# Patient Record
Sex: FEMALE | Race: BLACK OR AFRICAN AMERICAN | Employment: OTHER | URBAN - METROPOLITAN AREA
[De-identification: names, ages, dates, MRNs, and addresses within clinical notes are randomized per-mention and may not be internally consistent; named-entity substitution may affect disease eponyms.]

---

## 2017-01-04 ENCOUNTER — OFFICE VISIT (OUTPATIENT)
Dept: ORTHOPEDIC SURGERY | Age: 77
End: 2017-01-04

## 2017-01-04 VITALS
OXYGEN SATURATION: 94 % | WEIGHT: 175 LBS | SYSTOLIC BLOOD PRESSURE: 115 MMHG | RESPIRATION RATE: 18 BRPM | HEIGHT: 61 IN | BODY MASS INDEX: 33.04 KG/M2 | DIASTOLIC BLOOD PRESSURE: 71 MMHG | HEART RATE: 74 BPM | TEMPERATURE: 98.1 F

## 2017-01-04 DIAGNOSIS — M79.18 CERVICAL MYOFASCIAL PAIN SYNDROME: ICD-10-CM

## 2017-01-04 DIAGNOSIS — M48.02 CERVICAL SPINAL STENOSIS: Primary | ICD-10-CM

## 2017-01-04 DIAGNOSIS — M50.30 DDD (DEGENERATIVE DISC DISEASE), CERVICAL: ICD-10-CM

## 2017-01-04 DIAGNOSIS — M79.2 NEURITIS: ICD-10-CM

## 2017-01-04 DIAGNOSIS — M54.12 CERVICAL RADICULOPATHY: ICD-10-CM

## 2017-01-04 NOTE — MR AVS SNAPSHOT
Visit Information Date & Time Provider Department Dept. Phone Encounter #  
 1/4/2017  7:50 AM Armando Rosario MD [facility] [phone] [id number] Follow-up Instructions Return in about 6 months (around 7/4/2017) for Medication follow up. Routing History Your Appointments 1/30/2017  9:00 AM  
Office Visit with Shaneka Jimenez MD  
[facility] (--) Appt Note: medicare wellness [address] [id number] [phone]  
  
   
 [address] [id number] Upcoming Health Maintenance Date Due DTaP/Tdap/Td series (1 - Tdap) 2[birth year]1/1961 BREAST CANCER SCRN MAMMOGRAM 4/22/2017 MEDICARE YEARLY EXAM 1/26/2017 GLAUCOMA SCREENING Q2Y 1/11/2018 COLONOSCOPY 1/13/2020 Allergies as of 1/4/2017  Review Complete On: 1/4/2017 By: Armando Rosario MD  
  
 Severity Noted Reaction Type Reactions Naprosyn [Naproxen]  01/21/2014    Other (comments) Blood in urine Current Immunizations  Reviewed on 9/22/2016 Name Date Influenza High Dose Vaccine PF 9/22/2016  9:45 AM  
 Influenza Vaccine 10/20/2015, 9/26/2014, 10/8/2013 Influenza Vaccine Split 10/6/2011 Pneumococcal Conjugate (PCV-13) 5/11/2015 Pneumococcal Polysaccharide (PPSV-23) 6/14/2016 Not reviewed this visit You Were Diagnosed With   
  
 Codes Comments Cervical spinal stenosis    -  Primary ICD-10-CM: M48.02 
ICD-9-CM: 723.0 Cervical myofascial pain syndrome     ICD-10-CM: M79.1 ICD-9-CM: 729.1 Neuritis     ICD-10-CM: M79.2 ICD-9-CM: 729.2 Cervical radiculopathy     ICD-10-CM: M54.12 
ICD-9-CM: 723.4 DDD (degenerative disc disease), cervical     ICD-10-CM: M50.30 ICD-9-CM: 722.4 Vitals BP Pulse Temp Resp Height(growth percentile) Weight(growth percentile) 115/71 74 98.1 °F (36.7 °C) 18 5' 1\" (1.549 m) 175 lb (79.4 kg) SpO2 BMI OB Status Smoking Status 94% 33.07 kg/m2 Hysterectomy Former Smoker BMI and BSA Data Body Mass Index Body Surface Area 33.07 kg/m 2 1.85 m 2 Preferred Pharmacy Pharmacy Name Phone [address] [facility] [phone] Your Updated Medication List  
  
   
This list is accurate as of: 1/4/17  8:28 AM.  Always use your most recent med list.  
  
  
  
  
 acetaminophen-codeine 300-30 mg per tablet Commonly known as:  TYLENOL-CODEINE #3 Take 1 Tab by mouth two (2) times daily as needed for Pain. Max Daily Amount: 2 Tabs. albuterol 90 mcg/actuation inhaler Commonly known as:  PROAIR HFA Take 2 Puffs by inhalation every four (4) hours as needed. BIOTIN PO Take 5,000 mcg by mouth daily. CENTRUM SILVER PO Take 1 Tab by mouth daily. CITRACAL + D PO Take 3 Tabs by mouth daily. CO Q-10 100 mg capsule Generic drug:  co-enzyme Q-10 Take 100 mg by mouth daily. EPINEPHrine 0.3 mg/0.3 mL injection Commonly known as:  EPIPEN  
0.3 mL by IntraMUSCular route once as needed for 1 dose. esomeprazole 40 mg capsule Commonly known as:  NexIUM Take 1 Cap by mouth daily. * fluticasone furoate 100 mcg/actuation Dsdv inhaler Commonly known as:  ARNUITY ELLIPTA Take 1 Puff by inhalation daily. * fluticasone furoate 100 mcg/actuation Dsdv inhaler Commonly known as:  ARNUITY ELLIPTA Take 1 Puff by inhalation daily. gabapentin 600 mg Tb24 Commonly known as:  GRALISE  
TAKE 2 TABLETS (1,200 MG) DAILY  
  
 lidocaine 5 % Commonly known as:  Bard Mancuso Apply patch to the affected area for 12 hours a day and remove for 12 hours a day. * methylPREDNISolone 4 mg tablet Commonly known as:  Denton Beagle Per dose pack instructions * methylPREDNISolone 4 mg tablet Commonly known as:  Denton Beagle Per dose pack instructions  
  
 mometasone 50 mcg/actuation nasal spray Commonly known as:  NASONEX  
2 Sprays by Nasal route daily. omega-3 fatty acids-vitamin e 1,000 mg Cap Take 1 Cap by mouth two (2) times a day. OTHER Flurbiprofen 10%/ Baclofen 2%/ Cyclobenzaprine 2%/ Gabapentin 6%/ Lidocaine 5% Apply 1-2 Grams to affected area 3-4 times per day. 5/18/15-Fax from [facility]-reordered - 3 refills RESTASIS 0.05 % ophthalmic emulsion Generic drug:  cycloSPORINE Administer 1 Drop to both eyes two (2) times a day. * traMADol 50 mg tablet Commonly known as:  ULTRAM  
Take 1 Tab by mouth every eight (8) hours as needed for Pain. * traMADol 50 mg tablet Commonly known as:  ULTRAM  
Take 1 Tab by mouth every six (6) hours as needed for Pain. Max Daily Amount: 200 mg.  
  
 triamterene-hydroCHLOROthiazide 37.5-25 mg per tablet Commonly known as:  Soundra [place] TAKE ONE-HALF (1/2) TABLET DAILY  
  
 VITAMIN D3 1,000 unit Cap Generic drug:  cholecalciferol Take 1,000 Units by mouth daily. ZyrTEC 10 mg tablet Generic drug:  cetirizine Take 10 mg by mouth daily as needed for Allergies. * Notice: This list has 6 medication(s) that are the same as other medications prescribed for you. Read the directions carefully, and ask your doctor or other care provider to review them with you. Prescriptions Sent to Pharmacy Refills  
 gabapentin (GRALISE) 600 mg Tb24 1 Sig: TAKE 2 TABLETS (1,200 MG) DAILY Class: Normal  
 Pharmacy: [address], [address] [place] [address] #: [phone] Follow-up Instructions Return in about 6 months (around 7/4/2017) for Medication follow up. To-Do List   
 03/07/2017 7:00 AM  
  Appointment with [facility] RM 1 at [facility] ([phone]) GENERAL INSTRUCTIONS  This study does not require you to drink contrast prior to your study.   RELATED STUDY INFORMATION  Bring any films, CDs, and reports related with you on the day of your exam.  This only includes studies done outside of [address] [place] [address], [facility], Casandra Figueroa, and Valentino Agent. QUESTIONS  Notify the CT Department if you have any questions concerning your study. Casandra Figueroa - [phone] Moneta Darlin Siegel Valentino Agent - [phone] Patient Instructions Neck Arthritis: Exercises Your Care Instructions Here are some examples of typical rehabilitation exercises for your condition. Start each exercise slowly. Ease off the exercise if you start to have pain. Your doctor or physical therapist will tell you when you can start these exercises and which ones will work best for you. How to do the exercises Neck stretches to the side 1. This stretch works best if you keep your shoulder down as you lean away from it. To help you remember to do this, start by relaxing your shoulders and lightly holding on to your thighs or your chair. 2. Tilt your head toward your shoulder and hold for 15 to 30 seconds. Let the weight of your head stretch your muscles. 3. Repeat 2 to 4 times toward each shoulder. Chin tuck 1. Lie on the floor with a rolled-up towel under your neck. Your head should be touching the floor. 2. Slowly bring your chin toward your chest. 
3. Hold for a count of 6, and then relax for up to 10 seconds. 4. Repeat 8 to 12 times. Active cervical rotation 1. Sit in a firm chair, or stand up straight. 2. Keeping your chin level, turn your head to the right, and hold for 15 to 30 seconds. 3. Turn your head to the left and hold for 15 to 30 seconds. 4. Repeat 2 to 4 times to each side. Shoulder blade squeeze 1. While standing, squeeze your shoulder blades together. 2. Do not raise your shoulders up as you are squeezing. 3. Hold for 6 seconds. 4. Repeat 8 to 12 times. Shoulder rolls 1. Sit comfortably with your feet shoulder-width apart.  You can also do this exercise standing up. 2. Roll your shoulders up, then back, and then down in a smooth, circular motion. 3. Repeat 2 to 4 times. Follow-up care is a key part of your treatment and safety. Be sure to make and go to all appointments, and call your doctor if you are having problems. It's also a good idea to know your test results and keep a list of the medicines you take. Where can you learn more? Go to http://keke-josé luis.info/. Enter H599 in the search box to learn more about \"Neck Arthritis: Exercises. \" Current as of: May 23, 2016 Content Version: 11.1 © 8685-4397 CÃ[employer]ras Libres. Care instructions adapted under license by [employer] (which disclaims liability or warranty for this information). If you have questions about a medical condition or this instruction, always ask your healthcare professional. [place] [address] any warranty or liability for your use of this information. Low Back Arthritis: Exercises Your Care Instructions Here are some examples of typical rehabilitation exercises for your condition. Start each exercise slowly. Ease off the exercise if you start to have pain. Your doctor or physical therapist will tell you when you can start these exercises and which ones will work best for you. When you are not being active, find a comfortable position for rest. Some people are comfortable on the floor or a medium-firm bed with a small pillow under their head and another under their knees. Some people prefer to lie on their side with a pillow between their knees. Don't stay in one position for too long. Take short walks (10 to 20 minutes) every 2 to 3 hours. Avoid slopes, hills, and stairs until you feel better. Walk only distances you can manage without pain, especially leg pain. How to do the exercises Pelvic tilt 4. Lie on your back with your knees bent. 5. \"Brace\" your stomachtighten your muscles by pulling in and imagining your belly button moving toward your spine. 6. Press your lower back into the floor. You should feel your hips and pelvis rock back. 7. Hold for 6 seconds while breathing smoothly. 8. Relax and allow your pelvis and hips to rock forward. 9. Repeat 8 to 12 times. Back stretches 5. Get down on your hands and knees on the floor. 6. Relax your head and allow it to droop. Round your back up toward the ceiling until you feel a nice stretch in your upper, middle, and lower back. Hold this stretch for as long as it feels comfortable, or about 15 to 30 seconds. 7. Return to the starting position with a flat back while you are on your hands and knees. 8. Let your back sway by pressing your stomach toward the floor. Lift your buttocks toward the ceiling. 9. Hold this position for 15 to 30 seconds. 10. Repeat 2 to 4 times. Follow-up care is a key part of your treatment and safety. Be sure to make and go to all appointments, and call your doctor if you are having problems. It's also a good idea to know your test results and keep a list of the medicines you take. Where can you learn more? Go to http://keke-josé luis.info/. Enter Y502 in the search box to learn more about \"Low Back Arthritis: Exercises. \" Current as of: May 23, 2016 Content Version: 11.1 © 6438-4817 [employer]. Care instructions adapted under license by [employer] (which disclaims liability or warranty for this information). If you have questions about a medical condition or this instruction, always ask your healthcare professional. [address] any warranty or liability for your use of this information. Introducing [facility]! Dear Evelyne Wood: Thank you for requesting a [employer] ([employer]) account. Our records indicate that you already have an active [employer] ([employer]) account.   You can access your account anytime at [email or web address] Did you know that you can access your hospital and ER discharge instructions at any time in Sharetribe? You can also review all of your test results from your hospital stay or ER visit. Additional Information If you have questions, please visit the Frequently Asked Questions section of the Sharetribe website at [email or web address] Remember, Sharetribe is NOT to be used for urgent needs. For medical emergencies, dial 911. Now available from your iPhone and Android! Please provide this summary of care documentation to your next provider. Your primary care clinician is listed as SPENSER MIRANDA. If you have any questions after today's visit, please call [phone].

## 2017-01-04 NOTE — PROGRESS NOTES
[facility]  [address] [place], [address]  Phone: [phone]  Fax: [phone]          HISTORY OF PRESENT ILLNESS:  Ajay Padilla is a 78 y.o. female with history of cervical and lumbar pain. She c/o pain in the neck that intermittently radiates down both arms. Pt tried physical therapy with benefit. She denies ever trying dry needling. Pt has been prescribed Gralise 600 mg, taking 2 tabs daily, and requests a refill at this time. She denies any balance or weakness issues. Pt desires to continue with current care. Of note, Pt's  passed away in September. Pain Scale: 0 - No pain/10    PCP: Yoselin Valentine MD       Past Medical History   Diagnosis Date    AR (allergic rhinitis)     Arthropathy, unspecified, site unspecified     Asthma     Cataract     Cervical pain     Cylindrical bronchiectasis (HCC)     Fracture      rt ankle as an adult    History of pneumonia     Hypertension     Ill-defined condition      Spasms to left side    Left arm pain     Lumbar spinal stenosis     Myofascial pain     Osteopenia     Right buttock pain     Right low back pain     Sciatica of right side         Social History     Social History    Marital status:      Spouse name: N/A    Number of children: N/A    Years of education: N/A     Occupational History    Not on file.      Social History Main Topics    Smoking status: Former Smoker     Years: 5.00     Types: Cigarettes     Quit date: 1/1/1970    Smokeless tobacco: Never Used    Alcohol use 0.6 oz/week     1 Glasses of wine per week      Comment: rare    Drug use: Yes     Special: Prescription, OTC    Sexual activity: Not on file     Other Topics Concern    Not on file     Social History Narrative       Current Outpatient Prescriptions   Medication Sig Dispense Refill    gabapentin (GRALISE) 600 mg Tb24 TAKE 2 TABLETS (1,200 MG) DAILY 180 Tab 1    traMADol (ULTRAM) 50 mg tablet Take 1 Tab by mouth every six (6) hours as needed for Pain. Max Daily Amount: 200 mg. 20 Tab 0    lidocaine (LIDODERM) 5 % Apply patch to the affected area for 12 hours a day and remove for 12 hours a day. 1 Each 0    fluticasone furoate (ARNUITY ELLIPTA) 100 mcg/actuation dsdv inhaler Take 1 Puff by inhalation daily. 1 Inhaler 0    fluticasone furoate (ARNUITY ELLIPTA) 100 mcg/actuation dsdv inhaler Take 1 Puff by inhalation daily. 1 Inhaler 5    omega-3 fatty acids-vitamin e 1,000 mg cap Take 1 Cap by mouth two (2) times a day.  albuterol (PROAIR HFA) 90 mcg/actuation inhaler Take 2 Puffs by inhalation every four (4) hours as needed. 3 Inhaler 3    Cholecalciferol, Vitamin D3, (VITAMIN D3) 1,000 unit cap Take 1,000 Units by mouth daily.  traMADol (ULTRAM) 50 mg tablet Take 1 Tab by mouth every eight (8) hours as needed for Pain. 30 Tab 0    OTHER Flurbiprofen 10%/ Baclofen 2%/ Cyclobenzaprine 2%/ Gabapentin 6%/ Lidocaine 5%  Apply 1-2 Grams to affected area 3-4 times per day. 5/18/15-Fax from [facility]-reordered - 3 refills      EPINEPHrine (EPIPEN) 0.3 mg/0.3 mL (1:1,000) injection 0.3 mL by IntraMUSCular route once as needed for 1 dose. 1 Each 1    cycloSPORINE (RESTASIS) 0.05 % ophthalmic emulsion Administer 1 Drop to both eyes two (2) times a day.  mometasone (NASONEX) 50 mcg/actuation nasal spray 2 Sprays by Nasal route daily. 3 Container 3    MULTIVITAMIN W-MINERALS/LUTEIN (CENTRUM SILVER PO) Take 1 Tab by mouth daily.  cetirizine (ZYRTEC) 10 mg tablet Take 10 mg by mouth daily as needed for Allergies.  co-enzyme Q-10 (CO Q-10) 100 mg capsule Take 100 mg by mouth daily.  CALCIUM CITRATE/VITAMIN D3 (CITRACAL + D PO) Take 3 Tabs by mouth daily.  BIOTIN PO Take 5,000 mcg by mouth daily.       triamterene-hydroCHLOROthiazide (MAXZIDE) 37.5-25 mg per tablet TAKE ONE-HALF (1/2) TABLET DAILY 45 Tab 1    acetaminophen-codeine (TYLENOL-CODEINE #3) 300-30 mg per tablet Take 1 Tab by mouth two (2) times daily as needed for Pain. Max Daily Amount: 2 Tabs. 60 Tab 0    methylPREDNISolone (MEDROL DOSEPACK) 4 mg tablet Per dose pack instructions 1 Dose Pack 1    methylPREDNISolone (MEDROL DOSEPACK) 4 mg tablet Per dose pack instructions 1 Dose Pack 1    esomeprazole (NEXIUM) 40 mg capsule Take 1 Cap by mouth daily. 90 Cap 1       Allergies   Allergen Reactions    Naprosyn [Naproxen] Other (comments)     Blood in urine           REVIEW OF SYSTEMS    Constitutional: Negative for fever, chills, or weight change. Respiratory: Negative for cough or shortness of breath. Cardiovascular: Negative for chest pain or palpitations. Gastrointestinal: Negative for acid reflux, change in bowel habits, or constipation. Genitourinary: Negative for dysuria and flank pain. Musculoskeletal: Positive for cervical and lumbar pain. Skin: Negative for rash. Neurological: Negative for headaches, dizziness, or numbness. Endo/Heme/Allergies: Negative for increased bruising. Psychiatric/Behavioral: Negative for difficulty with sleep. PHYSICAL EXAMINATION  Visit Vitals    /71    Pulse 74    Temp 98.1 °F (36.7 °C)    Resp 18    Ht 5' 1\" (1.549 m)    Wt 175 lb (79.4 kg)    SpO2 94%    BMI 33.07 kg/m2       Constitutional: Awake, alert, and in no acute distress  Neurological: 1+ symmetrical DTRs in the upper extremities. 1+ symmetrical DTRs in the lower extremities. Sensation to light touch is intact. Negative Yessi's sign bilaterally. Skin: warm, dry, and intact. Musculoskeletal: Decrease left cervical flexion. Good ROM with cervical extension and forward flexion. Tight across the upper trapezius with multiple trigger points. No pain with extension, axial loading, or forward flexion. No pain with internal or external rotation of her hips. Negative straight leg raise bilaterally.       Biceps  Triceps Deltoids Wrist Ext Wrist Flex Hand Intrin   Right +4/5 +4/5 +4/5 +4/5 +4/5 +4/5   Left +4/5 +4/5 +4/5 +4/5 +4/5 +4/5      Hip Flex  Quads Hamstrings Ankle DF EHL Ankle PF   Right +4/5 +4/5 +4/5 +4/5 +4/5 +4/5   Left +4/5 +4/5 +4/5 +4/5 +4/5 +4/5     IMAGING:    Cervical Spine MRI from 11/08/2016 was personally reviewed with the Pt and demonstrated:    Results from [place] encounter on 11/08/16   MRI CERV SPINE WO CONT   Narrative MRI OF CERVICAL SPINE WITHOUT CONTRAST    CPT CODE: 01502    HISTORY: Chronic neck pain extending into the left shoulder with progressive  paresthesias left hand. COMPARISON: MRI cervical spine 4/10/2014. FINDINGS:     There is normal anatomic alignment of the cervical spine. Vertebral body heights  are maintained. Craniocervical junction and posterior elements are intact. Prevertebral soft tissues are normal. Incidentally imaged intracranial soft  tissues demonstrate no acute abnormalities. Within limitations of motion  artifact, cervical spinal cord maintains normal caliber, signal intensity and  morphology throughout. Cervical soft tissues demonstrate no acute abnormalities. C2/C3: Central canal and neural foramina are patent. C3/C4: Central canal and neural foramina are patent. C4/C5: Central canal and neural foramina are patent. C5/C6: Broad-based disc protrusion. Contact of the ventral cord. CSF dorsal to  the cord is maintained. Probable moderate right greater than left foraminal  stenoses. C6/C7: Mild bulging of the disc. Probable mild bilateral foraminal narrowing. C7/T1: Central canal and neural foramina are patent. T1/T2 through T4/T5: Central canal and foramina are adequate on the sagittal  images. Impression IMPRESSION:    Allowing for differences in technique, no substantial interval change since  2014.     Degenerative changes of the cervical spine are most pronounced at C5/C6 where  there is a disc protrusion which contacts the ventral cord and moderate  bilateral foraminal stenosis. Lumbar Spine MRI from 06/30/2016 was personally reviewed with the Pt and demonstrated:  [facility]   MRI LUMB SPINE WO CONT    Narrative Procedure: MRI of the lumbar spine without contrast.    CPT code: 03952    Comparisons: April 10, 2014. Indications: new left radicular symptoms; increased muscle spasm and pain for 3  months     Technique: T1 weighted, T2 FSE with fat saturation, FSE inversion recovery  sagittal images are supplemented by T2 weighted with fat saturation and T1  weighted axial images. Findings:        Sagittal images reveal overall normal vertebral body morphology. No fractures  noted. No suspicious lesions. Conus medullaris ends at the L1 vertebral body level. Correlation of axial and sagittal images reveals the following:    At L1-L2: No significant disc pathology. No significant facet arthropathy. No  central canal or foraminal stenosis. At L2-L3: Mild disc osteophyte complex at the posterior lateral corners. Mild  facet arthropathy. No central canal stenosis. Mild to moderate bilateral  foraminal stenosis. At L3-L4: Mild disc osteophyte complex at the posterior lateral corners. Mild to  moderate facet arthropathy. Posterior epidural lipomatosis with some mass  effect. No central canal stenosis. Mild left and mild to moderate right  foraminal stenosis. At L4-L5: Mild disc osteophyte complex at the posterior lateral corners. Mild to  moderate facet arthropathy. No central canal stenosis. Mild to moderate  foraminal stenosis. At L5-S1: Trace retrolisthesis L5 on S1. Overlying broad-based disc osteophyte  complex. Moderate facet arthropathy. No central canal stenosis. Moderate  foraminal stenosis. Visualized portions of the sacroiliac joints are unremarkable.  Incidentally  imaged retroperitoneal structures are unremarkable as well.               Impression Impression:    Degenerative changes as above. Interval resolution of disc extrusion at L5-S1  seen on prior study from 2014. ASSESSMENT   Law Donaldson was seen today for neck pain. Diagnoses and all orders for this visit:    Cervical spinal stenosis    Cervical myofascial pain syndrome    Neuritis  -     Discontinue: gabapentin (GRALISE) 600 mg Tb24; TAKE 2 TABLETS (1,200 MG) DAILY  -     gabapentin (GRALISE) 600 mg Tb24; TAKE 2 TABLETS (1,200 MG) DAILY    Cervical radiculopathy  -     Discontinue: gabapentin (GRALISE) 600 mg Tb24; TAKE 2 TABLETS (1,200 MG) DAILY  -     gabapentin (GRALISE) 600 mg Tb24; TAKE 2 TABLETS (1,200 MG) DAILY    DDD (degenerative disc disease), cervical  -     Discontinue: gabapentin (GRALISE) 600 mg Tb24; TAKE 2 TABLETS (1,200 MG) DAILY  -     gabapentin (GRALISE) 600 mg Tb24; TAKE 2 TABLETS (1,200 MG) DAILY         IMPRESSION AND PLAN:  Bartolome Hassan is a 78 y.o. female with history of cervical and lumbar pain. She c/o pain in the neck that intermittently radiates down both arms. Pt tried physical therapy with benefit but denies ever trying dry needling. She continues to take Gralise 600 mg 2 tabs daily. 1) Pt was given information on cervical and lumbar arthritis exercises. 2) She received a refill of Gralise 600 mg 2 tabs daily. 3) Pt was given information on dry needling; she will call if her pain worsens and she would like to try this  4) She was given information on how to use a TheraCane. 5) Ms. Marcela Crisostomo has a reminder for a \"due or due soon\" health maintenance. I have asked that she contact her primary care provider, Katia Diaz MD,  for follow-up on this health maintenance. 6)  reviewed. 7) Pt will follow-up in 6 months.       Written by Dennis Kraft, as dictated by Arthur Hubbard MD.

## 2017-01-24 ENCOUNTER — HOSPITAL ENCOUNTER (OUTPATIENT)
Dept: LAB | Age: 77
Discharge: HOME | End: 2017-01-24
Payer: MEDICARE

## 2017-01-24 DIAGNOSIS — E55.9 VITAMIN D DEFICIENCY: ICD-10-CM

## 2017-01-24 DIAGNOSIS — R68.89 OTHER GENERAL SYMPTOMS AND SIGNS: ICD-10-CM

## 2017-01-24 DIAGNOSIS — Z51.81 MEDICATION MONITORING ENCOUNTER: ICD-10-CM

## 2017-01-24 DIAGNOSIS — I10 ESSENTIAL HYPERTENSION: ICD-10-CM

## 2017-01-24 LAB
25(OH)D3 SERPL-MCNC: 35 NG/ML (ref 30–100)
ALBUMIN SERPL BCP-MCNC: 3.6 G/DL (ref 3.4–5)
ALBUMIN/GLOB SERPL: 1 {RATIO} (ref 0.8–1.7)
ALP SERPL-CCNC: 81 U/L (ref 45–117)
ALT SERPL-CCNC: 29 U/L (ref 13–56)
ANION GAP BLD CALC-SCNC: 7 MMOL/L (ref 3–18)
AST SERPL W P-5'-P-CCNC: 16 U/L (ref 15–37)
BASOPHILS # BLD AUTO: 0 K/UL (ref 0–0.1)
BASOPHILS # BLD: 0 % (ref 0–2)
BILIRUB SERPL-MCNC: 0.4 MG/DL (ref 0.2–1)
BUN SERPL-MCNC: 10 MG/DL (ref 7–18)
BUN/CREAT SERPL: 13 (ref 12–20)
CALCIUM SERPL-MCNC: 8.5 MG/DL (ref 8.5–10.1)
CHLORIDE SERPL-SCNC: 106 MMOL/L (ref 100–108)
CHOLEST SERPL-MCNC: 177 MG/DL
CO2 SERPL-SCNC: 28 MMOL/L (ref 21–32)
CREAT SERPL-MCNC: 0.75 MG/DL (ref 0.6–1.3)
DIFFERENTIAL METHOD BLD: NORMAL
EOSINOPHIL # BLD: 0 K/UL (ref 0–0.4)
EOSINOPHIL NFR BLD: 0 % (ref 0–5)
ERYTHROCYTE [DISTWIDTH] IN BLOOD BY AUTOMATED COUNT: 14.2 % (ref 11.6–14.5)
GLOBULIN SER CALC-MCNC: 3.5 G/DL (ref 2–4)
GLUCOSE SERPL-MCNC: 94 MG/DL (ref 74–99)
HCT VFR BLD AUTO: 41.6 % (ref 35–45)
HDLC SERPL-MCNC: 61 MG/DL (ref 40–60)
HDLC SERPL: 2.9 {RATIO} (ref 0–5)
HGB BLD-MCNC: 14.1 G/DL (ref 12–16)
LDLC SERPL CALC-MCNC: 94.6 MG/DL (ref 0–100)
LIPID PROFILE,FLP: ABNORMAL
LYMPHOCYTES # BLD AUTO: 35 % (ref 21–52)
LYMPHOCYTES # BLD: 1.7 K/UL (ref 0.9–3.6)
MAGNESIUM SERPL-MCNC: 2.2 MG/DL (ref 1.8–2.4)
MCH RBC QN AUTO: 30 PG (ref 24–34)
MCHC RBC AUTO-ENTMCNC: 33.9 G/DL (ref 31–37)
MCV RBC AUTO: 88.5 FL (ref 74–97)
MONOCYTES # BLD: 0.3 K/UL (ref 0.05–1.2)
MONOCYTES NFR BLD AUTO: 6 % (ref 3–10)
NEUTS SEG # BLD: 2.9 K/UL (ref 1.8–8)
NEUTS SEG NFR BLD AUTO: 59 % (ref 40–73)
PLATELET # BLD AUTO: 323 K/UL (ref 135–420)
PMV BLD AUTO: 10.5 FL (ref 9.2–11.8)
POTASSIUM SERPL-SCNC: 3.6 MMOL/L (ref 3.5–5.5)
PROT SERPL-MCNC: 7.1 G/DL (ref 6.4–8.2)
RBC # BLD AUTO: 4.7 M/UL (ref 4.2–5.3)
SODIUM SERPL-SCNC: 141 MMOL/L (ref 136–145)
TRIGL SERPL-MCNC: 107 MG/DL (ref ?–150)
TSH SERPL DL<=0.05 MIU/L-ACNC: 0.77 UIU/ML (ref 0.36–3.74)
VIT B12 SERPL-MCNC: 494 PG/ML (ref 211–911)
VLDLC SERPL CALC-MCNC: 21.4 MG/DL
WBC # BLD AUTO: 4.9 K/UL (ref 4.6–13.2)

## 2017-01-24 PROCEDURE — 84443 ASSAY THYROID STIM HORMONE: CPT | Performed by: FAMILY MEDICINE

## 2017-01-24 PROCEDURE — 36415 COLL VENOUS BLD VENIPUNCTURE: CPT | Performed by: FAMILY MEDICINE

## 2017-01-24 PROCEDURE — 80053 COMPREHEN METABOLIC PANEL: CPT | Performed by: FAMILY MEDICINE

## 2017-01-24 PROCEDURE — 85025 COMPLETE CBC W/AUTO DIFF WBC: CPT | Performed by: FAMILY MEDICINE

## 2017-01-24 PROCEDURE — 82607 VITAMIN B-12: CPT | Performed by: FAMILY MEDICINE

## 2017-01-24 PROCEDURE — 83735 ASSAY OF MAGNESIUM: CPT | Performed by: FAMILY MEDICINE

## 2017-01-24 PROCEDURE — 80061 LIPID PANEL: CPT | Performed by: FAMILY MEDICINE

## 2017-01-24 PROCEDURE — 82306 VITAMIN D 25 HYDROXY: CPT | Performed by: FAMILY MEDICINE

## 2017-01-30 ENCOUNTER — OFFICE VISIT (OUTPATIENT)
Dept: FAMILY MEDICINE CLINIC | Age: 77
End: 2017-01-30

## 2017-01-30 VITALS
HEIGHT: 61 IN | HEART RATE: 63 BPM | TEMPERATURE: 97.2 F | SYSTOLIC BLOOD PRESSURE: 119 MMHG | DIASTOLIC BLOOD PRESSURE: 75 MMHG | WEIGHT: 171.25 LBS | RESPIRATION RATE: 16 BRPM | BODY MASS INDEX: 32.33 KG/M2

## 2017-01-30 DIAGNOSIS — Z13.31 SCREENING FOR DEPRESSION: ICD-10-CM

## 2017-01-30 DIAGNOSIS — Z12.11 SCREEN FOR COLON CANCER: ICD-10-CM

## 2017-01-30 DIAGNOSIS — Z13.39 SCREENING FOR ALCOHOLISM: ICD-10-CM

## 2017-01-30 DIAGNOSIS — Z71.89 ACP (ADVANCE CARE PLANNING): ICD-10-CM

## 2017-01-30 DIAGNOSIS — R91.8 MULTIPLE LUNG NODULES: ICD-10-CM

## 2017-01-30 DIAGNOSIS — Z71.89 ADVANCED DIRECTIVES, COUNSELING/DISCUSSION: ICD-10-CM

## 2017-01-30 DIAGNOSIS — Z00.00 ROUTINE GENERAL MEDICAL EXAMINATION AT A HEALTH CARE FACILITY: Primary | ICD-10-CM

## 2017-01-30 DIAGNOSIS — I10 ESSENTIAL HYPERTENSION: ICD-10-CM

## 2017-01-30 RX ORDER — LANOLIN ALCOHOL/MO/W.PET/CERES
3 CREAM (GRAM) TOPICAL
COMMUNITY

## 2017-01-30 NOTE — PROGRESS NOTES
Chief Complaint   Patient presents with   [facility] 41 Visit     Medicare       Health Maintenance reviewed     1. Have you been to the ER, urgent care clinic since your last visit? Hospitalized since your last visit? No    2. Have you seen or consulted any other health care providers outside of the [address] [place] [address] since your last visit? Include any pap smears or colon screening. No    This is a Subsequent Medicare Annual Wellness Visit providing Personalized Prevention Plan Services (PPPS) (Performed 12 months after initial AWV and PPPS )    I have reviewed the patient's medical history in detail and updated the computerized patient record. History     Past Medical History   Diagnosis Date    AR (allergic rhinitis)     Arthropathy, unspecified, site unspecified     Asthma     Cataract     Cervical pain     Cylindrical bronchiectasis (HCC)     Fracture      rt ankle as an adult    History of pneumonia     Hypertension     Ill-defined condition      Spasms to left side    Left arm pain     Lumbar spinal stenosis     Myofascial pain     Osteopenia     Right buttock pain     Right low back pain     Sciatica of right side       Past Surgical History   Procedure Laterality Date    Hx tosha and bso      Hx orthopaedic       left 4th finger trigger release    Hx heent       sinus surgery    Hx cataract removal  8/15/2011     Current Outpatient Prescriptions   Medication Sig Dispense Refill    melatonin 3 mg tablet Take  by mouth.  gabapentin (GRALISE) 600 mg Tb24 TAKE 2 TABLETS (1,200 MG) DAILY 180 Tab 1    triamterene-hydroCHLOROthiazide (MAXZIDE) 37.5-25 mg per tablet TAKE ONE-HALF (1/2) TABLET DAILY 45 Tab 1    acetaminophen-codeine (TYLENOL-CODEINE #3) 300-30 mg per tablet Take 1 Tab by mouth two (2) times daily as needed for Pain. Max Daily Amount: 2 Tabs.  60 Tab 0    lidocaine (LIDODERM) 5 % Apply patch to the affected area for 12 hours a day and remove for 12 hours a day. 1 Each 0    fluticasone furoate (ARNUITY ELLIPTA) 100 mcg/actuation dsdv inhaler Take 1 Puff by inhalation daily. 1 Inhaler 0    omega-3 fatty acids-vitamin e 1,000 mg cap Take 1 Cap by mouth two (2) times a day.  albuterol (PROAIR HFA) 90 mcg/actuation inhaler Take 2 Puffs by inhalation every four (4) hours as needed. 3 Inhaler 3    Cholecalciferol, Vitamin D3, (VITAMIN D3) 1,000 unit cap Take 1,000 Units by mouth daily.  traMADol (ULTRAM) 50 mg tablet Take 1 Tab by mouth every eight (8) hours as needed for Pain. 30 Tab 0    OTHER Flurbiprofen 10%/ Baclofen 2%/ Cyclobenzaprine 2%/ Gabapentin 6%/ Lidocaine 5%  Apply 1-2 Grams to affected area 3-4 times per day. 5/18/15-[facility] - [facility] refills      EPINEPHrine (EPIPEN) 0.3 mg/0.3 mL (1:1,000) injection 0.3 mL by IntraMUSCular route once as needed for 1 dose. 1 Each 1    cycloSPORINE (RESTASIS) 0.05 % ophthalmic emulsion Administer 1 Drop to both eyes two (2) times a day.  mometasone (NASONEX) 50 mcg/actuation nasal spray 2 Sprays by Nasal route daily. 3 Container 3    MULTIVITAMIN W-MINERALS/LUTEIN (CENTRUM SILVER PO) Take 1 Tab by mouth daily.  cetirizine (ZYRTEC) 10 mg tablet Take 10 mg by mouth daily as needed for Allergies.  co-enzyme Q-10 (CO Q-10) 100 mg capsule Take 100 mg by mouth daily.  CALCIUM CITRATE/VITAMIN D3 (CITRACAL + D PO) Take 3 Tabs by mouth daily.  BIOTIN PO Take 5,000 mcg by mouth daily.  esomeprazole (NEXIUM) 40 mg capsule Take 1 Cap by mouth daily.  90 Cap 1     Allergies   Allergen Reactions    Naprosyn [Naproxen] Other (comments)     Blood in urine       Family History   Problem Relation Age of Onset    Asthma Mother     COPD Mother    [facility] MS Mother     Heart Disease Mother     Hypertension Mother    [facility] Stroke Father     Heart Disease Father     Hypertension Father     Allergic Rhinitis Sister     Asthma Brother     Hypertension Brother     Diabetes Brother     Migraines Paternal Grandmother      Social History   Substance Use Topics    Smoking status: Former Smoker     Years: 5.00     Types: Cigarettes     Quit date: 1/1/1970    Smokeless tobacco: Never Used    Alcohol use 0.6 oz/week     1 Glasses of wine per week      Comment: rare     Patient Active Problem List   Diagnosis Code    Multiple lung nodules R91.8    Allergic rhinitis J30.9    HTN (hypertension) I10    Asthma J45.909    Osteopenia M85.80    Vitamin D deficiency E55.9    Dense breasts R92.2    Right knee DJD, 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    Thoracic or lumbosacral neuritis or radiculitis, unspecified [id number]    Cervical radiculopathy M54.12    Colon polyps K63.5    Right low back pain M54.5    Right buttock pain M79.1    Annular disc tear M51.9    Facet hypertrophy of lumbar region M47.896    Bulging lumbar disc M51.26    Lumbar spinal stenosis M48.06    ACP (advance care planning) Z71.89    Neuritis of lower extremity G57.90    Trochanteric bursitis of both hips M70.61, M70.62    HNP (herniated nucleus pulposus), lumbar M51.26    Lumbar facet arthropathy (HCC) M12.88    Trochanteric bursitis, left hip M70.62    Left arm weakness M62.81       Depression Risk Factor Screening:     PHQ 2 / 9, over the last two weeks 6/14/2016   Little interest or pleasure in doing things Not at all   Feeling down, depressed or hopeless Not at all   Total Score PHQ 2 0     Alcohol Risk Factor Screening: On any occasion during the past 3 months, have you had more than 3 drinks containing alcohol? Yes    Do you average more than 7 drinks per week? No      Functional Ability and Level of Safety:   No exam data present    Hearing Loss   none    Activities of Daily Living   Self-care. Requires assistance with: no ADLs    Fall Risk     Fall Risk Assessment, last 12 mths 1/4/2017   Able to walk? Yes   Fall in past 12 months?  No     Abuse Screen   Patient is not abused    Review of Systems   Pertinent items are noted in HPI. Physical Examination     Evaluation of Cognitive Function:  Mood/affect:  happy  Appearance: age appropriate  Family member/caregiver input: no    Nursing note and vitals reviewed. Constitutional: She is oriented to person, place, and time. She appears well-developed and well-nourished. No distress. HENT:   Mouth/Throat: Oropharynx is clear and moist.   Neck: No JVD present. No thyromegaly present. Cardiovascular: Normal rate, regular rhythm and normal heart sounds. Pulmonary/Chest: Effort normal and breath sounds normal. No respiratory distress. She has no wheezes. She has no rales. Musculoskeletal: She exhibits no edema. Lymphadenopathy:     She has no cervical adenopathy. Neurological: She is alert and oriented to person, place, and time. Coordination normal.   Psychiatric: She has a normal mood and affect. Her behavior is normal.     Patient Care Team:  Dedra Leal MD as PCP - Lamine Arauz MD (Pulmonary Disease)    Advice/Referrals/Counseling   Education and counseling provided:  Are appropriate based on today's review and evaluation  End-of-Life planning (with patient's consent)  Pneumococcal Vaccine  Influenza Vaccine  Screening Pap and pelvic (covered once every 2 years)  Colorectal cancer screening tests    Assessment/Plan       ICD-10-CM ICD-9-CM    1. Routine general medical examination at a health care facility Z00.00 V70.0    2. Screening for alcoholism Z13.89 V79.1    3. Advanced directives, counseling/discussion Z71.89 V65.49    4. Screening for depression Z13.89 V79.0 DEPRESSION SCREEN ANNUAL   5. Screen for colon cancer Z12.11 V76.51 OCCULT BLOOD, IMMUNOASSAY (FIT)   6.  ACP (advance care planning) Z71.89 V65.49 ADVANCE CARE PLANNING FIRST 30 MINS

## 2017-01-30 NOTE — PATIENT INSTRUCTIONS
Please contact our office if you have any questions about your visit today. Medicare Part B Preventive Services Limitations Recommendation Scheduled   Bone Mass Measurement  (age 72 & older, biennial) Requires diagnosis related to osteoporosis or estrogen deficiency. Biennial benefit unless patient has history of long-term glucocorticoid tx or baseline is needed because initial test was by other method     Cardiovascular Screening Blood Tests (every 5 years)  Total cholesterol, HDL, Triglycerides Order as a panel if possible     Colorectal Cancer Screening  -Fecal occult blood test (annual)  -Flexible sigmoidoscopy (5y)  -Screening colonoscopy (10y)  -Barium Enema      Counseling to Prevent Tobacco Use (up to 8 sessions per year)  - Counseling greater than 3 and up to 10 minutes  - Counseling greater than 10 minutes Patients must be asymptomatic of tobacco-related conditions to receive as preventive service     Diabetes Screening Tests (at least every 3 years, Medicare covers annually or at 6-month intervals for prediabetic patients)    Fasting blood sugar (FBS) or glucose tolerance test (GTT) Patient must be diagnosed with one of the following:  -Hypertension, Dyslipidemia, obesity, previous impaired FBS or GTT  Or any two of the following: overweight, FH of diabetes, age ? 72, history of gestational diabetes, birth of baby weighing more than 9 pounds     Diabetes Self-Management Training (DSMT) (no USPSTF recommendation) Requires referral by treating physician for patient with diabetes or renal disease. 10 hours of initial DSMT session of no less than 30 minutes each in a continuous 12-month period. 2 hours of follow-up DSMT in subsequent years.      Glaucoma Screening (no USPSTF recommendation) Diabetes mellitus, family history, , age 48 or over,  American, age 72 or over     Human Immunodeficiency Virus (HIV) Screening (annually for increased risk patients)  HIV-1 and HIV-2 by EIA, LC, rapid antibody test, or oral mucosa transudate Patient must be at increased risk for HIV infection per USPSTF guidelines or pregnant. Tests covered annually for patients at increased risk. Pregnant patients may receive up to 3 test during pregnancy. Medical Nutrition Therapy (MNT) (for diabetes or renal disease not recommended schedule) Requires referral by treating physician for patient with diabetes or renal disease. Can be provided in same year as diabetes self-management training (DSMT), and CMS recommends medical nutrition therapy take place after DSMT. Up to 3 hours for initial year and 2 hours in subsequent years. Shingles Vaccination A shingles vaccine is also recommended once in a lifetime after age 61     Seasonal Influenza Vaccination (annually)      Pneumococcal Vaccination (once after 72)      Hepatitis B Vaccinations (if medium/high risk) Medium/high risk factors:  End-stage renal disease,  Hemophiliacs who received Factor VIII or IX concentrates, Clients of institutions for the mentally retarded, Persons who live in the same house as a HepB virus carrier, Homosexual men, Illicit injectable drug abusers. Screening Mammography (biennial age 54-69) Annually (age 36 or over)     Screening Pap Tests and Pelvic Examination (up to age 79 and after 79 if unknown history or abnormal study last 10 years) Every 25 months except high risk     Ultrasound Screening for Abdominal Aortic Aneurysm (AAA) (once) Patient must be referred through Critical access hospital and not have had a screening for abdominal aortic aneurysm before under Medicare.   Limited to patients who meet one of the following criteria:  - Men who are 73-68 years old and have smoked more than 100 cigarettes in their lifetime.  -Anyone with a FH of AAA  -Anyone recommended for screening by USPSTF

## 2017-01-30 NOTE — ACP (ADVANCE CARE PLANNING)
Advance Care Planning    Advance Care Planning (ACP) Provider Note - Comprehensive     Date of ACP Conversation: 01/30/17  Persons included in Conversation:  patient  Length of ACP Conversation in minutes:  16 minutes    Authorized Decision Maker (if patient is incapable of making informed decisions): This person is:  none          General ACP for ALL Patients with Decision Making Capacity:   Importance of advance care planning, including choosing a healthcare agent to communicate patient's healthcare decisions if patient lost the ability to make decisions, such as after a sudden illness or accident  Understanding of the healthcare agent role was assessed and information provided  Exploration of values, goals, and preferences if recovery is not expected, even with continued medical treatment in the event of: Imminent death  Severe, permanent brain injury  Opportunity offered to explore how cultural, Jehovah's witness, spiritual, or personal beliefs would affect decisions for future care     Review of Existing Advance Directive:  none    For Serious or Chronic Illness:  No known illness    Interventions Provided:  Recommended completion of Advance Directive form after review of ACP materials and conversation with prospective healthcare agent   Recommended communicating the plan and making copies for the healthcare agent, personal physician, and others as appropriate (e.g., health system)  Recommended review of completed ACP document annually or upon change in health status Discussed in detail [address] with patient.  Patient seems to have decided on no heroic measures if terminally ill, but wants care if deemed appropriate, at the present time and handouts given for pt complete disposition

## 2017-01-30 NOTE — MR AVS SNAPSHOT
Visit Information Date & Time Provider Department Dept. Phone Encounter #  
 1/30/2017  9:00 AM Evelio WelchBev [facility] [phone] Follow-up Instructions Return in about 3 months (around 4/30/2017). Upcoming Health Maintenance Date Due DTaP/Tdap/Td series (1 - Tdap) 2[birth year]1/1961 MEDICARE YEARLY EXAM 1/26/2017 BREAST CANCER SCRN MAMMOGRAM 4/22/2017 GLAUCOMA SCREENING Q2Y 1/11/2018 COLONOSCOPY 1/13/2020 Allergies as of 1/30/2017  Review Complete On: 1/30/2017 By: Evelio Welch MD  
  
 Severity Noted Reaction Type Reactions Naprosyn [Naproxen]  01/21/2014    Other (comments) Blood in urine Current Immunizations  Reviewed on 9/22/2016 Name Date Influenza High Dose Vaccine PF 9/22/2016  9:45 AM  
 Influenza Vaccine 10/20/2015, 9/26/2014, 10/8/2013 Influenza Vaccine Split 10/6/2011 Pneumococcal Conjugate (PCV-13) 5/11/2015 Pneumococcal Polysaccharide (PPSV-23) 6/14/2016 Not reviewed this visit You Were Diagnosed With   
  
 Codes Comments Routine general medical examination at a health care facility    -  Primary ICD-10-CM: Z00.00 ICD-9-CM: V70.0 Screening for alcoholism     ICD-10-CM: Z13.89 ICD-9-CM: V79.1 Advanced directives, counseling/discussion     ICD-10-CM: Z71.89 ICD-9-CM: V65.49 Screening for depression     ICD-10-CM: Z13.89 ICD-9-CM: V79.0 Screen for colon cancer     ICD-10-CM: Z12.11 ICD-9-CM: V76.51   
 ACP (advance care planning)     ICD-10-CM: Z71.89 ICD-9-CM: V65.49 Essential hypertension     ICD-10-CM: I10 
ICD-9-CM: 401.9 Multiple lung nodules     ICD-10-CM: R91.8 ICD-9-CM: 793.19 Vitals BP Pulse Temp Resp Height(growth percentile) Weight(growth percentile) 119/75 (BP 1 Location: Right arm, BP Patient Position: Sitting) 63 97.2 °F (36.2 °C) (Oral) 16 5' 1\" (1.549 m) 171 lb 4 oz (77.7 kg) BMI OB Status Smoking Status 32.36 kg/m2 Hysterectomy Former Smoker BMI and BSA Data Body Mass Index Body Surface Area  
 32.36 kg/m 2 1.83 m 2 Preferred Pharmacy Pharmacy Name Phone [address] [facility] [phone] Your Updated Medication List  
  
   
This list is accurate as of: 1/30/17 10:39 AM.  Always use your most recent med list.  
  
  
  
  
 acetaminophen-codeine 300-30 mg per tablet Commonly known as:  TYLENOL-CODEINE #3 Take 1 Tab by mouth two (2) times daily as needed for Pain. Max Daily Amount: 2 Tabs. albuterol 90 mcg/actuation inhaler Commonly known as:  PROAIR HFA Take 2 Puffs by inhalation every four (4) hours as needed. BIOTIN PO Take 5,000 mcg by mouth daily. CENTRUM SILVER PO Take 1 Tab by mouth daily. CITRACAL + D PO Take 3 Tabs by mouth daily. CO Q-10 100 mg capsule Generic drug:  co-enzyme Q-10 Take 100 mg by mouth daily. EPINEPHrine 0.3 mg/0.3 mL injection Commonly known as:  EPIPEN  
0.3 mL by IntraMUSCular route once as needed for 1 dose. esomeprazole 40 mg capsule Commonly known as:  NexIUM Take 1 Cap by mouth daily. fluticasone furoate 100 mcg/actuation Dsdv inhaler Commonly known as:  ARNUITY ELLIPTA Take 1 Puff by inhalation daily. gabapentin 600 mg Tb24 Commonly known as:  GRALISE  
TAKE 2 TABLETS (1,200 MG) DAILY  
  
 lidocaine 5 % Commonly known as:  [place]amp [place] Apply patch to the affected area for 12 hours a day and remove for 12 hours a day. melatonin 3 mg tablet Take  by mouth.  
  
 mometasone 50 mcg/actuation nasal spray Commonly known as:  NASONEX  
2 Sprays by Nasal route daily. omega-3 fatty acids-vitamin e 1,000 mg Cap Take 1 Cap by mouth two (2) times a day. OTHER Flurbiprofen 10%/ Baclofen 2%/ Cyclobenzaprine 2%/ Gabapentin 6%/ Lidocaine 5% Apply 1-2 Grams to affected area 3-4 times per day. 5/18/15-Fax from [facility]-reordered - 3 refills RESTASIS 0.05 % ophthalmic emulsion Generic drug:  cycloSPORINE Administer 1 Drop to both eyes two (2) times a day. traMADol 50 mg tablet Commonly known as:  ULTRAM  
Take 1 Tab by mouth every eight (8) hours as needed for Pain.  
  
 triamterene-hydroCHLOROthiazide 37.5-25 mg per tablet Commonly known as:  Tony Castillo TAKE ONE-HALF (1/2) TABLET DAILY  
  
 VITAMIN D3 1,000 unit Cap Generic drug:  cholecalciferol Take 1,000 Units by mouth daily. ZyrTEC 10 mg tablet Generic drug:  cetirizine Take 10 mg by mouth daily as needed for Allergies. We Performed the Following ADVANCE CARE PLANNING FIRST 30 MINS [75065 CPT(R)] [facility] [[id number] [facility]] Follow-up Instructions Return in about 3 months (around 4/30/2017). To-Do List   
 01/30/2017 Lab:  OCCULT BLOOD, IMMUNOASSAY (FIT)   
  
 03/07/2017 7:00 AM  
  Appointment with [facility] CT RM 1 at [facility] ([phone]) GENERAL INSTRUCTIONS  This study does not require you to drink contrast prior to your study. RELATED STUDY INFORMATION  Bring any films, CDs, and reports related with you on the day of your exam.  This only includes studies done outside of [address] [place] [address], [place] [address], [place], and [place]. QUESTIONS  Notify the CT Department if you have any questions concerning your study. [place] - [phone] [facility] [phone] Patient Instructions Please contact our office if you have any questions about your visit today. Medicare Part B Preventive Services Limitations Recommendation Scheduled Bone Mass Measurement 
(age 72 & older, biennial) Requires diagnosis related to osteoporosis or estrogen deficiency. Biennial benefit unless patient has history of long-term glucocorticoid tx or baseline is needed because initial test was by other method Cardiovascular Screening Blood Tests (every 5 years) Total cholesterol, HDL, Triglycerides Order as a panel if possible Colorectal Cancer Screening 
-Fecal occult blood test (annual) -Flexible sigmoidoscopy (5y) 
-Screening colonoscopy (10y) -Barium Enema Counseling to Prevent Tobacco Use (up to 8 sessions per year) - Counseling greater than 3 and up to 10 minutes - Counseling greater than 10 minutes Patients must be asymptomatic of tobacco-related conditions to receive as preventive service Diabetes Screening Tests (at least every 3 years, Medicare covers annually or at 6-month intervals for prediabetic patients) Fasting blood sugar (FBS) or glucose tolerance test (GTT) Patient must be diagnosed with one of the following: 
-Hypertension, Dyslipidemia, obesity, previous impaired FBS or GTT 
Or any two of the following: overweight, FH of diabetes, age ? 72, history of gestational diabetes, birth of baby weighing more than 9 pounds Diabetes Self-Management Training (DSMT) (no USPSTF recommendation) Requires referral by treating physician for patient with diabetes or renal disease. 10 hours of initial DSMT session of no less than 30 minutes each in a continuous 12-month period. 2 hours of follow-up DSMT in subsequent years. Glaucoma Screening (no USPSTF recommendation) Diabetes mellitus, family history, , age 48 or over,  American, age 72 or over Human Immunodeficiency Virus (HIV) Screening (annually for increased risk patients) HIV-1 and HIV-2 by EIA, LC, rapid antibody test, or oral mucosa transudate Patient must be at increased risk for HIV infection per USPSTF guidelines or pregnant. Tests covered annually for patients at increased risk. Pregnant patients may receive up to 3 test during pregnancy.     
Medical Nutrition Therapy (MNT) (for diabetes or renal disease not recommended schedule) Requires referral by treating physician for patient with diabetes or renal disease. Can be provided in same year as diabetes self-management training (DSMT), and CMS recommends medical nutrition therapy take place after DSMT. Up to 3 hours for initial year and 2 hours in subsequent years. Shingles Vaccination A shingles vaccine is also recommended once in a lifetime after age 61 Seasonal Influenza Vaccination (annually) Pneumococcal Vaccination (once after 65) Hepatitis B Vaccinations (if medium/high risk) Medium/high risk factors:  End-stage renal disease, Hemophiliacs who received Factor VIII or IX concentrates, Clients of institutions for the mentally retarded, Persons who live in the same house as a HepB virus carrier, Homosexual men, Illicit injectable drug abusers. Screening Mammography (biennial age 54-69) Annually (age 36 or over) Screening Pap Tests and Pelvic Examination (up to age 79 and after 79 if unknown history or abnormal study last 10 years) Every 24 months except high risk Ultrasound Screening for Abdominal Aortic Aneurysm (AAA) (once) Patient must be referred through IPPE and not have had a screening for abdominal aortic aneurysm before under Medicare. Limited to patients who meet one of the following criteria: 
- Men who are 73-68 years old and have smoked more than 100 cigarettes in their lifetime. 
-Anyone with a FH of AAA 
-Anyone recommended for screening by USPSTF Introducing [facility]! Dear Uri Gale: Thank you for requesting a Rumble account. Our records indicate that you already have an active Rumble account. You can access your account anytime at [email or web address] Did you know that you can access your hospital and ER discharge instructions at any time in Rumble? You can also review all of your test results from your hospital stay or ER visit. Additional Information If you have questions, please visit the Frequently Asked Questions section of the Prezi website at [email or web address] Remember, Prezi is NOT to be used for urgent needs. For medical emergencies, dial 911. Now available from your iPhone and Android! Please provide this summary of care documentation to your next provider. Your primary care clinician is listed as SPENSER MIRANDA. If you have any questions after today's visit, please call [phone].

## 2017-01-30 NOTE — PROGRESS NOTES
HISTORY OF PRESENT ILLNESS  Robert Robles is a 78 y.o. female.  htn chronic problem, stable  elev chol chronic problem, stable  pulm nodules chronic problem, stable asymptomatic at present [facility] follow up with Dr. Matthew SANTO  Past Medical History   Diagnosis Date    AR (allergic rhinitis)     Arthropathy, unspecified, site unspecified     Asthma     Cataract     Cervical pain     Cylindrical bronchiectasis ([address]     Fracture      rt ankle as an adult    History of pneumonia     Hypertension     Ill-defined condition      Spasms to left side    Left arm pain     Lumbar spinal stenosis     Myofascial pain     Osteopenia     Right buttock pain     Right low back pain     Sciatica of right side      Current Outpatient Prescriptions   Medication Sig Dispense Refill    melatonin 3 mg tablet Take  by mouth.  gabapentin (GRALISE) 600 mg Tb24 TAKE 2 TABLETS (1,200 MG) DAILY 180 Tab 1    triamterene-hydroCHLOROthiazide (MAXZIDE) 37.5-25 mg per tablet TAKE ONE-HALF (1/2) TABLET DAILY 45 Tab 1    acetaminophen-codeine (TYLENOL-CODEINE #3) 300-30 mg per tablet Take 1 Tab by mouth two (2) times daily as needed for Pain. Max Daily Amount: 2 Tabs. 60 Tab 0    lidocaine (LIDODERM) 5 % Apply patch to the affected area for 12 hours a day and remove for 12 hours a day. 1 Each 0    fluticasone furoate (ARNUITY ELLIPTA) 100 mcg/actuation dsdv inhaler Take 1 Puff by inhalation daily. 1 Inhaler 0    omega-3 fatty acids-vitamin e 1,000 mg cap Take 1 Cap by mouth two (2) times a day.  albuterol (PROAIR HFA) 90 mcg/actuation inhaler Take 2 Puffs by inhalation every four (4) hours as needed. 3 Inhaler 3    Cholecalciferol, Vitamin D3, (VITAMIN D3) 1,000 unit cap Take 1,000 Units by mouth daily.  traMADol (ULTRAM) 50 mg tablet Take 1 Tab by mouth every eight (8) hours as needed for Pain.  30 Tab 0    OTHER Flurbiprofen 10%/ Baclofen 2%/ Cyclobenzaprine 2%/ Gabapentin 6%/ Lidocaine 5%  Apply 1-2 Grams to affected area 3-4 times per day. 5/18/15-Fax from [facility]-reordered - 3 refills      EPINEPHrine (EPIPEN) 0.3 mg/0.3 mL (1:1,000) injection 0.3 mL by IntraMUSCular route once as needed for 1 dose. 1 Each 1    cycloSPORINE (RESTASIS) 0.05 % ophthalmic emulsion Administer 1 Drop to both eyes two (2) times a day.  mometasone (NASONEX) 50 mcg/actuation nasal spray 2 Sprays by Nasal route daily. 3 Container 3    MULTIVITAMIN W-MINERALS/LUTEIN (CENTRUM SILVER PO) Take 1 Tab by mouth daily.  cetirizine (ZYRTEC) 10 mg tablet Take 10 mg by mouth daily as needed for Allergies.  co-enzyme Q-10 (CO Q-10) 100 mg capsule Take 100 mg by mouth daily.  CALCIUM CITRATE/VITAMIN D3 (CITRACAL + D PO) Take 3 Tabs by mouth daily.  BIOTIN PO Take 5,000 mcg by mouth daily.  esomeprazole (NEXIUM) 40 mg capsule Take 1 Cap by mouth daily. 90 Cap 1     Allergies   Allergen Reactions    Naprosyn [Naproxen] Other (comments)     Blood in urine         ROS Respiratory: Negative for shortness of breath. Cardiovascular: Negative for chest pain. Genitourinary: Negative for frequency. Neurological: Negative for dizziness and headaches. Visit Vitals    /75 (BP 1 Location: Right arm, BP Patient Position: Sitting)    Pulse 63    Temp 97.2 °F (36.2 °C) (Oral)    Resp 16    Ht 5' 1\" (1.549 m)    Wt 171 lb 4 oz (77.7 kg)    BMI 32.36 kg/m2       Physical Exam Nursing note and vitals reviewed. Constitutional: She is oriented to person, place, and time. She appears well-developed and well-nourished. No distress. HENT:   Mouth/Throat: Oropharynx is clear and moist.   Neck: No JVD present. No thyromegaly present. Cardiovascular: Normal rate, regular rhythm and normal heart sounds. Pulmonary/Chest: Effort normal and breath sounds normal. No respiratory distress. She has no wheezes. She has no rales. Musculoskeletal: She exhibits no edema.    Lymphadenopathy:     She has no cervical adenopathy. Neurological: She is alert and oriented to person, place, and time. Coordination normal.   Psychiatric: She has a normal mood and affect. Her behavior is normal.    Results for orders placed or performed during the hospital encounter of 27/10/69   METABOLIC PANEL, COMPREHENSIVE   Result Value Ref Range    Sodium 141 136 - 145 mmol/L    Potassium 3.6 3.5 - 5.5 mmol/L    Chloride 106 100 - 108 mmol/L    CO2 28 21 - 32 mmol/L    Anion gap 7 3.0 - 18 mmol/L    Glucose 94 74 - 99 mg/dL    BUN 10 7.0 - 18 MG/DL    Creatinine 0.75 0.6 - 1.3 MG/DL    BUN/Creatinine ratio 13 12 - 20      GFR est AA >60 >60 ml/min/1.73m2    GFR est non-AA >60 >60 ml/min/1.73m2    Calcium 8.5 8.5 - 10.1 MG/DL    Bilirubin, total 0.4 0.2 - 1.0 MG/DL    ALT 29 13 - 56 U/L    AST 16 15 - 37 U/L    Alk. phosphatase 81 45 - 117 U/L    Protein, total 7.1 6.4 - 8.2 g/dL    Albumin 3.6 3.4 - 5.0 g/dL    Globulin 3.5 2.0 - 4.0 g/dL    A-G Ratio 1.0 0.8 - 1.7     LIPID PANEL   Result Value Ref Range    LIPID PROFILE          Cholesterol, total 177 <200 MG/DL    Triglyceride 107 <150 MG/DL    HDL Cholesterol 61 (H) 40 - 60 MG/DL    LDL, calculated 94.6 0 - 100 MG/DL    VLDL, calculated 21.4 MG/DL    CHOL/HDL Ratio 2.9 0 - 5.0     CBC WITH AUTOMATED DIFF   Result Value Ref Range    WBC 4.9 4.6 - 13.2 K/uL    RBC 4.70 4.20 - 5.30 M/uL    HGB 14.1 12.0 - 16.0 g/dL    HCT 41.6 35.0 - 45.0 %    MCV 88.5 74.0 - 97.0 FL    MCH 30.0 24.0 - 34.0 PG    MCHC 33.9 31.0 - 37.0 g/dL    RDW 14.2 11.6 - 14.5 %    PLATELET 524 443 - 366 K/uL    MPV 10.5 9.2 - 11.8 FL    NEUTROPHILS 59 40 - 73 %    LYMPHOCYTES 35 21 - 52 %    MONOCYTES 6 3 - 10 %    EOSINOPHILS 0 0 - 5 %    BASOPHILS 0 0 - 2 %    ABS. NEUTROPHILS 2.9 1.8 - 8.0 K/UL    ABS. LYMPHOCYTES 1.7 0.9 - 3.6 K/UL    ABS. MONOCYTES 0.3 0.05 - 1.2 K/UL    ABS. EOSINOPHILS 0.0 0.0 - 0.4 K/UL    ABS.  BASOPHILS 0.0 0.0 - 0.1 K/UL    DF AUTOMATED     VITAMIN B12   Result Value Ref Range    Vitamin B12 494 211 - 911 pg/mL   TSH 3RD GENERATION   Result Value Ref Range    TSH 0.77 0.36 - 3.74 uIU/mL   MAGNESIUM   Result Value Ref Range    Magnesium 2.2 1.8 - 2.4 mg/dL   VITAMIN D, 25 HYDROXY   Result Value Ref Range    Vitamin D 25-Hydroxy 35.0 30 - 100 ng/mL       ASSESSMENT and PLAN    ICD-10-CM ICD-9-CM                                              7. Essential hypertension stable continue current medications  I10 401.9    8. Multiple lung nodules follow up with pulmonary R91.8 793.19    Follow-up Disposition:  Return in about 3 months (around 4/30/2017).

## 2017-02-07 ENCOUNTER — HOSPITAL ENCOUNTER (OUTPATIENT)
Dept: LAB | Age: 77
Discharge: HOME | End: 2017-02-07
Payer: MEDICARE

## 2017-02-07 DIAGNOSIS — Z12.11 SCREEN FOR COLON CANCER: ICD-10-CM

## 2017-02-07 PROCEDURE — 82274 ASSAY TEST FOR BLOOD FECAL: CPT | Performed by: FAMILY MEDICINE

## 2017-02-10 LAB — HEMOCCULT STL QL IA: NEGATIVE

## 2017-02-24 DIAGNOSIS — R91.8 PULMONARY NODULES: Primary | ICD-10-CM

## 2017-02-28 DIAGNOSIS — R91.8 MULTIPLE LUNG NODULES: Primary | ICD-10-CM

## 2017-03-07 ENCOUNTER — HOSPITAL ENCOUNTER (OUTPATIENT)
Dept: CT IMAGING | Age: 77
Discharge: HOME | End: 2017-03-07
Attending: INTERNAL MEDICINE
Payer: MEDICARE

## 2017-03-07 DIAGNOSIS — R91.8 PULMONARY NODULES: ICD-10-CM

## 2017-03-07 PROCEDURE — 71250 CT THORAX DX C-: CPT

## 2017-03-22 ENCOUNTER — OFFICE VISIT (OUTPATIENT)
Dept: PULMONOLOGY | Age: 77
End: 2017-03-22

## 2017-03-22 VITALS
BODY MASS INDEX: 32.28 KG/M2 | SYSTOLIC BLOOD PRESSURE: 120 MMHG | HEART RATE: 65 BPM | TEMPERATURE: 97.8 F | RESPIRATION RATE: 16 BRPM | WEIGHT: 171 LBS | HEIGHT: 61 IN | OXYGEN SATURATION: 99 % | DIASTOLIC BLOOD PRESSURE: 80 MMHG

## 2017-03-22 DIAGNOSIS — M47.816 LUMBAR FACET ARTHROPATHY: ICD-10-CM

## 2017-03-22 DIAGNOSIS — J30.1 SEASONAL ALLERGIC RHINITIS DUE TO POLLEN: ICD-10-CM

## 2017-03-22 DIAGNOSIS — J45.30 MILD PERSISTENT ASTHMA WITHOUT COMPLICATION: Primary | ICD-10-CM

## 2017-03-22 DIAGNOSIS — R91.8 PULMONARY NODULES: ICD-10-CM

## 2017-03-22 RX ORDER — ALBUTEROL SULFATE 90 UG/1
2 AEROSOL, METERED RESPIRATORY (INHALATION)
Qty: 3 INHALER | Refills: 3 | Status: SHIPPED | COMMUNITY
Start: 2017-03-22 | End: 2018-05-06 | Stop reason: SDUPTHER

## 2017-03-22 NOTE — PROGRESS NOTES
Chief Complaint   Patient presents with    Asthma    Lung Nodule     Patient here for follow up for asthma and pulmonary nodule. Patient had CT 3/7/17.

## 2017-03-22 NOTE — PROGRESS NOTES
HISTORY OF PRESENT ILLNESS  Roshni Rob is a 78 y.o. female. HPI Comments: Follow up for asthma and pulmonary nodules. Pt denies new respiratory symptoms. Has baseline SOB and wheezing with severe exertion, treated with rescue inhaler. Last used rescue inhaler months ago. Pt also stopped using Arnuity last year because \"I felt better\". Pt with some personal challenges due to the recent death of her  who was on hospice during the last visit. No new respiratory symptoms. Asthma   The history is provided by the patient. This is a chronic problem. The current episode started more than 1 week ago. The problem occurs rarely (at leat twice weekly). The problem has been rapidly improving. Pertinent negatives include no headaches. Shortness of breath: after severe exertion. The symptoms are aggravated by exertion. Relieved by: Albuterol rescue inhaler. Treatments tried: beta agonist. The treatment provided significant relief. Review of Systems   Constitutional: Negative for chills, diaphoresis, fever, malaise/fatigue and weight loss. HENT: Positive for congestion. Negative for ear discharge, ear pain, hearing loss, nosebleeds, sore throat and tinnitus. Eyes: Negative for blurred vision, double vision, photophobia, pain, discharge and redness. Respiratory: Negative for hemoptysis, sputum production and stridor. Cough: occasional. Shortness of breath: after severe exertion. Wheezing: rare. Cardiovascular: Negative for palpitations, orthopnea, claudication, leg swelling and PND. Gastrointestinal: Negative for blood in stool, constipation, diarrhea, heartburn, melena, nausea and vomiting. Genitourinary: Negative for dysuria, flank pain, frequency, hematuria and urgency. Musculoskeletal: Negative for back pain, falls, joint pain, myalgias and neck pain. Skin: Negative for itching and rash.    Neurological: Negative for dizziness, tingling, tremors, sensory change, speech change, focal weakness, seizures, loss of consciousness, weakness and headaches. Endo/Heme/Allergies: Positive for environmental allergies. Negative for polydipsia. Does not bruise/bleed easily. Psychiatric/Behavioral: Positive for depression. Negative for hallucinations, memory loss, substance abuse and suicidal ideas. The patient is not nervous/anxious and does not have insomnia. Past Medical History:   Diagnosis Date    AR (allergic rhinitis)     Arthropathy, unspecified, site unspecified     Asthma     Cataract     Cervical pain     Cylindrical bronchiectasis (HCC)     Fracture     rt ankle as an adult    History of pneumonia     Hypertension     Ill-defined condition     Spasms to left side    Left arm pain     Lumbar spinal stenosis     Myofascial pain     Osteopenia     Right buttock pain     Right low back pain     Sciatica of right side      Current Outpatient Prescriptions on File Prior to Visit   Medication Sig Dispense Refill    melatonin 3 mg tablet Take  by mouth.  gabapentin (GRALISE) 600 mg Tb24 TAKE 2 TABLETS (1,200 MG) DAILY 180 Tab 1    triamterene-hydroCHLOROthiazide (MAXZIDE) 37.5-25 mg per tablet TAKE ONE-HALF (1/2) TABLET DAILY 45 Tab 1    lidocaine (LIDODERM) 5 % Apply patch to the affected area for 12 hours a day and remove for 12 hours a day. 1 Each 0    omega-3 fatty acids-vitamin e 1,000 mg cap Take 1 Cap by mouth two (2) times a day.  Cholecalciferol, Vitamin D3, (VITAMIN D3) 1,000 unit cap Take 1,000 Units by mouth daily.  traMADol (ULTRAM) 50 mg tablet Take 1 Tab by mouth every eight (8) hours as needed for Pain. 30 Tab 0    OTHER Flurbiprofen 10%/ Baclofen 2%/ Cyclobenzaprine 2%/ Gabapentin 6%/ Lidocaine 5%  Apply 1-2 Grams to affected area 3-4 times per day. 5/18/15-Fax from [facility]-reordered - 3 refills      EPINEPHrine (EPIPEN) 0.3 mg/0.3 mL (1:1,000) injection 0.3 mL by IntraMUSCular route once as needed for 1 dose.  1 Each 1    cycloSPORINE (RESTASIS) 0.05 % ophthalmic emulsion Administer 1 Drop to both eyes two (2) times a day.  mometasone (NASONEX) 50 mcg/actuation nasal spray 2 Sprays by Nasal route daily. 3 Container 3    MULTIVITAMIN W-MINERALS/LUTEIN (CENTRUM SILVER PO) Take 1 Tab by mouth daily.  cetirizine (ZYRTEC) 10 mg tablet Take 10 mg by mouth daily as needed for Allergies.  co-enzyme Q-10 (CO Q-10) 100 mg capsule Take 100 mg by mouth daily.  CALCIUM CITRATE/VITAMIN D3 (CITRACAL + D PO) Take 3 Tabs by mouth daily.  BIOTIN PO Take 5,000 mcg by mouth daily.  acetaminophen-codeine (TYLENOL-CODEINE #3) 300-30 mg per tablet Take 1 Tab by mouth two (2) times daily as needed for Pain. Max Daily Amount: 2 Tabs. 60 Tab 0    esomeprazole (NEXIUM) 40 mg capsule Take 1 Cap by mouth daily. 90 Cap 1     No current facility-administered medications on file prior to visit. Allergies   Allergen Reactions    Naprosyn [Naproxen] Other (comments)     Blood in urine       Social History     Social History    Marital status:      Spouse name: N/A    Number of children: N/A    Years of education: N/A     Occupational History    Not on file. Social History Main Topics    Smoking status: Former Smoker     Years: 5.00     Types: Cigarettes     Quit date: 1/1/1970    Smokeless tobacco: Never Used    Alcohol use 0.6 oz/week     1 Glasses of wine per week      Comment: rare    Drug use: Yes     Special: Prescription, OTC    Sexual activity: Not on file     Other Topics Concern    Not on file     Social History Narrative     Blood pressure 120/80, pulse 65, temperature 97.8 °F (36.6 °C), temperature source Oral, resp. rate 16, height 5' 1\" (1.549 m), weight 77.6 kg (171 lb), SpO2 99 %. Physical Exam   Constitutional: She is oriented to person, place, and time. She appears well-developed and well-nourished. No distress. HENT:   Head: Atraumatic.    Nose: Nose normal.   Mouth/Throat: No oropharyngeal exudate. Eyes: Conjunctivae are normal. Pupils are equal, round, and reactive to light. Right eye exhibits no discharge. Left eye exhibits no discharge. No scleral icterus. Neck: No JVD present. No tracheal deviation present. No thyromegaly present. Cardiovascular: Normal rate, regular rhythm and intact distal pulses. Exam reveals no gallop. No murmur heard. Pulmonary/Chest: Effort normal and breath sounds normal. No stridor. No respiratory distress. She has no wheezes. She has no rales. She exhibits no tenderness. Abdominal: Soft. She exhibits no mass. There is no tenderness. There is no rebound. Musculoskeletal: She exhibits no edema or tenderness. Lymphadenopathy:     She has no cervical adenopathy. Neurological: She is alert and oriented to person, place, and time. Skin: Skin is warm and dry. No rash noted. She is not diaphoretic. No erythema. Psychiatric: She has a normal mood and affect. Her behavior is normal. Judgment and thought content normal.     CT Results (most recent):    Results from Hospital Encounter encounter on 03/07/17   CT CHEST WO CONT   Narrative CT CHEST WITHOUT IV CONTRAST    COMPARISON: CT chest 8/8/2016, 8/25/2015 and 3/1/2013. INDICATIONS: Pulmonary nodules. TECHNIQUE: Volumetric data acquisition was performed through the chest with a  multislice scanner. Reconstructions were created in the axial, coronal, and  sagittal planes. All CT scans at this facility are performed using dose optimization technique as  appropriate to the performed exam, to include automated exposure control,  adjustment of the mA and/or kV according to patient's size (Including  appropriate matching for site-specific examinations), or use of iterative  reconstruction technique. FINDINGS:     Thyroid/Base Of Neck: The thyroid is unremarkable as visualized. Lungs:   Trachea and central bronchi are patent. No mass lesions are seen. .  Stable 4 mm 2 pleural-based pulmonary nodules lateral right middle lobe (series  4 image 31 and 32). Resolution of additional 4 mm pulmonary nodule in the right middle lobe. Perhaps 2 mm pulmonary nodule in the right minor fissure, stable (series 4 image  25)  Less conspicuous posterior right upper lobe 3 mm pulmonary nodule (series 4  image 15)  No new suspicious pulmonary nodule. Mild dependent atelectasis. Pleural Spaces: There is no pneumothorax or pleural fluid evident. No pleural plaques are seen    Lymph Nodes:   Axillae: Normal in size and number. Mediastinum / Melania: Limited without IV contrast but no gross adenopathy. Mediastinum, Great Vessels And Heart:  No mediastinal mass. Upper normal heart size. No pericardial effusion. Mild  aortic valve calcifications. No aortic aneurysm. Abdomen Structures Included:  No diagnostic abnormality. Osseous Structures:   Mild thoracic spondylosis. Impression IMPRESSION:     Resolution of 4 mm pulmonary nodule in the right middle lobe. Otherwise stable  few pulmonary nodules up to 4 cm in size, stable since 2013. ASSESSMENT and PLAN  Encounter Diagnoses   Name Primary?  Mild persistent asthma without complication Yes    Pulmonary nodules     Seasonal allergic rhinitis due to pollen     Lumbar facet arthropathy (HCC)        Continue rescue Albuterol , discontinue Arnuity as pt doing well without this for several months. No further indication for CT follow up. Reviewed CT results with pt. Continue rest of medications. RTC 6 months.

## 2017-03-22 NOTE — MR AVS SNAPSHOT
Visit Information Date & Time Provider Department Dept. Phone Encounter #  
 3/22/2017  4:00 PM Haja Antonio MD [facility] Radha Bulger [id number] Follow-up Instructions Return in about 6 months (around 9/22/2017). Your Appointments 4/28/2017  7:30 AM  
Follow Up with Tushar Whitley MD  
[facility] (--) Appt Note: 3 month follow up [address] [place] [id number] [phone]  
  
   
 [address] [place] [id number] Upcoming Health Maintenance Date Due DTaP/Tdap/Td series (1 - Tdap) 2/1/1961 BREAST CANCER SCRN MAMMOGRAM 4/22/2017 GLAUCOMA SCREENING Q2Y 1/11/2018 MEDICARE YEARLY EXAM 1/31/2018 COLONOSCOPY 1/13/2020 Allergies as of 3/22/2017  Review Complete On: 3/22/2017 By: Haja Antonio MD  
  
 Severity Noted Reaction Type Reactions Naprosyn [Naproxen]  01/21/2014    Other (comments) Blood in urine Current Immunizations  Reviewed on 9/22/2016 Name Date Influenza High Dose Vaccine PF 9/22/2016  9:45 AM  
 Influenza Vaccine 10/20/2015, 9/26/2014, 10/8/2013 Influenza Vaccine Split 10/6/2011 Pneumococcal Conjugate (PCV-13) 5/11/2015 Pneumococcal Polysaccharide (PPSV-23) 6/14/2016 Not reviewed this visit You Were Diagnosed With   
  
 Codes Comments Mild persistent asthma without complication    -  Primary ICD-10-CM: J45.30 ICD-9-CM: 493.90 Pulmonary nodules     ICD-10-CM: R91.8 ICD-9-CM: 793.19 Seasonal allergic rhinitis due to pollen     ICD-10-CM: J30.1 ICD-9-CM: 477.0 Lumbar facet arthropathy (HCC)     ICD-10-CM: M12.88 ICD-9-CM: 721.3 Vitals BP Pulse Temp Resp Height(growth percentile) Weight(growth percentile) 120/80 (BP 1 Location: Left arm, BP Patient Position: Sitting) 65 97.8 °F (36.6 °C) (Oral) 16 5' 1\" (1.549 m) 171 lb (77.6 kg) SpO2 BMI OB Status Smoking Status 99% 32.31 kg/m2 Hysterectomy Former Smoker BMI and BSA Data Body Mass Index Body Surface Area  
 32.31 kg/m 2 1.83 m 2 Preferred Pharmacy Pharmacy Name [address] [phone] Your Updated Medication List  
  
   
This list is accurate as of: 3/22/17  4:51 PM.  Always use your most recent med list.  
  
  
  
  
 acetaminophen-codeine 300-30 mg per tablet Commonly known as:  TYLENOL-CODEINE #3 Take 1 Tab by mouth two (2) times daily as needed for Pain. Max Daily Amount: 2 Tabs. albuterol 90 mcg/actuation inhaler Commonly known as:  PROAIR HFA Take 2 Puffs by inhalation every four (4) hours as needed. BIOTIN PO Take 5,000 mcg by mouth daily. CENTRUM SILVER PO Take 1 Tab by mouth daily. CITRACAL + D PO Take 3 Tabs by mouth daily. CO Q-10 100 mg capsule Generic drug:  co-enzyme Q-10 Take 100 mg by mouth daily. EPINEPHrine 0.3 mg/0.3 mL injection Commonly known as:  EPIPEN  
0.3 mL by IntraMUSCular route once as needed for 1 dose. esomeprazole 40 mg capsule Commonly known as:  NexIUM Take 1 Cap by mouth daily. fluticasone furoate 100 mcg/actuation Dsdv inhaler Commonly known as:  ARNUITY ELLIPTA Take 1 Puff by inhalation daily. gabapentin 600 mg Tb24 Commonly known as:  GRALISE  
TAKE 2 TABLETS (1,200 MG) DAILY  
  
 lidocaine 5 % Commonly known as:  [place] Prophet Apply patch to the affected area for 12 hours a day and remove for 12 hours a day. melatonin 3 mg tablet Take  by mouth.  
  
 mometasone 50 mcg/actuation nasal spray Commonly known as:  NASONEX  
2 Sprays by Nasal route daily. omega-3 fatty acids-vitamin e 1,000 mg Cap Take 1 Cap by mouth two (2) times a day. OTHER Flurbiprofen 10%/ Baclofen 2%/ Cyclobenzaprine 2%/ Gabapentin 6%/ Lidocaine 5% Apply 1-2 Grams to affected area 3-4 times per day. 5/18/15-Fax from [facility]-reordered - 3 refills RESTASIS 0.05 % ophthalmic emulsion Generic drug:  cycloSPORINE Administer 1 Drop to both eyes two (2) times a day. traMADol 50 mg tablet Commonly known as:  ULTRAM  
Take 1 Tab by mouth every eight (8) hours as needed for Pain.  
  
 triamterene-hydroCHLOROthiazide 37.5-25 mg per tablet Commonly known as:  [place] Ruse TAKE ONE-HALF (1/2) TABLET DAILY  
  
 VITAMIN D3 1,000 unit Cap Generic drug:  cholecalciferol Take 1,000 Units by mouth daily. ZyrTEC 10 mg tablet Generic drug:  cetirizine Take 10 mg by mouth daily as needed for Allergies. Follow-up Instructions Return in about 6 months (around 9/22/2017). Introducing [facility] SERVICES! Dear Jasper Narayanan: Thank you for requesting a [employer] account. Our records indicate that you already have an active [employer] account. You can access your account anytime at [email or web address] Did you know that you can access your hospital and ER discharge instructions at any time in [employer]? You can also review all of your test results from your hospital stay or ER visit. Additional Information If you have questions, please visit the Frequently Asked Questions section of the [employer] website at [email or web address] Remember, [employer] is NOT to be used for urgent needs. For medical emergencies, dial 911. Now available from your iPhone and Android! Please provide this summary of care documentation to your next provider. Your primary care clinician is listed as SPENSER MIRANDA. If you have any questions after today's visit, please call [phone].

## 2017-04-28 ENCOUNTER — OFFICE VISIT (OUTPATIENT)
Dept: FAMILY MEDICINE CLINIC | Age: 77
End: 2017-04-28

## 2017-04-28 VITALS
WEIGHT: 174.25 LBS | BODY MASS INDEX: 32.9 KG/M2 | HEART RATE: 65 BPM | TEMPERATURE: 97.7 F | SYSTOLIC BLOOD PRESSURE: 120 MMHG | DIASTOLIC BLOOD PRESSURE: 76 MMHG | RESPIRATION RATE: 20 BRPM | HEIGHT: 61 IN

## 2017-04-28 DIAGNOSIS — M51.26 HNP (HERNIATED NUCLEUS PULPOSUS), LUMBAR: ICD-10-CM

## 2017-04-28 DIAGNOSIS — Z12.31 ENCOUNTER FOR SCREENING MAMMOGRAM FOR BREAST CANCER: ICD-10-CM

## 2017-04-28 DIAGNOSIS — J45.20 MILD INTERMITTENT ASTHMA WITHOUT COMPLICATION: ICD-10-CM

## 2017-04-28 DIAGNOSIS — R91.8 MULTIPLE LUNG NODULES: ICD-10-CM

## 2017-04-28 DIAGNOSIS — T78.2XXS ANAPHYLACTIC REACTION, SEQUELA: ICD-10-CM

## 2017-04-28 DIAGNOSIS — I10 ESSENTIAL HYPERTENSION: Primary | ICD-10-CM

## 2017-04-28 RX ORDER — EPINEPHRINE 0.3 MG/.3ML
0.3 INJECTION SUBCUTANEOUS
Qty: 1 SYRINGE | Refills: 1 | Status: SHIPPED | OUTPATIENT
Start: 2017-04-28

## 2017-04-28 NOTE — PROGRESS NOTES
HISTORY OF PRESENT ILLNESS  Des Arnett is a 78 y.o. female. Chief Complaint   Patient presents with    Allergic Rhinitis chronic problem, stable     Hypertension chronic problem, stable Reports compliance with meds Asymptomatic, no headache or dizziness.  Asthma chronic problem, stable prn albuterol    Osteopenia    Vitamin D Deficiency chronic problem, stable    follow up pulmonary nodules. chronic problem, stable asymptomatic had mri last month  follow up hnp DDD, chronic problem, stable off and on flares, prn meds effective, no pain today  history of anaphylaxis, angioedema with multiple exposure, environmental, prn epipen, has not needed, rx has  needs refill  HPI  Past Medical History:   Diagnosis Date    AR (allergic rhinitis)     Arthropathy, unspecified, site unspecified     Asthma     Cataract     Cervical pain     Cylindrical bronchiectasis ([address]     Fracture     rt ankle as an adult    History of pneumonia     Hypertension     Ill-defined condition     Spasms to left side    Left arm pain     Lumbar spinal stenosis     Myofascial pain     Osteopenia     Right buttock pain     Right low back pain     Sciatica of right side      Current Outpatient Prescriptions   Medication Sig Dispense Refill    albuterol (PROAIR HFA) 90 mcg/actuation inhaler Take 2 Puffs by inhalation every four (4) hours as needed. 3 Inhaler 3    melatonin 3 mg tablet Take  by mouth.  gabapentin (GRALISE) 600 mg Tb24 TAKE 2 TABLETS (1,200 MG) DAILY 180 Tab 1    triamterene-hydroCHLOROthiazide (MAXZIDE) 37.5-25 mg per tablet TAKE ONE-HALF (1/2) TABLET DAILY 45 Tab 1    acetaminophen-codeine (TYLENOL-CODEINE #3) 300-30 mg per tablet Take 1 Tab by mouth two (2) times daily as needed for Pain. Max Daily Amount: 2 Tabs. 60 Tab 0    lidocaine (LIDODERM) 5 % Apply patch to the affected area for 12 hours a day and remove for 12 hours a day.  1 Each 0    omega-3 fatty acids-vitamin e 1,000 mg cap Take 1 Cap by mouth two (2) times a day.  Cholecalciferol, Vitamin D3, (VITAMIN D3) 1,000 unit cap Take 1,000 Units by mouth daily.  traMADol (ULTRAM) 50 mg tablet Take 1 Tab by mouth every eight (8) hours as needed for Pain. 30 Tab 0    OTHER Flurbiprofen 10%/ Baclofen 2%/ Cyclobenzaprine 2%/ Gabapentin 6%/ Lidocaine 5%  Apply 1-2 Grams to affected area 3-4 times per day. 5/18/15-Fax from [facility]-reordered - 3 refills      EPINEPHrine (EPIPEN) 0.3 mg/0.3 mL (1:1,000) injection 0.3 mL by IntraMUSCular route once as needed for 1 dose. 1 Each 1    cycloSPORINE (RESTASIS) 0.05 % ophthalmic emulsion Administer 1 Drop to both eyes two (2) times a day.  esomeprazole (NEXIUM) 40 mg capsule Take 1 Cap by mouth daily. 90 Cap 1    mometasone (NASONEX) 50 mcg/actuation nasal spray 2 Sprays by Nasal route daily. 3 Container 3    MULTIVITAMIN W-MINERALS/LUTEIN (CENTRUM SILVER PO) Take 1 Tab by mouth daily.  cetirizine (ZYRTEC) 10 mg tablet Take 10 mg by mouth daily as needed for Allergies.  co-enzyme Q-10 (CO Q-10) 100 mg capsule Take 100 mg by mouth daily.  CALCIUM CITRATE/VITAMIN D3 (CITRACAL + D PO) Take 3 Tabs by mouth daily.  BIOTIN PO Take 5,000 mcg by mouth daily. Allergies   Allergen Reactions    Naprosyn [Naproxen] Other (comments)     Blood in urine         ROS Respiratory: Negative for shortness of breath. Cardiovascular: Negative for chest pain. Genitourinary: Negative for frequency. Neurological: Negative for dizziness and headaches. Blood pressure 120/76, pulse 65, temperature 97.7 °F (36.5 °C), temperature source Oral, resp. rate 20, height 5' 1\" (1.549 m), weight 174 lb 4 oz (79 kg). Physical Exam Nursing note and vitals reviewed. Constitutional: She is oriented to person, place, and time. She appears well-developed and well-nourished. No distress. HENT:   Mouth/Throat: Oropharynx is clear and moist.   Neck: No JVD present.  No thyromegaly present. Cardiovascular: Normal rate, regular rhythm and normal heart sounds. Pulmonary/Chest: Effort normal and breath sounds normal. No respiratory distress. She has no wheezes. She has no rales. Musculoskeletal: She exhibits no edema. Lymphadenopathy:     She has no cervical adenopathy. Neurological: She is alert and oriented to person, place, and time. Coordination normal.   Psychiatric: She has a normal mood and affect. Her behavior is normal.    CT CHEST WITHOUT IV CONTRAST     COMPARISON: CT chest 8/8/2016, 8/25/2015 and 3/1/2013.     INDICATIONS: Pulmonary nodules.     TECHNIQUE: Volumetric data acquisition was performed through the chest with a  multislice scanner. Reconstructions were created in the axial, coronal, and  sagittal planes. All CT scans at this facility are performed using dose optimization technique as  appropriate to the performed exam, to include automated exposure control,  adjustment of the mA and/or kV according to patient's size (Including  appropriate matching for site-specific examinations), or use of iterative  reconstruction technique.     FINDINGS:      Thyroid/Base Of Neck: The thyroid is unremarkable as visualized.     Lungs:   Trachea and central bronchi are patent. No mass lesions are seen. .  Stable 4 mm 2 pleural-based pulmonary nodules lateral right middle lobe (series  4 image 31 and 32). Resolution of additional 4 mm pulmonary nodule in the right middle lobe. Perhaps 2 mm pulmonary nodule in the right minor fissure, stable (series 4 image  25)  Less conspicuous posterior right upper lobe 3 mm pulmonary nodule (series 4  image 15)  No new suspicious pulmonary nodule. Mild dependent atelectasis.     Pleural Spaces: There is no pneumothorax or pleural fluid evident. No pleural plaques are seen     Lymph Nodes:   Axillae: Normal in size and number.   Mediastinum / Melania: Limited without IV contrast but no gross adenopathy.     Mediastinum, Great Vessels And Heart:  No mediastinal mass. Upper normal heart size. No pericardial effusion. Mild  aortic valve calcifications. No aortic aneurysm.     Abdomen Structures Included:  No diagnostic abnormality.     Osseous Structures:   Mild thoracic spondylosis.     IMPRESSION  IMPRESSION:      Resolution of 4 mm pulmonary nodule in the right middle lobe. Otherwise stable  few pulmonary nodules up to 4 cm in size, stable since 2013. ASSESSMENT and PLAN    ICD-10-CM ICD-9-CM    1. Essential hypertension stable continue current medications  I10 401.9    2. HNP (herniated nucleus pulposus), lumbar Stable, continue current care. M51.26 722.10    3. Multiple lung nodules . stable asymptomatic ct improved 1 year follow up  R91.8 793.19    4. Mild intermittent asthma without complication Stable, continue current care. J45.20 493.90    5. Anaphylactic reaction, sequela Stable, continue current care. Refilled for prn use emergency T78. 2XXS 909.9 EPINEPHrine (EPIPEN) 0.3 mg/0.3 mL injection   6.  Encounter for screening mammogram for breast cancer Z12.31 V76.12 VANI MAMMO BI SCREENING INCL CAD

## 2017-04-28 NOTE — PROGRESS NOTES
Chief Complaint   Patient presents with    Allergic Rhinitis    Hypertension    Asthma    Osteopenia    Vitamin D Deficiency       Health Maintenance reviewed    1. Have you been to the ER, urgent care clinic since your last visit? Hospitalized since your last visit? No    2. Have you seen or consulted any other health care providers outside of the [facility] since your last visit? Include any pap smears or colon screening.  No

## 2017-04-28 NOTE — MR AVS SNAPSHOT
Visit Information Date & Time Provider Department Dept. Phone Encounter #  
 4/28/2017  7:30 AM Tony Dawn MD [address] [phone] [id number] Follow-up Instructions Return in about 15 weeks (around 8/11/2017). Upcoming Health Maintenance Date Due DTaP/Tdap/Td series (1 - Tdap) 2/1/1963 BREAST CANCER SCRN MAMMOGRAM 4/22/2017 GLAUCOMA SCREENING Q2Y 1/11/2018 MEDICARE YEARLY EXAM 1/31/2018 COLONOSCOPY 1/13/2020 Allergies as of 4/28/2017  Review Complete On: 4/28/2017 By: Tony Dawn MD  
  
 Severity Noted Reaction Type Reactions Naprosyn [Naproxen]  01/21/2014    Other (comments) Blood in urine Current Immunizations  Reviewed on 9/22/2016 Name Date Influenza High Dose Vaccine PF 9/22/2016  9:45 AM  
 Influenza Vaccine 10/20/2015, 9/26/2014, 10/8/2013 Influenza Vaccine Split 10/6/2011 Pneumococcal Conjugate (PCV-13) 5/11/2015 Pneumococcal Polysaccharide (PPSV-23) 6/14/2016 Not reviewed this visit You Were Diagnosed With   
  
 Codes Comments Essential hypertension    -  Primary ICD-10-CM: I10 
ICD-9-CM: 401.9 HNP (herniated nucleus pulposus), lumbar     ICD-10-CM: M51.26 
ICD-9-CM: 722.10 Multiple lung nodules     ICD-10-CM: R91.8 ICD-9-CM: 793.19 Mild intermittent asthma without complication     XOO-74-MK: J45.20 ICD-9-CM: 493.90 Anaphylactic reaction, sequela     ICD-10-CM: T78. 2XXS ICD-9-CM: 605. 9 Vitals BP Pulse Temp Resp Height(growth percentile) Weight(growth percentile) 120/76 (BP 1 Location: Right arm, BP Patient Position: Sitting) 65 97.7 °F (36.5 °C) (Oral) 20 5' 1\" (1.549 m) 174 lb 4 oz (79 kg) BMI OB Status Smoking Status 32.92 kg/m2 Hysterectomy Former Smoker BMI and BSA Data Body Mass Index Body Surface Area  
 32.92 kg/m 2 1.84 m 2 [phone] Your Updated Medication List  
  
   
This list is accurate as of: 4/28/17  8:15 AM.  Always use your most recent med list.  
  
  
  
  
 acetaminophen-codeine 300-30 mg per tablet Commonly known as:  TYLENOL-CODEINE #3 Take 1 Tab by mouth two (2) times daily as needed for Pain. Max Daily Amount: 2 Tabs. albuterol 90 mcg/actuation inhaler Commonly known as:  PROAIR HFA Take 2 Puffs by inhalation every four (4) hours as needed. BIOTIN PO Take 5,000 mcg by mouth daily. CENTRUM SILVER PO Take 1 Tab by mouth daily. CITRACAL + D PO Take 3 Tabs by mouth daily. CO Q-10 100 mg capsule Generic drug:  co-enzyme Q-10 Take 100 mg by mouth daily. EPINEPHrine 0.3 mg/0.3 mL injection Commonly known as:  EPIPEN  
0.3 mL by IntraMUSCular route once as needed for up to 1 dose. esomeprazole 40 mg capsule Commonly known as:  NexIUM Take 1 Cap by mouth daily. gabapentin 600 mg Tb24 Commonly known as:  GRALISE  
TAKE 2 TABLETS (1,200 MG) DAILY  
  
 lidocaine 5 % Commonly known as:  Lysle [place] Apply patch to the affected area for 12 hours a day and remove for 12 hours a day. melatonin 3 mg tablet Take  by mouth.  
  
 mometasone 50 mcg/actuation nasal spray Commonly known as:  NASONEX  
2 Sprays by Nasal route daily. omega-3 fatty acids-vitamin e 1,000 mg Cap Take 1 Cap by mouth two (2) times a day. OTHER Flurbiprofen 10%/ Baclofen 2%/ Cyclobenzaprine 2%/ Gabapentin 6%/ Lidocaine 5% Apply 1-2 Grams to affected area 3-4 times per day. 5/18/15-Fax from [facility]-reordered - 3 refills RESTASIS 0.05 % ophthalmic emulsion Generic drug:  cycloSPORINE Administer 1 Drop to both eyes two (2) times a day. traMADol 50 mg tablet Commonly known as:  ULTRAM  
Take 1 Tab by mouth every eight (8) hours as needed for Pain. triamterene-hydroCHLOROthiazide 37.5-25 mg per tablet Commonly known as:  Tiny Mouse TAKE ONE-HALF (1/2) TABLET DAILY  
  
 VITAMIN D3 1,000 unit Cap Generic drug:  cholecalciferol Take 1,000 Units by mouth daily. ZyrTEC 10 mg tablet Generic drug:  cetirizine Take 10 mg by mouth daily as needed for Allergies. Prescriptions Sent to Pharmacy Refills EPINEPHrine (EPIPEN) 0.3 mg/0.3 mL injection 1 Si.3 mL by IntraMUSCular route once as needed for up to 1 dose. Class: Normal  
 Pharmacy: [address], [address] [place] [address] #: [phone] Route: IntraMUSCular Follow-up Instructions Return in about 15 weeks (around 2017). Patient Instructions Please contact our office if you have any questions about your visit today. Introducing [facility]! Dear Maribeth Reyna: Thank you for requesting a [employer] account. Our records indicate that you already have an active [employer] account. You can access your account anytime at [email or web address]! Did you know that you can access your hospital and ER discharge instructions at any time in [employer]? You can also review all of your test results from your hospital stay or ER visit. Additional Information If you have questions, please visit the Frequently Asked Questions section of the [employer] website at [email or web address] Remember, [employer] is NOT to be used for urgent needs. For medical emergencies, dial 911. Now available from your iPhone and Android! Please provide this summary of care documentation to your next provider. Your primary care clinician is listed as SPENSER MIRANDA. If you have any questions after today's visit, please call [phone].

## 2017-05-08 ENCOUNTER — HOSPITAL ENCOUNTER (OUTPATIENT)
Dept: MAMMOGRAPHY | Age: 77
Discharge: HOME | End: 2017-05-08
Attending: FAMILY MEDICINE
Payer: MEDICARE

## 2017-05-08 DIAGNOSIS — Z12.31 VISIT FOR SCREENING MAMMOGRAM: ICD-10-CM

## 2017-05-08 PROCEDURE — 77063 BREAST TOMOSYNTHESIS BI: CPT

## 2017-08-08 ENCOUNTER — OFFICE VISIT (OUTPATIENT)
Dept: FAMILY MEDICINE CLINIC | Age: 77
End: 2017-08-08

## 2017-08-08 VITALS
RESPIRATION RATE: 16 BRPM | HEART RATE: 56 BPM | DIASTOLIC BLOOD PRESSURE: 71 MMHG | SYSTOLIC BLOOD PRESSURE: 129 MMHG | WEIGHT: 168 LBS | HEIGHT: 61 IN | TEMPERATURE: 97 F | BODY MASS INDEX: 31.72 KG/M2

## 2017-08-08 DIAGNOSIS — I10 ESSENTIAL HYPERTENSION: ICD-10-CM

## 2017-08-08 DIAGNOSIS — N95.2 ATROPHIC VAGINITIS: Primary | ICD-10-CM

## 2017-08-08 NOTE — PROGRESS NOTES
HISTORY OF PRESENT ILLNESS  Tawny Mullins is a 78 y.o. female. Chief Complaint   Patient presents with    Hypertension chronic problem, stable Reports compliance with meds Asymptomatic, no headache or dizziness.  Asthma    Other     HNP   complains of vaginal dryness causing sexual dysfunction, new problem   S/p tosha in 1980s  HPI  Past Medical History:   Diagnosis Date    AR (allergic rhinitis)     Arthropathy, unspecified, site unspecified     Asthma     Cataract     Cervical pain     Cylindrical bronchiectasis (HCC)     Fracture     rt ankle as an adult    History of pneumonia     Hypertension     Ill-defined condition     Spasms to left side    Left arm pain     Lumbar spinal stenosis     Myofascial pain     Osteopenia     Right buttock pain     Right low back pain     Sciatica of right side      Current Outpatient Prescriptions   Medication Sig Dispense Refill    GRALISE 600 mg Tb24 TAKE 2 TABLETS DAILY 180 Tab 0    EPINEPHrine (EPIPEN) 0.3 mg/0.3 mL injection 0.3 mL by IntraMUSCular route once as needed for up to 1 dose. 1 Syringe 1    albuterol (PROAIR HFA) 90 mcg/actuation inhaler Take 2 Puffs by inhalation every four (4) hours as needed. 3 Inhaler 3    melatonin 3 mg tablet Take  by mouth.  triamterene-hydroCHLOROthiazide (MAXZIDE) 37.5-25 mg per tablet TAKE ONE-HALF (1/2) TABLET DAILY 45 Tab 1    omega-3 fatty acids-vitamin e 1,000 mg cap Take 1 Cap by mouth two (2) times a day.  Cholecalciferol, Vitamin D3, (VITAMIN D3) 1,000 unit cap Take 1,000 Units by mouth daily.  cycloSPORINE (RESTASIS) 0.05 % ophthalmic emulsion Administer 1 Drop to both eyes two (2) times a day.  mometasone (NASONEX) 50 mcg/actuation nasal spray 2 Sprays by Nasal route daily. 3 Container 3    MULTIVITAMIN W-MINERALS/LUTEIN (CENTRUM SILVER PO) Take 1 Tab by mouth daily.  cetirizine (ZYRTEC) 10 mg tablet Take 10 mg by mouth daily as needed for Allergies.       co-enzyme Q-10 (CO Q-10) 100 mg capsule Take 100 mg by mouth daily.  CALCIUM CITRATE/VITAMIN D3 (CITRACAL + D PO) Take 3 Tabs by mouth daily.  BIOTIN PO Take 5,000 mcg by mouth daily. Allergies   Allergen Reactions    Naprosyn [Naproxen] Other (comments)     Blood in urine         ROS  Visit Vitals    /71 (BP 1 Location: Right arm, BP Patient Position: Sitting)    Pulse (!) 56    Temp 97 °F (36.1 °C) (Oral)    Resp 16    Ht 5' 1\" (1.549 m)    Wt 168 lb (76.2 kg)    BMI 31.74 kg/m2       Physical Exam  Nursing note and vitals reviewed. Constitutional: She is oriented to person, place, and time. She appears well-developed and well-nourished. No distress. HENT:   Mouth/Throat: Oropharynx is clear and moist.   Neck: No JVD present. No thyromegaly present. Cardiovascular: Normal rate, regular rhythm and normal heart sounds. Pulmonary/Chest: Effort normal and breath sounds normal. No respiratory distress. She has no wheezes. She has no rales. Musculoskeletal: She exhibits no edema. Lymphadenopathy:     She has no cervical adenopathy. Neurological: She is alert and oriented to person, place, and time. Coordination normal.   Psychiatric: She has a normal mood and affect. Her behavior is normal.    ASSESSMENT and PLAN    ICD-10-CM ICD-9-CM    1. Atrophic vaginitis N95.2 627.3 conjugated estrogens (PREMARIN) 0.625 mg/gram vaginal cream   2. Essential hypertension I10 401.9    Visit based of time 35 minutes total,  with more than 50% of the face-to-face visit time spent in counseling on Depression, it's treatment, prognosis, management advise, plan and follow-up recommendations.    atrophic vaginitis, premarin, otc

## 2017-08-08 NOTE — MR AVS SNAPSHOT
Visit Information Date & Time Provider Department Dept. Phone Encounter #  
 8/8/2017  7:45 AM Itzel Dykes MD [address] [phone] Follow-up Instructions Return in about 3 months (around 11/8/2017). Upcoming Health Maintenance Date Due DTaP/Tdap/Td series (1 - Tdap) 2/1/1961 INFLUENZA AGE 9 TO ADULT 8/1/2017 GLAUCOMA SCREENING Q2Y 1/11/2018 MEDICARE YEARLY EXAM 1/31/2018 BREAST CANCER SCRN MAMMOGRAM 5/8/2018 COLONOSCOPY 1/13/2020 Allergies as of 8/8/2017  Review Complete On: 8/8/2017 By: Itzel Dykes MD  
  
 Severity Noted Reaction Type Reactions Naprosyn [Naproxen]  01/21/2014    Other (comments) Blood in urine Current Immunizations  Reviewed on 9/22/2016 Name Date Influenza High Dose Vaccine PF 9/22/2016  9:45 AM  
 Influenza Vaccine 10/20/2015, 9/26/2014, 10/8/2013 Influenza Vaccine Split 10/6/2011 Pneumococcal Conjugate (PCV-13) 5/11/2015 Pneumococcal Polysaccharide (PPSV-23) 6/14/2016 Not reviewed this visit You Were Diagnosed With   
  
 Codes Comments Atrophic vaginitis    -  Primary ICD-10-CM: N95.2 ICD-9-CM: 627.3 Essential hypertension     ICD-10-CM: I10 
ICD-9-CM: 401.9 Vitals BP Pulse Temp Resp Height(growth percentile) Weight(growth percentile) 129/71 (BP 1 Location: Right arm, BP Patient Position: Sitting) (!) 56 97 °F (36.1 °C) (Oral) 16 5' 1\" (1.549 m) 168 lb (76.2 kg) BMI OB Status Smoking Status 31.74 kg/m2 Hysterectomy Former Smoker BMI and BSA Data Body Mass Index Body Surface Area 31.74 kg/m 2 1.81 m 2 Preferred Pharmacy Pharmacy Name Phone [facility] [address] [facility], [facility] [phone] Your Updated Medication List  
  
   
This list is accurate as of: 8/8/17  9:03 AM.  Always use your most recent med list.  
  
  
  
  
 albuterol 90 mcg/actuation inhaler Commonly known as:  PROAIR HFA Take 2 Puffs by inhalation every four (4) hours as needed. BIOTIN PO Take 5,000 mcg by mouth daily. CENTRUM SILVER PO Take 1 Tab by mouth daily. CITRACAL + D PO Take 3 Tabs by mouth daily. CO Q-10 100 mg capsule Generic drug:  co-enzyme Q-10 Take 100 mg by mouth daily. conjugated estrogens 0.625 mg/gram vaginal cream  
Commonly known as:  PREMARIN Insert 1 g into vagina daily. 3 weeks on and 1 week off, for maintenance 0.5 g 2x/week EPINEPHrine 0.3 mg/0.3 mL injection Commonly known as:  EPIPEN  
0.3 mL by IntraMUSCular route once as needed for up to 1 dose. GRALISE 600 mg Tb24 Generic drug:  gabapentin TAKE 2 TABLETS DAILY  
  
 melatonin 3 mg tablet Take  by mouth.  
  
 mometasone 50 mcg/actuation nasal spray Commonly known as:  NASONEX  
2 Sprays by Nasal route daily. omega-3 fatty acids-vitamin e 1,000 mg Cap Take 1 Cap by mouth two (2) times a day. RESTASIS 0.05 % ophthalmic emulsion Generic drug:  cycloSPORINE Administer 1 Drop to both eyes two (2) times a day. triamterene-hydroCHLOROthiazide 37.5-25 mg per tablet Commonly known as:  Pink Tiffany TAKE ONE-HALF (1/2) TABLET DAILY  
  
 VITAMIN D3 1,000 unit Cap Generic drug:  cholecalciferol Take 1,000 Units by mouth daily. ZyrTEC 10 mg tablet Generic drug:  cetirizine Take 10 mg by mouth daily as needed for Allergies. Prescriptions Sent to Pharmacy Refills  
 conjugated estrogens (PREMARIN) 0.625 mg/gram vaginal cream 3 Sig: Insert 1 g into vagina daily. 3 weeks on and 1 week off, for maintenance 0.5 g 2x/week Class: Normal  
 Pharmacy: [id number] [address] [place] [address], [address] [place] [address] #: [phone] Route: Vaginal  
  
Follow-up Instructions Return in about 3 months (around 11/8/2017). Patient Instructions Please contact our office if you have any questions about your visit today. Atrophic Vaginitis: Care Instructions Your Care Instructions Atrophic vaginitis is an irritation of the vagina. It's caused by thinning tissues and less moisture in the vaginal walls. It often happens during menopause when hormone levels change. Surgery to remove the ovaries also can cause it. Your doctor may do tests to rule out other causes. And you may get tests to measure your hormone levels. The problem is most often treated with the hormone estrogen. It comes in a cream, tablets, or a soft plastic ring that is placed in the vagina. Follow-up care is a key part of your treatment and safety. Be sure to make and go to all appointments, and call your doctor if you are having problems. It's also a good idea to know your test results and keep a list of the medicines you take. How can you care for yourself at home? · Use a water-based lubricant for your vagina if sex is dry or painful. Examples are Astroglide, Wet Lubricant Gel, and K-Y Jelly. · Talk with your doctor about using low-dose vaginal estrogen. It treats dryness and thinning tissue. · Do not douche. · Having sex improves blood flow to the vagina. This helps keep your tissue healthy. · Wipe from front to back after you use the toilet. This stops the spread of bacteria to your vagina. When should you call for help? Watch closely for changes in your health, and be sure to contact your doctor if: 
· You have unexpected vaginal bleeding. · You do not get better as expected. Where can you learn more? Go to http://keke-josé luis.info/. Enter R330 in the search box to learn more about \"Atrophic Vaginitis: Care Instructions. \" Current as of: October 13, 2016 Content Version: 11.3 © 8724-7643 [employer]  Care instructions adapted under license by [employer] (which disclaims liability or warranty for this information). If you have questions about a medical condition or this instruction, always ask your healthcare professional. [address] any warranty or liability for your use of this information. Introducing [facility]! Dear Arlet Espinoza: Thank you for requesting a [email or web address] account. Our records indicate that you already have an active [email or web address] account. You can access your account anytime at [email or web address] Did you know that you can access your hospital and ER discharge instructions at any time in [email or web address]? You can also review all of your test results from your hospital stay or ER visit. Additional Information If you have questions, please visit the Frequently Asked Questions section of the [email or web address] website at [email or web address] Remember, [email or web address] is NOT to be used for urgent needs. For medical emergencies, dial 911. Now available from your iPhone and Android! Please provide this summary of care documentation to your next provider. Your primary care clinician is listed as SPENSER MIRANDA. If you have any questions after today's visit, please call [phone].

## 2017-08-08 NOTE — PATIENT INSTRUCTIONS
Please contact our office if you have any questions about your visit today. Atrophic Vaginitis: Care Instructions  Your Care Instructions    Atrophic vaginitis is an irritation of the vagina. It's caused by thinning tissues and less moisture in the vaginal walls. It often happens during menopause when hormone levels change. Surgery to remove the ovaries also can cause it. Your doctor may do tests to rule out other causes. And you may get tests to measure your hormone levels. The problem is most often treated with the hormone estrogen. It comes in a cream, tablets, or a soft plastic ring that is placed in the vagina. Follow-up care is a key part of your treatment and safety. Be sure to make and go to all appointments, and call your doctor if you are having problems. It's also a good idea to know your test results and keep a list of the medicines you take. How can you care for yourself at home? · Use a water-based lubricant for your vagina if sex is dry or painful. Examples are Astroglide, Wet Lubricant Gel, and K-Y Jelly. · Talk with your doctor about using low-dose vaginal estrogen. It treats dryness and thinning tissue. · Do not douche. · Having sex improves blood flow to the vagina. This helps keep your tissue healthy. · Wipe from front to back after you use the toilet. This stops the spread of bacteria to your vagina. When should you call for help? Watch closely for changes in your health, and be sure to contact your doctor if:  · You have unexpected vaginal bleeding. · You do not get better as expected. Where can you learn more? Go to http://keke-josé luis.info/. Enter R330 in the search box to learn more about \"Atrophic Vaginitis: Care Instructions. \"  Current as of: October 13, 2016  Content Version: 11.3  © 1772-7959 [employer]. Care instructions adapted under license by [employer] (which disclaims liability or warranty for this information).  If you have questions about a medical condition or this instruction, always ask your healthcare professional. [place] [address] any warranty or liability for your use of this information.

## 2017-08-08 NOTE — PROGRESS NOTES
Chief Complaint   Patient presents with    Hypertension    Asthma    Other     HNP       Health Maintenance Due   Topic Date Due    DTaP/Tdap/Td series (1 - Tdap) 02/01/1963    INFLUENZA AGE 9 TO ADULT  08/01/2017       Health Maintenance reviewed     1. Have you been to the ER, urgent care clinic since your last visit? Hospitalized since your last visit? No    2. Have you seen or consulted any other health care providers outside of the [address] [place] [address] since your last visit? Include any pap smears or colon screening.  No

## 2017-08-16 RX ORDER — TRIAMTERENE/HYDROCHLOROTHIAZID 37.5-25 MG
TABLET ORAL
Qty: 45 TAB | Refills: 0 | Status: SHIPPED | OUTPATIENT
Start: 2017-08-16 | End: 2017-11-14 | Stop reason: SDUPTHER

## 2017-09-01 DIAGNOSIS — M79.2 NEURITIS: ICD-10-CM

## 2017-09-01 DIAGNOSIS — M54.12 CERVICAL RADICULOPATHY: ICD-10-CM

## 2017-09-01 DIAGNOSIS — M50.30 DDD (DEGENERATIVE DISC DISEASE), CERVICAL: ICD-10-CM

## 2017-09-01 RX ORDER — GABAPENTIN 600 MG/1
TABLET, FILM COATED ORAL
Qty: 180 TAB | Refills: 0 | Status: SHIPPED | OUTPATIENT
Start: 2017-09-01 | End: 2017-11-20 | Stop reason: SDUPTHER

## 2017-09-06 DIAGNOSIS — N95.2 ATROPHIC VAGINITIS: ICD-10-CM

## 2017-09-12 ENCOUNTER — TELEPHONE (OUTPATIENT)
Dept: FAMILY MEDICINE CLINIC | Age: 77
End: 2017-09-12

## 2017-09-18 NOTE — TELEPHONE ENCOUNTER
Patient notified. [facility] personnel were unavailable to transfer patient. Patient notified that a message will be sent out to her them to call her at her mobile phone to schedule f/u.

## 2017-09-20 ENCOUNTER — OFFICE VISIT (OUTPATIENT)
Dept: ORTHOPEDIC SURGERY | Age: 77
End: 2017-09-20

## 2017-09-20 VITALS
OXYGEN SATURATION: 98 % | TEMPERATURE: 98.3 F | DIASTOLIC BLOOD PRESSURE: 69 MMHG | BODY MASS INDEX: 31.79 KG/M2 | SYSTOLIC BLOOD PRESSURE: 118 MMHG | RESPIRATION RATE: 18 BRPM | HEIGHT: 61 IN | HEART RATE: 63 BPM | WEIGHT: 168.4 LBS

## 2017-09-20 DIAGNOSIS — M54.41 CHRONIC RIGHT-SIDED LOW BACK PAIN WITH RIGHT-SIDED SCIATICA: ICD-10-CM

## 2017-09-20 DIAGNOSIS — M48.02 CERVICAL STENOSIS OF SPINE: ICD-10-CM

## 2017-09-20 DIAGNOSIS — M50.30 DDD (DEGENERATIVE DISC DISEASE), CERVICAL: ICD-10-CM

## 2017-09-20 DIAGNOSIS — M48.061 LUMBAR SPINAL STENOSIS: Primary | ICD-10-CM

## 2017-09-20 DIAGNOSIS — G89.29 CHRONIC RIGHT-SIDED LOW BACK PAIN WITH RIGHT-SIDED SCIATICA: ICD-10-CM

## 2017-09-20 NOTE — PROGRESS NOTES
[address]  [address], [address]  [place], [address] [place] [address] Street  Phone: [phone]  Fax: [phone]  PROGRESS NOTE  Patient: Zeke Smith                MRN: [id number]       SSN: [id number]  YOB: 1940        AGE: 78 y.o. SEX: female  Body mass index is 31.82 kg/(m^2). PCP: Simi Seaman MD  09/20/17    Chief Complaint   Patient presents with    Back Pain     follow up    Neck Pain       HISTORY OF PRESENT ILLNESS:  Zeke Smith is a 78 y.o.  female with history of neck pain and arm pain and LBP and RLE sciatica for 5+ years. Her sciatica started about 5 years ago after pushing a heavy cart at work, she is a recently retired nurse. She comes in today with LBP and very intermittent RLE sciatica. She has no neck or arm pain. Her pain is controlled with her Gralise, she recently had skipped a few days of it from going on vacation and forgetting it, she experienced an increase in her pain. This has resolved. Prior history of neck and back problems: recurrent self limited episodes of low back pain in the past, previous osteoarthritis of lumbar spine and previous herniated disc, cervical stenosis, cervical radiculopathy and myofascial pain. Pain is aching and throbbing and no radiation of pain today. Symptoms are worst: mid-day. Pain is worse with walking, lifting, twisting and affects recreational activities. Pain is better with massage, relaxation and lying down. States she has been using Gralise ER 1200mg daily with complete, relief. Denies bladder/bowel dysfunction, saddle paresthesia, weakness, gait disturbance, or other neurological deficits. Denies chills, fever,night sweats, unexplained weight loss/weight gain, chest pain, sob or anxiety. Reports no new medical issues or hospitalizations since the last visit.   Pt at this time desires to  continue with current care/proceed with medication evaluation/proceed with LBP and intermittent RLE sciatica. ASSESSMENT   Diagnoses and all orders for this visit:    1. Lumbar spinal stenosis    2. Cervical stenosis of spine    3. Chronic right-sided low back pain with right-sided sciatica    4. DDD (degenerative disc disease), cervical         IMPRESSION AND PLAN:  This is a pt with chronic LBP and intermittent RLE sciatica who has has significantly improved since last OV. Her neck and arm pain has completely resolved. She has improved greatly, she will only need to follow-up yearly. She will call for refills of her Gralise, we can give her a year's supply. 1) Pt was given information on walking program   2) Continue HEP  3) Continue Gralise  4) Ms. Kayce Hoffmann has a reminder for a \"due or due soon\" health maintenance. I have asked that she contact her primary care provider, Jumana Grossman MD, for follow-up on this health maintenance. 5) We have informed patient to notify us for immediate appointment if he has any worsening neurogical symptoms or if an emergency situation presents, then call 911  6)  has been reviewed and is appropriate  7) Pt will follow-up in 1 year. Subjective    Work Retired Nurse    Smoking Status Non-smoker    Pain Scale: 0 - No pain/10    Pain Assessment  1/4/2017   Location of Pain Neck   Location Modifiers -   Severity of Pain 0   Quality of Pain -   Quality of Pain Comment -   Duration of Pain -   Frequency of Pain Intermittent   Aggravating Factors -   Aggravating Factors Comment -   Limiting Behavior -   Relieving Factors -   Result of Injury No         REVIEW OF SYSTEMS  Constitutional: Negative for fever, chills, or weight change. Respiratory: Negative for cough or shortness of breath. Cardiovascular: Negative for chest pain or palpitations. Gastrointestinal: Negative for incontinence, acid reflux, change in bowel habits, or constipation. Genitourinary: Negative for incontinence, dysuria and flank pain.    Musculoskeletal: Positive for LBP and intermittent sciatica, RLE pain. See HPI. Skin: Negative for rash. Neurological:RLE L5/S1 Radicular pain See HPI. Endo/Heme/Allergies: Negative. Psychiatric/Behavioral: Negative. PHYSICAL EXAMINATION  Visit Vitals    /69    Pulse 63    Temp 98.3 °F (36.8 °C) (Oral)    Resp 18    Ht 5' 1\" (1.549 m)    Wt 168 lb 6.4 oz (76.4 kg)    SpO2 98%    BMI 31.82 kg/m2         Accompanied by self. Constitutional:  Well developed, well nourished, in no acute distress. Psychiatric: Affect and mood are appropriate. Integumentary: No rashes or abrasions noted on exposed areas. Cardiovascular/Peripheral Vascular: +2 radial & pedal pulses. No peripheral edema is noted. Lymphatic:  No evidence of lymphedema. No cervical lymphadenopathy. SPINE/MUSCULOSKELETAL EXAM    Cervical spine:  Neck is midline. Normal muscle tone. No focal atrophy is noted. Neck ROM decreased with extension. Shoulder ROM intact. Tenderness to palpation none. Negative Spurling's sign. Negative Tinel's sign. Negative Perry's sign. Sensation grossly intact to light touch. MOTOR:     Hip Flex Quads Hamstrings Ankle DF EHL Ankle PF   Right 5/5 5/5 5/5 5/5 5/5 5/5   Left 5/5 5/5 5/5 5/5 5/5 5/5     Ambulation without assistive device.  FWB.    normal gait and station        PAST MEDICAL HISTORY   Past Medical History:   Diagnosis Date    AR (allergic rhinitis)     Arthropathy, unspecified, site unspecified     Asthma     Cataract     Cervical pain     Cylindrical bronchiectasis (HCC)     Fracture     rt ankle as an adult    History of pneumonia     Hypertension     Ill-defined condition     Spasms to left side    Left arm pain     Lumbar spinal stenosis     Myofascial pain     Osteopenia     Right buttock pain     Right low back pain     Sciatica of right side        Past Surgical History:   Procedure Laterality Date    HX CATARACT REMOVAL  8/15/2011    HX HEENT      sinus surgery    HX ORTHOPAEDIC      left 4th finger trigger release    HX SHIELA AND BSO     . MEDICATIONS      Current Outpatient Prescriptions   Medication Sig Dispense Refill    conjugated estrogens (PREMARIN) 0.625 mg/gram vaginal cream Insert 1 g into vagina daily. 3 weeks on and 1 week off, for maintenance 0.5 g 2x/week 90 g 3    GRALISE 600 mg Tb24 TAKE 2 TABLETS DAILY 180 Tab 0    triamterene-hydroCHLOROthiazide (MAXZIDE) 37.5-25 mg per tablet TAKE ONE-HALF (1/2) TABLET DAILY 45 Tab 0    EPINEPHrine (EPIPEN) 0.3 mg/0.3 mL injection 0.3 mL by IntraMUSCular route once as needed for up to 1 dose. 1 Syringe 1    albuterol (PROAIR HFA) 90 mcg/actuation inhaler Take 2 Puffs by inhalation every four (4) hours as needed. 3 Inhaler 3    melatonin 3 mg tablet Take  by mouth.  omega-3 fatty acids-vitamin e 1,000 mg cap Take 1 Cap by mouth two (2) times a day.  Cholecalciferol, Vitamin D3, (VITAMIN D3) 1,000 unit cap Take 1,000 Units by mouth daily.  cycloSPORINE (RESTASIS) 0.05 % ophthalmic emulsion Administer 1 Drop to both eyes two (2) times a day.  mometasone (NASONEX) 50 mcg/actuation nasal spray 2 Sprays by Nasal route daily. 3 Container 3    MULTIVITAMIN W-MINERALS/LUTEIN (CENTRUM SILVER PO) Take 1 Tab by mouth daily.  cetirizine (ZYRTEC) 10 mg tablet Take 10 mg by mouth daily as needed for Allergies.  co-enzyme Q-10 (CO Q-10) 100 mg capsule Take 100 mg by mouth daily.  CALCIUM CITRATE/VITAMIN D3 (CITRACAL + D PO) Take 3 Tabs by mouth daily.  BIOTIN PO Take 5,000 mcg by mouth daily. ALLERGIES    Allergies   Allergen Reactions    Naprosyn [Naproxen] Other (comments)     Blood in urine            SOCIAL HISTORY    Social History     Social History    Marital status:      Spouse name: N/A    Number of children: N/A    Years of education: N/A     Occupational History    Not on file.      Social History Main Topics    Smoking status: Former Smoker     Years: 5.00 Types: Cigarettes     Quit date: 1/1/1970    Smokeless tobacco: Never Used    Alcohol use 0.6 oz/week     1 Glasses of wine per week      Comment: rare    Drug use: Yes     Special: Prescription, OTC    Sexual activity: Not on file     Other Topics Concern    Not on file     Social History Narrative     Social History Narrative      Problem Relation Age of Onset    Asthma Mother     COPD Mother    Natacha Moe MS Mother     Heart Disease Mother     Hypertension Mother     Stroke Father     Heart Disease Father     Hypertension Father     Allergic Rhinitis Sister     Asthma Brother     Hypertension Brother     Diabetes Brother     Migraines Paternal Grandmother          Sussy Poole NP

## 2017-09-20 NOTE — PATIENT INSTRUCTIONS
Walking for Exercise: Care Instructions  Your Care Instructions    Walking is one of the easiest ways to get the exercise you need for good health. A brisk, 30-minute walk each day can help you feel better and have more energy. It can help you lower your risk of disease. Walking can help you keep your bones strong and your heart healthy. Check with your doctor before you start a walking plan if you have heart problems, other health issues, or you have not been active in a long time. Follow your doctor's instructions for safe levels of exercise. Follow-up care is a key part of your treatment and safety. Be sure to make and go to all appointments, and call your doctor if you are having problems. It's also a good idea to know your test results and keep a list of the medicines you take. How can you care for yourself at home? Getting started  · Start slowly and set a short-term goal. For example, walk for 5 or 10 minutes every day. · Bit by bit, increase the amount you walk every day. Try for at least 30 minutes on most days of the week. You also may want to swim, bike, or do other activities. · If finding enough time is a problem, it is fine to be active in blocks of 10 minutes or more throughout your day and week. · To get the heart-healthy benefits of walking, you need to walk briskly enough to increase your heart rate and breathing, but not so fast that you cannot talk comfortably. · Wear comfortable shoes that fit well and provide good support for your feet and ankles. Staying with your plan  · After you've made walking a habit, set a longer-term goal. You may want to set a goal of walking briskly for longer or walking farther. Experts say to do 2½ hours of moderate activity a week. A faster heartbeat is what defines moderate-level activity. · To stay motivated, walk with friends, coworkers, or pets. · Use a phone chance or pedometer to track your steps each day. Set a goal to increase your steps.  Once you get there, set a higher goal. Aim for 10,000 steps a day. · If the weather keeps you from walking outside, go for walks at the mall with a friend. Local schools and churches may have indoor gyms where you can walk. Fitting a walk into your workday  · Park several blocks away from work, or get off the bus a few stops early. · Use the stairs instead of the elevator, at least for a few floors. · Suggest holding meetings with colleagues during a walk inside or outside the building. · Use the restroom that is the farthest from your desk or workstation. · Use your morning and afternoon breaks to take quick 15-minute walks. Staying safe  · Know your surroundings. Walk in a well-lighted, safe place. If it is dark, walk with a partner. Wear light-colored clothing. If you can, buy a vest or jacket that reflects light. · Carry a cell phone for emergencies. · Drink plenty of water. Take a water bottle with you when you walk. This is very important if it is hot out. · Be careful not to slip on wet or icy ground. You can buy \"grippers\" for your shoes to help keep you from slipping. · Pay attention to your walking surface. Use sidewalks and paths. · If you have breathing problems like asthma or COPD, ask your doctor when it is safe for you to walk outdoors. Cold, dry air, smog, pollen, or other things in the air could cause breathing problems. Where can you learn more? Go to http://keke-josé luis.info/. Enter R159 in the search box to learn more about \"Walking for Exercise: Care Instructions. \"  Current as of: March 13, 2017  Content Version: 11.3  © 4623-1771 [employer]. Care instructions adapted under license by [employer] (which disclaims liability or warranty for this information).  If you have questions about a medical condition or this instruction, always ask your healthcare professional. [address]  any warranty or liability for your use of this information.

## 2017-09-25 ENCOUNTER — OFFICE VISIT (OUTPATIENT)
Dept: FAMILY MEDICINE CLINIC | Age: 77
End: 2017-09-25

## 2017-09-25 VITALS
HEIGHT: 61 IN | BODY MASS INDEX: 31.91 KG/M2 | HEART RATE: 60 BPM | RESPIRATION RATE: 20 BRPM | TEMPERATURE: 97.6 F | DIASTOLIC BLOOD PRESSURE: 77 MMHG | WEIGHT: 169 LBS | SYSTOLIC BLOOD PRESSURE: 123 MMHG

## 2017-09-25 DIAGNOSIS — H18.422: ICD-10-CM

## 2017-09-25 DIAGNOSIS — Z23 ENCOUNTER FOR IMMUNIZATION: ICD-10-CM

## 2017-09-25 DIAGNOSIS — Z01.818 PRE-OP EXAM: Primary | ICD-10-CM

## 2017-09-25 NOTE — PATIENT INSTRUCTIONS
Please contact our office if you have any questions about your visit today. Photorefractive Keratectomy ([address]): Before Your Surgery  What is photorefractive keratectomy? Photorefractive keratectomy ([address]) is surgery to improve how well you can see. It reshapes the outer part of your eyeball, called the cornea. This surgery can fix vision problems in one or both eyes. These problems include nearsightedness, farsightedness, and astigmatism. To do the surgery, the doctor first cleans your eye and puts drops in it. The drops numb your eye. Then he or she uses special tools to keep your eye open. The cells on the surface of your eye are removed or pulled to one side. With a laser, the doctor then removes tissue and reshapes your cornea. Then the doctor puts a contact lens on your eye to protect it. The doctor will remove the lens 2 to 4 days after surgery. [address] surgery takes about 20 to 30 minutes. Before surgery, you may get medicine to help you relax. During surgery, you may feel a little pressure in your eye. For 3 or 4 days after surgery, your eyes may burn or itch. You may feel like there is something in your eye. Your eye may also water more than usual.  You may see better as soon as the surgery is over. Or things may look blurry for a few days. You will probably be able to go back to work or your normal routine in about 3 to 7 days. For some people, it takes 3 to 6 months to see as clearly as possible. But most people no longer need glasses or contact lenses. Follow-up care is a key part of your treatment and safety. Be sure to make and go to all appointments, and call your doctor if you are having problems. It's also a good idea to know your test results and keep a list of the medicines you take. What happens before surgery? Surgery can be stressful. This information will help you understand what you can expect. And it will help you safely prepare for surgery.   Preparing for surgery  · Understand exactly what surgery is planned, along with the risks, benefits, and other options. · Tell your doctors ALL the medicines, vitamins, supplements, and herbal remedies you take. Some of these can increase the risk of bleeding or interact with anesthesia. · If you take blood thinners, such as warfarin (Coumadin), clopidogrel (Plavix), or aspirin, be sure to talk to your doctor. He or she will tell you if you should stop taking these medicines before your surgery. Make sure that you understand exactly what your doctor wants you to do. · Your doctor will tell you which medicines to take or stop before your surgery. You may need to stop taking certain medicines a week or more before surgery. So talk to your doctor as soon as you can. · If you have an advance directive, let your doctor know. It may include a living will and a durable power of  for health care. Bring a copy to the hospital. If you don't have one, you may want to prepare one. It lets your doctor and loved ones know your health care wishes. Doctors advise that everyone prepare these papers before any type of surgery or procedure. What happens on the day of surgery? · Follow the instructions exactly about when to stop eating and drinking. If you don't, your surgery may be canceled. If your doctor told you to take your medicines on the day of surgery, take them with only a sip of water. · Take a bath or shower before you come in for your surgery. Do not apply lotions, perfumes, deodorants, or nail polish. · Take off all jewelry and piercings. And take out contact lenses, if you wear them. At the hospital or surgery center  · Bring a picture ID. · The surgery will take about 20 to 30 minutes. · After the surgery, you will have a contact lens on your eye. You will be able to see through the lens, but it will be blurry. Your doctor may tell you to keep your eye closed as much as possible for up to 24 hours. · You will get drops in your eye. These will help it heal and prevent infection. Going home  · Be sure you have someone to drive you home. Anesthesia and pain medicine make it unsafe for you to drive. · You will be given more specific instructions about recovering from your surgery. They will cover things like diet, wound care, follow-up care, driving, and getting back to your normal routine. When should you call your doctor? · You have questions or concerns. · You don't understand how to prepare for your surgery. · You become ill before the surgery (such as fever, flu, or a cold). · You need to reschedule or have changed your mind about having the surgery. Where can you learn more? Go to http://keke-josé luis.info/. Enter H211 in the search box to learn more about \"Photorefractive Keratectomy ([address]): Before Your Surgery. \"  Current as of: March 3, 2017  Content Version: 11.3  © 2622-5393 [employer]. Care instructions adapted under license by [employer] (which disclaims liability or warranty for this information). If you have questions about a medical condition or this instruction, always ask your healthcare professional. [place] [address] any warranty or liability for your use of this information. Influenza (Flu): Care Instructions  Your Care Instructions  Influenza (flu) is an infection in the lungs and breathing passages. It is caused by the influenza virus. There are different strains, or types, of the flu virus from year to year. Unlike the common cold, the flu comes on suddenly and the symptoms, such as a cough, congestion, fever, chills, fatigue, aches, and pains, are more severe. These symptoms may last up to 10 days. Although the flu can make you feel very sick, it usually doesn't cause serious health problems. Home treatment is usually all you need for flu symptoms.  But your doctor may prescribe antiviral medicine to prevent other health problems, such as pneumonia, from developing. Older people and those who have a long-term health condition, such as lung disease, are most at risk for having pneumonia or other health problems. Follow-up care is a key part of your treatment and safety. Be sure to make and go to all appointments, and call your doctor if you are having problems. Its also a good idea to know your test results and keep a list of the medicines you take. How can you care for yourself at home? · Get plenty of rest.  · Drink plenty of fluids, enough so that your urine is light yellow or clear like water. If you have kidney, heart, or liver disease and have to limit fluids, talk with your doctor before you increase the amount of fluids you drink. · Take an over-the-counter pain medicine if needed, such as acetaminophen (Tylenol), ibuprofen (Advil, Motrin), or naproxen (Aleve), to relieve fever, headache, and muscle aches. Read and follow all instructions on the label. No one younger than 20 should take aspirin. It has been linked to Reye syndrome, a serious illness. · Do not smoke. Smoking can make the flu worse. If you need help quitting, talk to your doctor about stop-smoking programs and medicines. These can increase your chances of quitting for good. · Breathe moist air from a hot shower or from a sink filled with hot water to help clear a stuffy nose. · Before you use cough and cold medicines, check the label. These medicines may not be safe for young children or for people with certain health problems. · If the skin around your nose and lips becomes sore, put some petroleum jelly on the area. · To ease coughing:  ¨ Drink fluids to soothe a scratchy throat. ¨ Suck on cough drops or plain hard candy. ¨ Take an over-the-counter cough medicine that contains dextromethorphan to help you get some sleep. Read and follow all instructions on the label. ¨ Raise your head at night with an extra pillow. This may help you rest if coughing keeps you awake.   · Take any prescribed medicine exactly as directed. Call your doctor if you think you are having a problem with your medicine. To avoid spreading the flu  · Wash your hands regularly, and keep your hands away from your face. · Stay home from school, work, and other public places until you are feeling better and your fever has been gone for at least 24 hours. The fever needs to have gone away on its own without the help of medicine. · Ask people living with you to talk to their doctors about preventing the flu. They may get antiviral medicine to keep from getting the flu from you. · To prevent the flu in the future, get a flu vaccine every fall. Encourage people living with you to get the vaccine. · Cover your mouth when you cough or sneeze. When should you call for help? Call 911 anytime you think you may need emergency care. For example, call if:  · You have severe trouble breathing. Call your doctor now or seek immediate medical care if:  · You have new or worse trouble breathing. · You seem to be getting much sicker. · You feel very sleepy or confused. · You have a new or higher fever. · You get a new rash. Watch closely for changes in your health, and be sure to contact your doctor if:  · You begin to get better and then get worse. · You are not getting better after 1 week. Where can you learn more? Go to http://keke-josé luis.info/. Enter N670 in the search box to learn more about \"Influenza (Flu): Care Instructions. \"  Current as of: March 25, 2017  Content Version: 11.3  © 8576-7493 [employer]. Care instructions adapted under license by [employer] (which disclaims liability or warranty for this information). If you have questions about a medical condition or this instruction, always ask your healthcare professional. [place] [address] any warranty or liability for your use of this information.   Influenza Virus Vaccine (By injection) Influenza Virus Vaccine (in-floo-EN-za VYE-placido VAX-een)  Helps prevent infection with influenza (flu) virus. Brand Name(s): Afluria [id number] Formula, Abi Stakes 2723-6967 Formula, FluLaval Quadrivalent 3175-0096 Formula, Fluad 5640-5393 Formula, Fluarix Quadrivalent 9661-8888 Formula, Flublok 5651-2251 Formula, Flucelvax Quadrivalent 8803-7788 Formula, Fluvirin 1373-5045 Formula, Fluzone High-Dose 3354-2980 Formula, Fluzone Intradermal Quadrivalent 3854-8490 Formula, Fluzone Quadrivalent 2872-6969 Formula, Medical Provider Single Use EZ Flu Shot 7180-5949, Medical Provider Single Use EZ Flu Shot 8610-9293   There may be other brand names for this medicine. When This Medicine Should Not Be Used: This vaccine is not right for everyone. You should not receive it if you had an allergic reaction to flu vaccine. If you are allergic to eggs, tell the caregiver who is going to give you the injection. Some brands of this vaccine contain egg proteins and could cause an allergic reaction. How to Use This Medicine:   Injectable  · The vaccine is given as a shot into a muscle or into your skin, usually in the shoulder area. The shot could be given in the thigh for babies and young children. · A nurse or other health provider will give you this medicine. · Read and follow the patient instructions that come with this medicine. Talk to your doctor or pharmacist if you have any questions. · A child who is younger than 5years old and who has not had a flu shot before may need 2 shots. The second shot should be given about 1 month after the first.  · Missed dose: Most people need only 1 dose of the vaccine. If your child needs a second dose, it is important for the vaccine to be given on schedule. If you must cancel an appointment, make a new one right away.   Drugs and Foods to Avoid:   Ask your doctor or pharmacist before using any other medicine, including over-the-counter medicines, vitamins, and herbal products. · Tell your doctor if you are using a medicine or treatment that weakens your immune system, such as a steroid, radiation, or cancer treatment. This vaccine may not work as well if you are also using these medicines. However, your doctor may still want you to get the vaccine because it can give you some protection. Warnings While Using This Medicine:   · Tell your doctor if you are pregnant or breastfeeding or if you have a weak immune system. · Tell your doctor if you ever had an unusual reaction to a flu shot, such as Guillain-Barré syndrome, or if you are allergic to latex. · The flu vaccine may not protect everyone who receives it. This vaccine will not treat flu symptoms if you have already been infected with the virus. Possible Side Effects While Using This Medicine:   Call your doctor right away if you notice any of these side effects:  · Allergic reaction: Itching or hives, swelling in your face or hands, swelling or tingling in your mouth or throat, chest tightness, trouble breathing  · Fainting, dizziness, or lightheadedness  · Fever over 103 degrees F  · Seizures  · Severe muscle weakness  If you notice these less serious side effects, talk with your doctor:   · Headache, muscle pain, tiredness  · Irritability or crying (in a child)  · Redness, pain, swelling, soreness, or a lump where the shot was given  If you notice other side effects that you think are caused by this medicine, tell your doctor. Call your doctor for medical advice about side effects. You may report side effects to FDA at 7-631-FDA-6500  © 2017 [address] Information is for End User's use only and may not be sold, redistributed or otherwise used for commercial purposes. The above information is an  only. It is not intended as medical advice for individual conditions or treatments.  Talk to your doctor, nurse or pharmacist before following any medical regimen to see if it is safe and effective for you.

## 2017-09-25 NOTE — PROGRESS NOTES
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 Have you been to the ER, urgent care clinic since your last visit? Hospitalized since your last visit? No    2. Have you seen or consulted any other health care providers outside of the [address] [place] [address] since your last visit? Include any pap smears or colon screening.  No

## 2017-09-25 NOTE — PROGRESS NOTES
Leta Harada is a 78 y.o. female who presents for routine immunizations. She denies any symptoms , reactions or allergies that would exclude them from being immunized today. Risks and adverse reactions were discussed and the VIS was given to them. All questions were addressed. She was observed for 15 min post injection. There were no reactions observed.     Juan Miguel Adkins LPN

## 2017-09-25 NOTE — MR AVS SNAPSHOT
Visit Information Date & Time Provider Department Dept. Phone Encounter #  
 9/25/2017 10:00 AM Edwina Clark NP [facility] Wilman Herrera 79 [id number] Your Appointments 11/8/2017  7:45 AM  
Follow Up with Mirlande Watters MD  
[address] (--) Appt Note: 3 month fu  
 [address] [phone] [address] [address] [phone]  
  
   
 [address] [phone] [address] [address] Upcoming Health Maintenance Date Due DTaP/Tdap/Td series (1 - Tdap) 2/1/1961 INFLUENZA AGE 9 TO ADULT 8/1/2017 GLAUCOMA SCREENING Q2Y 1/11/2018 MEDICARE YEARLY EXAM 1/31/2018 BREAST CANCER SCRN MAMMOGRAM 5/8/2018 COLONOSCOPY 1/13/2020 Allergies as of 9/25/2017  Review Complete On: 9/20/2017 By: Osmel Deshpande NP Severity Noted Reaction Type Reactions Naprosyn [Naproxen]  01/21/2014    Other (comments) Blood in urine Current Immunizations  Reviewed on 9/22/2016 Name Date Influenza High Dose Vaccine PF 9/22/2016  9:45 AM  
 Influenza Vaccine 10/20/2015, 9/26/2014, 10/8/2013 Influenza Vaccine Split 10/6/2011 Pneumococcal Conjugate (PCV-13) 5/11/2015 Pneumococcal Polysaccharide (PPSV-23) 6/14/2016 Not reviewed this visit You Were Diagnosed With   
  
 Codes Comments Pre-op exam    -  Primary ICD-10-CM: C12.933 ICD-9-CM: V72.84 Keratopathy, band, left     ICD-10-CM: D36.624 ICD-9-CM: 371.43 Encounter for immunization     ICD-10-CM: H35 ICD-9-CM: V03.89 Vitals BP Pulse Temp Resp Height(growth percentile) Weight(growth percentile) 123/77 (BP 1 Location: Right arm, BP Patient Position: Sitting) 60 97.6 °F (36.4 °C) (Oral) 20 5' 1\" (1.549 m) 169 lb (76.7 kg) BMI OB Status Smoking Status 31.93 kg/m2 Hysterectomy Former Smoker BMI and BSA Data Body Mass Index Body Surface Area  
 31.93 kg/m 2 1.82 m 2 [phone] Your Updated Medication List  
  
   
This list is accurate as of: 9/25/17 10:35 AM.  Always use your most recent med list.  
  
  
  
  
 albuterol 90 mcg/actuation inhaler Commonly known as:  PROAIR HFA Take 2 Puffs by inhalation every four (4) hours as needed. BIOTIN PO Take 5,000 mcg by mouth daily. CENTRUM SILVER PO Take 1 Tab by mouth daily. CITRACAL + D PO Take 3 Tabs by mouth daily. CO Q-10 100 mg capsule Generic drug:  co-enzyme Q-10 Take 100 mg by mouth daily. conjugated estrogens 0.625 mg/gram vaginal cream  
Commonly known as:  PREMARIN Insert 1 g into vagina daily. 3 weeks on and 1 week off, for maintenance 0.5 g 2x/week EPINEPHrine 0.3 mg/0.3 mL injection Commonly known as:  EPIPEN  
0.3 mL by IntraMUSCular route once as needed for up to 1 dose. GRALISE 600 mg Tb24 Generic drug:  gabapentin TAKE 2 TABLETS DAILY  
  
 melatonin 3 mg tablet Take  by mouth.  
  
 mometasone 50 mcg/actuation nasal spray Commonly known as:  NASONEX  
2 Sprays by Nasal route daily. omega-3 fatty acids-vitamin e 1,000 mg Cap Take 1 Cap by mouth two (2) times a day. RESTASIS 0.05 % ophthalmic emulsion Generic drug:  cycloSPORINE Administer 1 Drop to both eyes two (2) times a day. triamterene-hydroCHLOROthiazide 37.5-25 mg per tablet Commonly known as:  David Feeling TAKE ONE-HALF (1/2) TABLET DAILY  
  
 VITAMIN D3 1,000 unit Cap Generic drug:  cholecalciferol Take 1,000 Units by mouth daily. ZyrTEC 10 mg tablet Generic drug:  cetirizine Take 10 mg by mouth daily as needed for Allergies. Patient Instructions Please contact our office if you have any questions about your visit today. Photorefractive Keratectomy ([address]): Before Your Surgery What is photorefractive keratectomy? Photorefractive keratectomy ([address]) is surgery to improve how well you can see. It reshapes the outer part of your eyeball, called the cornea. This surgery can fix vision problems in one or both eyes. These problems include nearsightedness, farsightedness, and astigmatism. To do the surgery, the doctor first cleans your eye and puts drops in it. The drops numb your eye. Then he or she uses special tools to keep your eye open. The cells on the surface of your eye are removed or pulled to one side. With a laser, the doctor then removes tissue and reshapes your cornea. Then the doctor puts a contact lens on your eye to protect it. The doctor will remove the lens 2 to 4 days after surgery. [address] surgery takes about 20 to 30 minutes. Before surgery, you may get medicine to help you relax. During surgery, you may feel a little pressure in your eye. For 3 or 4 days after surgery, your eyes may burn or itch. You may feel like there is something in your eye. Your eye may also water more than usual. 
You may see better as soon as the surgery is over. Or things may look blurry for a few days. You will probably be able to go back to work or your normal routine in about 3 to 7 days. For some people, it takes 3 to 6 months to see as clearly as possible. But most people no longer need glasses or contact lenses. Follow-up care is a key part of your treatment and safety. Be sure to make and go to all appointments, and call your doctor if you are having problems. It's also a good idea to know your test results and keep a list of the medicines you take. What happens before surgery? Surgery can be stressful. This information will help you understand what you can expect. And it will help you safely prepare for surgery. Preparing for surgery · Understand exactly what surgery is planned, along with the risks, benefits, and other options.  
· Tell your doctors ALL the medicines, vitamins, supplements, and herbal remedies you take. Some of these can increase the risk of bleeding or interact with anesthesia. · If you take blood thinners, such as warfarin (Coumadin), clopidogrel (Plavix), or aspirin, be sure to talk to your doctor. He or she will tell you if you should stop taking these medicines before your surgery. Make sure that you understand exactly what your doctor wants you to do. · Your doctor will tell you which medicines to take or stop before your surgery. You may need to stop taking certain medicines a week or more before surgery. So talk to your doctor as soon as you can. · If you have an advance directive, let your doctor know. It may include a living will and a durable power of  for health care. Bring a copy to the hospital. If you don't have one, you may want to prepare one. It lets your doctor and loved ones know your health care wishes. Doctors advise that everyone prepare these papers before any type of surgery or procedure. What happens on the day of surgery? · Follow the instructions exactly about when to stop eating and drinking. If you don't, your surgery may be canceled. If your doctor told you to take your medicines on the day of surgery, take them with only a sip of water. · Take a bath or shower before you come in for your surgery. Do not apply lotions, perfumes, deodorants, or nail polish. · Take off all jewelry and piercings. And take out contact lenses, if you wear them. At the hospital or surgery center · Bring a picture ID. · The surgery will take about 20 to 30 minutes. · After the surgery, you will have a contact lens on your eye. You will be able to see through the lens, but it will be blurry. Your doctor may tell you to keep your eye closed as much as possible for up to 24 hours. · You will get drops in your eye. These will help it heal and prevent infection. Going home · Be sure you have someone to drive you home.  Anesthesia and pain medicine make it unsafe for you to drive. · You will be given more specific instructions about recovering from your surgery. They will cover things like diet, wound care, follow-up care, driving, and getting back to your normal routine. When should you call your doctor? · You have questions or concerns. · You don't understand how to prepare for your surgery. · You become ill before the surgery (such as fever, flu, or a cold). · You need to reschedule or have changed your mind about having the surgery. Where can you learn more? Go to http://keke-josé luis.info/. Enter B179 in the search box to learn more about \"Photorefractive Keratectomy ([address]): Before Your Surgery. \" Current as of: March 3, 2017 Content Version: 11.3 © 9120-2797 [email or web address]. Care instructions adapted under license by [employer] (which disclaims liability or warranty for this information). If you have questions about a medical condition or this instruction, always ask your healthcare professional. [place] [address] any warranty or liability for your use of this information. Influenza (Flu): Care Instructions Your Care Instructions Influenza (flu) is an infection in the lungs and breathing passages. It is caused by the influenza virus. There are different strains, or types, of the flu virus from year to year. Unlike the common cold, the flu comes on suddenly and the symptoms, such as a cough, congestion, fever, chills, fatigue, aches, and pains, are more severe. These symptoms may last up to 10 days. Although the flu can make you feel very sick, it usually doesn't cause serious health problems. Home treatment is usually all you need for flu symptoms. But your doctor may prescribe antiviral medicine to prevent other health problems, such as pneumonia, from developing.  Older people and those who have a long-term health condition, such as lung disease, are most at risk for having pneumonia or other health problems. Follow-up care is a key part of your treatment and safety. Be sure to make and go to all appointments, and call your doctor if you are having problems. Its also a good idea to know your test results and keep a list of the medicines you take. How can you care for yourself at home? · Get plenty of rest. 
· Drink plenty of fluids, enough so that your urine is light yellow or clear like water. If you have kidney, heart, or liver disease and have to limit fluids, talk with your doctor before you increase the amount of fluids you drink. · Take an over-the-counter pain medicine if needed, such as acetaminophen (Tylenol), ibuprofen (Advil, Motrin), or naproxen (Aleve), to relieve fever, headache, and muscle aches. Read and follow all instructions on the label. No one younger than 20 should take aspirin. It has been linked to Reye syndrome, a serious illness. · Do not smoke. Smoking can make the flu worse. If you need help quitting, talk to your doctor about stop-smoking programs and medicines. These can increase your chances of quitting for good. · Breathe moist air from a hot shower or from a sink filled with hot water to help clear a stuffy nose. · Before you use cough and cold medicines, check the label. These medicines may not be safe for young children or for people with certain health problems. · If the skin around your nose and lips becomes sore, put some petroleum jelly on the area. · To ease coughing: ¨ Drink fluids to soothe a scratchy throat. ¨ Suck on cough drops or plain hard candy. ¨ Take an over-the-counter cough medicine that contains dextromethorphan to help you get some sleep. Read and follow all instructions on the label. ¨ Raise your head at night with an extra pillow. This may help you rest if coughing keeps you awake. · Take any prescribed medicine exactly as directed. Call your doctor if you think you are having a problem with your medicine. To avoid spreading the flu · Wash your hands regularly, and keep your hands away from your face. · Stay home from school, work, and other public places until you are feeling better and your fever has been gone for at least 24 hours. The fever needs to have gone away on its own without the help of medicine. · Ask people living with you to talk to their doctors about preventing the flu. They may get antiviral medicine to keep from getting the flu from you. · To prevent the flu in the future, get a flu vaccine every fall. Encourage people living with you to get the vaccine. · Cover your mouth when you cough or sneeze. When should you call for help? Call 911 anytime you think you may need emergency care. For example, call if: 
· You have severe trouble breathing. Call your doctor now or seek immediate medical care if: 
· You have new or worse trouble breathing. · You seem to be getting much sicker. · You feel very sleepy or confused. · You have a new or higher fever. · You get a new rash. Watch closely for changes in your health, and be sure to contact your doctor if: 
· You begin to get better and then get worse. · You are not getting better after 1 week. Where can you learn more? Go to http://keke-josé luis.info/. Enter M247 in the search box to learn more about \"Influenza (Flu): Care Instructions. \" Current as of: March 25, 2017 Content Version: 11.3 © 4786-8464 [employer]. Care instructions adapted under license by [employer] (which disclaims liability or warranty for this information). If you have questions about a medical condition or this instruction, always ask your healthcare professional. [place] [address] any warranty or liability for your use of this information. Influenza Virus Vaccine (By injection) Influenza Virus Vaccine (in-floo-EN-za VYE-placido VAX-een) Helps prevent infection with influenza (flu) virus. Brand Name(s): Afluria [id number] Formula, Diana Sinclair 0405-2893 Formula, FluLaval Quadrivalent 8872-7259 Formula, Fluad 5556-5694 Formula, Fluarix Quadrivalent 9170-7888 Formula, Flublok 4496-1896 Formula, Flucelvax Quadrivalent 6507-2394 Formula, Fluvirin 8663-0282 Formula, Fluzone High-Dose 2156-8590 Formula, Fluzone Intradermal Quadrivalent 3307-5605 Formula, Fluzone Quadrivalent 5189-6455 Formula, Medical Provider Single Use EZ Flu Shot 2574-0800, Medical Provider Single Use EZ Flu Shot 7163-0496 There may be other brand names for this medicine. When This Medicine Should Not Be Used: This vaccine is not right for everyone. You should not receive it if you had an allergic reaction to flu vaccine. If you are allergic to eggs, tell the caregiver who is going to give you the injection. Some brands of this vaccine contain egg proteins and could cause an allergic reaction. How to Use This Medicine:  
Injectable · The vaccine is given as a shot into a muscle or into your skin, usually in the shoulder area. The shot could be given in the thigh for babies and young children. · A nurse or other health provider will give you this medicine. · Read and follow the patient instructions that come with this medicine. Talk to your doctor or pharmacist if you have any questions. · A child who is younger than 5years old and who has not had a flu shot before may need 2 shots. The second shot should be given about 1 month after the first. 
· Missed dose: Most people need only 1 dose of the vaccine. If your child needs a second dose, it is important for the vaccine to be given on schedule. If you must cancel an appointment, make a new one right away. Drugs and Foods to Avoid: Ask your doctor or pharmacist before using any other medicine, including over-the-counter medicines, vitamins, and herbal products.  
· Tell your doctor if you are using a medicine or treatment that weakens your immune system, such as a steroid, radiation, or cancer treatment. This vaccine may not work as well if you are also using these medicines. However, your doctor may still want you to get the vaccine because it can give you some protection. Warnings While Using This Medicine: · Tell your doctor if you are pregnant or breastfeeding or if you have a weak immune system. · Tell your doctor if you ever had an unusual reaction to a flu shot, such as Guillain-Barré syndrome, or if you are allergic to latex. · The flu vaccine may not protect everyone who receives it. This vaccine will not treat flu symptoms if you have already been infected with the virus. Possible Side Effects While Using This Medicine:  
Call your doctor right away if you notice any of these side effects: · Allergic reaction: Itching or hives, swelling in your face or hands, swelling or tingling in your mouth or throat, chest tightness, trouble breathing · Fainting, dizziness, or lightheadedness · Fever over 103 degrees F 
· Seizures · Severe muscle weakness If you notice these less serious side effects, talk with your doctor:  
· Headache, muscle pain, tiredness · Irritability or crying (in a child) · Redness, pain, swelling, soreness, or a lump where the shot was given If you notice other side effects that you think are caused by this medicine, tell your doctor. Call your doctor for medical advice about side effects. You may report side effects to FDA at 7-603-FDA-7195 © 2017 [facility] Information is for End User's use only and may not be sold, redistributed or otherwise used for commercial purposes. The above information is an  only. It is not intended as medical advice for individual conditions or treatments. Talk to your doctor, nurse or pharmacist before following any medical regimen to see if it is safe and effective for you. Introducing [facility]! Dear Rafaela Puga: Thank you for requesting a Departing account. Our records indicate that you already have an active Departing account. You can access your account anytime at [email or web address] Did you know that you can access your hospital and ER discharge instructions at any time in Departing? You can also review all of your test results from your hospital stay or ER visit. Additional Information If you have questions, please visit the Frequently Asked Questions section of the Departing website at [email or web address] Remember, Departing is NOT to be used for urgent needs. For medical emergencies, dial 911. Now available from your iPhone and Android! Please provide this summary of care documentation to your next provider. Your primary care clinician is listed as SPENSER MIRANDA. If you have any questions after today's visit, please call [phone].

## 2017-09-25 NOTE — PROGRESS NOTES
HANSA Singh is a 78 y.o. 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 Denies chest pain, chest palpations, shortness of breath or leg pain. Reports blurred vision and stabbing left eye pain occasionally. Reports left  eye pain only last for a second. Reports Restasis helps with eye pain and dryness. Denies any history or reaction to anesthesia in the past.   Denies chest pain or shortness of breath. Reports a history of wheezing due to asthma but denies any recent flares. Denies dizziness, tingling, or weakness on one or any side of body. Admits to seasonal and environmental allergies but denies any currently. Reports chronic issue of insomnia. Reports she is on medication which is effective when she takes it. Past Medical History  Past Medical History:   Diagnosis Date    AR (allergic rhinitis)     Arthropathy, unspecified, site unspecified     Asthma     Cataract     Cervical pain     Cylindrical bronchiectasis (HCC)     Fracture     rt ankle as an adult    History of pneumonia     Hypertension     Ill-defined condition     Spasms to left side    Left arm pain     Lumbar spinal stenosis     Myofascial pain     Osteopenia     Right buttock pain     Right low back pain     Sciatica of right side        Surgical History  Past Surgical History:   Procedure Laterality Date    HX CATARACT REMOVAL  8/15/2011    HX HEENT      sinus surgery    HX ORTHOPAEDIC      left 4th finger trigger release    HX SHIELA AND BSO          Medications  Current Outpatient Prescriptions   Medication Sig Dispense Refill    conjugated estrogens (PREMARIN) 0.625 mg/gram vaginal cream Insert 1 g into vagina daily.  3 weeks on and 1 week off, for maintenance 0.5 g 2x/week 90 g 3    GRALISE 600 mg Tb24 TAKE 2 TABLETS DAILY 180 Tab 0    triamterene-hydroCHLOROthiazide (MAXZIDE) 37.5-25 mg per tablet TAKE ONE-HALF (1/2) TABLET DAILY 45 Tab 0    EPINEPHrine (EPIPEN) 0.3 mg/0.3 mL injection 0.3 mL by IntraMUSCular route once as needed for up to 1 dose. 1 Syringe 1    albuterol (PROAIR HFA) 90 mcg/actuation inhaler Take 2 Puffs by inhalation every four (4) hours as needed. 3 Inhaler 3    melatonin 3 mg tablet Take  by mouth.  omega-3 fatty acids-vitamin e 1,000 mg cap Take 1 Cap by mouth two (2) times a day.  Cholecalciferol, Vitamin D3, (VITAMIN D3) 1,000 unit cap Take 1,000 Units by mouth daily.  cycloSPORINE (RESTASIS) 0.05 % ophthalmic emulsion Administer 1 Drop to both eyes two (2) times a day.  mometasone (NASONEX) 50 mcg/actuation nasal spray 2 Sprays by Nasal route daily. 3 Container 3    MULTIVITAMIN W-MINERALS/LUTEIN (CENTRUM SILVER PO) Take 1 Tab by mouth daily.  cetirizine (ZYRTEC) 10 mg tablet Take 10 mg by mouth daily as needed for Allergies.  co-enzyme Q-10 (CO Q-10) 100 mg capsule Take 100 mg by mouth daily.  CALCIUM CITRATE/VITAMIN D3 (CITRACAL + D PO) Take 3 Tabs by mouth daily.  BIOTIN PO Take 5,000 mcg by mouth daily. Allergies  Allergies   Allergen Reactions    Naprosyn [Naproxen] Other (comments)     Blood in urine         Family History  Family History   Problem Relation Age of Onset    Asthma Mother    [employer] COPD Mother    [employer] MS Mother     Heart Disease Mother     Hypertension Mother     Stroke Father     Heart Disease Father     Hypertension Father     Allergic Rhinitis Sister     Asthma Brother     Hypertension Brother     Diabetes Brother     Migraines Paternal Grandmother        Social History  Social History     Social History    Marital status:      Spouse name: N/A    Number of children: N/A    Years of education: N/A     Occupational History    Not on file.      Social History Main Topics    Smoking status: Former Smoker     Years: 5.00     Types: Cigarettes     Quit date: 1/1/1970    Smokeless tobacco: Never Used    Alcohol use 0.6 oz/week     1 Glasses of wine per week      Comment: rare    Drug use: Yes     Special: Prescription, OTC    Sexual activity: Not on file     Other Topics Concern    Not on file     Social History Narrative       Problem List  Patient Active Problem List   Diagnosis Code    Multiple lung nodules R91.8    Allergic rhinitis J30.9    HTN (hypertension) I10    Asthma J45.909    Osteopenia M85.80    Vitamin D deficiency E55.9    Dense breasts R92.2    Right knee DJD, 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    Thoracic or lumbosacral neuritis or radiculitis, unspecified [id number]    Cervical radiculopathy M54.12    Colon polyps K63.5    Chronic right-sided low back pain with sciatica M54.40, G89.29    Right buttock pain M79.1    Annular disc tear M51.9    Facet hypertrophy of lumbar region M47.896    Bulging lumbar disc M51.26    Lumbar spinal stenosis M48.06    ACP (advance care planning) Z71.89    Neuritis of lower extremity G57.90    Trochanteric bursitis of both hips M70.61, M70.62    HNP (herniated nucleus pulposus), lumbar M51.26    Lumbar facet arthropathy M12.88    Trochanteric bursitis, left hip M70.62    Left arm weakness R29.898    Cervical stenosis of spine M48.02       Review of Systems  Review of Systems   Constitutional: Negative for chills, fever, malaise/fatigue and weight loss. HENT: Negative for ear pain, sinus pain and sore throat. Eyes: Positive for blurred vision, pain and redness (allergies). Negative for double vision and discharge. Respiratory: Positive for wheezing (history of asthma with no recent flares). Negative for cough and shortness of breath. Cardiovascular: Negative for chest pain, palpitations and leg swelling. Gastrointestinal: Negative for abdominal pain, constipation, diarrhea, nausea and vomiting. Genitourinary: Negative for dysuria, frequency, hematuria and urgency.    Musculoskeletal: Positive for back pain (history -denies currently). Negative for falls. Skin: Negative for itching and rash. Neurological: Negative for dizziness, tingling, speech change, focal weakness, weakness and headaches. Endo/Heme/Allergies: Positive for environmental allergies. Psychiatric/Behavioral: Negative for depression, substance abuse and suicidal ideas. The patient has insomnia (chronic on medication). The patient is not nervous/anxious. Vital Signs  Vitals:    09/25/17 1005   BP: 123/77   Pulse: 60   Resp: 20   Temp: 97.6 °F (36.4 °C)   TempSrc: Oral   Weight: 169 lb (76.7 kg)   Height: 5' 1\" (1.549 m)   PainSc:   0 - No pain       Physical Exam  Physical Exam   Constitutional: She is oriented to person, place, and time. HENT:   Right Ear: External ear normal.   Left Ear: External ear normal.   Nose: Nose normal.   Mouth/Throat: Oropharynx is clear and moist.   Neck: Normal range of motion. Cardiovascular: Normal rate, regular rhythm and normal heart sounds. Exam reveals no friction rub. No murmur heard. Pulmonary/Chest: Effort normal and breath sounds normal. No respiratory distress. She has no wheezes. Abdominal: Soft. Bowel sounds are normal. She exhibits no distension. There is no tenderness. Lymphadenopathy:     She has no cervical adenopathy. Neurological: She is alert and oriented to person, place, and time. No cranial nerve deficit. Skin: Skin is warm and dry. Psychiatric: She has a normal mood and affect. Her behavior is normal.   Vitals reviewed. Diagnostics  Orders Placed This Encounter    Influenza virus vaccine ([address]) 72 years and older (16437)       Results  Results for orders placed or performed during the hospital encounter of 02/07/17   OCCULT BLOOD, IMMUNOASSAY (FIT)   Result Value Ref Range    Occult blood fecal, by IA NEGATIVE  NEGATIVE               Assessment and Plan  Diagnoses and all orders for this visit:    1. Pre-op exam    2. Keratopathy, band, left    3.  Encounter for immunization  -     Influenza virus vaccine ([address]) 72 years and older ([id number])    Patient has a history of hypertension. Blood pressure is controlled on medication. Patient also has asthma which appears to be stable on today's visit. Patient has spinal stenosis, osteopenia, and multiple musculoskeletal issues (See current diagnoses). Please note that patient does have an allergy to naprosyn. Please also note that patient has had a history of anaphylactic reaction -source unknown- and patient has an epi-pen. Patient states surgery has been explained to her and she accepts risk. Patient cleared for keratopathy surgery for left eye with topical anesthesia with MAC. After care summary printed and reviewed with patient. Plan reviewed with patient. Questions answered. Patient verbalized understanding of plan and is in agreement with plan. Patient to follow up in one week or earlier if symptoms worsen.      Maame Estrada, FNP-C

## 2017-10-01 PROBLEM — H18.422 BAND KERATOPATHY OF LEFT EYE: Status: ACTIVE | Noted: 2017-10-01

## 2017-10-02 PROBLEM — H18.422 BAND KERATOPATHY OF LEFT EYE: Status: RESOLVED | Noted: 2017-10-01 | Resolved: 2017-10-02

## 2017-11-08 ENCOUNTER — OFFICE VISIT (OUTPATIENT)
Dept: FAMILY MEDICINE CLINIC | Age: 77
End: 2017-11-08

## 2017-11-08 VITALS
DIASTOLIC BLOOD PRESSURE: 77 MMHG | WEIGHT: 169.5 LBS | SYSTOLIC BLOOD PRESSURE: 121 MMHG | BODY MASS INDEX: 32 KG/M2 | RESPIRATION RATE: 16 BRPM | HEART RATE: 71 BPM | TEMPERATURE: 97.3 F | HEIGHT: 61 IN

## 2017-11-08 DIAGNOSIS — I10 ESSENTIAL HYPERTENSION: ICD-10-CM

## 2017-11-08 DIAGNOSIS — M85.80 OSTEOPENIA, UNSPECIFIED LOCATION: ICD-10-CM

## 2017-11-08 DIAGNOSIS — R92.2 DENSE BREASTS: ICD-10-CM

## 2017-11-08 DIAGNOSIS — E55.9 VITAMIN D DEFICIENCY: ICD-10-CM

## 2017-11-08 DIAGNOSIS — Z51.81 MEDICATION MONITORING ENCOUNTER: ICD-10-CM

## 2017-11-08 DIAGNOSIS — H18.423 BAND KERATOPATHY OF BOTH EYES: ICD-10-CM

## 2017-11-08 DIAGNOSIS — Z90.710 H/O: HYSTERECTOMY: ICD-10-CM

## 2017-11-08 DIAGNOSIS — N95.2 ATROPHIC VAGINITIS: Primary | ICD-10-CM

## 2017-11-08 NOTE — PATIENT INSTRUCTIONS
Please contact our office if you have any questions about your visit today. Atrophic Vaginitis: Care Instructions  Your Care Instructions    Atrophic vaginitis is an irritation of the vagina. It's caused by thinning tissues and less moisture in the vaginal walls. It often happens during menopause when hormone levels change. Surgery to remove the ovaries also can cause it. Your doctor may do tests to rule out other causes. And you may get tests to measure your hormone levels. The problem is most often treated with the hormone estrogen. It comes in a cream, tablets, or a soft plastic ring that is placed in the vagina. Follow-up care is a key part of your treatment and safety. Be sure to make and go to all appointments, and call your doctor if you are having problems. It's also a good idea to know your test results and keep a list of the medicines you take. How can you care for yourself at home? · Use a water-based lubricant for your vagina if sex is dry or painful. Examples are Astroglide, Wet Lubricant Gel, and K-Y Jelly. · Talk with your doctor about using low-dose vaginal estrogen. It treats dryness and thinning tissue. · Do not douche. · Having sex improves blood flow to the vagina. This helps keep your tissue healthy. When should you call for help? Watch closely for changes in your health, and be sure to contact your doctor if:  ? · You have unexpected vaginal bleeding. ? · You do not get better as expected. Where can you learn more? Go to http://keke-josé luis.info/. Enter R330 in the search box to learn more about \"Atrophic Vaginitis: Care Instructions. \"  Current as of: October 13, 2016  Content Version: 11.4  © 0048-9466 [employer]. Care instructions adapted under license by [employer] (which disclaims liability or warranty for this information).  If you have questions about a medical condition or this instruction, always ask your healthcare professional. [address] any warranty or liability for your use of this information. Hormone Therapy (HT): Care Instructions  Your Care Instructions    Hormone therapy (HT) is medicine to treat symptoms of menopause, such as hot flashes, vaginal dryness, and sleep problems. It replaces the hormones that drop at menopause. Most women get relief from these symptoms within weeks of starting HT. HT contains two female hormones, estrogen and progestin. HT may come in the form of a pill, patch, gel, spray, or vaginal ring. A vaginal cream or a vaginal ring that has a much lower dose of estrogen may be used to relieve vaginal dryness only. HT has some risks. Most doctors recommend that women only take HT for as short a time as possible. This is to reduce the chances of heart disease, breast cancer, blood clots, and stroke that may be connected to HT. Be sure to have regular checkups with your doctor when taking HT. Talk with your doctor about whether HT is right for you. If you decide that the benefits of HT outweigh the risks, ask your doctor to prescribe the lowest effective dose for as short a time as possible. Follow-up care is a key part of your treatment and safety. Be sure to make and go to all appointments, and call your doctor if you are having problems. It's also a good idea to know your test results and keep a list of the medicines you take. Why might you take HT?  · HT reduces symptoms of menopause. These include hot flashes, mood swings, and sleep problems. · The estrogen in HT helps to prevent thinning bones. And it may lower the chance of colon cancer. · HT helps keep the lining of the vagina moist and thick. This can reduce irritation. · HT helps protect against dental problems, such as tooth loss and gum disease. What are the risks of taking HT? · Some women who take HT may have vaginal bleeding, bloating, nausea, sore breasts, mood swings, and headaches.  Talk to your doctor about changing the type of HT you take or lowering the dose. This may help to end these side effects. · Taking HT may slightly increase your risk for heart disease, breast cancer, ovarian cancer, blood clots, and stroke. · You should not take HT if you:  ¨ Could be pregnant. ¨ Have a personal history of breast cancer, endometrial cancer, pulmonary embolism, deep vein thrombosis, heart attack, or stroke. ¨ Have vaginal bleeding from an unknown cause. ¨ Have active liver disease. What can you do to reduce the symptoms of menopause? · Eat healthy foods and get regular exercise. This also will help to maintain strong bones and a healthy heart. · Do not smoke. If you smoke, you can reduce hot flashes and long-term health risks by stopping. If you need help quitting, talk to your doctor about stop-smoking programs and medicines. These can increase your chances of quitting for good. · Practice daily breathing exercises (meditation) to reduce hot flashes and mood swings. · Limit the amount of alcohol you drink. This can reduce symptoms of menopause and long-term health risks. · Keep your home and office cool. · Use a vaginal lubricant, such as Astroglide, Wet Gel Lubricant, or K-Y Jelly. · Do pelvic floor (Kegel) exercises, which tighten and strengthen pelvic muscles. To do Kegel exercises:  ¨ Squeeze the same muscles you would use to stop your urine. Your belly and thighs should not move. ¨ Hold the squeeze for 3 seconds, then relax for 3 seconds. ¨ Start with 3 seconds. Then add 1 second each week until you are able to squeeze for 10 seconds. ¨ Repeat the exercise 10 to 15 times a session. Do three or more sessions a day. Where can you learn more? Go to http://keke-josé luis.info/. Enter [id number] [id number] [id number] in the search box to learn more about \"Hormone Therapy (HT): Care Instructions. \"  Current as of: October 13, 2016  Content Version: 11.4  © 6237-6071 [employer].  Care instructions adapted under license by [employer] (which disclaims liability or warranty for this information). If you have questions about a medical condition or this instruction, always ask your healthcare professional. [address] any warranty or liability for your use of this information.

## 2017-11-08 NOTE — MR AVS SNAPSHOT
Visit Information Date & Time Provider Department Dept. Phone Encounter #  
 11/8/2017  7:45 AM Konstantin Jaffe MD [address] [id number] Follow-up Instructions Return in about 1 month (around 12/8/2017) for and january AWV. Upcoming Health Maintenance Date Due DTaP/Tdap/Td series (1 - Tdap) 2/1/1963 GLAUCOMA SCREENING Q2Y 1/11/2018 MEDICARE YEARLY EXAM 1/31/2018 BREAST CANCER SCRN MAMMOGRAM 5/8/2018 COLONOSCOPY 1/13/2020 Allergies as of 11/8/2017  Review Complete On: 11/8/2017 By: Konstantin Jaffe MD  
  
 Severity Noted Reaction Type Reactions Naprosyn [Naproxen]  01/21/2014    Other (comments) Blood in urine Current Immunizations  Reviewed on 9/22/2016 Name Date Influenza High Dose Vaccine PF 9/25/2017, 9/22/2016  9:45 AM  
 Influenza Vaccine 10/20/2015, 9/26/2014, 10/8/2013 Influenza Vaccine Split 10/6/2011 Pneumococcal Conjugate (PCV-13) 5/11/2015 Pneumococcal Polysaccharide (PPSV-23) 6/14/2016 Not reviewed this visit You Were Diagnosed With   
  
 Codes Comments Atrophic vaginitis    -  Primary ICD-10-CM: N95.2 ICD-9-CM: 627.3 Dense breasts     ICD-10-CM: R92.2 ICD-9-CM: 793.82   
 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 
ICD-9-CM: V58.83 Essential hypertension     ICD-10-CM: I10 
ICD-9-CM: 401.9 Vitals BP Pulse Temp Resp Height(growth percentile) Weight(growth percentile) 121/77 (BP 1 Location: Right arm, BP Patient Position: Sitting) 71 97.3 °F (36.3 °C) (Oral) 16 5' 1\" (1.549 m) 169 lb 8 oz (76.9 kg) BMI OB Status Smoking Status 32.03 kg/m2 Hysterectomy Former Smoker Vitals History BMI and BSA Data Body Mass Index Body Surface Area 32.03 kg/m 2 1.82 m 2 Preferred Pharmacy Pharmacy Name Phone [facility] [address], [address] [place] [place] [phone] Your Updated Medication List  
  
   
This list is accurate as of: 11/8/17  9:08 AM.  Always use your most recent med list.  
  
  
  
  
 albuterol 90 mcg/actuation inhaler Commonly known as:  PROAIR HFA Take 2 Puffs by inhalation every four (4) hours as needed. BIOTIN PO Take 5,000 mcg by mouth daily. CENTRUM SILVER PO Take 1 Tab by mouth daily. CITRACAL + D PO Take 3 Tabs by mouth daily. CO Q-10 100 mg capsule Generic drug:  co-enzyme Q-10 Take 100 mg by mouth daily. conjugated estrogens 0.3 mg tablet Commonly known as:  PREMARIN Take 1 Tab by mouth daily. EPINEPHrine 0.3 mg/0.3 mL injection Commonly known as:  EPIPEN  
0.3 mL by IntraMUSCular route once as needed for up to 1 dose. GRALISE 600 mg Tb24 Generic drug:  gabapentin TAKE 2 TABLETS DAILY  
  
 melatonin 3 mg tablet Take  by mouth.  
  
 mometasone 50 mcg/actuation nasal spray Commonly known as:  NASONEX  
2 Sprays by Nasal route daily. omega-3 fatty acids-vitamin e 1,000 mg Cap Take 1 Cap by mouth two (2) times a day. RESTASIS 0.05 % ophthalmic emulsion Generic drug:  cycloSPORINE Administer 1 Drop to both eyes two (2) times a day. triamterene-hydroCHLOROthiazide 37.5-25 mg per tablet Commonly known as:  Mat Slice TAKE ONE-HALF (1/2) TABLET DAILY  
  
 VITAMIN D3 1,000 unit Cap Generic drug:  cholecalciferol Take 1,000 Units by mouth daily. ZyrTEC 10 mg tablet Generic drug:  cetirizine Take 10 mg by mouth daily as needed for Allergies. Prescriptions Sent to Pharmacy  Refills  
 conjugated estrogens (PREMARIN) 0.3 mg tablet 3  
 Sig: Take 1 Tab by mouth daily. Class: Normal  
 Pharmacy: [id number] [address], [address]  #: [phone] Route: Oral  
  
Follow-up Instructions Return in about 1 month (around 12/8/2017) for and january AWV. To-Do List   
 12/08/2017 Lab:  CBC WITH AUTOMATED DIFF   
  
 12/08/2017 Lab:  LIPID PANEL   
  
 12/08/2017 Lab:  MAGNESIUM   
  
 12/08/2017 Lab:  METABOLIC PANEL, COMPREHENSIVE   
  
 12/08/2017 Lab:  TSH 3RD GENERATION   
  
 12/08/2017 Lab:  VITAMIN D, 25 HYDROXY Patient Instructions Please contact our office if you have any questions about your visit today. Atrophic Vaginitis: Care Instructions Your Care Instructions Atrophic vaginitis is an irritation of the vagina. It's caused by thinning tissues and less moisture in the vaginal walls. It often happens during menopause when hormone levels change. Surgery to remove the ovaries also can cause it. Your doctor may do tests to rule out other causes. And you may get tests to measure your hormone levels. The problem is most often treated with the hormone estrogen. It comes in a cream, tablets, or a soft plastic ring that is placed in the vagina. Follow-up care is a key part of your treatment and safety. Be sure to make and go to all appointments, and call your doctor if you are having problems. It's also a good idea to know your test results and keep a list of the medicines you take. How can you care for yourself at home? · Use a water-based lubricant for your vagina if sex is dry or painful. Examples are Astroglide, Wet Lubricant Gel, and K-Y Jelly. · Talk with your doctor about using low-dose vaginal estrogen. It treats dryness and thinning tissue. · Do not douche. · Having sex improves blood flow to the vagina. This helps keep your tissue healthy. When should you call for help? Watch closely for changes in your health, and be sure to contact your doctor if: 
? · You have unexpected vaginal bleeding. ? · You do not get better as expected. Where can you learn more? Go to http://keke-josé luis.info/. Enter R330 in the search box to learn more about \"Atrophic Vaginitis: Care Instructions. \" Current as of: October 13, 2016 Content Version: 11.4 © 1319-9010 Instagram. Care instructions adapted under license by [employer] (which disclaims liability or warranty for this information). If you have questions about a medical condition or this instruction, always ask your healthcare professional. [address] any warranty or liability for your use of this information. Hormone Therapy (HT): Care Instructions Your Care Instructions Hormone therapy (HT) is medicine to treat symptoms of menopause, such as hot flashes, vaginal dryness, and sleep problems. It replaces the hormones that drop at menopause. Most women get relief from these symptoms within weeks of starting HT. HT contains two female hormones, estrogen and progestin. HT may come in the form of a pill, patch, gel, spray, or vaginal ring. A vaginal cream or a vaginal ring that has a much lower dose of estrogen may be used to relieve vaginal dryness only. HT has some risks. Most doctors recommend that women only take HT for as short a time as possible. This is to reduce the chances of heart disease, breast cancer, blood clots, and stroke that may be connected to HT. Be sure to have regular checkups with your doctor when taking HT. Talk with your doctor about whether HT is right for you. If you decide that the benefits of HT outweigh the risks, ask your doctor to prescribe the lowest effective dose for as short a time as possible. Follow-up care is a key part of your treatment and safety.  Be sure to make and go to all appointments, and call your doctor if you are having problems. It's also a good idea to know your test results and keep a list of the medicines you take. Why might you take HT? 
· HT reduces symptoms of menopause. These include hot flashes, mood swings, and sleep problems. · The estrogen in HT helps to prevent thinning bones. And it may lower the chance of colon cancer. · HT helps keep the lining of the vagina moist and thick. This can reduce irritation. · HT helps protect against dental problems, such as tooth loss and gum disease. What are the risks of taking HT? · Some women who take HT may have vaginal bleeding, bloating, nausea, sore breasts, mood swings, and headaches. Talk to your doctor about changing the type of HT you take or lowering the dose. This may help to end these side effects. · Taking HT may slightly increase your risk for heart disease, breast cancer, ovarian cancer, blood clots, and stroke. · You should not take HT if you: ¨ Could be pregnant. ¨ Have a personal history of breast cancer, endometrial cancer, pulmonary embolism, deep vein thrombosis, heart attack, or stroke. ¨ Have vaginal bleeding from an unknown cause. ¨ Have active liver disease. What can you do to reduce the symptoms of menopause? · Eat healthy foods and get regular exercise. This also will help to maintain strong bones and a healthy heart. · Do not smoke. If you smoke, you can reduce hot flashes and long-term health risks by stopping. If you need help quitting, talk to your doctor about stop-smoking programs and medicines. These can increase your chances of quitting for good. · Practice daily breathing exercises (meditation) to reduce hot flashes and mood swings. · Limit the amount of alcohol you drink. This can reduce symptoms of menopause and long-term health risks. · Keep your home and office cool. · Use a vaginal lubricant, such as Astroglide, Wet Gel Lubricant, or K-Y Jelly. · Do pelvic floor (Kegel) exercises, which tighten and strengthen pelvic muscles. To do Kegel exercises: 
¨ Squeeze the same muscles you would use to stop your urine. Your belly and thighs should not move. ¨ Hold the squeeze for 3 seconds, then relax for 3 seconds. ¨ Start with 3 seconds. Then add 1 second each week until you are able to squeeze for 10 seconds. ¨ Repeat the exercise 10 to 15 times a session. Do three or more sessions a day. Where can you learn more? Go to http://keke-josé luis.info/. Enter [id number] in the search box to learn more about \"Hormone Therapy (HT): Care Instructions. \" Current as of: October 13, 2016 Content Version: 11.4 © 7813-9836 Widbook. Care instructions adapted under license by [employer] (which disclaims liability or warranty for this information). If you have questions about a medical condition or this instruction, always ask your healthcare professional. [place] [address] any warranty or liability for your use of this information. Introducing [facility]! Dear James: Thank you for requesting a SnowGate account. Our records indicate that you already have an active SnowGate account. You can access your account anytime at [email or web address] cc. Did you know that you can access your hospital and ER discharge instructions at any time in SnowGate? You can also review all of your test results from your hospital stay or ER visit. Additional Information If you have questions, please visit the Frequently Asked Questions section of the SnowGate website at [email or web address]. Remember, SnowGate is NOT to be used for urgent needs. For medical emergencies, dial 911. Now available from your iPhone and Android! Please provide this summary of care documentation to your next provider. Your primary care clinician is listed as SPENSER MIRANDA. If you have any questions after today's visit, please call [phone].

## 2017-11-08 NOTE — PROGRESS NOTES
HISTORY OF PRESENT ILLNESS  Olivia Peters is a 78 y.o. female. 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 3 weeks on and 1 week off, for maintenance 0.5 g 2x/week 90 g 3    GRALISE 600 mg Tb24 TAKE 2 TABLETS DAILY 180 Tab 0    triamterene-hydroCHLOROthiazide (MAXZIDE) 37.5-25 mg per tablet TAKE ONE-HALF (1/2) TABLET DAILY 45 Tab 0    EPINEPHrine (EPIPEN) 0.3 mg/0.3 mL injection 0.3 mL by IntraMUSCular route once as needed for up to 1 dose. 1 Syringe 1    albuterol (PROAIR HFA) 90 mcg/actuation inhaler Take 2 Puffs by inhalation every four (4) hours as needed. 3 Inhaler 3    melatonin 3 mg tablet Take  by mouth.  omega-3 fatty acids-vitamin e 1,000 mg cap Take 1 Cap by mouth two (2) times a day.  Cholecalciferol, Vitamin D3, (VITAMIN D3) 1,000 unit cap Take 1,000 Units by mouth daily.  cycloSPORINE (RESTASIS) 0.05 % ophthalmic emulsion Administer 1 Drop to both eyes two (2) times a day.  mometasone (NASONEX) 50 mcg/actuation nasal spray 2 Sprays by Nasal route daily.  3 Container 3    MULTIVITAMIN W-MINERALS/LUTEIN (CENTRUM SILVER PO) Take 1 Tab by mouth daily.  cetirizine (ZYRTEC) 10 mg tablet Take 10 mg by mouth daily as needed for Allergies.  co-enzyme Q-10 (CO Q-10) 100 mg capsule Take 100 mg by mouth daily.  CALCIUM CITRATE/VITAMIN D3 (CITRACAL + D PO) Take 3 Tabs by mouth daily.  BIOTIN PO Take 5,000 mcg by mouth daily. Allergies   Allergen Reactions    Naprosyn [Naproxen] Other (comments)     Blood in urine         Review of Systems   Constitutional: Positive for diaphoresis. Neurological: Negative for dizziness and headaches. Visit Vitals    /77 (BP 1 Location: Right arm, BP Patient Position: Sitting)    Pulse 71    Temp 97.3 °F (36.3 °C) (Oral)    Resp 16    Ht 5' 1\" (1.549 m)    Wt 169 lb 8 oz (76.9 kg)    BMI 32.03 kg/m2       Physical Exam Nursing note and vitals reviewed. Constitutional: She is oriented to person, place, and time. She appears well-developed and well-nourished. No distress. HENT:   Mouth/Throat: Oropharynx is clear and moist.   Neck: No JVD present. No thyromegaly present. Cardiovascular: Normal rate, regular rhythm and normal heart sounds. Pulmonary/Chest: Effort normal and breath sounds normal. No respiratory distress. She has no wheezes. She has no rales. Musculoskeletal: She exhibits no edema. Lymphadenopathy:     She has no cervical adenopathy. Neurological: She is alert and oriented to person, place, and time. Coordination normal.   Psychiatric: She has a normal mood and affect. Her behavior is normal.    BILATERAL SCREENING DIGITAL MAMMOGRAM WITH CAD AND WITH DIGITAL BREAST  TOMOSYNTHESIS IMAGING/COMBINATION 2-D 3-D EXAM     History:  asymptomatic     Location: [facility]: mammogram 16, 15, 14     Findings: 2-D digital mammograms were performed with CC and MLO views obtained  of both breasts.  Digital tomosynthesis 3-D images were also obtained of both  breasts in the CC and MLO projections. CAD analysis was performed with the  computer-aided detection system. Any areas marked correlated with the mammogram.        There are no masses, regions of distortion, nor suspicious microcalcifications. The digital breast tomosynthesis images demonstrate no suspicious findings.     IMPRESSION  Impression:     No suspicious finding for malignancy. Recommend clinical followup and annual mammography as per the Society of Breast  Imaging and the [address] [place] [address] of Radiology guidelines.      BI RADS : BI-RADS one-negative mammogram     Breast Composition: There are scattered areas of fibroglandular density.           ASSESSMENT and PLAN    ICD-10-CM ICD-9-CM    1.  Atrophic vaginitis N95.2 627.3 conjugated estrogens (PREMARIN) 0.3 mg tablet      METABOLIC PANEL, COMPREHENSIVE      CBC WITH AUTOMATED DIFF      TSH 3RD GENERATION      MAGNESIUM   2. Dense breasts P14.9 591.69 METABOLIC PANEL, COMPREHENSIVE      CBC WITH AUTOMATED DIFF      TSH 3RD GENERATION      MAGNESIUM   3. H/O: hysterectomy, fibroids, 1980s Z90.710 V88.01 MAGNESIUM   4. 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 Osteopenia, unspecified location E68.20 001.08 METABOLIC PANEL, COMPREHENSIVE      CBC WITH AUTOMATED DIFF      TSH 3RD GENERATION      MAGNESIUM   6. Vitamin D deficiency Z39.9 918.1 METABOLIC PANEL, COMPREHENSIVE      CBC WITH AUTOMATED DIFF      TSH 3RD GENERATION      VITAMIN D, 25 HYDROXY      MAGNESIUM   7. Medication monitoring encounter Z51.81 V58.83 LIPID PANEL      METABOLIC PANEL, COMPREHENSIVE      CBC WITH AUTOMATED DIFF      TSH 3RD GENERATION      VITAMIN D, 25 HYDROXY      MAGNESIUM   8. Essential hypertension I10 401.9 LIPID PANEL      METABOLIC PANEL, COMPREHENSIVE      CBC WITH AUTOMATED DIFF      TSH 3RD GENERATION      MAGNESIUM

## 2017-11-08 NOTE — PROGRESS NOTES
Chief Complaint   Patient presents with    Hypertension    Other     HNP    Asthma       Health Maintenance Due   Topic Date Due    DTaP/Tdap/Td series (1 - Tdap) 02/01/1961       Health Maintenance reviewed    1. Have you been to the ER, urgent care clinic since your last visit? Hospitalized since your last visit? No    2. Have you seen or consulted any other health care providers outside of the [facility] since your last visit? Include any pap smears or colon screening.  No

## 2017-11-14 RX ORDER — TRIAMTERENE/HYDROCHLOROTHIAZID 37.5-25 MG
TABLET ORAL
Qty: 45 TAB | Refills: 0 | Status: SHIPPED | OUTPATIENT
Start: 2017-11-14 | End: 2018-05-06 | Stop reason: SDUPTHER

## 2017-11-30 DIAGNOSIS — N95.2 ATROPHIC VAGINITIS: ICD-10-CM

## 2018-01-22 ENCOUNTER — OFFICE VISIT (OUTPATIENT)
Dept: FAMILY MEDICINE CLINIC | Age: 78
End: 2018-01-22

## 2018-01-22 ENCOUNTER — HOSPITAL ENCOUNTER (OUTPATIENT)
Dept: LAB | Age: 78
Discharge: HOME | End: 2018-01-22
Payer: MEDICARE

## 2018-01-22 VITALS
HEIGHT: 61 IN | BODY MASS INDEX: 31.34 KG/M2 | DIASTOLIC BLOOD PRESSURE: 71 MMHG | OXYGEN SATURATION: 97 % | TEMPERATURE: 97.2 F | WEIGHT: 166 LBS | HEART RATE: 76 BPM | SYSTOLIC BLOOD PRESSURE: 124 MMHG | RESPIRATION RATE: 16 BRPM

## 2018-01-22 DIAGNOSIS — R31.9 URINARY TRACT INFECTION WITH HEMATURIA, SITE UNSPECIFIED: ICD-10-CM

## 2018-01-22 DIAGNOSIS — N39.0 URINARY TRACT INFECTION WITH HEMATURIA, SITE UNSPECIFIED: Primary | ICD-10-CM

## 2018-01-22 DIAGNOSIS — R30.0 DYSURIA: ICD-10-CM

## 2018-01-22 DIAGNOSIS — R31.9 URINARY TRACT INFECTION WITH HEMATURIA, SITE UNSPECIFIED: Primary | ICD-10-CM

## 2018-01-22 DIAGNOSIS — N39.0 URINARY TRACT INFECTION WITH HEMATURIA, SITE UNSPECIFIED: ICD-10-CM

## 2018-01-22 LAB
BILIRUB UR QL STRIP: NEGATIVE
GLUCOSE UR-MCNC: NEGATIVE MG/DL
KETONES P FAST UR STRIP-MCNC: NEGATIVE MG/DL
PH UR STRIP: 7 [PH] (ref 4.6–8)
PROT UR QL STRIP: NORMAL
SP GR UR STRIP: 1.01 (ref 1–1.03)
UA UROBILINOGEN AMB POC: NORMAL (ref 0.2–1)
URINALYSIS CLARITY POC: CLEAR
URINALYSIS COLOR POC: YELLOW
URINE BLOOD POC: NORMAL
URINE LEUKOCYTES POC: NORMAL
URINE NITRITES POC: NEGATIVE

## 2018-01-22 PROCEDURE — 87086 URINE CULTURE/COLONY COUNT: CPT | Performed by: FAMILY MEDICINE

## 2018-01-22 PROCEDURE — 87186 SC STD MICRODIL/AGAR DIL: CPT | Performed by: FAMILY MEDICINE

## 2018-01-22 PROCEDURE — 87077 CULTURE AEROBIC IDENTIFY: CPT | Performed by: FAMILY MEDICINE

## 2018-01-22 RX ORDER — PHENAZOPYRIDINE HYDROCHLORIDE 200 MG/1
200 TABLET, FILM COATED ORAL
Qty: 9 TAB | Refills: 0 | Status: SHIPPED | OUTPATIENT
Start: 2018-01-22 | End: 2018-01-25

## 2018-01-22 RX ORDER — SULFAMETHOXAZOLE AND TRIMETHOPRIM 800; 160 MG/1; MG/1
1 TABLET ORAL 2 TIMES DAILY
Qty: 6 TAB | Refills: 0 | Status: SHIPPED | OUTPATIENT
Start: 2018-01-22 | End: 2018-01-25

## 2018-01-22 NOTE — PROGRESS NOTES
HISTORY OF PRESENT ILLNESS  Chastity Tovar is a 78 y.o. female. Chief Complaint   Patient presents with    Blood in Urine     x 2 days     Dysuria    Urinary Frequency       HPI   Chastity Tovar is a 78 y.o. female who complains of urinary frequency, urgency, hematuria, and dysuria x 2 days, without flank pain, fever, chills, or abnormal vaginal discharge. Review of Systems   Constitutional: Negative. Negative for chills and fever. HENT: Negative. Respiratory: Negative. Cardiovascular: Negative. Gastrointestinal: Negative. Negative for nausea and vomiting. Genitourinary: Positive for dysuria, frequency, hematuria and urgency. Negative for flank pain. All other systems reviewed and are negative. Physical Exam   Constitutional: She is oriented to person, place, and time. She appears well-developed and well-nourished. No distress. HENT:   Head: Normocephalic and atraumatic. Right Ear: External ear normal.   Left Ear: External ear normal.   Nose: Nose normal.   Eyes: Conjunctivae and EOM are normal.   Neck: Normal range of motion. Neck supple. No JVD present. No thyromegaly present. Cardiovascular: Normal rate, regular rhythm and normal heart sounds. Exam reveals no gallop and no friction rub. No murmur heard. Pulmonary/Chest: Effort normal and breath sounds normal. She has no wheezes. She has no rhonchi. She has no rales. Abdominal: Soft. Bowel sounds are normal. She exhibits no distension. There is no tenderness. There is no CVA tenderness. Musculoskeletal: Normal range of motion. Lymphadenopathy:     She has no cervical adenopathy. Neurological: She is alert and oriented to person, place, and time. Coordination normal.   Skin: Skin is warm and dry. Psychiatric: She has a normal mood and affect. Her behavior is normal. Judgment and thought content normal.   Nursing note and vitals reviewed.       ASSESSMENT and PLAN  Diagnoses and all orders for this visit: 1. Urinary tract infection with hematuria, site unspecified  -     AMB POC URINALYSIS DIP STICK AUTO W/O MICRO  -     CULTURE, URINE; Future  -     trimethoprim-sulfamethoxazole (BACTRIM DS, SEPTRA DS) 160-800 mg per tablet; Take 1 Tab by mouth two (2) times a day for 3 days. -     phenazopyridine (PYRIDIUM) 200 mg tablet; Take 1 Tab by mouth three (3) times daily as needed for Pain for up to 3 days. 2. Dysuria  -     AMB POC URINALYSIS DIP STICK AUTO W/O MICRO  -     CULTURE, URINE; Future      Follow-up Disposition:  Return in about 2 weeks (around 2/5/2018) for f/u ua.

## 2018-01-22 NOTE — PROGRESS NOTES
Anu Sloan 78 y.o. female   Chief Complaint   Patient presents with    Blood in Urine     x 2 days     Dysuria    Urinary Frequency         1. Have you been to the ER, urgent care clinic since your last visit? Hospitalized since your last visit? No    2. Have you seen or consulted any other health care providers outside of the [address] [place] [address] since your last visit? Include any pap smears or colon screening.  No

## 2018-01-24 ENCOUNTER — OFFICE VISIT (OUTPATIENT)
Dept: FAMILY MEDICINE CLINIC | Age: 78
End: 2018-01-24

## 2018-01-24 ENCOUNTER — HOSPITAL ENCOUNTER (OUTPATIENT)
Dept: LAB | Age: 78
Discharge: HOME | End: 2018-01-24
Payer: MEDICARE

## 2018-01-24 DIAGNOSIS — R92.2 DENSE BREASTS: ICD-10-CM

## 2018-01-24 DIAGNOSIS — Z01.818 PRE-OP EXAM: Primary | ICD-10-CM

## 2018-01-24 DIAGNOSIS — H18.423 BAND KERATOPATHY OF BOTH EYES: ICD-10-CM

## 2018-01-24 DIAGNOSIS — M85.80 OSTEOPENIA, UNSPECIFIED LOCATION: ICD-10-CM

## 2018-01-24 DIAGNOSIS — N95.2 ATROPHIC VAGINITIS: ICD-10-CM

## 2018-01-24 DIAGNOSIS — Z51.81 MEDICATION MONITORING ENCOUNTER: ICD-10-CM

## 2018-01-24 DIAGNOSIS — H18.421: ICD-10-CM

## 2018-01-24 DIAGNOSIS — I10 ESSENTIAL HYPERTENSION: ICD-10-CM

## 2018-01-24 DIAGNOSIS — E55.9 VITAMIN D DEFICIENCY: ICD-10-CM

## 2018-01-24 DIAGNOSIS — Z90.710 H/O: HYSTERECTOMY: ICD-10-CM

## 2018-01-24 LAB
ALBUMIN SERPL-MCNC: 3.8 G/DL (ref 3.4–5)
ALBUMIN/GLOB SERPL: 1 {RATIO} (ref 0.8–1.7)
ALP SERPL-CCNC: 73 U/L (ref 45–117)
ALT SERPL-CCNC: 24 U/L (ref 13–56)
ANION GAP SERPL CALC-SCNC: 7 MMOL/L (ref 3–18)
AST SERPL-CCNC: 20 U/L (ref 15–37)
BACTERIA SPEC CULT: ABNORMAL
BASOPHILS # BLD: 0 K/UL (ref 0–0.06)
BASOPHILS NFR BLD: 0 % (ref 0–2)
BILIRUB SERPL-MCNC: 0.5 MG/DL (ref 0.2–1)
BUN SERPL-MCNC: 10 MG/DL (ref 7–18)
BUN/CREAT SERPL: 10 (ref 12–20)
CALCIUM SERPL-MCNC: 9 MG/DL (ref 8.5–10.1)
CHLORIDE SERPL-SCNC: 105 MMOL/L (ref 100–108)
CHOLEST SERPL-MCNC: 161 MG/DL
CO2 SERPL-SCNC: 31 MMOL/L (ref 21–32)
CREAT SERPL-MCNC: 0.98 MG/DL (ref 0.6–1.3)
DIFFERENTIAL METHOD BLD: ABNORMAL
EOSINOPHIL # BLD: 0 K/UL (ref 0–0.4)
EOSINOPHIL NFR BLD: 0 % (ref 0–5)
ERYTHROCYTE [DISTWIDTH] IN BLOOD BY AUTOMATED COUNT: 14.6 % (ref 11.6–14.5)
GLOBULIN SER CALC-MCNC: 3.7 G/DL (ref 2–4)
GLUCOSE SERPL-MCNC: 92 MG/DL (ref 74–99)
HCT VFR BLD AUTO: 42.6 % (ref 35–45)
HDLC SERPL-MCNC: 65 MG/DL (ref 40–60)
HDLC SERPL: 2.5 {RATIO} (ref 0–5)
HGB BLD-MCNC: 13.8 G/DL (ref 12–16)
LDLC SERPL CALC-MCNC: 75.6 MG/DL (ref 0–100)
LIPID PROFILE,FLP: ABNORMAL
LYMPHOCYTES # BLD: 1.6 K/UL (ref 0.9–3.6)
LYMPHOCYTES NFR BLD: 35 % (ref 21–52)
MAGNESIUM SERPL-MCNC: 2.4 MG/DL (ref 1.6–2.6)
MCH RBC QN AUTO: 29.4 PG (ref 24–34)
MCHC RBC AUTO-ENTMCNC: 32.4 G/DL (ref 31–37)
MCV RBC AUTO: 90.8 FL (ref 74–97)
MONOCYTES # BLD: 0.3 K/UL (ref 0.05–1.2)
MONOCYTES NFR BLD: 7 % (ref 3–10)
NEUTS SEG # BLD: 2.7 K/UL (ref 1.8–8)
NEUTS SEG NFR BLD: 58 % (ref 40–73)
PLATELET # BLD AUTO: 316 K/UL (ref 135–420)
PMV BLD AUTO: 10.5 FL (ref 9.2–11.8)
POTASSIUM SERPL-SCNC: 4.1 MMOL/L (ref 3.5–5.5)
PROT SERPL-MCNC: 7.5 G/DL (ref 6.4–8.2)
RBC # BLD AUTO: 4.69 M/UL (ref 4.2–5.3)
SERVICE CMNT-IMP: ABNORMAL
SODIUM SERPL-SCNC: 143 MMOL/L (ref 136–145)
TRIGL SERPL-MCNC: 102 MG/DL (ref ?–150)
TSH SERPL DL<=0.05 MIU/L-ACNC: 0.85 UIU/ML (ref 0.36–3.74)
VLDLC SERPL CALC-MCNC: 20.4 MG/DL
WBC # BLD AUTO: 4.6 K/UL (ref 4.6–13.2)

## 2018-01-24 PROCEDURE — 84443 ASSAY THYROID STIM HORMONE: CPT | Performed by: FAMILY MEDICINE

## 2018-01-24 PROCEDURE — 80061 LIPID PANEL: CPT | Performed by: FAMILY MEDICINE

## 2018-01-24 PROCEDURE — 83735 ASSAY OF MAGNESIUM: CPT | Performed by: FAMILY MEDICINE

## 2018-01-24 PROCEDURE — 82306 VITAMIN D 25 HYDROXY: CPT | Performed by: FAMILY MEDICINE

## 2018-01-24 PROCEDURE — 36415 COLL VENOUS BLD VENIPUNCTURE: CPT | Performed by: FAMILY MEDICINE

## 2018-01-24 PROCEDURE — 80053 COMPREHEN METABOLIC PANEL: CPT | Performed by: FAMILY MEDICINE

## 2018-01-24 PROCEDURE — 85025 COMPLETE CBC W/AUTO DIFF WBC: CPT | Performed by: FAMILY MEDICINE

## 2018-01-24 NOTE — PROGRESS NOTES
HPI  Jigna Kline is a 78 y.o. female  Chief Complaint   Patient presents with    Pre-op Exam     corneal surgery, right eye     Reports corneal right eye surgery on 2/7/18. Reports blurry vision in right eye. Denies right or left eye pain. Reports left eye surgery was completed in the fall and was successful. Denies chest pain or shortness of breath. Denies dizziness, tingling, LOC. Admits to runny nose from allergies. Denies cough, congestion, or sore throat. Reports being diagnosed with a UTI two days ago and taking Bactrim currently with last dose tomorrow 1/25/18. Denies fevers, chills, nausea, vomiting, urinary urgency, frequency, blood in urine, or pain on urination. Reports all of her symptoms have currently resolved since starting her antibiotic. No reaction from any type of anesthesia in the past.     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    Cataract     Cervical pain     Cylindrical bronchiectasis (HCC)     Fracture     rt ankle as an adult    History of pneumonia     Hypertension     Ill-defined condition     Spasms to left side    Left arm pain     Lumbar spinal stenosis     Myofascial pain     Osteopenia     Right buttock pain     Right low back pain     Sciatica of right side        Surgical History  Past Surgical History:   Procedure Laterality Date    HX CATARACT REMOVAL  8/15/2011    HX HEENT      sinus surgery    HX ORTHOPAEDIC      left 4th finger trigger release    HX SHIELA AND BSO          Medications  Current Outpatient Prescriptions   Medication Sig Dispense Refill    trimethoprim-sulfamethoxazole (BACTRIM DS, SEPTRA DS) 160-800 mg per tablet Take 1 Tab by mouth two (2) times a day for 3 days.  6 Tab 0    phenazopyridine (PYRIDIUM) 200 mg tablet Take 1 Tab by mouth three (3) times daily as needed for Pain for up to 3 days. 9 Tab 0    conjugated estrogens (PREMARIN) 0.3 mg tablet Take 1 Tab by mouth daily. 90 Tab 1    GRALISE 600 mg Tb24 TAKE 2 TABLETS DAILY 180 Tab 0    triamterene-hydroCHLOROthiazide (MAXZIDE) 37.5-25 mg per tablet TAKE ONE-HALF (1/2) TABLET DAILY 45 Tab 0    EPINEPHrine (EPIPEN) 0.3 mg/0.3 mL injection 0.3 mL by IntraMUSCular route once as needed for up to 1 dose. 1 Syringe 1    albuterol (PROAIR HFA) 90 mcg/actuation inhaler Take 2 Puffs by inhalation every four (4) hours as needed. 3 Inhaler 3    melatonin 3 mg tablet Take  by mouth.  omega-3 fatty acids-vitamin e 1,000 mg cap Take 1 Cap by mouth two (2) times a day.  Cholecalciferol, Vitamin D3, (VITAMIN D3) 1,000 unit cap Take 1,000 Units by mouth daily.  cycloSPORINE (RESTASIS) 0.05 % ophthalmic emulsion Administer 1 Drop to both eyes two (2) times a day.  mometasone (NASONEX) 50 mcg/actuation nasal spray 2 Sprays by Nasal route daily. 3 Container 3    MULTIVITAMIN W-MINERALS/LUTEIN (CENTRUM SILVER PO) Take 1 Tab by mouth daily.  cetirizine (ZYRTEC) 10 mg tablet Take 10 mg by mouth daily as needed for Allergies.  co-enzyme Q-10 (CO Q-10) 100 mg capsule Take 100 mg by mouth daily.  CALCIUM CITRATE/VITAMIN D3 (CITRACAL + D PO) Take 3 Tabs by mouth daily.  BIOTIN PO Take 5,000 mcg by mouth daily.          Allergies  Allergies   Allergen Reactions    Naprosyn [Naproxen] Other (comments)     Blood in urine         Family History  Family History   Problem Relation Age of Onset    Asthma Mother    [facility] COPD Mother    [facility] MS Mother     Heart Disease Mother     Hypertension Mother     Stroke Father     Heart Disease Father     Hypertension Father     Allergic Rhinitis Sister     Asthma Brother     Hypertension Brother     Diabetes Brother     Migraines Paternal Grandmother        Social History  Social History     Social History    Marital status:      Spouse name: N/A    Number of children: N/A    Years of education: N/A     Occupational History    Not on file. Social History Main Topics    Smoking status: Former Smoker     Years: 5.00     Types: Cigarettes     Quit date: 1/1/1970    Smokeless tobacco: Never Used    Alcohol use 0.6 oz/week     1 Glasses of wine per week      Comment: rare    Drug use: No    Sexual activity: Not on file     Other Topics Concern    Not on file     Social History Narrative       Problem List  Patient Active Problem List   Diagnosis Code    Multiple lung nodules R91.8    Allergic rhinitis J30.9    HTN (hypertension) I10    Asthma J45.909    Osteopenia M85.80    Vitamin D deficiency E55.9    Dense breasts R92.2    Right knee DJD, 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    Thoracic or lumbosacral neuritis or radiculitis, unspecified [id number]    Cervical radiculopathy M54.12    Colon polyps K63.5    Chronic right-sided low back pain with sciatica M54.40, G89.29    Right buttock pain M79.1    Annular disc tear M51.9    Facet hypertrophy of lumbar region M47.896    Bulging lumbar disc M51.26    Lumbar spinal stenosis M48.061    ACP (advance care planning) Z71.89    Neuritis of lower extremity G57.90    Trochanteric bursitis of both hips M70.61, M70.62    HNP (herniated nucleus pulposus), lumbar M51.26    Lumbar facet arthropathy M12.88    Trochanteric bursitis, left hip M70.62    Left arm weakness R29.898    Cervical stenosis of spine M48.02       Review of Systems  Review of Systems   Constitutional: Negative for chills and fever. HENT: Negative for congestion, ear pain, sinus pain and sore throat. Eyes: Positive for blurred vision. Negative for double vision, photophobia, pain, discharge and redness. Respiratory: Negative for cough, shortness of breath and wheezing. Cardiovascular: Negative for chest pain and leg swelling. Gastrointestinal: Negative for abdominal pain, blood in stool, nausea and vomiting.    Genitourinary: Negative for dysuria, frequency, hematuria and urgency. Musculoskeletal: Negative for back pain, falls, joint pain, myalgias and neck pain. Neurological: Negative for dizziness, speech change and loss of consciousness. Psychiatric/Behavioral: Negative for depression, substance abuse and suicidal ideas. Vital Signs  Vitals:    01/24/18 0901   BP: 114/70   Pulse: (!) 59   Resp: 16   Temp: 97.5 °F (36.4 °C)   TempSrc: Oral   Weight: 166 lb 4 oz (75.4 kg)   Height: 5' 1\" (1.549 m)   PainSc:   0 - No pain       Physical Exam  Physical Exam   Constitutional: She is oriented to person, place, and time. HENT:   Right Ear: External ear normal.   Left Ear: External ear normal.   Mouth/Throat: Oropharynx is clear and moist.   Eyes: Pupils are equal, round, and reactive to light. Neck: Normal range of motion. Cardiovascular: Normal rate, regular rhythm, normal heart sounds and intact distal pulses. Exam reveals no friction rub. No murmur heard. Pulmonary/Chest: Effort normal and breath sounds normal. No respiratory distress. Abdominal: Soft. Bowel sounds are normal. She exhibits no distension. There is no tenderness. There is no rebound and no guarding. Musculoskeletal: Normal range of motion. She exhibits no edema or deformity. Lymphadenopathy:     She has no cervical adenopathy. Neurological: She is alert and oriented to person, place, and time. No cranial nerve deficit. Skin: Skin is warm and dry. Psychiatric: She has a normal mood and affect. Her behavior is normal.   Vitals reviewed. Diagnostics  No orders of the defined types were placed in this encounter.       Results  Results for orders placed or performed during the hospital encounter of 01/22/18   CULTURE, URINE   Result Value Ref Range    Special Requests: NO SPECIAL REQUESTS      Culture result: [id number] COLONIES/mL ESCHERICHIA COLI (A)         Susceptibility    Escherichia coli - LUKAS     Ampicillin ($) <=2 Susceptible ug/mL     Ampicillin/sulbactam ($) <=2 Susceptible ug/mL     Cefazolin ($) <=4 Susceptible ug/mL     Cefepime ($$) <=1 Susceptible ug/mL     Ceftazidime ($) <=1 Susceptible ug/mL     Ceftriaxone ($) <=1 Susceptible ug/mL     Ciprofloxacin ($) <=0.25 Susceptible ug/mL     Gentamicin ($) <=1 Susceptible ug/mL     Imipenem <=0.25 Susceptible ug/mL     Levofloxacin ($) <=0.12 Susceptible ug/mL     Nitrofurantoin <=16 Susceptible ug/mL     Piperacillin/Tazobac ($) <=4 Susceptible ug/mL     Tobramycin ($) <=1 Susceptible ug/mL     Trimeth-Sulfamethoxa <=20 Susceptible ug/mL     Assessment and Plan  Diagnoses and all orders for this visit:    1. Pre-op exam    2. Keratopathy, band, right      Hypertension and asthma controlled with current medication regimen. Patient has an allergy to naprosyn. She is currently being treated for a UTI and will complete her antibiotic therapy on 1/25/18. She is currently asymptomatic with no fevers, chills, hematuria, dysuria, urgency, or frequency. She denies back pain on today's visit but please note she does have a history of spinal stenosis, osteopenia, and other musculoskeletal issues. Her chart also indicates an anaphylactic reaction with source unknown. Patient does have an epi-pen. Patient states she is familiar with th surgery as she had the left eye completed a few months ago with no problems. She accepts risk and she is ready to have the surgery. Based on today's findings, the patient is cleared for keratopathy surgery of the right eye with topical/mac anesthesia. After care summary printed and reviewed with patient. Plan reviewed with patient. Questions answered. Patient verbalized understanding of plan and is in agreement with plan. Patient to follow up as need with her PCP or earlier if symptoms worsen. She has been instructed to return if she becomes symptomatic with urinary symptoms prior to her surgery.  She is also instructed to notify surgeon if symptomatic on the day of surgery including but not limited to any fevers, chills, urinary symptoms, bleeding issues, chest pain, or shortness of breath. She verbalizes understanding and she is in agreement with this plan.     JALYN Liang

## 2018-01-24 NOTE — MR AVS SNAPSHOT
[facility] 
 
 
 [address] [address] [id number] [address] [address] [phone] Patient: Xavier Whitley MRN:  [id number] Visit Information Date & Time Provider Department Dept. Phone Encounter #  
 1/24/2018  9:00 AM Mahendra Moore NP [facility] Herrera [address] [id number] Your Appointments 2/9/2018 10:45 AM  
Follow Up with Devin Oreilly MD  
[address] (--) Appt Note: dariela Stricklandumattie [address] [facility] [id number] [phone] [address] [facility] [address]  
  
    
 2/23/2018  9:00 AM  
Office Visit with Kathy Frias MD  
[address] (--) Appt Note: [facility]   
 [address] [address] [address] [address] [address] [phone]  
  
   
 [place] [address] [address] [address] [address] Upcoming Health Maintenance Date Due DTaP/Tdap/Td series (1 - Tdap) 2/1/1963 BREAST CANCER SCRN MAMMOGRAM 5/8/2018 MEDICARE YEARLY EXAM 1/31/2018 GLAUCOMA SCREENING Q2Y 9/7/2019 COLONOSCOPY 1/13/2020 Allergies as of 1/24/2018  Review Complete On: 1/22/2018 By: Devin Oreilly MD  
  
 Severity Noted Reaction Type Reactions Naprosyn [Naproxen]  01/21/2014    Other (comments) Blood in urine Current Immunizations  Reviewed on 9/22/2016 Name Date Influenza High Dose Vaccine PF 9/25/2017, 9/22/2016  9:45 AM  
 Influenza Vaccine 10/20/2015, 9/26/2014, 10/8/2013 Influenza Vaccine Split 10/6/2011 Pneumococcal Conjugate (PCV-13) 5/11/2015 Pneumococcal Polysaccharide (PPSV-23) 6/14/2016 Not reviewed this visit You Were Diagnosed With   
  
 Codes Comments Pre-op exam    -  Primary ICD-10-CM: G85.366 ICD-9-CM: V72.84 Vitals BP Pulse Temp Resp Height(growth percentile) Weight(growth percentile)  114/70 (BP 1 Location: Right arm, BP Patient Position: Sitting) (!) 59 97.5 °F (36.4 °C) (Oral) 16 5' 1\" (1.549 m) 166 lb 4 oz (75.4 kg) BMI OB Status Smoking Status 31.41 kg/m2 Hysterectomy Former Smoker BMI and BSA Data Body Mass Index Body Surface Area  
 31.41 kg/m 2 1.8 m 2 Preferred Pharmacy Pharmacy Name Phone [facility] [address] [place] [phone] Your Updated Medication List  
  
   
This list is accurate as of: 1/24/18  9:41 AM.  Always use your most recent med list.  
  
  
  
  
 albuterol 90 mcg/actuation inhaler Commonly known as:  PROAIR HFA Take 2 Puffs by inhalation every four (4) hours as needed. BIOTIN PO Take 5,000 mcg by mouth daily. CENTRUM SILVER PO Take 1 Tab by mouth daily. CITRACAL + D PO Take 3 Tabs by mouth daily. CO Q-10 100 mg capsule Generic drug:  co-enzyme Q-10 Take 100 mg by mouth daily. conjugated estrogens 0.3 mg tablet Commonly known as:  PREMARIN Take 1 Tab by mouth daily. EPINEPHrine 0.3 mg/0.3 mL injection Commonly known as:  EPIPEN  
0.3 mL by IntraMUSCular route once as needed for up to 1 dose. GRALISE 600 mg Tb24 Generic drug:  gabapentin TAKE 2 TABLETS DAILY  
  
 melatonin 3 mg tablet Take  by mouth.  
  
 mometasone 50 mcg/actuation nasal spray Commonly known as:  NASONEX  
2 Sprays by Nasal route daily. omega-3 fatty acids-vitamin e 1,000 mg Cap Take 1 Cap by mouth two (2) times a day. phenazopyridine 200 mg tablet Commonly known as:  PYRIDIUM Take 1 Tab by mouth three (3) times daily as needed for Pain for up to 3 days. RESTASIS 0.05 % ophthalmic emulsion Generic drug:  cycloSPORINE Administer 1 Drop to both eyes two (2) times a day. triamterene-hydroCHLOROthiazide 37.5-25 mg per tablet Commonly known as:  Deitra Aschoff TAKE ONE-HALF (1/2) TABLET DAILY  
  
 trimethoprim-sulfamethoxazole 160-800 mg per tablet Commonly known as:  BACTRIM DS, SEPTRA DS Take 1 Tab by mouth two (2) times a day for 3 days. VITAMIN D3 1,000 unit Cap Generic drug:  cholecalciferol Take 1,000 Units by mouth daily. ZyrTEC 10 mg tablet Generic drug:  cetirizine Take 10 mg by mouth daily as needed for Allergies. Patient Instructions Please contact our office if you have any questions about your visit today. Introducing [facility]! Dear Aurelio Roman: Thank you for requesting a Riiid account. Our records indicate that you already have an active Riiid account. You can access your account anytime at [email or web address] Did you know that you can access your hospital and ER discharge instructions at any time in Riiid? You can also review all of your test results from your hospital stay or ER visit. Additional Information If you have questions, please visit the Frequently Asked Questions section of the Riiid website at [email or web address] Remember, Riiid is NOT to be used for urgent needs. For medical emergencies, dial 911. Now available from your iPhone and Android! Please provide this summary of care documentation to your next provider. Your primary care clinician is listed as SPENSER MIRANDA. If you have any questions after today's visit, please call [phone].

## 2018-01-24 NOTE — PROGRESS NOTES
Chief Complaint   Patient presents with    Pre-op Exam     corneal surgery, right eye       1. Have you been to the ER, urgent care clinic since your last visit? Hospitalized since your last visit? No    2. Have you seen or consulted any other health care providers outside of the [address] [place] [address] since your last visit? Include any pap smears or colon screening.  No

## 2018-01-25 VITALS
HEART RATE: 62 BPM | DIASTOLIC BLOOD PRESSURE: 70 MMHG | HEIGHT: 61 IN | SYSTOLIC BLOOD PRESSURE: 114 MMHG | WEIGHT: 166.25 LBS | RESPIRATION RATE: 16 BRPM | TEMPERATURE: 97.5 F | BODY MASS INDEX: 31.39 KG/M2

## 2018-01-25 LAB — 25(OH)D3 SERPL-MCNC: 44 NG/ML (ref 30–100)

## 2018-02-03 PROBLEM — H18.421 BAND KERATOPATHY OF RIGHT EYE: Status: ACTIVE | Noted: 2018-02-03

## 2018-02-05 PROBLEM — H18.421 BAND KERATOPATHY OF RIGHT EYE: Status: RESOLVED | Noted: 2018-02-03 | Resolved: 2018-02-05

## 2018-02-09 ENCOUNTER — OFFICE VISIT (OUTPATIENT)
Dept: FAMILY MEDICINE CLINIC | Age: 78
End: 2018-02-09

## 2018-02-09 VITALS
DIASTOLIC BLOOD PRESSURE: 68 MMHG | HEART RATE: 61 BPM | WEIGHT: 168.25 LBS | SYSTOLIC BLOOD PRESSURE: 114 MMHG | RESPIRATION RATE: 20 BRPM | BODY MASS INDEX: 31.77 KG/M2 | TEMPERATURE: 97 F | HEIGHT: 61 IN

## 2018-02-09 DIAGNOSIS — N30.01 ACUTE CYSTITIS WITH HEMATURIA: Primary | ICD-10-CM

## 2018-02-09 LAB
BILIRUB UR QL STRIP: NEGATIVE
GLUCOSE UR-MCNC: NEGATIVE MG/DL
KETONES P FAST UR STRIP-MCNC: NEGATIVE MG/DL
PH UR STRIP: 6 [PH] (ref 4.6–8)
PROT UR QL STRIP: NEGATIVE
SP GR UR STRIP: 1.02 (ref 1–1.03)
UA UROBILINOGEN AMB POC: NORMAL (ref 0.2–1)
URINALYSIS CLARITY POC: CLEAR
URINALYSIS COLOR POC: YELLOW
URINE BLOOD POC: NEGATIVE
URINE LEUKOCYTES POC: NEGATIVE
URINE NITRITES POC: NEGATIVE

## 2018-02-09 RX ORDER — BROMFENAC SODIUM 0.7 MG/ML
SOLUTION/ DROPS OPHTHALMIC
COMMUNITY
Start: 2018-01-30 | End: 2018-04-04 | Stop reason: ALTCHOICE

## 2018-02-09 RX ORDER — MOXIFLOXACIN 5 MG/ML
SOLUTION/ DROPS OPHTHALMIC
COMMUNITY
Start: 2018-01-30 | End: 2018-04-04 | Stop reason: ALTCHOICE

## 2018-02-09 RX ORDER — PREDNISOLONE ACETATE 10 MG/ML
SUSPENSION/ DROPS OPHTHALMIC
COMMUNITY
Start: 2018-01-30 | End: 2018-04-04 | Stop reason: ALTCHOICE

## 2018-02-09 NOTE — PROGRESS NOTES
Chief Complaint   Patient presents with    Follow-up     UTI       Health Maintenance Due   Topic Date Due    DTaP/Tdap/Td series (1 - Tdap) 02/01/1963    MEDICARE YEARLY EXAM  01/31/2018    BREAST CANCER SCRN MAMMOGRAM  05/08/2018       Health Maintenance reviewed     1. Have you been to the ER, urgent care clinic since your last visit? Hospitalized since your last visit? No    2. Have you seen or consulted any other health care providers outside of the [address] [place] [address] since your last visit? Include any pap smears or colon screening.  Yes When: 1/18 Where: eye dr Reason for visit: surgery

## 2018-02-09 NOTE — PROGRESS NOTES
Chief Complaint   Patient presents with    Follow-up     UTI     HISTORY OF PRESENT ILLNESS  Stefani Gill is a 70 y.o. female. HPI  Pt here for f/u of uti. She has completed the medication and all sx have resolved. No new concerns. ROS  Review of Systems   Constitutional: Negative. HENT: Negative. Respiratory: Negative. Cardiovascular: Negative. All other systems reviewed and are negative. Physical Exam  Physical Exam   Nursing note and vitals reviewed. Constitutional: She is oriented to person, place, and time. She appears well-developed and well-nourished. HENT:   Head: Normocephalic and atraumatic. Right Ear: External ear normal.   Left Ear: External ear normal.   Nose: Nose normal.   Eyes: Conjunctivae and EOM are normal.   Neck: Normal range of motion. Neck supple. No JVD present. Carotid bruit is not present. No thyromegaly present. Cardiovascular: Normal rate, regular rhythm, normal heart sounds and intact distal pulses. Exam reveals no gallop and no friction rub. No murmur heard. Pulmonary/Chest: Effort normal and breath sounds normal. She has no wheezes. She has no rhonchi. She has no rales. Abdominal: Soft. Bowel sounds are normal.   Musculoskeletal: Normal range of motion. Neurological: She is alert and oriented to person, place, and time. Coordination normal.   Skin: Skin is warm and dry. Psychiatric: She has a normal mood and affect.  Her behavior is normal. Judgment and thought content normal.     Recent Results (from the past 12 hour(s))   AMB POC URINALYSIS DIP STICK AUTO W/O MICRO    Collection Time: 02/09/18 11:46 AM   Result Value Ref Range    Color (UA POC) Yellow     Clarity (UA POC) Clear     Glucose (UA POC) Negative Negative    Bilirubin (UA POC) Negative Negative    Ketones (UA POC) Negative Negative    Specific gravity (UA POC) 1.020 1.001 - 1.035    Blood (UA POC) Negative Negative    pH (UA POC) 6.0 4.6 - 8.0    Protein (UA POC) Negative Negative    Urobilinogen (UA POC) 0.2 mg/dL 0.2 - 1    Nitrites (UA POC) Negative Negative    Leukocyte esterase (UA POC) Negative Negative       ASSESSMENT and PLAN  Diagnoses and all orders for this visit:    1. Acute cystitis with hematuria  -     AMB POC URINALYSIS DIP STICK OR TABLET REAGENT AUTO W/O MICRO  Resolved.      Follow-up Disposition: prn

## 2018-04-04 ENCOUNTER — OFFICE VISIT (OUTPATIENT)
Dept: FAMILY MEDICINE CLINIC | Age: 78
End: 2018-04-04

## 2018-04-04 VITALS
SYSTOLIC BLOOD PRESSURE: 127 MMHG | HEIGHT: 61 IN | TEMPERATURE: 98 F | DIASTOLIC BLOOD PRESSURE: 77 MMHG | RESPIRATION RATE: 16 BRPM | WEIGHT: 168.5 LBS | BODY MASS INDEX: 31.81 KG/M2 | HEART RATE: 62 BPM

## 2018-04-04 DIAGNOSIS — Z00.00 MEDICARE ANNUAL WELLNESS VISIT, SUBSEQUENT: Primary | ICD-10-CM

## 2018-04-04 DIAGNOSIS — Z12.39 BREAST CANCER SCREENING: ICD-10-CM

## 2018-04-04 NOTE — PATIENT INSTRUCTIONS
Please contact our office if you have any questions about your visit today. Medicare Wellness Visit, Female    The best way to live healthy is to have a healthy lifestyle by eating a well-balanced diet, exercising regularly, limiting alcohol and stopping smoking. Regular physical exams and screening tests are another way to keep healthy. Preventive exams provided by your health care provider can find health problems before they become diseases or illnesses. Preventive services including immunizations, screening tests, monitoring and exams can help you take care of your own health. All people over age 72 should have a pneumovax  and and a prevnar shot to prevent pneumonia. These are once in a lifetime unless you and your provider decide differently. All people over 65 should have a yearly flu shot and a tetanus vaccine every 10 years. A bone mass density to screen for osteoporosis or thinning of the bones should be done every 2 years after 65. Screening for diabetes mellitus with a blood sugar test should be done every year. Glaucoma is a disease of the eye due to increased ocular pressure that can lead to blindness and it should be done every year by an eye professional.    Cardiovascular screening tests that check for elevated lipids (fatty part of blood) which can lead to heart disease and strokes should be done every 5 years. Colorectal screening that evaluates for blood or polyps in your colon should be done yearly as a stool test or every five years as a flexible sigmoidoscope or every 10 years as a colonoscopy up to age 76. Breast cancer screening with a mammogram is recommended biennially  for women age 54-69. Screening for cervical cancer with a pap smear and pelvic exam is recommended for women after age 72 years every 2 years up to age 79 or when the provider and patient decide to stop. If there is a history of cervical abnormalities or other increased risk for cancer then the test is recommended yearly. Hepatitis C screening is also recommended for anyone born between [birth year] through [address]. A shingles vaccine is also recommended once in a lifetime after age 61. Your Medicare Wellness Exam is recommended annually. Here is a list of your current Health Maintenance items with a due date:  Health Maintenance Due   Topic Date Due    DTaP/Tdap/Td  (1 - Tdap) 02/01/1963    Annual Well Visit  03/14/2018    Breast Cancer Screening  05/08/2018          Well Visit, Over 72: Care Instructions  Your Care Instructions    Physical exams can help you stay healthy. Your doctor has checked your overall health and may have suggested ways to take good care of yourself. He or she also may have recommended tests. At home, you can help prevent illness with healthy eating, regular exercise, and other steps. Follow-up care is a key part of your treatment and safety. Be sure to make and go to all appointments, and call your doctor if you are having problems. It's also a good idea to know your test results and keep a list of the medicines you take. How can you care for yourself at home? · Reach and stay at a healthy weight. This will lower your risk for many problems, such as obesity, diabetes, heart disease, and high blood pressure. · Get at least 30 minutes of exercise on most days of the week. Walking is a good choice. You also may want to do other activities, such as running, swimming, cycling, or playing tennis or team sports. · Do not smoke. Smoking can make health problems worse. If you need help quitting, talk to your doctor about stop-smoking programs and medicines. These can increase your chances of quitting for good. · Protect your skin from too much sun. When you're outdoors from 10 a.m. to 4 p.m., stay in the shade or cover up with clothing and a hat with a wide brim. Wear sunglasses that block UV rays.  Even when it's cloudy, put broad-spectrum sunscreen (SPF 30 or higher) on any exposed skin. · See a dentist one or two times a year for checkups and to have your teeth cleaned. · Wear a seat belt in the car. · Limit alcohol to 2 drinks a day for men and 1 drink a day for women. Too much alcohol can cause health problems. Follow your doctor's advice about when to have certain tests. These tests can spot problems early. For men and women  · Cholesterol. Your doctor will tell you how often to have this done based on your overall health and other things that can increase your risk for heart attack and stroke. · Blood pressure. Have your blood pressure checked during a routine doctor visit. Your doctor will tell you how often to check your blood pressure based on your age, your blood pressure results, and other factors. · Diabetes. Ask your doctor whether you should have tests for diabetes. · Vision. Experts recommend that you have yearly exams for glaucoma and other age-related eye problems. · Hearing. Tell your doctor if you notice any change in your hearing. You can have tests to find out how well you hear. · Colon cancer tests. Keep having colon cancer tests as your doctor recommends. You can have one of several types of tests. · Heart attack and stroke risk. At least every 4 to 6 years, you should have your risk for heart attack and stroke assessed. Your doctor uses factors such as your age, blood pressure, cholesterol, and whether you smoke or have diabetes to show what your risk for a heart attack or stroke is over the next 10 years. · Osteoporosis. Talk to your doctor about whether you should have a bone density test to find out whether you have thinning bones. Also ask your doctor about whether you should take calcium and vitamin D supplements. For women  · Pap test and pelvic exam. You may no longer need a Pap test. Talk with your doctor about whether to stop or continue to have Pap tests.   · Breast exam and mammogram. Ask how often you should have a mammogram, which is an X-ray of your breasts. A mammogram can spot breast cancer before it can be felt and when it is easiest to treat. · Thyroid disease. Talk to your doctor about whether to have your thyroid checked as part of a regular physical exam. Women have an increased chance of a thyroid problem. For men  · Prostate exam. Talk to your doctor about whether you should have a blood test (called a PSA test) for prostate cancer. Experts disagree on whether men should have this test. Some experts recommend that you discuss the benefits and risks of the test with your doctor. · Abdominal aortic aneurysm. Ask your doctor whether you should have a test to check for an aneurysm. You may need a test if you ever smoked or if your parent, brother, sister, or child has had an aneurysm. When should you call for help? Watch closely for changes in your health, and be sure to contact your doctor if you have any problems or symptoms that concern you. Where can you learn more? Go to http://keke-josé luis.info/. Enter M410 in the search box to learn more about \"Well Visit, Over 65: Care Instructions. \"  Current as of: May 12, 2017  Content Version: 11.4  © 1425-1619 [employer], [employer]. Care instructions adapted under license by [employer] (which disclaims liability or warranty for this information). If you have questions about a medical condition or this instruction, always ask your healthcare professional. [address] any warranty or liability for your use of this information.

## 2018-04-04 NOTE — PROGRESS NOTES
Chief Complaint   Patient presents with   [facility] Visit     Medicare       Health Maintenance Due   Topic Date Due    DTaP/Tdap/Td series (1 - Tdap) 02/01/1963    MEDICARE YEARLY EXAM  03/14/2018    BREAST CANCER SCRN MAMMOGRAM  05/08/2018       Health Maintenance reviewed     1. Have you been to the ER, urgent care clinic since your last visit? Hospitalized since your last visit? No    2. Have you seen or consulted any other health care providers outside of the [address] [place] [address] since your last visit? Include any pap smears or colon screening. No      This is the Subsequent Medicare Annual Wellness Exam, performed 12 months or more after the Initial AWV or the last Subsequent AWV    I have reviewed the patient's medical history in detail and updated the computerized patient record. 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    Band keratopathy of right eye 2/3/2018    Cataract     Cervical pain     Cylindrical bronchiectasis (HCC)     Fracture     rt ankle as an adult    History of pneumonia     Hypertension     Ill-defined condition     Spasms to left side    Left arm pain     Lumbar spinal stenosis     Myofascial pain     Osteopenia     Right buttock pain     Right low back pain     Sciatica of right side       Past Surgical History:   Procedure Laterality Date    HX CATARACT REMOVAL  8/15/2011    HX HEENT      sinus surgery    HX KERATOPLASTY Left 10/02/2017    HX ORTHOPAEDIC      left 4th finger trigger release    HX SHIELA AND BSO       Current Outpatient Prescriptions   Medication Sig Dispense Refill    conjugated estrogens (PREMARIN) 0.3 mg tablet Take 1 Tab by mouth daily.  90 Tab 1    GRALISE 600 mg Tb24 TAKE 2 TABLETS DAILY 180 Tab 0    triamterene-hydroCHLOROthiazide (MAXZIDE) 37.5-25 mg per tablet TAKE ONE-HALF (1/2) TABLET DAILY 45 Tab 0    EPINEPHrine (EPIPEN) 0.3 mg/0.3 mL injection 0.3 mL by IntraMUSCular route once as needed for up to 1 dose. 1 Syringe 1    albuterol (PROAIR HFA) 90 mcg/actuation inhaler Take 2 Puffs by inhalation every four (4) hours as needed. 3 Inhaler 3    melatonin 3 mg tablet Take  by mouth.  omega-3 fatty acids-vitamin e 1,000 mg cap Take 1 Cap by mouth two (2) times a day.  Cholecalciferol, Vitamin D3, (VITAMIN D3) 1,000 unit cap Take 3,000 Units by mouth daily.  cycloSPORINE (RESTASIS) 0.05 % ophthalmic emulsion Administer 1 Drop to both eyes two (2) times a day.  mometasone (NASONEX) 50 mcg/actuation nasal spray 2 Sprays by Nasal route daily. 3 Container 3    MULTIVITAMIN W-MINERALS/LUTEIN (CENTRUM SILVER PO) Take 1 Tab by mouth daily.  cetirizine (ZYRTEC) 10 mg tablet Take 10 mg by mouth daily as needed for Allergies.  co-enzyme Q-10 (CO Q-10) 100 mg capsule Take 100 mg by mouth daily.  CALCIUM CITRATE/VITAMIN D3 (CITRACAL + D PO) Take 3 Tabs by mouth daily.  BIOTIN PO Take 5,000 mcg by mouth daily.        Allergies   Allergen Reactions    Naprosyn [Naproxen] Other (comments)     Blood in urine       Family History   Problem Relation Age of Onset    Asthma Mother     COPD Mother    Windy Mullins MS Mother     Heart Disease Mother     Hypertension Mother     Stroke Father     Heart Disease Father     Hypertension Father     Allergic Rhinitis Sister     Asthma Brother     Hypertension Brother     Diabetes Brother     Migraines Paternal Grandmother      Social History   Substance Use Topics    Smoking status: Former Smoker     Years: 5.00     Types: Cigarettes     Quit date: 1/1/1970    Smokeless tobacco: Never Used    Alcohol use 0.6 oz/week     1 Glasses of wine per week      Comment: rare     Patient Active Problem List   Diagnosis Code    Multiple lung nodules R91.8    Allergic rhinitis J30.9    HTN (hypertension) I10    Asthma J45.909    Osteopenia M85.80    Vitamin D deficiency E55.9    Dense breasts R92.2    Right knee DJD, 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    Thoracic or lumbosacral neuritis or radiculitis, unspecified [id number]    Cervical radiculopathy M54.12    Colon polyps K63.5    Chronic right-sided low back pain with sciatica M54.40, G89.29    Right buttock pain M79.1    Annular disc tear M51.9    Facet hypertrophy of lumbar region M47.896    Bulging lumbar disc M51.26    Lumbar spinal stenosis M48.061    ACP (advance care planning) Z71.89    Neuritis of lower extremity G57.90    Trochanteric bursitis of both hips M70.61, M70.62    HNP (herniated nucleus pulposus), lumbar M51.26    Lumbar facet arthropathy (HCC) M46.96    Trochanteric bursitis, left hip M70.62    Left arm weakness R29.898    Cervical stenosis of spine M48.02       Depression Risk Factor Screening:     PHQ over the last two weeks 8/8/2017   Little interest or pleasure in doing things Not at all   Feeling down, depressed or hopeless Not at all   Total Score PHQ 2 0     Alcohol Risk Factor Screening: On any occasion in the past three months you have had more than 3 drinks containing alcohol. Functional Ability and Level of Safety:   Hearing Loss  Hearing is good. Activities of Daily Living  The home contains: handrails and rugs  Patient does total self care    Fall Risk  Fall Risk Assessment, last 12 mths 2/9/2018   Able to walk? Yes   Fall in past 12 months?  No       Abuse Screen  Patient is not abused    Cognitive Screening   Evaluation of Cognitive Function:  Has your family/caregiver stated any concerns about your memory: no      Patient Care Team   Patient Care Team:  Teo Lennon MD as PCP - Peyman Trammell MD (Pulmonary Disease)    Assessment/Plan   Education and counseling provided:          Health Maintenance Due   Topic Date Due    DTaP/Tdap/Td series (1 - Tdap) 02/01/1963    MEDICARE YEARLY EXAM  03/14/2018    BREAST CANCER SCRN MAMMOGRAM 05/08/2018

## 2018-04-04 NOTE — MR AVS SNAPSHOT
[address] [place] [id number] [phone] Patient: Leora Everett MRN:  [id number] Visit Information Date & Time Provider Department Dept. Phone Encounter #  
 4/4/2018  8:00 AM Leanne Little NP [address] [id number] Upcoming Health Maintenance Date Due DTaP/Tdap/Td series (1 - Tdap) 2/1/1963 MEDICARE YEARLY EXAM 3/14/2018 BREAST CANCER SCRN MAMMOGRAM 5/8/2018 GLAUCOMA SCREENING Q2Y 9/7/2019 COLONOSCOPY 1/13/2020 Allergies as of 4/4/2018  Review Complete On: 4/4/2018 By: Leanne Little NP Severity Noted Reaction Type Reactions Naprosyn [Naproxen]  01/21/2014    Other (comments) Blood in urine Current Immunizations  Reviewed on 9/22/2016 Name Date Influenza High Dose Vaccine PF 9/25/2017, 9/22/2016  9:45 AM  
 Influenza Vaccine 10/20/2015, 9/26/2014, 10/8/2013 Influenza Vaccine Split 10/6/2011 Pneumococcal Conjugate (PCV-13) 5/11/2015 Pneumococcal Polysaccharide (PPSV-23) 6/14/2016 Not reviewed this visit You Were Diagnosed With   
  
 Codes Comments Medicare annual wellness visit, subsequent    -  Primary ICD-10-CM: Z00.00 ICD-9-CM: V70.0 Breast cancer screening     ICD-10-CM: Z12.31 
ICD-9-CM: V76.10 Vitals BP Pulse Temp Resp Height(growth percentile) Weight(growth percentile) 127/77 (BP 1 Location: Right arm, BP Patient Position: Sitting) 62 98 °F (36.7 °C) (Oral) 16 5' 1\" (1.549 m) 168 lb 8 oz (76.4 kg) BMI OB Status Smoking Status 31.84 kg/m2 Hysterectomy Former Smoker BMI and BSA Data Body Mass Index Body Surface Area  
 31.84 kg/m 2 1.81 m 2 Preferred Pharmacy Pharmacy Name Phone [facility] [address] [facility], [address] [place] [address] [phone] Your Updated Medication List  
  
   
 This list is accurate as of 4/4/18  8:37 AM.  Always use your most recent med list.  
  
  
  
  
 albuterol 90 mcg/actuation inhaler Commonly known as:  PROAIR HFA Take 2 Puffs by inhalation every four (4) hours as needed. BIOTIN PO Take 5,000 mcg by mouth daily. CENTRUM SILVER PO Take 1 Tab by mouth daily. CITRACAL + D PO Take 3 Tabs by mouth daily. CO Q-10 100 mg capsule Generic drug:  co-enzyme Q-10 Take 100 mg by mouth daily. conjugated estrogens 0.3 mg tablet Commonly known as:  PREMARIN Take 1 Tab by mouth daily. EPINEPHrine 0.3 mg/0.3 mL injection Commonly known as:  EPIPEN  
0.3 mL by IntraMUSCular route once as needed for up to 1 dose. GRALISE 600 mg Tb24 Generic drug:  gabapentin TAKE 2 TABLETS DAILY  
  
 melatonin 3 mg tablet Take  by mouth.  
  
 mometasone 50 mcg/actuation nasal spray Commonly known as:  NASONEX  
2 Sprays by Nasal route daily. omega-3 fatty acids-vitamin e 1,000 mg Cap Take 1 Cap by mouth two (2) times a day. RESTASIS 0.05 % ophthalmic emulsion Generic drug:  cycloSPORINE Administer 1 Drop to both eyes two (2) times a day. triamterene-hydroCHLOROthiazide 37.5-25 mg per tablet Commonly known as:  Jarett Kevin TAKE ONE-HALF (1/2) TABLET DAILY  
  
 VITAMIN D3 1,000 unit Cap Generic drug:  cholecalciferol Take 3,000 Units by mouth daily. ZyrTEC 10 mg tablet Generic drug:  cetirizine Take 10 mg by mouth daily as needed for Allergies. To-Do List   
 10/03/2018 Imaging:  VANI MAMMO BI SCREENING INCL CAD Patient Instructions Please contact our office if you have any questions about your visit today. Medicare Wellness Visit, Female The best way to live healthy is to have a healthy lifestyle by eating a well-balanced diet, exercising regularly, limiting alcohol and stopping smoking.  
 
Regular physical exams and screening tests are another way to keep healthy. Preventive exams provided by your health care provider can find health problems before they become diseases or illnesses. Preventive services including immunizations, screening tests, monitoring and exams can help you take care of your own health. All people over age 72 should have a pneumovax  and and a prevnar shot to prevent pneumonia. These are once in a lifetime unless you and your provider decide differently. All people over 65 should have a yearly flu shot and a tetanus vaccine every 10 years. A bone mass density to screen for osteoporosis or thinning of the bones should be done every 2 years after 65. Screening for diabetes mellitus with a blood sugar test should be done every year. Glaucoma is a disease of the eye due to increased ocular pressure that can lead to blindness and it should be done every year by an eye professional. 
 
Cardiovascular screening tests that check for elevated lipids (fatty part of blood) which can lead to heart disease and strokes should be done every 5 years. Colorectal screening that evaluates for blood or polyps in your colon should be done yearly as a stool test or every five years as a flexible sigmoidoscope or every 10 years as a colonoscopy up to age 76. Breast cancer screening with a mammogram is recommended biennially  for women age 54-69. Screening for cervical cancer with a pap smear and pelvic exam is recommended for women after age 72 years every 2 years up to age 79 or when the provider and patient decide to stop. If there is a history of cervical abnormalities or other increased risk for cancer then the test is recommended yearly. Hepatitis C screening is also recommended for anyone born between [birth year] through [address]. A shingles vaccine is also recommended once in a lifetime after age 61. Your Medicare Wellness Exam is recommended annually. Here is a list of your current Health Maintenance items with a due date: Health Maintenance Due Topic Date Due  
 DTaP/Tdap/Td  (1 - Tdap) 02/01/1961 [place] Annual Well Visit  03/14/2018  Breast Cancer Screening  05/08/2018 Well Visit, Over 72: Care Instructions Your Care Instructions Physical exams can help you stay healthy. Your doctor has checked your overall health and may have suggested ways to take good care of yourself. He or she also may have recommended tests. At home, you can help prevent illness with healthy eating, regular exercise, and other steps. Follow-up care is a key part of your treatment and safety. Be sure to make and go to all appointments, and call your doctor if you are having problems. It's also a good idea to know your test results and keep a list of the medicines you take. How can you care for yourself at home? · Reach and stay at a healthy weight. This will lower your risk for many problems, such as obesity, diabetes, heart disease, and high blood pressure. · Get at least 30 minutes of exercise on most days of the week. Walking is a good choice. You also may want to do other activities, such as running, swimming, cycling, or playing tennis or team sports. · Do not smoke. Smoking can make health problems worse. If you need help quitting, talk to your doctor about stop-smoking programs and medicines. These can increase your chances of quitting for good. · Protect your skin from too much sun. When you're outdoors from 10 a.m. to 4 p.m., stay in the shade or cover up with clothing and a hat with a wide brim. Wear sunglasses that block UV rays. Even when it's cloudy, put broad-spectrum sunscreen (SPF 30 or higher) on any exposed skin. · See a dentist one or two times a year for checkups and to have your teeth cleaned. · Wear a seat belt in the car. · Limit alcohol to 2 drinks a day for men and 1 drink a day for women. Too much alcohol can cause health problems. Follow your doctor's advice about when to have certain tests.  These tests can spot problems early. For men and women · Cholesterol. Your doctor will tell you how often to have this done based on your overall health and other things that can increase your risk for heart attack and stroke. · Blood pressure. Have your blood pressure checked during a routine doctor visit. Your doctor will tell you how often to check your blood pressure based on your age, your blood pressure results, and other factors. · Diabetes. Ask your doctor whether you should have tests for diabetes. · Vision. Experts recommend that you have yearly exams for glaucoma and other age-related eye problems. · Hearing. Tell your doctor if you notice any change in your hearing. You can have tests to find out how well you hear. · Colon cancer tests. Keep having colon cancer tests as your doctor recommends. You can have one of several types of tests. · Heart attack and stroke risk. At least every 4 to 6 years, you should have your risk for heart attack and stroke assessed. Your doctor uses factors such as your age, blood pressure, cholesterol, and whether you smoke or have diabetes to show what your risk for a heart attack or stroke is over the next 10 years. · Osteoporosis. Talk to your doctor about whether you should have a bone density test to find out whether you have thinning bones. Also ask your doctor about whether you should take calcium and vitamin D supplements. For women · Pap test and pelvic exam. You may no longer need a Pap test. Talk with your doctor about whether to stop or continue to have Pap tests. · Breast exam and mammogram. Ask how often you should have a mammogram, which is an X-ray of your breasts. A mammogram can spot breast cancer before it can be felt and when it is easiest to treat. · Thyroid disease. Talk to your doctor about whether to have your thyroid checked as part of a regular physical exam. Women have an increased chance of a thyroid problem. For men · Prostate exam. Talk to your doctor about whether you should have a blood test (called a PSA test) for prostate cancer. Experts disagree on whether men should have this test. Some experts recommend that you discuss the benefits and risks of the test with your doctor. · Abdominal aortic aneurysm. Ask your doctor whether you should have a test to check for an aneurysm. You may need a test if you ever smoked or if your parent, brother, sister, or child has had an aneurysm. When should you call for help? Watch closely for changes in your health, and be sure to contact your doctor if you have any problems or symptoms that concern you. Where can you learn more? Go to http://keke-josé luis.info/. Enter L011 in the search box to learn more about \"Well Visit, Over 65: Care Instructions. \" Current as of: May 12, 2017 Content Version: 11.4 © 3804-4061 [employer]. Care instructions adapted under license by [employer] (which disclaims liability or warranty for this information). If you have questions about a medical condition or this instruction, always ask your healthcare professional. [address] any warranty or liability for your use of this information. Introducing [facility]! Dear Austin Rahman: Thank you for requesting a [employer] account. Our records indicate that you already have an active [employer] account. You can access your account anytime at [email or web address] Did you know that you can access your hospital and ER discharge instructions at any time in [employer]? You can also review all of your test results from your hospital stay or ER visit. Additional Information If you have questions, please visit the Frequently Asked Questions section of the [employer] website at [email or web address] Remember, [employer] is NOT to be used for urgent needs. For medical emergencies, dial 911. Now available from your iPhone and Android! Please provide this summary of care documentation to your next provider. Your primary care clinician is listed as SPENSER MIRANDA. If you have any questions after today's visit, please call [phone].

## 2018-04-04 NOTE — PROGRESS NOTES
Chief Complaint   Patient presents with   [address] Visit     Medicare       Health Maintenance Due   Topic Date Due    DTaP/Tdap/Td series (1 - Tdap) 02/01/1961    MEDICARE YEARLY EXAM  03/14/2018    BREAST CANCER SCRN MAMMOGRAM  05/08/2018       Health Maintenance reviewed     1. Have you been to the ER, urgent care clinic since your last visit? Hospitalized since your last visit? No    2. Have you seen or consulted any other health care providers outside of the [address] [place] [address] since your last visit? Include any pap smears or colon screening. No      This is the Subsequent Medicare Annual Wellness Exam, performed 12 months or more after the Initial AWV or the last Subsequent AWV    I have reviewed the patient's medical history in detail and updated the computerized patient record. 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    Band keratopathy of right eye 2/3/2018    Cataract     Cervical pain     Cylindrical bronchiectasis (HCC)     Fracture     rt ankle as an adult    History of pneumonia     Hypertension     Ill-defined condition     Spasms to left side    Left arm pain     Lumbar spinal stenosis     Myofascial pain     Osteopenia     Right buttock pain     Right low back pain     Sciatica of right side       Past Surgical History:   Procedure Laterality Date    HX CATARACT REMOVAL  8/15/2011    HX HEENT      sinus surgery    HX KERATOPLASTY Left 10/02/2017    HX ORTHOPAEDIC      left 4th finger trigger release    HX SHIELA AND BSO       Current Outpatient Prescriptions   Medication Sig Dispense Refill    conjugated estrogens (PREMARIN) 0.3 mg tablet Take 1 Tab by mouth daily.  90 Tab 1    GRALISE 600 mg Tb24 TAKE 2 TABLETS DAILY 180 Tab 0    triamterene-hydroCHLOROthiazide (MAXZIDE) 37.5-25 mg per tablet TAKE ONE-HALF (1/2) TABLET DAILY 45 Tab 0    EPINEPHrine (EPIPEN) 0.3 mg/0.3 mL injection 0.3 mL by IntraMUSCular route once as needed for up to 1 dose. 1 Syringe 1    albuterol (PROAIR HFA) 90 mcg/actuation inhaler Take 2 Puffs by inhalation every four (4) hours as needed. 3 Inhaler 3    melatonin 3 mg tablet Take  by mouth.  omega-3 fatty acids-vitamin e 1,000 mg cap Take 1 Cap by mouth two (2) times a day.  Cholecalciferol, Vitamin D3, (VITAMIN D3) 1,000 unit cap Take 3,000 Units by mouth daily.  cycloSPORINE (RESTASIS) 0.05 % ophthalmic emulsion Administer 1 Drop to both eyes two (2) times a day.  mometasone (NASONEX) 50 mcg/actuation nasal spray 2 Sprays by Nasal route daily. 3 Container 3    MULTIVITAMIN W-MINERALS/LUTEIN (CENTRUM SILVER PO) Take 1 Tab by mouth daily.  cetirizine (ZYRTEC) 10 mg tablet Take 10 mg by mouth daily as needed for Allergies.  co-enzyme Q-10 (CO Q-10) 100 mg capsule Take 100 mg by mouth daily.  CALCIUM CITRATE/VITAMIN D3 (CITRACAL + D PO) Take 3 Tabs by mouth daily.  BIOTIN PO Take 5,000 mcg by mouth daily.        Allergies   Allergen Reactions    Naprosyn [Naproxen] Other (comments)     Blood in urine       Family History   Problem Relation Age of Onset    Asthma Mother     COPD Mother    Catha Sprout MS Mother     Heart Disease Mother     Hypertension Mother     Stroke Father     Heart Disease Father     Hypertension Father     Allergic Rhinitis Sister     Asthma Brother     Hypertension Brother     Diabetes Brother     Migraines Paternal Grandmother      Social History   Substance Use Topics    Smoking status: Former Smoker     Years: 5.00     Types: Cigarettes     Quit date: 1/1/1970    Smokeless tobacco: Never Used    Alcohol use 0.6 oz/week     1 Glasses of wine per week      Comment: rare     Patient Active Problem List   Diagnosis Code    Multiple lung nodules R91.8    Allergic rhinitis J30.9    HTN (hypertension) I10    Asthma J45.909    Osteopenia M85.80    Vitamin D deficiency E55.9    Dense breasts R92.2    Right knee DJD, 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    Thoracic or lumbosacral neuritis or radiculitis, unspecified [id number]    Cervical radiculopathy M54.12    Colon polyps K63.5    Chronic right-sided low back pain with sciatica M54.40, G89.29    Right buttock pain M79.1    Annular disc tear M51.9    Facet hypertrophy of lumbar region M47.896    Bulging lumbar disc M51.26    Lumbar spinal stenosis M48.061    ACP (advance care planning) Z71.89    Neuritis of lower extremity G57.90    Trochanteric bursitis of both hips M70.61, M70.62    HNP (herniated nucleus pulposus), lumbar M51.26    Lumbar facet arthropathy (HCC) M46.96    Trochanteric bursitis, left hip M70.62    Left arm weakness R29.898    Cervical stenosis of spine M48.02       Depression Risk Factor Screening:     PHQ over the last two weeks 8/8/2017   Little interest or pleasure in doing things Not at all   Feeling down, depressed or hopeless Not at all   Total Score PHQ 2 0     Alcohol Risk Factor Screening: On any occasion in the past three months you have had more than 3 drinks containing alcohol. Functional Ability and Level of Safety:   Hearing Loss  Hearing is good. Activities of Daily Living  The home contains: handrails and rugs  Patient does total self care    Fall Risk  Fall Risk Assessment, last 12 mths 2/9/2018   Able to walk? Yes   Fall in past 12 months?  No       Abuse Screen  Patient is not abused    Cognitive Screening   Evaluation of Cognitive Function:  Has your family/caregiver stated any concerns about your memory: no  Normal    Patient Care Team   Patient Care Team:  Kristine Hernandez MD as PCP - Apolinar Bill MD (Pulmonary Disease)    Assessment/Plan   Education and counseling provided:  Are appropriate based on today's review and evaluation  End-of-Life planning (with patient's consent)  Pneumococcal Vaccine  Influenza Vaccine  Hepatitis B Vaccine  Screening Mammography  Screening Pap and pelvic (covered once every 2 years)  Colorectal cancer screening tests  Cardiovascular screening blood test  Bone mass measurement (DEXA)  Screening for glaucoma  Diabetes screening test  medication     Diagnoses and all orders for this visit:    1. Medicare annual wellness visit, subsequent    2. Breast cancer screening  -     VANI MAMMO BI SCREENING INCL CAD;  Future        Health Maintenance Due   Topic Date Due    DTaP/Tdap/Td series (1 - Tdap) 02/01/1961    MEDICARE YEARLY EXAM  03/14/2018    BREAST CANCER SCRN MAMMOGRAM  05/08/2018

## 2018-04-13 NOTE — TELEPHONE ENCOUNTER
Per pt she thought her pcp gave her flonase, but they said no so she looked and thought it was Dr. Emigdio Culver. Can Dr. Emigdio Culver [facility] in [facility]? Pt made apt for next available 6/4/18.

## 2018-04-13 NOTE — TELEPHONE ENCOUNTER
Spoke with pt. Our records do not show we came this med either. Last allergy med I see was from PCP back in 2012. Pt last seen over 1 yr ago.  Appt scheduled

## 2018-05-07 RX ORDER — ALBUTEROL SULFATE 90 UG/1
AEROSOL, METERED RESPIRATORY (INHALATION)
Qty: 3 INHALER | Refills: 3 | Status: SHIPPED | OUTPATIENT
Start: 2018-05-07

## 2018-05-09 RX ORDER — TRIAMTERENE/HYDROCHLOROTHIAZID 37.5-25 MG
TABLET ORAL
Qty: 45 TAB | Refills: 0 | Status: SHIPPED | COMMUNITY
Start: 2018-05-09 | End: 2018-08-07 | Stop reason: SDUPTHER

## 2018-05-12 DIAGNOSIS — N95.2 ATROPHIC VAGINITIS: ICD-10-CM

## 2018-05-16 RX ORDER — ESTROGENS, CONJUGATED 0.3 MG/1
TABLET, FILM COATED ORAL
Qty: 90 TAB | Refills: 0 | Status: SHIPPED | COMMUNITY
Start: 2018-05-16 | End: 2018-08-14 | Stop reason: SDUPTHER

## 2018-05-18 ENCOUNTER — HOSPITAL ENCOUNTER (OUTPATIENT)
Dept: MAMMOGRAPHY | Age: 78
Discharge: HOME | End: 2018-05-18
Attending: NURSE PRACTITIONER
Payer: MEDICARE

## 2018-05-18 DIAGNOSIS — Z12.39 BREAST CANCER SCREENING: ICD-10-CM

## 2018-05-18 PROCEDURE — 77063 BREAST TOMOSYNTHESIS BI: CPT

## 2018-05-18 NOTE — PROGRESS NOTES
Sent in My Chart - Mammogram is normal and of no clinical concern.  [facility]

## 2018-06-04 ENCOUNTER — OFFICE VISIT (OUTPATIENT)
Dept: PULMONOLOGY | Age: 78
End: 2018-06-04

## 2018-06-04 VITALS
TEMPERATURE: 97.8 F | OXYGEN SATURATION: 98 % | HEART RATE: 65 BPM | BODY MASS INDEX: 32.1 KG/M2 | HEIGHT: 61 IN | RESPIRATION RATE: 18 BRPM | DIASTOLIC BLOOD PRESSURE: 60 MMHG | WEIGHT: 170 LBS | SYSTOLIC BLOOD PRESSURE: 102 MMHG

## 2018-06-04 DIAGNOSIS — J30.2 SEASONAL ALLERGIC RHINITIS, UNSPECIFIED TRIGGER: ICD-10-CM

## 2018-06-04 DIAGNOSIS — R91.8 PULMONARY NODULES: ICD-10-CM

## 2018-06-04 DIAGNOSIS — J45.30 MILD PERSISTENT ASTHMA WITHOUT COMPLICATION: Primary | ICD-10-CM

## 2018-06-04 DIAGNOSIS — M47.816 LUMBAR FACET ARTHROPATHY: ICD-10-CM

## 2018-06-04 RX ORDER — FLUTICASONE PROPIONATE 50 MCG
2 SPRAY, SUSPENSION (ML) NASAL DAILY
Qty: 3 BOTTLE | Refills: 2 | Status: SHIPPED | COMMUNITY
Start: 2018-06-04 | End: 2019-02-11 | Stop reason: SDUPTHER

## 2018-06-04 NOTE — PROGRESS NOTES
HISTORY OF PRESENT ILLNESS  Rayo Rausch is a 70 y.o. female. HPI Comments: Follow up for asthma and pulmonary nodules. Pt denies new respiratory symptoms. Baseline SOB and wheezing with severe exertion has resolved, and pt has not used rescue inhaler in over 6 months. Pt also stopped using Arnuity over a year ago because \"I felt better\". No new respiratory symptoms. Asthma   The history is provided by the patient. This is a chronic problem. The current episode started more than 1 week ago. The problem occurs rarely. The problem has been resolved. Pertinent negatives include no chest pain, no abdominal pain and no headaches. Shortness of breath: only after severe exertion  Nothing aggravates the symptoms. Relieved by: Albuterol rescue inhaler. Treatments tried: beta agonist. The treatment provided significant relief. Review of Systems   Constitutional: Negative for chills, diaphoresis, fever, malaise/fatigue and weight loss. HENT: Positive for congestion. Negative for ear discharge, ear pain, hearing loss, nosebleeds, sinus pain, sore throat and tinnitus. Eyes: Negative for blurred vision, double vision, photophobia, pain, discharge and redness. Respiratory: Negative for hemoptysis, sputum production and stridor. Cough: occasional. Shortness of breath: only after severe exertion  Wheezing: rare. Cardiovascular: Negative for chest pain, palpitations, orthopnea, claudication, leg swelling and PND. Gastrointestinal: Negative for abdominal pain, blood in stool, constipation, diarrhea, heartburn, melena, nausea and vomiting. Genitourinary: Negative for dysuria, flank pain, frequency, hematuria and urgency. Musculoskeletal: Positive for back pain. Negative for falls, joint pain, myalgias and neck pain. Skin: Negative for itching and rash.    Neurological: Negative for dizziness, tingling, tremors, sensory change, speech change, focal weakness, seizures, loss of consciousness, weakness and headaches. Endo/Heme/Allergies: Positive for environmental allergies. Negative for polydipsia. Does not bruise/bleed easily. Psychiatric/Behavioral: Negative for depression, hallucinations, memory loss, substance abuse and suicidal ideas. The patient is not nervous/anxious and does not have insomnia. 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    Band keratopathy of right eye 2/3/2018    Cataract     Cervical pain     Cylindrical bronchiectasis (HCC)     Fracture     rt ankle as an adult    History of pneumonia     Hypertension     Ill-defined condition     Spasms to left side    Left arm pain     Lumbar spinal stenosis     Myofascial pain     Osteopenia     Right buttock pain     Right low back pain     Sciatica of right side      Current Outpatient Prescriptions on File Prior to Visit   Medication Sig Dispense Refill    PREMARIN 0.3 mg tablet TAKE 1 TABLET DAILY 90 Tab 0    triamterene-hydroCHLOROthiazide (MAXZIDE) 37.5-25 mg per tablet TAKE ONE-HALF (1/2) TABLET DAILY 45 Tab 0    GRALISE 600 mg Tb24 TAKE 2 TABLETS DAILY 180 Tab 0    PROAIR HFA 90 mcg/actuation inhaler USE 2 INHALATIONS EVERY 4 HOURS AS NEEDED 3 Inhaler 3    EPINEPHrine (EPIPEN) 0.3 mg/0.3 mL injection 0.3 mL by IntraMUSCular route once as needed for up to 1 dose. 1 Syringe 1    melatonin 3 mg tablet Take  by mouth.  omega-3 fatty acids-vitamin e 1,000 mg cap Take 1 Cap by mouth two (2) times a day.  Cholecalciferol, Vitamin D3, (VITAMIN D3) 1,000 unit cap Take 3,000 Units by mouth daily.  cycloSPORINE (RESTASIS) 0.05 % ophthalmic emulsion Administer 1 Drop to both eyes two (2) times a day.  MULTIVITAMIN W-MINERALS/LUTEIN (CENTRUM SILVER PO) Take 1 Tab by mouth daily.  co-enzyme Q-10 (CO Q-10) 100 mg capsule Take 100 mg by mouth daily.         BIOTIN PO Take 5,000 mcg by mouth daily.  cetirizine (ZYRTEC) 10 mg tablet Take 10 mg by mouth daily as needed for Allergies.  CALCIUM CITRATE/VITAMIN D3 (CITRACAL + D PO) Take 3 Tabs by mouth daily. No current facility-administered medications on file prior to visit. Allergies   Allergen Reactions    Naprosyn [Naproxen] Other (comments)     Blood in urine       Social History     Social History    Marital status:      Spouse name: N/A    Number of children: N/A    Years of education: N/A     Occupational History    Not on file. Social History Main Topics    Smoking status: Former Smoker     Years: 5.00     Types: Cigarettes     Quit date: 1/1/1970    Smokeless tobacco: Never Used    Alcohol use 0.6 oz/week     1 Glasses of wine per week      Comment: rare    Drug use: No    Sexual activity: Not on file     Other Topics Concern    Not on file     Social History Narrative     Blood pressure 102/60, pulse 65, temperature 97.8 °F (36.6 °C), temperature source Oral, resp. rate 18, height 5' 1\" (1.549 m), weight 77.1 kg (170 lb), SpO2 98 %. Physical Exam   Constitutional: She is oriented to person, place, and time. She appears well-developed and well-nourished. No distress. HENT:   Head: Atraumatic. Nose: Nose normal.   Mouth/Throat: No oropharyngeal exudate. Eyes: Conjunctivae are normal. Pupils are equal, round, and reactive to light. Right eye exhibits no discharge. Left eye exhibits no discharge. No scleral icterus. Neck: No JVD present. No tracheal deviation present. No thyromegaly present. Cardiovascular: Normal rate, regular rhythm and intact distal pulses. Exam reveals no gallop. No murmur heard. Pulmonary/Chest: Effort normal and breath sounds normal. No stridor. No respiratory distress. She has no wheezes. She has no rales. She exhibits no tenderness. Abdominal: Soft. She exhibits no mass. There is no tenderness. There is no rebound.    Musculoskeletal: She exhibits no edema or tenderness. Lymphadenopathy:     She has no cervical adenopathy. Neurological: She is alert and oriented to person, place, and time. Skin: Skin is warm and dry. No rash noted. She is not diaphoretic. No erythema. Psychiatric: She has a normal mood and affect. Her behavior is normal. Judgment and thought content normal.     CT Results (most recent):    Results from Hospital Encounter encounter on 03/07/17   CT CHEST WO CONT   Narrative CT CHEST WITHOUT IV CONTRAST    COMPARISON: CT chest 8/8/2016, 8/25/2015 and 3/1/2013. INDICATIONS: Pulmonary nodules. TECHNIQUE: Volumetric data acquisition was performed through the chest with a  multislice scanner. Reconstructions were created in the axial, coronal, and  sagittal planes. All CT scans at this facility are performed using dose optimization technique as  appropriate to the performed exam, to include automated exposure control,  adjustment of the mA and/or kV according to patient's size (Including  appropriate matching for site-specific examinations), or use of iterative  reconstruction technique. FINDINGS:     Thyroid/Base Of Neck: The thyroid is unremarkable as visualized. Lungs:   Trachea and central bronchi are patent. No mass lesions are seen. .  Stable 4 mm 2 pleural-based pulmonary nodules lateral right middle lobe (series  4 image 31 and 32). Resolution of additional 4 mm pulmonary nodule in the right middle lobe. Perhaps 2 mm pulmonary nodule in the right minor fissure, stable (series 4 image  25)  Less conspicuous posterior right upper lobe 3 mm pulmonary nodule (series 4  image 15)  No new suspicious pulmonary nodule. Mild dependent atelectasis. Pleural Spaces: There is no pneumothorax or pleural fluid evident. No pleural plaques are seen    Lymph Nodes:   Axillae: Normal in size and number. Mediastinum / Melania: Limited without IV contrast but no gross adenopathy.     Mediastinum, Great Vessels And Heart:  No mediastinal mass. Upper normal heart size. No pericardial effusion. Mild  aortic valve calcifications. No aortic aneurysm. Abdomen Structures Included:  No diagnostic abnormality. Osseous Structures:   Mild thoracic spondylosis. Impression IMPRESSION:     Resolution of 4 mm pulmonary nodule in the right middle lobe. Otherwise stable  few pulmonary nodules up to 4 cm in size, stable since 2013. ASSESSMENT and PLAN  Encounter Diagnoses   Name Primary?  Mild persistent asthma without complication Yes    Pulmonary nodules     Comment: stable since 2013    Seasonal allergic rhinitis, unspecified trigger     Lumbar facet arthropathy (HCC)        Continue rescue Albuterol , continue to hold Arnuity as pt doing well without this for over a year. Resume nasal steroids, Rx sent for Flonase and pt instructed on proper use. Educated on allergy trigger avoidance. No further indication for CT follow up. Continue rest of medications. RTC 12 months.

## 2018-06-04 NOTE — PROGRESS NOTES
Chief Complaint   Patient presents with    Asthma    Lung Nodule     1. Have you been to the ER, urgent care clinic since your last visit? Hospitalized since your last visit? No    2. Have you seen or consulted any other health care providers outside of the [address] [place] [address] since your last visit? Include any pap smears or colon screening.  No

## 2018-07-17 ENCOUNTER — TELEPHONE (OUTPATIENT)
Dept: FAMILY MEDICINE CLINIC | Age: 78
End: 2018-07-17

## 2018-07-20 ENCOUNTER — TELEPHONE (OUTPATIENT)
Dept: ORTHOPEDIC SURGERY | Age: 78
End: 2018-07-20

## 2018-07-20 DIAGNOSIS — M50.30 DDD (DEGENERATIVE DISC DISEASE), CERVICAL: ICD-10-CM

## 2018-07-20 DIAGNOSIS — M79.2 NEURITIS: ICD-10-CM

## 2018-07-20 DIAGNOSIS — M54.12 CERVICAL RADICULOPATHY: ICD-10-CM

## 2018-07-20 NOTE — TELEPHONE ENCOUNTER
Note      Called pt to inform her we will send her Gralise to her preferred pharmacy   Pharmacy: [id number]     And was informed that she does not have to f/u until 9/2018

## 2018-07-20 NOTE — TELEPHONE ENCOUNTER
Called pt to inform her we will send her Gralise to her preferred pharmacy   Pharmacy: [facility]    And was informed that she does not have to f/u until 9/2018

## 2018-07-20 NOTE — TELEPHONE ENCOUNTER
Pt is calling to speak with Dr. Terrence Marcial or her nurse. She wants to know if she needs to make a follow up appt to get her refill. Pt may be reached at [phone].

## 2018-08-07 RX ORDER — TRIAMTERENE/HYDROCHLOROTHIAZID 37.5-25 MG
TABLET ORAL
Qty: 30 TAB | Refills: 0 | Status: SHIPPED | OUTPATIENT
Start: 2018-08-07 | End: 2019-02-25 | Stop reason: SDUPTHER

## 2018-08-14 DIAGNOSIS — N95.2 ATROPHIC VAGINITIS: ICD-10-CM

## 2018-08-15 RX ORDER — ESTROGENS, CONJUGATED 0.3 MG/1
TABLET, FILM COATED ORAL
Qty: 90 TAB | Refills: 0 | Status: SHIPPED | OUTPATIENT
Start: 2018-08-15 | End: 2018-11-12 | Stop reason: SDUPTHER

## 2018-11-12 DIAGNOSIS — N95.2 ATROPHIC VAGINITIS: ICD-10-CM

## 2018-11-12 RX ORDER — ESTROGENS, CONJUGATED 0.3 MG/1
TABLET, FILM COATED ORAL
Qty: 90 TAB | Refills: 0 | Status: SHIPPED | OUTPATIENT
Start: 2018-11-12 | End: 2019-02-10 | Stop reason: SDUPTHER

## 2018-12-26 PROBLEM — M77.32 HEEL SPUR, LEFT: Status: ACTIVE | Noted: 2018-12-26

## 2018-12-26 PROBLEM — R87.612 LGSIL OF CERVIX OF UNDETERMINED SIGNIFICANCE: Status: ACTIVE | Noted: 2018-12-26

## 2019-01-01 NOTE — PATIENT INSTRUCTIONS

## 2019-02-10 DIAGNOSIS — N95.2 ATROPHIC VAGINITIS: ICD-10-CM

## 2019-02-10 RX ORDER — ESTROGENS, CONJUGATED 0.3 MG/1
TABLET, FILM COATED ORAL
Qty: 90 TAB | Refills: 0 | Status: SHIPPED | OUTPATIENT
Start: 2019-02-10 | End: 2019-07-10 | Stop reason: ALTCHOICE

## 2019-02-11 RX ORDER — FLUTICASONE PROPIONATE 50 MCG
SPRAY, SUSPENSION (ML) NASAL
Qty: 48 G | Refills: 2 | Status: ON HOLD | OUTPATIENT
Start: 2019-02-11 | End: 2019-11-11 | Stop reason: SDUPTHER

## 2019-02-12 ENCOUNTER — OFFICE VISIT (OUTPATIENT)
Dept: INTERNAL MEDICINE CLINIC | Age: 79
End: 2019-02-12

## 2019-02-12 ENCOUNTER — HOSPITAL ENCOUNTER (OUTPATIENT)
Dept: LAB | Age: 79
Discharge: HOME | End: 2019-02-12
Payer: MEDICARE

## 2019-02-12 VITALS
WEIGHT: 192.3 LBS | RESPIRATION RATE: 16 BRPM | HEART RATE: 65 BPM | OXYGEN SATURATION: 98 % | BODY MASS INDEX: 36.31 KG/M2 | TEMPERATURE: 98 F | SYSTOLIC BLOOD PRESSURE: 121 MMHG | DIASTOLIC BLOOD PRESSURE: 66 MMHG | HEIGHT: 61 IN

## 2019-02-12 DIAGNOSIS — G89.29 CHRONIC RIGHT-SIDED LOW BACK PAIN WITH RIGHT-SIDED SCIATICA: ICD-10-CM

## 2019-02-12 DIAGNOSIS — M51.26 HNP (HERNIATED NUCLEUS PULPOSUS), LUMBAR: ICD-10-CM

## 2019-02-12 DIAGNOSIS — Z00.00 WELLNESS EXAMINATION: Primary | ICD-10-CM

## 2019-02-12 DIAGNOSIS — D12.6 TUBULAR ADENOMA OF COLON: ICD-10-CM

## 2019-02-12 DIAGNOSIS — R87.612 LGSIL OF CERVIX OF UNDETERMINED SIGNIFICANCE: ICD-10-CM

## 2019-02-12 DIAGNOSIS — E55.9 VITAMIN D DEFICIENCY: ICD-10-CM

## 2019-02-12 DIAGNOSIS — M54.41 CHRONIC RIGHT-SIDED LOW BACK PAIN WITH RIGHT-SIDED SCIATICA: ICD-10-CM

## 2019-02-12 DIAGNOSIS — I10 ESSENTIAL HYPERTENSION: ICD-10-CM

## 2019-02-12 DIAGNOSIS — M85.80 OSTEOPENIA, UNSPECIFIED LOCATION: ICD-10-CM

## 2019-02-12 DIAGNOSIS — J30.2 SEASONAL ALLERGIC RHINITIS, UNSPECIFIED TRIGGER: ICD-10-CM

## 2019-02-12 DIAGNOSIS — J45.909 UNCOMPLICATED ASTHMA, UNSPECIFIED ASTHMA SEVERITY, UNSPECIFIED WHETHER PERSISTENT: ICD-10-CM

## 2019-02-12 DIAGNOSIS — M89.9 DISORDER OF BONE: ICD-10-CM

## 2019-02-12 DIAGNOSIS — M77.8 TENDONITIS OF ELBOW, LEFT: ICD-10-CM

## 2019-02-12 DIAGNOSIS — Z00.00 WELLNESS EXAMINATION: ICD-10-CM

## 2019-02-12 DIAGNOSIS — M54.12 CERVICAL RADICULOPATHY: ICD-10-CM

## 2019-02-12 PROBLEM — E66.01 SEVERE OBESITY (HCC): Status: ACTIVE | Noted: 2019-02-12

## 2019-02-12 LAB
APPEARANCE UR: CLEAR
BILIRUB UR QL: NEGATIVE
COLOR UR: YELLOW
GLUCOSE UR STRIP.AUTO-MCNC: NEGATIVE MG/DL
HGB UR QL STRIP: NEGATIVE
KETONES UR QL STRIP.AUTO: NEGATIVE MG/DL
LEUKOCYTE ESTERASE UR QL STRIP.AUTO: NEGATIVE
NITRITE UR QL STRIP.AUTO: NEGATIVE
PH UR STRIP: 6.5 [PH] (ref 5–8)
PROT UR STRIP-MCNC: NEGATIVE MG/DL
SP GR UR REFRACTOMETRY: 1.01 (ref 1–1.03)
UROBILINOGEN UR QL STRIP.AUTO: 0.2 EU/DL (ref 0.2–1)

## 2019-02-12 PROCEDURE — 81003 URINALYSIS AUTO W/O SCOPE: CPT

## 2019-02-12 RX ORDER — DICLOFENAC SODIUM 10 MG/G
2 GEL TOPICAL
Qty: 100 G | Refills: 0 | Status: SHIPPED | OUTPATIENT
Start: 2019-02-12 | End: 2019-11-01

## 2019-02-12 RX ORDER — MINERAL OIL
180 ENEMA (ML) RECTAL DAILY
COMMUNITY

## 2019-02-12 NOTE — PATIENT INSTRUCTIONS

## 2019-02-12 NOTE — PROGRESS NOTES
HPI     Leta Harada is a 70 y.o. female with relevant past medical history of HTN, asthma, allergic rhinitis, chronic back pain, cervical spinal stenosis, lumbar herniated disc, stable lung nodules (following with pulm, Dr. Rakesh Merchant), history of abnormal paps, here to establish care. Patient reports left elbow pain and occasional locking sensation with movement, onset a few weeks ago, pain onset with movement, seems to start at neck/shoulder area, radiated to elbow laterally and all the way to her wrist. Tender tendon to touch. Denies any recent trauma. Denies any recent cardiopulmonary symptoms. Former smoker, quit many years ago. H/o abnormal pap, last done 2017 approx. On premarin for dry vaginal mucosa and dyspareunia, symptoms still present on and off. Allergic rhinitis and asthma, per patient well controlled. Denies any active cough, SOB, wheezing, CP, dizziness, HA, malaise. ROS  As above included in HPI. Otherwise 11 point review of systems negative including constitutional, skin, HENT, eyes, respiratory, cardiovascular, gastrointestinal, genitourinary, musculoskeletal, endocrine, hematologic, allergy, and neurologic.     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    Band keratopathy of right eye 2/3/2018    Cataract     Cervical pain     Cylindrical bronchiectasis (HCC)     Fracture     rt ankle as an adult    History of pneumonia     Hypertension     Ill-defined condition     Spasms to left side    Left arm pain     Lumbar spinal stenosis     Myofascial pain     Osteopenia     Right buttock pain     Right low back pain     Sciatica of right side      Past Surgical History:   Procedure Laterality Date    HX CATARACT REMOVAL  8/15/2011    HX HEENT      sinus surgery    HX KERATOPLASTY Left 10/02/2017    HX ORTHOPAEDIC      left 4th finger trigger release    HX SHIELA AND BSO          Family History  Family History   Problem Relation Age of Onset   Samantha Muñoz Asthma Mother    Samantha Muñoz COPD Mother    Samantha Muñoz MS Mother     Heart Disease Mother     Hypertension Mother     Stroke Father     Heart Disease Father     Hypertension Father     Allergic Rhinitis Sister     Asthma Brother     Hypertension Brother     Diabetes Brother     Migraines Paternal Grandmother        Social History  She  reports that she quit smoking about 49 years ago. Her smoking use included cigarettes. She quit after 5.00 years of use. she has never used smokeless tobacco.   Social History     Substance and Sexual Activity   Alcohol Use Yes    Alcohol/week: 0.6 oz    Types: 1 Glasses of wine per week    Comment: rare       Immunization History  Immunization History   Administered Date(s) Administered    Influenza High Dose Vaccine PF 09/22/2016, 09/25/2017    Influenza Vaccine 10/08/2013, 09/26/2014, 10/20/2015    Influenza Vaccine Split 10/06/2011    Pneumococcal Conjugate (PCV-13) 05/11/2015    Pneumococcal Polysaccharide (PPSV-23) 06/14/2016       Allergies  Allergies   Allergen Reactions    Naprosyn [Naproxen] Other (comments)     Blood in urine         Medications  Current Outpatient Medications   Medication Sig    fexofenadine (ALLEGRA) 180 mg tablet Take  by mouth.  diclofenac (VOLTAREN) 1 % gel Apply 2 g to affected area three (3) times daily as needed.  fluticasone (FLONASE) 50 mcg/actuation nasal spray USE 2 SPRAYS IN EACH NOSTRIL DAILY    PREMARIN 0.3 mg tablet TAKE 1 TABLET DAILY    triamterene-hydroCHLOROthiazide (MAXZIDE) 37.5-25 mg per tablet TAKE ONE-HALF (1/2) TABLET DAILY    gabapentin (GRALISE) 600 mg Tb24 Take 1 tab PO BID (Patient taking differently: Take 1,000 mg by mouth daily.  Take 1 tab PO BID)    PROAIR HFA 90 mcg/actuation inhaler USE 2 INHALATIONS EVERY 4 HOURS AS NEEDED    EPINEPHrine (EPIPEN) 0.3 mg/0.3 mL injection 0.3 mL by IntraMUSCular route once as needed for up to 1 dose.  melatonin 3 mg tablet Take  by mouth.  omega-3 fatty acids-vitamin e 1,000 mg cap Take 1 Cap by mouth daily.  Cholecalciferol, Vitamin D3, (VITAMIN D3) 1,000 unit cap Take 3,000 Units by mouth daily.  cycloSPORINE (RESTASIS) 0.05 % ophthalmic emulsion Administer 1 Drop to both eyes two (2) times a day.  MULTIVITAMIN W-MINERALS/LUTEIN (CENTRUM SILVER PO) Take 1 Tab by mouth daily.  co-enzyme Q-10 (CO Q-10) 100 mg capsule Take 100 mg by mouth daily.  BIOTIN PO Take 5,000 mcg by mouth daily. No current facility-administered medications for this visit. Visit Vitals  /66 (BP 1 Location: Left arm, BP Patient Position: Sitting)   Pulse 65   Temp 98 °F (36.7 °C) (Oral)   Resp 16   Ht 5' 1\" (1.549 m)   Wt 192 lb 4.8 oz (87.2 kg)   SpO2 98%   BMI 36.33 kg/m²     Body mass index is 36.33 kg/m². Physical Exam   Constitutional: She is oriented to person, place, and time and well-developed, well-nourished, and in no distress. HENT:   Head: Normocephalic and atraumatic. Right Ear: External ear normal.   Left Ear: External ear normal.   Nose: Nose normal.   Mouth/Throat: Oropharynx is clear and moist.   Eyes: EOM are normal. Pupils are equal, round, and reactive to light. Neck: Normal range of motion. Neck supple. Cardiovascular: Normal rate, regular rhythm, normal heart sounds and intact distal pulses. Pulmonary/Chest: Effort normal and breath sounds normal. No respiratory distress. Abdominal: Soft. Bowel sounds are normal.   Musculoskeletal: Normal range of motion. She exhibits no edema. Neurological: She is alert and oriented to person, place, and time. Skin: Skin is warm. No rash noted. No erythema. No pallor. Psychiatric: Mood and affect normal.   Nursing note and vitals reviewed. REVIEW OF DATA    Labs  No visits with results within 1 Month(s) from this visit.    Latest known visit with results is: Office Visit on 02/09/2018   Component Date Value Ref Range Status    Color (UA POC) 02/09/2018 Yellow   Final    Clarity (UA POC) 02/09/2018 Clear   Final    Glucose (UA POC) 02/09/2018 Negative  Negative Final    Bilirubin (UA POC) 02/09/2018 Negative  Negative Final    Ketones (UA POC) 02/09/2018 Negative  Negative Final    Specific gravity (UA POC) 02/09/2018 1.020  1.001 - 1.035 Final    Blood (UA POC) 02/09/2018 Negative  Negative Final    pH (UA POC) 02/09/2018 6.0  4.6 - 8.0 Final    Protein (UA POC) 02/09/2018 Negative  Negative Final    Urobilinogen (UA POC) 02/09/2018 0.2 mg/dL  0.2 - 1 Final    Nitrites (UA POC) 02/09/2018 Negative  Negative Final    Leukocyte esterase (UA POC) 02/09/2018 Negative  Negative Final         CT Results (most recent):  Results from Hospital Encounter encounter on 03/07/17   CT CHEST WO CONT    Narrative CT CHEST WITHOUT IV CONTRAST    COMPARISON: CT chest 8/8/2016, 8/25/2015 and 3/1/2013. INDICATIONS: Pulmonary nodules. TECHNIQUE: Volumetric data acquisition was performed through the chest with a  multislice scanner. Reconstructions were created in the axial, coronal, and  sagittal planes. All CT scans at this facility are performed using dose optimization technique as  appropriate to the performed exam, to include automated exposure control,  adjustment of the mA and/or kV according to patient's size (Including  appropriate matching for site-specific examinations), or use of iterative  reconstruction technique. FINDINGS:     Thyroid/Base Of Neck: The thyroid is unremarkable as visualized. Lungs:   Trachea and central bronchi are patent. No mass lesions are seen. .  Stable 4 mm 2 pleural-based pulmonary nodules lateral right middle lobe (series  4 image 31 and 32). Resolution of additional 4 mm pulmonary nodule in the right middle lobe.   Perhaps 2 mm pulmonary nodule in the right minor fissure, stable (series 4 image  25)  Less conspicuous posterior right upper lobe 3 mm pulmonary nodule (series 4  image 15)  No new suspicious pulmonary nodule. Mild dependent atelectasis. Pleural Spaces: There is no pneumothorax or pleural fluid evident. No pleural plaques are seen    Lymph Nodes:   Axillae: Normal in size and number. Mediastinum / Melania: Limited without IV contrast but no gross adenopathy. Mediastinum, Great Vessels And Heart:  No mediastinal mass. Upper normal heart size. No pericardial effusion. Mild  aortic valve calcifications. No aortic aneurysm. Abdomen Structures Included:  No diagnostic abnormality. Osseous Structures:   Mild thoracic spondylosis. Impression IMPRESSION:     Resolution of 4 mm pulmonary nodule in the right middle lobe. Otherwise stable  few pulmonary nodules up to 4 cm in size, stable since 2013. XR Results (most recent):  Results from Hospital Encounter encounter on 10/15/16   XR SPINE CERV 4 OR 5 V    Narrative Cervical spine - 5 view(s)    History: Neck pain and tingling. Comparison: Cervical spine radiographs 3/19/2014 and MR cervical spine  4/10/2014. Findings: The lateral view demonstrates from C2 to C7. Trace anterolisthesis C6 on C7  stable from 2014. The vertebra are otherwise normal in height and alignment. Decreased disc height C5-C6. The disc spaces are otherwise preserved. The neural  foramina are patent. The prevertebral soft tissues are normal.  There is no  evidence of fracture or dislocation. Prominent osteophyte anterior inferior C5. Impression Impression:    No acute fracture or malalignment.     Thank you for this referral.        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 Wellness examination  Physical exam done, screening labs as ordered  - LIPID PANEL; Future  - CBC WITH AUTOMATED DIFF; Future  - METABOLIC PANEL, COMPREHENSIVE; Future  - URINALYSIS W/ RFLX MICROSCOPIC; Future    2. Osteopenia, unspecified location  - DEXA BONE DENSITY STUDY AXIAL; Future       3. Tendonitis of elbow, left  Unable to take oral NSAIDs. Okay to use topical with close monitoring. Patient declined muscle relaxant.  - diclofenac (VOLTAREN) 1 % gel; Apply 2 g to affected area three (3) times daily as needed. Dispense: 100 g; Refill: 0    4. Essential hypertension  Controlled, c/w Triamterene/HCTZ    6. Uncomplicated asthma, unspecified asthma severity, unspecified whether persistent  Stable. No recent use of rescue inhaler    7. Seasonal allergic rhinitis, unspecified trigger  Controlled with allegra and fluticasone nasal spray PRN    8. Tubular adenoma of colon  Last colonoscopy 2015    8. Chronic right-sided low back pain with right-sided sciatica  10. HNP (herniated nucleus pulposus), lumbar  11. Cervical radiculopathy  Following with spine ortho, Dr. Ca Hayes. Taking gabapentin with adequate pain relief reported    12. LGSIL of cervix of undetermined significance  - REFERRAL TO OBSTETRICS AND GYNECOLOGY for follow up and pap.      13. Vitamin D deficiency  - VITAMIN D, 1, 25 DIHYDROXY; Future        Follow-up Disposition:  Return in about 6 months (around 8/12/2019). Eulalia Denton MD

## 2019-02-12 NOTE — PROGRESS NOTES
1. Have you been to the ER, urgent care clinic or hospitalized since your last visit? NO.     2. Have you seen or consulted any other health care providers outside of the [address] [place] [address] since your last visit (Include any pap smears or colon screening)? YES   Dr Ruby Quiroga, Dr Ame George    Do you have an Advanced Directive? NO    Would you like information on Advanced Directives?  YES A-T Advancement Flap Text: The defect edges were debeveled with a #15 scalpel blade.  Given the location of the defect, shape of the defect and the proximity to free margins an A-T advancement flap was deemed most appropriate.  Using a sterile surgical marker, an appropriate advancement flap was drawn incorporating the defect and placing the expected incisions within the relaxed skin tension lines where possible.    The area thus outlined was incised deep to adipose tissue with a #15 scalpel blade.  The skin margins were undermined to an appropriate distance in all directions utilizing iris scissors.

## 2019-02-19 ENCOUNTER — APPOINTMENT (OUTPATIENT)
Dept: INTERNAL MEDICINE CLINIC | Age: 79
End: 2019-02-19

## 2019-02-19 ENCOUNTER — HOSPITAL ENCOUNTER (OUTPATIENT)
Dept: LAB | Age: 79
Discharge: HOME | End: 2019-02-19
Payer: MEDICARE

## 2019-02-19 DIAGNOSIS — Z00.00 WELLNESS EXAMINATION: ICD-10-CM

## 2019-02-19 DIAGNOSIS — E55.9 VITAMIN D DEFICIENCY: ICD-10-CM

## 2019-02-19 LAB
ALBUMIN SERPL-MCNC: 3.9 G/DL (ref 3.4–5)
ALBUMIN/GLOB SERPL: 1.1 {RATIO} (ref 0.8–1.7)
ALP SERPL-CCNC: 73 U/L (ref 45–117)
ALT SERPL-CCNC: 30 U/L (ref 13–56)
ANION GAP SERPL CALC-SCNC: 7 MMOL/L (ref 3–18)
AST SERPL-CCNC: 21 U/L (ref 15–37)
BASOPHILS # BLD: 0 K/UL (ref 0–0.1)
BASOPHILS NFR BLD: 0 % (ref 0–2)
BILIRUB SERPL-MCNC: 0.5 MG/DL (ref 0.2–1)
BUN SERPL-MCNC: 12 MG/DL (ref 7–18)
BUN/CREAT SERPL: 15 (ref 12–20)
CALCIUM SERPL-MCNC: 9.1 MG/DL (ref 8.5–10.1)
CHLORIDE SERPL-SCNC: 105 MMOL/L (ref 100–108)
CHOLEST SERPL-MCNC: 171 MG/DL
CO2 SERPL-SCNC: 30 MMOL/L (ref 21–32)
CREAT SERPL-MCNC: 0.78 MG/DL (ref 0.6–1.3)
DIFFERENTIAL METHOD BLD: ABNORMAL
EOSINOPHIL # BLD: 0 K/UL (ref 0–0.4)
EOSINOPHIL NFR BLD: 0 % (ref 0–5)
ERYTHROCYTE [DISTWIDTH] IN BLOOD BY AUTOMATED COUNT: 15.3 % (ref 11.6–14.5)
GLOBULIN SER CALC-MCNC: 3.7 G/DL (ref 2–4)
GLUCOSE SERPL-MCNC: 96 MG/DL (ref 74–99)
HCT VFR BLD AUTO: 45.5 % (ref 35–45)
HDLC SERPL-MCNC: 70 MG/DL (ref 40–60)
HDLC SERPL: 2.4 {RATIO} (ref 0–5)
HGB BLD-MCNC: 14.6 G/DL (ref 12–16)
LDLC SERPL CALC-MCNC: 80.6 MG/DL (ref 0–100)
LIPID PROFILE,FLP: ABNORMAL
LYMPHOCYTES # BLD: 1.8 K/UL (ref 0.9–3.6)
LYMPHOCYTES NFR BLD: 38 % (ref 21–52)
MCH RBC QN AUTO: 28.9 PG (ref 24–34)
MCHC RBC AUTO-ENTMCNC: 32.1 G/DL (ref 31–37)
MCV RBC AUTO: 90.1 FL (ref 74–97)
MONOCYTES # BLD: 0.3 K/UL (ref 0.05–1.2)
MONOCYTES NFR BLD: 6 % (ref 3–10)
NEUTS SEG # BLD: 2.7 K/UL (ref 1.8–8)
NEUTS SEG NFR BLD: 56 % (ref 40–73)
PLATELET # BLD AUTO: 352 K/UL (ref 135–420)
PMV BLD AUTO: 10.6 FL (ref 9.2–11.8)
POTASSIUM SERPL-SCNC: 4.1 MMOL/L (ref 3.5–5.5)
PROT SERPL-MCNC: 7.6 G/DL (ref 6.4–8.2)
RBC # BLD AUTO: 5.05 M/UL (ref 4.2–5.3)
SODIUM SERPL-SCNC: 142 MMOL/L (ref 136–145)
TRIGL SERPL-MCNC: 102 MG/DL (ref ?–150)
VLDLC SERPL CALC-MCNC: 20.4 MG/DL
WBC # BLD AUTO: 4.8 K/UL (ref 4.6–13.2)

## 2019-02-19 PROCEDURE — 80061 LIPID PANEL: CPT

## 2019-02-19 PROCEDURE — 80053 COMPREHEN METABOLIC PANEL: CPT

## 2019-02-19 PROCEDURE — 36415 COLL VENOUS BLD VENIPUNCTURE: CPT

## 2019-02-19 PROCEDURE — 82652 VIT D 1 25-DIHYDROXY: CPT

## 2019-02-19 PROCEDURE — 85025 COMPLETE CBC W/AUTO DIFF WBC: CPT

## 2019-02-21 LAB — 1,25(OH)2D3 SERPL-MCNC: 42.2 PG/ML (ref 19.9–79.3)

## 2019-02-25 DIAGNOSIS — M54.12 CERVICAL RADICULOPATHY: ICD-10-CM

## 2019-02-25 DIAGNOSIS — M79.2 NEURITIS: ICD-10-CM

## 2019-02-25 DIAGNOSIS — M50.30 DDD (DEGENERATIVE DISC DISEASE), CERVICAL: ICD-10-CM

## 2019-02-25 RX ORDER — TRIAMTERENE/HYDROCHLOROTHIAZID 37.5-25 MG
TABLET ORAL
Qty: 90 TAB | Refills: 2 | Status: SHIPPED | OUTPATIENT
Start: 2019-02-25 | End: 2020-08-03 | Stop reason: SDUPTHER

## 2019-03-07 ENCOUNTER — HOSPITAL ENCOUNTER (OUTPATIENT)
Dept: BONE DENSITY | Age: 79
Discharge: HOME | End: 2019-03-07
Attending: INTERNAL MEDICINE
Payer: MEDICARE

## 2019-03-07 DIAGNOSIS — M85.80 OSTEOPENIA, UNSPECIFIED LOCATION: ICD-10-CM

## 2019-03-07 DIAGNOSIS — M89.9 DISORDER OF BONE: ICD-10-CM

## 2019-03-07 PROCEDURE — 77080 DXA BONE DENSITY AXIAL: CPT

## 2019-04-24 ENCOUNTER — HOSPITAL ENCOUNTER (OUTPATIENT)
Dept: LAB | Age: 79
Discharge: HOME | End: 2019-04-24
Payer: MEDICARE

## 2019-04-24 ENCOUNTER — OFFICE VISIT (OUTPATIENT)
Dept: OBGYN CLINIC | Age: 79
End: 2019-04-24

## 2019-04-24 VITALS
BODY MASS INDEX: 31.83 KG/M2 | HEIGHT: 61 IN | RESPIRATION RATE: 18 BRPM | WEIGHT: 168.6 LBS | TEMPERATURE: 96.5 F | OXYGEN SATURATION: 98 % | DIASTOLIC BLOOD PRESSURE: 76 MMHG | HEART RATE: 61 BPM | SYSTOLIC BLOOD PRESSURE: 135 MMHG

## 2019-04-24 DIAGNOSIS — Z12.72 SCREENING FOR VAGINAL CANCER: ICD-10-CM

## 2019-04-24 DIAGNOSIS — Z12.31 BREAST CANCER SCREENING BY MAMMOGRAM: ICD-10-CM

## 2019-04-24 DIAGNOSIS — Z01.419 ENCOUNTER FOR WELL WOMAN EXAM WITH ROUTINE GYNECOLOGICAL EXAM: Primary | ICD-10-CM

## 2019-04-24 PROCEDURE — 87625 HPV TYPES 16 & 18 ONLY: CPT

## 2019-04-24 PROCEDURE — 88175 CYTOPATH C/V AUTO FLUID REDO: CPT

## 2019-04-24 PROCEDURE — 87624 HPV HI-RISK TYP POOLED RSLT: CPT

## 2019-04-24 NOTE — PROGRESS NOTES
Robert Robles presents today for   Chief Complaint   Patient presents with    Well Woman       Is someone accompanying this pt? No  Patient does not have any concerns at this time. Patient is not on birth control at this time . Is the patient using any DME equipment during OV? No    Depression Screening:  3 most recent PHQ Screens 2/12/2019   Little interest or pleasure in doing things Not at all   Feeling down, depressed, irritable, or hopeless Not at all   Total Score PHQ 2 0       Learning Assessment:  Learning Assessment 3/19/2014   PRIMARY LEARNER Patient   HIGHEST LEVEL OF EDUCATION - PRIMARY LEARNER  4 YEARS OF COLLEGE   BARRIERS PRIMARY LEARNER NONE   CO-LEARNER CAREGIVER No   PRIMARY LANGUAGE ENGLISH   LEARNER PREFERENCE PRIMARY LISTENING   ANSWERED BY patient   RELATIONSHIP SELF       Abuse Screening:  Abuse Screening Questionnaire 4/2/2014   Do you ever feel afraid of your partner? N   Are you in a relationship with someone who physically or mentally threatens you? N   Is it safe for you to go home? Y       Fall Risk  Fall Risk Assessment, last 12 mths 2/12/2019   Able to walk? Yes   Fall in past 12 months?  No       This Visit Test  Results for orders placed or performed during the hospital encounter of 02/19/19   LIPID PANEL   Result Value Ref Range    LIPID PROFILE          Cholesterol, total 171 <200 MG/DL    Triglyceride 102 <150 MG/DL    HDL Cholesterol 70 (H) 40 - 60 MG/DL    LDL, calculated 80.6 0 - 100 MG/DL    VLDL, calculated 20.4 MG/DL    CHOL/HDL Ratio 2.4 0 - 5.0     CBC WITH AUTOMATED DIFF   Result Value Ref Range    WBC 4.8 4.6 - 13.2 K/uL    RBC 5.05 4.20 - 5.30 M/uL    HGB 14.6 12.0 - 16.0 g/dL    HCT 45.5 (H) 35.0 - 45.0 %    MCV 90.1 74.0 - 97.0 FL    MCH 28.9 24.0 - 34.0 PG    MCHC 32.1 31.0 - 37.0 g/dL    RDW 15.3 (H) 11.6 - 14.5 %    PLATELET 965 673 - 712 K/uL    MPV 10.6 9.2 - 11.8 FL    NEUTROPHILS 56 40 - 73 %    LYMPHOCYTES 38 21 - 52 %    MONOCYTES 6 3 - 10 % EOSINOPHILS 0 0 - 5 %    BASOPHILS 0 0 - 2 %    ABS. NEUTROPHILS 2.7 1.8 - 8.0 K/UL    ABS. LYMPHOCYTES 1.8 0.9 - 3.6 K/UL    ABS. MONOCYTES 0.3 0.05 - 1.2 K/UL    ABS. EOSINOPHILS 0.0 0.0 - 0.4 K/UL    ABS. BASOPHILS 0.0 0.0 - 0.1 K/UL    DF AUTOMATED     METABOLIC PANEL, COMPREHENSIVE   Result Value Ref Range    Sodium 142 136 - 145 mmol/L    Potassium 4.1 3.5 - 5.5 mmol/L    Chloride 105 100 - 108 mmol/L    CO2 30 21 - 32 mmol/L    Anion gap 7 3.0 - 18 mmol/L    Glucose 96 74 - 99 mg/dL    BUN 12 7.0 - 18 MG/DL    Creatinine 0.78 0.6 - 1.3 MG/DL    BUN/Creatinine ratio 15 12 - 20      GFR est AA >60 >60 ml/min/1.73m2    GFR est non-AA >60 >60 ml/min/1.73m2    Calcium 9.1 8.5 - 10.1 MG/DL    Bilirubin, total 0.5 0.2 - 1.0 MG/DL    ALT (SGPT) 30 13 - 56 U/L    AST (SGOT) 21 15 - 37 U/L    Alk. phosphatase 73 45 - 117 U/L    Protein, total 7.6 6.4 - 8.2 g/dL    Albumin 3.9 3.4 - 5.0 g/dL    Globulin 3.7 2.0 - 4.0 g/dL    A-G Ratio 1.1 0.8 - 1.7     VITAMIN D, 1, 25 DIHYDROXY   Result Value Ref Range    Calcitriol (Vit D 1, 25 di-OH) 42.2 19.9 - 79.3 pg/mL       Health Maintenance reviewed and discussed and ordered per Provider. Health Maintenance Due   Topic Date Due    BREAST CANCER SCRN MAMMOGRAM  05/18/2019   . Coordination of Care:  1. Have you been to the ER, urgent care clinic since your last visit? Hospitalized since your last visit? No    2. Have you seen or consulted any other health care providers outside of the [address] [place] [address] since your last visit? Include any pap smears or colon screening. No    Pt in today for well woman,Doing well, has not had a pap since 2013 and it was normal. Pt voiced no complaints at this time.

## 2019-04-24 NOTE — PATIENT INSTRUCTIONS
Pelvic Exam: Care Instructions  Your Care Instructions    When your doctor examines all of your pelvic organs, it's called a pelvic exam. Two good reasons to have this kind of exam are to check for sexually transmitted infections (STIs) and to get a Pap test. A Pap test is also called a Pap smear. It checks for early changes that can lead to cancer of the cervix. Sometimes a pelvic exam is part of a regular checkup. In this case, you can do some things to make your test results as accurate as possible. · Try to schedule the exam when you don't have your period. · Don't use douches, tampons, or vaginal medicines, sprays, or powders for 24 hours before your exam.  · Don't have sex for 24 hours before your exam.  Other times, women have this kind of exam at any time of the month. This is because they have pelvic pain, bleeding, or discharge. Or they may have another pelvic problem. Before your exam, it's important to share some information with your doctor. For example, if you are a survivor of rape or sexual abuse, you can talk about any concerns you may have. Your doctor will also want to know if you are pregnant or use birth control. And he or she will want to hear about any problems, surgeries, or procedures you have had in your pelvic area. You will also need to tell your doctor when your last period was. Follow-up care is a key part of your treatment and safety. Be sure to make and go to all appointments, and call your doctor if you are having problems. It's also a good idea to know your test results and keep a list of the medicines you take. How is a pelvic exam done? · During a pelvic exam, you will:  ? Take off your clothes below the waist. You will get a paper or cloth cover to put over the lower half of your body. ? Lie on your back on an exam table. Your feet will be raised above you. Stirrups will support your feet. · The doctor will:  ? Ask you to relax your knees.  Your knees need to lean out, toward the walls. ? Check the opening of your vagina for sores or swelling. ? Gently put a tool called a speculum into your vagina. It opens the vagina a little bit. You will feel some pressure. But if you are relaxed, it will not hurt. It lets your doctor see inside the vagina. ? Use a small brush, spatula, or swab to get a sample of cells, if you are having a Pap test or culture. The doctor then removes the speculum. ? Put on gloves and put one or two fingers of one hand into your vagina. The other hand goes on your lower belly. This lets your doctor feel your pelvic organs. You will probably feel some pressure. Try to stay relaxed. ? Put one gloved finger into your rectum and one into your vagina, if needed. This can also help check your pelvic organs. This exam takes about 10 minutes. At the end, you will get a washcloth or tissue to clean your vaginal area. It's normal to have some discharge after this exam. You can then get dressed. Some test results may be ready right away. But results from a culture or a Pap test may take several days or a few weeks. Why should you have a pelvic exam?  · You want to have recommended screening tests. This includes a Pap test.  · You think you have a vaginal infection. Signs include itching, burning, or unusual discharge. · You might have been exposed to a sexually transmitted infection (STI), such as chlamydia or herpes. · You have vaginal bleeding that is not part of your normal menstrual period. · You have pain in your belly or pelvis. · You have been sexually assaulted. A pelvic exam lets your doctor collect evidence and check for STIs. · You are pregnant. · You are having trouble getting pregnant. What are the risks of a pelvic exam?  There are no risks from a pelvic exam.  When should you call for help? Watch closely for changes in your health, and be sure to contact your doctor if you have any problems. Where can you learn more?   Go to http://keke-josé luis.info/. Enter L351 in the search box to learn more about \"Pelvic Exam: Care Instructions. \"  Current as of: May 14, 2018  Content Version: 11.9  © 8170-2903 [employer]. Care instructions adapted under license by [employer] (which disclaims liability or warranty for this information). If you have questions about a medical condition or this instruction, always ask your healthcare professional. [place] [address] any warranty or liability for your use of this information. Well Visit, Over 72: Care Instructions  Your Care Instructions    Physical exams can help you stay healthy. Your doctor has checked your overall health and may have suggested ways to take good care of yourself. He or she also may have recommended tests. At home, you can help prevent illness with healthy eating, regular exercise, and other steps. Follow-up care is a key part of your treatment and safety. Be sure to make and go to all appointments, and call your doctor if you are having problems. It's also a good idea to know your test results and keep a list of the medicines you take. How can you care for yourself at home? · Reach and stay at a healthy weight. This will lower your risk for many problems, such as obesity, diabetes, heart disease, and high blood pressure. · Get at least 30 minutes of exercise on most days of the week. Walking is a good choice. You also may want to do other activities, such as running, swimming, cycling, or playing tennis or team sports. · Do not smoke. Smoking can make health problems worse. If you need help quitting, talk to your doctor about stop-smoking programs and medicines. These can increase your chances of quitting for good. · Protect your skin from too much sun. When you're outdoors from 10 a.m. to 4 p.m., stay in the shade or cover up with clothing and a hat with a wide brim. Wear sunglasses that block UV rays.  Even when it's cloudy, put broad-spectrum sunscreen (SPF 30 or higher) on any exposed skin. · See a dentist one or two times a year for checkups and to have your teeth cleaned. · Wear a seat belt in the car. · Limit alcohol to 2 drinks a day for men and 1 drink a day for women. Too much alcohol can cause health problems. Follow your doctor's advice about when to have certain tests. These tests can spot problems early. For men and women  · Cholesterol. Your doctor will tell you how often to have this done based on your overall health and other things that can increase your risk for heart attack and stroke. · Blood pressure. Have your blood pressure checked during a routine doctor visit. Your doctor will tell you how often to check your blood pressure based on your age, your blood pressure results, and other factors. · Diabetes. Ask your doctor whether you should have tests for diabetes. · Vision. Experts recommend that you have yearly exams for glaucoma and other age-related eye problems. · Hearing. Tell your doctor if you notice any change in your hearing. You can have tests to find out how well you hear. · Colon cancer tests. Keep having colon cancer tests as your doctor recommends. You can have one of several types of tests. · Heart attack and stroke risk. At least every 4 to 6 years, you should have your risk for heart attack and stroke assessed. Your doctor uses factors such as your age, blood pressure, cholesterol, and whether you smoke or have diabetes to show what your risk for a heart attack or stroke is over the next 10 years. · Osteoporosis. Talk to your doctor about whether you should have a bone density test to find out whether you have thinning bones. Also ask your doctor about whether you should take calcium and vitamin D supplements. For women  · Pap test and pelvic exam. You may no longer need a Pap test. Talk with your doctor about whether to stop or continue to have Pap tests.   · Breast exam and mammogram. Ask how often you should have a mammogram, which is an X-ray of your breasts. A mammogram can spot breast cancer before it can be felt and when it is easiest to treat. · Thyroid disease. Talk to your doctor about whether to have your thyroid checked as part of a regular physical exam. Women have an increased chance of a thyroid problem. For men  · Prostate exam. Talk to your doctor about whether you should have a blood test (called a PSA test) for prostate cancer. Experts disagree on whether men should have this test. Some experts recommend that you discuss the benefits and risks of the test with your doctor. · Abdominal aortic aneurysm. Ask your doctor whether you should have a test to check for an aneurysm. You may need a test if you ever smoked or if your parent, brother, sister, or child has had an aneurysm. When should you call for help? Watch closely for changes in your health, and be sure to contact your doctor if you have any problems or symptoms that concern you. Where can you learn more? Go to http://keke-josé luis.info/. Enter P292 in the search box to learn more about \"Well Visit, Over 65: Care Instructions. \"  Current as of: March 28, 2018  Content Version: 11.9  © 6231-8453 [employer]. Care instructions adapted under license by [employer] (which disclaims liability or warranty for this information). If you have questions about a medical condition or this instruction, always ask your healthcare professional. [place] [address] any warranty or liability for your use of this information. Atrophic Vaginitis: Care Instructions  Your Care Instructions    Atrophic vaginitis is an irritation of the vagina. It's caused by thinning tissues and less moisture in the vaginal walls. It often happens during menopause when hormone levels change. Surgery to remove the ovaries also can cause it.  Your doctor may do tests to rule out other causes. And you may get tests to measure your hormone levels. The problem is most often treated with the hormone estrogen. It comes in a cream, tablets, or a soft plastic ring that is placed in the vagina. Follow-up care is a key part of your treatment and safety. Be sure to make and go to all appointments, and call your doctor if you are having problems. It's also a good idea to know your test results and keep a list of the medicines you take. How can you care for yourself at home? · Use a water-based lubricant for your vagina if sex is dry or painful. Examples are Astroglide, Wet Lubricant Gel, and K-Y Jelly. · Talk with your doctor about using low-dose vaginal estrogen. It treats dryness and thinning tissue. · Do not douche. · Having sex improves blood flow to the vagina. This helps keep your tissue healthy. When should you call for help? Watch closely for changes in your health, and be sure to contact your doctor if:    · You have unexpected vaginal bleeding.     · You do not get better as expected. Where can you learn more? Go to http://keke-josé luis.info/. Enter R330 in the search box to learn more about \"Atrophic Vaginitis: Care Instructions. \"  Current as of: May 14, 2018  Content Version: 11.9  © 0671-8598 [employer]. Care instructions adapted under license by [employer] (which disclaims liability or warranty for this information). If you have questions about a medical condition or this instruction, always ask your healthcare professional. [place] [address] any warranty or liability for your use of this information.

## 2019-04-24 NOTE — PROGRESS NOTES
Name: Ilene Holland MRN: [id number] No obstetric history on file. YOB: 1940  Age: 70 y.o. Sex: female        Chief Complaint   Patient presents with    Well Woman       HPI 70 P2 s/p SHIELA, BSO in the  for symptomatic fibroid uterus presents for her well woman exam. She has no complaints at this time.    Denies depression  Does not eat a well balanced diet  Does not exercise  Denies domestic abuse  Last tdap unsure  Mammogram 2018--Birads2  Colonoscopy 2015 s/p polypectomy, needs repeat in 5yrs  Shingles vaccine 2017  Pneumonia vaccine 2016  Flu vaccine 2018  DEXA 3/2019 by PCP    OB History        3    Para   2    Term   2            AB   1    Living   2       SAB   0    TAB        Ectopic   1    Molar        Multiple        Live Births   2          Obstetric Comments   [email or web address]  H/o STI: none  Last Pap:               Social History     Substance and Sexual Activity   Sexual Activity Yes    Partners: Male    Birth control/protection: None       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    Band keratopathy of right eye 2/3/2018    Cataract     Cylindrical bronchiectasis ([facility] [address]     Fracture     rt ankle as an adult    History of pneumonia     Hypertension     Ill-defined condition     Spasms to left side    Left arm pain     Lumbar spinal stenosis     Myofascial pain     Osteopenia     Sciatica of right side        Past Surgical History:   Procedure Laterality Date    HX CATARACT REMOVAL  8/15/2011    HX HEENT      sinus surgery    HX KERATOPLASTY Left 10/02/2017    HX ORTHOPAEDIC      left 4th finger trigger release    HX SHIELA AND BSO      uterine fibroids       Allergies   Allergen Reactions    Naprosyn [Naproxen] Other (comments)     Blood in urine         Current Outpatient Medications on File Prior to Visit   Medication Sig Dispense Refill    gabapentin (GRALISE) 600 mg Tb24 TAKE 1 TABLET TWICE A  Tab 0    triamterene-hydroCHLOROthiazide (MAXZIDE) 37.5-25 mg per tablet TAKE ONE-HALF (1/2) TABLET DAILY 90 Tab 2    fexofenadine (ALLEGRA) 180 mg tablet Take  by mouth.  diclofenac (VOLTAREN) 1 % gel Apply 2 g to affected area three (3) times daily as needed. 100 g 0    fluticasone (FLONASE) 50 mcg/actuation nasal spray USE 2 SPRAYS IN EACH NOSTRIL DAILY 48 g 2    PREMARIN 0.3 mg tablet TAKE 1 TABLET DAILY 90 Tab 0    PROAIR HFA 90 mcg/actuation inhaler USE 2 INHALATIONS EVERY 4 HOURS AS NEEDED 3 Inhaler 3    EPINEPHrine (EPIPEN) 0.3 mg/0.3 mL injection 0.3 mL by IntraMUSCular route once as needed for up to 1 dose. 1 Syringe 1    melatonin 3 mg tablet Take  by mouth.  omega-3 fatty acids-vitamin e 1,000 mg cap Take 1 Cap by mouth daily.  Cholecalciferol, Vitamin D3, (VITAMIN D3) 1,000 unit cap Take 3,000 Units by mouth daily.  cycloSPORINE (RESTASIS) 0.05 % ophthalmic emulsion Administer 1 Drop to both eyes two (2) times a day.  MULTIVITAMIN W-MINERALS/LUTEIN (CENTRUM SILVER PO) Take 1 Tab by mouth daily.  co-enzyme Q-10 (CO Q-10) 100 mg capsule Take 100 mg by mouth daily.  BIOTIN PO Take 5,000 mcg by mouth daily. No current facility-administered medications on file prior to visit.         Social History     Socioeconomic History    Marital status:      Spouse name: Not on file    Number of children: Not on file    Years of education: Not on file    Highest education level: Not on file   Occupational History    Not on file   Social Needs    Financial resource strain: Not on file    Food insecurity:     Worry: Not on file     Inability: Not on file    Transportation needs:     Medical: Not on file     Non-medical: Not on file   Tobacco Use    Smoking status: Former Smoker     Years: 5.00     Types: Cigarettes     Last attempt to quit: 1/1/1970     Years since quittin.3    Smokeless tobacco: Never Used   Substance and Sexual Activity    Alcohol use: Yes     Alcohol/week: 0.6 oz     Types: 1 Glasses of wine per week     Frequency: Monthly or less     Comment: rare    Drug use: No    Sexual activity: Yes     Partners: Male     Birth control/protection: None   Lifestyle    Physical activity:     Days per week: Not on file     Minutes per session: Not on file    Stress: Not on file   Relationships    Social connections:     Talks on phone: Not on file     Gets together: Not on file     Attends Gnosticism service: Not on file     Active member of club or organization: Not on file     Attends meetings of clubs or organizations: Not on file     Relationship status: Not on file    Intimate partner violence:     Fear of current or ex partner: Not on file     Emotionally abused: Not on file     Physically abused: Not on file     Forced sexual activity: Not on file   Other Topics Concern    Not on file   Social History Narrative    Not on file       Family History   Problem Relation Age of Onset    Asthma Mother     COPD Mother     MS Mother     Heart Disease Mother     Hypertension Mother     Stroke Father     Heart Disease Father     Hypertension Father     Allergic Rhinitis Sister     Asthma Brother     Hypertension Brother     Diabetes Brother     Migraines Paternal Grandmother        ROS    Visit Vitals  /76 (BP 1 Location: Right arm, BP Patient Position: Sitting)   Pulse 61   Temp 96.5 °F (35.8 °C) (Oral)   Resp 18   Ht 5' 1\" (1.549 m)   Wt 168 lb 9.6 oz (76.5 kg)   SpO2 98%   BMI 31.86 kg/m²       GENERAL:  Well developed, well nourished, in no distress  NEURO/PSYCHE: Grossly intact, normal mood and affect  HEENT: Normal cephalic, atraumatic, good dentition, neck supple.  No thyromegaly  BREASTS: breasts appear normal, no suspicious masses, no skin or nipple changes  CV: regular rate and rhythm  LUNGS: clear to auscultation bilaterally, no wheezes, rhonchi or rales, good air entry with normal effort  ABDOMEN: + BS, soft without tenderness, no guarding, rebound or masses  EXTREMITIES: no edema or erythema noted  SKIN:  Warm, dry, no lesions  LYMPHATICS: No supraclavicular, axillary or inguinal nodes noted    PELVIC EXAM:  LABIA MAJORA: no masses, tenderness or lesions  LABIA MINORA: no masses, tenderness or lesions  CLITORIS: no masses, tenderness or lesions  URETHRA: normal appearing, no masses or tenderness  BLADDER: no fullness or tenderness  VAGINA: pink appearing vagina with physiologic discharge, no lesions   PERINEUM: no masses, tenderness or lesions  CERVIX: No CMT or lesions  UTERUS: small, mobile, nontender  ADNEXA: nontender and no masses      1. Encounter for well woman exam with routine gynecological exam  Reviewed diet, weight loss, exercise, and domestic abuse. Encouraged condom use every time. Mammogram, colonoscopy, DEXA. Reviewed she needs her pneumonia, shingles and tetanus vaccines. All of her questions were discussed. 2. Screening for vaginal cancer  Last vaginal pap in 2013 with LSIL will repeat today. - PAP IG, APTIMA HPV AND RFX 16/18,45 ([id number]); Future    3. Breast cancer screening by mammogram  - [facility] MAMMO BI SCREENING INCL CAD;  Future          F/U 1yr or as needed

## 2019-05-21 ENCOUNTER — HOSPITAL ENCOUNTER (OUTPATIENT)
Dept: MAMMOGRAPHY | Age: 79
Discharge: HOME | End: 2019-05-21
Attending: INTERNAL MEDICINE
Payer: MEDICARE

## 2019-05-21 DIAGNOSIS — Z12.31 VISIT FOR SCREENING MAMMOGRAM: ICD-10-CM

## 2019-05-21 PROCEDURE — 77063 BREAST TOMOSYNTHESIS BI: CPT

## 2019-06-04 DIAGNOSIS — M79.2 NEURITIS: ICD-10-CM

## 2019-06-04 DIAGNOSIS — M50.30 DDD (DEGENERATIVE DISC DISEASE), CERVICAL: ICD-10-CM

## 2019-06-04 DIAGNOSIS — M54.12 CERVICAL RADICULOPATHY: ICD-10-CM

## 2019-06-04 NOTE — TELEPHONE ENCOUNTER
Patient is going out of town tomorrow and is requesting a 30 day supply - Please sign if appropriate. Last Visit: 9/20/17 with DORINA Green  Next Appointment: return in 1 year  Previous Refill Encounter(s): 2/25/19 #180    Requested Prescriptions     Pending Prescriptions Disp Refills    gabapentin (GRALISE) 600 mg Tb24 60 Tab 0     Sig: Take 600 mg by mouth two (2) times a day.

## 2019-06-11 ENCOUNTER — TELEPHONE (OUTPATIENT)
Dept: OBGYN CLINIC | Age: 79
End: 2019-06-11

## 2019-06-13 ENCOUNTER — TELEPHONE (OUTPATIENT)
Dept: OBGYN CLINIC | Age: 79
End: 2019-06-13

## 2019-06-13 NOTE — TELEPHONE ENCOUNTER
Received a call from the pt using two identifiers,name and . Pt requesting Pap results, stated that she received a call from the provider about the results but never spoke with her. Pt given results and message forwarded to the provider for review because pt wanted to know what she needed to do from here.

## 2019-06-17 NOTE — TELEPHONE ENCOUNTER
Call made to the pt using two identifiers,name and . Pt made aware that the provider wants her to get a repeat pap and she can go to  [facility] and the number given to her office to schedule an appointment due to our office closing on 19. Pt verbalized understanding.

## 2019-06-19 NOTE — TELEPHONE ENCOUNTER
Called and spoke with patient about her vaginal pap smear. VAIN1 with HPV+. She will need repeat pap smear in 1 yr. She has an appointment with her new Obgyn, Dr. Eleazar Carolina in July. Will defer further management. Answered all of her questions.

## 2019-06-24 DIAGNOSIS — M50.30 DDD (DEGENERATIVE DISC DISEASE), CERVICAL: ICD-10-CM

## 2019-06-24 DIAGNOSIS — M79.2 NEURITIS: ICD-10-CM

## 2019-06-24 DIAGNOSIS — M54.12 CERVICAL RADICULOPATHY: ICD-10-CM

## 2019-06-24 NOTE — TELEPHONE ENCOUNTER
Last Visit: 9/20/17 with DORINA Green  Next Appointment: return in 1 year  Previous Refill Encounter(s): 6/4/19 #60    Requested Prescriptions     Pending Prescriptions Disp Refills    gabapentin (GRALISE) 600 mg Tb24 180 Tab 0     Sig: Take 600 mg by mouth two (2) times a day.

## 2019-07-05 ENCOUNTER — OFFICE VISIT (OUTPATIENT)
Dept: PULMONOLOGY | Age: 79
End: 2019-07-05

## 2019-07-05 VITALS
OXYGEN SATURATION: 99 % | BODY MASS INDEX: 31.83 KG/M2 | HEIGHT: 61 IN | SYSTOLIC BLOOD PRESSURE: 122 MMHG | DIASTOLIC BLOOD PRESSURE: 76 MMHG | HEART RATE: 68 BPM | WEIGHT: 168.6 LBS

## 2019-07-05 DIAGNOSIS — R91.8 LUNG NODULES: ICD-10-CM

## 2019-07-05 DIAGNOSIS — Z87.891 FORMER SMOKER: ICD-10-CM

## 2019-07-05 DIAGNOSIS — J45.20 MILD INTERMITTENT ASTHMA WITHOUT COMPLICATION: Primary | ICD-10-CM

## 2019-07-05 DIAGNOSIS — J30.9 ALLERGIC RHINITIS, UNSPECIFIED SEASONALITY, UNSPECIFIED TRIGGER: ICD-10-CM

## 2019-07-05 NOTE — PROGRESS NOTES
Chief Complaint   Patient presents with    Follow-up     patient is here for her 1 year follow up. she states she feels very well today, she denies wheezing/shortness of breath/cough. 1. Have you been to the ER, urgent care clinic since your last visit? Hospitalized since your last visit? No    2. Have you seen or consulted any other health care providers outside of the [address] [place] [address] since your last visit? Include any pap smears or colon screening.  No

## 2019-07-05 NOTE — PROGRESS NOTES
HISTORY OF PRESENT ILLNESS  Mary Rob is a 70 y.o. female. Follow up for asthma and pulmonary nodules. Pt denies new respiratory symptoms. Baseline SOB and wheezing with severe exertion has resolved, and pt has not used rescue inhaler for months. Pt rarely uses rescue inhaler. She was previously on Arnuity which was discontinued on a prior visit. Pt reports increased nasal congestion and rhinorrhea last month, treated with antihstamines. No other new respiratory symptoms. Pt has had serial CT's for lung nodules in the past, stable for over 5 years. Asthma   The history is provided by the patient. This is a chronic problem. The current episode started more than 1 week ago. The problem occurs rarely. The problem has been resolved. Pertinent negatives include no chest pain, no abdominal pain and no headaches. Shortness of breath: with severe exertion   Nothing aggravates the symptoms. Relieved by: Albuterol rescue inhaler. Treatments tried: beta agonist. The treatment provided significant relief. Review of Systems   Constitutional: Negative for chills, diaphoresis, fever, malaise/fatigue and weight loss. HENT: Positive for congestion (resolved). Negative for ear discharge, ear pain, hearing loss, nosebleeds, sinus pain, sore throat and tinnitus. Eyes: Negative for blurred vision, double vision, photophobia, pain, discharge and redness. Respiratory: Negative for cough, hemoptysis, sputum production and stridor. Shortness of breath: with severe exertion   Wheezing: rare. Cardiovascular: Negative for chest pain, palpitations, orthopnea, claudication, leg swelling and PND. Gastrointestinal: Negative for abdominal pain, blood in stool, constipation, diarrhea, heartburn, melena, nausea and vomiting. Genitourinary: Negative for dysuria, flank pain, frequency, hematuria and urgency. Musculoskeletal: Positive for back pain. Negative for falls, joint pain, myalgias and neck pain.    Skin: Negative for itching and rash. Neurological: Negative for dizziness, tingling, tremors, sensory change, speech change, focal weakness, seizures, loss of consciousness, weakness and headaches. Endo/Heme/Allergies: Positive for environmental allergies. Negative for polydipsia. Does not bruise/bleed easily. Psychiatric/Behavioral: Negative for depression, hallucinations, memory loss, substance abuse and suicidal ideas. The patient is not nervous/anxious and does not have insomnia. 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 60 Tab 0    triamterene-hydroCHLOROthiazide (MAXZIDE) 37.5-25 mg per tablet TAKE ONE-HALF (1/2) TABLET DAILY 90 Tab 2    fexofenadine (ALLEGRA) 180 mg tablet Take  by mouth.  fluticasone (FLONASE) 50 mcg/actuation nasal spray USE 2 SPRAYS IN EACH NOSTRIL DAILY 48 g 2    PROAIR HFA 90 mcg/actuation inhaler USE 2 INHALATIONS EVERY 4 HOURS AS NEEDED 3 Inhaler 3    melatonin 3 mg tablet Take  by mouth.  Cholecalciferol, Vitamin D3, (VITAMIN D3) 1,000 unit cap Take 3,000 Units by mouth daily.  cycloSPORINE (RESTASIS) 0.05 % ophthalmic emulsion Administer 1 Drop to both eyes two (2) times a day.  MULTIVITAMIN W-MINERALS/LUTEIN (CENTRUM SILVER PO) Take 1 Tab by mouth daily.  co-enzyme Q-10 (CO Q-10) 100 mg capsule Take 100 mg by mouth daily.  BIOTIN PO Take 5,000 mcg by mouth daily.  diclofenac (VOLTAREN) 1 % gel Apply 2 g to affected area three (3) times daily as needed. 100 g 0    PREMARIN 0.3 mg tablet TAKE 1 TABLET DAILY 90 Tab 0    EPINEPHrine (EPIPEN) 0.3 mg/0.3 mL injection 0.3 mL by IntraMUSCular route once as needed for up to 1 dose. 1 Syringe 1    omega-3 fatty acids-vitamin e 1,000 mg cap Take 1 Cap by mouth daily. No current facility-administered medications on file prior to visit. Allergies   Allergen Reactions    Naprosyn [Naproxen] Other (comments)     Blood in urine       Social History     Socioeconomic History    Marital status:      Spouse name: Not on file    Number of children: Not on file    Years of education: Not on file    Highest education level: Not on file   Occupational History    Not on file   Social Needs    Financial resource strain: Not on file    Food insecurity:     Worry: Not on file     Inability: Not on file    Transportation needs:     Medical: Not on file     Non-medical: Not on file   Tobacco Use    Smoking status: Former Smoker     Years: 5.00     Types: Cigarettes     Last attempt to quit: 1970     Years since quittin.5    Smokeless tobacco: Never Used   Substance and Sexual Activity    Alcohol use:  Yes     Alcohol/week: 0.6 oz     Types: 1 Glasses of wine per week     Frequency: Monthly or less     Comment: rare    Drug use: No    Sexual activity: Yes     Partners: Male     Birth control/protection: None   Lifestyle    Physical activity:     Days per week: Not on file     Minutes per session: Not on file    Stress: Not on file   Relationships    Social connections:     Talks on phone: Not on file     Gets together: Not on file     Attends Anabaptist service: Not on file     Active member of club or organization: Not on file     Attends meetings of clubs or organizations: Not on file     Relationship status: Not on file    Intimate partner violence: Fear of current or ex partner: Not on file     Emotionally abused: Not on file     Physically abused: Not on file     Forced sexual activity: Not on file   Other Topics Concern    Not on file   Social History Narrative    Not on file     Blood pressure 122/76, pulse 68, height 5' 1\" (1.549 m), weight 76.5 kg (168 lb 9.6 oz), SpO2 99 %. Physical Exam   Constitutional: She is oriented to person, place, and time. She appears well-developed and well-nourished. No distress. HENT:   Head: Atraumatic. Nose: Nose normal.   Mouth/Throat: No oropharyngeal exudate. Eyes: Pupils are equal, round, and reactive to light. Conjunctivae are normal. Right eye exhibits no discharge. Left eye exhibits no discharge. No scleral icterus. Neck: No JVD present. No tracheal deviation present. No thyromegaly present. Cardiovascular: Normal rate, regular rhythm and intact distal pulses. Exam reveals no gallop. No murmur heard. Pulmonary/Chest: Effort normal and breath sounds normal. No stridor. No respiratory distress. She has no wheezes. She has no rales. She exhibits no tenderness. Abdominal: Soft. She exhibits no mass. There is no tenderness. There is no rebound. Musculoskeletal: She exhibits no edema, tenderness or deformity. Lymphadenopathy:     She has no cervical adenopathy. Neurological: She is alert and oriented to person, place, and time. Coordination normal.   Skin: Skin is warm and dry. No rash noted. She is not diaphoretic. No erythema. No pallor. Psychiatric: She has a normal mood and affect. Her behavior is normal. Judgment and thought content normal.     CT Results (most recent):  Results from Hospital Encounter encounter on 03/07/17   CT CHEST WO CONT    Narrative CT CHEST WITHOUT IV CONTRAST    COMPARISON: CT chest 8/8/2016, 8/25/2015 and 3/1/2013. INDICATIONS: Pulmonary nodules. TECHNIQUE: Volumetric data acquisition was performed through the chest with a  multislice scanner.  Reconstructions were created in the axial, coronal, and  sagittal planes. All CT scans at this facility are performed using dose optimization technique as  appropriate to the performed exam, to include automated exposure control,  adjustment of the mA and/or kV according to patient's size (Including  appropriate matching for site-specific examinations), or use of iterative  reconstruction technique. FINDINGS:     Thyroid/Base Of Neck: The thyroid is unremarkable as visualized. Lungs:   Trachea and central bronchi are patent. No mass lesions are seen. .  Stable 4 mm 2 pleural-based pulmonary nodules lateral right middle lobe (series  4 image 31 and 32). Resolution of additional 4 mm pulmonary nodule in the right middle lobe. Perhaps 2 mm pulmonary nodule in the right minor fissure, stable (series 4 image  25)  Less conspicuous posterior right upper lobe 3 mm pulmonary nodule (series 4  image 15)  No new suspicious pulmonary nodule. Mild dependent atelectasis. Pleural Spaces: There is no pneumothorax or pleural fluid evident. No pleural plaques are seen    Lymph Nodes:   Axillae: Normal in size and number. Mediastinum / Melania: Limited without IV contrast but no gross adenopathy. Mediastinum, Great Vessels And Heart:  No mediastinal mass. Upper normal heart size. No pericardial effusion. Mild  aortic valve calcifications. No aortic aneurysm. Abdomen Structures Included:  No diagnostic abnormality. Osseous Structures:   Mild thoracic spondylosis. Impression IMPRESSION:     Resolution of 4 mm pulmonary nodule in the right middle lobe. Otherwise stable  few pulmonary nodules up to 4 cm in size, stable since 2013. ASSESSMENT and PLAN  Encounter Diagnoses   Name Primary?     Mild intermittent asthma without complication Yes    Allergic rhinitis, unspecified seasonality, unspecified trigger     Former smoker     Lung nodules        Continue rescue Albuterol , will not add ICS as pt with rare symptoms. Resume nasal steroids, Rx sent for Flonase and pt instructed on proper use. Educated on allergy trigger avoidance. No further indication for CT follow up. Continue rest of medications. RTC 12 months.   Ingrid Cruz on next visit

## 2019-07-08 ENCOUNTER — OFFICE VISIT (OUTPATIENT)
Dept: OBGYN CLINIC | Age: 79
End: 2019-07-08

## 2019-07-08 VITALS
OXYGEN SATURATION: 100 % | DIASTOLIC BLOOD PRESSURE: 79 MMHG | BODY MASS INDEX: 32.28 KG/M2 | HEART RATE: 69 BPM | SYSTOLIC BLOOD PRESSURE: 117 MMHG | WEIGHT: 171 LBS | HEIGHT: 61 IN | RESPIRATION RATE: 20 BRPM | TEMPERATURE: 98 F

## 2019-07-08 DIAGNOSIS — B97.7 HPV IN FEMALE: ICD-10-CM

## 2019-07-08 DIAGNOSIS — R87.612 LGSIL ON PAP SMEAR OF CERVIX: Primary | ICD-10-CM

## 2019-07-08 NOTE — PROGRESS NOTES
1. Have you been to the ER, urgent care clinic since your last visit? Hospitalized since your last visit? No    2. Have you seen or consulted any other health care providers outside of the [address] [place] [address] since your last visit? Include any pap smears or colon screening.  No

## 2019-07-08 NOTE — PROGRESS NOTES
Subjective: Johnny Partida is a 70 y.o. female seen for evaluation of abnormal Pap smear. She was seen in April for an annual exam. She reports having had a hysterectomy in the past for uterine fibroids and denies a history of severe cervical dysplasia or cervical procedures. She had a pap smear performed of the vaginal cuff that resulted as LSIL with +HR HPV. Marital status:   Sexual history: single partner, contraception - status post hysterectomy.   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    Thoracic or lumbosacral neuritis or radiculitis, unspecified [id number]    Cervical radiculopathy M54.12    Tubular adenoma of colon D12.6    Chronic right-sided low back pain with sciatica M54.40, G89.29    Annular disc tear M51.9    Lumbar spinal stenosis M48.061    Trochanteric bursitis of both hips M70.61, M70.62    HNP (herniated nucleus pulposus), lumbar M51.26    Lumbar facet arthropathy M47.816    Cervical stenosis of spine M48.02    LGSIL of cervix of undetermined significance R87.612    Heel spur, left M77.32    Severe obesity ([address] E66.01     Patient Active Problem List    Diagnosis Date Noted    Severe obesity ([address] 02/12/2019    LGSIL of cervix of undetermined significance 12/26/2018    Heel spur, left 12/26/2018    Cervical stenosis of spine 09/20/2017    HNP (herniated nucleus pulposus), lumbar 07/28/2016    Lumbar facet arthropathy 07/28/2016    Trochanteric bursitis of both hips 06/27/2016    Annular disc tear 10/26/2015    Lumbar spinal stenosis 10/26/2015    Chronic right-sided low back pain with sciatica     Tubular adenoma of colon 01/13/2015    Thoracic or lumbosacral neuritis or radiculitis, unspecified 04/24/2014    Cervical radiculopathy 04/24/2014    Right knee DJD, XR 1/2014 01/21/2014    Osteopenia 01/15/2013    Vitamin D deficiency 01/15/2013    Asthma 10/06/2011    Multiple lung nodules 11/03/2010    Allergic rhinitis 11/03/2010    HTN (hypertension) 11/03/2010     Current Outpatient Medications   Medication Sig Dispense Refill    gabapentin (GRALISE) 600 mg Tb24 Take 600 mg by mouth two (2) times a day. 60 Tab 0    triamterene-hydroCHLOROthiazide (MAXZIDE) 37.5-25 mg per tablet TAKE ONE-HALF (1/2) TABLET DAILY 90 Tab 2    fexofenadine (ALLEGRA) 180 mg tablet Take  by mouth.  diclofenac (VOLTAREN) 1 % gel Apply 2 g to affected area three (3) times daily as needed. 100 g 0    fluticasone (FLONASE) 50 mcg/actuation nasal spray USE 2 SPRAYS IN EACH NOSTRIL DAILY 48 g 2    PROAIR HFA 90 mcg/actuation inhaler USE 2 INHALATIONS EVERY 4 HOURS AS NEEDED 3 Inhaler 3    EPINEPHrine (EPIPEN) 0.3 mg/0.3 mL injection 0.3 mL by IntraMUSCular route once as needed for up to 1 dose. 1 Syringe 1    omega-3 fatty acids-vitamin e 1,000 mg cap Take 1 Cap by mouth daily.  Cholecalciferol, Vitamin D3, (VITAMIN D3) 1,000 unit cap Take 3,000 Units by mouth daily.  cycloSPORINE (RESTASIS) 0.05 % ophthalmic emulsion Administer 1 Drop to both eyes two (2) times a day.  MULTIVITAMIN W-MINERALS/LUTEIN (CENTRUM SILVER PO) Take 1 Tab by mouth daily.  co-enzyme Q-10 (CO Q-10) 100 mg capsule Take 100 mg by mouth daily.  BIOTIN PO Take 5,000 mcg by mouth daily.  PREMARIN 0.3 mg tablet TAKE 1 TABLET DAILY 90 Tab 0    melatonin 3 mg tablet Take  by mouth.        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    Band keratopathy of right eye 2/3/2018    Cataract  Cylindrical [facility]chiectasis ([facility]     Fracture     rt ankle as an adult    History of pneumonia     Hypertension     Ill-defined condition     Spasms to left side    Left arm pain     Lumbar spinal stenosis     Myofascial pain     Osteopenia     Sciatica of right side      Past Surgical History:   Procedure Laterality Date    HX CATARACT REMOVAL  8/15/2011    HX HEENT      sinus surgery    HX KERATOPLASTY Left 10/02/2017    HX ORTHOPAEDIC      left 4th finger trigger release    HX SHIELA AND BSO  1970s    uterine fibroids     Family History   Problem Relation Age of Onset    Asthma Mother     COPD Mother    Oleary MS Mother     Heart Disease Mother     Hypertension Mother     Stroke Father     Heart Disease Father     Hypertension Father     Allergic Rhinitis Sister     Asthma Brother     Hypertension Brother     Diabetes Brother     Migraines Paternal Grandmother      Social History     Tobacco Use    Smoking status: Former Smoker     Years: 5.00     Types: Cigarettes     Last attempt to quit: 1970     Years since quittin.5    Smokeless tobacco: Never Used   Substance Use Topics    Alcohol use: Yes     Alcohol/week: 0.6 oz     Types: 1 Glasses of wine per week     Frequency: Monthly or less     Comment: rare      Review of Systems    A comprehensive review of systems was negative except for that written in the HPI. Objective:     Visit Vitals  /79   Pulse 69   Temp 98 °F (36.7 °C) (Oral)   Resp 20   Ht 5' 1\" (1.549 m)   Wt 171 lb (77.6 kg)   SpO2 100%   BMI 32.31 kg/m²      Physical Exam:   General appearance - alert, well appearing, and in no distress     Assessment/Plan:   Colposcopy will be scheduled as soon as possible, plan is pending results of pathology report   The pathophysiology of HPV was discussed with Ms. Lazo. She was advised that it is one of the most commong STDs in the world. Greater than 80% of people are exposed to it at some point in our lifetime. She was also advised that there are great than 100 strains of the virus. While some are low risk strains, others are considered more high risk in regards to its ability to cause cervical cancer. Additionally, we discussed modifiable risk factors including barrier protection during intercourse, smoking cessation, and the HPV vaccine. Lastly, I advised her that with the abnormal pap smear she had (LSIL/+HR HPV), we will need to perform a colposcopy with possible biopsy. ICD-10-CM ICD-9-CM    1. LGSIL on Pap smear of cervix R87.612 795.03    2. 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  LGSIL on Pap smear of cervix Yes    HPV in female      No orders of the defined types were placed in this encounter. Follow-up and Dispositions    · Return in about 1 month (around 8/5/2019) for PROCEDURE: Colposcopy and possible biopsy. The entire visit with Ms. Lazo took 30 minutes. Over 50% of this visit was spent counseling the patient regarding her concerns. All questions were answered to her satisfaction. She will follow-up in 3 weeks.

## 2019-07-10 ENCOUNTER — OFFICE VISIT (OUTPATIENT)
Dept: ORTHOPEDIC SURGERY | Age: 79
End: 2019-07-10

## 2019-07-10 VITALS
HEART RATE: 67 BPM | SYSTOLIC BLOOD PRESSURE: 110 MMHG | WEIGHT: 171.6 LBS | BODY MASS INDEX: 32.4 KG/M2 | TEMPERATURE: 97.9 F | OXYGEN SATURATION: 96 % | RESPIRATION RATE: 19 BRPM | DIASTOLIC BLOOD PRESSURE: 75 MMHG | HEIGHT: 61 IN

## 2019-07-10 DIAGNOSIS — M54.12 CERVICAL RADICULOPATHY: ICD-10-CM

## 2019-07-10 DIAGNOSIS — M79.2 NEURITIS: ICD-10-CM

## 2019-07-10 DIAGNOSIS — M50.30 DDD (DEGENERATIVE DISC DISEASE), CERVICAL: ICD-10-CM

## 2019-07-10 NOTE — PROGRESS NOTES
[address]  [address], [address]  [place]  Phone: [phone]  Fax: [phone]    Belkis Mcdermott  : 1940  PCP: Keke Alonzo MD    PROGRESS NOTE    HISTORY OF PRESENT ILLNESS:  Chief Complaint   Patient presents with    Back Pain     Med Refill     Rut Marroquin is a 70 y.o.  female with history of  history of neck pain and arm pain and LBP and RLE sciatica for 5+ years. Her sciatica started about 5 years ago after pushing a heavy cart at work, she is a recently retired nurse. At her last visit, she was to continue gralise. Today, she states she is here for her neck and back pain. She states the Gralise handles her pain well. She has had to cut back and she has noticed her right sciatica pain has returned. Denies bladder/bowel dysfunction, saddle paresthesia, weakness, gait disturbance, or other neurological deficit. Pt at this time desires to  continue with current care/proceed with medication evaluation. ASSESSMENT  70 y.o. female with lumbar and neck pain. Diagnoses and all orders for this visit:    1. DDD (degenerative disc disease), cervical  -     gabapentin (GRALISE) 600 mg Tb24; Take 1,200 mg by mouth daily. Max Daily Amount: 1,200 mg. Indications: Neuropathic Pain    2. Neuritis  -     gabapentin (GRALISE) 600 mg Tb24; Take 1,200 mg by mouth daily. Max Daily Amount: 1,200 mg. Indications: Neuropathic Pain    3. Cervical radiculopathy  -     gabapentin (GRALISE) 600 mg Tb24; Take 1,200 mg by mouth daily. Max Daily Amount: 1,200 mg. Indications: Neuropathic Pain         IMPRESSION/PLAN    1) Pt was given information on lumbar pain. 2) continue Gralise. 10 day supply for local pharmacy, 90 day supply for express scripts. 3) continue HEP  4) Ms. Dennys Aguilar has a reminder for a \"due or due soon\" health maintenance.  I have asked that she contact her primary care provider, Stpehanie Meraz MD, for follow-up on this health maintenance. 5) We have informed patient to notify us for immediate appointment if he has any worsening neurogical symptoms or if an emergency situation presents, then call 911  6) Pt will follow-up in 1 year for yearly follow up. Risks and benefits of ongoing therapy have been reviewed with the patient.  is appropriate. 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 Max Daily Amount: 1,200 mg. Indications: Neuropathic Pain 180 Tab 3    triamterene-hydroCHLOROthiazide (MAXZIDE) 37.5-25 mg per tablet TAKE ONE-HALF (1/2) TABLET DAILY 90 Tab 2    fexofenadine (ALLEGRA) 180 mg tablet Take  by mouth.  diclofenac (VOLTAREN) 1 % gel Apply 2 g to affected area three (3) times daily as needed. 100 g 0    fluticasone (FLONASE) 50 mcg/actuation nasal spray USE 2 SPRAYS IN EACH NOSTRIL DAILY 48 g 2    PROAIR HFA 90 mcg/actuation inhaler USE 2 INHALATIONS EVERY 4 HOURS AS NEEDED 3 Inhaler 3    EPINEPHrine (EPIPEN) 0.3 mg/0.3 mL injection 0.3 mL by IntraMUSCular route once as needed for up to 1 dose. 1 Syringe 1    melatonin 3 mg tablet Take  by mouth.  Cholecalciferol, Vitamin D3, (VITAMIN D3) 1,000 unit cap Take 3,000 Units by mouth daily.  cycloSPORINE (RESTASIS) 0.05 % ophthalmic emulsion Administer 1 Drop to both eyes two (2) times a day.       MULTIVITAMIN W-MINERALS/LUTEIN (CENTRUM SILVER PO) Take 1 Tab by mouth daily.  co-enzyme Q-10 (CO Q-10) 100 mg capsule Take 100 mg by mouth daily.  BIOTIN PO Take 5,000 mcg by mouth daily. ALLERGIES  Allergies   Allergen Reactions    Naprosyn [Naproxen] Other (comments)     Blood in urine         SOCIAL HISTORY    Social History     Socioeconomic History    Marital status:      Spouse name: Not on file    Number of children: Not on file    Years of education: Not on file    Highest education level: Not on file   Occupational History    Not on file   Social Needs    Financial resource strain: Not on file    Food insecurity:     Worry: Not on file     Inability: Not on file    Transportation needs:     Medical: Not on file     Non-medical: Not on file   Tobacco Use    Smoking status: Former Smoker     Years: 5.00     Types: Cigarettes     Last attempt to quit: 1970     Years since quittin.5    Smokeless tobacco: Never Used   Substance and Sexual Activity    Alcohol use:  Yes     Alcohol/week: 0.6 oz     Types: 1 Glasses of wine per week     Frequency: Monthly or less     Comment: rare    Drug use: No    Sexual activity: Yes     Partners: Male     Birth control/protection: None   Lifestyle    Physical activity:     Days per week: Not on file     Minutes per session: Not on file    Stress: Not on file   Relationships    Social connections:     Talks on phone: Not on file     Gets together: Not on file     Attends Protestant service: Not on file     Active member of club or organization: Not on file     Attends meetings of clubs or organizations: Not on file     Relationship status: Not on file    Intimate partner violence:     Fear of current or ex partner: Not on file     Emotionally abused: Not on file     Physically abused: Not on file     Forced sexual activity: Not on file   Other Topics Concern    Not on file   Social History Narrative    Not on file SUBJECTIVE    Work retired nurse     Pain Scale: 3/10    Pain Assessment  7/10/2019   Location of Pain Back   Location Modifiers -   Severity of Pain 3   Quality of Pain Aching   Quality of Pain Comment -   Duration of Pain -   Frequency of Pain Constant   Aggravating Factors Standing;Walking;Bending   Aggravating Factors Comment -   Limiting Behavior -   Relieving Factors NSAID   Result of Injury -       Accompanied by self. REVIEW OF SYSTEMS  ROS    Constitutional: Negative for fever, chills, or weight change. Respiratory: Negative for cough or shortness of breath. Cardiovascular: Negative for chest pain or palpitations. Gastrointestinal: Negative for acid reflux, change in bowel habits, or constipation. Genitourinary: Negative for incontinence, dysuria and flank pain. Musculoskeletal: Positive for lumbar pain. Skin: Negative for rash. Neurological: Negative for headaches, dizziness, or numbness. Endo/Heme/Allergies: Negative . Psychiatric/Behavioral: Negative. PHYSICAL EXAMINATION  Visit Vitals  /75   Pulse 67   Temp 97.9 °F (36.6 °C) (Oral)   Resp 19   Ht 5' 1\" (1.549 m)   Wt 171 lb 9.6 oz (77.8 kg)   SpO2 96%   BMI 32.42 kg/m²       Constitutional: Well developed,  well nourished,  awake, alert, and in no acute distress. Neurological:  Sensation to light touch is intact. Psychiatric: Affect and mood are appropriate. Integumentary: No rashes or abrasions noted on exposed areas,  warm, dry and intact. Cardiovascular/Peripheral Vascular:  No peripheral edema is noted. Lymphatic:  No evidence of lymphedema. No cervical lymphadenopathy. SPINE/MUSCULOSKELETAL EXAM    Cervical spine:  Neck is midline. Normal muscle tone. No focal atrophy is noted. Neck ROM decreased with extension. Shoulder ROM intact. Tenderness to palpation none. Negative Spurling's sign. Negative Tinel's sign.  Negative Perry's sign.      Sensation grossly intact to light touch.      BUE +4/5        MOTOR:      Hip Flex Quads Hamstrings Ankle DF EHL Ankle PF   Right 5/5 5/5 5/5 5/5 5/5 5/5   Left 5/5 5/5 5/5 5/5 5/5 5/5      Ambulation without assistive device. FWB.      normal gait and station           Jacklyn Allison NP

## 2019-07-10 NOTE — PATIENT INSTRUCTIONS
Back Pain: Care Instructions  Your Care Instructions    Back pain has many possible causes. It is often related to problems with muscles and ligaments of the back. It may also be related to problems with the nerves, discs, or bones of the back. Moving, lifting, standing, sitting, or sleeping in an awkward way can strain the back. Sometimes you don't notice the injury until later. Arthritis is another common cause of back pain. Although it may hurt a lot, back pain usually improves on its own within several weeks. Most people recover in 12 weeks or less. Using good home treatment and being careful not to stress your back can help you feel better sooner. Follow-up care is a key part of your treatment and safety. Be sure to make and go to all appointments, and call your doctor if you are having problems. It's also a good idea to know your test results and keep a list of the medicines you take. How can you care for yourself at home? · Sit or lie in positions that are most comfortable and reduce your pain. Try one of these positions when you lie down:  ? Lie on your back with your knees bent and supported by large pillows. ? Lie on the floor with your legs on the seat of a sofa or chair. ? Lie on your side with your knees and hips bent and a pillow between your legs. ? Lie on your stomach if it does not make pain worse. · Do not sit up in bed, and avoid soft couches and twisted positions. Bed rest can help relieve pain at first, but it delays healing. Avoid bed rest after the first day of back pain. · Change positions every 30 minutes. If you must sit for long periods of time, take breaks from sitting. Get up and walk around, or lie in a comfortable position. · Try using a heating pad on a low or medium setting for 15 to 20 minutes every 2 or 3 hours. Try a warm shower in place of one session with the heating pad. · You can also try an ice pack for 10 to 15 minutes every 2 to 3 hours.  Put a thin cloth between the ice pack and your skin. · Take pain medicines exactly as directed. ? If the doctor gave you a prescription medicine for pain, take it as prescribed. ? If you are not taking a prescription pain medicine, ask your doctor if you can take an over-the-counter medicine. · Take short walks several times a day. You can start with 5 to 10 minutes, 3 or 4 times a day, and work up to longer walks. Walk on level surfaces and avoid hills and stairs until your back is better. · Return to work and other activities as soon as you can. Continued rest without activity is usually not good for your back. · To prevent future back pain, do exercises to stretch and strengthen your back and stomach. Learn how to use good posture, safe lifting techniques, and proper body mechanics. When should you call for help? Call your doctor now or seek immediate medical care if:    · You have new or worsening numbness in your legs.     · You have new or worsening weakness in your legs. (This could make it hard to stand up.)     · You lose control of your bladder or bowels.    Watch closely for changes in your health, and be sure to contact your doctor if:    · You have a fever, lose weight, or don't feel well.     · You do not get better as expected. Where can you learn more? Go to http://keke-josé luis.info/. Enter Y392 in the search box to learn more about \"Back Pain: Care Instructions. \"  Current as of: September 20, 2018  Content Version: 11.9  © 5946-4638 [employer]. Care instructions adapted under license by [employer] (which disclaims liability or warranty for this information). If you have questions about a medical condition or this instruction, always ask your healthcare professional. [place] [address] any warranty or liability for your use of this information.          Neck Pain: Care Instructions  Your Care Instructions    You can have neck pain anywhere from the bottom of your head to the top of your shoulders. It can spread to the upper back or arms. Injuries, painting a ceiling, sleeping with your neck twisted, staying in one position for too long, and many other activities can cause neck pain. Most neck pain gets better with home care. Your doctor may recommend medicine to relieve pain or relax your muscles. He or she may suggest exercise and physical therapy to increase flexibility and relieve stress. You may need to wear a special (cervical) collar to support your neck for a day or two. Follow-up care is a key part of your treatment and safety. Be sure to make and go to all appointments, and call your doctor if you are having problems. It's also a good idea to know your test results and keep a list of the medicines you take. How can you care for yourself at home? · Try using a heating pad on a low or medium setting for 15 to 20 minutes every 2 or 3 hours. Try a warm shower in place of one session with the heating pad. · You can also try an ice pack for 10 to 15 minutes every 2 to 3 hours. Put a thin cloth between the ice and your skin. · Take pain medicines exactly as directed. ? If the doctor gave you a prescription medicine for pain, take it as prescribed. ? If you are not taking a prescription pain medicine, ask your doctor if you can take an over-the-counter medicine. · If your doctor recommends a cervical collar, wear it exactly as directed. When should you call for help? Call your doctor now or seek immediate medical care if:    · You have new or worsening numbness in your arms, buttocks or legs.     · You have new or worsening weakness in your arms or legs.  (This could make it hard to stand up.)     · You lose control of your bladder or bowels.    Watch closely for changes in your health, and be sure to contact your doctor if:    · Your neck pain is getting worse.     · You are not getting better after 1 week.     · You do not get better as expected. Where can you learn more? Go to http://keke-josé luis.info/. Enter [id number] in the search box to learn more about \"Neck Pain: Care Instructions. \"  Current as of: September 20, 2018  Content Version: 11.9  © 2328-7746 [employer]. Care instructions adapted under license by [employer] (which disclaims liability or warranty for this information). If you have questions about a medical condition or this instruction, always ask your healthcare professional. [address] any warranty or liability for your use of this information.

## 2019-08-05 ENCOUNTER — OFFICE VISIT (OUTPATIENT)
Dept: OBGYN CLINIC | Age: 79
End: 2019-08-05

## 2019-08-05 VITALS
HEART RATE: 66 BPM | HEIGHT: 61 IN | WEIGHT: 171 LBS | OXYGEN SATURATION: 100 % | RESPIRATION RATE: 20 BRPM | DIASTOLIC BLOOD PRESSURE: 71 MMHG | SYSTOLIC BLOOD PRESSURE: 118 MMHG | TEMPERATURE: 98 F | BODY MASS INDEX: 32.28 KG/M2

## 2019-08-05 DIAGNOSIS — R87.612 LGSIL ON PAP SMEAR OF CERVIX: Primary | ICD-10-CM

## 2019-08-05 DIAGNOSIS — B97.7 HIGH RISK HPV INFECTION: ICD-10-CM

## 2019-08-05 NOTE — PROGRESS NOTES
1. Have you been to the ER, urgent care clinic since your last visit? Hospitalized since your last visit? **No    2. Have you seen or consulted any other health care providers outside of the [address] [place] [address] since your last visit? Include any pap smears or colon screening.  No

## 2019-08-05 NOTE — PROGRESS NOTES
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 Speculum placed in vagina and excellent visualization of vaginal cuff swabbed x 3 with acetic acid solution. Findings:  Vaginal inspection: normal without visible lesions. Specimens: N/A    Complications: none. Plan:  Repeat pap with next annual exam. No further intervention needed at this time. Lord Collins

## 2019-08-21 NOTE — PROGRESS NOTES
Joy Poole presents today for   Chief Complaint   Patient presents with    Hypertension    Foot Swelling     patient reports having bilateral feet swelling x 1 week. Patient states that this happens in the evening. Patient denies any pain                Depression Screening:  3 most recent PHQ Screens 7/10/2019   Little interest or pleasure in doing things Not at all   Feeling down, depressed, irritable, or hopeless Not at all   Total Score PHQ 2 0       Learning Assessment:  Learning Assessment 3/19/2014   PRIMARY LEARNER Patient   HIGHEST LEVEL OF EDUCATION - PRIMARY LEARNER  4 YEARS OF COLLEGE   BARRIERS PRIMARY LEARNER NONE   CO-LEARNER CAREGIVER No   PRIMARY LANGUAGE ENGLISH   LEARNER PREFERENCE PRIMARY LISTENING   ANSWERED BY patient   RELATIONSHIP SELF       Abuse Screening:  Abuse Screening Questionnaire 8/22/2019   Do you ever feel afraid of your partner? N   Are you in a relationship with someone who physically or mentally threatens you? N   Is it safe for you to go home? Y       Fall Risk  Fall Risk Assessment, last 12 mths 7/10/2019   Able to walk? Yes   Fall in past 12 months? No           Coordination of Care:  1. Have you been to the ER, urgent care clinic since your last visit? Hospitalized since your last visit? no    2. Have you seen or consulted any other health care providers outside of the [address] [place] [address] since your last visit? Include any pap smears or colon screening. no      Advance Directive:  1. Do you have an advance directive in place? Patient Reply:no    2. If not, would you like material regarding how to put one in place?  Patient Reply: no

## 2019-08-22 ENCOUNTER — OFFICE VISIT (OUTPATIENT)
Dept: INTERNAL MEDICINE CLINIC | Age: 79
End: 2019-08-22

## 2019-08-22 VITALS
TEMPERATURE: 97 F | HEIGHT: 61 IN | BODY MASS INDEX: 32.47 KG/M2 | SYSTOLIC BLOOD PRESSURE: 119 MMHG | DIASTOLIC BLOOD PRESSURE: 78 MMHG | WEIGHT: 172 LBS | HEART RATE: 66 BPM | RESPIRATION RATE: 16 BRPM | OXYGEN SATURATION: 96 %

## 2019-08-22 DIAGNOSIS — Z71.89 ADVANCE CARE PLANNING: ICD-10-CM

## 2019-08-22 DIAGNOSIS — I10 ESSENTIAL HYPERTENSION: Primary | ICD-10-CM

## 2019-08-22 DIAGNOSIS — R73.9 HYPERGLYCEMIA: ICD-10-CM

## 2019-08-22 DIAGNOSIS — E66.01 SEVERE OBESITY (HCC): ICD-10-CM

## 2019-08-22 DIAGNOSIS — Z12.11 SCREENING FOR COLON CANCER: ICD-10-CM

## 2019-08-22 DIAGNOSIS — Z00.00 MEDICARE ANNUAL WELLNESS VISIT, SUBSEQUENT: ICD-10-CM

## 2019-08-22 DIAGNOSIS — R87.612 LGSIL OF CERVIX OF UNDETERMINED SIGNIFICANCE: ICD-10-CM

## 2019-08-22 NOTE — PROGRESS NOTES
This is the Subsequent Medicare Annual Wellness Exam, performed 12 months or more after the Initial AWV or the last Subsequent AWV    I have reviewed the patient's medical history in detail and updated the computerized patient record. History   The patient is a 61-year-old female with history of hypertension, LGSIL 4/19, osteopenia, tubular adenomas colon polyps, obesity, vitamin D deficiency, stable pulmonary nodules, asthma (followed by Robert Maya), cervical disc disease, and lumbar disc disease (followed by Orthopedics). Health Maintenance History  Immunizations reviewed: Tdap over-due   Pneumovax: up to date   Flu: over-due  Zoster: up to date but we do not have records. Immunization History   Administered Date(s) Administered    Influenza High Dose Vaccine PF 09/22/2016, 09/25/2017    Influenza Vaccine 10/08/2013, 09/26/2014, 10/20/2015    Influenza Vaccine Split 10/06/2011    Pneumococcal Conjugate (PCV-13) 05/11/2015    Pneumococcal Polysaccharide (PPSV-23) 06/14/2016       Colonoscopy: Overdue per our records after January 2019. No hematochezia/melena    Eye exam: Up to date    Mammo: Up to date. No breast pain/masses.     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    Band keratopathy of right eye 2/3/2018    Cataract     Cylindrical bronchiectasis (HCC)     Fracture     rt ankle as an adult    History of pneumonia     Hypertension     Ill-defined condition     Spasms to left side    Left arm pain     Lumbar spinal stenosis     Myofascial pain     Osteopenia     Sciatica of right side       Past Surgical History:   Procedure Laterality Date    HX CATARACT REMOVAL  8/15/2011    HX HEENT      sinus surgery    HX KERATOPLASTY Left 10/02/2017    HX ORTHOPAEDIC      left 4th finger trigger release    HX SHIELA AND BSO  1970s    uterine fibroids     Current Outpatient Medications   Medication Sig Dispense Refill    gabapentin (GRALISE) 600 mg Tb24 Take 1,200 mg by mouth daily. Max Daily Amount: 1,200 mg. Indications: Neuropathic Pain 20 Tab 0    triamterene-hydroCHLOROthiazide (MAXZIDE) 37.5-25 mg per tablet TAKE ONE-HALF (1/2) TABLET DAILY 90 Tab 2    fexofenadine (ALLEGRA) 180 mg tablet Take  by mouth.  fluticasone (FLONASE) 50 mcg/actuation nasal spray USE 2 SPRAYS IN EACH NOSTRIL DAILY 48 g 2    PROAIR HFA 90 mcg/actuation inhaler USE 2 INHALATIONS EVERY 4 HOURS AS NEEDED 3 Inhaler 3    EPINEPHrine (EPIPEN) 0.3 mg/0.3 mL injection 0.3 mL by IntraMUSCular route once as needed for up to 1 dose. 1 Syringe 1    melatonin 3 mg tablet Take  by mouth.  Cholecalciferol, Vitamin D3, (VITAMIN D3) 1,000 unit cap Take 3,000 Units by mouth daily.  cycloSPORINE (RESTASIS) 0.05 % ophthalmic emulsion Administer 1 Drop to both eyes two (2) times a day.  MULTIVITAMIN W-MINERALS/LUTEIN (CENTRUM SILVER PO) Take 1 Tab by mouth daily.  co-enzyme Q-10 (CO Q-10) 100 mg capsule Take 100 mg by mouth daily.  BIOTIN PO Take 5,000 mcg by mouth daily.  diclofenac (VOLTAREN) 1 % gel Apply 2 g to affected area three (3) times daily as needed.  100 g 0     Allergies   Allergen Reactions    Naprosyn [Naproxen] Other (comments)     Blood in urine       Family History   Problem Relation Age of Onset    Asthma Mother     COPD Mother    Jewell Specter MS Mother     Heart Disease Mother     Hypertension Mother     Stroke Father     Heart Disease Father     Hypertension Father     Allergic Rhinitis Sister     Asthma Brother     Hypertension Brother     Diabetes Brother     Migraines Paternal Grandmother      Social History     Tobacco Use    Smoking status: Former Smoker     Years: 5.00     Types: Cigarettes     Last attempt to quit: 1970     Years since quittin.6    Smokeless tobacco: Never Used   Substance Use Topics    Alcohol use: Yes     Alcohol/week: 1.0 standard drinks     Types: 1 Glasses of wine per week     Frequency: Monthly or less     Comment: rare     Patient Active Problem List   Diagnosis Code    Multiple lung nodules R91.8    Allergic rhinitis J30.9    HTN (hypertension) I10    Asthma J45.909    Osteopenia M85.80    Vitamin D deficiency E55.9    Right knee DJD, 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    Tubular adenoma of colon D12.6    Chronic right-sided low back pain with sciatica M54.40, G89.29    Lumbar spinal stenosis M48.061    HNP (herniated nucleus pulposus), lumbar M51.26    Lumbar facet arthropathy M47.816    Cervical stenosis of spine M48.02    LGSIL of cervix of undetermined significance R87.612    Heel spur, left M77.32    Severe obesity (HCC) E66.01       Depression Risk Factor Screening:     3 most recent PHQ Screens 8/22/2019   Little interest or pleasure in doing things Not at all   Feeling down, depressed, irritable, or hopeless Not at all   Total Score PHQ 2 0     Alcohol Risk Factor Screening: You do not drink alcohol or very rarely. Functional Ability and Level of Safety:   Hearing Loss  Hearing is good. Activities of Daily Living  The home contains: no safety equipment. Patient does total self care   She does not need any assistance with ADLs/IADLs. Fall Risk  Fall Risk Assessment, last 12 mths 8/22/2019   Able to walk? Yes   Fall in past 12 months? No       Abuse Screen  Patient is not abused     ROS:  Gen: No fever/chlls  HEENT: No sore throat, eye pain, ear pain, or congestion.  No HA  CV: No CP  Resp: No cough/SOB  GI: No abdominal pain  : No hematuria/dysuria  Derm: No rash  Neuro: No new paresthesias/weakness  Musc: No new myalgias/jt pain  Psych: No depression sx      Cognitive Screening   Evaluation of Cognitive Function:  Has your family/caregiver stated any concerns about your memory: no  Normal    Visit Vitals  /78 Pulse 66   Temp 97 °F (36.1 °C) (Oral)   Resp 16   Ht 5' 1\" (1.549 m)   Wt 172 lb (78 kg)   SpO2 96%   BMI 32.50 kg/m²     General:  Alert, cooperative, no distress   Head:  Normocephalic, without obvious abnormality, atraumatic. Eyes:  Conjunctivae clear   Ears:  Clear external auditory canals   Nose: Nares normal. Septum midline. Mucosa normal. No drainage or sinus tenderness. Throat: Lips, mucosa, and tongue normal. Unremarkable oropharynx   Neck: Supple, symmetrical, trachea midline, no adenopathy. Lungs:   Clear to auscultation bilaterally. Heart:  Regular rate and rhythm, S1, S2 normal, no murmur, click, rub or gallop. Abdomen:   Soft, non-tender. Bowel sounds normal. No masses. Extremities: Extremities normal no edema. Pulses: +2 radial pulses b/l   Skin:  No rashes or lesions. Lymph nodes: Cervical LN normal.   Neurologic:  Psych: Stable gait  Euthymic     3/3 short-term recall. Unremarkable clock drawing exercise. Patient Care Team   Patient Care Team:  Ankit Boo MD as PCP - General (Family Practice)  Hillary Choudhary MD (Pulmonary Disease)    Assessment/Plan   Education and counseling provided:  Are appropriate based on today's review and evaluation  End-of-Life planning (with patient's consent)  Pneumococcal Vaccine  Influenza Vaccine  Screening Mammography  Screening Pap and pelvic (covered once every 2 years)  Colorectal cancer screening tests  Cardiovascular screening blood test  Bone mass measurement (DEXA)  Screening for glaucoma  Diabetes screening test    Diagnoses and all orders for this visit:    1. Essential hypertension  -     HEMOGLOBIN A1C W/O EAG; Future  -     LIPID PANEL; Future  -     METABOLIC PANEL, COMPREHENSIVE; Future  -     MICROALBUMIN, UR, RAND W/ MICROALB/CREAT RATIO; Future    2. Hyperglycemia  -     HEMOGLOBIN A1C W/O EAG; Future  -     METABOLIC PANEL, COMPREHENSIVE; Future    3.  Screening for colon cancer  -     REFERRAL TO GASTROENTEROLOGY        Health Maintenance Due   Topic Date Due    Shingrix Vaccine Age 49> (1 of 2) 02/01/1992    MEDICARE YEARLY EXAM  04/05/2019    COLONOSCOPY  01/13/2020       Health Maintenance:  - Placing a referral for colon cancer screening due in January 2020. Advance Care Planning (ACP) Provider Conversation Snapshot    Date of ACP Conversation: 08/22/19  Persons included in Conversation:  patient  Length of ACP Conversation in minutes:  <16 minutes (Non-Billable)    Authorized Decision Maker (if patient is incapable of making informed decisions): This person is:   Healthcare Agent/Medical Power of  under Advance Directive - see below    For Patients with Decision Making Capacity:   Values/Goals: Exploration of values, goals, and preferences if recovery is not expected, even with continued medical treatment in the event of:  Imminent death  Severe, permanent brain injury    Conversation Outcomes / Follow-Up Plan:   Recommended completion of Advance Directive form after review of ACP materials and conversation with prospective healthcare agent . The patient was given an advance directive to complete. She wants her , Mr. Trish Bauer, to be her POA. She is interested in her sister as being her successor POA.

## 2019-08-22 NOTE — PROGRESS NOTES
Gemma Hickey presents today for   Chief Complaint   Patient presents with    Hypertension    Foot Swelling     patient reports having bilateral feet swelling x 1 week. Patient states that this happens in the evening. Patient denies any pain    Annual Wellness Visit              Depression Screening:  3 most recent PHQ Screens 7/10/2019   Little interest or pleasure in doing things Not at all   Feeling down, depressed, irritable, or hopeless Not at all   Total Score PHQ 2 0       Learning Assessment:  Learning Assessment 3/19/2014   PRIMARY LEARNER Patient   HIGHEST LEVEL OF EDUCATION - PRIMARY LEARNER  4 YEARS OF COLLEGE   BARRIERS PRIMARY LEARNER NONE   CO-LEARNER CAREGIVER No   PRIMARY LANGUAGE ENGLISH   LEARNER PREFERENCE PRIMARY LISTENING   ANSWERED BY patient   RELATIONSHIP SELF       Abuse Screening:  Abuse Screening Questionnaire 8/22/2019   Do you ever feel afraid of your partner? N   Are you in a relationship with someone who physically or mentally threatens you? N   Is it safe for you to go home? Y       Fall Risk  Fall Risk Assessment, last 12 mths 7/10/2019   Able to walk? Yes   Fall in past 12 months? No           Coordination of Care:  1. Have you been to the ER, urgent care clinic since your last visit? Hospitalized since your last visit? no    2. Have you seen or consulted any other health care providers outside of the [address] [place] [address] since your last visit? Include any pap smears or colon screening. yes      Advance Directive:  1. Do you have an advance directive in place? Patient Reply:no    2. If not, would you like material regarding how to put one in place?  Patient Reply: no

## 2019-08-22 NOTE — PATIENT INSTRUCTIONS
High Blood Pressure: Care Instructions  Overview    It's normal for blood pressure to go up and down throughout the day. But if it stays up, you have high blood pressure. Another name for high blood pressure is hypertension. Despite what a lot of people think, high blood pressure usually doesn't cause headaches or make you feel dizzy or lightheaded. It usually has no symptoms. But it does increase your risk of stroke, heart attack, and other problems. You and your doctor will talk about your risks of these problems based on your blood pressure. Your doctor will give you a goal for your blood pressure. Your goal will be based on your health and your age. Lifestyle changes, such as eating healthy and being active, are always important to help lower blood pressure. You might also take medicine to reach your blood pressure goal.  Follow-up care is a key part of your treatment and safety. Be sure to make and go to all appointments, and call your doctor if you are having problems. It's also a good idea to know your test results and keep a list of the medicines you take. How can you care for yourself at home? Medical treatment  · If you stop taking your medicine, your blood pressure will go back up. You may take one or more types of medicine to lower your blood pressure. Be safe with medicines. Take your medicine exactly as prescribed. Call your doctor if you think you are having a problem with your medicine. · Talk to your doctor before you start taking aspirin every day. Aspirin can help certain people lower their risk of a heart attack or stroke. But taking aspirin isn't right for everyone, because it can cause serious bleeding. · See your doctor regularly. You may need to see the doctor more often at first or until your blood pressure comes down. · If you are taking blood pressure medicine, talk to your doctor before you take decongestants or anti-inflammatory medicine, such as ibuprofen.  Some of these medicines can raise blood pressure. · Learn how to check your blood pressure at home. Lifestyle changes  · Stay at a healthy weight. This is especially important if you put on weight around the waist. Losing even 10 pounds can help you lower your blood pressure. · If your doctor recommends it, get more exercise. Walking is a good choice. Bit by bit, increase the amount you walk every day. Try for at least 30 minutes on most days of the week. You also may want to swim, bike, or do other activities. · Avoid or limit alcohol. Talk to your doctor about whether you can drink any alcohol. · Try to limit how much sodium you eat to less than 2,300 milligrams (mg) a day. Your doctor may ask you to try to eat less than 1,500 mg a day. · Eat plenty of fruits (such as bananas and oranges), vegetables, legumes, whole grains, and low-fat dairy products. · Lower the amount of saturated fat in your diet. Saturated fat is found in animal products such as milk, cheese, and meat. Limiting these foods may help you lose weight and also lower your risk for heart disease. · Do not smoke. Smoking increases your risk for heart attack and stroke. If you need help quitting, talk to your doctor about stop-smoking programs and medicines. These can increase your chances of quitting for good. When should you call for help? Call 911 anytime you think you may need emergency care. This may mean having symptoms that suggest that your blood pressure is causing a serious heart or blood vessel problem. Your blood pressure may be over 180/120.   For example, call 911 if:    · You have symptoms of a heart attack. These may include:  ? Chest pain or pressure, or a strange feeling in the chest.  ? Sweating. ? Shortness of breath. ? Nausea or vomiting. ? Pain, pressure, or a strange feeling in the back, neck, jaw, or upper belly or in one or both shoulders or arms. ? Lightheadedness or sudden weakness.   ? A fast or irregular heartbeat.     · You have symptoms of a stroke. These may include:  ? Sudden numbness, tingling, weakness, or loss of movement in your face, arm, or leg, especially on only one side of your body. ? Sudden vision changes. ? Sudden trouble speaking. ? Sudden confusion or trouble understanding simple statements. ? Sudden problems with walking or balance. ? A sudden, severe headache that is different from past headaches.     · You have severe back or belly pain.    Do not wait until your blood pressure comes down on its own. Get help right away.   Call your doctor now or seek immediate care if:    · Your blood pressure is much higher than normal (such as 180/120 or higher), but you don't have symptoms.     · You think high blood pressure is causing symptoms, such as:  ? Severe headache.  ? Blurry vision.    Watch closely for changes in your health, and be sure to contact your doctor if:    · Your blood pressure measures higher than your doctor recommends at least 2 times. That means the top number is higher or the bottom number is higher, or both.     · You think you may be having side effects from your blood pressure medicine. Where can you learn more? Go to http://keke-josé luis.info/. Enter H632 in the search box to learn more about \"High Blood Pressure: Care Instructions. \"  Current as of: July 22, 2018  Content Version: 12.1  © 1447-0445 [employer]. Care instructions adapted under license by [employer] (which disclaims liability or warranty for this information). If you have questions about a medical condition or this instruction, always ask your healthcare professional. [place] [address] any warranty or liability for your use of this information. Medicare Part B Preventive Services Limitations Recommendation Scheduled   Bone Mass Measurement  (age 72 & older, biennial) Requires diagnosis related to osteoporosis or estrogen deficiency.  Biennial benefit unless patient has history of long-term glucocorticoid tx or baseline is needed because initial test was by other method This should be done at age 72 and again if on osteoporosis medication at 2-3 year intervals. Up to date   Cardiovascular Screening Blood Tests (every 5 years)  Total cholesterol, HDL, Triglycerides Order as a panel if possible We should check your cholesterol panel at least once every 5 years. Up to date   Colorectal Cancer Screening  -Fecal occult blood test (annual)  -Flexible sigmoidoscopy (5y)  -Screening colonoscopy (10y)  -Barium Enema  Due per your Gastroenterologist's recommendations. Overdue   Counseling to Prevent Tobacco Use (up to 8 sessions per year)  - Counseling greater than 3 and up to 10 minutes  - Counseling greater than 10 minutes Patients must be asymptomatic of tobacco-related conditions to receive as preventive service Continue with smoking cessation    Diabetes Screening Tests (at least every 3 years, Medicare covers annually or at 6-month intervals for prediabetic patients)    Fasting blood sugar (FBS) or glucose tolerance test (GTT) Patient must be diagnosed with one of the following:  -Hypertension, Dyslipidemia, obesity, previous impaired FBS or GTT  Or any two of the following: overweight, FH of diabetes, age ? 72, history of gestational diabetes, birth of baby weighing more than 9 pounds Should be done yearly Up to date   Diabetes Self-Management Training (DSMT) (no USPSTF recommendation) Requires referral by treating physician for patient with diabetes or renal disease. 10 hours of initial DSMT session of no less than 30 minutes each in a continuous 12-month period. 2 hours of follow-up DSMT in subsequent years. Not applicable Not applicable   Glaucoma Screening (no USPSTF recommendation) Diabetes mellitus, family history, , age 48 or over,  American, age 72 or over Continue with annual eye exams.  Up to date   Human Immunodeficiency Virus (HIV) Screening (annually for increased risk patients)  HIV-1 and HIV-2 by EIA, LC, rapid antibody test, or oral mucosa transudate Patient must be at increased risk for HIV infection per USPSTF guidelines or pregnant. Tests covered annually for patients at increased risk. Pregnant patients may receive up to 3 test during pregnancy. Not applicable Not applicable   Medical Nutrition Therapy (MNT) (for diabetes or renal disease not recommended schedule) Requires referral by treating physician for patient with diabetes or renal disease. Can be provided in same year as diabetes self-management training (DSMT), and CMS recommends medical nutrition therapy take place after DSMT. Up to 3 hours for initial year and 2 hours in subsequent years. Not applicable Not applicable   Shingles Vaccination A shingles vaccine is also recommended once in a lifetime after age 61 Vaccination recommended for shingles vaccination. Overdue   Seasonal Influenza Vaccination (annually)  Continue with yearly \"flu\" shot annually Overdue   Pneumococcal Vaccination (once after 65)  Please receive this vaccination at age 72. Up to date   Hepatitis B Vaccinations (if medium/high risk) Medium/high risk factors:  End-stage renal disease,  Hemophiliacs who received Factor VIII or IX concentrates, Clients of institutions for the mentally retarded, Persons who live in the same house as a HepB virus carrier, Homosexual men, Illicit injectable drug abusers. Not applicable Not applicable   Screening Mammography (biennial age 54-69) Annually (age 36 or over) You need a mammogram yearly to screen for breast cancer. Up to date   Screening Pap Tests and Pelvic Examination (up to age 79 and after 79 if unknown history or abnormal study last 10 years) Every 24 months except high risk You need no Pap smear at this time.  Discussed tday   Ultrasound Screening for Abdominal Aortic Aneurysm (AAA) (once) Patient must be referred through [facility] and not have had a screening for abdominal aortic aneurysm before under Medicare. Limited to patients who meet one of the following criteria:  - Men who are 73-68 years old and have smoked more than 100 cigarettes in their lifetime.  -Anyone with a FH of AAA  -Anyone recommended for screening by USPSTF Not applicable Not applicable         Medicare Wellness Visit, Female     The best way to live healthy is to have a lifestyle where you eat a well-balanced diet, exercise regularly, limit alcohol use, and quit all forms of tobacco/nicotine, if applicable. Regular preventive services are another way to keep healthy. Preventive services (vaccines, screening tests, monitoring & exams) can help personalize your care plan, which helps you manage your own care. Screening tests can find health problems at the earliest stages, when they are easiest to treat. [facility] follows the current, evidence-based guidelines published by the [facility] ([facility]) when recommending preventive services for our patients. Because we follow these guidelines, sometimes recommendations change over time as research supports it. (For example, mammograms used to be recommended annually. Even though Medicare will still pay for an annual mammogram, the newer guidelines recommend a mammogram every two years for women of average risk.)  Of course, you and your doctor may decide to screen more often for some diseases, based on your risk and your health status. Preventive services for you include:  - Medicare offers their members a free annual wellness visit, which is time for you and your primary care provider to discuss and plan for your preventive service needs. Take advantage of this benefit every year!  -All adults over the age of 72 should receive the recommended pneumonia vaccines.  Current USPSTF guidelines recommend a series of two vaccines for the best pneumonia protection.   -All adults should have a flu vaccine yearly and a tetanus vaccine every 10 years. All adults age 61 and older should receive a shingles vaccine once in their lifetime.    -A bone mass density test is recommended when a woman turns 65 to screen for osteoporosis. This test is only recommended one time, as a screening. Some providers will use this same test as a disease monitoring tool if you already have osteoporosis. -All adults age 38-68 who are overweight should have a diabetes screening test once every three years.   -Other screening tests and preventive services for persons with diabetes include: an eye exam to screen for diabetic retinopathy, a kidney function test, a foot exam, and stricter control over your cholesterol.   -Cardiovascular screening for adults with routine risk involves an electrocardiogram (ECG) at intervals determined by your doctor.   -Colorectal cancer screenings should be done for adults age 54-65 with no increased risk factors for colorectal cancer. There are a number of acceptable methods of screening for this type of cancer. Each test has its own benefits and drawbacks. Discuss with your doctor what is most appropriate for you during your annual wellness visit. The different tests include: colonoscopy (considered the best screening method), a fecal occult blood test, a fecal DNA test, and sigmoidoscopy. -Breast cancer screenings are recommended every other year for women of normal risk, age 54-69.  -Cervical cancer screenings for women over age 72 are only recommended with certain risk factors.   -All adults born between [facility] should be screened once for Hepatitis C.      Here is a list of your current Health Maintenance items (your personalized list of preventive services) with a due date:  Health Maintenance Due   Topic Date Due    Shingles Vaccine (1 of 2) 02/01/1992    Annual Well Visit  04/05/2019    Colonoscopy  01/13/2020         Medicare Wellness Visit, Female     The best way to live healthy is to have a lifestyle where you eat a well-balanced diet, exercise regularly, limit alcohol use, and quit all forms of tobacco/nicotine, if applicable. Regular preventive services are another way to keep healthy. Preventive services (vaccines, screening tests, monitoring & exams) can help personalize your care plan, which helps you manage your own care. Screening tests can find health problems at the earliest stages, when they are easiest to treat. [facility] follows the current, evidence-based guidelines published by the [facility] ([facility]) when recommending preventive services for our patients. Because we follow these guidelines, sometimes recommendations change over time as research supports it. (For example, mammograms used to be recommended annually. Even though Medicare will still pay for an annual mammogram, the newer guidelines recommend a mammogram every two years for women of average risk.)  Of course, you and your doctor may decide to screen more often for some diseases, based on your risk and your health status. Preventive services for you include:  - Medicare offers their members a free annual wellness visit, which is time for you and your primary care provider to discuss and plan for your preventive service needs. Take advantage of this benefit every year!  -All adults over the age of 72 should receive the recommended pneumonia vaccines. Current USPSTF guidelines recommend a series of two vaccines for the best pneumonia protection.   -All adults should have a flu vaccine yearly and a tetanus vaccine every 10 years. All adults age 61 and older should receive a shingles vaccine once in their lifetime.    -A bone mass density test is recommended when a woman turns 65 to screen for osteoporosis. This test is only recommended one time, as a screening. Some providers will use this same test as a disease monitoring tool if you already have osteoporosis.   -All adults age 38-68 who are overweight should have a diabetes screening test once every three years.   -Other screening tests and preventive services for persons with diabetes include: an eye exam to screen for diabetic retinopathy, a kidney function test, a foot exam, and stricter control over your cholesterol.   -Cardiovascular screening for adults with routine risk involves an electrocardiogram (ECG) at intervals determined by your doctor.   -Colorectal cancer screenings should be done for adults age 54-65 with no increased risk factors for colorectal cancer. There are a number of acceptable methods of screening for this type of cancer. Each test has its own benefits and drawbacks. Discuss with your doctor what is most appropriate for you during your annual wellness visit. The different tests include: colonoscopy (considered the best screening method), a fecal occult blood test, a fecal DNA test, and sigmoidoscopy. -Breast cancer screenings are recommended every other year for women of normal risk, age 54-69.  -Cervical cancer screenings for women over age 72 are only recommended with certain risk factors.   -All adults born between [facility] should be screened once for Hepatitis C.      Here is a list of your current Health Maintenance items (your personalized list of preventive services) with a due date:  Health Maintenance Due   Topic Date Due    Shingles Vaccine (1 of 2) 02/01/1992    Annual Well Visit  04/05/2019    Colonoscopy  01/13/2020

## 2019-08-22 NOTE — ACP (ADVANCE CARE PLANNING)
Advance Care Planning (ACP) Provider Conversation Snapshot    Date of ACP Conversation: 08/22/19  Persons included in Conversation:  patient  Length of ACP Conversation in minutes:  <16 minutes (Non-Billable)    Authorized Decision Maker (if patient is incapable of making informed decisions): This person is:   Healthcare Agent/Medical Power of  under Advance Directive - see below    For Patients with Decision Making Capacity:   Values/Goals: Exploration of values, goals, and preferences if recovery is not expected, even with continued medical treatment in the event of:  Imminent death  Severe, permanent brain injury    Conversation Outcomes / Follow-Up Plan:   Recommended completion of Advance Directive form after review of ACP materials and conversation with prospective healthcare agent . The patient was given an advance directive to complete. She wants her , Mr. Jessica Green, to be her POA. She is interested in her sister as being her successor POA.     Dr. Issa Washington  [facility] [address], [address] [place] [address] Str.  Phone: [phone]  Fax: [phone]

## 2019-08-22 NOTE — PROGRESS NOTES
INTERNISTS OF [facility]:  8/22/2019, MRN: [id number]      Errol Keith is a 70 y.o. female and presents to clinic for Hypertension; Foot Swelling (patient reports having bilateral feet swelling x 1 week. Patient states that this happens in the evening. Patient denies any pain); and Annual Wellness Visit    Subjective: The patient is a 70-year-old female with history of hypertension, LGSIL 4/19, osteopenia, tubular adenomas colon polyps, obesity, vitamin D deficiency, stable pulmonary nodules, asthma (followed by [facility]), cervical disc disease, and lumbar disc disease (followed by Orthopedics). 1. Hypertension: Present for over 3 months. Taking 1/2 tab triamterene-HCTZ daily. No adverse side effects of taking this medicine. Bp is 119/78 today. She is not regularly exercising or dieting. Her weight today is 172lbs. Her blood glucose was mildly elevated in the past.  She reports some swelling in her legs at night. She does not drink a lot of water. Wt Readings from Last 3 Encounters:   08/22/19 172 lb (78 kg)   08/05/19 171 lb (77.6 kg)   07/10/19 171 lb 9.6 oz (77.8 kg)     2. LGSIL: Present for yrs. Followed by GYN. She was told to f/u with them in a year. She just had a PAP earlier this yr. No vaginal bleeding.         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 Max Daily Amount: 1,200 mg. Indications: Neuropathic Pain 20 Tab 0    triamterene-hydroCHLOROthiazide (MAXZIDE) 37.5-25 mg per tablet TAKE ONE-HALF (1/2) TABLET DAILY 90 Tab 2    fexofenadine (ALLEGRA) 180 mg tablet Take  by mouth.  fluticasone (FLONASE) 50 mcg/actuation nasal spray USE 2 SPRAYS IN EACH NOSTRIL DAILY 48 g 2    PROAIR HFA 90 mcg/actuation inhaler USE 2 INHALATIONS EVERY 4 HOURS AS NEEDED 3 Inhaler 3    EPINEPHrine (EPIPEN) 0.3 mg/0.3 mL injection 0.3 mL by IntraMUSCular route once as needed for up to 1 dose. 1 Syringe 1    melatonin 3 mg tablet Take  by mouth.  Cholecalciferol, Vitamin D3, (VITAMIN D3) 1,000 unit cap Take 3,000 Units by mouth daily.  cycloSPORINE (RESTASIS) 0.05 % ophthalmic emulsion Administer 1 Drop to both eyes two (2) times a day.  MULTIVITAMIN W-MINERALS/LUTEIN (CENTRUM SILVER PO) Take 1 Tab by mouth daily.  co-enzyme Q-10 (CO Q-10) 100 mg capsule Take 100 mg by mouth daily.  BIOTIN PO Take 5,000 mcg by mouth daily.  diclofenac (VOLTAREN) 1 % gel Apply 2 g to affected area three (3) times daily as needed.  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 (HCC)     Fracture     rt ankle as an adult    History of pneumonia     Hypertension     Ill-defined condition     Spasms to left side    Left arm pain     Lumbar spinal stenosis     Myofascial pain     Osteopenia     Sciatica of right side        Past Surgical History:   Procedure Laterality Date    HX CATARACT REMOVAL  8/15/2011    HX HEENT      sinus surgery    HX KERATOPLASTY Left 10/02/2017    HX ORTHOPAEDIC      left 4th finger trigger release    HX SHIELA AND BSO  1970s    uterine fibroids       Family History   Problem Relation Age of Onset    Asthma Mother     COPD Mother    [place]le[place] MS Mother     Heart Disease Mother     Hypertension Mother     Stroke Father     Heart Disease Father     Hypertension Father     Allergic Rhinitis Sister     Asthma Brother     Hypertension Brother     Diabetes Brother     Migraines Paternal Grandmother        Social History     Tobacco Use    Smoking status: Former Smoker     Years: 5.00     Types: Cigarettes     Last attempt to quit: 1970     Years since quittin.6    Smokeless tobacco: Never Used   Substance Use Topics    Alcohol use: Yes     Alcohol/week: 1.0 standard drinks     Types: 1 Glasses of wine per week     Frequency: Monthly or less     Comment: rare       ROS   Review of Systems   Constitutional: Negative for chills, fever and malaise/fatigue. HENT: Negative for ear pain and sore throat. Eyes: Negative for blurred vision and pain. Respiratory: Negative for cough and shortness of breath. Cardiovascular: Negative for chest pain. Gastrointestinal: Negative for abdominal pain, blood in stool and melena. Genitourinary: Negative for dysuria and hematuria. Musculoskeletal: Positive for back pain and joint pain. Negative for myalgias. Skin: Negative for rash. Neurological: Positive for tingling. Negative for focal weakness and headaches. Endo/Heme/Allergies: Does not bruise/bleed easily. Psychiatric/Behavioral: Negative for substance abuse.        Objective     Vitals:    19 0748   BP: 119/78   Pulse: 66   Resp: 16   Temp: 97 °F (36.1 °C)   TempSrc: Oral   SpO2: 96%   Weight: 172 lb (78 kg)   Height: 5' 1\" (1.549 m)   PainSc:   0 - No pain       Physical Exam   Constitutional: She is oriented to person, place, and time and well-developed, well-nourished, and in no distress. HENT:   Head: Normocephalic and atraumatic. Right Ear: External ear normal.   Left Ear: External ear normal.   Nose: Nose normal.   Mouth/Throat: Oropharynx is clear and moist. No oropharyngeal exudate. Eyes: Pupils are equal, round, and reactive to light. Conjunctivae and EOM are normal. Right eye exhibits no discharge. Left eye exhibits no discharge. No scleral icterus. Neck: Neck supple. Cardiovascular: Normal rate, regular rhythm, normal heart sounds and intact distal pulses. Exam reveals no gallop and no friction rub. No murmur heard. Pulmonary/Chest: Effort normal and breath sounds normal. No respiratory distress. She has no wheezes. She has no rales. Abdominal: Soft. Bowel sounds are normal. She exhibits no distension. There is no tenderness. There is no rebound and no guarding. Musculoskeletal: She exhibits no edema or tenderness (BUE). Lymphadenopathy:     She has no cervical adenopathy. Neurological: She is alert and oriented to person, place, and time. She exhibits normal muscle tone. Gait normal.   Skin: Skin is warm and dry. No erythema. Psychiatric: Affect normal.   Nursing note and vitals reviewed.       LABS   Data Review:   Lab Results   Component Value Date/Time    WBC 4.8 02/19/2019 10:17 AM    HGB 14.6 02/19/2019 10:17 AM    HCT 45.5 (H) 02/19/2019 10:17 AM    PLATELET 805 58/87/5013 10:17 AM    MCV 90.1 02/19/2019 10:17 AM       Lab Results   Component Value Date/Time    Sodium 142 02/19/2019 10:17 AM    Potassium 4.1 02/19/2019 10:17 AM    Chloride 105 02/19/2019 10:17 AM    CO2 30 02/19/2019 10:17 AM    Anion gap 7 02/19/2019 10:17 AM    Glucose 96 02/19/2019 10:17 AM    BUN 12 02/19/2019 10:17 AM    Creatinine 0.78 02/19/2019 10:17 AM    BUN/Creatinine ratio 15 02/19/2019 10:17 AM    GFR est AA >60 02/19/2019 10:17 AM    GFR est non-AA >60 02/19/2019 10:17 AM    Calcium 9.1 02/19/2019 10:17 AM       Lab Results   Component Value Date/Time    Cholesterol, total 171 02/19/2019 10:17 AM    HDL Cholesterol 70 (H) 02/19/2019 10:17 AM    LDL, calculated 80.6 02/19/2019 10:17 AM    VLDL, calculated 20.4 02/19/2019 10:17 AM    Triglyceride 102 02/19/2019 10:17 AM    CHOL/HDL Ratio 2.4 02/19/2019 10:17 AM       No results found for: HBA1C, HGBE8, VCJ4QHWJ, LIY9TYVK, XYP3DPYZ    Assessment/Plan:   1. Hypertension: There is no edema on today's exam.  Her BP is too low to add any additional medication. I advised her to elevate her legs as needed for positional edema.  -Continue with medication as prescribed. - I encouraged her to reduce her portions and to exercise regularly. I will recheck her weight at her follow-up appointment.  -Checking a lipid panel, CMP, urine protein screen, and an A1c just before her follow-up appointment. 2. LGSIL Hx:   - C/w GYN f/u      Health Maintenance Due   Topic Date Due    Shingrix Vaccine Age 49> (1 of 2) 02/01/1992    MEDICARE YEARLY EXAM  04/05/2019    COLONOSCOPY  01/13/2020     Lab review: labs are reviewed in the EHR and ordered as mentioned above    I have discussed the diagnosis with the patient and the intended plan as seen in the above orders. The patient has received an after-visit summary and questions were answered concerning future plans. I have discussed medication side effects and warnings with the patient as well. I have reviewed the plan of care with the patient, accepted their input and they are in agreement with the treatment goals. All questions were answered. The patient understands the plan of care. Handouts provided today with above information. Pt instructed if symptoms worsen to call the office or report to the ED for continued care. Greater than 50% of the visit time was spent in counseling and/or coordination of care. Voice recognition was used to generate this report, which may have resulted in some phonetic based errors in grammar and contents.  Even though attempts were made to correct all the mistakes, some may have been missed, and remained in the body of the document. Follow-up and Dispositions    · Return in about 6 months (around 2/22/2020) for BP check, lab results. Rodolfo Amaya MD

## 2019-10-04 ENCOUNTER — OFFICE VISIT (OUTPATIENT)
Dept: INTERNAL MEDICINE CLINIC | Age: 79
End: 2019-10-04

## 2019-10-04 VITALS
TEMPERATURE: 97.3 F | OXYGEN SATURATION: 93 % | BODY MASS INDEX: 32.59 KG/M2 | HEART RATE: 73 BPM | HEIGHT: 61 IN | RESPIRATION RATE: 14 BRPM | WEIGHT: 172.6 LBS | DIASTOLIC BLOOD PRESSURE: 77 MMHG | SYSTOLIC BLOOD PRESSURE: 127 MMHG

## 2019-10-04 DIAGNOSIS — J20.9 ACUTE BRONCHITIS, UNSPECIFIED ORGANISM: Primary | ICD-10-CM

## 2019-10-04 RX ORDER — AZITHROMYCIN 250 MG/1
TABLET, FILM COATED ORAL
Qty: 6 TAB | Refills: 0 | Status: SHIPPED | OUTPATIENT
Start: 2019-10-04 | End: 2019-11-01

## 2019-10-04 NOTE — PATIENT INSTRUCTIONS
Health Maintenance Due   Topic Date Due    Shingrix Vaccine Age 49> (1 of 2) 02/01/1992    COLONOSCOPY  01/13/2020          Bronchitis: Care Instructions  Your Care Instructions    Bronchitis is inflammation of the bronchial tubes, which carry air to the lungs. The tubes swell and produce mucus, or phlegm. The mucus and inflamed bronchial tubes make you cough. You may have trouble breathing. Most cases of bronchitis are caused by viruses like those that cause colds. Antibiotics usually do not help and they may be harmful. Bronchitis usually develops rapidly and lasts about 2 to 3 weeks in otherwise healthy people. Follow-up care is a key part of your treatment and safety. Be sure to make and go to all appointments, and call your doctor if you are having problems. It's also a good idea to know your test results and keep a list of the medicines you take. How can you care for yourself at home? · Take all medicines exactly as prescribed. Call your doctor if you think you are having a problem with your medicine. · Get some extra rest.  · Take an over-the-counter pain medicine, such as acetaminophen (Tylenol), ibuprofen (Advil, Motrin), or naproxen (Aleve) to reduce fever and relieve body aches. Read and follow all instructions on the label. · Do not take two or more pain medicines at the same time unless the doctor told you to. Many pain medicines have acetaminophen, which is Tylenol. Too much acetaminophen (Tylenol) can be harmful. · Take an over-the-counter cough medicine that contains dextromethorphan to help quiet a dry, hacking cough so that you can sleep. Avoid cough medicines that have more than one active ingredient. Read and follow all instructions on the label. · Breathe moist air from a humidifier, hot shower, or sink filled with hot water. The heat and moisture will thin mucus so you can cough it out. · Do not smoke. Smoking can make bronchitis worse.  If you need help quitting, talk to your doctor about stop-smoking programs and medicines. These can increase your chances of quitting for good. When should you call for help? Call 911 anytime you think you may need emergency care. For example, call if:    · You have severe trouble breathing.    Call your doctor now or seek immediate medical care if:    · You have new or worse trouble breathing.     · You cough up dark brown or bloody mucus (sputum).     · You have a new or higher fever.     · You have a new rash.    Watch closely for changes in your health, and be sure to contact your doctor if:    · You cough more deeply or more often, especially if you notice more mucus or a change in the color of your mucus.     · You are not getting better as expected. Where can you learn more? Go to http://keke-josé luis.info/. Enter H333 in the search box to learn more about \"Bronchitis: Care Instructions. \"  Current as of: June 9, 2019  Content Version: 12.2  © 8485-4130 [employer]. Care instructions adapted under license by [employer] (which disclaims liability or warranty for this information). If you have questions about a medical condition or this instruction, always ask your healthcare professional. [address] any warranty or liability for your use of this information.

## 2019-10-04 NOTE — PROGRESS NOTES
INTERNISTS OF [facility]:  10/4/2019, MRN: [id number]      Darell Moore is a 70 y.o. female and presents to clinic for Cold Symptoms (x 7 days); Cough (coughing up yellow phlegm ); and Wheezing (with some shortness of breath)    Subjective: The patient is a 82-year-old female with history of hypertension, LGSIL 4/19, osteopenia, tubular adenomas colon polyps, obesity, vitamin D deficiency, stable pulmonary nodules, asthma (followed by [facility]), cervical disc disease, and lumbar disc disease (followed by Orthopedics). Respiratory Tract Infection: Present: 7 days. Sx include: productive cough (yellow), fatigue, wheezing, and SOB. Sick contacts: none. Alleviating factors: allegra does not seem to be helping. Associated sx: rhinorrhea. No sore throat or fever/chills. She reports worsening of sx. She has pressure like sinus pain. 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 Max Daily Amount: 1,200 mg. Indications: Neuropathic Pain 20 Tab 0    triamterene-hydroCHLOROthiazide (MAXZIDE) 37.5-25 mg per tablet TAKE ONE-HALF (1/2) TABLET DAILY 90 Tab 2    fexofenadine (ALLEGRA) 180 mg tablet Take 180 mg by mouth daily.       fluticasone (FLONASE) 50 mcg/actuation nasal spray USE 2 SPRAYS IN EACH NOSTRIL DAILY 48 g 2    EPINEPHrine (EPIPEN) 0.3 mg/0.3 mL injection 0.3 mL by IntraMUSCular route once as needed for up to 1 dose. 1 Syringe 1    melatonin 3 mg tablet Take 3 mg by mouth daily as needed (sleep).  Cholecalciferol, Vitamin D3, (VITAMIN D3) 1,000 unit cap Take 3,000 Units by mouth daily.  cycloSPORINE (RESTASIS) 0.05 % ophthalmic emulsion Administer 1 Drop to both eyes two (2) times a day.  MULTIVITAMIN W-MINERALS/LUTEIN (CENTRUM SILVER PO) Take 1 Tab by mouth daily.  co-enzyme Q-10 (CO Q-10) 100 mg capsule Take 100 mg by mouth daily.  BIOTIN PO Take 5,000 mcg by mouth daily.  diclofenac (VOLTAREN) 1 % gel Apply 2 g to affected area three (3) times daily as needed.  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 (HCC)     Fracture     rt ankle as an adult    History of pneumonia     Hypertension     Ill-defined condition     Spasms to left side    Left arm pain     Lumbar spinal stenosis     Myofascial pain     Osteopenia     Sciatica of right side        Past Surgical History:   Procedure Laterality Date    HX CATARACT REMOVAL  8/15/2011    HX HEENT      sinus surgery    HX KERATOPLASTY Left 10/02/2017    HX ORTHOPAEDIC      left 4th finger trigger release    HX SHIELA AND BSO  1970s    uterine fibroids       Family History   Problem Relation Age of Onset    Asthma Mother     COPD Mother     MS Mother     Heart Disease Mother     Hypertension Mother     Stroke Father     Heart Disease Father  Hypertension Father     Allergic Rhinitis Sister     Asthma Brother     Hypertension Brother     Diabetes Brother     Migraines Paternal Grandmother        Social History     Tobacco Use    Smoking status: Former Smoker     Years: 5.00     Types: Cigarettes     Last attempt to quit: 1970     Years since quittin.7    Smokeless tobacco: Never Used   Substance Use Topics    Alcohol use: Yes     Alcohol/week: 1.0 standard drinks     Types: 1 Glasses of wine per week     Frequency: Monthly or less     Comment: rare       ROS   Review of Systems   Constitutional: Positive for malaise/fatigue. Negative for chills, fever and weight loss. HENT: Positive for congestion. Negative for ear pain and sore throat. Eyes: Negative for blurred vision and pain. Respiratory: Positive for cough, sputum production, shortness of breath and wheezing. Cardiovascular: Negative for chest pain. Gastrointestinal: Negative for abdominal pain, blood in stool and melena. Genitourinary: Negative for dysuria and hematuria. Musculoskeletal: Negative for joint pain and myalgias. Skin: Negative for rash. Neurological: Positive for headaches (sinus pressure). Negative for tingling and focal weakness. Endo/Heme/Allergies: Positive for environmental allergies. Does not bruise/bleed easily. Psychiatric/Behavioral: Negative for substance abuse. Objective     Vitals:    10/04/19 0847   BP: 127/77   Pulse: 73   Resp: 14   Temp: 97.3 °F (36.3 °C)   TempSrc: Oral   SpO2: 93%   Weight: 172 lb 9.6 oz (78.3 kg)   Height: 5' 1\" (1.549 m)   PainSc:   0 - No pain       Physical Exam   Constitutional: She is oriented to person, place, and time and well-developed, well-nourished, and in no distress. HENT:   Head: Normocephalic and atraumatic. Right Ear: External ear normal.   Left Ear: External ear normal.   Mouth/Throat: Oropharynx is clear and moist. No oropharyngeal exudate.    Clear TMs. +Nasal mucosa is erythematous. Maxillary sinus areas are tender to palpation   Eyes: Pupils are equal, round, and reactive to light. Conjunctivae and EOM are normal. Right eye exhibits no discharge. Left eye exhibits no discharge. No scleral icterus. Neck: Neck supple. Cardiovascular: Normal rate, regular rhythm, normal heart sounds and intact distal pulses. Exam reveals no gallop and no friction rub. No murmur heard. Pulmonary/Chest: Effort normal. No respiratory distress. She has no wheezes. Coarse breath sounds b/l   Abdominal: Soft. Bowel sounds are normal. She exhibits no distension. There is no tenderness. There is no rebound and no guarding. Musculoskeletal: She exhibits no edema or tenderness (Bue). Lymphadenopathy:     She has no cervical adenopathy. Neurological: She is alert and oriented to person, place, and time. She exhibits normal muscle tone. Gait normal.   Skin: Skin is warm and dry. No erythema. Psychiatric: Affect normal.   Nursing note and vitals reviewed.       LABS   Data Review:   Lab Results   Component Value Date/Time    WBC 4.8 02/19/2019 10:17 AM    HGB 14.6 02/19/2019 10:17 AM    HCT 45.5 (H) 02/19/2019 10:17 AM    PLATELET 697 20/80/2698 10:17 AM    MCV 90.1 02/19/2019 10:17 AM       Lab Results   Component Value Date/Time    Sodium 142 02/19/2019 10:17 AM    Potassium 4.1 02/19/2019 10:17 AM    Chloride 105 02/19/2019 10:17 AM    CO2 30 02/19/2019 10:17 AM    Anion gap 7 02/19/2019 10:17 AM    Glucose 96 02/19/2019 10:17 AM    BUN 12 02/19/2019 10:17 AM    Creatinine 0.78 02/19/2019 10:17 AM    BUN/Creatinine ratio 15 02/19/2019 10:17 AM    GFR est AA >60 02/19/2019 10:17 AM    GFR est non-AA >60 02/19/2019 10:17 AM    Calcium 9.1 02/19/2019 10:17 AM       Lab Results   Component Value Date/Time    Cholesterol, total 171 02/19/2019 10:17 AM    HDL Cholesterol 70 (H) 02/19/2019 10:17 AM    LDL, calculated 80.6 02/19/2019 10:17 AM    VLDL, calculated 20.4 02/19/2019 10:17 AM Triglyceride 102 02/19/2019 10:17 AM    CHOL/HDL Ratio 2.4 02/19/2019 10:17 AM       No results found for: HBA1C, HGBE8, YJF6LWKW, IVZ6NNRA, FZG8FQOG    Assessment/Plan:   Acute Bronchitis:   - Azithromycin ordered. - RTC if sx worsen  - Rest. Hydration with water. ORDERS:  - azithromycin (ZITHROMAX) 250 mg tablet; Take 500mg (2 tabs) on Day 1 and then 250mg (1 tab) daily on Days 2-5. Dispense: 6 Tab; Refill: 0      Health Maintenance Due   Topic Date Due    Shingrix Vaccine Age 49> (1 of 2) 02/01/1992    COLONOSCOPY  01/13/2020     Lab review: labs are reviewed in the EHR    I have discussed the diagnosis with the patient and the intended plan as seen in the above orders. The patient has received an after-visit summary and questions were answered concerning future plans. I have discussed medication side effects and warnings with the patient as well. I have reviewed the plan of care with the patient, accepted their input and they are in agreement with the treatment goals. All questions were answered. The patient understands the plan of care. Handouts provided today with above information. Pt instructed if symptoms worsen to call the office or report to the ED for continued care. Greater than 50% of the visit time was spent in counseling and/or coordination of care. Voice recognition was used to generate this report, which may have resulted in some phonetic based errors in grammar and contents. Even though attempts were made to correct all the mistakes, some may have been missed, and remained in the body of the document.           Dianne Holbrook MD

## 2019-10-04 NOTE — PROGRESS NOTES
Chief Complaint   Patient presents with    Cold Symptoms     x 7 days    Cough     coughing up yellow phlegm     Wheezing     with some shortness of breath       1. Have you been to the ER, urgent care clinic since your last visit? Hospitalized since your last visit? No    2. Have you seen or consulted any other health care providers outside of the [address] [place] [address] since your last visit? Include any pap smears or colon screening. No    Patient was given a copy of the Advanced Directive and understands to bring it in once completed.

## 2019-11-08 ENCOUNTER — ANESTHESIA EVENT (OUTPATIENT)
Dept: ENDOSCOPY | Age: 79
End: 2019-11-08
Payer: MEDICARE

## 2019-11-11 ENCOUNTER — ANESTHESIA (OUTPATIENT)
Dept: ENDOSCOPY | Age: 79
End: 2019-11-11
Payer: MEDICARE

## 2019-11-11 ENCOUNTER — HOSPITAL ENCOUNTER (OUTPATIENT)
Age: 79
Setting detail: OUTPATIENT SURGERY
Discharge: HOME | End: 2019-11-11
Attending: INTERNAL MEDICINE | Admitting: INTERNAL MEDICINE
Payer: MEDICARE

## 2019-11-11 VITALS
RESPIRATION RATE: 20 BRPM | BODY MASS INDEX: 32.28 KG/M2 | SYSTOLIC BLOOD PRESSURE: 134 MMHG | WEIGHT: 171 LBS | TEMPERATURE: 96.8 F | HEART RATE: 61 BPM | OXYGEN SATURATION: 98 % | DIASTOLIC BLOOD PRESSURE: 71 MMHG | HEIGHT: 61 IN

## 2019-11-11 LAB — COLONOSCOPY, EXTERNAL: NORMAL

## 2019-11-11 PROCEDURE — 88305 TISSUE EXAM BY PATHOLOGIST: CPT

## 2019-11-11 PROCEDURE — 77030018846 HC SOL IRR STRL H20 ICUM -A: Performed by: INTERNAL MEDICINE

## 2019-11-11 PROCEDURE — 74011250636 HC RX REV CODE- 250/636: Performed by: NURSE ANESTHETIST, CERTIFIED REGISTERED

## 2019-11-11 PROCEDURE — 77030021593 HC FCPS BIOP ENDOSC BSC -A: Performed by: INTERNAL MEDICINE

## 2019-11-11 PROCEDURE — 76040000019: Performed by: INTERNAL MEDICINE

## 2019-11-11 PROCEDURE — 76060000031 HC ANESTHESIA FIRST 0.5 HR: Performed by: INTERNAL MEDICINE

## 2019-11-11 PROCEDURE — 77030008565 HC TBNG SUC IRR ERBE -B: Performed by: INTERNAL MEDICINE

## 2019-11-11 PROCEDURE — 77030029384 HC SNR POLYP CAPTVR II BSC -B: Performed by: INTERNAL MEDICINE

## 2019-11-11 PROCEDURE — 77030013992 HC SNR POLYP ENDOSC BSC -B: Performed by: INTERNAL MEDICINE

## 2019-11-11 PROCEDURE — 74011000250 HC RX REV CODE- 250: Performed by: NURSE ANESTHETIST, CERTIFIED REGISTERED

## 2019-11-11 RX ORDER — PROPOFOL 10 MG/ML
INJECTION, EMULSION INTRAVENOUS AS NEEDED
Status: DISCONTINUED | OUTPATIENT
Start: 2019-11-11 | End: 2019-11-11 | Stop reason: HOSPADM

## 2019-11-11 RX ORDER — FLUTICASONE PROPIONATE 50 MCG
SPRAY, SUSPENSION (ML) NASAL
Qty: 48 G | Refills: 4 | Status: SHIPPED | OUTPATIENT
Start: 2019-11-11 | End: 2020-12-03

## 2019-11-11 RX ORDER — SODIUM CHLORIDE 0.9 % (FLUSH) 0.9 %
5-40 SYRINGE (ML) INJECTION AS NEEDED
Status: DISCONTINUED | OUTPATIENT
Start: 2019-11-11 | End: 2019-11-11 | Stop reason: HOSPADM

## 2019-11-11 RX ORDER — SODIUM CHLORIDE 0.9 % (FLUSH) 0.9 %
5-40 SYRINGE (ML) INJECTION EVERY 8 HOURS
Status: DISCONTINUED | OUTPATIENT
Start: 2019-11-11 | End: 2019-11-11 | Stop reason: HOSPADM

## 2019-11-11 RX ORDER — LIDOCAINE HYDROCHLORIDE 20 MG/ML
INJECTION, SOLUTION EPIDURAL; INFILTRATION; INTRACAUDAL; PERINEURAL AS NEEDED
Status: DISCONTINUED | OUTPATIENT
Start: 2019-11-11 | End: 2019-11-11 | Stop reason: HOSPADM

## 2019-11-11 RX ORDER — SODIUM CHLORIDE, SODIUM LACTATE, POTASSIUM CHLORIDE, CALCIUM CHLORIDE 600; 310; 30; 20 MG/100ML; MG/100ML; MG/100ML; MG/100ML
75 INJECTION, SOLUTION INTRAVENOUS CONTINUOUS
Status: DISCONTINUED | OUTPATIENT
Start: 2019-11-11 | End: 2019-11-11 | Stop reason: HOSPADM

## 2019-11-11 RX ORDER — SODIUM CHLORIDE, SODIUM LACTATE, POTASSIUM CHLORIDE, CALCIUM CHLORIDE 600; 310; 30; 20 MG/100ML; MG/100ML; MG/100ML; MG/100ML
50 INJECTION, SOLUTION INTRAVENOUS CONTINUOUS
Status: DISCONTINUED | OUTPATIENT
Start: 2019-11-11 | End: 2019-11-11 | Stop reason: HOSPADM

## 2019-11-11 RX ADMIN — SODIUM CHLORIDE, SODIUM LACTATE, POTASSIUM CHLORIDE, AND CALCIUM CHLORIDE 75 ML/HR: 600; 310; 30; 20 INJECTION, SOLUTION INTRAVENOUS at 09:21

## 2019-11-11 RX ADMIN — PROPOFOL 50 MG: 10 INJECTION, EMULSION INTRAVENOUS at 10:25

## 2019-11-11 RX ADMIN — PROPOFOL 50 MG: 10 INJECTION, EMULSION INTRAVENOUS at 10:32

## 2019-11-11 RX ADMIN — FAMOTIDINE 20 MG: 10 INJECTION INTRAVENOUS at 09:21

## 2019-11-11 RX ADMIN — PROPOFOL 50 MG: 10 INJECTION, EMULSION INTRAVENOUS at 10:22

## 2019-11-11 RX ADMIN — LIDOCAINE HYDROCHLORIDE 60 MG: 20 INJECTION, SOLUTION EPIDURAL; INFILTRATION; INTRACAUDAL; PERINEURAL at 10:22

## 2019-11-11 NOTE — H&P
[facility]    [email or web address]      Impression:   1.hx polyps      Plan:     1. Dearing mac all risks benfits and alt discussed       Chief Complaint:   surviellance     HPI:  Steven Whitney is a 70 y.o. 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    Band keratopathy of right eye 2/3/2018    Cataract     Cylindrical bronchiectasis ([address]     Fracture     rt ankle as an adult    History of pneumonia     Hypertension     Ill-defined condition     Spasms to left side    Left arm pain     Lumbar spinal stenosis     Myofascial pain     Osteopenia     Sciatica of right side        PSH:   Past Surgical History:   Procedure Laterality Date    HX CATARACT REMOVAL  8/15/2011    HX HEENT      sinus surgery    HX KERATOPLASTY Left 10/02/2017    HX ORTHOPAEDIC      left 4th finger trigger release    HX SHIELA AND BSO  1970s    uterine fibroids       Social HX:   Social History     Socioeconomic History    Marital status:      Spouse name: Not on file    Number of children: Not on file    Years of education: Not on file    Highest education level: Not on file   Occupational History    Not on file   Social Needs    Financial resource strain: Not on file    Food insecurity:     Worry: Not on file     Inability: Not on file    Transportation needs:     Medical: Not on file     Non-medical: Not on file   Tobacco Use    Smoking status: Former Smoker     Years: 5.00     Types: Cigarettes     Last attempt to quit: 1970     Years since quittin.8    Smokeless tobacco: Never Used   Substance and Sexual Activity    Alcohol use:  Yes     Alcohol/week: 1.0 standard drinks     Types: 1 Glasses of wine per week     Frequency: Monthly or less     Comment: occasional wine    Drug use: No    Sexual activity: Yes     Partners: Male     Birth control/protection: None   Lifestyle    Physical activity:     Days per week: Not on file     Minutes per session: Not on file    Stress: Not on file   Relationships    Social connections:     Talks on phone: Not on file     Gets together: Not on file     Attends Rastafarian service: Not on file     Active member of club or organization: Not on file     Attends meetings of clubs or organizations: Not on file     Relationship status: Not on file    Intimate partner violence:     Fear of current or ex partner: Not on file     Emotionally abused: Not on file     Physically abused: Not on file     Forced sexual activity: Not on file   Other Topics Concern    Not on file   Social History Narrative    Not on file       FHX:   Family History   Problem Relation Age of Onset    Asthma Mother     COPD Mother     MS Mother     Heart Disease Mother     Hypertension Mother     Stroke Father     Heart Disease Father     Hypertension Father     Allergic Rhinitis Sister     Asthma Brother     Hypertension Brother     Diabetes Brother     Migraines Paternal Grandmother        Allergy:   Allergies   Allergen Reactions    Naprosyn [Naproxen] Other (comments)     Blood in urine         Home Medications:     No medications prior to admission. Review of Systems:     Constitutional: No fevers, chills, weight loss, fatigue. Skin: No rashes, pruritis, jaundice, ulcerations, erythema. HENT: No headaches, nosebleeds, sinus pressure, rhinorrhea, sore throat. Eyes: No visual changes, blurred vision, eye pain, photophobia, jaundice. Cardiovascular: No chest pain, heart palpitations. Respiratory: No cough, SOB, wheezing, chest discomfort, orthopnea. Gastrointestinal: neg   Genitourinary: No dysuria, bleeding, discharge, pyuria. Musculoskeletal: No weakness, arthralgias, wasting. Endo: No sweats.    Heme: No bruising, easy bleeding. Allergies: As noted. Neurological: Cranial nerves intact. Alert and oriented. Gait not assessed. Psychiatric:  No anxiety, depression, hallucinations. Visit Vitals  Ht 5' 0.5\" (1.537 m)   Wt 78 kg (172 lb)   BMI 33.04 kg/m²       Physical Assessment:     constitutional: appearance: well developed, well nourished, normal habitus, no deformities, in no acute distress. skin: inspection: no rashes, ulcers, icterus or other lesions; no clubbing or telangiectasias. palpation: no induration or subcutaneos nodules. eyes: inspection: normal conjunctivae and lids; no jaundice pupils: symmetrical, normoreactive to light, normal accommodation and size. ENMT: mouth: normal oral mucosa,lips and gums; good dentition. oropharynx: normal tongue, hard and soft palate; posterior pharynx without erythema, exudate or lesions. neck: no masses organomegaly or tenderness. respiratory: effort: normal chest excursion; no intercostal retraction or accessory muscle use. cardiovascular: abdominal aorta: normal size and position; no bruits. palpation: PMI of normal size and position; normal rhythm; no thrill or murmurs. abdominal: abdomen: normal consistency; no tenderness or masses. hernias: no hernias appreciated. liver: normal size and consistency. spleen: not palpable. rectal: hemoccult/guaiac: not performed. musculoskeletal: no deformities or muscle wasting   lymphatic: axilae: not palpable. groin: not palpable. neck: within normal limits. other: not palpable. neurologic: cranial nerves: II-XII normal.   psychiatric: judgement/insight: within normal limits. memory: within normal limits for recent and remote events. mood and affect: no evidence of depression, anxiety or agitation. orientation: oriented to time, space and person. Basic Metabolic Profile   No results for input(s): NA, K, CL, CO2, BUN, GLU, CA, MG, PHOS in the last 72 hours.     No lab exists for component: CREAT CBC w/Diff    No results for input(s): WBC, RBC, HGB, HCT, MCV, MCH, MCHC, RDW, PLT, HGBEXT, HCTEXT, PLTEXT in the last 72 hours. No lab exists for component: MPV No results for input(s): GRANS, LYMPH, EOS, PRO, MYELO, METAS, BLAST in the last 72 hours. No lab exists for component: MONO, BASO     Hepatic Function   No results for input(s): ALB, TP, TBILI, GPT, SGOT, AP, AML, LPSE in the last 72 hours. No lab exists for component: Linda Springer MD, M.D. [facility], [facility]  [email or web address]

## 2019-11-11 NOTE — DISCHARGE INSTRUCTIONS
Colon Polyps: Care Instructions  Your Care Instructions    Colon polyps are growths in the colon or the rectum. The cause of most colon polyps is not known, and most people who get them do not have any problems. But a certain kind can turn into cancer. For this reason, regular testing for colon polyps is important for people as they get older. It is also important for anyone who has an increased risk for colon cancer. Polyps are usually found through routine colon cancer screening tests. Although most colon polyps are not cancerous, they are usually removed and then tested for cancer. Screening for colon cancer saves lives because the cancer can usually be cured if it is caught early. If you have a polyp that is the type that can turn into cancer, you may need more tests to examine your entire colon. The doctor will remove any other polyps that he or she finds, and you will be tested more often. Follow-up care is a key part of your treatment and safety. Be sure to make and go to all appointments, and call your doctor if you are having problems. It's also a good idea to know your test results and keep a list of the medicines you take. How can you care for yourself at home? Regular exams to look for colon polyps are the best way to prevent polyps from turning into colon cancer. These can include stool tests, sigmoidoscopy, colonoscopy, and CT colonography. Talk with your doctor about a testing schedule that is right for you. To prevent polyps  There is no home treatment that can prevent colon polyps. But these steps may help lower your risk for cancer. · Stay active. Being active can help you get to and stay at a healthy weight. Try to exercise on most days of the week. Walking is a good choice. · Eat well. Choose a variety of vegetables, fruits, legumes (such as peas and beans), fish, poultry, and whole grains. · Do not smoke.  If you need help quitting, talk to your doctor about stop-smoking programs and medicines. These can increase your chances of quitting for good. · If you drink alcohol, limit how much you drink. Limit alcohol to 2 drinks a day for men and 1 drink a day for women. When should you call for help? Call your doctor now or seek immediate medical care if:    · You have severe belly pain.     · Your stools are maroon or very bloody.    Watch closely for changes in your health, and be sure to contact your doctor if:    · You have a fever.     · You have nausea or vomiting.     · You have a change in bowel habits (new constipation or diarrhea).     · Your symptoms get worse or are not improving as expected. Where can you learn more? Go to http://keke[employer] Movementjosé luis.info/. Enter 95 [id number] in the search box to learn more about \"Colon Polyps: Care Instructions. \"  Current as of: December 19, 2018  Content Version: 12.2  © 5588-0888 [employer]. Care instructions adapted under license by [employer] (which disclaims liability or warranty for this information). If you have questions about a medical condition or this instruction, always ask your healthcare professional. [place] [address] any warranty or liability for your use of this information. Colonoscopy: What to Expect at [address] [place] [address]  After you have a colonoscopy, you will stay at the clinic for 1 to 2 hours until the medicines wear off. Then you can go home. But you will need to arrange for a ride. Your doctor will tell you when you can eat and do your other usual activities. Your doctor will talk to you about when you will need your next colonoscopy. Your doctor can help you decide how often you need to be checked. This will depend on the results of your test and your risk for colorectal cancer. After the test, you may be bloated or have gas pains. You may need to pass gas.  If a biopsy was done or a polyp was removed, you may have streaks of blood in your stool (feces) for a few days. Problems such as heavy rectal bleeding may not occur until several weeks after the test. This isn't common. But it can happen after polyps are removed. This care sheet gives you a general idea about how long it will take for you to recover. But each person recovers at a different pace. Follow the steps below to get better as quickly as possible. How can you care for yourself at home? Activity    · Rest when you feel tired.     · You can do your normal activities when it feels okay to do so. Diet    · Follow your doctor's directions for eating.     · Unless your doctor has told you not to, drink plenty of fluids. This helps to replace the fluids that were lost during the colon prep.     · Do not drink alcohol. Medicines    · Your doctor will tell you if and when you can restart your medicines. He or she will also give you instructions about taking any new medicines.     · If you take blood thinners, such as warfarin (Coumadin), clopidogrel (Plavix), or aspirin, be sure to talk to your doctor. He or she will tell you if and when to start taking those medicines again. Make sure that you understand exactly what your doctor wants you to do.     · If polyps were removed or a biopsy was done during the test, your doctor may tell you not to take aspirin or other anti-inflammatory medicines for a few days. These include ibuprofen (Advil, Motrin) and naproxen (Aleve). Other instructions    · For your safety, do not drive or operate machinery until the medicine wears off and you can think clearly. Your doctor may tell you not to drive or operate machinery until the day after your test.     · Do not sign legal documents or make major decisions until the medicine wears off and you can think clearly. The anesthesia can make it hard for you to fully understand what you are agreeing to. Follow-up care is a key part of your treatment and safety.  Be sure to make and go to all appointments, and call your doctor if you are having problems. It's also a good idea to know your test results and keep a list of the medicines you take. When should you call for help? Call 911 anytime you think you may need emergency care. For example, call if:    · You passed out (lost consciousness).     · You pass maroon or bloody stools.     · You have trouble breathing.    Call your doctor now or seek immediate medical care if:    · You have pain that does not get better after you take pain medicine.     · You are sick to your stomach or cannot drink fluids.     · You have new or worse belly pain.     · You have blood in your stools.     · You have a fever.     · You cannot pass stools or gas.    Watch closely for changes in your health, and be sure to contact your doctor if you have any problems. Where can you learn more? Go to http://keke-josé luis.info/. Enter E264 in the search box to learn more about \"Colonoscopy: What to Expect at Home. \"  Current as of: December 19, 2018  Content Version: 12.2  © 2133-2910 [employer] Care instructions adapted under license by [employer] (which disclaims liability or warranty for this information). If you have questions about a medical condition or this instruction, always ask your healthcare professional. [address] any warranty or liability for your use of this information. DISCHARGE SUMMARY from Nurse    PATIENT INSTRUCTIONS:    After general anesthesia or intravenous sedation, for 24 hours or while taking prescription Narcotics:  · Limit your activities  · Do not drive and operate hazardous machinery  · Do not make important personal or business decisions  · Do  not drink alcoholic beverages  · If you have not urinated within 8 hours after discharge, please contact your surgeon on call.     Report the following to your surgeon:  · Excessive pain, swelling, redness or odor of or around the surgical area  · Temperature over 100.5  · Nausea and vomiting lasting longer than 4 hours or if unable to take medications  · Any signs of decreased circulation or nerve impairment to extremity: change in color, persistent  numbness, tingling, coldness or increase pain  · Any questions    What to do at Home:  Recommended activity: Activity as tolerated and no driving for today. *  Please give a list of your current medications to your Primary Care Provider. *  Please update this list whenever your medications are discontinued, doses are      changed, or new medications (including over-the-counter products) are added. *  Please carry medication information at all times in case of emergency situations. These are general instructions for a healthy lifestyle:    No smoking/ No tobacco products/ Avoid exposure to second hand smoke  Surgeon General's Warning:  Quitting smoking now greatly reduces serious risk to your health. Obesity, smoking, and sedentary lifestyle greatly increases your risk for illness    A healthy diet, regular physical exercise & weight monitoring are important for maintaining a healthy lifestyle    You may be retaining fluid if you have a history of heart failure or if you experience any of the following symptoms:  Weight gain of 3 pounds or more overnight or 5 pounds in a week, increased swelling in our hands or feet or shortness of breath while lying flat in bed. Please call your doctor as soon as you notice any of these symptoms; do not wait until your next office visit. The discharge information has been reviewed with the patient. The patient verbalized understanding. Discharge medications reviewed with the patient and appropriate educational materials and side effects teaching were provided.   ___________________________________________________________________________________________________________________________________

## 2019-11-11 NOTE — ANESTHESIA POSTPROCEDURE EVALUATION
Procedure(s):  COLONOSCOPY w polypectomies. MAC    Anesthesia Post Evaluation      Multimodal analgesia: multimodal analgesia used between 6 hours prior to anesthesia start to PACU discharge  Patient location during evaluation: bedside  Patient participation: complete - patient participated  Level of consciousness: awake  Pain management: adequate  Airway patency: patent  Anesthetic complications: no  Cardiovascular status: stable  Respiratory status: acceptable  Hydration status: acceptable  Post anesthesia nausea and vomiting:  controlled      Vitals Value Taken Time   /80 11/11/2019 11:02 AM   Temp 36.4 °C (97.5 °F) 11/11/2019 10:44 AM   Pulse 58 11/11/2019 11:09 AM   Resp 16 11/11/2019 11:09 AM   SpO2 100 % 11/11/2019 11:09 AM   Vitals shown include unvalidated device data.

## 2019-11-11 NOTE — ANESTHESIA PREPROCEDURE EVALUATION
Relevant Problems   No relevant active problems       Anesthetic History               Review of Systems / Medical History  Patient summary reviewed and pertinent labs reviewed    Pulmonary            Asthma : well controlled       Neuro/Psych              Cardiovascular    Hypertension: well controlled              Exercise tolerance: >4 METS     GI/Hepatic/Renal                Endo/Other             Other Findings              Physical Exam    Airway  Mallampati: III  TM Distance: 4 - 6 cm  Neck ROM: decreased range of motion   Mouth opening: Normal     Cardiovascular    Rhythm: regular           Dental    Dentition: Upper partial plate     Pulmonary  Breath sounds clear to auscultation               Abdominal  GI exam deferred       Other Findings            Anesthetic Plan    ASA: 2  Anesthesia type: MAC            Anesthetic plan and risks discussed with: Patient

## 2019-11-27 ENCOUNTER — TELEPHONE (OUTPATIENT)
Dept: INTERNAL MEDICINE CLINIC | Age: 79
End: 2019-11-27

## 2019-11-27 NOTE — TELEPHONE ENCOUNTER
Patient called requesting an appt for next week. Stating she randomly get sharp shooting pain in her head. No openings until January. Please advise.

## 2019-12-02 NOTE — TELEPHONE ENCOUNTER
Please offer her an appointment tomorrow to see me in 2 PM.    Dr. Steve Wheeler  [facility], [facility], [address]  Phone: [phone]  Fax: [phone]

## 2019-12-03 ENCOUNTER — OFFICE VISIT (OUTPATIENT)
Dept: INTERNAL MEDICINE CLINIC | Age: 79
End: 2019-12-03

## 2019-12-03 VITALS
HEIGHT: 61 IN | HEART RATE: 68 BPM | RESPIRATION RATE: 14 BRPM | BODY MASS INDEX: 33.3 KG/M2 | DIASTOLIC BLOOD PRESSURE: 76 MMHG | SYSTOLIC BLOOD PRESSURE: 119 MMHG | TEMPERATURE: 97.6 F | OXYGEN SATURATION: 96 % | WEIGHT: 176.4 LBS

## 2019-12-03 DIAGNOSIS — R73.9 HYPERGLYCEMIA: ICD-10-CM

## 2019-12-03 DIAGNOSIS — I10 ESSENTIAL HYPERTENSION: ICD-10-CM

## 2019-12-03 DIAGNOSIS — R51.9 NONINTRACTABLE HEADACHE, UNSPECIFIED CHRONICITY PATTERN, UNSPECIFIED HEADACHE TYPE: Primary | ICD-10-CM

## 2019-12-03 NOTE — PROGRESS NOTES
INTERNISTS OF [facility]:  12/3/2019, MRN: [id number]      Gray Cutler is a 70 y.o. female and presents to clinic for Head Pain (Patient here for sharp shooting pain in head. Patient reports pain on right side of head. x 2 weeks )    Subjective: The patient is a 57-year-old female with history of hypertension, LGSIL 4/19, osteopenia, tubular adenomas colon polyps, obesity, vitamin D deficiency, stable pulmonary nodules, asthma (followed by Elham Ch), cervical disc disease, and lumbar disc disease (followed by Orthopedics).     Headaches: Patient reports daily headaches for the past 2 weeks. No triggers are known. Pain can be severe in quality. He can be sharp as well. Symptoms are intermittent. Headache pain is mostly along the left side. Although her vision had deteriorated over the past 2 years, she sees her eye doctor,  regularly. Her last exam was earlier this year. She reports no change in her hearing. No weakness. She has occasional left upper extremity paresthesias but the symptoms are not associated with her headaches. She states that Tylenol works well to relieve her symptoms but symptoms will recur once Tylenol wears off. She denies dizzy spells. Her blood pressure today is 119/76. She is on triamterene-HCTZ. No adverse side effects or take his medication. She only sleeps 4 to 5 hours per night. Per her , she occasionally will snore. She has never had a sleep study. No tobacco, alcohol beverage consumption, or drug use. She drinks coffee every day, drinking a pot of coffee on average per day. Per review of the EHR, the patient had a mildly elevated BG noted on a random BMP.       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  gabapentin (GRALISE) 600 mg Tb24 Take 1,200 mg by mouth daily. Max Daily Amount: 1,200 mg. Indications: Neuropathic Pain 20 Tab 0    triamterene-hydroCHLOROthiazide (MAXZIDE) 37.5-25 mg per tablet TAKE ONE-HALF (1/2) TABLET DAILY 90 Tab 2    fexofenadine (ALLEGRA) 180 mg tablet Take 180 mg by mouth daily.  PROAIR HFA 90 mcg/actuation inhaler USE 2 INHALATIONS EVERY 4 HOURS AS NEEDED 3 Inhaler 3    EPINEPHrine (EPIPEN) 0.3 mg/0.3 mL injection 0.3 mL by IntraMUSCular route once as needed for up to 1 dose. 1 Syringe 1    melatonin 3 mg tablet Take 3 mg by mouth daily as needed (sleep).  Cholecalciferol, Vitamin D3, (VITAMIN D3) 1,000 unit cap Take 3,000 Units by mouth daily.  cycloSPORINE (RESTASIS) 0.05 % ophthalmic emulsion Administer 1 Drop to both eyes two (2) times a day.  MULTIVITAMIN W-MINERALS/LUTEIN (CENTRUM SILVER PO) Take 1 Tab by mouth daily.  co-enzyme Q-10 (CO Q-10) 100 mg capsule Take 100 mg by mouth daily.  BIOTIN PO Take 5,000 mcg by mouth daily.          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 (HCC)     Fracture     rt ankle as an adult    History of pneumonia     Hypertension     Ill-defined condition     Spasms to left side    Left arm pain     Lumbar spinal stenosis     Myofascial pain     Osteopenia     Sciatica of right side        Past Surgical History:   Procedure Laterality Date    COLONOSCOPY N/A 2019    COLONOSCOPY w polypectomies performed by Joe Gomes MD at [facility]    HX CATARACT REMOVAL  8/15/2011    HX HEENT      sinus surgery    HX KERATOPLASTY Left 10/02/2017    HX ORTHOPAEDIC      left 4th finger trigger release    HX SHIELA AND BSO  1970s    uterine fibroids       Family History   Problem Relation Age of Onset    Asthma Mother     COPD Mother    [facility] Mother     Heart Disease Mother     Hypertension Mother     Stroke Father     Heart Disease Father     Hypertension Father     Allergic Rhinitis Sister     Asthma Brother     Hypertension Brother     Diabetes Brother     Migraines Paternal Grandmother        Social History     Tobacco Use    Smoking status: Former Smoker     Years: 5.00     Types: Cigarettes     Last attempt to quit: 1970     Years since quittin.9    Smokeless tobacco: Never Used   Substance Use Topics    Alcohol use: Yes     Alcohol/week: 1.0 standard drinks     Types: 1 Glasses of wine per week     Frequency: Monthly or less     Comment: occasional wine       ROS   Review of Systems   Constitutional: Negative for chills and fever. HENT: Negative for ear pain and sore throat. Eyes: Positive for blurred vision (chronic). Negative for pain. Respiratory: Negative for cough and shortness of breath. Cardiovascular: Negative for chest pain. Gastrointestinal: Negative for abdominal pain, blood in stool and melena. Genitourinary: Negative for dysuria and hematuria.    Musculoskeletal: Negative for joint pain and myalgias. Skin: Negative for rash. Neurological: Positive for tingling (LUE off/on) and headaches. Negative for focal weakness. Endo/Heme/Allergies: Does not bruise/bleed easily. Psychiatric/Behavioral: Negative for substance abuse. Objective     Vitals:    12/03/19 1343   BP: 119/76   Pulse: 68   Resp: 14   Temp: 97.6 °F (36.4 °C)   TempSrc: Oral   SpO2: 96%   Weight: 176 lb 6.4 oz (80 kg)   Height: 5' 0.5\" (1.537 m)   PainSc:   4   PainLoc: Head       Physical Exam  Vitals signs and nursing note reviewed. HENT:      Head: Normocephalic and atraumatic. Right Ear: Tympanic membrane, ear canal and external ear normal.      Left Ear: Tympanic membrane, ear canal and external ear normal.      Nose: Nose normal.      Mouth/Throat:      Mouth: Mucous membranes are moist.      Pharynx: No oropharyngeal exudate. Eyes:      General: No scleral icterus. Right eye: No discharge. Left eye: No discharge. Conjunctiva/sclera: Conjunctivae normal.   Neck:      Musculoskeletal: Neck supple. Cardiovascular:      Rate and Rhythm: Normal rate and regular rhythm. Heart sounds: Normal heart sounds. No murmur. No friction rub. No gallop. Pulmonary:      Effort: Pulmonary effort is normal. No respiratory distress. Breath sounds: Normal breath sounds. No wheezing or rales. Chest:      Chest wall: No tenderness. Abdominal:      General: Bowel sounds are normal. There is no distension. Palpations: Abdomen is soft. There is no mass. Tenderness: There is no tenderness. There is no guarding or rebound. Musculoskeletal:         General: No tenderness (BUE). Lymphadenopathy:      Cervical: No cervical adenopathy. Skin:     General: Skin is warm and dry. Findings: No erythema. Neurological:      Mental Status: She is alert and oriented to person, place, and time. Motor: No abnormal muscle tone. Gait: Gait is intact.    Psychiatric:         Mood and Affect: Mood and affect normal.         LABS   Data Review:   Lab Results   Component Value Date/Time    WBC 4.8 02/19/2019 10:17 AM    HGB 14.6 02/19/2019 10:17 AM    HCT 45.5 (H) 02/19/2019 10:17 AM    PLATELET 886 62/93/2383 10:17 AM    MCV 90.1 02/19/2019 10:17 AM       Lab Results   Component Value Date/Time    Sodium 142 02/19/2019 10:17 AM    Potassium 4.1 02/19/2019 10:17 AM    Chloride 105 02/19/2019 10:17 AM    CO2 30 02/19/2019 10:17 AM    Anion gap 7 02/19/2019 10:17 AM    Glucose 96 02/19/2019 10:17 AM    BUN 12 02/19/2019 10:17 AM    Creatinine 0.78 02/19/2019 10:17 AM    BUN/Creatinine ratio 15 02/19/2019 10:17 AM    GFR est AA >60 02/19/2019 10:17 AM    GFR est non-AA >60 02/19/2019 10:17 AM    Calcium 9.1 02/19/2019 10:17 AM       Lab Results   Component Value Date/Time    Cholesterol, total 171 02/19/2019 10:17 AM    HDL Cholesterol 70 (H) 02/19/2019 10:17 AM    LDL, calculated 80.6 02/19/2019 10:17 AM    VLDL, calculated 20.4 02/19/2019 10:17 AM    Triglyceride 102 02/19/2019 10:17 AM    CHOL/HDL Ratio 2.4 02/19/2019 10:17 AM       No results found for: HBA1C, HGBE8, WYK1DPHS, AFS4WOMR    Assessment/Plan:   HA: Stable BP. I suspect she has a tension headache.  - Checking labs today.  -Continue with Rx as prescribed for blood pressure control.  -Return to clinic to assess her headaches. I instructed her in between appointments to make sure she gets proper rest.  In order to do this, she needs to remove caffeinated products from her diet. If she does this and is still having headache pain, I would consider getting imaging studies of her brain, a referral too Neurology, and trying a medication to reduce the frequency/severity of her HA    ORDERS:  - CBC WITH AUTOMATED DIFF; Future  - METABOLIC PANEL, COMPREHENSIVE; Future  - CBC WITH AUTOMATED DIFF; Future  - HEMOGLOBIN A1C W/O EAG;  Future  - MICROALBUMIN, UR, RAND W/ MICROALB/CREAT RATIO; Future      Health Maintenance Due   Topic Date Due    Shingrix Vaccine Age 50> (1 of 2) 02/01/1992    GLAUCOMA SCREENING Q2Y  12/11/2019     Lab review: labs are reviewed in the EHR and ordered as mentioned above    I have discussed the diagnosis with the patient and the intended plan as seen in the above orders. The patient has received an after-visit summary and questions were answered concerning future plans. I have discussed medication side effects and warnings with the patient as well. I have reviewed the plan of care with the patient, accepted their input and they are in agreement with the treatment goals. All questions were answered. The patient understands the plan of care. Handouts provided today with above information. Pt instructed if symptoms worsen to call the office or report to the ED for continued care. Greater than 50% of the visit time was spent in counseling and/or coordination of care. Voice recognition was used to generate this report, which may have resulted in some phonetic based errors in grammar and contents. Even though attempts were made to correct all the mistakes, some may have been missed, and remained in the body of the document.           Shasta Hernandez MD

## 2019-12-03 NOTE — PROGRESS NOTES
Hal Casanova presents today for   Chief Complaint   Patient presents with    Head Pain     Patient here for sharp shooting pain in head. Patient reports pain on right side of head. x 2 weeks               Depression Screening:  3 most recent PHQ Screens 8/22/2019   Little interest or pleasure in doing things Not at all   Feeling down, depressed, irritable, or hopeless Not at all   Total Score PHQ 2 0       Learning Assessment:  Learning Assessment 10/4/2019   PRIMARY LEARNER Patient   HIGHEST LEVEL OF EDUCATION - PRIMARY LEARNER  4 YEARS OF COLLEGE   BARRIERS PRIMARY LEARNER NONE   CO-LEARNER CAREGIVER No   PRIMARY LANGUAGE ENGLISH   LEARNER PREFERENCE PRIMARY DEMONSTRATION   ANSWERED BY patient   RELATIONSHIP SELF       Abuse Screening:  Abuse Screening Questionnaire 10/4/2019   Do you ever feel afraid of your partner? N   Are you in a relationship with someone who physically or mentally threatens you? N   Is it safe for you to go home? Y       Fall Risk  Fall Risk Assessment, last 12 mths 8/22/2019   Able to walk? Yes   Fall in past 12 months? No           Coordination of Care:  1. Have you been to the ER, urgent care clinic since your last visit? Hospitalized since your last visit? No    2. Have you seen or consulted any other health care providers outside of the [address] [place] [address] since your last visit? Include any pap smears or colon screening. No      Advance Directive:  1. Do you have an advance directive in place?  Patient Reply:YES    2.  Per patient no changes to their ACP contact No.

## 2019-12-03 NOTE — PATIENT INSTRUCTIONS

## 2019-12-04 ENCOUNTER — APPOINTMENT (OUTPATIENT)
Dept: INTERNAL MEDICINE CLINIC | Age: 79
End: 2019-12-04

## 2019-12-04 ENCOUNTER — HOSPITAL ENCOUNTER (OUTPATIENT)
Dept: LAB | Age: 79
Discharge: HOME | End: 2019-12-04
Payer: MEDICARE

## 2019-12-04 DIAGNOSIS — R51.9 NONINTRACTABLE HEADACHE, UNSPECIFIED CHRONICITY PATTERN, UNSPECIFIED HEADACHE TYPE: ICD-10-CM

## 2019-12-04 DIAGNOSIS — I10 ESSENTIAL HYPERTENSION: ICD-10-CM

## 2019-12-04 DIAGNOSIS — R73.9 HYPERGLYCEMIA: ICD-10-CM

## 2019-12-04 LAB
ALBUMIN SERPL-MCNC: 3.9 G/DL (ref 3.4–5)
ALBUMIN/GLOB SERPL: 1.1 {RATIO} (ref 0.8–1.7)
ALP SERPL-CCNC: 79 U/L (ref 45–117)
ALT SERPL-CCNC: 40 U/L (ref 13–56)
ANION GAP SERPL CALC-SCNC: 2 MMOL/L (ref 3–18)
AST SERPL-CCNC: 25 U/L (ref 10–38)
BASOPHILS # BLD: 0 K/UL (ref 0–0.1)
BASOPHILS NFR BLD: 0 % (ref 0–2)
BILIRUB SERPL-MCNC: 0.5 MG/DL (ref 0.2–1)
BUN SERPL-MCNC: 13 MG/DL (ref 7–18)
BUN/CREAT SERPL: 17 (ref 12–20)
CALCIUM SERPL-MCNC: 9.3 MG/DL (ref 8.5–10.1)
CHLORIDE SERPL-SCNC: 107 MMOL/L (ref 100–111)
CO2 SERPL-SCNC: 31 MMOL/L (ref 21–32)
CREAT SERPL-MCNC: 0.78 MG/DL (ref 0.6–1.3)
DIFFERENTIAL METHOD BLD: ABNORMAL
EOSINOPHIL # BLD: 0 K/UL (ref 0–0.4)
EOSINOPHIL NFR BLD: 0 % (ref 0–5)
ERYTHROCYTE [DISTWIDTH] IN BLOOD BY AUTOMATED COUNT: 15.4 % (ref 11.6–14.5)
GLOBULIN SER CALC-MCNC: 3.6 G/DL (ref 2–4)
GLUCOSE SERPL-MCNC: 101 MG/DL (ref 74–99)
HCT VFR BLD AUTO: 45.8 % (ref 35–45)
HGB BLD-MCNC: 14.5 G/DL (ref 12–16)
LYMPHOCYTES # BLD: 1.5 K/UL (ref 0.9–3.6)
LYMPHOCYTES NFR BLD: 33 % (ref 21–52)
MCH RBC QN AUTO: 29.6 PG (ref 24–34)
MCHC RBC AUTO-ENTMCNC: 31.7 G/DL (ref 31–37)
MCV RBC AUTO: 93.5 FL (ref 74–97)
MONOCYTES # BLD: 0.2 K/UL (ref 0.05–1.2)
MONOCYTES NFR BLD: 5 % (ref 3–10)
NEUTS SEG # BLD: 2.8 K/UL (ref 1.8–8)
NEUTS SEG NFR BLD: 62 % (ref 40–73)
PLATELET # BLD AUTO: 330 K/UL (ref 135–420)
PMV BLD AUTO: 11 FL (ref 9.2–11.8)
POTASSIUM SERPL-SCNC: 3.8 MMOL/L (ref 3.5–5.5)
PROT SERPL-MCNC: 7.5 G/DL (ref 6.4–8.2)
RBC # BLD AUTO: 4.9 M/UL (ref 4.2–5.3)
SODIUM SERPL-SCNC: 140 MMOL/L (ref 136–145)
WBC # BLD AUTO: 4.6 K/UL (ref 4.6–13.2)

## 2019-12-04 PROCEDURE — 82043 UR ALBUMIN QUANTITATIVE: CPT

## 2019-12-04 PROCEDURE — 80053 COMPREHEN METABOLIC PANEL: CPT

## 2019-12-04 PROCEDURE — 85025 COMPLETE CBC W/AUTO DIFF WBC: CPT

## 2019-12-04 PROCEDURE — 36415 COLL VENOUS BLD VENIPUNCTURE: CPT

## 2019-12-04 PROCEDURE — 80061 LIPID PANEL: CPT

## 2019-12-04 PROCEDURE — 83036 HEMOGLOBIN GLYCOSYLATED A1C: CPT

## 2019-12-05 ENCOUNTER — TELEPHONE (OUTPATIENT)
Dept: INTERNAL MEDICINE CLINIC | Age: 79
End: 2019-12-05

## 2019-12-05 LAB
CHOLEST SERPL-MCNC: 167 MG/DL
CREAT UR-MCNC: 223 MG/DL (ref 30–125)
HBA1C MFR BLD: 5.6 % (ref 4.2–5.6)
HDLC SERPL-MCNC: 64 MG/DL (ref 40–60)
HDLC SERPL: 2.6 {RATIO} (ref 0–5)
LDLC SERPL CALC-MCNC: 78.6 MG/DL (ref 0–100)
LIPID PROFILE,FLP: ABNORMAL
MICROALBUMIN UR-MCNC: 1.24 MG/DL (ref 0–3)
MICROALBUMIN/CREAT UR-RTO: 6 MG/G (ref 0–30)
TRIGL SERPL-MCNC: 122 MG/DL (ref ?–150)
VLDLC SERPL CALC-MCNC: 24.4 MG/DL

## 2019-12-05 NOTE — PROGRESS NOTES
Please let her know that her A1c is normal at 5.6. Kidney and liver function labs are unremarkable. Her CBC is unremarkable.     Dr. Ivette Harris  Internists of [address], [address] [place] [address] Str.  Phone: [phone]  Fax: [phone]

## 2019-12-05 NOTE — TELEPHONE ENCOUNTER
----- Message from Manisha Miner MD sent at 12/5/2019  7:52 AM EST -----  Please let her know that her A1c is normal at 5.6. Kidney and liver function labs are unremarkable. Her CBC is unremarkable.     Dr. Christi Ugarte  Internists of [address], [address] [place] [address] Str.  Phone: [phone]  Fax: [phone]

## 2020-01-07 DIAGNOSIS — M79.2 NEURITIS: ICD-10-CM

## 2020-01-07 DIAGNOSIS — M50.30 DDD (DEGENERATIVE DISC DISEASE), CERVICAL: ICD-10-CM

## 2020-01-07 DIAGNOSIS — M54.12 CERVICAL RADICULOPATHY: ICD-10-CM

## 2020-01-07 NOTE — TELEPHONE ENCOUNTER
Last Visit: 12/3/19 with DORINA Schroeder  Next Appointment: 20 with DORINA Schroeder  Previous Refill Encounter(s): 7/10/19 #180 with 3 refills (Rx  after 6 months)    Requested Prescriptions     Pending Prescriptions Disp Refills    gabapentin (GRALISE) 600 mg Tb24 180 Tab 1     Sig: Take 1,200 mg by mouth daily. Max Daily Amount: 1,200 mg.  Indications: Neuropathic Pain

## 2020-02-10 ENCOUNTER — HOSPITAL ENCOUNTER (OUTPATIENT)
Dept: LAB | Age: 80
Discharge: HOME | End: 2020-02-10
Payer: MEDICARE

## 2020-02-10 ENCOUNTER — APPOINTMENT (OUTPATIENT)
Dept: INTERNAL MEDICINE CLINIC | Age: 80
End: 2020-02-10

## 2020-02-10 DIAGNOSIS — R73.9 HYPERGLYCEMIA: ICD-10-CM

## 2020-02-10 DIAGNOSIS — R51.9 NONINTRACTABLE HEADACHE, UNSPECIFIED CHRONICITY PATTERN, UNSPECIFIED HEADACHE TYPE: ICD-10-CM

## 2020-02-10 DIAGNOSIS — I10 ESSENTIAL HYPERTENSION: ICD-10-CM

## 2020-02-10 LAB
ALBUMIN SERPL-MCNC: 3.6 G/DL (ref 3.4–5)
ALBUMIN/GLOB SERPL: 0.9 {RATIO} (ref 0.8–1.7)
ALP SERPL-CCNC: 77 U/L (ref 45–117)
ALT SERPL-CCNC: 36 U/L (ref 13–56)
ANION GAP SERPL CALC-SCNC: 4 MMOL/L (ref 3–18)
AST SERPL-CCNC: 22 U/L (ref 10–38)
BASOPHILS # BLD: 0 K/UL (ref 0–0.1)
BASOPHILS NFR BLD: 0 % (ref 0–2)
BILIRUB SERPL-MCNC: 0.5 MG/DL (ref 0.2–1)
BUN SERPL-MCNC: 12 MG/DL (ref 7–18)
BUN/CREAT SERPL: 17 (ref 12–20)
CALCIUM SERPL-MCNC: 8.9 MG/DL (ref 8.5–10.1)
CHLORIDE SERPL-SCNC: 110 MMOL/L (ref 100–111)
CHOLEST SERPL-MCNC: 163 MG/DL
CO2 SERPL-SCNC: 28 MMOL/L (ref 21–32)
CREAT SERPL-MCNC: 0.7 MG/DL (ref 0.6–1.3)
CREAT UR-MCNC: 352 MG/DL (ref 30–125)
DIFFERENTIAL METHOD BLD: ABNORMAL
EOSINOPHIL # BLD: 0 K/UL (ref 0–0.4)
EOSINOPHIL NFR BLD: 0 % (ref 0–5)
ERYTHROCYTE [DISTWIDTH] IN BLOOD BY AUTOMATED COUNT: 15.3 % (ref 11.6–14.5)
EST. AVERAGE GLUCOSE BLD GHB EST-MCNC: 123 MG/DL
GLOBULIN SER CALC-MCNC: 3.8 G/DL (ref 2–4)
GLUCOSE SERPL-MCNC: 89 MG/DL (ref 74–99)
HBA1C MFR BLD: 5.9 % (ref 4.2–5.6)
HCT VFR BLD AUTO: 41.8 % (ref 35–45)
HDLC SERPL-MCNC: 60 MG/DL (ref 40–60)
HDLC SERPL: 2.7 {RATIO} (ref 0–5)
HGB BLD-MCNC: 13.9 G/DL (ref 12–16)
LDLC SERPL CALC-MCNC: 87 MG/DL (ref 0–100)
LIPID PROFILE,FLP: NORMAL
LYMPHOCYTES # BLD: 1.6 K/UL (ref 0.9–3.6)
LYMPHOCYTES NFR BLD: 37 % (ref 21–52)
MCH RBC QN AUTO: 29.3 PG (ref 24–34)
MCHC RBC AUTO-ENTMCNC: 33.3 G/DL (ref 31–37)
MCV RBC AUTO: 88 FL (ref 74–97)
MICROALBUMIN UR-MCNC: 1.8 MG/DL (ref 0–3)
MICROALBUMIN/CREAT UR-RTO: 5 MG/G (ref 0–30)
MONOCYTES # BLD: 0.3 K/UL (ref 0.05–1.2)
MONOCYTES NFR BLD: 8 % (ref 3–10)
NEUTS SEG # BLD: 2.4 K/UL (ref 1.8–8)
NEUTS SEG NFR BLD: 55 % (ref 40–73)
PLATELET # BLD AUTO: 354 K/UL (ref 135–420)
PMV BLD AUTO: 10.6 FL (ref 9.2–11.8)
POTASSIUM SERPL-SCNC: 3.7 MMOL/L (ref 3.5–5.5)
PROT SERPL-MCNC: 7.4 G/DL (ref 6.4–8.2)
RBC # BLD AUTO: 4.75 M/UL (ref 4.2–5.3)
SODIUM SERPL-SCNC: 142 MMOL/L (ref 136–145)
TRIGL SERPL-MCNC: 80 MG/DL (ref ?–150)
VLDLC SERPL CALC-MCNC: 16 MG/DL
WBC # BLD AUTO: 4.3 K/UL (ref 4.6–13.2)

## 2020-02-10 PROCEDURE — 82043 UR ALBUMIN QUANTITATIVE: CPT

## 2020-02-10 PROCEDURE — 80061 LIPID PANEL: CPT

## 2020-02-10 PROCEDURE — 83036 HEMOGLOBIN GLYCOSYLATED A1C: CPT

## 2020-02-10 PROCEDURE — 80053 COMPREHEN METABOLIC PANEL: CPT

## 2020-02-10 PROCEDURE — 36415 COLL VENOUS BLD VENIPUNCTURE: CPT

## 2020-02-10 PROCEDURE — 85025 COMPLETE CBC W/AUTO DIFF WBC: CPT

## 2020-02-17 NOTE — PROGRESS NOTES
I will discuss results with her at her upcoming appointment. Her A1c is up from 5.6 to 5.9. Her kidney and liver function labs are unremarkable. Her total cholesterol is 153. Her triglycerides are 80. HDL is 60. Her LDL is 87. Her CBC is unremarkable except for a mildly low white blood cell count of 4.3. Her white blood cell count has been in this range for several years.     Dr. Sharon Khan  Internists of [address], [address] [place] [address] Str.  Phone: [phone]  Fax: [phone]

## 2020-03-25 ENCOUNTER — VIRTUAL VISIT (OUTPATIENT)
Dept: INTERNAL MEDICINE CLINIC | Age: 80
End: 2020-03-25

## 2020-03-25 DIAGNOSIS — M54.31 BILATERAL SCIATICA: Primary | ICD-10-CM

## 2020-03-25 DIAGNOSIS — I10 ESSENTIAL HYPERTENSION: ICD-10-CM

## 2020-03-25 DIAGNOSIS — M54.32 BILATERAL SCIATICA: Primary | ICD-10-CM

## 2020-03-25 RX ORDER — METHYLPREDNISOLONE 4 MG/1
TABLET ORAL
Qty: 1 DOSE PACK | Refills: 0 | Status: SHIPPED | OUTPATIENT
Start: 2020-03-25 | End: 2020-04-27 | Stop reason: ALTCHOICE

## 2020-03-25 NOTE — PROGRESS NOTES
Jose D Kamara is a 68 y.o. female who was seen by synchronous (real-time) audio-video technology on 3/25/2020. Assessment & Plan:   1. HTN/HLD:  - C/w rx as prescribed. 2. Sciatica:  - Medrol dose pack prescribed. - C/w gabapentin. All questions were answered about this rx. - Next apt in 1 month to assess her response  - She declines a second neuropathic agent at this time. - Activity as tolerated. Follow-up and Dispositions    · Return in about 4 weeks (around 4/22/2020) for BP check. CPT Codes 32148-22072 for Established Patients may apply to this Telehealth Visit    Subjective: Jose D Kamara was seen for sciatica and HTN/HLD. HISTORY OF PRESENT ILLNESS:   The patient is a 70-year-old female with history of hypertension, LGSIL 4/19, osteopenia, tubular adenomas colon polyps, obesity, vitamin D deficiency, stable pulmonary nodules, asthma (followed by Kathy Orr), cervical disc disease, and lumbar disc disease (followed by Orthopedics).     1. HLD/HTN: Her total cholesterol is <200. She is taking triamterene-HCTZ. No adverse side effects. She is not checking her BP at home. BP Readings from Last 3 Encounters:   12/03/19 119/76   11/11/19 134/71   10/04/19 127/77     2. Sciatica: \"Just recently my sciatica has been giving me problems. \" Taking: Gabapentin. Pain is along her right lower back and radiates down her leg. It can be bad at night - in regards to her pain. Occasionally, she will take extra strength tylenol. No adverse side effects from this rx but she is concerned about the long term effects of gabapentin. She has not fallen down. She picked up a few cases of water since her last apt. She feels like her left leg goes to sleep off/on. +H/o lumbar disc disease. Prior to Admission medications    Medication Sig Start Date End Date Taking? Authorizing Provider   mv-min/vit C/glut/lysine/hb124 (A[place]ORNE, LYSINE HCL, PO) Take  by mouth.    Yes Provider, Historical methylPREDNISolone (MEDROL DOSEPACK) 4 mg tablet Take per package instructions 3/25/20  Yes Gilbert Mckeon MD   gabapentin (GRALISE) 600 mg Tb24 Take 1,200 mg by mouth daily. Max Daily Amount: 1,200 mg. Indications: Neuropathic Pain 1/8/20  Yes Liliana Lam NP   fluticasone propionate (FLONASE) 50 mcg/actuation nasal spray USE 2 SPRAYS IN EACH NOSTRIL DAILY 11/11/19  Yes Loni Rouse MD   vitamin C-biotin (HAIR-SKIN-NAILS, VIT C-BIOTIN,) 50 mg -1,250 mcg chew Take  by mouth daily. Yes Provider, Historical   triamterene-hydroCHLOROthiazide (MAXZIDE) 37.5-25 mg per tablet TAKE ONE-HALF (1/2) TABLET DAILY 2/25/19  Yes Day Chaney MD   fexofenadine (ALLEGRA) 180 mg tablet Take 180 mg by mouth daily. Yes Provider, Historical   PROAIR HFA 90 mcg/actuation inhaler USE 2 INHALATIONS EVERY 4 HOURS AS NEEDED 5/7/18  Yes Loni Rouse MD   EPINEPHrine (EPIPEN) 0.3 mg/0.3 mL injection 0.3 mL by IntraMUSCular route once as needed for up to 1 dose. 4/28/17  Yes Mendel Feathers, MD   melatonin 3 mg tablet Take 3 mg by mouth daily as needed (sleep). Yes Provider, Historical   Cholecalciferol, Vitamin D3, (VITAMIN D3) 1,000 unit cap Take 3,000 Units by mouth daily. 11/12/14  Yes Flaca Vaughan NP   cycloSPORINE (RESTASIS) 0.05 % ophthalmic emulsion Administer 1 Drop to both eyes two (2) times a day. Yes Provider, Historical   MULTIVITAMIN W-MINERALS/LUTEIN (CENTRUM SILVER PO) Take 1 Tab by mouth daily. Yes Provider, Historical   co-enzyme Q-10 (CO Q-10) 100 mg capsule Take 100 mg by mouth daily. Yes Provider, Historical   BIOTIN PO Take 5,000 mcg by mouth daily.  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  methylPREDNISolone (MEDROL DOSEPACK) 4 mg tablet Take per package instructions 1 Dose Pack 0    gabapentin (GRALISE) 600 mg Tb24 Take 1,200 mg by mouth daily. Max Daily Amount: 1,200 mg. Indications: Neuropathic Pain 180 Tab 1    fluticasone propionate (FLONASE) 50 mcg/actuation nasal spray USE 2 SPRAYS IN EACH NOSTRIL DAILY 48 g 4    vitamin C-biotin (HAIR-SKIN-NAILS, VIT C-BIOTIN,) 50 mg -1,250 mcg chew Take  by mouth daily.  triamterene-hydroCHLOROthiazide (MAXZIDE) 37.5-25 mg per tablet TAKE ONE-HALF (1/2) TABLET DAILY 90 Tab 2    fexofenadine (ALLEGRA) 180 mg tablet Take 180 mg by mouth daily.  PROAIR HFA 90 mcg/actuation inhaler USE 2 INHALATIONS EVERY 4 HOURS AS NEEDED 3 Inhaler 3    EPINEPHrine (EPIPEN) 0.3 mg/0.3 mL injection 0.3 mL by IntraMUSCular route once as needed for up to 1 dose. 1 Syringe 1    melatonin 3 mg tablet Take 3 mg by mouth daily as needed (sleep).  Cholecalciferol, Vitamin D3, (VITAMIN D3) 1,000 unit cap Take 3,000 Units by mouth daily.  cycloSPORINE (RESTASIS) 0.05 % ophthalmic emulsion Administer 1 Drop to both eyes two (2) times a day.  MULTIVITAMIN W-MINERALS/LUTEIN (CENTRUM SILVER PO) Take 1 Tab by mouth daily.  co-enzyme Q-10 (CO Q-10) 100 mg capsule Take 100 mg by mouth daily.  BIOTIN PO Take 5,000 mcg by mouth daily.        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    Band keratopathy of right eye 2/3/2018    Cataract     Cylindrical bronchiectasis ([address]     Fracture     rt ankle as an adult    History of pneumonia     Hypertension     Ill-defined condition     Spasms to left side    Left arm pain     Lumbar spinal stenosis     Myofascial pain     Osteopenia     Sciatica of right side      Past Surgical History:   Procedure Laterality Date    COLONOSCOPY N/A 2019    COLONOSCOPY w polypectomies performed by Rupa Santos MD at [address]  8/15/2011    HX HEENT      sinus surgery    HX KERATOPLASTY Left 10/02/2017    HX ORTHOPAEDIC      left 4th finger trigger release    HX SHIELA AND BSO  1970s    uterine fibroids     Family History   Problem Relation Age of Onset    Asthma Mother     COPD Mother    [facility] Mother     Heart Disease Mother     Hypertension Mother     Stroke Father     Heart Disease Father     Hypertension Father     Allergic Rhinitis Sister     Asthma Brother     Hypertension Brother     Diabetes Brother     Migraines Paternal Grandmother      Social History     Tobacco Use    Smoking status: Former Smoker     Years: 5.00     Types: Cigarettes     Last attempt to quit: 1970     Years since quittin.2    Smokeless tobacco: Never Used   Substance Use Topics    Alcohol use: Yes     Alcohol/week: 1.0 standard drinks     Types: 1 Glasses of wine per week     Frequency: Monthly or less     Comment: occasional wine          Review of Systems   Constitutional: Negative for chills and fever. HENT: Negative for ear pain and sore throat. Eyes: Negative for blurred vision and pain. Respiratory: Negative for cough and shortness of breath.     Cardiovascular: Negative for chest pain. Gastrointestinal: Negative for abdominal pain, blood in stool and melena. Genitourinary: Negative for dysuria and hematuria. Musculoskeletal: Positive for back pain. Negative for joint pain and myalgias. Skin: Negative for rash. Neurological: Positive for tingling. Negative for focal weakness and headaches. Endo/Heme/Allergies: Does not bruise/bleed easily. Psychiatric/Behavioral: Negative for substance abuse. Objective:     General: alert, cooperative, no distress   Mental  status: mental status: alert, oriented to person, place, and time, normal mood, behavior, speech, dress, motor activity, and thought processes   Resp: resp: normal effort and no respiratory distress   Neuro: neuro: no gross deficits   Skin: skin: no discoloration or lesions of concern on visible areas     Due to this being a TeleHealth evaluation, many elements of the physical examination are unable to be assessed. We discussed the expected course, resolution and complications of the diagnosis(es) in detail. Medication risks, benefits, costs, interactions, and alternatives were discussed as indicated. I advised her to contact the office if her condition worsens, changes or fails to improve as anticipated. She expressed understanding with the diagnosis(es) and plan. Pursuant to the emergency declaration under the [facility] waiver authority and the [employer] General Act, this Virtual  Visit was conducted, with patient's consent, to reduce the patient's risk of exposure to COVID-19 and provide continuity of care for an established patient. Services were provided through a video synchronous discussion virtually to substitute for in-person clinic visit.     Peter Menchaca MD

## 2020-03-25 NOTE — PROGRESS NOTES
Bartolome Hassan is a 68 y.o. female evaluated via telephone on 3/25/2020. Consent: 
She and/or health care decision maker is aware that that she may receive a bill for this telephone service, depending on her insurance coverage, and has provided verbal consent to proceed: Yes Documentation: I communicated with the patient and/or health care decision maker about her sciatica and HTN/HLD. Details of this discussion including any medical advice provided: provided below HISTORY OF PRESENT ILLNESS:  
The patient is a 56-year-old female with history of hypertension, LGSIL 4/19, osteopenia, tubular adenomas colon polyps, obesity, vitamin D deficiency, stable pulmonary nodules, asthma (followed by [facility]), cervical disc disease, and lumbar disc disease (followed by Orthopedics).  
 
1. HLD/HTN: Her total cholesterol is <200. She is taking triamterene-HCTZ. No adverse side effects. She is not checking her BP at home. BP Readings from Last 3 Encounters:  
12/03/19 119/76  
11/11/19 134/71  
10/04/19 127/77 2. Sciatica: \"Just recently my sciatica has been giving me problems. \" Taking: Gabapentin. Pain is along her right lower back and radiates down her leg. It can be bad at night - in regards to her pain. Occasionally, she will take extra strength tylenol. No adverse side effects from this rx but she is concerned about the long term effects of gabapentin. She has not fallen down. She picked up a few cases of water since her last apt. She feels like her left leg goes to sleep off/on. +H/o lumbar disc disease. ASSESSMENT/PLAN: 
-1. HTN/HLD: 
- C/w rx as prescribed. 2. Sciatica: 
- Medrol dose pack prescribed. - C/w gabapentin. All questions were answered about this rx. - Next apt in 1 month to assess her response - She declines a second neuropathic agent at this time. - Activity as tolerated.  
 
 
 
 
I affirm this is a Patient Initiated Episode with an Established Patient who has not had a related appointment within my department in the past 7 days or scheduled within the next 24 hours. Total Time: minutes: 21-30 minutes Note: not billable if this call serves to triage the patient into an appointment for the relevant concern Tay Thompson MD

## 2020-03-26 ENCOUNTER — TELEPHONE (OUTPATIENT)
Dept: INTERNAL MEDICINE CLINIC | Age: 80
End: 2020-03-26

## 2020-03-26 NOTE — TELEPHONE ENCOUNTER
----- Message from Rajiv Iqbal MD sent at 3/25/2020  5:03 PM EDT -----  Regarding: F/u apt needed  Please schedule her for a phone visit in 4 wks - 15 min needed for follow up on sciatica.     Respectfully,  Dr. Gary Cuellar  [facility] [address], [address] [place] [address] Str.  Phone: [phone]  Fax: [phone]

## 2020-04-27 ENCOUNTER — VIRTUAL VISIT (OUTPATIENT)
Dept: INTERNAL MEDICINE CLINIC | Age: 80
End: 2020-04-27

## 2020-04-27 DIAGNOSIS — M54.41 CHRONIC RIGHT-SIDED LOW BACK PAIN WITH BILATERAL SCIATICA: Primary | ICD-10-CM

## 2020-04-27 DIAGNOSIS — M54.42 CHRONIC RIGHT-SIDED LOW BACK PAIN WITH BILATERAL SCIATICA: Primary | ICD-10-CM

## 2020-04-27 DIAGNOSIS — R23.2 HOT FLASHES: ICD-10-CM

## 2020-04-27 DIAGNOSIS — R91.8 MULTIPLE LUNG NODULES: ICD-10-CM

## 2020-04-27 DIAGNOSIS — J30.9 ALLERGIC RHINITIS, UNSPECIFIED SEASONALITY, UNSPECIFIED TRIGGER: ICD-10-CM

## 2020-04-27 DIAGNOSIS — G89.29 CHRONIC RIGHT-SIDED LOW BACK PAIN WITH BILATERAL SCIATICA: Primary | ICD-10-CM

## 2020-04-27 NOTE — PROGRESS NOTES
Fred Albert is a 68 y.o. female who was seen by synchronous (real-time) audio-video technology on 4/27/2020. Assessment & Plan:   1. Sciatica:   Continue with gabapentin. Activity as tolerated. 2.  Hot flashes:  The patient declines pharmacotherapy options which were discussed with her today. 3. H/o Pulmonary Nodules: Her last CT scan 2017 was reassuring. We discussed these CT scan findings. Since she does not have any new symptoms, we discussed the need to hold off on any additional imaging studies at this time. 4.  Allergic rhinitis:  Continue with Allegra and Flonase. We discussed the addition of Singulair. She wants to hold off on this option at this time. Lab review: labs are reviewed in the EHR     I have discussed the diagnosis with the patient and the intended plan as seen in the above orders. I have discussed medication side effects and warnings with the patient as well. I have reviewed the plan of care with the patient, accepted their input and they are in agreement with the treatment goals. All questions were answered. The patient understands the plan of care. Pt instructed if symptoms worsen to call the office or report to the ED for continued care. Greater than 50% of the visit time was spent in counseling and/or coordination of care. Voice recognition was used to generate this report, which may have resulted in some phonetic based errors in grammar and contents. Even though attempts were made to correct all the mistakes, some may have been missed, and remained in the body of the document. Subjective:    Fred Albert was seen for   Chief Complaint   Patient presents with    Back Pain     The patient is a 79-year-old female with history of hypertension, LGSIL 4/19, osteopenia, tubular adenomas colon polyps, obesity, vitamin D deficiency, stable pulmonary nodules, asthma (followed by [facility]), cervical disc disease, and lumbar disc disease (followed by Orthopedics).      1. Sciatica: At her last appointment, she reported worsening back pain symptoms despite taking gabapentin. She described her pain as being along her right lower back and radiating down her right leg. Symptoms seem to be worse at night. She would occasionally take an extra strength Tylenol for additional pain relief. She denied recent trauma but admitted to picking up a few cases of water just before sx onset. +H/o lumbar disc disease. At her last apt, I prescribed a medrol dose pack. Since then, she reports: her back pain is doing \"so so. \"  Pain is 2 out of 10 today. Today, she states that her pain is mostly along her left lower back and radiates down her left leg. Pain is tolerable per her history. 2. Allergic rhinitis: She reports itchy throat,itchy ears, and rhinorrhea. +Allegra and flonase. No shortness of breath. 3. H/o Pulmonary Nodules: The patient has not seen her pulmonology team in over a year. She was concerned given her history of pulmonary nodules. Her last chest CT scan was in 2017. At that time, 1 of her nodules have resolved. The other nodules were small and stable in appearance when compared to previous studies. She denies shortness of breath. 4.  Hot flashes: The patient has complained of hot flashes. Symptoms will only last minutes. Prior to Admission medications    Medication Sig Start Date End Date Taking? Authorizing Provider   mv-min/vit C/glut/lysine/hb124 ([place], LYSINE HCL, PO) Take  by mouth. Provider, Historical   methylPREDNISolone (MEDROL DOSEPACK) 4 mg tablet Take per package instructions 3/25/20 4/27/20  Brooke Baltazar MD   gabapentin (GRALISE) 600 mg Tb24 Take 1,200 mg by mouth daily. Max Daily Amount: 1,200 mg.  Indications: Neuropathic Pain 1/8/20   Bk CARDENAS NP   fluticasone propionate (FLONASE) 50 mcg/actuation nasal spray USE 2 SPRAYS IN [facility] NOSTRIL DAILY 11/11/19   Say Olson MD   vitamin C-biotin (HAIR-SKIN-NAILS, VIT C-BIOTIN,) 50 mg -1,250 mcg chew Take  by mouth daily. Provider, Historical   triamterene-hydroCHLOROthiazide (MAXZIDE) 37.5-25 mg per tablet TAKE ONE-HALF (1/2) TABLET DAILY 2/25/19   Tomeka Potts MD   fexofenadine (ALLEGRA) 180 mg tablet Take 180 mg by mouth daily. Provider, Historical   PROAIR HFA 90 mcg/actuation inhaler USE 2 INHALATIONS EVERY 4 HOURS AS NEEDED 5/7/18   Gloria Shields MD   EPINEPHrine (EPIPEN) 0.3 mg/0.3 mL injection 0.3 mL by IntraMUSCular route once as needed for up to 1 dose. 4/28/17   Osorio Bernabe MD   melatonin 3 mg tablet Take 3 mg by mouth daily as needed (sleep). Provider, Historical   Cholecalciferol, Vitamin D3, (VITAMIN D3) 1,000 unit cap Take 3,000 Units by mouth daily. 11/12/14   Graciela Vargas, DORINA   cycloSPORINE (RESTASIS) 0.05 % ophthalmic emulsion Administer 1 Drop to both eyes two (2) times a day. Provider, Historical   MULTIVITAMIN W-MINERALS/LUTEIN (CENTRUM SILVER PO) Take 1 Tab by mouth daily. Provider, Historical   co-enzyme Q-10 (CO Q-10) 100 mg capsule Take 100 mg by mouth daily. Provider, Historical   BIOTIN PO Take 5,000 mcg by mouth daily.  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    Band keratopathy of right eye 2/3/2018    Cataract     Cylindrical bronchiectasis (HCC)     Fracture     rt ankle as an adult    History of pneumonia     Hypertension     Ill-defined condition     Spasms to left side    Left arm pain     Lumbar spinal stenosis     Myofascial pain     Osteopenia     Sciatica of right side      Past Surgical History:   Procedure Laterality Date    COLONOSCOPY N/A 11/11/2019    COLONOSCOPY w polypectomies performed by Saleem Rodarte Dixie Herrera MD at [address] HX CATARACT REMOVAL  8/15/2011    HX HEENT      sinus surgery    HX KERATOPLASTY Left 10/02/2017    HX ORTHOPAEDIC      left 4th finger trigger release    HX SHIELA AND BSO  1970s    uterine fibroids     Family History   Problem Relation Age of Onset    Asthma Mother     COPD Mother    Gloridonny Seeds MS Mother     Heart Disease Mother     Hypertension Mother     Stroke Father     Heart Disease Father     Hypertension Father     Allergic Rhinitis Sister     Asthma Brother     Hypertension Brother     Diabetes Brother     Migraines Paternal Grandmother      Social History     Socioeconomic History    Marital status:      Spouse name: Not on file    Number of children: Not on file    Years of education: Not on file    Highest education level: Not on file   Tobacco Use    Smoking status: Former Smoker     Years: 5.00     Types: Cigarettes     Last attempt to quit: 1970     Years since quittin.3    Smokeless tobacco: Never Used   Substance and Sexual Activity    Alcohol use: Yes     Alcohol/week: 1.0 standard drinks     Types: 1 Glasses of wine per week     Frequency: Monthly or less     Comment: occasional wine    Drug use: No    Sexual activity: Yes     Partners: Male     Birth control/protection: None       ROS:  Gen: No fever/chills  HEENT: No sore throat, eye pain, or ear pain. +Pruritus along her eyes/ears  CV: No CP  Resp: No cough/SOB  GI: No abdominal pain  : No hematuria/dysuria  Derm: No rash  Neuro: No new paresthesias/weakness  Musc: No new myalgias/jt pain. +Chronic back pain - see HPI  Psych: No depression sx were described today.       Objective:     General: alert, cooperative, no distress   Mental  status: mental status: alert, oriented to person, place, and time, normal mood, behavior, speech, dress, motor activity, and thought processes   Resp: resp: normal effort and no respiratory distress   Neuro: neuro: no gross deficits   Skin: skin: no discoloration or lesions of concern on visible areas   HEENT: White sclera. +Allergic shiners. LABS:  Lab Results   Component Value Date/Time    Sodium 142 02/10/2020 10:46 AM    Potassium 3.7 02/10/2020 10:46 AM    Chloride 110 02/10/2020 10:46 AM    CO2 28 02/10/2020 10:46 AM    Anion gap 4 02/10/2020 10:46 AM    Glucose 89 02/10/2020 10:46 AM    BUN 12 02/10/2020 10:46 AM    Creatinine 0.70 02/10/2020 10:46 AM    BUN/Creatinine ratio 17 02/10/2020 10:46 AM    GFR est AA >60 02/10/2020 10:46 AM    GFR est non-AA >60 02/10/2020 10:46 AM    Calcium 8.9 02/10/2020 10:46 AM       Lab Results   Component Value Date/Time    Cholesterol, total 163 02/10/2020 10:46 AM    HDL Cholesterol 60 02/10/2020 10:46 AM    LDL, calculated 87 02/10/2020 10:46 AM    VLDL, calculated 16 02/10/2020 10:46 AM    Triglyceride 80 02/10/2020 10:46 AM    CHOL/HDL Ratio 2.7 02/10/2020 10:46 AM       Lab Results   Component Value Date/Time    WBC 4.3 (L) 02/10/2020 10:46 AM    HGB 13.9 02/10/2020 10:46 AM    HCT 41.8 02/10/2020 10:46 AM    PLATELET 802 23/31/0296 10:46 AM    MCV 88.0 02/10/2020 10:46 AM       Lab Results   Component Value Date/Time    Hemoglobin A1c 5.9 (H) 02/10/2020 10:46 AM       Lab Results   Component Value Date/Time    TSH 0.85 01/24/2018 08:13 AM           Due to this being a TeleHealth  evaluation, many elements of the physical examination are unable to be assessed. The pt was seen by synchronous (real-time) audio-video technology, and/or her healthcare decision maker, is aware that this patient-initiated, Telehealth encounter is a billable service, with coverage as determined by her insurance carrier. She is aware that she may receive a bill and has provided verbal consent to proceed: Yes. The pt is being evaluated by a video visit encounter for concerns as above. A caregiver was present when appropriate.  Due to this being a TeleHealth encounter (During ROEAV-15 public health emergency), evaluation of the following organ systems was limited: Vitals/Constitutional/EENT/Resp/CV/GI//MS/Neuro/Skin/Heme-Lymph-Imm. Pursuant to the emergency declaration under the [address] [place] [address], [facility] waiver authority and the [employer] Act, this Virtual  Visit was conducted, with patient's (and/or legal guardian's) consent, to reduce the patient's risk of exposure to COVID-19 and provide necessary medical care. Services were provided through a video synchronous discussion virtually to substitute for in-person clinic visit. Patient and provider were located at their individual homes. We discussed the expected course, resolution and complications of the diagnosis(es) in detail. Medication risks, benefits, costs, interactions, and alternatives were discussed as indicated. I advised her to contact the office if her condition worsens, changes or fails to improve as anticipated. She expressed understanding with the diagnosis(es) and plan.      Jeannine Lombard, MD

## 2020-08-03 ENCOUNTER — OFFICE VISIT (OUTPATIENT)
Dept: ORTHOPEDIC SURGERY | Age: 80
End: 2020-08-03

## 2020-08-03 VITALS
WEIGHT: 174 LBS | RESPIRATION RATE: 16 BRPM | HEIGHT: 61 IN | SYSTOLIC BLOOD PRESSURE: 118 MMHG | DIASTOLIC BLOOD PRESSURE: 68 MMHG | BODY MASS INDEX: 32.85 KG/M2 | HEART RATE: 68 BPM | OXYGEN SATURATION: 98 % | TEMPERATURE: 98 F

## 2020-08-03 DIAGNOSIS — M47.816 LUMBAR FACET ARTHROPATHY: ICD-10-CM

## 2020-08-03 DIAGNOSIS — M43.10 RETROLISTHESIS: ICD-10-CM

## 2020-08-03 DIAGNOSIS — M50.30 DDD (DEGENERATIVE DISC DISEASE), CERVICAL: Primary | ICD-10-CM

## 2020-08-03 DIAGNOSIS — M54.12 CERVICAL RADICULOPATHY: ICD-10-CM

## 2020-08-03 DIAGNOSIS — M79.2 NEURITIS: ICD-10-CM

## 2020-08-03 RX ORDER — GABAPENTIN 600 MG/1
1200 TABLET, FILM COATED ORAL DAILY
Qty: 180 TAB | Refills: 1 | Status: SHIPPED | COMMUNITY
Start: 2020-08-03 | End: 2020-08-03 | Stop reason: CLARIF

## 2020-08-03 RX ORDER — GABAPENTIN 600 MG/1
1200 TABLET, FILM COATED ORAL DAILY
Qty: 180 TAB | Refills: 1 | Status: SHIPPED | OUTPATIENT
Start: 2020-08-03 | End: 2021-03-04 | Stop reason: SDUPTHER

## 2020-08-03 RX ORDER — GABAPENTIN 600 MG/1
1200 TABLET, FILM COATED ORAL DAILY
Qty: 20 TAB | Refills: 0 | Status: SHIPPED | OUTPATIENT
Start: 2020-08-03 | End: 2021-02-02 | Stop reason: SDUPTHER

## 2020-08-03 RX ORDER — GABAPENTIN 600 MG/1
1200 TABLET, FILM COATED ORAL DAILY
Qty: 20 TAB | Refills: 0 | Status: SHIPPED | OUTPATIENT
Start: 2020-08-03 | End: 2020-08-03 | Stop reason: CLARIF

## 2020-08-03 RX ORDER — TRIAMTERENE/HYDROCHLOROTHIAZID 37.5-25 MG
0.5 TABLET ORAL DAILY
Qty: 45 TAB | Refills: 3 | Status: SHIPPED | OUTPATIENT
Start: 2020-08-03 | End: 2021-07-27

## 2020-08-03 NOTE — TELEPHONE ENCOUNTER
Last Visit: 4/27/20 with MD Meena Monk  Next Appointment: 10/27/20 with MD Meena Monk  Previous Refill Encounter(s): 2/25/19 #90 with 2 refills    Requested Prescriptions     Pending Prescriptions Disp Refills    triamterene-hydroCHLOROthiazide (MAXZIDE) 37.5-25 mg per tablet 45 Tab 3     Sig: Take 0.5 Tabs by mouth daily.

## 2020-08-03 NOTE — PROGRESS NOTES
Prescription for gralise was faxed over to [facility]. A fax confirmation was received. The short prescription was called into [facility] as requested by the pt. The verbal order was given to [facility], the pharmacist. Talya Loco was RBV'd. No further questions at this time.

## 2020-08-03 NOTE — PROGRESS NOTES
Chief complaint   Chief Complaint   Patient presents with    Back Pain     lower    Neck Pain     posterior       History of Present Illness:  Elizabeth Rucker is a  68 y.o.  female who comes in today for follow-up of her neck pain with radiating left arm pain and low back pain with radiating right lower extremity pain down to her knee. Her symptoms are controlled with release 600 mg 2 tablets daily. She does not have any side effects from it and it makes her pain very tolerable. She states she can definitely tell a difference if she does not take it. She denies any new medical issues or ER visits. She is a retired nurse. She is a non-smoker. She denies fever bowel bladder dysfunction. Physical Exam: The patient is a 68-year-old female well-developed well-nourished who is alert and oriented with a normal mood and affect. She has a full weightbearing nonantalgic gait. She does not use any assistive device. She has 4 out of 5 strength of her bilateral upper and lower extremities. Negative straight leg raise. Negative Hoffmans. She does have some pain with hyperextension lumbar spine. Assessment and Plan: This is a patient with cervical degenerative disc disease and radiculopathy with neuritis. She also has lumbar facet disease with retrolisthesis. I am refilling her release. I will send 90-day to [facility] and a 10-day supply to her local pharmacy. We will see her back in 6 months sooner if needed.         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    Band keratopathy of right eye 2/3/2018    Cataract     Cylindrical bronchiectasis (HCC)     Fracture     rt ankle as an adult    History of pneumonia     Hypertension     Ill-defined condition     Spasms to left side    Left arm pain     Lumbar spinal stenosis     Myofascial pain  Osteopenia     Sciatica of right side      Past Surgical History:   Procedure Laterality Date    COLONOSCOPY N/A 2019    COLONOSCOPY w polypectomies performed by Shira Jolley MD at [address] HX CATARACT REMOVAL  8/15/2011    HX HEENT      sinus surgery    HX KERATOPLASTY Left 10/02/2017    HX ORTHOPAEDIC      left 4th finger trigger release    HX SHIELA AND BSO  1970s    uterine fibroids     Social History     Socioeconomic History    Marital status:      Spouse name: Not on file    Number of children: Not on file    Years of education: Not on file    Highest education level: Not on file   Occupational History    Not on file   Social Needs    Financial resource strain: Not on file    Food insecurity     Worry: Not on file     Inability: Not on file    Transportation needs     Medical: Not on file     Non-medical: Not on file   Tobacco Use    Smoking status: Former Smoker     Years: 5.00     Types: Cigarettes     Last attempt to quit: 1970     Years since quittin.6    Smokeless tobacco: Never Used   Substance and Sexual Activity    Alcohol use:  Yes     Alcohol/week: 1.0 standard drinks     Types: 1 Glasses of wine per week     Frequency: Monthly or less     Comment: occasional wine    Drug use: No    Sexual activity: Yes     Partners: Male     Birth control/protection: None   Lifestyle    Physical activity     Days per week: Not on file     Minutes per session: Not on file    Stress: Not on file   Relationships    Social connections     Talks on phone: Not on file     Gets together: Not on file     Attends Advent service: Not on file     Active member of club or organization: Not on file     Attends meetings of clubs or organizations: Not on file     Relationship status: Not on file    Intimate partner violence     Fear of current or ex partner: Not on file     Emotionally abused: Not on file     Physically abused: Not on file     Forced sexual activity: Not on file   Other Topics Concern    Not on file   Social History Narrative    Not on file     Family History   Problem Relation Age of Onset   Edelmira Lynn Asthma Mother     COPD Mother    Edelmira Lynn MS Mother     Heart Disease Mother     Hypertension Mother     Stroke Father     Heart Disease Father     Hypertension Father     Allergic Rhinitis Sister     Asthma Brother     Hypertension Brother     Diabetes Brother     Migraines Paternal Grandmother        Physical Exam:  Visit Vitals  /68 (BP 1 Location: Left arm, BP Patient Position: Sitting)   Pulse 68   Temp 98 °F (36.7 °C) (Oral)   Resp 16   Ht 5' 0.5\" (1.537 m)   Wt 174 lb (78.9 kg)   SpO2 98%   BMI 33.42 kg/m²     Pain Scale: 5/10       has been . reviewed and is appropriate          Diagnoses and all orders for this visit:    1. DDD (degenerative disc disease), cervical  -     gabapentin (Gralise) 600 mg Tb24; Take 1,200 mg by mouth daily. Max Daily Amount: 1,200 mg. Indications: neuropathic pain  -     gabapentin (Gralise) 600 mg Tb24; Take 1,200 mg by mouth daily. Max Daily Amount: 1,200 mg.    2. Neuritis  -     gabapentin (Gralise) 600 mg Tb24; Take 1,200 mg by mouth daily. Max Daily Amount: 1,200 mg. Indications: neuropathic pain  -     gabapentin (Gralise) 600 mg Tb24; Take 1,200 mg by mouth daily. Max Daily Amount: 1,200 mg.    3. Cervical radiculopathy  -     gabapentin (Gralise) 600 mg Tb24; Take 1,200 mg by mouth daily. Max Daily Amount: 1,200 mg. Indications: neuropathic pain  -     gabapentin (Gralise) 600 mg Tb24; Take 1,200 mg by mouth daily. Max Daily Amount: 1,200 mg.    4. Lumbar facet arthropathy    5. Retrolisthesis            Follow-up and Dispositions    · Return in about 6 months (around 2/3/2021) for with NP Baraga.              We have informed Maryse Lomeli to notify us for immediate appointment if she has any worsening neurogical symptoms or if an emergency situation presents, then call [phone]

## 2020-08-12 ENCOUNTER — TELEPHONE (OUTPATIENT)
Dept: INTERNAL MEDICINE CLINIC | Age: 80
End: 2020-08-12

## 2020-08-12 DIAGNOSIS — J30.9 ALLERGIC RHINITIS, UNSPECIFIED SEASONALITY, UNSPECIFIED TRIGGER: Primary | ICD-10-CM

## 2020-08-12 NOTE — TELEPHONE ENCOUNTER
Please let the patient know that I placed a referral to ENT for evaluation of her chronic seasonal allergies.     Dr. Mala Watts  [facility], [address] [place], [address]  Phone: [phone]  Fax: [phone]

## 2020-08-18 ENCOUNTER — HOSPITAL ENCOUNTER (OUTPATIENT)
Dept: MAMMOGRAPHY | Age: 80
Discharge: HOME | End: 2020-08-18
Attending: INTERNAL MEDICINE
Payer: MEDICARE

## 2020-08-18 DIAGNOSIS — Z12.31 VISIT FOR SCREENING MAMMOGRAM: ICD-10-CM

## 2020-08-18 PROCEDURE — 77063 BREAST TOMOSYNTHESIS BI: CPT

## 2020-10-05 ENCOUNTER — OFFICE VISIT (OUTPATIENT)
Dept: INTERNAL MEDICINE CLINIC | Age: 80
End: 2020-10-05
Payer: MEDICARE

## 2020-10-05 VITALS
SYSTOLIC BLOOD PRESSURE: 124 MMHG | TEMPERATURE: 97.9 F | DIASTOLIC BLOOD PRESSURE: 75 MMHG | RESPIRATION RATE: 12 BRPM | WEIGHT: 173 LBS | HEART RATE: 61 BPM | HEIGHT: 61 IN | BODY MASS INDEX: 32.66 KG/M2 | OXYGEN SATURATION: 98 %

## 2020-10-05 DIAGNOSIS — M25.562 ACUTE PAIN OF LEFT KNEE: Primary | ICD-10-CM

## 2020-10-05 PROCEDURE — G8754 DIAS BP LESS 90: HCPCS | Performed by: INTERNAL MEDICINE

## 2020-10-05 PROCEDURE — G8432 DEP SCR NOT DOC, RNG: HCPCS | Performed by: INTERNAL MEDICINE

## 2020-10-05 PROCEDURE — G8427 DOCREV CUR MEDS BY ELIG CLIN: HCPCS | Performed by: INTERNAL MEDICINE

## 2020-10-05 PROCEDURE — G8417 CALC BMI ABV UP PARAM F/U: HCPCS | Performed by: INTERNAL MEDICINE

## 2020-10-05 PROCEDURE — 99214 OFFICE O/P EST MOD 30 MIN: CPT | Performed by: INTERNAL MEDICINE

## 2020-10-05 PROCEDURE — 1090F PRES/ABSN URINE INCON ASSESS: CPT | Performed by: INTERNAL MEDICINE

## 2020-10-05 PROCEDURE — 1101F PT FALLS ASSESS-DOCD LE1/YR: CPT | Performed by: INTERNAL MEDICINE

## 2020-10-05 PROCEDURE — G8536 NO DOC ELDER MAL SCRN: HCPCS | Performed by: INTERNAL MEDICINE

## 2020-10-05 PROCEDURE — G8399 PT W/DXA RESULTS DOCUMENT: HCPCS | Performed by: INTERNAL MEDICINE

## 2020-10-05 PROCEDURE — G8752 SYS BP LESS 140: HCPCS | Performed by: INTERNAL MEDICINE

## 2020-10-05 PROCEDURE — G0463 HOSPITAL OUTPT CLINIC VISIT: HCPCS | Performed by: INTERNAL MEDICINE

## 2020-10-05 RX ORDER — METHYLPREDNISOLONE 4 MG/1
TABLET ORAL
Qty: 1 DOSE PACK | Refills: 0 | Status: SHIPPED | OUTPATIENT
Start: 2020-10-05 | End: 2020-10-15 | Stop reason: ALTCHOICE

## 2020-10-05 NOTE — PROGRESS NOTES
[facility]NISTS OF [facility]:  10/5/2020, MRN: [id number]      Radha Bolanos is a 68 y.o. female and presents to clinic for Knee Pain (Left knee pain x 3 days. Patient states that she felt a sharp pain when climbing into bed. Pain is constant since then. Patient denies any swelling or difficulty walking)    Subjective: The patient is a 81-year-old female with history of hypertension, LGSIL 4/19, osteopenia, tubular adenomas colon polyps, obesity, vitamin D deficiency, stable pulmonary nodules, asthma (followed by Lisa Minus), cervical disc disease, and lumbar disc disease (followed by Orthopedics).     1. Left Knee Pain: Present for 3 days. Pain is sharp in quality and occurs when climbing into bed. She can bear weight and ambulate. There has been no swelling. No alleviating factors are known. 2. Prediabetes/HTN: BP is 124/75. Weight is 173 pounds. She is taking triamtereneHCTZ. No adverse side effects from taking these medicines. 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 45 Tab 3    gabapentin (Gralise) 600 mg Tb24 Take 1,200 mg by mouth daily.  Max Daily Amount: 1,200 mg. 20 Tab 0    gabapentin (Gralise) 600 mg Tb24 Take 1,200 mg by mouth daily. Max Daily Amount: 1,200 mg. Indications: neuropathic pain 180 Tab 1    mv-min/vit C/glut/lysine/hb124 (AIRBORNE, LYSINE HCL, PO) Take  by mouth.  fluticasone propionate (FLONASE) 50 mcg/actuation nasal spray USE 2 SPRAYS IN EACH NOSTRIL DAILY 48 g 4    vitamin C-biotin (HAIR-SKIN-NAILS, VIT C-BIOTIN,) 50 mg -1,250 mcg chew Take  by mouth daily.  fexofenadine (ALLEGRA) 180 mg tablet Take 180 mg by mouth daily.  PROAIR HFA 90 mcg/actuation inhaler USE 2 INHALATIONS EVERY 4 HOURS AS NEEDED 3 Inhaler 3    EPINEPHrine (EPIPEN) 0.3 mg/0.3 mL injection 0.3 mL by IntraMUSCular route once as needed for up to 1 dose. 1 Syringe 1    melatonin 3 mg tablet Take 3 mg by mouth daily as needed (sleep).  Cholecalciferol, Vitamin D3, (VITAMIN D3) 1,000 unit cap Take 3,000 Units by mouth daily.  cycloSPORINE (RESTASIS) 0.05 % ophthalmic emulsion Administer 1 Drop to both eyes two (2) times a day.  MULTIVITAMIN W-MINERALS/LUTEIN (CENTRUM SILVER PO) Take 1 Tab by mouth daily.  co-enzyme Q-10 (CO Q-10) 100 mg capsule Take 100 mg by mouth daily.  BIOTIN PO Take 5,000 mcg by mouth daily.          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 (HCC)     Fracture     rt ankle as an adult    History of pneumonia     Hypertension     Ill-defined condition     Spasms to left side    Left arm pain     Lumbar spinal stenosis     Myofascial pain     Osteopenia     Sciatica of right side        Past Surgical History:   Procedure Laterality Date    COLONOSCOPY N/A 11/11/2019    COLONOSCOPY w polypectomies performed by Hesham Montez MD at [facility]COPY  HX CATARACT REMOVAL  8/15/2011    HX HEENT      sinus surgery    HX KERATOPLASTY Left 10/02/2017    HX ORTHOPAEDIC      left 4th finger trigger release    HX SHIELA AND BSO  1970s    uterine fibroids       Family History   Problem Relation Age of Onset    Asthma Mother     COPD Mother    [facility] Mother     Heart Disease Mother     Hypertension Mother     Stroke Father     Heart Disease Father     Hypertension Father     Allergic Rhinitis Sister     Asthma Brother     Hypertension Brother     Diabetes Brother     Migraines Paternal Grandmother        Social History     Tobacco Use    Smoking status: Former Smoker     Years: 5.00     Types: Cigarettes     Last attempt to quit: 1970     Years since quittin.7    Smokeless tobacco: Never Used   Substance Use Topics    Alcohol use: Yes     Alcohol/week: 1.0 standard drinks     Types: 1 Glasses of wine per week     Frequency: Monthly or less     Comment: occasional wine       ROS   Review of Systems   Constitutional: Negative for chills and fever. HENT: Negative for ear pain and sore throat. Eyes: Negative for blurred vision and pain. Respiratory: Negative for shortness of breath. Cardiovascular: Negative for chest pain. Gastrointestinal: Negative for abdominal pain, blood in stool and melena. Genitourinary: Negative for dysuria and hematuria. Musculoskeletal: Positive for joint pain. Negative for myalgias. Skin: Negative for rash. Neurological: Negative for tingling, focal weakness and headaches. Endo/Heme/Allergies: Does not bruise/bleed easily. Psychiatric/Behavioral: Negative for substance abuse. Objective     Vitals:    10/05/20 1149   BP: 124/75   Pulse: 61   Resp: 12   Temp: 97.9 °F (36.6 °C)   TempSrc: Temporal   SpO2: 98%   Weight: 173 lb (78.5 kg)   Height: 5' 0.5\" (1.537 m)   PainSc:  10 - Worst pain ever   PainLoc: Knee       Physical Exam  Vitals signs and nursing note reviewed.    HENT:      Head: Normocephalic and atraumatic. Right Ear: External ear normal.      Left Ear: External ear normal.   Eyes:      General: No scleral icterus. Right eye: No discharge. Left eye: No discharge. Conjunctiva/sclera: Conjunctivae normal.   Neck:      Musculoskeletal: Neck supple. Cardiovascular:      Rate and Rhythm: Normal rate and regular rhythm. Heart sounds: Normal heart sounds. No murmur. No friction rub. No gallop. Pulmonary:      Effort: Pulmonary effort is normal. No respiratory distress. Breath sounds: Normal breath sounds. No wheezing or rales. Abdominal:      General: Bowel sounds are normal. There is no distension. Palpations: Abdomen is soft. There is no mass. Tenderness: There is no abdominal tenderness. There is no guarding or rebound. Musculoskeletal:         General: No swelling (Bue/BLE) or tenderness (Bue). Comments: Adequate range of motion along her left knee. She has crepitus with flexion and extension of her knee joint. No effusions are present. Lymphadenopathy:      Cervical: No cervical adenopathy. Skin:     General: Skin is warm and dry. Findings: No erythema. Neurological:      Mental Status: She is alert and oriented to person, place, and time. Motor: No abnormal muscle tone. Gait: Gait is intact.  Gait normal.   Psychiatric:         Mood and Affect: Mood and affect normal.         LABS   Data Review:   Lab Results   Component Value Date/Time    WBC 4.3 (L) 02/10/2020 10:46 AM    HGB 13.9 02/10/2020 10:46 AM    HCT 41.8 02/10/2020 10:46 AM    PLATELET 109 72/83/6701 10:46 AM    MCV 88.0 02/10/2020 10:46 AM       Lab Results   Component Value Date/Time    Sodium 142 02/10/2020 10:46 AM    Potassium 3.7 02/10/2020 10:46 AM    Chloride 110 02/10/2020 10:46 AM    CO2 28 02/10/2020 10:46 AM    Anion gap 4 02/10/2020 10:46 AM    Glucose 89 02/10/2020 10:46 AM    BUN 12 02/10/2020 10:46 AM    Creatinine 0.70 02/10/2020 10:46 AM    BUN/Creatinine ratio 17 02/10/2020 10:46 AM    GFR est AA >60 02/10/2020 10:46 AM    GFR est non-AA >60 02/10/2020 10:46 AM    Calcium 8.9 02/10/2020 10:46 AM       Lab Results   Component Value Date/Time    Cholesterol, total 163 02/10/2020 10:46 AM    HDL Cholesterol 60 02/10/2020 10:46 AM    LDL, calculated 87 02/10/2020 10:46 AM    VLDL, calculated 16 02/10/2020 10:46 AM    Triglyceride 80 02/10/2020 10:46 AM    CHOL/HDL Ratio 2.7 02/10/2020 10:46 AM       Lab Results   Component Value Date/Time    Hemoglobin A1c 5.9 (H) 02/10/2020 10:46 AM       Assessment/Plan:   1. Acute pain of left knee: Likely from OA. Referral to Orthopedics. Okay to use anti-inflammatory Rx as needed. Tylenol as needed  Activity as tolerated. ORDERS:  - REFERRAL TO ORTHOPEDICS    2. HTN/Prediabetes: BP is stable. Continue with Rx as prescribed. Follow-up with me in 6 months for an in office visit. Awaiting lab results. Health Maintenance Due   Topic Date Due    GLAUCOMA SCREENING Q2Y  12/11/2019    Medicare Yearly Exam  08/22/2020         Lab review: labs are reviewed in the EHR    I have discussed the diagnosis with the patient and the intended plan as seen in the above orders. The patient has received an after-visit summary and questions were answered concerning future plans. I have discussed medication side effects and warnings with the patient as well. I have reviewed the plan of care with the patient, accepted their input and they are in agreement with the treatment goals. All questions were answered. The patient understands the plan of care. Handouts provided today with above information. Pt instructed if symptoms worsen to call the office or report to the ED for continued care. Greater than 50% of the visit time was spent in counseling and/or coordination of care.       Voice recognition was used to generate this report, which may have resulted in some phonetic based errors in grammar and contents. Even though attempts were made to correct all the mistakes, some may have been missed, and remained in the body of the document.           Shelly Napier MD

## 2020-10-05 NOTE — PROGRESS NOTES
Chief Complaint   Patient presents with    Knee Pain     Left knee pain x 3 days. Patient states that she felt a sharp pain when climbing into bed. Pain is constant since then. Patient denies any swelling or difficulty walking     1. Have you been to the ER, urgent care clinic since your last visit? Hospitalized since your last visit? No    2. Have you seen or consulted any other health care providers outside of the [address] [place] [address] since your last visit? Include any pap smears or colon screening.  No

## 2020-10-05 NOTE — PATIENT INSTRUCTIONS
Knee Pain or Injury: Care Instructions Your Care Instructions Injuries are a common cause of knee problems. Sudden (acute) injuries may be caused by a direct blow to the knee. They can also be caused by abnormal twisting, bending, or falling on the knee. Pain, bruising, or swelling may be severe, and may start within minutes of the injury. Overuse is another cause of knee pain. Other causes are climbing stairs, kneeling, and other activities that use the knee. Everyday wear and tear, especially as you get older, also can cause knee pain. Rest, along with home treatment, often relieves pain and allows your knee to heal. If you have a serious knee injury, you may need tests and treatment. Follow-up care is a key part of your treatment and safety. Be sure to make and go to all appointments, and call your doctor if you are having problems. It's also a good idea to know your test results and keep a list of the medicines you take. How can you care for yourself at home? · Be safe with medicines. Read and follow all instructions on the label. ? If the doctor gave you a prescription medicine for pain, take it as prescribed. ? If you are not taking a prescription pain medicine, ask your doctor if you can take an over-the-counter medicine. · Rest and protect your knee. Take a break from any activity that may cause pain. · Put ice or a cold pack on your knee for 10 to 20 minutes at a time. Put a thin cloth between the ice and your skin. · Prop up a sore knee on a pillow when you ice it or anytime you sit or lie down for the next 3 days. Try to keep it above the level of your heart. This will help reduce swelling. · If your knee is not swollen, you can put moist heat, a heating pad, or a warm cloth on your knee. · If your doctor recommends an elastic bandage, sleeve, or other type of support for your knee, wear it as directed.  
· Follow your doctor's instructions about how much weight you can put on your leg. Use a cane, crutches, or a walker as instructed. · Follow your doctor's instructions about activity during your healing process. If you can do mild exercise, slowly increase your activity. · Reach and stay at a healthy weight. Extra weight can strain the joints, especially the knees and hips, and make the pain worse. Losing even a few pounds may help. When should you call for help? Call 911 anytime you think you may need emergency care. For example, call if: 
  · You have symptoms of a blood clot in your lung (called a pulmonary embolism). These may include: 
? Sudden chest pain. ? Trouble breathing. ? Coughing up blood. Call your doctor now or seek immediate medical care if: 
  · You have severe or increasing pain.  
  · Your leg or foot turns cold or changes color.  
  · You cannot stand or put weight on your knee.  
  · Your knee looks twisted or bent out of shape.  
  · You cannot move your knee.  
  · You have signs of infection, such as: 
? Increased pain, swelling, warmth, or redness. ? Red streaks leading from the knee. ? Pus draining from a place on your knee. ? A fever.  
  · You have signs of a blood clot in your leg (called a deep vein thrombosis), such as: 
? Pain in your calf, back of the knee, thigh, or groin. ? Redness and swelling in your leg or groin. Watch closely for changes in your health, and be sure to contact your doctor if: 
  · You have tingling, weakness, or numbness in your knee.  
  · You have any new symptoms, such as swelling.  
  · You have bruises from a knee injury that last longer than 2 weeks.  
  · You do not get better as expected. Where can you learn more? Go to http://www.gray.com/ Enter K195 in the search box to learn more about \"Knee Pain or Injury: Care Instructions. \" Current as of: June 26, 2019               Content Version: 12.6 © 7959-2498 [employer]. Care instructions adapted under license by [employer] (which disclaims liability or warranty for this information). If you have questions about a medical condition or this instruction, always ask your healthcare professional. [address] any warranty or liability for your use of this information.

## 2020-10-13 ENCOUNTER — LAB ONLY (OUTPATIENT)
Dept: INTERNAL MEDICINE CLINIC | Age: 80
End: 2020-10-13

## 2020-10-13 ENCOUNTER — HOSPITAL ENCOUNTER (OUTPATIENT)
Dept: LAB | Age: 80
Discharge: HOME | End: 2020-10-13
Payer: MEDICARE

## 2020-10-13 DIAGNOSIS — R73.9 HYPERGLYCEMIA: ICD-10-CM

## 2020-10-13 DIAGNOSIS — I10 ESSENTIAL HYPERTENSION: Primary | ICD-10-CM

## 2020-10-13 DIAGNOSIS — I10 ESSENTIAL HYPERTENSION: ICD-10-CM

## 2020-10-13 LAB
ALBUMIN SERPL-MCNC: 3.6 G/DL (ref 3.4–5)
ALBUMIN/GLOB SERPL: 1 {RATIO} (ref 0.8–1.7)
ALP SERPL-CCNC: 82 U/L (ref 45–117)
ALT SERPL-CCNC: 35 U/L (ref 13–56)
ANION GAP SERPL CALC-SCNC: 7 MMOL/L (ref 3–18)
AST SERPL-CCNC: 19 U/L (ref 10–38)
BASOPHILS # BLD: 0 K/UL (ref 0–0.1)
BASOPHILS NFR BLD: 0 % (ref 0–2)
BILIRUB SERPL-MCNC: 0.8 MG/DL (ref 0.2–1)
BUN SERPL-MCNC: 18 MG/DL (ref 7–18)
BUN/CREAT SERPL: 24 (ref 12–20)
CALCIUM SERPL-MCNC: 9 MG/DL (ref 8.5–10.1)
CHLORIDE SERPL-SCNC: 104 MMOL/L (ref 100–111)
CHOLEST SERPL-MCNC: 156 MG/DL
CO2 SERPL-SCNC: 33 MMOL/L (ref 21–32)
CREAT SERPL-MCNC: 0.74 MG/DL (ref 0.6–1.3)
CREAT UR-MCNC: 154 MG/DL (ref 30–125)
DIFFERENTIAL METHOD BLD: ABNORMAL
EOSINOPHIL # BLD: 0 K/UL (ref 0–0.4)
EOSINOPHIL NFR BLD: 0 % (ref 0–5)
ERYTHROCYTE [DISTWIDTH] IN BLOOD BY AUTOMATED COUNT: 15.3 % (ref 11.6–14.5)
EST. AVERAGE GLUCOSE BLD GHB EST-MCNC: 117 MG/DL
GLOBULIN SER CALC-MCNC: 3.7 G/DL (ref 2–4)
GLUCOSE SERPL-MCNC: 75 MG/DL (ref 74–99)
HBA1C MFR BLD: 5.7 % (ref 4.2–5.6)
HCT VFR BLD AUTO: 43.9 % (ref 35–45)
HDLC SERPL-MCNC: 79 MG/DL (ref 40–60)
HDLC SERPL: 2 {RATIO} (ref 0–5)
HGB BLD-MCNC: 14.5 G/DL (ref 12–16)
LDLC SERPL CALC-MCNC: 53.6 MG/DL (ref 0–100)
LIPID PROFILE,FLP: ABNORMAL
LYMPHOCYTES # BLD: 2.4 K/UL (ref 0.9–3.6)
LYMPHOCYTES NFR BLD: 39 % (ref 21–52)
MCH RBC QN AUTO: 29.2 PG (ref 24–34)
MCHC RBC AUTO-ENTMCNC: 33 G/DL (ref 31–37)
MCV RBC AUTO: 88.3 FL (ref 74–97)
MICROALBUMIN UR-MCNC: 0.97 MG/DL (ref 0–3)
MICROALBUMIN/CREAT UR-RTO: 6 MG/G (ref 0–30)
MONOCYTES # BLD: 0.3 K/UL (ref 0.05–1.2)
MONOCYTES NFR BLD: 6 % (ref 3–10)
NEUTS SEG # BLD: 3.4 K/UL (ref 1.8–8)
NEUTS SEG NFR BLD: 55 % (ref 40–73)
PLATELET # BLD AUTO: 345 K/UL (ref 135–420)
PMV BLD AUTO: 10.7 FL (ref 9.2–11.8)
POTASSIUM SERPL-SCNC: 4.3 MMOL/L (ref 3.5–5.5)
PROT SERPL-MCNC: 7.3 G/DL (ref 6.4–8.2)
RBC # BLD AUTO: 4.97 M/UL (ref 4.2–5.3)
SODIUM SERPL-SCNC: 144 MMOL/L (ref 136–145)
TRIGL SERPL-MCNC: 117 MG/DL (ref ?–150)
VLDLC SERPL CALC-MCNC: 23.4 MG/DL
WBC # BLD AUTO: 6.1 K/UL (ref 4.6–13.2)

## 2020-10-13 PROCEDURE — 80053 COMPREHEN METABOLIC PANEL: CPT

## 2020-10-13 PROCEDURE — 36415 COLL VENOUS BLD VENIPUNCTURE: CPT

## 2020-10-13 PROCEDURE — 82043 UR ALBUMIN QUANTITATIVE: CPT

## 2020-10-13 PROCEDURE — 83036 HEMOGLOBIN GLYCOSYLATED A1C: CPT

## 2020-10-13 PROCEDURE — 85025 COMPLETE CBC W/AUTO DIFF WBC: CPT

## 2020-10-13 PROCEDURE — 80061 LIPID PANEL: CPT

## 2020-10-15 ENCOUNTER — OFFICE VISIT (OUTPATIENT)
Dept: ORTHOPEDIC SURGERY | Age: 80
End: 2020-10-15
Payer: MEDICARE

## 2020-10-15 VITALS
SYSTOLIC BLOOD PRESSURE: 127 MMHG | BODY MASS INDEX: 32.66 KG/M2 | TEMPERATURE: 96.8 F | OXYGEN SATURATION: 98 % | HEART RATE: 61 BPM | WEIGHT: 173 LBS | RESPIRATION RATE: 14 BRPM | DIASTOLIC BLOOD PRESSURE: 77 MMHG | HEIGHT: 61 IN

## 2020-10-15 DIAGNOSIS — M25.562 ACUTE PAIN OF LEFT KNEE: ICD-10-CM

## 2020-10-15 DIAGNOSIS — M17.12 PRIMARY OSTEOARTHRITIS OF LEFT KNEE: Primary | ICD-10-CM

## 2020-10-15 DIAGNOSIS — M71.22 BAKER CYST, LEFT: ICD-10-CM

## 2020-10-15 PROCEDURE — 73562 X-RAY EXAM OF KNEE 3: CPT | Performed by: SPECIALIST

## 2020-10-15 PROCEDURE — 99203 OFFICE O/P NEW LOW 30 MIN: CPT | Performed by: SPECIALIST

## 2020-10-15 PROCEDURE — G8399 PT W/DXA RESULTS DOCUMENT: HCPCS | Performed by: SPECIALIST

## 2020-10-15 PROCEDURE — G8536 NO DOC ELDER MAL SCRN: HCPCS | Performed by: SPECIALIST

## 2020-10-15 PROCEDURE — G8754 DIAS BP LESS 90: HCPCS | Performed by: SPECIALIST

## 2020-10-15 PROCEDURE — 1101F PT FALLS ASSESS-DOCD LE1/YR: CPT | Performed by: SPECIALIST

## 2020-10-15 PROCEDURE — 1090F PRES/ABSN URINE INCON ASSESS: CPT | Performed by: SPECIALIST

## 2020-10-15 PROCEDURE — G8432 DEP SCR NOT DOC, RNG: HCPCS | Performed by: SPECIALIST

## 2020-10-15 PROCEDURE — G8417 CALC BMI ABV UP PARAM F/U: HCPCS | Performed by: SPECIALIST

## 2020-10-15 PROCEDURE — G8427 DOCREV CUR MEDS BY ELIG CLIN: HCPCS | Performed by: SPECIALIST

## 2020-10-15 PROCEDURE — G8752 SYS BP LESS 140: HCPCS | Performed by: SPECIALIST

## 2020-10-15 NOTE — PROGRESS NOTES
Patient: Can Gonzalez                MRN: [id number]       SSN: [id number]  YOB: 1940        AGE: 68 y.o. SEX: female    PCP: Chepe Holley MD  10/15/20    Chief Complaint   Patient presents with    Knee Pain     left     HISTORY:  Can Gonzalez is a 68 y.o. female who is seen for left knee pain. She has been experiencing left knee pain for the past several weeks. She feels posterior knee pain. She does not recall any injury. She feels pain with standing, walking and stair climbing. She experiences startup pain after sitting. She responded well to a steroid taper pack prescribed by her PCP. Pain Assessment  10/15/2020   Location of Pain Knee   Location Modifiers Left   Severity of Pain 7   Quality of Pain Burning   Quality of Pain Comment -   Duration of Pain Persistent   Frequency of Pain Constant   Aggravating Factors Other (Comment)   Aggravating Factors Comment touching   Limiting Behavior Some   Relieving Factors NSAID;Other (Comment)   Relieving Factors Comment -   Result of Injury No     Occupation, etc:  Ms. Shahbaz Kline retired in 2016. She previously worked as an LPN at [employer]  She lives in [facility] with her . She has 2 sons, 1 daughter, many grandchildren. Her  has 8 children. She does not exercise. She is not diabetic. She is hypertensive. She enjoys cooking. She gained 10# during the coronavirus shutdown. Ms. Shahbaz Kline weighs 173 lbs and is 5'0\" tall.        Lab Results   Component Value Date/Time    Hemoglobin A1c 5.7 (H) 10/13/2020 09:43 AM     Weight Metrics 10/15/2020 10/5/2020 8/3/2020 12/3/2019 11/11/2019 10/4/2019 8/22/2019   Weight 173 lb 173 lb 174 lb 176 lb 6.4 oz 171 lb 172 lb 9.6 oz 172 lb   BMI 33.23 kg/m2 33.23 kg/m2 33.42 kg/m2 33.88 kg/m2 32.85 kg/m2 32.61 kg/m2 32.5 kg/m2       Patient Active Problem List   Diagnosis Code    Multiple lung nodules R91.8    Allergic rhinitis J30.9    HTN (hypertension) I10  Asthma J45.909    Osteopenia M85.80    Vitamin D deficiency E55.9    Right knee DJD, 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    Tubular adenoma of colon D12.6    Chronic right-sided low back pain with sciatica M54.40, G89.29    Lumbar spinal stenosis M48.061    HNP (herniated nucleus pulposus), lumbar M51.26    Lumbar facet arthropathy M47.816    Cervical stenosis of spine M48.02    LGSIL of cervix of undetermined significance R87.612    Heel spur, left M77.32    Severe obesity (HCC) E66.01     REVIEW OF SYSTEMS:    Constitutional Symptoms: Negative   Eyes: Negative   Ears, Nose, Throat and Mouth: Negative   Cardiovascular: Negative   Respiratory: Negative   Genitourinary: Per HPI   Gastrointestinal: Per HPI   Integumentary (Skin and/or Breast): Negative   Musculoskeletal: Per HPI   Endocrine/Rheumatologic: Negative   Neurological: Per HPI   Hematology/Lymphatic: Negative    Allergic/Immunologic: Negative   Phychiatric: Negative    Social History     Socioeconomic History    Marital status:      Spouse name: Not on file    Number of children: Not on file    Years of education: Not on file    Highest education level: Not on file   Occupational History    Not on file   Social Needs    Financial resource strain: Not on file    Food insecurity     Worry: Not on file     Inability: Not on file   [employer] needs     Medical: Not on file     Non-medical: Not on file   Tobacco Use    Smoking status: Former Smoker     Years: 5.00     Types: Cigarettes     Last attempt to quit: 1970     Years since quittin.8    Smokeless tobacco: Never Used   Substance and Sexual Activity    Alcohol use:  Yes     Alcohol/week: 1.0 standard drinks     Types: 1 Glasses of wine per week     Frequency: Monthly or less     Comment: occasional wine    Drug use: No    Sexual activity: Yes     Partners: Male     Birth control/protection: None   Lifestyle    Physical activity     Days per week: Not on file     Minutes per session: Not on file    Stress: Not on file   Relationships    Social connections     Talks on phone: Not on file     Gets together: Not on file     Attends Rest[employer] service: Not on file     Active member of club or organization: Not on file     Attends meetings of clubs or organizations: Not on file     Relationship status: Not on file    Intimate partner violence     Fear of current or ex partner: Not on file     Emotionally abused: Not on file     Physically abused: Not on file     Forced sexual activity: Not on file   Other Topics Concern    Not on file   Social History Narrative    Not on file      Allergies   Allergen Reactions    Naprosyn [Naproxen] Other (comments)     Blood in urine        Current Outpatient Medications   Medication Sig    triamterene-hydroCHLOROthiazide (MAXZIDE) 37.5-25 mg per tablet Take 0.5 Tabs by mouth daily.  gabapentin (Gralise) 600 mg Tb24 Take 1,200 mg by mouth daily. Max Daily Amount: 1,200 mg.    gabapentin (Gralise) 600 mg Tb24 Take 1,200 mg by mouth daily. Max Daily Amount: 1,200 mg. Indications: neuropathic pain    mv-min/vit C/glut/lysine/hb124 (AIRBORNE, LYSINE HCL, PO) Take  by mouth.  fluticasone propionate (FLONASE) 50 mcg/actuation nasal spray USE 2 SPRAYS IN EACH NOSTRIL DAILY    vitamin C-biotin (HAIR-SKIN-NAILS, VIT C-BIOTIN,) 50 mg -1,250 mcg chew Take  by mouth daily.  fexofenadine (ALLEGRA) 180 mg tablet Take 180 mg by mouth daily.  PROAIR HFA 90 mcg/actuation inhaler USE 2 INHALATIONS EVERY 4 HOURS AS NEEDED    EPINEPHrine (EPIPEN) 0.3 mg/0.3 mL injection 0.3 mL by IntraMUSCular route once as needed for up to 1 dose.  melatonin 3 mg tablet Take 3 mg by mouth daily as needed (sleep).  Cholecalciferol, Vitamin D3, (VITAMIN D3) 1,000 unit cap Take 3,000 Units by mouth daily.  cycloSPORINE (RESTASIS) 0.05 % ophthalmic emulsion Administer 1 Drop to both eyes two (2) times a day.     MULTIVITAMIN W-MINERALS/LUTEIN (CENTRUM SILVER PO) Take 1 Tab by mouth daily.  co-enzyme Q-10 (CO Q-10) 100 mg capsule Take 100 mg by mouth daily.  BIOTIN PO Take 5,000 mcg by mouth daily. No current facility-administered medications for this visit. PHYSICAL EXAMINATION:  Visit Vitals  /77 (BP 1 Location: Left arm, BP Patient Position: Sitting)   Pulse 61   Temp 96.8 °F (36 °C)   Resp 14   Ht 5' 0.5\" (1.537 m)   Wt 173 lb (78.5 kg)   SpO2 98%   BMI 33.23 kg/m²      ORTHO EXAMINATION:  Examination Right knee Left knee   Skin Intact Intact   Range of motion 120-0 115-0   Effusion - -   Medial joint line tenderness - +   Lateral joint line tenderness - -   Popliteal tenderness - -   Osteophytes palpable - -   Lupes - -   Patella crepitus - -   Anterior drawer - -   Lateral laxity - -   Medial laxity - -   Varus deformity - -   Valgus deformity - -   Pretibial edema - ++   Calf tenderness - -       RADIOGRAPHS:  XR LEFT KNEE 10/15/20 MADELYN  IMPRESSION:  Three views - No fractures, no effusion, mild joint space narrowing, no osteophytes present. Kellgren Mc grade 1     IMPRESSION:      ICD-10-CM ICD-9-CM    1. Primary osteoarthritis of left knee  M17.12 715.16 REFERRAL TO PHYSICAL THERAPY   2. Acute pain of left knee  M25.562 719.46 AMB POC X-RAY KNEE 3 VIEW   3. Baker cyst, left  M71.22 727.51      PLAN: Dietary counseling provided today. Start weight loss with low carb diet and intermittent fasting. She will start a brief course of outpatient physical therapy. There is no need for surgery at this time. She will follow up as needed.       Scribed by Dina Belcher ([address]) as dictated by Bell Corado MD

## 2020-10-20 NOTE — PROGRESS NOTES
I will discuss results with her at her upcoming appointment. Her A1c is down from 5.9 eight months ago to 5.7. There is no microalbuminuria. Her kidney function liver function labs are normal.  Her total cholesterol is 156. Triglycerides are 117. HDL is 79. Her LDL is 53. Overall, her cholesterol has improved over the past 8 months.   Meanwhile, her CBC is normal.    Dr. Marc Yousif  [facility], [address] [place] [address], [address] [place] [address] Str.  Phone: [phone]  Fax: [phone]

## 2020-10-26 ENCOUNTER — HOSPITAL ENCOUNTER (OUTPATIENT)
Dept: PHYSICAL THERAPY | Age: 80
Discharge: HOME | End: 2020-10-26
Payer: MEDICARE

## 2020-10-26 PROCEDURE — 97110 THERAPEUTIC EXERCISES: CPT

## 2020-10-26 PROCEDURE — 97162 PT EVAL MOD COMPLEX 30 MIN: CPT

## 2020-10-26 PROCEDURE — 97535 SELF CARE MNGMENT TRAINING: CPT

## 2020-10-26 NOTE — PROGRESS NOTES
[facility]  [address]  [place], [address]  [phone] fax    Plan of Care/ Statement of Necessity for Physical Therapy Services    Patient name: Ramo Patterson Start of Care: 10/26/2020   Referral source: Bennett Onofre MD : 1940    Medical Diagnosis: Unilateral primary osteoarthritis, left knee [M17.12]  Payor: [place] MEDICARE / Plan: [place] MEDICARE PART A & B / Product Type: Medicare /  Onset Date:Oct 1, 2020    Treatment Diagnosis: Left knee pain   Prior Hospitalization: see medical history Provider#: [id number]   Medications: Verified on Patient summary List    Comorbidities: OA, HTN, visual impairment, asthma, LBP, C/S pain   Prior Level of Function: functionally I with all activities      The Plan of Care and following information is based on the information from the initial evaluation. Assessment/ key information: 67 y/o female presents with c/o left knee pain that increase at the first of the month requiring medical intervention. The pt reports seeing ortho and was given a course of Prednisone, which she reports has helped some but she still reports pain. The pt demonstrates limited left knee AROM, limited left knee and hip strength, decreased quad, gastroc and HS flexibility and tenderness to palpation over the MJL and medial HS tendons. The pt will benefit from PT to address the aforementioned impairments. Evaluation Complexity History MEDIUM  Complexity : 1-2 comorbidities / personal factors will impact the outcome/ POC ; Examination MEDIUM Complexity : 3 Standardized tests and measures addressing body structure, function, activity limitation and / or participation in recreation  ;Presentation MEDIUM Complexity : Evolving with changing characteristics  ; Clinical Decision Making MEDIUM Complexity : FOTO score of 26-74  Overall Complexity Rating: MEDIUM  Problem List: pain affecting function, decrease ROM, decrease strength, impaired gait/ balance, decrease ADL/ functional abilitiies, decrease activity tolerance and decrease flexibility/ joint mobility   Treatment Plan may include any combination of the following: Therapeutic exercise, Therapeutic activities, Neuromuscular re-education, Physical agent/modality, Manual therapy, Aquatic therapy, Patient education and Self Care training  Patient / Family readiness to learn indicated by: asking questions  Persons(s) to be included in education: patient (P)  Barriers to Learning/Limitations: None  Patient Goal (s): To improve my strength and be pain free  Patient Self Reported Health Status: fair  Rehabilitation Potential: good    Short Term Goals: To be accomplished in 1 weeks:   1. The pt will be I and compliant with HEP     Long Term Goals: To be accomplished in 4 weeks:   1. Improve FOTO score to 62 to improve ability for daily tasks. 2. Improve left knee AROM flexion to at least 110 degrees without pain increase for improved ability for daily tasks. 3. Improve left quad MMT to 5/5 for improved ability for standing and gait   4. The pt will demonstrate ability to walk a mile with a little difficulty. Frequency / Duration: Patient to be seen 2 times per week for 4 weeks. Patient/ Caregiver education and instruction: Diagnosis, prognosis, self care, activity modification and exercises   [x]  Plan of care has been reviewed with PTA    Certification Period: 10-26-20 to 11-24-20  Gretel Conrad, PT 10/26/2020 12:17 PM    ________________________________________________________________________    I certify that the above Therapy Services are being furnished while the patient is under my care. I agree with the treatment plan and certify that this therapy is necessary.     Physician's Signature:____________________  Date:____________Time: _________    Please sign and return to [facility] [address]  [facility] [address]  [place], [address]  [phone] fax

## 2020-10-26 NOTE — PROGRESS NOTES
PT DAILY TREATMENT NOTE 10-18    Patient Name: Felisa Tabares  Date:10/26/2020  : 1940  [x]  Patient  Verified  Payor: [place] MEDICARE / Plan: VA MEDICARE PART A & B / Product Type: Medicare /    In time:11:30  Out time:12:08  Total Treatment Time (min): 38  Visit #: 1 of 8    Medicare/BCBS Only   Total Timed Codes (min):  24 1:1 Treatment Time:  38       Treatment Area: Unilateral primary osteoarthritis, left knee [M17.12]    SUBJECTIVE  Pain Level (0-10 scale): 3  Any medication changes, allergies to medications, adverse drug reactions, diagnosis change, or new procedure performed?: [x] No    [] Yes (see summary sheet for update)  Subjective functional status/changes:   [] No changes reported       OBJECTIVE    Modality rationale:    Min Type Additional Details    [] Estim:  []Unatt       []IFC  []Premod                        []Other:  []w/ice   []w/heat  Position:  Location:    [] Estim: []Att    []TENS instruct  []NMES                    []Other:  []w/US   []w/ice   []w/heat  Position:  Location:    []  Traction: [] Cervical       []Lumbar                       [] Prone          []Supine                       []Intermittent   []Continuous Lbs:  [] before manual  [] after manual    []  Ultrasound: []Continuous   [] Pulsed                           []1MHz   []3MHz W/cm2:  Location:    []  Iontophoresis with dexamethasone         Location: [] Take home patch   [] In clinic    []  Ice     []  heat  []  Ice massage  []  Laser   []  Anodyne Position:  Location:    []  Laser with stim  []  Other:  Position:  Location:    []  Vasopneumatic Device Pressure:       [] lo [] med [] hi   Temperature: [] lo [] med [] hi   [] Skin assessment post-treatment:  []intact []redness- no adverse reaction    []redness  adverse reaction:     14 min [x]Eval                  []Re-Eval       16 min Therapeutic Exercise:  [] See flow sheet : HEP   Rationale: increase ROM and increase strength to improve the patients ability to perform functional tasks. 8 min Self Care/Home Management:  Use of heat/ice at home for pain management, activity modification    Rationale: decrease pain / joint compression  to improve the patients ability to perform daily tasks. With   [] TE   [] TA   [] neuro   [] other: Patient Education: [x] Review HEP    [] Progressed/Changed HEP based on:   [] positioning   [] body mechanics   [] transfers   [] heat/ice application    [] other:      Other Objective/Functional Measures:       Pain Level (0-10 scale) post treatment: 5    ASSESSMENT/Changes in Function:      Patient will continue to benefit from skilled PT services to modify and progress therapeutic interventions, address functional mobility deficits, address ROM deficits, address strength deficits, analyze and address soft tissue restrictions and analyze and cue movement patterns to attain remaining goals. [x]  See Plan of Care  []  See progress note/recertification  []  See Discharge Summary         Progress towards goals / Updated goals:  Short Term Goals: To be accomplished in 1 weeks:   1. The pt will be I and compliant with HEP   iE- issued and instructed in HEP  Long Term Goals: To be accomplished in 4 weeks:   1. Improve FOTO score to 62 to improve ability for daily tasks. IE- 47   2. Improve left knee AROM flexion to at least 110 degrees without pain increase for improved ability for daily tasks. IE- 103 degrees with pain   3. Improve left quad MMT to 5/5 for improved ability for standing and gait   IE- 4+/5   4. The pt will demonstrate ability to walk a mile with a little difficulty.     IE- moderate difficulty      PLAN  []  Upgrade activities as tolerated     [x]  Continue plan of care  []  Update interventions per flow sheet       []  Discharge due to:_  []  Other:_      Robina Crane, PT 10/26/2020  12:06 PM    Future Appointments   Date Time Provider Kin Dai   10/27/2020  9:00 AM Gerald Hyde MD [facility] 10/28/2020 10:30 AM Danyelle Newman, PTA [facility]   11/2/2020 11:15 AM Tilton Libman, PT [facility]   11/4/2020 12:15 PM Mor Dural, PT [facility]   11/9/2020 10:30 AM Danyelle Newman, PTA [facility]   11/11/2020 10:45 AM Mor Dural, PT [facility]   11/16/2020 10:00 AM Mor Payne, PT [facility]   11/18/2020 10:00 AM Smiley Loera, PT [facility]   2/2/2021  8:30 AM Amber Lam, DORINA [facility]

## 2020-10-27 ENCOUNTER — VIRTUAL VISIT (OUTPATIENT)
Dept: INTERNAL MEDICINE CLINIC | Age: 80
End: 2020-10-27
Payer: MEDICARE

## 2020-10-27 ENCOUNTER — TELEPHONE (OUTPATIENT)
Dept: INTERNAL MEDICINE CLINIC | Age: 80
End: 2020-10-27

## 2020-10-27 DIAGNOSIS — G89.29 CHRONIC PAIN OF LEFT KNEE: Primary | ICD-10-CM

## 2020-10-27 DIAGNOSIS — I10 ESSENTIAL HYPERTENSION: ICD-10-CM

## 2020-10-27 DIAGNOSIS — E66.01 SEVERE OBESITY (HCC): ICD-10-CM

## 2020-10-27 DIAGNOSIS — M25.562 CHRONIC PAIN OF LEFT KNEE: Primary | ICD-10-CM

## 2020-10-27 DIAGNOSIS — Z71.89 ADVANCE CARE PLANNING: ICD-10-CM

## 2020-10-27 DIAGNOSIS — R73.9 HYPERGLYCEMIA: ICD-10-CM

## 2020-10-27 DIAGNOSIS — Z00.00 MEDICARE ANNUAL WELLNESS VISIT, SUBSEQUENT: ICD-10-CM

## 2020-10-27 PROCEDURE — G8536 NO DOC ELDER MAL SCRN: HCPCS | Performed by: INTERNAL MEDICINE

## 2020-10-27 PROCEDURE — G0439 PPPS, SUBSEQ VISIT: HCPCS | Performed by: INTERNAL MEDICINE

## 2020-10-27 PROCEDURE — G0463 HOSPITAL OUTPT CLINIC VISIT: HCPCS | Performed by: INTERNAL MEDICINE

## 2020-10-27 PROCEDURE — 1101F PT FALLS ASSESS-DOCD LE1/YR: CPT | Performed by: INTERNAL MEDICINE

## 2020-10-27 PROCEDURE — G8427 DOCREV CUR MEDS BY ELIG CLIN: HCPCS | Performed by: INTERNAL MEDICINE

## 2020-10-27 PROCEDURE — 99214 OFFICE O/P EST MOD 30 MIN: CPT | Performed by: INTERNAL MEDICINE

## 2020-10-27 PROCEDURE — G8417 CALC BMI ABV UP PARAM F/U: HCPCS | Performed by: INTERNAL MEDICINE

## 2020-10-27 PROCEDURE — G8510 SCR DEP NEG, NO PLAN REQD: HCPCS | Performed by: INTERNAL MEDICINE

## 2020-10-27 PROCEDURE — G8399 PT W/DXA RESULTS DOCUMENT: HCPCS | Performed by: INTERNAL MEDICINE

## 2020-10-27 PROCEDURE — G8756 NO BP MEASURE DOC: HCPCS | Performed by: INTERNAL MEDICINE

## 2020-10-27 PROCEDURE — 1090F PRES/ABSN URINE INCON ASSESS: CPT | Performed by: INTERNAL MEDICINE

## 2020-10-27 PROCEDURE — G0444 DEPRESSION SCREEN ANNUAL: HCPCS | Performed by: INTERNAL MEDICINE

## 2020-10-27 NOTE — PATIENT INSTRUCTIONS
Medicare Wellness Visit, Female The best way to live healthy is to have a lifestyle where you eat a well-balanced diet, exercise regularly, limit alcohol use, and quit all forms of tobacco/nicotine, if applicable. Regular preventive services are another way to keep healthy. Preventive services (vaccines, screening tests, monitoring & exams) can help personalize your care plan, which helps you manage your own care. Screening tests can find health problems at the earliest stages, when they are easiest to treat. [facility] follows the current, evidence-based guidelines published by the [facility] Bandar Rodriguez ([facility]) when recommending preventive services for our patients. Because we follow these guidelines, sometimes recommendations change over time as research supports it. (For example, mammograms used to be recommended annually. Even though Medicare will still pay for an annual mammogram, the newer guidelines recommend a mammogram every two years for women of average risk). Of course, you and your doctor may decide to screen more often for some diseases, based on your risk and your co-morbidities (chronic disease you are already diagnosed with). Preventive services for you include: - Medicare offers their members a free annual wellness visit, which is time for you and your primary care provider to discuss and plan for your preventive service needs. Take advantage of this benefit every year! 
-All adults over the age of 72 should receive the recommended pneumonia vaccines. Current USPSTF guidelines recommend a series of two vaccines for the best pneumonia protection.  
-All adults should have a flu vaccine yearly and a tetanus vaccine every 10 years.  
-All adults age 48 and older should receive the shingles vaccines (series of two vaccines). -All adults age 38-68 who are overweight should have a diabetes screening test once every three years. -All adults born between 80 and 1965 should be screened once for Hepatitis C. 
-Other screening tests and preventive services for persons with diabetes include: an eye exam to screen for diabetic retinopathy, a kidney function test, a foot exam, and stricter control over your cholesterol.  
-Cardiovascular screening for adults with routine risk involves an electrocardiogram (ECG) at intervals determined by your doctor.  
-Colorectal cancer screenings should be done for adults age 54-65 with no increased risk factors for colorectal cancer. There are a number of acceptable methods of screening for this type of cancer. Each test has its own benefits and drawbacks. Discuss with your doctor what is most appropriate for you during your annual wellness visit. The different tests include: colonoscopy (considered the best screening method), a fecal occult blood test, a fecal DNA test, and sigmoidoscopy. 
 
-A bone mass density test is recommended when a woman turns 65 to screen for osteoporosis. This test is only recommended one time, as a screening. Some providers will use this same test as a disease monitoring tool if you already have osteoporosis. -Breast cancer screenings are recommended every other year for women of normal risk, age 54-69. 
-Cervical cancer screenings for women over age 72 are only recommended with certain risk factors. Here is a list of your current Health Maintenance items (your personalized list of preventive services) with a due date: 
Health Maintenance Due Topic Date Due  Glaucoma Screening   12/11/2019 [facility] Annual Well Visit  08/22/2020 Medicare Part B Preventive Services Limitations Recommendation Scheduled Bone Mass Measurement 
(age 72 & older, biennial) Requires diagnosis related to osteoporosis or estrogen deficiency.  Biennial benefit unless patient has history of long-term glucocorticoid tx or baseline is needed because initial test was by other method This should be done at age 72 and again if on osteoporosis medication at 2-3 year intervals. Up to date Cardiovascular Screening Blood Tests (every 5 years) Total cholesterol, HDL, Triglycerides Order as a panel if possible We should check your cholesterol panel at least once every 5 years. Up to date Colorectal Cancer Screening 
-Fecal occult blood test (annual) -Flexible sigmoidoscopy (5y) 
-Screening colonoscopy (10y) -Barium Enema  Due per your Gastroenterologist's recommendations. Up to date Counseling to Prevent Tobacco Use (up to 8 sessions per year) - Counseling greater than 3 and up to 10 minutes - Counseling greater than 10 minutes Patients must be asymptomatic of tobacco-related conditions to receive as preventive service Continue with smoking cessation Not applicable Diabetes Screening Tests (at least every 3 years, Medicare covers annually or at 6-month intervals for prediabetic patients) Fasting blood sugar (FBS) or glucose tolerance test (GTT) Patient must be diagnosed with one of the following: 
-Hypertension, Dyslipidemia, obesity, previous impaired FBS or GTT 
Or any two of the following: overweight, FH of diabetes, age ? 72, history of gestational diabetes, birth of baby weighing more than 9 pounds Should be done yearly Up to date Diabetes Self-Management Training (DSMT) (no USPSTF recommendation) Requires referral by treating physician for patient with diabetes or renal disease. 10 hours of initial DSMT session of no less than 30 minutes each in a continuous 12-month period. 2 hours of follow-up DSMT in subsequent years. Not applicable Not applicable Glaucoma Screening (no USPSTF recommendation) Diabetes mellitus, family history, , age 48 or over,  American, age 72 or over Continue with annual eye exams. Up to date but we do not have records Human Immunodeficiency Virus (HIV) Screening (annually for increased risk patients) HIV-1 and HIV-2 by EIA, LC, rapid antibody test, or oral mucosa transudate Patient must be at increased risk for HIV infection per USPSTF guidelines or pregnant. Tests covered annually for patients at increased risk. Pregnant patients may receive up to 3 test during pregnancy. Not applicable Not applicable Medical Nutrition Therapy (MNT) (for diabetes or renal disease not recommended schedule) Requires referral by treating physician for patient with diabetes or renal disease. Can be provided in same year as diabetes self-management training (DSMT), and CMS recommends medical nutrition therapy take place after DSMT. Up to 3 hours for initial year and 2 hours in subsequent years. Not applicable Not applicable Shingles Vaccination A shingles vaccine is also recommended once in a lifetime after age 61 Vaccination recommended for shingles vaccination. Up to date Seasonal Influenza Vaccination (annually)  Continue with yearly \"flu\" shot annually Up to date Pneumococcal Vaccination (once after 65)  Please receive this vaccination at age 72. Up to date Hepatitis B Vaccinations (if medium/high risk) Medium/high risk factors:  End-stage renal disease, Hemophiliacs who received Factor VIII or IX concentrates, Clients of institutions for the mentally retarded, Persons who live in the same house as a HepB virus carrier, Homosexual men, Illicit injectable drug abusers. Not applicable Not applicable Screening Mammography (biennial age 54-69) Annually (age 36 or over) You need a mammogram yearly to screen for breast cancer. Up to date Screening Pap Tests and Pelvic Examination (up to age 79 and after 79 if unknown history or abnormal study last 10 years) Every 24 months except high risk You need no Pap smear at this time. Not warranted at this time.   
Ultrasound Screening for Abdominal Aortic Aneurysm (AAA) (once) Patient must be referred through IPPE and not have had a screening for abdominal aortic aneurysm before under Medicare. Limited to patients who meet one of the following criteria: 
- Men who are 73-68 years old and have smoked more than 100 cigarettes in their lifetime. 
-Anyone with a FH of AAA 
-Anyone recommended for screening by USPSTF Not applicable Not applicable Well Visit, Over 72: Care Instructions Your Care Instructions Physical exams can help you stay healthy. Your doctor has checked your overall health and may have suggested ways to take good care of yourself. He or she also may have recommended tests. At home, you can help prevent illness with healthy eating, regular exercise, and other steps. Follow-up care is a key part of your treatment and safety. Be sure to make and go to all appointments, and call your doctor if you are having problems. It's also a good idea to know your test results and keep a list of the medicines you take. How can you care for yourself at home? · Reach and stay at a healthy weight. This will lower your risk for many problems, such as obesity, diabetes, heart disease, and high blood pressure. · Get at least 30 minutes of exercise on most days of the week. Walking is a good choice. You also may want to do other activities, such as running, swimming, cycling, or playing tennis or team sports. · Do not smoke. Smoking can make health problems worse. If you need help quitting, talk to your doctor about stop-smoking programs and medicines. These can increase your chances of quitting for good. · Protect your skin from too much sun. When you're outdoors from 10 a.m. to 4 p.m., stay in the shade or cover up with clothing and a hat with a wide brim. Wear sunglasses that block UV rays. Even when it's cloudy, put broad-spectrum sunscreen (SPF 30 or higher) on any exposed skin. · See a dentist one or two times a year for checkups and to have your teeth cleaned. · Wear a seat belt in the car. Follow your doctor's advice about when to have certain tests. These tests can spot problems early. For men and women · Cholesterol. Your doctor will tell you how often to have this done based on your overall health and other things that can increase your risk for heart attack and stroke. · Blood pressure. Have your blood pressure checked during a routine doctor visit. Your doctor will tell you how often to check your blood pressure based on your age, your blood pressure results, and other factors. · Diabetes. Ask your doctor whether you should have tests for diabetes. · Vision. Experts recommend that you have yearly exams for glaucoma and other age-related eye problems. · Hearing. Tell your doctor if you notice any change in your hearing. You can have tests to find out how well you hear. · Colon cancer tests. Keep having colon cancer tests as your doctor recommends. You can have one of several types of tests. · Heart attack and stroke risk. At least every 4 to 6 years, you should have your risk for heart attack and stroke assessed. Your doctor uses factors such as your age, blood pressure, cholesterol, and whether you smoke or have diabetes to show what your risk for a heart attack or stroke is over the next 10 years. · Osteoporosis. Talk to your doctor about whether you should have a bone density test to find out whether you have thinning bones. Ask your doctor if you need to take a calcium plus vitamin D supplement. You may be able to get enough calcium and vitamin D through your diet. For women · Pap test and pelvic exam. You may no longer need a Pap test. Talk with your doctor about whether to stop or continue to have Pap tests. · Breast exam and mammogram. Ask how often you should have a mammogram, which is an X-ray of your breasts. A mammogram can spot breast cancer before it can be felt and when it is easiest to treat. · Thyroid disease.  Talk to your doctor about whether to have your thyroid checked as part of a regular physical exam. Women have an increased chance of a thyroid problem. For men · Prostate exam. Talk to your doctor about whether you should have a blood test (called a PSA test) for prostate cancer. Experts recommend that you discuss the benefits and risks of the test with your doctor before you decide whether to have this test. Some experts say that men ages 79 and older no longer need testing. · Abdominal aortic aneurysm. Ask your doctor whether you should have a test to check for an aneurysm. You may need a test if you ever smoked or if your parent, brother, sister, or child has had an aneurysm. When should you call for help? Watch closely for changes in your health, and be sure to contact your doctor if you have any problems or symptoms that concern you. Where can you learn more? Go to http://www.gray.com/ Enter Y714 in the search box to learn more about \"Well Visit, Over 65: Care Instructions. \" Current as of: May 27, 2020               Content Version: 12.6 © 2006-2020 [employer]. Care instructions adapted under license by [employer] (which disclaims liability or warranty for this information). If you have questions about a medical condition or this instruction, always ask your healthcare professional. [address] any warranty or liability for your use of this information.

## 2020-10-27 NOTE — TELEPHONE ENCOUNTER
----- Message from Foster Davalos MD sent at 10/27/2020  9:32 AM EDT -----  Regarding: F/u apts in 6 months  Please schedule the patient for an in office visit 30 minutes with me in 6 months. Please schedule for labs 1 week before her follow-up appointment.     Thanks,  Dr. Colton iFgueredo  [facility], [address] [place] [address], [address] [place] [address] Str.  Phone: [phone]  Fax: [phone]

## 2020-10-27 NOTE — PROGRESS NOTES
Irena Francisco is a 68 y.o. female who was seen by synchronous (real-time) audio-video technology on 10/27/2020. Assessment & Plan:   1. Left Knee Pain: From OA. Improved s/p steroid course. - C/w PT for now. - Sx relief with OTC rx.     2.  Prediabetes: Improving. +Obese per BMI measurement. I will check labs in 6 months. I encouraged her to reduce her processed food and carb intake. I will recheck her weight in the office in 6 months at her appointment. 3.  Hypertension: Stable. Continue with Rx as prescribed. Checking labs in 6 months. Follow-up with me in the office for BP check. Lab review: labs are reviewed in the EHR and ordered as mentioned above. I have discussed the diagnosis with the patient and the intended plan as seen in the above orders. I have discussed medication side effects and warnings with the patient as well. I have reviewed the plan of care with the patient, accepted their input and they are in agreement with the treatment goals. All questions were answered. The patient understands the plan of care. Pt instructed if symptoms worsen to call the office or report to the ED for continued care. Greater than 50% of the visit time was spent in counseling and/or coordination of care. Voice recognition was used to generate this report, which may have resulted in some phonetic based errors in grammar and contents. Even though attempts were made to correct all the mistakes, some may have been missed, and remained in the body of the document. Subjective:    Irena Francisco was seen for   Chief Complaint   Patient presents with    Follow-up    Annual Wellness Visit     The patient is a 79-year-old female with history of hypertension, LGSIL 4/19, osteopenia, tubular adenomas colon polyps, obesity, vitamin D deficiency, stable pulmonary nodules, asthma (followed by Ramila Sosa), cervical disc disease, and lumbar disc disease (followed by Orthopedics).     1. Left Knee Pain: At her last appointment, she reported a 3-day history of sharp left knee pain that occurred from climbing into bed. At her last appointment, we discussed the option of physical therapy. She begins PT soon and reports today: pain is better. S/p steroid course. 2. Prediabetes: Her A1C is down to 5.7. She likes to eat sweets. 3. HTN: She is taking maxide. Her labs this month show no CKD. No electrolyte abnormalities. BP Readings from Last 3 Encounters:   10/15/20 127/77   10/05/20 124/75   08/03/20 118/68         Prior to Admission medications    Medication Sig Start Date End Date Taking? Authorizing Provider   triamterene-hydroCHLOROthiazide (MAXZIDE) 37.5-25 mg per tablet Take 0.5 Tabs by mouth daily. 8/3/20   Jose Antonio Bergeron MD   gabapentin (Gralise) 600 mg Tb24 Take 1,200 mg by mouth daily. Max Daily Amount: 1,200 mg. 8/3/20   Fco Lam NP   gabapentin (Gralise) 600 mg Tb24 Take 1,200 mg by mouth daily. Max Daily Amount: 1,200 mg. Indications: neuropathic pain 8/3/20   Jazmine Lam NP   mv-min/vit C/glut/lysine/hb124 (AIRBORNE, LYSINE HCL, PO) Take  by mouth. Provider, Historical   fluticasone propionate (FLONASE) 50 mcg/actuation nasal spray USE 2 SPRAYS IN EACH NOSTRIL DAILY 11/11/19   Angelina Rangel MD   vitamin C-biotin (HAIR-SKIN-NAILS, VIT C-BIOTIN,) 50 mg -1,250 mcg chew Take  by mouth daily. Provider, Historical   fexofenadine (ALLEGRA) 180 mg tablet Take 180 mg by mouth daily. Provider, Historical   PROAIR HFA 90 mcg/actuation inhaler USE 2 INHALATIONS EVERY 4 HOURS AS NEEDED 5/7/18   Angelina Rangel MD   EPINEPHrine (EPIPEN) 0.3 mg/0.3 mL injection 0.3 mL by IntraMUSCular route once as needed for up to 1 dose. 4/28/17   Octavio Jones MD   melatonin 3 mg tablet Take 3 mg by mouth daily as needed (sleep). Provider, Historical   Cholecalciferol, Vitamin D3, (VITAMIN D3) 1,000 unit cap Take 3,000 Units by mouth daily.  11/12/14   Les Cifuentes., NP cycloSPORINE (RESTASIS) 0.05 % ophthalmic emulsion Administer 1 Drop to both eyes two (2) times a day. Provider, Historical   MULTIVITAMIN W-MINERALS/LUTEIN (CENTRUM SILVER PO) Take 1 Tab by mouth daily. Provider, Historical   co-enzyme Q-10 (CO Q-10) 100 mg capsule Take 100 mg by mouth daily. Provider, Historical   BIOTIN PO Take 5,000 mcg by mouth daily.  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    Band keratopathy of right eye 2/3/2018    Cataract     Cylindrical bronchiectasis ([address]     Fracture     rt ankle as an adult    History of pneumonia     Hypertension     Ill-defined condition     Spasms to left side    Left arm pain     Lumbar spinal stenosis     Myofascial pain     Osteopenia     Sciatica of right side      Past Surgical History:   Procedure Laterality Date    COLONOSCOPY N/A 11/11/2019    COLONOSCOPY w polypectomies performed by Kimberly Evangelista MD at [address] [place] [address] Rd  8/15/2011    HX HEENT      sinus surgery    HX KERATOPLASTY Left 10/02/2017    HX ORTHOPAEDIC      left 4th finger trigger release    HX SHIELA AND BSO  1970s    uterine fibroids     Family History   Problem Relation Age of Onset    Asthma Mother     COPD Mother     MS Mother     Heart Disease Mother     Hypertension Mother     Stroke Father     Heart Disease Father     Hypertension Father     Allergic Rhinitis Sister     Asthma Brother     Hypertension Brother     Diabetes Brother     Migraines Paternal Grandmother      Social History     Socioeconomic History    Marital status:      Spouse name: Not on file    Number of children: Not on file    Years of education: Not on file    Highest education level: Not on file   Tobacco Use    Smoking status: Former Smoker     Years: 5.00     Types: Cigarettes     Last attempt to quit: 1970     Years since quittin.8    Smokeless tobacco: Never Used   Substance and Sexual Activity    Alcohol use: Yes     Alcohol/week: 1.0 standard drinks     Types: 1 Glasses of wine per week     Frequency: Monthly or less     Comment: occasional wine    Drug use: No    Sexual activity: Yes     Partners: Male     Birth control/protection: None       ROS:  Gen: No fever/chills  HEENT: No sore throat, eye pain, ear pain, or congestion.  No HA  CV: No CP  Resp: No cough/SOB  GI: No abdominal pain  : No hematuria/dysuria  Derm: No rash  Neuro: No new paresthesias/weakness  Musc: No new myalgias/jt pain  Psych: No depression sx      Objective:     General: alert, cooperative, no distress   Mental  status: mental status: alert, oriented to person, place, and time, normal mood, behavior, speech, dress, motor activity, and thought processes   Resp: resp: normal effort and no respiratory distress   Neuro: neuro: no gross deficits   Skin: skin: no discoloration or lesions of concern on visible areas     LABS:  Lab Results   Component Value Date/Time    Sodium 144 10/13/2020 09:43 AM    Potassium 4.3 10/13/2020 09:43 AM    Chloride 104 10/13/2020 09:43 AM    CO2 33 (H) 10/13/2020 09:43 AM    Anion gap 7 10/13/2020 09:43 AM    Glucose 75 10/13/2020 09:43 AM    BUN 18 10/13/2020 09:43 AM    Creatinine 0.74 10/13/2020 09:43 AM    BUN/Creatinine ratio 24 (H) 10/13/2020 09:43 AM    GFR est AA >60 10/13/2020 09:43 AM    GFR est non-AA >60 10/13/2020 09:43 AM    Calcium 9.0 10/13/2020 09:43 AM       Lab Results   Component Value Date/Time    Cholesterol, total 156 10/13/2020 09:43 AM    HDL Cholesterol 79 (H) 10/13/2020 09:43 AM    LDL, calculated 53.6 10/13/2020 09:43 AM    VLDL, calculated 23.4 10/13/2020 09:43 AM    Triglyceride 117 10/13/2020 09:43 AM    CHOL/HDL Ratio 2.0 10/13/2020 09:43 AM Lab Results   Component Value Date/Time    WBC 6.1 10/13/2020 09:43 AM    HGB 14.5 10/13/2020 09:43 AM    HCT 43.9 10/13/2020 09:43 AM    PLATELET 280 15/86/4244 09:43 AM    MCV 88.3 10/13/2020 09:43 AM       Lab Results   Component Value Date/Time    Hemoglobin A1c 5.7 (H) 10/13/2020 09:43 AM       Lab Results   Component Value Date/Time    TSH 0.85 01/24/2018 08:13 AM           Due to this being a TeleHealth  evaluation, many elements of the physical examination are unable to be assessed. The pt was seen by synchronous (real-time) audio-video technology, and/or her healthcare decision maker, is aware that this patient-initiated, Telehealth encounter is a billable service, with coverage as determined by her insurance carrier. She is aware that she may receive a bill and has provided verbal consent to proceed: Yes. The pt is being evaluated by a video visit encounter for concerns as above. A caregiver was present when appropriate. Due to this being a TeleHealth encounter (During [facility] public health emergency), evaluation of the following organ systems was limited: Vitals/Constitutional/EENT/Resp/CV/GI//MS/Neuro/Skin/Heme-Lymph-Imm. Pursuant to the emergency declaration under the [facility], 1135 waiver authority and the [employer] General Act, this Virtual  Visit was conducted, with patient's (and/or legal guardian's) consent, to reduce the patient's risk of exposure to COVID-19 and provide necessary medical care. Services were provided through a video synchronous discussion virtually to substitute for in-person clinic visit. Patient and provider were located at their individual homes. We discussed the expected course, resolution and complications of the diagnosis(es) in detail. Medication risks, benefits, costs, interactions, and alternatives were discussed as indicated.   I advised her to contact the office if her condition worsens, changes or fails to improve as anticipated. She expressed understanding with the diagnosis(es) and plan.      Shaila Wesley MD

## 2020-10-27 NOTE — PROGRESS NOTES
[facility]NISTS OF [facility]:  10/27/20, [id number]      The Subsequent Medicare Annual Wellness Exam PROGRESS NOTE    This is a Subsequent Medicare Annual Wellness Exam (AWV). I have reviewed the patient's medical history in detail and updated the computerized patient record. Francisco Busch is a 68 y.o.  female and presents for an annual wellness exam.    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    Band keratopathy of right eye 2/3/2018    Cataract     Cylindrical [address]chiectasis ([facility] [address]     Fracture     rt ankle as an adult    History of pneumonia     Hypertension     Ill-defined condition     Spasms to left side    Left arm pain     Lumbar spinal stenosis     Myofascial pain     Osteopenia     Sciatica of right side       Past Surgical History:   Procedure Laterality Date    COLONOSCOPY N/A 11/11/2019    COLONOSCOPY w polypectomies performed by No Quintero MD at [address] HX CATARACT REMOVAL  8/15/2011    HX HEENT      sinus surgery    HX KERATOPLASTY Left 10/02/2017    HX ORTHOPAEDIC      left 4th finger trigger release    HX SHIELA AND BSO  1970s    uterine fibroids     Current Outpatient Medications   Medication Sig Dispense Refill    triamterene-hydroCHLOROthiazide (MAXZIDE) 37.5-25 mg per tablet Take 0.5 Tabs by mouth daily. 45 Tab 3    gabapentin (Gralise) 600 mg Tb24 Take 1,200 mg by mouth daily. Max Daily Amount: 1,200 mg. 20 Tab 0    gabapentin (Gralise) 600 mg Tb24 Take 1,200 mg by mouth daily. Max Daily Amount: 1,200 mg. Indications: neuropathic pain 180 Tab 1    mv-min/vit C/glut/lysine/hb124 (AIRBORNE, LYSINE HCL, PO) Take  by mouth.       fluticasone propionate (FLONASE) 50 mcg/actuation nasal spray USE 2 SPRAYS IN EACH NOSTRIL DAILY 48 g 4    vitamin C-biotin (HAIR-SKIN-NAILS, VIT C-BIOTIN,) 50 mg -1,250 mcg chew Take  by mouth daily.  fexofenadine (ALLEGRA) 180 mg tablet Take 180 mg by mouth daily.  PROAIR HFA 90 mcg/actuation inhaler USE 2 INHALATIONS EVERY 4 HOURS AS NEEDED 3 Inhaler 3    EPINEPHrine (EPIPEN) 0.3 mg/0.3 mL injection 0.3 mL by IntraMUSCular route once as needed for up to 1 dose. 1 Syringe 1    melatonin 3 mg tablet Take 3 mg by mouth daily as needed (sleep).  Cholecalciferol, Vitamin D3, (VITAMIN D3) 1,000 unit cap Take 3,000 Units by mouth daily.  cycloSPORINE (RESTASIS) 0.05 % ophthalmic emulsion Administer 1 Drop to both eyes two (2) times a day.  MULTIVITAMIN W-MINERALS/LUTEIN (CENTRUM SILVER PO) Take 1 Tab by mouth daily.  co-enzyme Q-10 (CO Q-10) 100 mg capsule Take 100 mg by mouth daily.  BIOTIN PO Take 5,000 mcg by mouth daily. Allergies   Allergen Reactions    Naprosyn [Naproxen] Other (comments)     Blood in urine       Family History   Problem Relation Age of Onset    Asthma Mother     COPD Mother    [de-identified] MS Mother     Heart Disease Mother     Hypertension Mother     Stroke Father     Heart Disease Father     Hypertension Father     Allergic Rhinitis Sister     Asthma Brother     Hypertension Brother     Diabetes Brother     Migraines Paternal Grandmother      Social History     Tobacco Use    Smoking status: Former Smoker     Years: 5.00     Types: Cigarettes     Last attempt to quit: 1970     Years since quittin.8    Smokeless tobacco: Never Used   Substance Use Topics    Alcohol use:  Yes     Alcohol/week: 1.0 standard drinks     Types: 1 Glasses of wine per week     Frequency: Monthly or less     Comment: occasional wine     Patient Active Problem List   Diagnosis Code    Multiple lung nodules R91.8    Allergic rhinitis J30.9    HTN (hypertension) I10    Asthma J45.909    Osteopenia M85.80    Vitamin D deficiency E55.9    Right knee DJD, 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    Tubular adenoma of colon D12.6    Chronic right-sided low back pain with sciatica M54.40, G89.29    Lumbar spinal stenosis M48.061    HNP (herniated nucleus pulposus), lumbar M51.26    Lumbar facet arthropathy M47.816    Cervical stenosis of spine M48.02    LGSIL of cervix of undetermined significance R87.612    Heel spur, left M77.32    Severe obesity (HCC) E66.01     The patient is a 30-year-old female with history of hypertension, LGSIL 4/19, osteopenia, tubular adenomas colon polyps, obesity, vitamin D deficiency, stable pulmonary nodules, asthma (followed by Zain Gaitan), cervical disc disease, and lumbar disc disease (followed by Orthopedics).     Health Maintenance History  Immunizations reviewed: Tdap up to date   Pneumovax: up to date   Flu: up to date  Zoster: up to date    Immunization History   Administered Date(s) Administered    Influenza High Dose Vaccine PF 09/22/2016, 09/25/2017, 09/10/2020    Influenza Vaccine 10/08/2013, 09/26/2014, 10/20/2015, 10/28/2019    Influenza Vaccine Split 10/06/2011    Influenza, Quadrivalent, Adjuvanted (>65 Yrs FLUAD QUAD 38348) 09/10/2020    Pneumococcal Conjugate (PCV-13) 05/11/2015    Pneumococcal Polysaccharide (PPSV-23) 06/14/2016    Tdap 10/24/2020    Zoster Recombinant 01/14/2020, 06/08/2020       Colonoscopy: No bleeding. UTD     Eye exam: UTD. We don't have records. Mammo: Up to date. Dexascan: Up to date    Pelvic/Pap: No bleeding. Review of Systems   Constitutional: Negative for chills and fever. HENT: Negative for ear pain and sore throat. Eyes: Negative for blurred vision and pain. Respiratory: Negative for cough and shortness of breath. Cardiovascular: Negative for chest pain. Gastrointestinal: Negative for abdominal pain, blood in stool and melena. Genitourinary: Negative for dysuria and hematuria. Musculoskeletal: Positive for back pain (chronic) and joint pain (left knee pain is improving). Negative for myalgias. Skin: Negative for rash.    Neurological: Negative for tingling and headaches. Endo/Heme/Allergies: Does not bruise/bleed easily. Psychiatric/Behavioral: Positive for depression. Negative for substance abuse. Depression Risk Factor Screening:      Patient Health Questionnaire (PHQ-2)   Over the last 2 weeks, how often have you been bothered by any of the following problems? · Little interest or pleasure in doing things? · Not at all. [0]  · Feeling down, depressed, or hopeless? · Several days. [1]    Total Score: 1/6  PHQ-2 Assessment Scoring:   A score of 2 or more requires further screening with the PHQ-9    Alcohol Risk Factor Screening:   Women: On any occasion during the past 3 months, have you had more than 3 drinks containing alcohol? no    Do you average more than 7 drinks per week? no    Tobacco Use Screening:     Social History     Tobacco Use   Smoking Status Former Smoker    Years: 5.00    Types: Cigarettes    Last attempt to quit: 1970    Years since quittin.8   Smokeless Tobacco Never Used       Hearing Loss    Hearing is good. Activities of Daily Living   Self-care. Requires assistance with: no ADLs  She needs assistance with ADLs/IADLs    Fall Risk   no falls within the past year    Abuse Screen   None    Additional Examination Findings: There were no vitals filed for this visit. There is no height or weight on file to calculate BMI. Evaluation of Cognitive Function:  Mood/affect: Euthymic  Appearance: Well-groomed  Family member/caregiver input: She is not with a family member during her virtual visit. General:   Well-nourished, well-groomed, pleasant, alert, in no acute distress. Head:  Normocephalic, atraumatic  Ears:  External ears WNL  Eyes:  Clear sclera  Neuro:   Alert, conversant, appropriate, following commands  Skin:    No rashes noted  Psych: Affect, mood and judgment appropriate      Dementia Screen:   Three Item Recall: 3/3      LABS   Data Review:   Lab Results   Component Value Date/Time    Sodium 144 10/13/2020 09:43 AM    Potassium 4.3 10/13/2020 09:43 AM    Chloride 104 10/13/2020 09:43 AM    CO2 33 (H) 10/13/2020 09:43 AM    Anion gap 7 10/13/2020 09:43 AM    Glucose 75 10/13/2020 09:43 AM    BUN 18 10/13/2020 09:43 AM    Creatinine 0.74 10/13/2020 09:43 AM    BUN/Creatinine ratio 24 (H) 10/13/2020 09:43 AM    GFR est AA >60 10/13/2020 09:43 AM    GFR est non-AA >60 10/13/2020 09:43 AM    Calcium 9.0 10/13/2020 09:43 AM       Lab Results   Component Value Date/Time    WBC 6.1 10/13/2020 09:43 AM    HGB 14.5 10/13/2020 09:43 AM    HCT 43.9 10/13/2020 09:43 AM    PLATELET 761 05/06/7752 09:43 AM    MCV 88.3 10/13/2020 09:43 AM       Lab Results   Component Value Date/Time    Hemoglobin A1c 5.7 (H) 10/13/2020 09:43 AM       Lab Results   Component Value Date/Time    Cholesterol, total 156 10/13/2020 09:43 AM    HDL Cholesterol 79 (H) 10/13/2020 09:43 AM    LDL, calculated 53.6 10/13/2020 09:43 AM    VLDL, calculated 23.4 10/13/2020 09:43 AM    Triglyceride 117 10/13/2020 09:43 AM    CHOL/HDL Ratio 2.0 10/13/2020 09:43 AM       Patient Care Team:  Giana Kendall MD as PCP - General (Family Medicine)  Giana Kendall MD as PCP - [facility]  Chang Lara MD (Pulmonary Disease)    End-of-life planning  Advanced Directive in the case than an injury or illness causes the patient to be unable to make health care decisions was discussed with the patient.      Advice/Referrals/Counselling/Plan:   Education and counseling provided:  Are appropriate based on today's review and evaluation  End-of-Life planning (with patient's consent)  Pneumococcal Vaccine  Influenza Vaccine  Screening Mammography  Screening Pap and pelvic (covered once every 2 years)  Colorectal cancer screening tests  Cardiovascular screening blood test  Bone mass measurement (DEXA)  Screening for glaucoma  Diabetes screening test  Include in education list (weight loss, physical activity, smoking cessation, fall prevention, and nutrition)    ICD-10-CM ICD-9-CM    1. Essential hypertension  I10 401.9 MICROALBUMIN, UR, RAND W/ MICROALB/CREAT RATIO      LIPID PANEL      METABOLIC PANEL, COMPREHENSIVE   2. Chronic pain of left knee  M25.562 719.46     G89.29 338.29    3. Hyperglycemia  R73.9 790.29 HEMOGLOBIN A1C W/O EAG      METABOLIC PANEL, COMPREHENSIVE   4. Severe obesity ([facility] [address]  E66.01 278.01      reviewed diet, exercise and weight control. Brief written plan, checklist    Assessment/Plan:    Health Maintenance:  We will get a copy of her last eye exam.      Lab review: labs are reviewed in the [address] [place] [address] (ACP) Provider Conversation     Date of ACP Conversation: 10/27/20  Persons included in Conversation:  patient    Authorized Decision Maker (if patient is incapable of making informed decisions): This person is:   Named in Advance Directive or Healthcare Power of           For Patients with Decision Making Capacity:   Values/Goals: Exploration of values, goals, and preferences if recovery is not expected, even with continued medical treatment in the event of:  Imminent death  Severe, permanent brain injury    Conversation Outcomes / Follow-Up Plan:   She has no changes to made to her pre-existing advance directive which was reviewed with her today. I have discussed the diagnosis with the patient and the intended plan as seen in the above orders. The patient has received an after-visit summary and questions were answered concerning future plans. I have discussed medication side effects and warnings with the patient as well. I have reviewed the plan of care with the patient, accepted their input and they are in agreement with the treatment goals.

## 2020-10-27 NOTE — ACP (ADVANCE CARE PLANNING)
Advance Care Planning  Advance Care Planning (ACP) Provider Conversation     Date of ACP Conversation: 10/27/20  Persons included in Conversation:  patient    Authorized Decision Maker (if patient is incapable of making informed decisions): This person is:   Named in Advance Directive or Healthcare Power of           For Patients with Decision Making Capacity:   Values/Goals: Exploration of values, goals, and preferences if recovery is not expected, even with continued medical treatment in the event of:  Imminent death  Severe, permanent brain injury    Conversation Outcomes / Follow-Up Plan:   She has no changes to made to her pre-existing advance directive which was reviewed with her today.       Dr. Colette Walker  [facility], [address] [place] [address], [address] [place] [address] Str.  Phone: [phone]  Fax: [phone]

## 2020-10-28 ENCOUNTER — HOSPITAL ENCOUNTER (OUTPATIENT)
Dept: PHYSICAL THERAPY | Age: 80
Discharge: HOME | End: 2020-10-28
Payer: MEDICARE

## 2020-10-28 PROCEDURE — 97140 MANUAL THERAPY 1/> REGIONS: CPT

## 2020-10-28 PROCEDURE — 97110 THERAPEUTIC EXERCISES: CPT

## 2020-10-28 NOTE — PROGRESS NOTES
PT DAILY TREATMENT NOTE 10-18    Patient Name: Trecia Ahumada  Date:10/28/2020  : 1940  [x]  Patient  Verified  Payor: [place] MEDICARE / Plan: [place] MEDICARE PART A & B / Product Type: Medicare /    In time:10:30  Out time:11:03  Total Treatment Time (min): 33  Visit #: 2 of 8    Medicare/BCBS Only   Total Timed Codes (min):  33 1:1 Treatment Time:  33       Treatment Area: Unilateral primary osteoarthritis, left knee [M17.12]    SUBJECTIVE  Pain Level (0-10 scale): 3/10  Any medication changes, allergies to medications, adverse drug reactions, diagnosis change, or new procedure performed?: [x] No    [] Yes (see summary sheet for update)  Subjective functional status/changes:   [x] No changes reported    OBJECTIVE    25 min Therapeutic Exercise:  [x] See flow sheet :   Rationale: increase ROM, increase strength and increase proprioception to improve the patients ability to perform ADL's.    8 min Manual Therapy:  Left patellar mobs, Knee PROM flex, STM/DTM/Stick medial HS. Pt supine and prone. Rationale: decrease pain, increase ROM, increase tissue extensibility and decrease trigger points to improve ease of performing ADL's. With   [x] TE   [] TA   [] neuro   [] other: Patient Education: [x] Review HEP    [] Progressed/Changed HEP based on:   [] positioning   [] body mechanics   [] transfers   [] heat/ice application    [] other:      Other Objective/Functional Measures: Initiated exercises per flow sheet. Expressed Right hip soreness while standing for Left hip 3-way. PD modalities post-treatment. Pain Level (0-10 scale) post treatment: 3/10    ASSESSMENT/Changes in Function: First F/U visit. Soreness posterior medial Left knee post-treatment. Continue PT to increase strength and flexibility to improve ease of performing functional activities.     Patient will continue to benefit from skilled PT services to modify and progress therapeutic interventions, address functional mobility deficits, address ROM deficits, address strength deficits, analyze and address soft tissue restrictions and analyze and modify body mechanics/ergonomics to attain remaining goals. [x]  See Plan of Care  []  See progress note/recertification  []  See Discharge Summary         Progress towards goals / Updated goals:  Short Term Goals: To be accomplished in 1 weeks:              1. The pt will be I and compliant with HEP              iE- issued and instructed in HEP   Current: \"So-so\" per pt. 10/28/2020  Long Term Goals: To be accomplished in 4 weeks:              1. Improve FOTO score to 62 to improve ability for daily tasks. IE- 47              2. Improve left knee AROM flexion to at least 110 degrees without pain increase for improved ability for daily tasks. IE- 103 degrees with pain              3. Improve left quad MMT to 5/5 for improved ability for standing and gait              IE- 4+/5              4. The pt will demonstrate ability to walk a mile with a little difficulty.                IE- moderate difficulty    PLAN  []  Upgrade activities as tolerated     [x]  Continue plan of care  []  Update interventions per flow sheet       []  Discharge due to:_  []  Other:_      Kerwin Greer, CAL[facility]ON 10/28/2020  10:28 AM    Future Appointments   Date Time Provider Kin Dai   10/28/2020 10:30 AM Carmelina Goyal, [facility]   11/2/2020 11:15 AM Asha Zhang, PT [facility]   11/4/2020 12:15 PM Summer Gonzalez, PT [facility]   11/9/2020 10:30 AM Carmelina Goyal, [facility]   11/11/2020 10:45 AM Summer Gonzalez, PT [facility]   11/16/2020 10:00 AM Summer Gonzalez, PT [facility]   11/18/2020 10:00 AM Asha Zhang, PT [facility]   2/2/2021  8:30 AM Sourav Lam, [facility]

## 2020-11-02 ENCOUNTER — APPOINTMENT (OUTPATIENT)
Dept: PHYSICAL THERAPY | Age: 80
End: 2020-11-02
Payer: MEDICARE

## 2020-11-04 ENCOUNTER — HOSPITAL ENCOUNTER (OUTPATIENT)
Dept: PHYSICAL THERAPY | Age: 80
Discharge: HOME | End: 2020-11-04
Payer: MEDICARE

## 2020-11-04 PROCEDURE — 97140 MANUAL THERAPY 1/> REGIONS: CPT

## 2020-11-04 PROCEDURE — 97110 THERAPEUTIC EXERCISES: CPT

## 2020-11-04 NOTE — PROGRESS NOTES
PT DAILY TREATMENT NOTE     Patient Name: Ellie Lynn  Date:2020  : 1940  [x]  Patient  Verified  Payor: [place] MEDICARE / Plan: [place] MEDICARE PART A & B / Product Type: Medicare /    In time:11:02  Out time:11:35  Total Treatment Time (min): 33  Visit #: 3 of 8    Medicare/BCBS Only   Total Timed Codes (min):  33 1:1 Treatment Time:  28       Treatment Area: Unilateral primary osteoarthritis, left knee [M17.12]    SUBJECTIVE  Pain Level (0-10 scale): 3  Any medication changes, allergies to medications, adverse drug reactions, diagnosis change, or new procedure performed?: [x] No    [] Yes (see summary sheet for update)  Subjective functional status/changes:   [] No changes reported  The pt reports her left knee is hurting some today but her right knee is actually worse than the left.      OBJECTIVE    Modality rationale:    Min Type Additional Details    [] Estim:  []Unatt       []IFC  []Premod                        []Other:  []w/ice   []w/heat  Position:  Location:    [] Estim: []Att    []TENS instruct  []NMES                    []Other:  []w/US   []w/ice   []w/heat  Position:  Location:    []  Traction: [] Cervical       []Lumbar                       [] Prone          []Supine                       []Intermittent   []Continuous Lbs:  [] before manual  [] after manual    []  Ultrasound: []Continuous   [] Pulsed                           []1MHz   []3MHz W/cm2:  Location:    []  Iontophoresis with dexamethasone         Location: [] Take home patch   [] In clinic    []  Ice     []  heat  []  Ice massage  []  Laser   []  Anodyne Position:  Location:    []  Laser with stim  []  Other:  Position:  Location:    []  Vasopneumatic Device Pressure:       [] lo [] med [] hi   Temperature: [] lo [] med [] hi   [] Skin assessment post-treatment:  []intact []redness- no adverse reaction    []redness  adverse reaction:         25 min Therapeutic Exercise:  [x] See flow sheet :   Rationale: increase ROM and increase strength to improve the patients ability to peform ADL      8 min Manual Therapy:  Left patellar mobs, PROM, STM medial HS tendons   The manual therapy interventions were performed at a separate and distinct time from the therapeutic activities interventions. Rationale: decrease pain, increase ROM and increase tissue extensibility to perform ADL            With   [] TE   [] TA   [] neuro   [] other: Patient Education: [x] Review HEP    [] Progressed/Changed HEP based on:   [] positioning   [] body mechanics   [] transfers   [] heat/ice application    [] other:      Other Objective/Functional Measures: increased reps per flow sheet, added SB curls     Pain Level (0-10 scale) post treatment: 1    ASSESSMENT/Changes in Function:  The pt remains tender along the medial HS tendons with myofascial tightness noted. She was able to progress with therex today without increase in pain. Decreased pain was noted post treatment. Patient will continue to benefit from skilled PT services to modify and progress therapeutic interventions, address functional mobility deficits, address ROM deficits, address strength deficits, analyze and address soft tissue restrictions, analyze and cue movement patterns and analyze and modify body mechanics/ergonomics to attain remaining goals. []  See Plan of Care  []  See progress note/recertification  []  See Discharge Summary         Progress towards goals / Updated goals:  Short Term Goals: To be accomplished in 1 weeks:              1. The pt will be I and compliant with HEP              iE- issued and instructed in HEP              Current: \"So-so\" per pt. 10/28/2020  Long Term Goals: To be accomplished in 4 weeks:              1. Improve FOTO score to 62 to improve ability for daily tasks.              IE- 54              2.  Improve left knee AROM flexion to at least 110 degrees without pain increase for improved ability for daily tasks.               IE- 103 degrees with pain              3. Improve left quad MMT to 5/5 for improved ability for standing and gait              IE- 4+/5   Current: progressed with therex 11-4-20              4. The pt will demonstrate ability to walk a mile with a little difficulty.                ZV- moderate difficulty    PLAN  []  Upgrade activities as tolerated     [x]  Continue plan of care  []  Update interventions per flow sheet       []  Discharge due to:_  []  Other:_      Stephon Campa, PT 11/4/2020  11:05 AM    Future Appointments   Date Time Provider Kin Dai   11/9/2020 10:30 AM Terrence Kimball PTA [facility]   11/11/2020 10:45 AM Aggie Perez, PT [facility]   11/16/2020 10:00 AM Aggie Perez, PT [facility]   11/18/2020 10:00 AM Juwan Lambert, PT [facility]   2/2/2021  8:30 AM Ly Lam, [facility]

## 2020-11-09 ENCOUNTER — HOSPITAL ENCOUNTER (OUTPATIENT)
Dept: PHYSICAL THERAPY | Age: 80
Discharge: HOME | End: 2020-11-09
Payer: MEDICARE

## 2020-11-09 PROCEDURE — 97110 THERAPEUTIC EXERCISES: CPT

## 2020-11-09 PROCEDURE — 97140 MANUAL THERAPY 1/> REGIONS: CPT

## 2020-11-09 NOTE — PROGRESS NOTES
PT DAILY TREATMENT NOTE     Patient Name: Can Gonzalez  Date:2020  : 1940  [x]  Patient  Verified   Payor: [place] MEDICARE / Plan: [place] MEDICARE PART A & B / Product Type: Medicare /    In time:10:30  Out time:11:03  Total Treatment Time (min): 33  Visit #: 4 of 8    Medicare/BCBS Only   Total Timed Codes (min):  33 1:1 Treatment Time:  33       Treatment Area: Unilateral primary osteoarthritis, left knee [M17.12]    SUBJECTIVE  Pain Level (0-10 scale): 2/10  Any medication changes, allergies to medications, adverse drug reactions, diagnosis change, or new procedure performed?: [x] No    [] Yes (see summary sheet for update)  Subjective functional status/changes:   [] No changes reported  \"It has it's times but it's doing better. \"    OBJECTIVE    25 min Therapeutic Exercise:  [x] See flow sheet :   Rationale: increase ROM and increase strength to improve the patients ability to perform ADL's.    8 min Manual Therapy:  Left patellar mobs, PROM, STM medial HS tendons   The manual therapy interventions were performed at a separate and distinct time from the therapeutic activities interventions. Rationale: decrease pain, increase ROM, increase tissue extensibility and decrease trigger points to improve ease of performing functional activities. With   [x] TE   [] TA   [] neuro   [] other: Patient Education: [x] Review HEP    [] Progressed/Changed HEP based on:   [] positioning   [] body mechanics   [] transfers   [] heat/ice application    [] other:      Other Objective/Functional Measures: Added 4\" forward/lateral step ups. Added 1# to SLR's 3-way. AROM Left knee flexion 131 degrees with discomfort only. Pain Level (0-10 scale) post treatment: 0/10    ASSESSMENT/Changes in Function: Demonstrates improved Left knee flexion, decreased pain associated with AROM. Occasional Left genu valgus with exercises, limited Left Hip ER/abd strength, add exercises next visit as tolerable.     Patient will continue to benefit from skilled PT services to modify and progress therapeutic interventions, address functional mobility deficits, address ROM deficits, address strength deficits, analyze and address soft tissue restrictions and analyze and cue movement patterns to attain remaining goals. [x]  See Plan of Care  []  See progress note/recertification  []  See Discharge Summary         Progress towards goals / Updated goals:  Short Term Goals: To be accomplished in 1 weeks:              1. The pt will be I and compliant with HEP              KF- issued and instructed in HEP              Current: \"So-so\" per pt. 10/28/2020  Long Term Goals: To be accomplished in 4 weeks:              1. Improve FOTO score to 62 to improve ability for daily tasks.              IE- 54              2. Improve left knee AROM flexion to at least 110 degrees without pain increase for improved ability for daily tasks.               IE- 103 degrees with pain   Current: AROM Left knee flexion 131 degrees with discomfort only. 11/9/2020              3. Improve left quad MMT to 5/5 for improved ability for standing and gait              IE- 4+/5              Current: progressed with therex 11-4-20              4. The pt will demonstrate ability to walk a mile with a little difficulty.                ZR- moderate difficulty    PLAN  []  Upgrade activities as tolerated     [x]  Continue plan of care  []  Update interventions per flow sheet       []  Discharge due to:_  []  Other:_      Radha Miller PTA 11/9/2020  10:40 AM    Future Appointments   Date Time Provider Kin Dai   11/11/2020 10:45 AM [facility]   11/16/2020 10:00 AM [facility]   11/18/2020  9:45 AM [facility]   2/2/2021  8:30 AM Michael Lam, NP [facility]

## 2020-11-11 ENCOUNTER — HOSPITAL ENCOUNTER (OUTPATIENT)
Dept: PHYSICAL THERAPY | Age: 80
Discharge: HOME | End: 2020-11-11
Payer: MEDICARE

## 2020-11-11 PROCEDURE — 97140 MANUAL THERAPY 1/> REGIONS: CPT

## 2020-11-11 PROCEDURE — 97110 THERAPEUTIC EXERCISES: CPT

## 2020-11-11 NOTE — PROGRESS NOTES
PT DAILY TREATMENT NOTE     Patient Name: Inocencio Alba  Date:2020  : 1940  [x]  Patient  Verified  Payor: [place] MEDICARE / Plan: [place] MEDICARE PART A & B / Product Type: Medicare /    In time:1051  Out time:1131  Total Treatment Time (min): 40  Visit #: 5 of 8    Medicare/BCBS Only   Total Timed Codes (min):  40 1:1 Treatment Time:  40       Treatment Area: Unilateral primary osteoarthritis, left knee [M17.12]    SUBJECTIVE  Pain Level (0-10 scale): 2  Any medication changes, allergies to medications, adverse drug reactions, diagnosis change, or new procedure performed?: [x] No    [] Yes (see summary sheet for update)  Subjective functional status/changes:   [] No changes reported  The pt reports she feels like she is doing better.      OBJECTIVE    Modality rationale:    Min Type Additional Details    [] Estim:  []Unatt       []IFC  []Premod                        []Other:  []w/ice   []w/heat  Position:  Location:    [] Estim: []Att    []TENS instruct  []NMES                    []Other:  []w/US   []w/ice   []w/heat  Position:  Location:    []  Traction: [] Cervical       []Lumbar                       [] Prone          []Supine                       []Intermittent   []Continuous Lbs:  [] before manual  [] after manual    []  Ultrasound: []Continuous   [] Pulsed                           []1MHz   []3MHz W/cm2:  Location:    []  Iontophoresis with dexamethasone         Location: [] Take home patch   [] In clinic    []  Ice     []  heat  []  Ice massage  []  Laser   []  Anodyne Position:  Location:    []  Laser with stim  []  Other:  Position:  Location:    []  Vasopneumatic Device Pressure:       [] lo [] med [] hi   Temperature: [] lo [] med [] hi   [] Skin assessment post-treatment:  []intact []redness- no adverse reaction    []redness  adverse reaction:     32 min Therapeutic Exercise:  [x] See flow sheet :   Rationale: increase ROM and increase strength to improve the patients ability to perform ADL      8 min Manual Therapy:  Left patellar mobs, STM medial HS tendons, PROM of the left knee   The manual therapy interventions were performed at a separate and distinct time from the therapeutic activities interventions. Rationale: decrease pain, increase ROM and increase tissue extensibility to improve ability for functional tasks. With   [] TE   [] TA   [] neuro   [] other: Patient Education: [x] Review HEP    [] Progressed/Changed HEP based on:   [] positioning   [] body mechanics   [] transfers   [] heat/ice application    [] other:      Other Objective/Functional Measures:       Pain Level (0-10 scale) post treatment: 1    ASSESSMENT/Changes in Function: The pt is progressing well with PT. She demonstrates improved tolerance for walking and strengthening exercises continue to progress. Patient will continue to benefit from skilled PT services to modify and progress therapeutic interventions, address functional mobility deficits, address ROM deficits, address strength deficits, analyze and address soft tissue restrictions and analyze and cue movement patterns to attain remaining goals. []  See Plan of Care  []  See progress note/recertification  []  See Discharge Summary         Progress towards goals / Updated goals:  Short Term Goals: To be accomplished in 1 weeks:              1. The pt will be I and compliant with HEP              JW- issued and instructed in HEP              Current: \"So-so\" per pt. 10/28/2020  Long Term Goals: To be accomplished in 4 weeks:              1. Improve FOTO score to 62 to improve ability for daily tasks.              IE- 54              2. Improve left knee AROM flexion to at least 110 degrees without pain increase for improved ability for daily tasks.               IE- 103 degrees with pain              Current: AROM Left knee flexion 131 degrees with discomfort only.  11/9/2020              3. Improve left quad MMT to 5/5 for improved ability for standing and gait              IE- 4+/5              Current: progressed with therex 11-4-20              4. The pt will demonstrate ability to walk a mile with a little difficulty.                UL- moderate difficulty   Current: able to walk on TM for a mile with some difficulty    PLAN  []  Upgrade activities as tolerated     [x]  Continue plan of care  []  Update interventions per flow sheet       []  Discharge due to:_  []  Other:_      Osbaldo Shelton, PT 11/11/2020  11:05 AM    Future Appointments   Date Time Provider Kin [place]   11/16/2020 10:00 AM Bienvenido Hart, PT [facility]   11/18/2020  9:45 AM Katlyn Miguel PTA [facility]   2/2/2021  8:30 AM Lyric Lam NP [facility]

## 2020-11-16 ENCOUNTER — HOSPITAL ENCOUNTER (OUTPATIENT)
Dept: PHYSICAL THERAPY | Age: 80
Discharge: HOME | End: 2020-11-16
Payer: MEDICARE

## 2020-11-16 PROCEDURE — 97110 THERAPEUTIC EXERCISES: CPT

## 2020-11-16 PROCEDURE — 97140 MANUAL THERAPY 1/> REGIONS: CPT

## 2020-11-16 NOTE — PROGRESS NOTES
PT DAILY TREATMENT NOTE     Patient Name: Felisa Tabares  Date:2020  : 1940  [x]  Patient  Verified  Payor: [place] MEDICARE / Plan: VA MEDICARE PART A & B / Product Type: Medicare /    In time:10:04  Out time:10:42  Total Treatment Time (min): 38  Visit #: 6 of 8    Medicare/BCBS Only   Total Timed Codes (min):  38 1:1 Treatment Time:  45       Treatment Area: Unilateral primary osteoarthritis, left knee [M17.12]    SUBJECTIVE  Pain Level (0-10 scale): 5  Any medication changes, allergies to medications, adverse drug reactions, diagnosis change, or new procedure performed?: [x] No    [] Yes (see summary sheet for update)  Subjective functional status/changes:   [] No changes reported  The pt reports she had some LBP increase after last PT session and is having increased pain in the knee     OBJECTIVE    Modality rationale:    Min Type Additional Details    [] Estim:  []Unatt       []IFC  []Premod                        []Other:  []w/ice   []w/heat  Position:  Location:    [] Estim: []Att    []TENS instruct  []NMES                    []Other:  []w/US   []w/ice   []w/heat  Position:  Location:    []  Traction: [] Cervical       []Lumbar                       [] Prone          []Supine                       []Intermittent   []Continuous Lbs:  [] before manual  [] after manual    []  Ultrasound: []Continuous   [] Pulsed                           []1MHz   []3MHz W/cm2:  Location:    []  Iontophoresis with dexamethasone         Location: [] Take home patch   [] In clinic    []  Ice     []  heat  []  Ice massage  []  Laser   []  Anodyne Position:  Location:    []  Laser with stim  []  Other:  Position:  Location:    []  Vasopneumatic Device Pressure:       [] lo [] med [] hi   Temperature: [] lo [] med [] hi   [] Skin assessment post-treatment:  []intact []redness- no adverse reaction    []redness  adverse reaction:     30 min Therapeutic Exercise:  [x] See flow sheet :   Rationale: increase ROM and increase strength to improve the patients ability to perform functional tasks. 8 min Manual Therapy:  Stick to left rectus femoris and STM distal rectus femoris   The manual therapy interventions were performed at a separate and distinct time from the therapeutic activities interventions. Rationale: decrease pain, increase ROM and increase tissue extensibility to perform functional tasks. With   [] TE   [] TA   [] neuro   [] other: Patient Education: [x] Review HEP    [] Progressed/Changed HEP based on:   [] positioning   [] body mechanics   [] transfers   [] heat/ice application    [] other:      Other Objective/Functional Measures: reduced reps or held therex per flow sheet today due to pain increase. Pain Level (0-10 scale) post treatment: 0    ASSESSMENT/Changes in Function: The pt has made significant progress with PT since start of care. Pain increase today limited some exercises. Decreased knee pain post manual. Left groin/hip pain and LBP appear to be more limiting at this time. Anticipate discharge to [facility] following next session. Patient will continue to benefit from skilled PT services to modify and progress therapeutic interventions, address functional mobility deficits, address ROM deficits, address strength deficits, analyze and address soft tissue restrictions and analyze and cue movement patterns to attain remaining goals. []  See Plan of Care  []  See progress note/recertification  []  See Discharge Summary         Progress towards goals / Updated goals:  Short Term Goals: To be accomplished in 1 weeks:              1. The pt will be I and compliant with HEP              WE- issued and instructed in HEP              Current: MET per pt 11-16-20  Long Term Goals: To be accomplished in 4 weeks:              1. Improve FOTO score to 62 to improve ability for daily tasks.              IE- 54              2.  Improve left knee AROM flexion to at least 110 degrees without pain increase for improved ability for daily tasks.               IE- 103 degrees with pain              Current: MET AROM Left knee flexion 131 degrees with discomfort only. 11/9/2020              3. Improve left quad MMT to 5/5 for improved ability for standing and gait              IE- 4+/5              Current: progressed with therex 11-4-20              4. The pt will demonstrate ability to walk a mile with a little difficulty.                BI- moderate difficulty              Current: able to walk on TM for a mile with some difficulty       PLAN  []  Upgrade activities as tolerated     [x]  Continue plan of care  []  Update interventions per flow sheet       []  Discharge due to:_  []  Other:_      Rocio Lord, PT 11/16/2020  10:07 AM    Future Appointments   Date Time Provider Kin Dai   11/19/2020 12:00 PM Hellen Phoenix, PT [facility]   2/2/2021  8:30 AM Preethi Lam, [facility]

## 2020-11-18 ENCOUNTER — APPOINTMENT (OUTPATIENT)
Dept: PHYSICAL THERAPY | Age: 80
End: 2020-11-18
Payer: MEDICARE

## 2020-11-19 ENCOUNTER — HOSPITAL ENCOUNTER (OUTPATIENT)
Dept: PHYSICAL THERAPY | Age: 80
Discharge: HOME | End: 2020-11-19
Payer: MEDICARE

## 2020-11-19 PROCEDURE — 97110 THERAPEUTIC EXERCISES: CPT

## 2020-11-19 NOTE — PROGRESS NOTES
PT DAILY TREATMENT NOTE     Patient Name: Serjio Rizzo  Date:2020  : 1940  -  Patient  Verified  Payor: [place] MED[place]ARE / Plan: [place] MEDICARE PART A & B / Product Type: Medicare /    In time:748  Out time:827  Total Treatment Time (min): 39  Visit #: 7 of 8    Medicare/BCBS Only   Total Timed Codes (min):  39 1:1 Treatment Time:  44       Treatment Area: Unilateral primary osteoarthritis, left knee [M17.12]    SUBJECTIVE  Pain Level (0-10 scale): 1  Any medication changes, allergies to medications, adverse drug reactions, diagnosis change, or new procedure performed?: - No    - Yes (see summary sheet for update)  Subjective functional status/changes:   - No changes reported  Good reports of decreased pain today. Reports ready to d/c to HEP. OBJECTIVE    37 min Therapeutic Exercise:  [x]? See flow sheet :   Rationale: increase ROM and increase strength to improve the patients ability to perform functional tasks.         2 min Manual Therapy:  Brief STM to distal vastus lateralis   Rationale: decrease pain, increase ROM and increase tissue extensibility to perform functional tasks. With   - TE   - TA   - neuro   - other: Patient Education: - Review HEP    - Progressed/Changed HEP based on:   - positioning   - body mechanics   - transfers   - heat/ice application    - other:      Other Objective/Functional Measures: see d/c summary     Pain Level (0-10 scale) post treatment: 0    ASSESSMENT/Changes in Function: Since Ms. Lazo's SOC, she has met all functional goals, demonstrating improved knee strength and stability as well as improved ease of ADLs and ambulation. Home exercise program has been updated and pt indicates readiness to d/c.       -  See Plan of Care  -  See progress note/recertification  X See Discharge Summary         Progress towards goals / Updated goals:  Short Term Goals: To be accomplished in 1 weeks:  1.  The pt will be I and compliant with HEP              iE- issued and instructed in HEP              Current: MET per pt 11-16-20  Long Term Goals: To be accomplished in 4 weeks:  1. Improve FOTO score to 62 to improve ability for daily tasks.              IE- 54   Current: met, 69% (11/19/2020)  2. Improve left knee AROM flexion to at least 110 degrees without pain increase for improved ability for daily tasks.               IE- 103 degrees with pain              Current: MET, AROM Left knee flexion 131 degrees with discomfort only. 11/9/2020  3. Improve left quad MMT to 5/5 for improved ability for standing and gait              IE- 4+/5              Current: met, 5/5 MMT B (11/19/2020)  4. The pt will demonstrate ability to walk a mile with a little difficulty.                KX- moderate difficulty              Current: met, able to walk on TM for a mile with a little difficulty, needing to slow down the speed sometimes; reports improved from before therapy (11/29/2020)    PLAN  -  Upgrade activities as tolerated     -  Continue plan of care  -  Update interventions per flow sheet       X  Discharge due to:_  -  Other:_      Teofilo Carrasco, PT 11/19/2020  7:53 AM    Future Appointments   Date Time Provider Kin Dai   2/2/2021  8:30 AM Destiny Lam NP [facility]

## 2020-12-03 RX ORDER — FLUTICASONE PROPIONATE 50 MCG
SPRAY, SUSPENSION (ML) NASAL
Qty: 48 G | Refills: 3 | Status: SHIPPED | OUTPATIENT
Start: 2020-12-03 | End: 2022-01-17

## 2020-12-04 NOTE — PROGRESS NOTES
[facility]  [address]  [place], [address]  [phone] fax    Physical Therapy Discharge Summary    Patient name: Yin Rosa Start of Care: 10/26/2020   Referral source: Darral Fabry, MD : 1940   Medical/Treatment Diagnosis: Unilateral primary osteoarthritis, left knee [M17.12]  Payor: [place] MEDICARE / Plan: [place] MEDICARE PART A & B / Product Type: Medicare /  Onset Date:10/1/2020     Prior Hospitalization: see medical history Provider#: [id number]   Medications: Verified on Patient Summary List    Comorbidities: OA, HTN, visual impairment, asthma, LBP, C/S pain   Prior Level of Function: functionally I with all activities  Visits from Start of Care: 7    Missed Visits: 1  Reporting Period : 10/26/2020 to 2020    Summary of Care:  Short Term Goals: To be accomplished in 1 weeks:  1. The pt will be I and compliant with HEP              iE- issued and instructed in HEP              Current: MET per pt   Long Term Goals: To be accomplished in 4 weeks:  1. Improve FOTO score to 62 to improve ability for daily tasks.              IE- 54              Current: met, 69%   2. Improve left knee AROM flexion to at least 110 degrees without pain increase for improved ability for daily tasks.               IE- 103 degrees with pain              Current: MET, AROM Left knee flexion 131 degrees with discomfort only. 3. Improve left quad MMT to 5/5 for improved ability for standing and gait              IE- 4+/5              Current: met, 5/5 MMT B   4. The pt will demonstrate ability to walk a mile with a little difficulty.             OC- moderate difficulty              Current: met, able to walk on TM for a mile with a little difficulty, needing to slow down the speed sometimes; reports improved from before therapy       ASSESSMENT/RECOMMENDATIONS:  Since Ms. Lazo's SOC, she has met all functional goals, demonstrating improved knee strength and stability as well as improved ease of ADLs and ambulation.  Home exercise program has been updated and pt indicates readiness to d/c.  [x]Discontinue therapy: [x]Patient has reached or is progressing toward set goals      []Patient is non-compliant or has abdicated      []Due to lack of appreciable progress towards set goals    Varun Barker, PT 12/4/2020 1:53 PM

## 2021-01-05 DIAGNOSIS — R91.8 MULTIPLE LUNG NODULES: ICD-10-CM

## 2021-01-05 DIAGNOSIS — J30.9 ALLERGIC RHINITIS, UNSPECIFIED SEASONALITY, UNSPECIFIED TRIGGER: ICD-10-CM

## 2021-01-05 DIAGNOSIS — J45.909 ASTHMA, UNSPECIFIED ASTHMA SEVERITY, UNSPECIFIED WHETHER COMPLICATED, UNSPECIFIED WHETHER PERSISTENT: ICD-10-CM

## 2021-01-05 DIAGNOSIS — R06.02 SOB (SHORTNESS OF BREATH): Primary | ICD-10-CM

## 2021-01-05 NOTE — PROGRESS NOTES
Order placed for COVID-19 test, per Verbal Order from Dr. Waldemar Tolliver on 1/5/2021. Last office visit: 7/5/2019  Follow up Visit: 2/11/2021    Provider is aware of last office visit and follow up. No further action requested from provider.

## 2021-01-28 DIAGNOSIS — M47.816 LUMBAR FACET ARTHROPATHY: ICD-10-CM

## 2021-01-28 DIAGNOSIS — M79.2 NEURITIS: ICD-10-CM

## 2021-01-28 DIAGNOSIS — M54.12 CERVICAL RADICULOPATHY: ICD-10-CM

## 2021-01-28 DIAGNOSIS — M50.30 DDD (DEGENERATIVE DISC DISEASE), CERVICAL: Primary | ICD-10-CM

## 2021-01-29 RX ORDER — GABAPENTIN 600 MG/1
TABLET, FILM COATED ORAL
Qty: 180 TAB | Refills: 1 | OUTPATIENT
Start: 2021-01-29

## 2021-02-02 ENCOUNTER — OFFICE VISIT (OUTPATIENT)
Dept: ORTHOPEDIC SURGERY | Age: 81
End: 2021-02-02
Payer: MEDICARE

## 2021-02-02 VITALS
SYSTOLIC BLOOD PRESSURE: 130 MMHG | TEMPERATURE: 97.6 F | HEART RATE: 70 BPM | DIASTOLIC BLOOD PRESSURE: 80 MMHG | BODY MASS INDEX: 33.42 KG/M2 | WEIGHT: 174 LBS

## 2021-02-02 DIAGNOSIS — M54.2 NECK PAIN: ICD-10-CM

## 2021-02-02 DIAGNOSIS — M50.30 DDD (DEGENERATIVE DISC DISEASE), CERVICAL: ICD-10-CM

## 2021-02-02 DIAGNOSIS — G89.29 CHRONIC BILATERAL LOW BACK PAIN WITH RIGHT-SIDED SCIATICA: Primary | ICD-10-CM

## 2021-02-02 DIAGNOSIS — M79.2 NEURITIS: ICD-10-CM

## 2021-02-02 DIAGNOSIS — M54.12 CERVICAL RADICULOPATHY: ICD-10-CM

## 2021-02-02 DIAGNOSIS — M54.41 CHRONIC BILATERAL LOW BACK PAIN WITH RIGHT-SIDED SCIATICA: Primary | ICD-10-CM

## 2021-02-02 PROCEDURE — G8536 NO DOC ELDER MAL SCRN: HCPCS | Performed by: NURSE PRACTITIONER

## 2021-02-02 PROCEDURE — 72040 X-RAY EXAM NECK SPINE 2-3 VW: CPT | Performed by: NURSE PRACTITIONER

## 2021-02-02 PROCEDURE — 1101F PT FALLS ASSESS-DOCD LE1/YR: CPT | Performed by: NURSE PRACTITIONER

## 2021-02-02 PROCEDURE — G8432 DEP SCR NOT DOC, RNG: HCPCS | Performed by: NURSE PRACTITIONER

## 2021-02-02 PROCEDURE — G8752 SYS BP LESS 140: HCPCS | Performed by: NURSE PRACTITIONER

## 2021-02-02 PROCEDURE — G8417 CALC BMI ABV UP PARAM F/U: HCPCS | Performed by: NURSE PRACTITIONER

## 2021-02-02 PROCEDURE — G8399 PT W/DXA RESULTS DOCUMENT: HCPCS | Performed by: NURSE PRACTITIONER

## 2021-02-02 PROCEDURE — 1090F PRES/ABSN URINE INCON ASSESS: CPT | Performed by: NURSE PRACTITIONER

## 2021-02-02 PROCEDURE — G8754 DIAS BP LESS 90: HCPCS | Performed by: NURSE PRACTITIONER

## 2021-02-02 PROCEDURE — G8427 DOCREV CUR MEDS BY ELIG CLIN: HCPCS | Performed by: NURSE PRACTITIONER

## 2021-02-02 PROCEDURE — 72100 X-RAY EXAM L-S SPINE 2/3 VWS: CPT | Performed by: NURSE PRACTITIONER

## 2021-02-02 PROCEDURE — 99213 OFFICE O/P EST LOW 20 MIN: CPT | Performed by: NURSE PRACTITIONER

## 2021-02-02 RX ORDER — GABAPENTIN 600 MG/1
1200 TABLET, FILM COATED ORAL DAILY
Qty: 180 TAB | Refills: 1 | Status: SHIPPED | COMMUNITY
Start: 2021-02-02 | End: 2021-02-18 | Stop reason: SDUPTHER

## 2021-02-02 NOTE — PROGRESS NOTES
Chief complaint   Chief Complaint   Patient presents with    Back Pain     6mo fu     Neck Pain       History of Present Illness:  David Hurtado is a  80 y.o.  female who comes in today for follow-up of her chronic neck and back pain. She gets pain that radiates from her neck to her left upper extremity. She also gets pain that travels down her right lower extremity to her knee. She states she is having little more pain lately. She can get some stiff and sharp stabbing pain that only lasts briefly and both her neck and her back. Normally her her pain trolled with Gralise 600 mg 2 tabs daily. If she has extra pain she will take Tylenol. 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 She is a non-smoker. She denies fever bowel bladder dysfunction. Denies any other new medical issues. Physical Exam: Patient is a 68-year-old female well-developed well-nourished who is alert and oriented with a normal mood and affect. She has a full weightbearing nonantalgic gait. She has 4 out of 5 strength of her bilateral upper and lower extremities. Negative straight leg raise. Negative Yessi's. Not use any assistive device. Assessment and Plan: Is a patient with cervical degenerative disc disease and neuritis along with lumbar facet arthropathy and a retrolisthesis. We did lumbar AP lateral and cervical AP lateral x-rays. These be read by Dinah Sarabia. I have refilled her Gralise. She is going to try using heat on her neck and her back 20 minutes at a time. We will see her back in 6-month sooner if needed.         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    Band keratopathy of right eye 2/3/2018    Cataract     Cylindrical bronchiectasis (HCC)     Fracture     rt ankle as an adult    History of pneumonia     Hypertension     Ill-defined condition Spasms to left side    Left arm pain     Lumbar spinal stenosis     Myofascial pain     Osteopenia     Sciatica of right side      Past Surgical History:   Procedure Laterality Date    COLONOSCOPY N/A 2019    COLONOSCOPY w polypectomies performed by Wilver Wood MD at [address] HX CATARACT REMOVAL  8/15/2011    HX HEENT      sinus surgery    HX KERATOPLASTY Left 10/02/2017    HX ORTHOPAEDIC      left 4th finger trigger release    HX SHIELA AND BSO  1970s    uterine fibroids     Social History     Socioeconomic History    Marital status:      Spouse name: Not on file    Number of children: Not on file    Years of education: Not on file    Highest education level: Not on file   Occupational History    Not on file   Social Needs    Financial resource strain: Not on file    Food insecurity     Worry: Not on file     Inability: Not on file    Transportation needs     Medical: Not on file     Non-medical: Not on file   Tobacco Use    Smoking status: Former Smoker     Years: 5.00     Types: Cigarettes     Quit date: 1970     Years since quittin.1    Smokeless tobacco: Never Used   Substance and Sexual Activity    Alcohol use:  Yes     Alcohol/week: 1.0 standard drinks     Types: 1 Glasses of wine per week     Frequency: Monthly or less     Comment: occasional wine    Drug use: No    Sexual activity: Yes     Partners: Male     Birth control/protection: None   Lifestyle    Physical activity     Days per week: Not on file     Minutes per session: Not on file    Stress: Not on file   Relationships    Social connections     Talks on phone: Not on file     Gets together: Not on file     Attends [employer] service: Not on file     Active member of club or organization: Not on file     Attends meetings of clubs or organizations: Not on file     Relationship status: Not on file    Intimate partner violence     Fear of current or ex partner: Not on file     Emotionally abused: Not on file     Physically abused: Not on file     Forced sexual activity: Not on file   Other Topics Concern    Not on file   Social History Narrative    Not on file     Family History   Problem Relation Age of Onset    Asthma Mother     COPD Mother    Lorie Maya MS Mother     Heart Disease Mother     Hypertension Mother     Stroke Father     Heart Disease Father     Hypertension Father     Allergic Rhinitis Sister     Asthma Brother     Hypertension Brother     Diabetes Brother     Migraines Paternal Grandmother        Physical Exam:  Visit Vitals  /80 (BP 1 Location: Left upper arm, BP Patient Position: Sitting)   Pulse 70   Temp 97.6 °F (36.4 °C) (Temporal)   Wt 174 lb (78.9 kg)   BMI 33.42 kg/m²     Pain Scale: 0 - No pain/10       has been . reviewed and is appropriate          Diagnoses and all orders for this visit:    1. Chronic bilateral low back pain with right-sided sciatica  -     AMB POC XRAY, SPINE, LUMBOSACRAL; 2 O  -     gabapentin (Gralise) 600 mg Tb24; Take 1,200 mg by mouth daily. Max Daily Amount: 1,200 mg. Indications: neuropathic pain    2. Neck pain  -     AMB POC XRAY, SPINE, CERVICAL; 2 OR 3    3. DDD (degenerative disc disease), cervical  -     gabapentin (Gralise) 600 mg Tb24; Take 1,200 mg by mouth daily. Max Daily Amount: 1,200 mg. Indications: neuropathic pain    4. Neuritis  -     gabapentin (Gralise) 600 mg Tb24; Take 1,200 mg by mouth daily. Max Daily Amount: 1,200 mg. Indications: neuropathic pain    5. Cervical radiculopathy  -     gabapentin (Gralise) 600 mg Tb24; Take 1,200 mg by mouth daily. Max Daily Amount: 1,200 mg. Indications: neuropathic pain            Follow-up and Dispositions    · Return in about 6 months (around 8/2/2021) for with NP [place].              We have informed Alphonso Closs to notify us for immediate appointment if she has any worsening neurogical symptoms or if an emergency situation presents, then call [phone]

## 2021-02-15 ENCOUNTER — HOSPITAL ENCOUNTER (OUTPATIENT)
Dept: LAB | Age: 81
Discharge: HOME | End: 2021-02-15
Payer: MEDICARE

## 2021-02-15 PROCEDURE — U0003 INFECTIOUS AGENT DETECTION BY NUCLEIC ACID (DNA OR RNA); SEVERE ACUTE RESPIRATORY SYNDROME CORONAVIRUS 2 (SARS-COV-2) (CORONAVIRUS DISEASE [COVID-19]), AMPLIFIED PROBE TECHNIQUE, MAKING USE OF HIGH THROUGHPUT TECHNOLOGIES AS DESCRIBED BY CMS-2020-01-R: HCPCS

## 2021-02-16 LAB — SARS-COV-2, COV2NT: NOT DETECTED

## 2021-02-17 ENCOUNTER — TELEPHONE (OUTPATIENT)
Dept: INTERNAL MEDICINE CLINIC | Age: 81
End: 2021-02-17

## 2021-02-17 RX ORDER — GABAPENTIN 600 MG/1
1200 TABLET, FILM COATED ORAL DAILY
Qty: 180 TAB | Refills: 1 | Status: SHIPPED | OUTPATIENT
Start: 2021-02-17 | End: 2021-02-18 | Stop reason: SDUPTHER

## 2021-02-17 NOTE — TELEPHONE ENCOUNTER
Patient called regarding this refill request. She states she still hasn't received the prescription gabapentin (Gralise) 600 mg Tb24 and now she only has 1 pill for today and 1 pill for tomorrow. I see the E-Prescribing Status says \"transmission to pharmacy failed\" when we attempted to send it to [facility]. She's asking if we can send it to [facility] on [address] instead. Please advise patient back regarding this at [phone].

## 2021-02-17 NOTE — PROGRESS NOTES
Please let her know that she is negative for Covid.      Dr. Marleny Graham  [facility], [address] [facility], [address]  Phone: [phone]  Fax: [phone]

## 2021-02-17 NOTE — TELEPHONE ENCOUNTER
----- Message from Axel Rodriguez MD sent at 2/17/2021  8:02 AM EST -----  Please let her know that she is negative for Covid.      Dr. Kobi Brito  [facility], [address] [place] [address], [facility] [address]  Phone: [phone]  Fax: [phone]

## 2021-02-18 ENCOUNTER — OFFICE VISIT (OUTPATIENT)
Dept: PULMONOLOGY | Age: 81
End: 2021-02-18
Payer: MEDICARE

## 2021-02-18 VITALS
WEIGHT: 173 LBS | DIASTOLIC BLOOD PRESSURE: 64 MMHG | TEMPERATURE: 98 F | OXYGEN SATURATION: 98 % | RESPIRATION RATE: 19 BRPM | SYSTOLIC BLOOD PRESSURE: 120 MMHG | HEART RATE: 68 BPM | HEIGHT: 60 IN | BODY MASS INDEX: 33.96 KG/M2

## 2021-02-18 DIAGNOSIS — M54.12 CERVICAL RADICULOPATHY: ICD-10-CM

## 2021-02-18 DIAGNOSIS — M47.816 LUMBAR FACET ARTHROPATHY: ICD-10-CM

## 2021-02-18 DIAGNOSIS — M50.30 DDD (DEGENERATIVE DISC DISEASE), CERVICAL: ICD-10-CM

## 2021-02-18 DIAGNOSIS — J30.9 ALLERGIC RHINITIS, UNSPECIFIED SEASONALITY, UNSPECIFIED TRIGGER: ICD-10-CM

## 2021-02-18 DIAGNOSIS — M79.2 NEURITIS: ICD-10-CM

## 2021-02-18 DIAGNOSIS — Z87.891 FORMER SMOKER: ICD-10-CM

## 2021-02-18 DIAGNOSIS — J45.909 UNCOMPLICATED ASTHMA, UNSPECIFIED ASTHMA SEVERITY, UNSPECIFIED WHETHER PERSISTENT: Primary | ICD-10-CM

## 2021-02-18 DIAGNOSIS — M54.41 CHRONIC BILATERAL LOW BACK PAIN WITH RIGHT-SIDED SCIATICA: ICD-10-CM

## 2021-02-18 DIAGNOSIS — R91.8 LUNG NODULES: ICD-10-CM

## 2021-02-18 DIAGNOSIS — G89.29 CHRONIC BILATERAL LOW BACK PAIN WITH RIGHT-SIDED SCIATICA: ICD-10-CM

## 2021-02-18 PROCEDURE — G8417 CALC BMI ABV UP PARAM F/U: HCPCS | Performed by: INTERNAL MEDICINE

## 2021-02-18 PROCEDURE — G8399 PT W/DXA RESULTS DOCUMENT: HCPCS | Performed by: INTERNAL MEDICINE

## 2021-02-18 PROCEDURE — 94010 BREATHING CAPACITY TEST: CPT | Performed by: INTERNAL MEDICINE

## 2021-02-18 PROCEDURE — G8752 SYS BP LESS 140: HCPCS | Performed by: INTERNAL MEDICINE

## 2021-02-18 PROCEDURE — 99214 OFFICE O/P EST MOD 30 MIN: CPT | Performed by: INTERNAL MEDICINE

## 2021-02-18 PROCEDURE — G8427 DOCREV CUR MEDS BY ELIG CLIN: HCPCS | Performed by: INTERNAL MEDICINE

## 2021-02-18 PROCEDURE — 1090F PRES/ABSN URINE INCON ASSESS: CPT | Performed by: INTERNAL MEDICINE

## 2021-02-18 PROCEDURE — G8536 NO DOC ELDER MAL SCRN: HCPCS | Performed by: INTERNAL MEDICINE

## 2021-02-18 PROCEDURE — G8510 SCR DEP NEG, NO PLAN REQD: HCPCS | Performed by: INTERNAL MEDICINE

## 2021-02-18 PROCEDURE — 1101F PT FALLS ASSESS-DOCD LE1/YR: CPT | Performed by: INTERNAL MEDICINE

## 2021-02-18 PROCEDURE — G8754 DIAS BP LESS 90: HCPCS | Performed by: INTERNAL MEDICINE

## 2021-02-18 RX ORDER — GABAPENTIN 600 MG/1
1200 TABLET, FILM COATED ORAL DAILY
Qty: 28 TAB | Refills: 0 | OUTPATIENT
Start: 2021-02-18

## 2021-02-18 RX ORDER — GABAPENTIN 600 MG/1
1200 TABLET, FILM COATED ORAL DAILY
Qty: 180 TAB | Refills: 1 | OUTPATIENT
Start: 2021-02-18

## 2021-02-18 RX ORDER — GABAPENTIN 600 MG/1
1200 TABLET, FILM COATED ORAL DAILY
Qty: 28 TAB | Refills: 0 | Status: SHIPPED | OUTPATIENT
Start: 2021-02-18 | End: 2021-03-04 | Stop reason: SDUPTHER

## 2021-02-18 RX ORDER — GABAPENTIN 600 MG/1
1200 TABLET, FILM COATED ORAL DAILY
Qty: 180 TAB | Refills: 1 | Status: SHIPPED | OUTPATIENT
Start: 2021-02-18 | End: 2021-03-04 | Stop reason: SDUPTHER

## 2021-02-18 NOTE — TELEPHONE ENCOUNTER
Patient wants to  the prescription to take to [facility]. Please advise if this is possible. She would like to pick it up around 3 pm from the [facility].

## 2021-02-18 NOTE — TELEPHONE ENCOUNTER
This attempted to send to [facility] on 2/2/21 but the transmission failed. Also attempted to send to [address] [place] [address] on 2/17/21 but that transmission also failed. Patient is asking for a 90 d/s to go to [facility], and a short supply to her local pharmacy. Meds have been pended again to [facility]. Requested Prescriptions     Pending Prescriptions Disp Refills    gabapentin (Gralise) 600 mg Tb24 28 Tab 0     Sig: Take 1,200 mg by mouth daily. Max Daily Amount: 1,200 mg. Indications: neuropathic pain    gabapentin (Gralise) 600 mg Tb24 180 Tab 1     Sig: Take 1,200 mg by mouth daily. Max Daily Amount: 1,200 mg.  Indications: neuropathic pain

## 2021-02-18 NOTE — TELEPHONE ENCOUNTER
Patient called again regarding refills, she wants to make sure we can provide her a paper copy of rx as requested below in addition to sending to [facility]. She also has concerns with the refill delay and doesn't understand why she wasn't called if there was an issue. Please advise patient as soon as possible regarding picking up the prescription this afternoon in office, [phone].

## 2021-02-18 NOTE — PROGRESS NOTES
Susana Danielle presents today for   Chief Complaint   Patient presents with    Asthma    Lung Nodule    Cough     Productive coguh with white sputum. Is someone accompanying this pt? No    Is the patient using any DME equipment during OV? No   -DME Company NA    Depression Screening:  3 most recent PHQ Screens 2/18/2021   PHQ Not Done -   Little interest or pleasure in doing things Not at all   Feeling down, depressed, irritable, or hopeless Not at all   Total Score PHQ 2 0       Learning Assessment:  Learning Assessment 10/4/2019   PRIMARY LEARNER Patient   HIGHEST LEVEL OF EDUCATION - PRIMARY LEARNER  4 YEARS OF COLLEGE   BARRIERS PRIMARY LEARNER NONE   CO-LEARNER CAREGIVER No   PRIMARY LANGUAGE ENGLISH   LEARNER PREFERENCE PRIMARY DEMONSTRATION   ANSWERED BY patient   RELATIONSHIP SELF       Abuse Screening:  Abuse Screening Questionnaire 10/5/2020   Do you ever feel afraid of your partner? N   Are you in a relationship with someone who physically or mentally threatens you? N   Is it safe for you to go home? Y       Fall Risk  Fall Risk Assessment, last 12 mths 2/18/2021   Able to walk? Yes   Fall in past 12 months? 0   Do you feel unsteady? 0   Are you worried about falling 0         Coordination of Care:  1. Have you been to the ER, urgent care clinic since your last visit? Hospitalized since your last visit? No    2. Have you seen or consulted any other health care providers outside of the [address] [place] [address] since your last visit? Include any pap smears or colon screening.  No.

## 2021-02-18 NOTE — PROGRESS NOTES
HISTORY OF PRESENT ILLNESS  Estiven Gilliam is a 80 y.o. female following up for asthma. Pt with asthma, no interval exacerbations and only on Albuterol which she uses sparingly. Pt denies new respiratory symptoms. Pt with rare episodes of shortness of breath. She was previously on Arnuity which was stopped several visits ago. She does have allergic rhinitis which is controlled with nasal steroids. She was followed for lung nodules in the past which on serial CT's have been stable for over 5 years. Asthma  The history is provided by the patient. This is a chronic problem. The current episode started more than 1 week ago. The problem occurs rarely. The problem has been resolved. Pertinent negatives include no chest pain, no abdominal pain and no headaches. Shortness of breath: rare. Nothing aggravates the symptoms. Relieved by: Albuterol rescue inhaler. Treatments tried: beta agonist. The treatment provided significant relief. Review of Systems   Constitutional: Negative for chills, diaphoresis, fever, malaise/fatigue and weight loss. HENT: Positive for congestion (resolved). Negative for ear discharge, ear pain, hearing loss, nosebleeds, sinus pain, sore throat and tinnitus. Eyes: Negative for blurred vision, double vision, photophobia, pain, discharge and redness. Respiratory: Negative for cough, hemoptysis, sputum production, wheezing and stridor. Shortness of breath: rare. Cardiovascular: Negative for chest pain, palpitations, orthopnea, claudication, leg swelling and PND. Gastrointestinal: Negative for abdominal pain, blood in stool, constipation, diarrhea, heartburn, melena, nausea and vomiting. Genitourinary: Negative for dysuria, flank pain, frequency, hematuria and urgency. Musculoskeletal: Positive for back pain ( sciatica). Negative for falls, joint pain, myalgias and neck pain. Skin: Negative for itching and rash.    Neurological: Negative for dizziness, tingling, tremors, sensory change, speech change, focal weakness, seizures, loss of consciousness, weakness and headaches. Endo/Heme/Allergies: Positive for environmental allergies. Negative for polydipsia. Does not bruise/bleed easily. Psychiatric/Behavioral: Negative for depression, hallucinations, memory loss, substance abuse and suicidal ideas. The patient is not nervous/anxious and does not have insomnia. 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    Band keratopathy of right eye 2/3/2018    Cataract     Cylindrical bronchiectasis (HCC)     Fracture     rt ankle as an adult    History of pneumonia     Hypertension     Ill-defined condition     Spasms to left side    Left arm pain     Lumbar spinal stenosis     Myofascial pain     Osteopenia     Sciatica of right side      Current Outpatient Medications on File Prior to Visit   Medication Sig Dispense Refill    fluticasone propionate (FLONASE) 50 mcg/actuation nasal spray USE 2 SPRAYS IN EACH NOSTRIL DAILY 48 g 3    triamterene-hydroCHLOROthiazide (MAXZIDE) 37.5-25 mg per tablet Take 0.5 Tabs by mouth daily. 45 Tab 3    gabapentin (Gralise) 600 mg Tb24 Take 1,200 mg by mouth daily. Max Daily Amount: 1,200 mg. Indications: neuropathic pain 180 Tab 1    mv-min/vit C/glut/lysine/hb124 (AIRBORNE, LYSINE HCL, PO) Take  by mouth.  fexofenadine (ALLEGRA) 180 mg tablet Take 180 mg by mouth daily.  PROAIR HFA 90 mcg/actuation inhaler USE 2 INHALATIONS EVERY 4 HOURS AS NEEDED 3 Inhaler 3    EPINEPHrine (EPIPEN) 0.3 mg/0.3 mL injection 0.3 mL by IntraMUSCular route once as needed for up to 1 dose. 1 Syringe 1    melatonin 3 mg tablet Take 3 mg by mouth daily as needed (sleep).  Cholecalciferol, Vitamin D3, (VITAMIN D3) 1,000 unit cap Take 3,000 Units by mouth daily.       cycloSPORINE (RESTASIS) 0.05 % ophthalmic emulsion Administer 1 Drop to both eyes two (2) times a day.  MULTIVITAMIN W-MINERALS/LUTEIN (CENTRUM SILVER PO) Take 1 Tab by mouth daily.  co-enzyme Q-10 (CO Q-10) 100 mg capsule Take 100 mg by mouth daily.  BIOTIN PO Take 5,000 mcg by mouth daily.  vitamin C-biotin (HAIR-SKIN-NAILS, VIT C-BIOTIN,) 50 mg -1,250 mcg chew Take  by mouth daily. No current facility-administered medications on file prior to visit. Allergies   Allergen Reactions    Naprosyn [Naproxen] Other (comments)     Blood in urine       Social History     Socioeconomic History    Marital status:      Spouse name: Not on file    Number of children: Not on file    Years of education: Not on file    Highest education level: Not on file   Occupational History    Not on file   Social Needs    Financial resource strain: Not on file    Food insecurity     Worry: Not on file     Inability: Not on file    Transportation needs     Medical: Not on file     Non-medical: Not on file   Tobacco Use    Smoking status: Former Smoker     Packs/day: 1.00     Years: 10.00     Pack years: 10.00     Types: Cigarettes     Quit date: 1970     Years since quittin.1    Smokeless tobacco: Never Used   Substance and Sexual Activity    Alcohol use:  Yes     Alcohol/week: 1.0 standard drinks     Types: 1 Glasses of wine per week     Frequency: Monthly or less     Comment: occasional wine    Drug use: No    Sexual activity: Yes     Partners: Male     Birth control/protection: None   Lifestyle    Physical activity     Days per week: Not on file     Minutes per session: Not on file    Stress: Not on file   Relationships    Social connections     Talks on phone: Not on file     Gets together: Not on file     Attends Spiritism service: Not on file     Active member of club or organization: Not on file     Attends meetings of clubs or organizations: Not on file     Relationship status: Not on file    Intimate partner violence Fear of current or ex partner: Not on file     Emotionally abused: Not on file     Physically abused: Not on file     Forced sexual activity: Not on file   Other Topics Concern    Not on file   Social History Narrative    Not on file     Blood pressure 120/64, pulse 68, temperature 98 °F (36.7 °C), temperature source Oral, resp. rate 19, height 5' (1.524 m), weight 78.5 kg (173 lb), SpO2 98 %. Physical Exam   Constitutional: She is oriented to person, place, and time. She appears well-developed and well-nourished. No distress. HENT:   Head: Atraumatic. Oral and nasal exams deferred   Eyes: Pupils are equal, round, and reactive to light. Conjunctivae are normal. Right eye exhibits no discharge. Left eye exhibits no discharge. No scleral icterus. Neck: No JVD present. No tracheal deviation present. No thyromegaly present. Cardiovascular: Normal rate, regular rhythm and intact distal pulses. Exam reveals no gallop. No murmur heard. Pulmonary/Chest: Effort normal and breath sounds normal. No stridor. No respiratory distress. She has no wheezes. She has no rales. She exhibits no tenderness. Abdominal: Soft. She exhibits no mass. There is no abdominal tenderness. There is no rebound. Musculoskeletal:         General: No tenderness, deformity or edema. Lymphadenopathy:     She has no cervical adenopathy. Neurological: She is alert and oriented to person, place, and time. Coordination normal.   Skin: Skin is warm and dry. No rash noted. She is not diaphoretic. No erythema. No pallor. Psychiatric: She has a normal mood and affect. Her behavior is normal. Judgment and thought content normal.     CT Results (most recent):  Results from Hospital Encounter encounter on 03/07/17   CT CHEST WO CONT    Narrative CT CHEST WITHOUT IV CONTRAST    COMPARISON: CT chest 8/8/2016, 8/25/2015 and 3/1/2013. INDICATIONS: Pulmonary nodules.     TECHNIQUE: Volumetric data acquisition was performed through the chest with a  multislice scanner. Reconstructions were created in the axial, coronal, and  sagittal planes. All CT scans at this facility are performed using dose optimization technique as  appropriate to the performed exam, to include automated exposure control,  adjustment of the mA and/or kV according to patient's size (Including  appropriate matching for site-specific examinations), or use of iterative  reconstruction technique. FINDINGS:     Thyroid/Base Of Neck: The thyroid is unremarkable as visualized. Lungs:   Trachea and central bronchi are patent. No mass lesions are seen. .  Stable 4 mm 2 pleural-based pulmonary nodules lateral right middle lobe (series  4 image 31 and 32). Resolution of additional 4 mm pulmonary nodule in the right middle lobe. Perhaps 2 mm pulmonary nodule in the right minor fissure, stable (series 4 image  25)  Less conspicuous posterior right upper lobe 3 mm pulmonary nodule (series 4  image 15)  No new suspicious pulmonary nodule. Mild dependent atelectasis. Pleural Spaces: There is no pneumothorax or pleural fluid evident. No pleural plaques are seen    Lymph Nodes:   Axillae: Normal in size and number. Mediastinum / Melania: Limited without IV contrast but no gross adenopathy. Mediastinum, Great Vessels And Heart:  No mediastinal mass. Upper normal heart size. No pericardial effusion. Mild  aortic valve calcifications. No aortic aneurysm. Abdomen Structures Included:  No diagnostic abnormality. Osseous Structures:   Mild thoracic spondylosis. Impression IMPRESSION:     Resolution of 4 mm pulmonary nodule in the right middle lobe. Otherwise stable  few pulmonary nodules up to 4 cm in size, stable since 2013. Spirometry: normal flows and FV loop  ASSESSMENT and PLAN  Encounter Diagnoses   Name Primary?     Uncomplicated asthma, unspecified asthma severity, unspecified whether persistent Yes    Lung nodules     Former smoker     Allergic rhinitis, unspecified seasonality, unspecified trigger        Continue rescue Albuterol , no indication for step up therapy. Continue Flonase,  pt reminded on proper use. counseled on allergy trigger avoidance. No further indication for CT follow up. Continue rest of medications. RTC 12 months. Daniela Zheng results reviewed with pt.

## 2021-03-04 ENCOUNTER — DOCUMENTATION ONLY (OUTPATIENT)
Dept: ORTHOPEDIC SURGERY | Age: 81
End: 2021-03-04

## 2021-03-04 DIAGNOSIS — G89.29 CHRONIC BILATERAL LOW BACK PAIN WITH RIGHT-SIDED SCIATICA: ICD-10-CM

## 2021-03-04 DIAGNOSIS — M79.2 NEURITIS: ICD-10-CM

## 2021-03-04 DIAGNOSIS — M54.12 CERVICAL RADICULOPATHY: ICD-10-CM

## 2021-03-04 DIAGNOSIS — M50.30 DDD (DEGENERATIVE DISC DISEASE), CERVICAL: ICD-10-CM

## 2021-03-04 DIAGNOSIS — M54.41 CHRONIC BILATERAL LOW BACK PAIN WITH RIGHT-SIDED SCIATICA: ICD-10-CM

## 2021-03-04 RX ORDER — GABAPENTIN 600 MG/1
1200 TABLET, FILM COATED ORAL DAILY
Qty: 10 TAB | Refills: 0 | Status: SHIPPED | OUTPATIENT
Start: 2021-03-04 | End: 2021-04-12 | Stop reason: SDUPTHER

## 2021-03-04 RX ORDER — GABAPENTIN 600 MG/1
1200 TABLET, FILM COATED ORAL DAILY
Qty: 180 TAB | Refills: 1 | Status: SHIPPED | OUTPATIENT
Start: 2021-03-04 | End: 2021-03-04 | Stop reason: SDUPTHER

## 2021-03-04 NOTE — PROGRESS NOTES
Called [facility] and gave VO for Gralise 600mg #10 tabs , 2 tabs every day , no refills per VO from Alonzo Manzo NP

## 2021-03-04 NOTE — PROGRESS NOTES
gralise not escribing. Please call in the 10 day supply to [facility] per patient request.    Printed 90 day rx given to [facility]

## 2021-03-05 NOTE — PROGRESS NOTES
Understood, please tell her to let us know next week if [facility] doesnt not arrive on time and we can send in another 5 days.      Or, if she has not picked it up, we can update the order for a 10 day supply

## 2021-03-05 NOTE — PROGRESS NOTES
This is only for a day supply per the pt request, she will receive her 90 day supply from [facility]

## 2021-03-05 NOTE — PROGRESS NOTES
I called and spoke to Ms. Lazo. The pt was identified using 2 pt identifiers. She was notified of the provider's response to her recent concerns. The pt verbalized understanding. She states that the 10 tabs cost her 89.00 at the local pharmacy. She had to go to the post office and overnight the prescription. This cost her another 35.00. According to [facility], it will still take 7-10 days for the pt to get her prescription. Ms. Ginger Deng says that she will probably just reduce her dose of gralise from 2 tabs a day down to one so that she can make her medication stretch. She cannot afford another 90.00. Message will be forwarded to provider for review. Pt will only be called if there are any issues with her only taking one tab a day. She is aware of this and has no other questions or concerns at this time. She is going to call the pharmacy to make sure they got the prescription.

## 2021-03-05 NOTE — PROGRESS NOTES
Patient called today from [facility]; she picked up 10 tabs and is concerned that she will run out of medication before [facility] comes through. She is requesting a call back to discuss.  [phone]

## 2021-03-05 NOTE — PROGRESS NOTES
I think pt is concerned because the 10 tabs she picked up is only a 5 day supply not 10 days based on the directions.

## 2021-04-12 ENCOUNTER — OFFICE VISIT (OUTPATIENT)
Dept: ORTHOPEDIC SURGERY | Age: 81
End: 2021-04-12
Payer: MEDICARE

## 2021-04-12 VITALS
DIASTOLIC BLOOD PRESSURE: 74 MMHG | HEART RATE: 63 BPM | BODY MASS INDEX: 32.7 KG/M2 | WEIGHT: 173.2 LBS | OXYGEN SATURATION: 95 % | HEIGHT: 61 IN | SYSTOLIC BLOOD PRESSURE: 131 MMHG | RESPIRATION RATE: 16 BRPM | TEMPERATURE: 97 F

## 2021-04-12 DIAGNOSIS — G89.29 CHRONIC BILATERAL LOW BACK PAIN WITH RIGHT-SIDED SCIATICA: ICD-10-CM

## 2021-04-12 DIAGNOSIS — M48.02 CERVICAL SPINAL STENOSIS: ICD-10-CM

## 2021-04-12 DIAGNOSIS — M79.2 NEURITIS: ICD-10-CM

## 2021-04-12 DIAGNOSIS — M54.41 CHRONIC BILATERAL LOW BACK PAIN WITH RIGHT-SIDED SCIATICA: ICD-10-CM

## 2021-04-12 DIAGNOSIS — M50.30 DDD (DEGENERATIVE DISC DISEASE), CERVICAL: ICD-10-CM

## 2021-04-12 DIAGNOSIS — M54.12 CERVICAL RADICULOPATHY: ICD-10-CM

## 2021-04-12 DIAGNOSIS — M62.838 MUSCLE SPASM: ICD-10-CM

## 2021-04-12 DIAGNOSIS — M47.812 CERVICAL FACET SYNDROME: ICD-10-CM

## 2021-04-12 DIAGNOSIS — M54.16 LUMBAR RADICULOPATHY: Primary | ICD-10-CM

## 2021-04-12 PROCEDURE — G8427 DOCREV CUR MEDS BY ELIG CLIN: HCPCS | Performed by: PHYSICAL MEDICINE & REHABILITATION

## 2021-04-12 PROCEDURE — G8399 PT W/DXA RESULTS DOCUMENT: HCPCS | Performed by: PHYSICAL MEDICINE & REHABILITATION

## 2021-04-12 PROCEDURE — G8417 CALC BMI ABV UP PARAM F/U: HCPCS | Performed by: PHYSICAL MEDICINE & REHABILITATION

## 2021-04-12 PROCEDURE — 1090F PRES/ABSN URINE INCON ASSESS: CPT | Performed by: PHYSICAL MEDICINE & REHABILITATION

## 2021-04-12 PROCEDURE — G8536 NO DOC ELDER MAL SCRN: HCPCS | Performed by: PHYSICAL MEDICINE & REHABILITATION

## 2021-04-12 PROCEDURE — 1101F PT FALLS ASSESS-DOCD LE1/YR: CPT | Performed by: PHYSICAL MEDICINE & REHABILITATION

## 2021-04-12 PROCEDURE — G8510 SCR DEP NEG, NO PLAN REQD: HCPCS | Performed by: PHYSICAL MEDICINE & REHABILITATION

## 2021-04-12 PROCEDURE — 99214 OFFICE O/P EST MOD 30 MIN: CPT | Performed by: PHYSICAL MEDICINE & REHABILITATION

## 2021-04-12 PROCEDURE — G8754 DIAS BP LESS 90: HCPCS | Performed by: PHYSICAL MEDICINE & REHABILITATION

## 2021-04-12 PROCEDURE — G8752 SYS BP LESS 140: HCPCS | Performed by: PHYSICAL MEDICINE & REHABILITATION

## 2021-04-12 RX ORDER — GABAPENTIN 600 MG/1
1200 TABLET, FILM COATED ORAL DAILY
Qty: 180 TAB | Refills: 1 | Status: SHIPPED | OUTPATIENT
Start: 2021-04-12 | End: 2021-08-02 | Stop reason: SDUPTHER

## 2021-04-12 RX ORDER — GABAPENTIN 600 MG/1
2 TABLET, FILM COATED ORAL DAILY
COMMUNITY
Start: 2021-03-05 | End: 2021-04-12 | Stop reason: SDUPTHER

## 2021-04-12 RX ORDER — GABAPENTIN 600 MG/1
1200 TABLET, FILM COATED ORAL DAILY
Qty: 180 TAB | Refills: 1 | Status: SHIPPED | OUTPATIENT
Start: 2021-04-12 | End: 2021-04-12 | Stop reason: SDUPTHER

## 2021-04-12 RX ORDER — GABAPENTIN 600 MG/1
1200 TABLET, FILM COATED ORAL DAILY
Qty: 28 TAB | Refills: 0 | Status: SHIPPED | OUTPATIENT
Start: 2021-04-12 | End: 2021-05-14 | Stop reason: SDUPTHER

## 2021-04-12 RX ORDER — GABAPENTIN 600 MG/1
1200 TABLET, FILM COATED ORAL DAILY
Qty: 28 TAB | Refills: 0 | Status: SHIPPED | OUTPATIENT
Start: 2021-04-12 | End: 2021-04-12 | Stop reason: SDUPTHER

## 2021-04-12 NOTE — PATIENT INSTRUCTIONS
Pinched Nerve in the Neck: Care Instructions  Your Care Instructions  A pinched nerve in the neck happens when a vertebra or disc in the upper part of your spine is damaged. This damage can happen because of an injury. Or it can just happen with age. The changes caused by the damage may put pressure on a nearby nerve root, pinching it. This causes symptoms such as sharp pain in your neck, shoulder, arm, hand, or back. You may also have tingling or numbness. Sometimes it makes your arm weaker. The symptoms are usually worse when you turn your head or strain your neck. For many people, the symptoms get better over time and finally go away. Early treatment usually includes medicines for pain and swelling. Sometimes physical therapy and special exercises may help. Follow-up care is a key part of your treatment and safety. Be sure to make and go to all appointments, and call your doctor if you are having problems. It's also a good idea to know your test results and keep a list of the medicines you take. How can you care for yourself at home? · Be safe with medicines. Read and follow all instructions on the label. ? If the doctor gave you a prescription medicine for pain, take it as prescribed. ? If you are not taking a prescription pain medicine, ask your doctor if you can take an over-the-counter medicine. · Try using a heating pad on a low or medium setting for 15 to 20 minutes every 2 or 3 hours. Try a warm shower in place of one session with the heating pad. You can also buy single-use heat wraps that last up to 8 hours. · You can also try an ice pack for 10 to 15 minutes every 2 to 3 hours. There isn't strong evidence that either heat or ice will help. But you can try them to see if they help you. · Don't spend too long in one position. Take short breaks to move around and change positions. · Wear a seat belt and shoulder harness when you are in a car.   · Sleep with a pillow under your head and neck that keeps your neck straight. · If you were given a neck brace (cervical collar) to limit neck motion, wear it as instructed for as many days as your doctor tells you to. Do not wear it longer than you were told to. Wearing a brace for too long can lead to neck stiffness and can weaken the neck muscles. · Follow your doctor's instructions for gentle neck-stretching exercises. · Do not smoke. Smoking can slow healing of your discs. If you need help quitting, talk to your doctor about stop-smoking programs and medicines. These can increase your chances of quitting for good. · Avoid strenuous work or exercise until your doctor says it is okay. When should you call for help? Call 911 anytime you think you may need emergency care. For example, call if:    · You are unable to move an arm or a leg at all. Call your doctor now or seek immediate medical care if:    · You have new or worse symptoms in your arms, legs, chest, belly, or buttocks. Symptoms may include:  ? Numbness or tingling. ? Weakness. ? Pain.     · You lose bladder or bowel control. Watch closely for changes in your health, and be sure to contact your doctor if:    · You are not getting better as expected. Where can you learn more? Go to http://www.gray.com/  Enter A151 in the search box to learn more about \"Pinched Nerve in the Neck: Care Instructions. \"  Current as of: November 16, 2020               Content Version: 12.8  © 7852-1882 [employer]. Care instructions adapted under license by [employer] (which disclaims liability or warranty for this information). If you have questions about a medical condition or this instruction, always ask your healthcare professional. [place] [address] any warranty or liability for your use of this information.          Sciatica: Care Instructions  Your Care Instructions     Sciatica (say \"eeq-IU-le-kuh\") is an irritation of one of the sciatic nerves, which come from the spinal cord in the lower back. The sciatic nerves and their branches extend down through the buttock to the foot. Sciatica can develop when an injured disc in the back irritates or presses against a spinal nerve root. Its main symptom is pain, numbness, or weakness that is often worse in the leg or foot than in the back. Sciatica often will improve and go away with time. Early treatment usually includes medicines and exercises to relieve pain. Follow-up care is a key part of your treatment and safety. Be sure to make and go to all appointments, and call your doctor if you are having problems. It's also a good idea to know your test results and keep a list of the medicines you take. How can you care for yourself at home? · Take pain medicines exactly as directed. ? If the doctor gave you a prescription medicine for pain, take it as prescribed. ? If you are not taking a prescription pain medicine, ask your doctor if you can take an over-the-counter medicine. · Use heat or ice to relieve pain. ? To apply heat, put a warm water bottle, heating pad set on low, or warm cloth on your back. Do not go to sleep with a heating pad on your skin. ? To use ice, put ice or a cold pack on the area for 10 to 20 minutes at a time. Put a thin cloth between the ice and your skin. · Avoid sitting if possible, unless it feels better than standing. · Alternate lying down with short walks. Increase your walking distance as you are able to without making your symptoms worse. · Do not do anything that makes your symptoms worse. When should you call for help? Call 911 anytime you think you may need emergency care. For example, call if:    · You are unable to move a leg at all. Call your doctor now or seek immediate medical care if:    · You have new or worse symptoms in your legs or buttocks. Symptoms may include:  ? Numbness or tingling. ? Weakness.   ? Pain.     · You lose bladder or bowel control. Watch closely for changes in your health, and be sure to contact your doctor if:    · You are not getting better as expected. Where can you learn more? Go to http://www.gray.com/  Enter Z239 in the search box to learn more about \"Sciatica: Care Instructions. \"  Current as of: November 16, 2020               Content Version: 12.8  © 2006-2021 [employer]. Care instructions adapted under license by [employer] (which disclaims liability or warranty for this information). If you have questions about a medical condition or this instruction, always ask your healthcare professional. [place] [address] any warranty or liability for your use of this information. Sciatica: Exercises  Introduction  Here are some examples of typical rehabilitation exercises for your condition. Start each exercise slowly. Ease off the exercise if you start to have pain. Your doctor or physical therapist will tell you when you can start these exercises and which ones will work best for you. When you are not being active, find a comfortable position for rest. Some people are comfortable on the floor or a medium-firm bed with a small pillow under their head and another under their knees. Some people prefer to lie on their side with a pillow between their knees. Don't stay in one position for too long. Take short walks (10 to 20 minutes) every 2 to 3 hours. Avoid slopes, hills, and stairs until you feel better. Walk only distances you can manage without pain, especially leg pain. How to do the exercises  Back stretches   1. Get down on your hands and knees on the floor. 2. Relax your head and allow it to droop. Round your back up toward the ceiling until you feel a nice stretch in your upper, middle, and lower back. Hold this stretch for as long as it feels comfortable, or about 15 to 30 seconds.   3. Return to the starting position with a flat back while you are on your hands and knees. 4. Let your back sway by pressing your stomach toward the floor. Lift your buttocks toward the ceiling. 5. Hold this position for 15 to 30 seconds. 6. Repeat 2 to 4 times. Follow-up care is a key part of your treatment and safety. Be sure to make and go to all appointments, and call your doctor if you are having problems. It's also a good idea to know your test results and keep a list of the medicines you take. Where can you learn more? Go to http://www.gray.com/  Enter F706 in the search box to learn more about \"Sciatica: Exercises. \"  Current as of: November 16, 2020               Content Version: 12.8  © 2006-2021 [employer]. Care instructions adapted under license by [employer] (which disclaims liability or warranty for this information). If you have questions about a medical condition or this instruction, always ask your healthcare professional. [address] any warranty or liability for your use of this information.

## 2021-04-12 NOTE — LETTER
4/14/2021 Patient: Yoli Esqudea YOB: 1940 Date of Visit: 4/12/2021 Gilma Marquez [facility] [address] [id number] [place] [address] [address] [employer] Dear Gilma Marquez MD, Thank you for referring Ms. Allan Crane to [facility] for evaluation. My notes for this consultation are attached. If you have questions, please do not hesitate to call me. I look forward to following your patient along with you. Sincerely, Valerie Padron MD

## 2021-04-12 NOTE — PROGRESS NOTES
Marissa Woodall presents today for   Chief Complaint   Patient presents with    LOW BACK PAIN    Buttocks pain     right    Leg Pain     right    Follow-up       Is someone accompanying this pt? No    Is the patient using any DME equipment during OV? No    Depression Screening:  3 most recent PHQ Screens 4/12/2021   PHQ Not Done -   Little interest or pleasure in doing things Not at all   Feeling down, depressed, irritable, or hopeless Not at all   Total Score PHQ 2 0       Learning Assessment:  Learning Assessment 10/4/2019   PRIMARY LEARNER Patient   HIGHEST LEVEL OF EDUCATION - PRIMARY LEARNER  4 YEARS OF COLLEGE   BARRIERS PRIMARY LEARNER NONE   CO-LEARNER CAREGIVER No   PRIMARY LANGUAGE ENGLISH   LEARNER PREFERENCE PRIMARY DEMONSTRATION   ANSWERED BY patient   RELATIONSHIP SELF       Abuse Screening:  Abuse Screening Questionnaire 10/5/2020   Do you ever feel afraid of your partner? N   Are you in a relationship with someone who physically or mentally threatens you? N   Is it safe for you to go home? Y       Fall Risk  Fall Risk Assessment, last 12 mths 4/12/2021   Able to walk? Yes   Fall in past 12 months? 0   Do you feel unsteady? 0   Are you worried about falling 0       OPIOID RISK TOOL  No flowsheet data found. Pt currently taking Antiplatelet therapy? No    Coordination of Care:  1. Have you been to the ER, urgent care clinic since your last visit? No  Hospitalized since your last visit? No    2. Have you seen or consulted any other health care providers outside of the [address] [place] [address] since your last visit? No Include any pap smears or colon screening.  No

## 2021-04-12 NOTE — PROGRESS NOTES
[facility]  [address] [place] [address], [facility]  Phone: [phone]  Fax: [phone]    Pt's YOB: 1940    ASSESSMENT   Diagnoses and all orders for this visit:    1. Lumbar radiculopathy  -     gabapentin (Gralise) 600 mg Tb24; Take 1,200 mg by mouth daily. Max Daily Amount: 1,200 mg. Indications: neuropathic pain  -     gabapentin (Gralise) 600 mg Tb24; Take 1,200 mg by mouth daily. Max Daily Amount: 1,200 mg. Indications: neuropathic pain    2. DDD (degenerative disc disease), cervical  -     gabapentin (Gralise) 600 mg Tb24; Take 1,200 mg by mouth daily. Max Daily Amount: 1,200 mg. Indications: neuropathic pain  -     gabapentin (Gralise) 600 mg Tb24; Take 1,200 mg by mouth daily. Max Daily Amount: 1,200 mg. Indications: neuropathic pain    3. Neuritis  -     gabapentin (Gralise) 600 mg Tb24; Take 1,200 mg by mouth daily. Max Daily Amount: 1,200 mg. Indications: neuropathic pain  -     gabapentin (Gralise) 600 mg Tb24; Take 1,200 mg by mouth daily. Max Daily Amount: 1,200 mg. Indications: neuropathic pain    4. Cervical radiculopathy  -     gabapentin (Gralise) 600 mg Tb24; Take 1,200 mg by mouth daily. Max Daily Amount: 1,200 mg. Indications: neuropathic pain  -     gabapentin (Gralise) 600 mg Tb24; Take 1,200 mg by mouth daily. Max Daily Amount: 1,200 mg. Indications: neuropathic pain    5. Chronic bilateral low back pain with right-sided sciatica  -     gabapentin (Gralise) 600 mg Tb24; Take 1,200 mg by mouth daily. Max Daily Amount: 1,200 mg. Indications: neuropathic pain  -     gabapentin (Gralise) 600 mg Tb24; Take 1,200 mg by mouth daily. Max Daily Amount: 1,200 mg. Indications: neuropathic pain    6. Cervical spinal stenosis    7. Cervical facet syndrome    8.  Muscle spasm         IMPRESSION AND PLAN:  Harshal Aponte is a 80 y.o. female with history of cervical and lumbar pain with RUE & RLE paraesthesia presents to the office today for follow up. Pt takes Gralise 600 mg 2 tabs daily, however, she has had difficulty receiving her medication from [facility], so she has only been taking 1 tab daily. As a result, her pain has increased. 1) Pt was given information on cervical radiculopathy and sciatica exercises. 2) She was given a 14 day supply of Gralise 600 mg BID sent to [facility]. 3) She was given a refill of Gralise 600 mg BID sent to [facility]  4) Ms. Rossy Sawyer has a reminder for a \"due or due soon\" health maintenance. I have asked that she contact her primary care provider, Viridiana Elizalde MD, for follow-up on this health maintenance. 5)  demonstrated consistency with prescribing. 6) Pt has had recent x-rays but has not advanced imaging studies -- if pain worsens or she has any changes, recommend updating her MRI's   Follow-up and Dispositions    · Return in about 3 months (around 7/12/2021) for Medication follow up. HISTORY OF PRESENT ILLNESS:  Lynell Barthel is a 80 y.o. female with history of cervical and lumbar pain and presents to the office today for follow up. She complains of right shoulder pain, RUE and RLE paraesthesia. She notes that she was getting massages prior to the [place] pandemic. Pt takes Gralise 600 mg 2 tabs daily, however, she has had difficulty receiving her medication from [facility], so she has only been taking 1 tab daily. As a result, her pain has increased. She had it filled at [facility] instead, but notes that it was a significant financial burden, so she needs it filled with [facility] again. She is taking 3000 units of Vitamin D daily. Pt at this time desires to continue with current care.      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    Band keratopathy of right eye 2/3/2018  Cataract     Cylindrical bronchiectasis (HCC)     Fracture     rt ankle as an adult    History of pneumonia     Hypertension     Ill-defined condition     Spasms to left side    Left arm pain     Lumbar spinal stenosis     Myofascial pain     Osteopenia     Sciatica of right side         Social History     Socioeconomic History    Marital status:      Spouse name: Not on file    Number of children: Not on file    Years of education: Not on file    Highest education level: Not on file   Occupational History    Not on file   Social Needs    Financial resource strain: Not on file    Food insecurity     Worry: Not on file     Inability: Not on file    Transportation needs     Medical: Not on file     Non-medical: Not on file   Tobacco Use    Smoking status: Former Smoker     Packs/day: 1.00     Years: 10.00     Pack years: 10.00     Types: Cigarettes     Quit date: 1970     Years since quittin.3    Smokeless tobacco: Never Used   Substance and Sexual Activity    Alcohol use:  Yes     Alcohol/week: 1.0 standard drinks     Types: 1 Glasses of wine per week     Frequency: Monthly or less     Comment: occasional wine    Drug use: No    Sexual activity: Yes     Partners: Male     Birth control/protection: None   Lifestyle    Physical activity     Days per week: Not on file     Minutes per session: Not on file    Stress: Not on file   Relationships    Social connections     Talks on phone: Not on file     Gets together: Not on file     Attends [employer] service: Not on file     Active member of club or organization: Not on file     Attends meetings of clubs or organizations: Not on file     Relationship status: Not on file    Intimate partner violence     Fear of current or ex partner: Not on file     Emotionally abused: Not on file     Physically abused: Not on file     Forced sexual activity: Not on file   Other Topics Concern    Not on file   Social History Narrative    Not on file       Current Outpatient Medications   Medication Sig Dispense Refill    gabapentin (Gralise) 600 mg Tb24 Take 1,200 mg by mouth daily. Max Daily Amount: 1,200 mg. Indications: neuropathic pain 28 Tab 0    gabapentin (Gralise) 600 mg Tb24 Take 1,200 mg by mouth daily. Max Daily Amount: 1,200 mg. Indications: neuropathic pain 180 Tab 1    fluticasone propionate (FLONASE) 50 mcg/actuation nasal spray USE 2 SPRAYS IN EACH NOSTRIL DAILY 48 g 3    triamterene-hydroCHLOROthiazide (MAXZIDE) 37.5-25 mg per tablet Take 0.5 Tabs by mouth daily. 45 Tab 3    mv-min/vit C/glut/lysine/hb124 (AIRBORNE, LYSINE HCL, PO) Take  by mouth.  fexofenadine (ALLEGRA) 180 mg tablet Take 180 mg by mouth daily.  PROAIR HFA 90 mcg/actuation inhaler USE 2 INHALATIONS EVERY 4 HOURS AS NEEDED 3 Inhaler 3    EPINEPHrine (EPIPEN) 0.3 mg/0.3 mL injection 0.3 mL by IntraMUSCular route once as needed for up to 1 dose. 1 Syringe 1    melatonin 3 mg tablet Take 3 mg by mouth daily as needed (sleep).  Cholecalciferol, Vitamin D3, (VITAMIN D3) 1,000 unit cap Take 3,000 Units by mouth daily.  cycloSPORINE (RESTASIS) 0.05 % ophthalmic emulsion Administer 1 Drop to both eyes two (2) times a day.  MULTIVITAMIN W-MINERALS/LUTEIN (CENTRUM SILVER PO) Take 1 Tab by mouth daily.  co-enzyme Q-10 (CO Q-10) 100 mg capsule Take 100 mg by mouth daily.  BIOTIN PO Take 5,000 mcg by mouth daily.  vitamin C-biotin (HAIR-SKIN-NAILS, VIT C-BIOTIN,) 50 mg -1,250 mcg chew Take  by mouth daily. Allergies   Allergen Reactions    Naprosyn [Naproxen] Other (comments)     Blood in urine           REVIEW OF SYSTEMS    Constitutional: Negative for fever, chills, or weight change. Respiratory: Negative for cough or shortness of breath. Cardiovascular: Negative for chest pain or palpitations. Gastrointestinal: Negative for acid reflux, change in bowel habits, or constipation.   Genitourinary: Negative for dysuria and flank pain. Musculoskeletal: Positive for low back pain. Neurological: Negative for headaches, dizziness. Positive for RUE & RLE paraesthesia. Endo/Heme/Allergies: Negative for increased bruising. Psychiatric/Behavioral: Negative for difficulty with sleep. As per HPI    PHYSICAL EXAMINATION  Visit Vitals  /74   Pulse 63   Temp 97 °F (36.1 °C) (Tympanic)   Resp 16   Ht 5' 0.5\" (1.537 m)   Wt 173 lb 3.2 oz (78.6 kg)   SpO2 95%   BMI 33.27 kg/m²       Constitutional: Awake, alert, and in no acute distress. Neurological: 1+ symmetrical DTRs in the upper extremities. 1+ symmetrical DTRs in the lower extremities. Sensation to light touch is intact. Negative Perry's sign bilaterally. Skin: warm, dry, and intact. Musculoskeletal: Tightness across upper back and right shoulder. No pain with extension, axial loading, or forward flexion. No pain with internal or external rotation of her hips. Negative straight leg raise bilaterally. Biceps  Triceps Deltoids Wrist Ext Wrist Flex Hand Intrin   Right +4/5 +4/5 +4/5 +4/5 +4/5 +4/5   Left +4/5 +4/5 +4/5 +4/5 +4/5 +4/5      Hip Flex  Quads Hamstrings Ankle DF EHL Ankle PF   Right +4/5 +4/5 +4/5 +4/5 +4/5 +4/5   Left +4/5 +4/5 +4/5 +4/5 +4/5 +4/5     IMAGING:  Cervical 2V x-rays from 2/2/2021 was personally reviewed with the Pt and demonstrated:  Some obstruction to visualization due to necklace. Possibly mild disc space narrowing at C5-C6. No acute pathology identified. Lumbar 2V x-rays from 2/2/2021 was personally reviewed with the Pt and demonstrated:  Mild disc space narrowing at L5-S1. No acute pathology identified.      Cervical Spine MRI from 11/08/2016 was personally reviewed with the Pt and demonstrated:     Results from Hospital Encounter encounter on 11/08/16   MRI CERV SPINE WO CONT    Narrative MRI OF CERVICAL SPINE WITHOUT CONTRAST    CPT CODE: 55917    HISTORY: Chronic neck pain extending into the left shoulder with progressive  paresthesias left hand. COMPARISON: MRI cervical spine 4/10/2014. FINDINGS:     There is normal anatomic alignment of the cervical spine. Vertebral body heights  are maintained. Craniocervical junction and posterior elements are intact. Prevertebral soft tissues are normal. Incidentally imaged intracranial soft  tissues demonstrate no acute abnormalities. Within limitations of motion  artifact, cervical spinal cord maintains normal caliber, signal intensity and  morphology throughout. Cervical soft tissues demonstrate no acute abnormalities. C2/C3: Central canal and neural foramina are patent. C3/C4: Central canal and neural foramina are patent. C4/C5: Central canal and neural foramina are patent. C5/C6: Broad-based disc protrusion. Contact of the ventral cord. CSF dorsal to  the cord is maintained. Probable moderate right greater than left foraminal  stenoses. C6/C7: Mild bulging of the disc. Probable mild bilateral foraminal narrowing. C7/T1: Central canal and neural foramina are patent. T1/T2 through T4/T5: Central canal and foramina are adequate on the sagittal  images.               Impression IMPRESSION:    Allowing for differences in technique, no substantial interval change since  2014. Degenerative changes of the cervical spine are most pronounced at C5/C6 where  there is a disc protrusion which contacts the ventral cord and moderate  bilateral foraminal stenosis.          Lumbar Spine MRI from 06/30/2016 was personally reviewed with the Pt and demonstrated:  Results from [place] encounter on 06/30/16   MRI LUMB SPINE WO CONT    Narrative Procedure: MRI of the lumbar spine without contrast.    CPT code: 77022    Comparisons: April 10, 2014.     Indications: new left radicular symptoms; increased muscle spasm and pain for 3  months     Technique: T1 weighted, T2 FSE with fat saturation, FSE inversion recovery  sagittal images are supplemented by T2 weighted with fat saturation and T1  weighted axial images. Findings:        Sagittal images reveal overall normal vertebral body morphology. No fractures  noted. No suspicious lesions. Conus medullaris ends at the L1 vertebral body level. Correlation of axial and sagittal images reveals the following:    At L1-L2: No significant disc pathology. No significant facet arthropathy. No  central canal or foraminal stenosis. At L2-L3: Mild disc osteophyte complex at the posterior lateral corners. Mild  facet arthropathy. No central canal stenosis. Mild to moderate bilateral  foraminal stenosis. At L3-L4: Mild disc osteophyte complex at the posterior lateral corners. Mild to  moderate facet arthropathy. Posterior epidural lipomatosis with some mass  effect. No central canal stenosis. Mild left and mild to moderate right  foraminal stenosis. At L4-L5: Mild disc osteophyte complex at the posterior lateral corners. Mild to  moderate facet arthropathy. No central canal stenosis. Mild to moderate  foraminal stenosis. At L5-S1: Trace retrolisthesis L5 on S1. Overlying broad-based disc osteophyte  complex. Moderate facet arthropathy. No central canal stenosis. Moderate  foraminal stenosis. Visualized portions of the sacroiliac joints are unremarkable. Incidentally  imaged retroperitoneal structures are unremarkable as well.               Impression Impression:    Degenerative changes as above. Interval resolution of disc extrusion at L5-S1  seen on prior study from 2014. Written by Helena Cochran, as dictated by Sonam Buenrostro MD.  I, Dr. Sonam Buenrostro confirm that all documentation is accurate.

## 2021-04-13 ENCOUNTER — PATIENT MESSAGE (OUTPATIENT)
Dept: ORTHOPEDIC SURGERY | Age: 81
End: 2021-04-13

## 2021-04-13 NOTE — TELEPHONE ENCOUNTER
From: Ronit Larson  To: Sylvain Sparks MD  Sent: 4/13/2021 7:47 AM EDT  Subject: Prescription Question    [facility] They don't have refill order yet. Call in number [phone]. Fax is [phone]. I'm sorry for being a pain but they want me pay 536. At local pharmacy. For the 28 tabs 1st quote was 200. Called back went down to 60.  Thank you Appreciate all u are doing 4 me

## 2021-04-13 NOTE — TELEPHONE ENCOUNTER
Returned call to patient, verified Name/, obtained patient's 's  to send fax of Rx. Informed patient d/t Dejuan Melissaty being considered a controlled medication in the [facility] I am unable to phone Rx in. Informed patient I must fax the Rx with sponsor's information. Patient verbalized agreement/understanding. Informed patient if we are unable to fax Rx, patient will need to  Rx from office, then mail in. Patient verbalized agreement/understanding. Rx faxed with cover letter with patient's and sponsor's information. Confirmation received. No further action required at this time.

## 2021-04-27 ENCOUNTER — HOSPITAL ENCOUNTER (OUTPATIENT)
Dept: LAB | Age: 81
Discharge: HOME | End: 2021-04-27
Payer: MEDICARE

## 2021-04-27 ENCOUNTER — APPOINTMENT (OUTPATIENT)
Dept: INTERNAL MEDICINE CLINIC | Age: 81
End: 2021-04-27

## 2021-04-27 DIAGNOSIS — R73.9 HYPERGLYCEMIA: ICD-10-CM

## 2021-04-27 DIAGNOSIS — I10 ESSENTIAL HYPERTENSION: ICD-10-CM

## 2021-04-27 LAB
ALBUMIN SERPL-MCNC: 3.9 G/DL (ref 3.4–5)
ALBUMIN/GLOB SERPL: 1.1 {RATIO} (ref 0.8–1.7)
ALP SERPL-CCNC: 88 U/L (ref 45–117)
ALT SERPL-CCNC: 34 U/L (ref 13–56)
ANION GAP SERPL CALC-SCNC: 3 MMOL/L (ref 3–18)
AST SERPL-CCNC: 24 U/L (ref 10–38)
BILIRUB SERPL-MCNC: 0.7 MG/DL (ref 0.2–1)
BUN SERPL-MCNC: 14 MG/DL (ref 7–18)
BUN/CREAT SERPL: 18 (ref 12–20)
CALCIUM SERPL-MCNC: 9.5 MG/DL (ref 8.5–10.1)
CHLORIDE SERPL-SCNC: 106 MMOL/L (ref 100–111)
CHOLEST SERPL-MCNC: 169 MG/DL
CO2 SERPL-SCNC: 34 MMOL/L (ref 21–32)
CREAT SERPL-MCNC: 0.77 MG/DL (ref 0.6–1.3)
CREAT UR-MCNC: 206 MG/DL (ref 30–125)
GLOBULIN SER CALC-MCNC: 3.5 G/DL (ref 2–4)
GLUCOSE SERPL-MCNC: 92 MG/DL (ref 74–99)
HBA1C MFR BLD: 5.6 % (ref 4.2–5.6)
HDLC SERPL-MCNC: 55 MG/DL (ref 40–60)
HDLC SERPL: 3.1 {RATIO} (ref 0–5)
LDLC SERPL CALC-MCNC: 93 MG/DL (ref 0–100)
LIPID PROFILE,FLP: NORMAL
MICROALBUMIN UR-MCNC: 1.37 MG/DL (ref 0–3)
MICROALBUMIN/CREAT UR-RTO: 7 MG/G (ref 0–30)
POTASSIUM SERPL-SCNC: 4.2 MMOL/L (ref 3.5–5.5)
PROT SERPL-MCNC: 7.4 G/DL (ref 6.4–8.2)
SODIUM SERPL-SCNC: 143 MMOL/L (ref 136–145)
TRIGL SERPL-MCNC: 105 MG/DL (ref ?–150)
VLDLC SERPL CALC-MCNC: 21 MG/DL

## 2021-04-27 PROCEDURE — 82043 UR ALBUMIN QUANTITATIVE: CPT

## 2021-04-27 PROCEDURE — 83036 HEMOGLOBIN GLYCOSYLATED A1C: CPT

## 2021-04-27 PROCEDURE — 80061 LIPID PANEL: CPT

## 2021-04-27 PROCEDURE — 80053 COMPREHEN METABOLIC PANEL: CPT

## 2021-05-13 NOTE — PROGRESS NOTES
Rumford Yolanda presents today for   Chief Complaint   Patient presents with    Follow-up    Hypertension    Labs     4-27-21            Coordination of Care:  1. Have you been to the ER, urgent care clinic since your last visit? Hospitalized since your last visit? no    2. Have you seen or consulted any other health care providers outside of the [address] [place] [address] since your last visit? Include any pap smears or colon screening.  yes

## 2021-05-14 ENCOUNTER — OFFICE VISIT (OUTPATIENT)
Dept: INTERNAL MEDICINE CLINIC | Age: 81
End: 2021-05-14
Payer: MEDICARE

## 2021-05-14 VITALS
HEIGHT: 61 IN | HEART RATE: 69 BPM | SYSTOLIC BLOOD PRESSURE: 115 MMHG | OXYGEN SATURATION: 96 % | DIASTOLIC BLOOD PRESSURE: 69 MMHG | WEIGHT: 171 LBS | TEMPERATURE: 97.5 F | BODY MASS INDEX: 32.28 KG/M2 | RESPIRATION RATE: 14 BRPM

## 2021-05-14 DIAGNOSIS — M12.811 RIGHT ROTATOR CUFF TEAR ARTHROPATHY: ICD-10-CM

## 2021-05-14 DIAGNOSIS — E66.01 SEVERE OBESITY (HCC): ICD-10-CM

## 2021-05-14 DIAGNOSIS — J45.909 UNCOMPLICATED ASTHMA, UNSPECIFIED ASTHMA SEVERITY, UNSPECIFIED WHETHER PERSISTENT: ICD-10-CM

## 2021-05-14 DIAGNOSIS — F43.9 STRESS: Primary | ICD-10-CM

## 2021-05-14 DIAGNOSIS — M75.101 RIGHT ROTATOR CUFF TEAR ARTHROPATHY: ICD-10-CM

## 2021-05-14 DIAGNOSIS — G47.00 INSOMNIA, UNSPECIFIED TYPE: ICD-10-CM

## 2021-05-14 DIAGNOSIS — R87.612 LGSIL OF CERVIX OF UNDETERMINED SIGNIFICANCE: ICD-10-CM

## 2021-05-14 DIAGNOSIS — I10 ESSENTIAL HYPERTENSION: ICD-10-CM

## 2021-05-14 DIAGNOSIS — R73.9 HYPERGLYCEMIA: ICD-10-CM

## 2021-05-14 PROCEDURE — 1090F PRES/ABSN URINE INCON ASSESS: CPT | Performed by: INTERNAL MEDICINE

## 2021-05-14 PROCEDURE — G8427 DOCREV CUR MEDS BY ELIG CLIN: HCPCS | Performed by: INTERNAL MEDICINE

## 2021-05-14 PROCEDURE — G8432 DEP SCR NOT DOC, RNG: HCPCS | Performed by: INTERNAL MEDICINE

## 2021-05-14 PROCEDURE — G8417 CALC BMI ABV UP PARAM F/U: HCPCS | Performed by: INTERNAL MEDICINE

## 2021-05-14 PROCEDURE — G8752 SYS BP LESS 140: HCPCS | Performed by: INTERNAL MEDICINE

## 2021-05-14 PROCEDURE — G8399 PT W/DXA RESULTS DOCUMENT: HCPCS | Performed by: INTERNAL MEDICINE

## 2021-05-14 PROCEDURE — G0463 HOSPITAL OUTPT CLINIC VISIT: HCPCS | Performed by: INTERNAL MEDICINE

## 2021-05-14 PROCEDURE — 99214 OFFICE O/P EST MOD 30 MIN: CPT | Performed by: INTERNAL MEDICINE

## 2021-05-14 PROCEDURE — 1101F PT FALLS ASSESS-DOCD LE1/YR: CPT | Performed by: INTERNAL MEDICINE

## 2021-05-14 PROCEDURE — G8536 NO DOC ELDER MAL SCRN: HCPCS | Performed by: INTERNAL MEDICINE

## 2021-05-14 PROCEDURE — G8754 DIAS BP LESS 90: HCPCS | Performed by: INTERNAL MEDICINE

## 2021-05-14 NOTE — PATIENT INSTRUCTIONS
Body Mass Index: Care Instructions Your Care Instructions Body mass index (BMI) can help you see if your weight is raising your risk for health problems. It uses a formula to compare how much you weigh with how tall you are. · A BMI lower than 18.5 is considered underweight. · A BMI between 18.5 and 24.9 is considered healthy. · A BMI between 25 and 29.9 is considered overweight. A BMI of 30 or higher is considered obese. If your BMI is in the normal range, it means that you have a lower risk for weight-related health problems. If your BMI is in the overweight or obese range, you may be at increased risk for weight-related health problems, such as high blood pressure, heart disease, stroke, arthritis or joint pain, and diabetes. If your BMI is in the underweight range, you may be at increased risk for health problems such as fatigue, lower protection (immunity) against illness, muscle loss, bone loss, hair loss, and hormone problems. BMI is just one measure of your risk for weight-related health problems. You may be at higher risk for health problems if you are not active, you eat an unhealthy diet, or you drink too much alcohol or use tobacco products. Follow-up care is a key part of your treatment and safety. Be sure to make and go to all appointments, and call your doctor if you are having problems. It's also a good idea to know your test results and keep a list of the medicines you take. How can you care for yourself at home? · Practice healthy eating habits. This includes eating plenty of fruits, vegetables, whole grains, lean protein, and low-fat dairy. · If your doctor recommends it, get more exercise. Walking is a good choice. Bit by bit, increase the amount you walk every day. Try for at least 30 minutes on most days of the week. · Do not smoke. Smoking can increase your risk for health problems. If you need help quitting, talk to your doctor about stop-smoking programs and medicines. These can increase your chances of quitting for good. · Limit alcohol to 2 drinks a day for men and 1 drink a day for women. Too much alcohol can cause health problems. If you have a BMI higher than 25 · Your doctor may do other tests to check your risk for weight-related health problems. This may include measuring the distance around your waist. A waist measurement of more than 40 inches in men or 35 inches in women can increase the risk of weight-related health problems. · Talk with your doctor about steps you can take to stay healthy or improve your health. You may need to make lifestyle changes to lose weight and stay healthy, such as changing your diet and getting regular exercise. If you have a BMI lower than 18.5 · Your doctor may do other tests to check your risk for health problems. · Talk with your doctor about steps you can take to stay healthy or improve your health. You may need to make lifestyle changes to gain or maintain weight and stay healthy, such as getting more healthy foods in your diet and doing exercises to build muscle. Where can you learn more? Go to http://www.wells.com/ Enter S176 in the search box to learn more about \"Body Mass Index: Care Instructions. \" Current as of: September 23, 2020               Content Version: 12.8 © 2006-2021 [employer]wise[employer]. Care instructions adapted under license by [employer] (which disclaims liability or warranty for this information). If you have questions about a medical condition or this instruction, always ask your healthcare professional. [place] [address] any warranty or liability for your use of this information.

## 2021-05-19 ENCOUNTER — APPOINTMENT (OUTPATIENT)
Dept: PHYSICAL THERAPY | Age: 81
End: 2021-05-19

## 2021-05-25 ENCOUNTER — HOSPITAL ENCOUNTER (OUTPATIENT)
Dept: PHYSICAL THERAPY | Age: 81
Discharge: HOME | End: 2021-05-25
Payer: MEDICARE

## 2021-05-25 DIAGNOSIS — M75.101 RIGHT ROTATOR CUFF TEAR ARTHROPATHY: ICD-10-CM

## 2021-05-25 DIAGNOSIS — M12.811 RIGHT ROTATOR CUFF TEAR ARTHROPATHY: ICD-10-CM

## 2021-05-25 PROCEDURE — 97110 THERAPEUTIC EXERCISES: CPT

## 2021-05-25 PROCEDURE — 97161 PT EVAL LOW COMPLEX 20 MIN: CPT

## 2021-05-25 NOTE — PROGRESS NOTES
[facility]  [address]  [place], [address]  [phone] fax    Plan of Care/ Statement of Necessity for Physical Therapy Services    Patient name: Gary Mejia Start of Care: 2021   Referral source: Haim Segura MD : 1940    Medical Diagnosis: Right rotator cuff tear arthropathy [M75.101, M12.811]  Payor: Perfecto Cantrell / Plan: [place] & [place] / Product Type: Medicare /  Onset Date: April/May 2021    Treatment Diagnosis: Right shoulder pain   Prior Hospitalization: see medical history Provider#: [id number]   Medications: Verified on Patient summary List    Comorbidities: osteoporosis, arthritis, HTN, visual impairment, asthma   Prior Level of Function: limitations with ADLs      The Plan of Care and following information is based on the information from the initial evaluation. Assessment/ key information: Pt is a 79 y/o F experiencing right shoulder pain along the anterior-superior and posterior-inferior aspects of the shoulder depending on the position and movement of the shoulder that sometimes radiates down to the elbow. She reported that she wakes up from pain if she rolls onto the right arm. Pt demonstrated deficiency in shoulder AROM, especially in flexion and abduction and reported pain with activities using active shoulder ER (hair styling). Pt also reported pain after lifting heavy objects. She demonstrated better ROM passively with shoulder flexion, abduction, and IR/ER. Spurling's test and cervical compression was negative today. Decreased right shoulder strength noted also compared to left, some pain reported in accordance. Signs and symptoms suggest posterior capsule tightness and rotator cuff (teres minor) involvement. Pt would benefit from PT to improve P/AROM and strength of the shoulder for ease of ADLs.       Evaluation Complexity History MEDIUM  Complexity : 1-2 comorbidities / personal factors will impact the outcome/ POC ; Examination LOW Complexity : 1-2 Standardized tests and measures addressing body structure, function, activity limitation and / or participation in recreation  ;Presentation LOW Complexity : Stable, uncomplicated  ;Clinical Decision Making MEDIUM Complexity : FOTO score of 26-74  Overall Complexity Rating: LOW   Problem List: pain affecting function, decrease ROM, decrease strength, decrease ADL/ functional abilitiies and decrease flexibility/ joint mobility   Treatment Plan may include any combination of the following: Therapeutic exercise, Therapeutic activities, Neuromuscular re-education, Physical agent/modality, Manual therapy, Patient education, Self Care training and Functional mobility training  Patient / Family readiness to learn indicated by: trying to perform skills and interest  Persons(s) to be included in education: patient (P)  Barriers to Learning/Limitations: None  Patient Goal (s): pain relief and avoid surgery  Patient Self Reported Health Status: good  Rehabilitation Potential: good    Short Term Goals: To be accomplished in 2 weeks:  1. Pt will demonstrate I and compliance with HEP to maximize therapeutic effect. 2. Pt will demonstrate right shoulder ABD PROM 130 deg without increased pain for improved joint mobility with ADLs. Long Term Goals: To be accomplished in 4 weeks:  1. Pt will demonstrate right shoulder AROM flexion and abduction to 135 without pain increase for improved ADL ease. 2. Pt will demonstrate right shoulder flexion and abduction strength to 4+/5 for improved stability with household duties. 3. Pt will improve functional ER to C7 to improve grooming and self care ease. 4. Pt will demonstrate right shoulder ER strength 4+/5 without pain for improved OH stability with ADLs. 5. Pt will report little or no difficulty with lifting items from the floor to improve functional tasks and ADL performance.      Frequency / Duration: Patient to be seen 2 times per week for 4 weeks. Patient/ Caregiver education and instruction: Diagnosis, prognosis, exercises   [x]  Plan of care has been reviewed with PTA    Certification Period: 5/25/2021 to 6/22/2021  Vivian Robles DPT, CMTPT 5/25/2021 2:46 PM    ________________________________________________________________________    I certify that the above Therapy Services are being furnished while the patient is under my care. I agree with the treatment plan and certify that this therapy is necessary.     [de-identified] Signature:____________________  Date:____________Time: _________     Shaista Lopez MD  Please sign and return to [facility] [address] [place] [address] Laura Souza [address] [place] [address] [address]  [phone] fax

## 2021-05-25 NOTE — PROGRESS NOTES
PT DAILY TREATMENT NOTE     Patient Name: Terra Shaw  Date:2021  : 1940  [x]  Patient  Verified  Payor: [place] MEDICARE / Plan: [place] MEDICARE PART A & B / Product Type: Medicare /    In time:2:09  Out time:2:46  Total Treatment Time (min): 37  Visit #: 1 of 8    Medicare/BCBS Only   Total Timed Codes (min):  19 1:1 Treatment Time:  18       Treatment Area: Right rotator cuff tear arthropathy [M75.101, M12.811]    SUBJECTIVE  Pain Level (0-10 scale): 5-6  Any medication changes, allergies to medications, adverse drug reactions, diagnosis change, or new procedure performed?: [x] No    [] Yes (see summary sheet for update)  Subjective functional status/changes:   [] No changes reported  The pt reports difficulty with sleeping on her right side and lifting items due to shoulder pain    OBJECTIVE    18 min [x]Eval                  []Re-Eval       19 min Therapeutic Exercise:  [] See flow sheet : including pt education   Rationale: increase ROM and increase proprioception to improve the patients ability to perform ADLs           With   [] TE   [] TA   [] neuro   [] other: Patient Education: [x] Review HEP    [] Progressed/Changed HEP based on:   [] positioning   [] body mechanics   [] transfers   [] heat/ice application    [] other:      Other Objective/Functional Measures:      Pain Level (0-10 scale) post treatment: 6    ASSESSMENT/Changes in Function: see POC    Patient will continue to benefit from skilled PT services to modify and progress therapeutic interventions, address functional mobility deficits, address ROM deficits, address strength deficits, analyze and address soft tissue restrictions, analyze and cue movement patterns and analyze and modify body mechanics/ergonomics to attain remaining goals. [x]  See Plan of Care  []  See progress note/recertification  []  See Discharge Summary         Progress towards goals / Updated goals:  Short Term Goals: To be accomplished in 2 weeks:  1. Pt will demonstrate I and compliance with HEP to maximize therapeutic effect. IE: HEP issued and instructed  2. Pt will demonstrate right shoulder ABD PROM 130 deg without increased pain for improved joint mobility with ADLs. IE: 100 deg with discomfort  Long Term Goals: To be accomplished in 4 weeks:  1. Pt will demonstrate right shoulder AROM flexion and abduction to 135 without pain increase for improved ADL ease. IE: 120 deg flexion  110 deg abd  2. Pt will demonstrate right shoulder flexion and abduction strength to 4+/5 for improved stability with household duties. IE: 4/5 flexion  4/5 abduction with discomfort  3. Pt will improve functional ER to C7 to improve grooming and self care ease. IE: to temple  4. Pt will demonstrate right shoulder ER strength 4+/5 without pain for improved OH stability with ADLs. IE: 4+/5 with pain  5. Pt will report little or no difficulty with lifting items from the floor to improve functional tasks and ADL performance.     IE: not assessed yet via [address] [place] [address]  []  Upgrade activities as tolerated     [x]  Continue plan of care  []  Update interventions per flow sheet       []  Discharge due to:_  []  Other:_      Carmen Merritt DPT CMTPT 2021  3:59 PM    Future Appointments   Date Time Provider Kin Dai   2021 10:30 AM Darwin Loera Cea, PT [facility]   6/3/2021  9:45 AM Sami Johnston, PTA [facility]   2021  9:00 AM Yesy Garrison, DPT [facility]   2021  8:30 AM Graylon Eun, PT [facility]   6/15/2021  9:45 AM Graylon Eun, PT [facility]   2021 10:30 AM Graylon Eun, PT [facility]   2021  9:45 AM Graylon Eun, PT [facility]   2021 10:30 AM Graylon Eun, PT [facility]   2021  8:30 AM Severa Sandhoff, MD [facility]   8/3/2021  8:20 AM Arline Osborne NP [facility]   10/4/2021  8:55 AM [facility] NURSE VISIT [facility]   10/11/2021  8:30 AM Bart La MD [facility]

## 2021-05-27 ENCOUNTER — HOSPITAL ENCOUNTER (OUTPATIENT)
Dept: PHYSICAL THERAPY | Age: 81
Discharge: HOME | End: 2021-05-27
Payer: MEDICARE

## 2021-05-27 PROCEDURE — 97110 THERAPEUTIC EXERCISES: CPT

## 2021-05-27 PROCEDURE — 97140 MANUAL THERAPY 1/> REGIONS: CPT

## 2021-05-27 PROCEDURE — 97112 NEUROMUSCULAR REEDUCATION: CPT

## 2021-05-27 NOTE — PROGRESS NOTES
PT DAILY TREATMENT NOTE     Patient Name: Mili [facility]  Date:2021  : 1940  [x]  Patient  Verified  Payor: [place] MEDICARE / Plan: [place] MEDICARE PART A & B / Product Type: Medicare /    In time:10:30  Out time:11:20  Total Treatment Time (min): 50  Visit #: 2 of 8    Medicare/BCBS Only   Total Timed Codes (min):  40 1:1 Treatment Time:  40        Treatment Area: Right shoulder pain [M25.511]    SUBJECTIVE  Pain Level (0-10 scale): 6/10  Any medication changes, allergies to medications, adverse drug reactions, diagnosis change, or new procedure performed?: [x] No    [] Yes (see summary sheet for update)  Subjective functional status/changes:   [] No changes reported  The patient states that her right shoulder continues to bother her and wakes her up at night. OBJECTIVE   Modality rationale: decrease pain and increase tissue extensibility to improve the patients ability to improve ADL ease. Min Type Additional Details   10 []  Ice     [x]  heat  []  Ice massage  []  Laser   []  Anodyne Position: seated with pillow beneath right arm  Location: right UT/teres minor/infraspinatus region   [] Skin assessment post-treatment:  []intact []redness- no adverse reaction    []redness  adverse reaction:     22 min Therapeutic Exercise:  [x] See flow sheet :   Rationale: increase ROM and increase strength to improve the patients ability to improve ADL ease. 10 min Neuromuscular Re-education:  []  See flow sheet :   Rationale: increase ROM and increase strength  to improve the patients ability to improve ADL ease. 8 min Manual Therapy:  Right GHJ inferior/posterior capsular mobs into flexion/ABD/ and IR 90/90 at a grade III. The manual therapy interventions were performed at a separate and distinct time from the therapeutic activities interventions. Rationale: decrease pain, increase ROM and increase tissue extensibility to improve ADL.        With   [] TE   [] TA   [] neuro   [] other: Patient Education: [x] Review HEP    [] Progressed/Changed HEP based on:   [] positioning   [] body mechanics   [] transfers   [] heat/ice application    [] other:      Other Objective/Functional Measures:   Full passive flexion/ABD right shoulder     Pain Level (0-10 scale) post treatment: 2/10    ASSESSMENT/Changes in Function: The patient had pain relief with cross arm stretching of posterior capsule and cuff. Opted to hold further interventions due to ache reported following interventions performed. She cited most discomfort through the muscle belly itself into infraspinatus and teres minor region, this MH was opted for at the end of the session. Patient will continue to benefit from skilled PT services to modify and progress therapeutic interventions, address functional mobility deficits, address ROM deficits, address strength deficits, analyze and address soft tissue restrictions, analyze and cue movement patterns, analyze and modify body mechanics/ergonomics, assess and modify postural abnormalities and instruct in home and community integration to attain remaining goals. []  See Plan of Care  []  See progress note/recertification  []  See Discharge Summary         Progress towards goals / Updated goals:  Short Term Goals: To be accomplished in 2 weeks:  1. Pt will demonstrate I and compliance with HEP to maximize therapeutic effect. IE: HEP issued and instructed   Current: Met - reports independence stating they are \"doable\" 5/27/2021  2. Pt will demonstrate right shoulder ABD PROM 130 deg without increased pain for improved joint mobility with ADLs. IE: 100 deg with discomfort  Long Term Goals: To be accomplished in 4 weeks:  1. Pt will demonstrate right shoulder AROM flexion and abduction to 135 without pain increase for improved ADL ease. IE: 120 deg flexion  110 deg abd  2.  Pt will demonstrate right shoulder flexion and abduction strength to 4+/5 for improved stability with household duties. IE: 4/5 flexion  4/5 abduction with discomfort  3. Pt will improve functional ER to C7 to improve grooming and self care ease. IE: to temple  4. Pt will demonstrate right shoulder ER strength 4+/5 without pain for improved OH stability with ADLs. IE: 4+/5 with pain  5.  Pt will report little or no difficulty with lifting items from the floor to improve functional tasks and ADL performance.               IE: not assessed yet via [address] [place] [address]  []  Upgrade activities as tolerated     [x]  Continue plan of care  []  Update interventions per flow sheet       []  Discharge due to:_  []  Other:_      Carol Storey, PT 5/27/2021  10:49 AM    Future Appointments   Date Time Provider Kin Dai   6/3/2021  9:45 AM Rosina Cornejo, PTA [facility]   6/8/2021  9:00 AM Miller Mcfadden, DPT [facility]   6/11/2021  8:30 AM Thelda Pink, PT [facility]   6/15/2021  9:45 AM Thelda Pink, PT [facility]   6/17/2021 10:30 AM Thelda Pink, PT [facility]   6/22/2021  9:45 AM Thelda Pink, PT [facility]   6/24/2021 10:30 AM Thelda Pink, PT [facility]   7/6/2021  8:30 AM Reina Mcintyre MD [facility] AMB   8/3/2021  8:20 AM Destiny Ruelas NP [facility] AMB   10/4/2021  8:55 AM [facility] NURSE VISIT [facility] AMB   10/11/2021  8:30 AM Richar Shukla MD [facility] BS AMB

## 2021-06-03 ENCOUNTER — APPOINTMENT (OUTPATIENT)
Dept: PHYSICAL THERAPY | Age: 81
End: 2021-06-03
Payer: MEDICARE

## 2021-06-03 ENCOUNTER — HOSPITAL ENCOUNTER (OUTPATIENT)
Dept: PHYSICAL THERAPY | Age: 81
Discharge: HOME | End: 2021-06-03
Payer: MEDICARE

## 2021-06-03 PROCEDURE — 97112 NEUROMUSCULAR REEDUCATION: CPT

## 2021-06-03 PROCEDURE — 97140 MANUAL THERAPY 1/> REGIONS: CPT

## 2021-06-03 PROCEDURE — 97110 THERAPEUTIC EXERCISES: CPT

## 2021-06-03 NOTE — PROGRESS NOTES
PT DAILY TREATMENT NOTE     Patient Name: Mraissa Woodall  Date:6/3/2021  : 1940  [x]  Patient  Verified  Payor: VA MEDICARE / Plan: VA MEDICARE PART A & B / Product Type: Medicare /    In time:3:00  Out time: 3:43  Total Treatment Time (min): 43  Visit #: 3 of 8    Medicare/BCBS Only   Total Timed Codes (min):  43 1:1 Treatment Time:  43     Treatment Area: Right shoulder pain [M25.511]    SUBJECTIVE  Pain Level (0-10 scale): 4/10  Any medication changes, allergies to medications, adverse drug reactions, diagnosis change, or new procedure performed?: [x] No    [] Yes (see summary sheet for update)  Subjective functional status/changes:   [] No changes reported  The patient reports that her she was unable to sleep well last night due to right shoulder pain, but reports her pain is less today. OBJECTIVE  23 min Therapeutic Exercise:  [x] See flow sheet :   Rationale: increase ROM and increase strength to improve the patients ability to improve ADL ease. 12 min Neuromuscular Re-education:  [x]  See flow sheet :   Rationale: increase ROM, increase strength and improve coordination  to improve the patients ability to improve ADL ease. 8 min Manual Therapy:  Right inferior/posterior capsular mobs grade II to III performed with the patient in supine. The manual therapy interventions were performed at a separate and distinct time from the therapeutic activities interventions. Rationale: decrease pain, increase ROM and increase tissue extensibility to improve ADL ease.            With   [] TE   [] TA   [] neuro   [] other: Patient Education: [x] Review HEP    [] Progressed/Changed HEP based on:   [] positioning   [] body mechanics   [] transfers   [] heat/ice application    [] other:      Other Objective/Functional Measures:   AROM: 135 degrees flexion  115 degrees ABD      Pain Level (0-10 scale) post treatment: 0/10    ASSESSMENT/Changes in Function: The patient has progressed with her right shoulder flexion since IE. She does continue to have pain through her teres minor region noting tenderness to palpation. Patient will continue to benefit from skilled PT services to modify and progress therapeutic interventions, address functional mobility deficits, address ROM deficits, address strength deficits, analyze and address soft tissue restrictions, analyze and cue movement patterns, analyze and modify body mechanics/ergonomics, address imbalance/dizziness and instruct in home and community integration to attain remaining goals. []  See Plan of Care  []  See progress note/recertification  []  See Discharge Summary         Progress towards goals / Updated goals:  Short Term Goals: To be accomplished in 2 weeks:  1. Pt will demonstrate I and compliance with HEP to maximize therapeutic effect.              XT: HEP issued and instructed              Current: Met - reports independence stating they are \"doable\" 5/27/2021  2. Pt will demonstrate right shoulder ABD PROM 130 deg without increased pain for improved joint mobility with ADLs.             TI: 100 deg with discomfort    Current: Met - WNL all planes passively 6/03/2021  Long Term Goals: To be accomplished in 4 weeks:  1. Pt will demonstrate right shoulder AROM flexion and abduction to 135 without pain increase for improved ADL ease.              IE: 120 deg flexion  110 deg abd   Current: Progressed to 135 degrees flexion, 115 degrees ABD 6/03/2021  2. Pt will demonstrate right shoulder flexion and abduction strength to 4+/5 for improved stability with household duties.             IE: 4/5 flexion  4/5 abduction with discomfort  3. Pt will improve functional ER to C7 to improve grooming and self care ease.              IE: to temple  4. Pt will demonstrate right shoulder ER strength 4+/5 without pain for improved OH stability with ADLs.             YS: 4+/5 with pain  5.  Pt will report little or no difficulty with lifting items from the floor to improve functional tasks and ADL performance.               AX: not assessed yet via [facility]  []  Upgrade activities as tolerated     [x]  Continue plan of care  []  Update interventions per flow sheet       []  Discharge due to:_  []  Other:_      Elaine Owens, PT 6/3/2021  3:15 PM    Future Appointments   Date Time Provider Kin Dai   6/8/2021  9:00 AM Mari Mcfadden, NAYA [facility]   6/11/2021  8:30 AM Tyree Orn, PT [facility]   6/15/2021  9:45 AM Tyree Orn, PT [facility]   6/17/2021 10:30 AM Tyree Orn, PT [facility]   6/22/2021  9:45 AM Tyree Orn, PT [facility]   6/24/2021 10:30 AM Tyree Orn, PT [facility]   7/6/2021  8:30 AM Avril Mays MD [facility] AM[facility]   8/3/2021  8:20 AM Cha Neff NP [facility] AMB   10/4/2021  8:55 AM [facility] NURSE VISIT [facility]   10/11/2021  8:30 AM Wilberto Montelongo MD [facility] AM[facility]

## 2021-06-08 ENCOUNTER — HOSPITAL ENCOUNTER (OUTPATIENT)
Dept: PHYSICAL THERAPY | Age: 81
Discharge: HOME | End: 2021-06-08
Payer: MEDICARE

## 2021-06-08 PROCEDURE — 97110 THERAPEUTIC EXERCISES: CPT

## 2021-06-08 PROCEDURE — 97112 NEUROMUSCULAR REEDUCATION: CPT

## 2021-06-08 PROCEDURE — 97140 MANUAL THERAPY 1/> REGIONS: CPT

## 2021-06-08 NOTE — PROGRESS NOTES
PT DAILY TREATMENT NOTE     Patient Name: Terra Shaw  Date:2021  : 1940  [x]  Patient  Verified  Payor: [place] MEDICARE / Plan: VA MEDICARE PART A & B / Product Type: Medicare /    In time: 8:56A  Out time: 9:49A  Total Treatment Time (min): 53  Visit #: 4 of 8    Medicare/BCBS Only   Total Timed Codes (min):  43 1:1 Treatment Time:  43     Treatment Area: Right shoulder pain [M25.511]    SUBJECTIVE  Pain Level (0-10 scale): 2-3/10  Any medication changes, allergies to medications, adverse drug reactions, diagnosis change, or new procedure performed?: [x] No    [] Yes (see summary sheet for update)  Subjective functional status/changes:   [] No changes reported  Pt reports improvements in overall pain levels since beginning skilled PT services.      OBJECTIVE    Modality rationale: decrease pain and increase tissue extensibility to improve the patients ability to perform ADLs with greater ease    Min Type Additional Details    [] Estim:  []Unatt       []IFC  []Premod                        []Other:  []w/ice   []w/heat  Position:  Location:    [] Estim: []Att    []TENS instruct  []NMES                    []Other:  []w/US   []w/ice   []w/heat  Position:  Location:    []  Traction: [] Cervical       []Lumbar                       [] Prone          []Supine                       []Intermittent   []Continuous Lbs:  [] before manual  [] after manual    []  Ultrasound: []Continuous   [] Pulsed                           []1MHz   []3MHz Location:  W/cm2:    []  Iontophoresis with dexamethasone         Location: [] Take home patch   [] In clinic   10 []  Ice     [x]  heat  []  Ice massage  []  Laser   []  Anodyne Position: Seated   Location: R Shld     []  Laser with stim  []  Other: Position:  Location:    []  Vasopneumatic Device  Pre-treatment girth:  Post-treatment girth:  Measured at (location):  Pressure:       [] lo [] med [] hi   Temperature: [] lo [] med [] hi       20 min Therapeutic Exercise: [x] See flow sheet :   Rationale: increase ROM and increase strength to improve the patients ability to improve ADL ease. 13 min Neuromuscular Re-education:  [x]  See flow sheet :   Rationale: increase ROM, increase strength and improve coordination  to improve the patients ability to improve ADL ease. 10 min Manual Therapy:  STM/TPr to R pec, Right inferior/posterior capsular mobs grade II to III performed with the patient in supine. The manual therapy interventions were performed at a separate and distinct time from the therapeutic activities interventions. Rationale: decrease pain, increase ROM and increase tissue extensibility to improve ADL ease. With   [x] TE   [] TA   [] neuro   [] other: Patient Education: [x] Review HEP    [] Progressed/Changed HEP based on:   [] positioning   [] body mechanics   [] transfers   [] heat/ice application    [] other:      Other Objective/Functional Measures:   AROM: 142 degrees flexion; 130 degrees ABD      Pain Level (0-10 scale) post treatment: 1/10    ASSESSMENT/Changes in Function:   Improvements in AROM evident by objective measures noted above. Continued progression of POC through added scapular stability ex and increased resistance of activities performed over previous sessions. Pt reports and demonstrates early onset muscular fatigue, however no increase in baseline symptoms. Patient will continue to benefit from skilled PT services to modify and progress therapeutic interventions, address functional mobility deficits, address ROM deficits, address strength deficits, analyze and address soft tissue restrictions, analyze and cue movement patterns, analyze and modify body mechanics/ergonomics, address imbalance/dizziness and instruct in home and community integration to attain remaining goals.      [x]  See Plan of Care  []  See progress note/recertification  []  See Discharge Summary         Progress towards goals / Updated goals:  Short Term Goals: To be accomplished in 2 weeks:  1. Pt will demonstrate I and compliance with HEP to maximize therapeutic effect.              BB: HEP issued and instructed              Current: Met - reports independence stating they are \"doable\" 5/27/2021  2. Pt will demonstrate right shoulder ABD PROM 130 deg without increased pain for improved joint mobility with ADLs.             BF: 100 deg with discomfort    Current: Met - WNL all planes passively 6/03/2021  Long Term Goals: To be accomplished in 4 weeks:  1. Pt will demonstrate right shoulder AROM flexion and abduction to 135 without pain increase for improved ADL ease.              IE: 120 deg flexion  110 deg abd   Current: Progressed to 142 degrees flexion; 130 degrees ABD, 06/08/21  2. Pt will demonstrate right shoulder flexion and abduction strength to 4+/5 for improved stability with household duties.             IE: 4/5 flexion  4/5 abduction with discomfort  3. Pt will improve functional ER to C7 to improve grooming and self care ease.              IE: to temple  4. Pt will demonstrate right shoulder ER strength 4+/5 without pain for improved OH stability with ADLs.             YA: 4+/5 with pain  5.  Pt will report little or no difficulty with lifting items from the floor to improve functional tasks and ADL performance.               IE: not assessed yet via [address] [place] [address]  []  Upgrade activities as tolerated     [x]  Continue plan of care  []  Update interventions per flow sheet       []  Discharge due to:_  []  Other:_      Stephanie Vargas DPT 6/8/2021  3:15 PM    Future Appointments   Date Time Provider Kin Dai   6/11/2021  8:30 AM Tejas Vazquez, PT [facility]   6/15/2021  9:45 AM Davis Loera, PT [facility]   6/17/2021 10:30 AM Tejas Vazquez, PT [facility]   6/22/2021  9:45 AM Tejas Vazquez, PT [facility]   6/24/2021 10:30 AM Tejas Vazquez, PT [facility]   7/6/2021  8:30 AM Tristan Hernandez MD [facility] AM[facility] 8/3/2021  8:20 AM Chloe Bray, DORINA [facility] AMB   10/4/2021  8:55 AM [facility] NURSE VISIT [facility] BS AMB   10/11/2021  8:30 AM Myrtle Durbin MD [facility] BS AMB

## 2021-06-11 ENCOUNTER — HOSPITAL ENCOUNTER (OUTPATIENT)
Dept: PHYSICAL THERAPY | Age: 81
Discharge: HOME | End: 2021-06-11
Payer: MEDICARE

## 2021-06-11 PROCEDURE — 97110 THERAPEUTIC EXERCISES: CPT

## 2021-06-11 PROCEDURE — 97140 MANUAL THERAPY 1/> REGIONS: CPT

## 2021-06-11 PROCEDURE — 97112 NEUROMUSCULAR REEDUCATION: CPT

## 2021-06-11 NOTE — PROGRESS NOTES
PT DAILY TREATMENT NOTE     Patient Name: Rich Breen  Date:2021  : 1940  [x]  Patient  Verified  Payor: [place] MEDICARE / Plan: [place] MEDICARE PART A & B / Product Type: Medicare /    In time:8:30  Out time:9:14  Total Treatment Time (min): 44  Visit #: 5 of 8    Medicare/BCBS Only   Total Timed Codes (min):  44 1:1 Treatment Time:  44        Treatment Area: Right shoulder pain [M25.511]    SUBJECTIVE  Pain Level (0-10 scale): 3/10  Any medication changes, allergies to medications, adverse drug reactions, diagnosis change, or new procedure performed?: [x] No    [] Yes (see summary sheet for update)  Subjective functional status/changes:   [] No changes reported  The patient reports that her shoulder was sore last night, but feels that the weather may have increased it a bit. The patient indicates she is not as much pain reported this morning. OBJECTIVE  24 min Therapeutic Exercise:  [] See flow sheet :   Rationale: increase ROM and increase strength to improve the patients ability to improve ADL ease. 12 min Neuromuscular Re-education:  [x]  See flow sheet :   Rationale: increase ROM, increase strength and improve coordination  to improve the patients ability to improve ADL ease. 8 min Manual Therapy:  Right inferior/posterior capsular mobs grade II to III performed with the patient in supine. The manual therapy interventions were performed at a separate and distinct time from the therapeutic activities interventions. Rationale: decrease pain, increase ROM and increase tissue extensibility to improve ADL ease.        With   [] TE   [] TA   [] neuro   [] other: Patient Education: [x] Review HEP    [] Progressed/Changed HEP based on:   [] positioning   [] body mechanics   [] transfers   [] heat/ice application    [] other:      Other Objective/Functional Measures:   Functional ER to C7     Pain Level (0-10 scale) post treatment: 0/10    ASSESSMENT/Changes in Function: Notable improvement in functional ER, meeting long term goal which allows for improved ease of grooming and dressing. Good progress with PT as far as decreasing pain levels. She leaves in no pain. Patient will continue to benefit from skilled PT services to modify and progress therapeutic interventions, address functional mobility deficits, address ROM deficits, address strength deficits, analyze and address soft tissue restrictions, analyze and cue movement patterns, analyze and modify body mechanics/ergonomics, assess and modify postural abnormalities and instruct in home and community integration to attain remaining goals. []  See Plan of Care  []  See progress note/recertification  []  See Discharge Summary         Progress towards goals / Updated goals:  Short Term Goals: To be accomplished in 2 weeks:  1. Pt will demonstrate I and compliance with HEP to maximize therapeutic effect.              DG: HEP issued and instructed              EWUGRKD: Met - reports independence stating they are \"doable\" 5/27/2021  2. Pt will demonstrate right shoulder ABD PROM 130 deg without increased pain for improved joint mobility with ADLs.             LT: 100 deg with discomfort               Current: Met - WNL all planes passively 6/03/2021  Long Term Goals: To be accomplished in 4 weeks:  1. Pt will demonstrate right shoulder AROM flexion and abduction to 135 without pain increase for improved ADL ease.              IE: 120 deg flexion  110 deg abd              Current: Progressed to 142 degrees flexion; 130 degrees ABD, 06/08/21  2. Pt will demonstrate right shoulder flexion and abduction strength to 4+/5 for improved stability with household duties.             IE: 4/5 flexion  4/5 abduction with discomfort  3. Pt will improve functional ER to C7 to improve grooming and self care ease.              IE: to temple    Current: Met C7 6/11/2021  4.  Pt will demonstrate right shoulder ER strength 4+/5 without pain for improved OH stability with ADLs.             BF: 4+/5 with pain  5.  Pt will report little or no difficulty with lifting items from the floor to improve functional tasks and ADL performance.               IQ: not assessed yet via FOTO    PLAN  []  Upgrade activities as tolerated     [x]  Continue plan of care  []  Update interventions per flow sheet       []  Discharge due to:_  []  Other:_      Sahil Arthur, PT 6/11/2021  8:44 AM    Future Appointments   Date Time Provider Kin Dai   6/15/2021  9:45 AM [facility]   6/17/2021 10:30 AM Jose De Jesus Yu, PT [facility]   6/22/2021  9:45 AM [facility]   6/24/2021 10:30 AM [facility]   7/6/2021  8:30 AM Sosa Cotton MD [facility] AM[facility]   8/3/2021  8:20 AM Vishal Watters NP [facility] AM[facility]   10/4/2021  8:55 AM [facility] NURSE VISIT [facility]   10/11/2021  8:30 AM Darris Severance, MD [facility]

## 2021-06-15 ENCOUNTER — HOSPITAL ENCOUNTER (OUTPATIENT)
Dept: PHYSICAL THERAPY | Age: 81
Discharge: HOME | End: 2021-06-15
Payer: MEDICARE

## 2021-06-15 PROCEDURE — 97110 THERAPEUTIC EXERCISES: CPT

## 2021-06-15 PROCEDURE — 97112 NEUROMUSCULAR REEDUCATION: CPT

## 2021-06-15 PROCEDURE — 97140 MANUAL THERAPY 1/> REGIONS: CPT

## 2021-06-15 NOTE — PROGRESS NOTES
PT DAILY TREATMENT NOTE     Patient Name: Elayne ePoples  Date:6/15/2021  : 1940  [x]  Patient  Verified  Payor: [place] MEDICARE / Plan: [place] MEDICARE PART A & B / Product Type: Medicare /    In time:9:45  Out time:10:22  Total Treatment Time (min): 38  Visit #: 6 of 8    Medicare/BCBS Only   Total Timed Codes (min):  38 1:1 Treatment Time:  38       Treatment Area: Right shoulder pain [M25.511]    SUBJECTIVE  Pain Level (0-10 scale): 5/10  Any medication changes, allergies to medications, adverse drug reactions, diagnosis change, or new procedure performed?: [x] No    [] Yes (see summary sheet for update)  Subjective functional status/changes:   [] No changes reported  The patient reports that she fell in her shower yesterday due to putting a new mat down which slipped and she fell on her right side. She indicates her back is a little more sore than her shoulder today and generally places her pain level at a 5. OBJECTIVE  15 min Therapeutic Exercise:  [x] See flow sheet :   Rationale: increase ROM and increase strength to improve the patients ability to improve ADL ease. 15 min Neuromuscular Re-education:  [x]  See flow sheet :   Rationale: increase ROM and increase strength  to improve the patients ability to improve ADL ease. 8 min Manual Therapy:  Right GHJ inferior/posterior capsular mobs with stretching of pec major/minor with patient in supine grade   The manual therapy interventions were performed at a separate and distinct time from the therapeutic activities interventions. Rationale: decrease pain, increase ROM and increase tissue extensibility to improve ADL ease.         With   [] TE   [] TA   [] neuro   [] other: Patient Education: [x] Review HEP    [] Progressed/Changed HEP based on:   [] positioning   [] body mechanics   [] transfers   [] heat/ice application    [] other:      Other Objective/Functional Measures:     Pain Level (0-10 scale) post treatment: 3/10    ASSESSMENT/Changes in Function: The patient exhibits excellent PROM today during session without pain provocation. Her IR into end ranges at 90/90 remain mildly painful, but we were able to continue with all interventions without regression today despite pt reporting a fall yesterday. Patient will continue to benefit from skilled PT services to modify and progress therapeutic interventions, address functional mobility deficits, address ROM deficits, address strength deficits, analyze and address soft tissue restrictions, analyze and cue movement patterns, analyze and modify body mechanics/ergonomics, assess and modify postural abnormalities and instruct in home and community integration to attain remaining goals. []  See Plan of Care  []  See progress note/recertification  []  See Discharge Summary         Progress towards goals / Updated goals:  Short Term Goals: To be accomplished in 2 weeks:  1. Pt will demonstrate I and compliance with HEP to maximize therapeutic effect.              ES: HEP issued and instructed              ZQAFDJA: Met - reports independence stating they are \"doable\" 5/27/2021  2. Pt will demonstrate right shoulder ABD PROM 130 deg without increased pain for improved joint mobility with ADLs.             DW: 100 deg with discomfort               SDRIUFQ: Met - WNL all planes passively 6/03/2021  Long Term Goals: To be accomplished in 4 weeks:  1. Pt will demonstrate right shoulder AROM flexion and abduction to 135 without pain increase for improved ADL ease.              IE: 120 deg flexion  110 deg abd              Current: Progressed to 142 degrees flexion; 130 degrees ABD, 06/08/21  2. Pt will demonstrate right shoulder flexion and abduction strength to 4+/5 for improved stability with household duties.             IE: 4/5 flexion  4/5 abduction with discomfort  3.  Pt will improve functional ER to C7 to improve grooming and self care ease.              IE: to temple Current: Met C7 6/11/2021  4. Pt will demonstrate right shoulder ER strength 4+/5 without pain for improved OH stability with ADLs.             BA: 4+/5 with pain  5.  Pt will report little or no difficulty with lifting items from the floor to improve functional tasks and ADL performance.               MD: not assessed yet via [address]  []  Upgrade activities as tolerated     [x]  Continue plan of care  []  Update interventions per flow sheet       []  Discharge due to:_  []  Other:_      Taylor Joe, PT 6/15/2021  9:53 AM    Future Appointments   Date Time Provider Kin Malaika   6/17/2021 10:30 AM Kamar Clements, PT [facility]   6/22/2021  9:45 AM Ling Loera, PT [facility]   6/24/2021 10:30 AM Kamar Clements, PT [facility]   7/6/2021  8:30 AM Mirta Sung MD [facility]   8/3/2021  8:20 AM Adan Prajapati NP [facility] AM[facility]   10/4/2021  8:55 AM [facility] NURSE VISIT [facility] AMB   10/11/2021  8:30 AM Camacho Kidd MD [facility] AMB

## 2021-06-17 ENCOUNTER — HOSPITAL ENCOUNTER (OUTPATIENT)
Dept: PHYSICAL THERAPY | Age: 81
Discharge: HOME | End: 2021-06-17
Payer: MEDICARE

## 2021-06-17 PROCEDURE — 97140 MANUAL THERAPY 1/> REGIONS: CPT

## 2021-06-17 PROCEDURE — 97112 NEUROMUSCULAR REEDUCATION: CPT

## 2021-06-17 PROCEDURE — 97110 THERAPEUTIC EXERCISES: CPT

## 2021-06-17 NOTE — PROGRESS NOTES
PT DAILY TREATMENT NOTE     Patient Name: Bennie Sandy  Date:2021  : 1940  [x]  Patient  Verified  Payor: [place] MEDICARE / Plan: [place] MEDICARE PART A & B / Product Type: Medicare /    In time:11:11  Out time:11:51  Total Treatment Time (min): 40  Visit #: 7 of 8    Medicare/BCBS Only   Total Timed Codes (min):  40 1:1 Treatment Time:  40       Treatment Area: Right shoulder pain [M25.511]    SUBJECTIVE  Pain Level (0-10 scale): 1/10  Any medication changes, allergies to medications, adverse drug reactions, diagnosis change, or new procedure performed?: [x] No    [] Yes (see summary sheet for update)  Subjective functional status/changes:   [] No changes reported  The patient reports that her shoulder is doing well. OBJECTIVE  20 min Therapeutic Exercise:  [x] See flow sheet :   Rationale: increase ROM and increase strength to improve the patients ability to improve ADL ease. 12 min Neuromuscular Re-education:  [x]  See flow sheet :   Rationale: increase strength, improve coordination and improve balance  to improve the patients ability to improve ADL ease. 8 min Manual Therapy: Right GHJ inferior/posterior capsular mobs with stretching of pec major/minor with patient in supine grade   The manual therapy interventions were performed at a separate and distinct time from the therapeutic activities interventions. Rationale: decrease pain, increase ROM and increase tissue extensibility to improve ADL ease. With   [] TE   [] TA   [] neuro   [] other: Patient Education: [x] Review HEP    [] Progressed/Changed HEP based on:   [] positioning   [] body mechanics   [] transfers   [] heat/ice application    [] other:      Other Objective/Functional Measures:   ER strength: 5/5 MMT no pain  Demonstrates picking up 2# ball from floor without shoulder pain and states she has no pain reaching from the floor.      Pain Level (0-10 scale) post treatment: 0/10     ASSESSMENT/Changes in Function: Excellent progress with PT, adding full cans and standing wand with sidelying AROM work without provocation of pain. She was pain-free into IR at 90/90 today, with meeting goals of ER strength and lifting things from floor without pain. Discussed with the patient regarding making next visit probably her last with the patient in agreement as long as no increase in pain or changes occur between now and then. Patient will continue to benefit from skilled PT services to modify and progress therapeutic interventions, address functional mobility deficits, address ROM deficits, address strength deficits, analyze and address soft tissue restrictions, analyze and cue movement patterns, analyze and modify body mechanics/ergonomics, assess and modify postural abnormalities and instruct in home and community integration to attain remaining goals. []  See Plan of Care  []  See progress note/recertification  []  See Discharge Summary         Progress towards goals / Updated goals:  Short Term Goals: To be accomplished in 2 weeks:  1. Pt will demonstrate I and compliance with HEP to maximize therapeutic effect.              OV: HEP issued and instructed              KJMZOFQ: Met - reports independence stating they are \"doable\" 5/27/2021  2. Pt will demonstrate right shoulder ABD PROM 130 deg without increased pain for improved joint mobility with ADLs.             WH: 100 deg with discomfort               CSDCIIZ: Met - WNL all planes passively 6/03/2021  Long Term Goals: To be accomplished in 4 weeks:  1. Pt will demonstrate right shoulder AROM flexion and abduction to 135 without pain increase for improved ADL ease.              IE: 120 deg flexion  110 deg abd              Current: Progressed to 142 degrees flexion; 130 degrees ABD, 06/08/21  2. Pt will demonstrate right shoulder flexion and abduction strength to 4+/5 for improved stability with household duties.               IE: 4/5 flexion  4/5 abduction with discomfort  3. Pt will improve functional ER to C7 to improve grooming and self care ease.              IE: to temple               Current: Met C7 6/11/2021  4. Pt will demonstrate right shoulder ER strength 4+/5 without pain for improved OH stability with ADLs.             OT: 4+/5 with pain   Current: Met 5/5 MMT 6/17/2021  5.  Pt will report little or no difficulty with lifting items from the floor to improve functional tasks and ADL performance.               FD: not assessed yet via FOTO   Current: Met - no pain lifting items from the floor 6/17/2021    PLAN  []  Upgrade activities as tolerated     [x]  Continue plan of care  []  Update interventions per flow sheet       []  Discharge due to:_  []  Other:_      Ruchi Schumacher, PT 6/17/2021  11:17 AM    Future Appointments   Date Time Provider Kin Dai   6/22/2021  9:45 AM Leslye Brito, PT [facility]   6/24/2021 10:30 AM Leslye Brito PT [facility]   7/6/2021  8:30 AM Anju Rodriguez MD [facility] AMB   8/3/2021  8:20 AM Rj Houser NP [facility] AM[facility]   10/4/2021  8:55 AM IO NURSE VISIT [facility] BS AMB   10/11/2021  8:30 AM Marco A Guallpa MD [facility] BS AMB

## 2021-06-22 ENCOUNTER — HOSPITAL ENCOUNTER (OUTPATIENT)
Dept: PHYSICAL THERAPY | Age: 81
Discharge: HOME | End: 2021-06-22
Payer: MEDICARE

## 2021-06-22 PROCEDURE — 97140 MANUAL THERAPY 1/> REGIONS: CPT

## 2021-06-22 PROCEDURE — 97112 NEUROMUSCULAR REEDUCATION: CPT

## 2021-06-22 PROCEDURE — 97110 THERAPEUTIC EXERCISES: CPT

## 2021-06-22 NOTE — PROGRESS NOTES
PT DAILY TREATMENT NOTE     Patient Name: Melia Santiago  Date:2021  : 1940  [x]  Patient  Verified  Payor: VA MEDICARE / Plan: VA MEDICARE PART A & B / Product Type: Medicare /    In time:9:47  Out time:10:28  Total Treatment Time (min): 41  Visit #: 8 of 8    Medicare/BCBS Only   Total Timed Codes (min):  41 1:1 Treatment Time:  41       Treatment Area: Right shoulder pain [M25.511]    SUBJECTIVE  Pain Level (0-10 scale): 0-1/10  Any medication changes, allergies to medications, adverse drug reactions, diagnosis change, or new procedure performed?: [x] No    [] Yes (see summary sheet for update)  Subjective functional status/changes:   [] No changes reported  The patient reports that her shoulder is a little sore upon arrival, but that she had an active weekend. OBJECTIVE  21 min Therapeutic Exercise:  [] See flow sheet :   Rationale: increase ROM and increase strength to improve the patients ability to improve ADL ease. 12 min Neuromuscular Re-education:  []  See flow sheet :   Rationale: increase ROM and increase strength  to improve the patients ability to improve ADL ease. 8 min Manual Therapy:  Right GHJ inferior/posterior capsular mobs with stretching of pec major/minor with patient in supine grade II   The manual therapy interventions were performed at a separate and distinct time from the therapeutic activities interventions. Rationale: decrease pain, increase ROM and increase tissue extensibility to improve ADL ease. With   [] TE   [] TA   [] neuro   [] other: Patient Education: [x] Review HEP    [] Progressed/Changed HEP based on:   [] positioning   [] body mechanics   [] transfers   [] heat/ice application    [] other:      Other Objective/Functional Measures: FOTO: 87     Pain Level (0-10 scale) post treatment: 0/10    ASSESSMENT/Changes in Function: The patient has made excellent progress with PT meeting her long term and short term goals.  She is quite confident moving forward with D/Cing from PT today and leaves the clinic in no pain with all questions answered. []  See Plan of Care  []  See progress note/recertification  [x]  See Discharge Summary         Progress towards goals / Updated goals:  Short Term Goals: To be accomplished in 2 weeks:  1. Pt will demonstrate I and compliance with HEP to maximize therapeutic effect.              PP: HEP issued and instructed              ZYMDNGM: Met - reports independence stating they are \"doable\" 5/27/2021  2. Pt will demonstrate right shoulder ABD PROM 130 deg without increased pain for improved joint mobility with ADLs.             KO: 100 deg with discomfort               IERQXTA: Met - WNL all planes passively 6/03/2021  Long Term Goals: To be accomplished in 4 weeks:  1. Pt will demonstrate right shoulder AROM flexion and abduction to 135 without pain increase for improved ADL ease.              IE: 120 deg flexion  110 deg abd              Current: Met - Progressed to 142 degrees flexion; > 130 degrees ABD  2. Pt will demonstrate right shoulder flexion and abduction strength to 4+/5 for improved stability with household duties.             MW: 4/5 flexion  4/5 abduction with discomfort   Current: 4+/5 MMT 6/22/2021  3. Pt will improve functional ER to C7 to improve grooming and self care ease.              IE: to temple               Current: Met C7 6/11/2021  4. Pt will demonstrate right shoulder ER strength 4+/5 without pain for improved OH stability with ADLs.             ZR: 4+/5 with pain              Current: Met 5/5 MMT 6/17/2021  5.  Pt will report little or no difficulty with lifting items from the floor to improve functional tasks and ADL performance.               IE: not assessed yet via FOTO              Current: Met - no pain lifting items from the floor 6/17/2021    PLAN  []  Upgrade activities as tolerated     []  Continue plan of care  []  Update interventions per flow sheet       [] Discharge due to:_  []  Other:_      Harpreet Hinton, PT 6/22/2021  9:52 AM    Future Appointments   Date Time Provider Kin Malaika   6/24/2021 10:30 AM Osiris Neil, PT [facility]   7/6/2021  8:30 AM Louie Arteaga MD [facility] AM[facility]   8/3/2021  8:20 AM Edith Castelan NP [facility] AMB   10/4/2021  8:55 AM [facility] NURSE VISIT [facility]   10/11/2021  8:30 AM Trina He MD [facility] AM[facility]

## 2021-06-22 NOTE — PROGRESS NOTES
[facility]  [address]  [facility], [address]  [phone] fax    Physical Therapy Discharge Summary    Patient name: Tia Gomes Start of Care: 2021   Referral source: Nissa Her MD : 1940               Medical Diagnosis: Right rotator cuff tear arthropathy [M75.101, M12.811]  Payor: VA MEDICARE / Plan: VA MEDICARE PART A & B / Product Type: Medicare /  Onset Date: April/May 2021               Treatment Diagnosis: Right shoulder pain   Prior Hospitalization: see medical history Provider#: [id number]   Medications: Verified on Patient summary List    Comorbidities: osteoporosis, arthritis, HTN, visual impairment, asthma   Prior Level of Function: limitations with ADLs  Visits from Start of Care: 8    Missed Visits: 0  Reporting Period : 2021 to 2021    Summary of Care:  Short Term Goals: To be accomplished in 2 weeks:  1. Pt will demonstrate I and compliance with HEP to maximize therapeutic effect.              YK: HEP issued and instructed              PLKLNDI: Met - reports independence stating they are \"doable\" 2021  2. Pt will demonstrate right shoulder ABD PROM 130 deg without increased pain for improved joint mobility with ADLs.             IC: 100 deg with discomfort               KAGTABK: Met - WNL all planes passively 2021  Long Term Goals: To be accomplished in 4 weeks:  1. Pt will demonstrate right shoulder AROM flexion and abduction to 135 without pain increase for improved ADL ease.              IE: 120 deg flexion  110 deg abd              Current: Met - Progressed to 142 degrees flexion; > 130 degrees ABD  2. Pt will demonstrate right shoulder flexion and abduction strength to 4+/5 for improved stability with household duties.             JL: 4/5 flexion  4/5 abduction with discomfort              Current: 4+/5 MMT 2021  3.  Pt will improve functional ER to C7 to improve grooming and self care ease.              IE: to temple               Current: Met C7 6/11/2021  4. Pt will demonstrate right shoulder ER strength 4+/5 without pain for improved OH stability with ADLs.             UE: 4+/5 with pain              Current: Met 5/5 MMT 6/17/2021  5. Pt will report little or no difficulty with lifting items from the floor to improve functional tasks and ADL performance.               IE: not assessed yet via FOTO              Current: Met - no pain lifting items from the floor 6/17/2021    ASSESSMENT/RECOMMENDATIONS:   The patient has made excellent progress with PT meeting her long term and short term goals. She is quite confident moving forward with D/Cing from PT today and leaves the clinic in no pain with all questions answered.      [x]Discontinue therapy: [x]Patient has reached or is progressing toward set goals      []Patient is non-compliant or has abdicated      []Due to lack of appreciable progress towards set [address], PT 6/22/2021 10:28 AM

## 2021-06-24 ENCOUNTER — APPOINTMENT (OUTPATIENT)
Dept: PHYSICAL THERAPY | Age: 81
End: 2021-06-24
Payer: MEDICARE

## 2021-07-06 ENCOUNTER — OFFICE VISIT (OUTPATIENT)
Dept: ORTHOPEDIC SURGERY | Age: 81
End: 2021-07-06
Payer: MEDICARE

## 2021-07-06 VITALS
OXYGEN SATURATION: 96 % | WEIGHT: 167.8 LBS | RESPIRATION RATE: 16 BRPM | BODY MASS INDEX: 31.68 KG/M2 | HEIGHT: 61 IN | DIASTOLIC BLOOD PRESSURE: 81 MMHG | HEART RATE: 61 BPM | TEMPERATURE: 97.1 F | SYSTOLIC BLOOD PRESSURE: 123 MMHG

## 2021-07-06 DIAGNOSIS — M47.816 LUMBAR FACET ARTHROPATHY: Primary | ICD-10-CM

## 2021-07-06 DIAGNOSIS — M54.16 LUMBAR RADICULOPATHY: ICD-10-CM

## 2021-07-06 DIAGNOSIS — M62.838 MUSCLE SPASM: ICD-10-CM

## 2021-07-06 DIAGNOSIS — M79.2 NEURITIS: ICD-10-CM

## 2021-07-06 DIAGNOSIS — Z87.19 HISTORY OF GI BLEED: ICD-10-CM

## 2021-07-06 DIAGNOSIS — M51.36 DDD (DEGENERATIVE DISC DISEASE), LUMBAR: ICD-10-CM

## 2021-07-06 PROCEDURE — G8432 DEP SCR NOT DOC, RNG: HCPCS | Performed by: PHYSICAL MEDICINE & REHABILITATION

## 2021-07-06 PROCEDURE — G8754 DIAS BP LESS 90: HCPCS | Performed by: PHYSICAL MEDICINE & REHABILITATION

## 2021-07-06 PROCEDURE — 99214 OFFICE O/P EST MOD 30 MIN: CPT | Performed by: PHYSICAL MEDICINE & REHABILITATION

## 2021-07-06 PROCEDURE — G8417 CALC BMI ABV UP PARAM F/U: HCPCS | Performed by: PHYSICAL MEDICINE & REHABILITATION

## 2021-07-06 PROCEDURE — G8427 DOCREV CUR MEDS BY ELIG CLIN: HCPCS | Performed by: PHYSICAL MEDICINE & REHABILITATION

## 2021-07-06 PROCEDURE — G8536 NO DOC ELDER MAL SCRN: HCPCS | Performed by: PHYSICAL MEDICINE & REHABILITATION

## 2021-07-06 PROCEDURE — G8399 PT W/DXA RESULTS DOCUMENT: HCPCS | Performed by: PHYSICAL MEDICINE & REHABILITATION

## 2021-07-06 PROCEDURE — 1101F PT FALLS ASSESS-DOCD LE1/YR: CPT | Performed by: PHYSICAL MEDICINE & REHABILITATION

## 2021-07-06 PROCEDURE — G8752 SYS BP LESS 140: HCPCS | Performed by: PHYSICAL MEDICINE & REHABILITATION

## 2021-07-06 PROCEDURE — 1090F PRES/ABSN URINE INCON ASSESS: CPT | Performed by: PHYSICAL MEDICINE & REHABILITATION

## 2021-07-06 RX ORDER — GABAPENTIN 600 MG/1
1200 TABLET, FILM COATED ORAL DAILY
Qty: 180 TABLET | Refills: 1 | Status: CANCELLED | OUTPATIENT
Start: 2021-07-06

## 2021-07-06 NOTE — PROGRESS NOTES
[facility]  [address], [facility] [address]  Phone: [phone]  Fax: [phone]    Pt's YOB: 1940    ASSESSMENT   Diagnoses and all orders for this visit:    1. Lumbar facet arthropathy  -     MRI LUMB SPINE WO CONT; Future    2. Lumbar radiculopathy  -     MRI LUMB SPINE WO CONT; Future    3. Muscle spasm  -     MRI LUMB SPINE WO CONT; Future    4. DDD (degenerative disc disease), lumbar  -     MRI LUMB SPINE WO CONT; Future    5. Neuritis    6. History of GI bleed         IMPRESSION AND PLAN:  Annalisa Frey is a 80 y.o. female with history of cervical and lumbar pain. Pt complains of increased pain across the lower back with intermittent pain radiating down the right leg. She continues to take Gabitril 600 mg 2 tabs in the evening with relief. 1) Pt was given information on cervical and lumbar arthritis exercises. 2) Discussed treatment options with the patient including steroid injections and a lumbar RFA. 3) Pt was given information on radiofrequency ablation. Walter Sousa 4) She will continue taking Gralise 600 mg as prescribed and does not need a refill at this time-- will do refills at her follow up visit. 5) A lumbar MRI was ordered since she has increased lumbar pain with right radicular symptoms despite physical therapy. 6) Of note, she does not tolerate NSAID's due to gi bleed. 7) Ms. Lili Arias has a reminder for a \"due or due soon\" health maintenance. I have asked that she contact her primary care provider, Cristian Salazar MD, for follow-up on this health maintenance. 8)  demonstrated consistency with prescribing. Follow-up and Dispositions    · Return in about 4 weeks (around 8/3/2021) for Diagnostic Test follow up. HISTORY OF PRESENT ILLNESS:  Annalisa Frey is a 80 y.o. female with history of cervical and lumbar pain and presents to the office today for follow up.  She complains of increased pain across the lower back since her last office visit. Pt reports intermittent pain radiating down the posterior aspect of the right leg. She notes that her pain generally worsens when she sits for prolonged periods of time. Pt continues to take Gabitril 600 mg 2 tabs in the evening with relief. She denies any previous lumbar surgeries. Pt at this time desires to proceed with a lumbar MRI. 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    Band keratopathy of right eye 2/3/2018    Cataract     Cylindrical bronchiectasis (HCC)     Fracture     rt ankle as an adult    History of pneumonia     Hypertension     Ill-defined condition     Spasms to left side    Left arm pain     Lumbar spinal stenosis     Myofascial pain     Osteopenia     Sciatica of right side         Social History     Socioeconomic History    Marital status:      Spouse name: Not on file    Number of children: Not on file    Years of education: Not on file    Highest education level: Not on file   Occupational History    Not on file   Tobacco Use    Smoking status: Former Smoker     Packs/day: 1.00     Years: 10.00     Pack years: 10.00     Types: Cigarettes     Quit date: 1970     Years since quittin.5    Smokeless tobacco: Never Used   Substance and Sexual Activity    Alcohol use:  Yes     Alcohol/week: 1.0 standard drinks     Types: 1 Glasses of wine per week     Comment: occasional wine    Drug use: No    Sexual activity: Yes     Partners: Male     Birth control/protection: None   Other Topics Concern    Not on file   Social History Narrative    Not on file     Social Determinants of Health     Financial Resource Strain:     Difficulty of Paying Living Expenses:    Food Insecurity:     Worried About Running Out of Food in the Last Year:     Ran Out of Food in the Last Year:    Transportation Needs:     Lack of Transportation (Medical):  Lack of Transportation (Non-Medical):    Physical Activity:     Days of Exercise per Week:     Minutes of Exercise per Session:    Stress:     Feeling of Stress :    Social Connections:     Frequency of Communication with Friends and Family:     Frequency of Social Gatherings with Friends and Family:     Attends [employer] Services:     Active Member of Clubs or Organizations:     Attends Club or Organization Meetings:     Marital Status:    Intimate Partner Violence:     Fear of Current or Ex-Partner:     Emotionally Abused:     Physically Abused:     Sexually Abused:        Current Outpatient Medications   Medication Sig Dispense Refill    zinc sulfate (ZINC-15 PO) Take  by mouth.  gabapentin (Gralise) 600 mg Tb24 Take 1,200 mg by mouth daily. Max Daily Amount: 1,200 mg. Indications: neuropathic pain 180 Tab 1    fluticasone propionate (FLONASE) 50 mcg/actuation nasal spray USE 2 SPRAYS IN EACH NOSTRIL DAILY 48 g 3    triamterene-hydroCHLOROthiazide (MAXZIDE) 37.5-25 mg per tablet Take 0.5 Tabs by mouth daily. 45 Tab 3    mv-min/vit C/glut/lysine/hb124 (AIRBORNE, LYSINE HCL, PO) Take  by mouth.  fexofenadine (ALLEGRA) 180 mg tablet Take 180 mg by mouth daily.  PROAIR HFA 90 mcg/actuation inhaler USE 2 INHALATIONS EVERY 4 HOURS AS NEEDED 3 Inhaler 3    melatonin 3 mg tablet Take 3 mg by mouth daily as needed (sleep).  Cholecalciferol, Vitamin D3, (VITAMIN D3) 1,000 unit cap Take 3,000 Units by mouth daily.  cycloSPORINE (RESTASIS) 0.05 % ophthalmic emulsion Administer 1 Drop to both eyes two (2) times a day.  MULTIVITAMIN W-MINERALS/LUTEIN (CENTRUM SILVER PO) Take 1 Tab by mouth daily.  co-enzyme Q-10 (CO Q-10) 100 mg capsule Take 100 mg by mouth daily.  BIOTIN PO Take 5,000 mcg by mouth daily.       vitamin C-biotin (HAIR-SKIN-NAILS, VIT C-BIOTIN,) 50 mg -1,250 mcg chew Take  by mouth daily.  EPINEPHrine (EPIPEN) 0.3 mg/0.3 mL injection 0.3 mL by IntraMUSCular route once as needed for up to 1 dose. 1 Syringe 1       Allergies   Allergen Reactions    Naprosyn [Naproxen] Other (comments)     Blood in urine           REVIEW OF SYSTEMS    Constitutional: Negative for fever, chills, or weight change. Respiratory: Negative for cough or shortness of breath. Cardiovascular: Negative for chest pain or palpitations. Gastrointestinal: Negative for acid reflux, change in bowel habits, or constipation. Genitourinary: Negative for dysuria and flank pain. Musculoskeletal: Positive for lumbar pain. Neurological: Negative for headaches, dizziness, or numbness. Endo/Heme/Allergies: Negative for increased bruising. Psychiatric/Behavioral: Negative for difficulty with sleep. As per HPI    PHYSICAL EXAMINATION  Visit Vitals  /81 (BP 1 Location: Right arm, BP Patient Position: Sitting)   Pulse 61   Temp 97.1 °F (36.2 °C) (Temporal)   Resp 16   Ht 5' 0.5\" (1.537 m)   Wt 167 lb 12.8 oz (76.1 kg)   SpO2 96%   BMI 32.23 kg/m²       Constitutional: Awake, alert, and in no acute distress. Neurological: 1+ symmetrical DTRs in the upper extremities. 1+ symmetrical DTRs in the lower extremities. Sensation to light touch is intact. Negative Perry's sign bilaterally. Skin: warm, dry, and intact. Musculoskeletal: Tight across the upper trapezius bilaterally. Tenderness to palpation in the lower lumbar region. Pain with extension and axial loading. Improvement with forward flexion. Good range of motion and no pain with internal or external rotation of her hips. Negative straight leg raise bilaterally.       Biceps  Triceps Deltoids Wrist Ext Wrist Flex Hand Intrin   Right +4/5 +4/5 +4/5 +4/5 +4/5 +4/5   Left +4/5 +4/5 +4/5 +4/5 +4/5 +4/5      Hip Flex  Quads Hamstrings Ankle DF EHL Ankle PF   Right +4/5 +4/5 +4/5 +4/5 +4/5 +4/5   Left +4/5 +4/5 +4/5 +4/5 +4/5 +4/5     IMAGING:    Cervical 2V x-rays from 2/2/2021 was personally reviewed with the Pt and demonstrated:  Some obstruction to visualization due to necklace. Possibly mild disc space narrowing at C5-C6. No acute pathology identified. Lumbar 2V x-rays from 2/2/2021 was personally reviewed with the Pt and demonstrated:  Mild disc space narrowing at L5-S1. No acute pathology identified.      Cervical Spine MRI from 11/08/2016 was personally reviewed with the Pt and demonstrated:     Results from [place] encounter on 11/08/16   MRI CERV SPINE WO CONT    Narrative MRI OF CERVICAL SPINE WITHOUT CONTRAST    CPT CODE: 06484    HISTORY: Chronic neck pain extending into the left shoulder with progressive  paresthesias left hand. COMPARISON: MRI cervical spine 4/10/2014. FINDINGS:     There is normal anatomic alignment of the cervical spine. Vertebral body heights  are maintained. Craniocervical junction and posterior elements are intact. Prevertebral soft tissues are normal. Incidentally imaged intracranial soft  tissues demonstrate no acute abnormalities. Within limitations of motion  artifact, cervical spinal cord maintains normal caliber, signal intensity and  morphology throughout. Cervical soft tissues demonstrate no acute abnormalities. C2/C3: Central canal and neural foramina are patent. C3/C4: Central canal and neural foramina are patent. C4/C5: Central canal and neural foramina are patent. C5/C6: Broad-based disc protrusion. Contact of the ventral cord. CSF dorsal to  the cord is maintained. Probable moderate right greater than left foraminal  stenoses. C6/C7: Mild bulging of the disc. Probable mild bilateral foraminal narrowing. C7/T1: Central canal and neural foramina are patent.     T1/T2 through T4/T5: Central canal and foramina are adequate on the sagittal  images.               Impression IMPRESSION:    Allowing for differences in technique, no substantial interval change since  2014. Degenerative changes of the cervical spine are most pronounced at C5/C6 where  there is a disc protrusion which contacts the ventral cord and moderate  bilateral foraminal stenosis. [facility]           Lumbar Spine MRI from 06/30/2016 was personally reviewed with the Pt and demonstrated:  Results from [place] encounter on 06/30/16   MRI LUMB SPINE WO CONT    Narrative Procedure: MRI of the lumbar spine without contrast.    CPT code: 21213    Comparisons: April 10, 2014. Indications: new left radicular symptoms; increased muscle spasm and pain for 3  months     Technique: T1 weighted, T2 FSE with fat saturation, FSE inversion recovery  sagittal images are supplemented by T2 weighted with fat saturation and T1  weighted axial images. Findings:        Sagittal images reveal overall normal vertebral body morphology. No fractures  noted. No suspicious lesions. Conus medullaris ends at the L1 vertebral body level. Correlation of axial and sagittal images reveals the following:    At L1-L2: No significant disc pathology. No significant facet arthropathy. No  central canal or foraminal stenosis. At L2-L3: Mild disc osteophyte complex at the posterior lateral corners. Mild  facet arthropathy. No central canal stenosis. Mild to moderate bilateral  foraminal stenosis. At L3-L4: Mild disc osteophyte complex at the posterior lateral corners. Mild to  moderate facet arthropathy. Posterior epidural lipomatosis with some mass  effect. No central canal stenosis. Mild left and mild to moderate right  foraminal stenosis. At L4-L5: Mild disc osteophyte complex at the posterior lateral corners. Mild to  moderate facet arthropathy. No central canal stenosis. Mild to moderate  foraminal stenosis. At L5-S1: Trace retrolisthesis L5 on S1. Overlying broad-based disc osteophyte  complex. Moderate facet arthropathy. No central canal stenosis.  Moderate  foraminal stenosis. Visualized portions of the sacroiliac joints are unremarkable. Incidentally  imaged retroperitoneal structures are unremarkable as well.               Impression Impression:    Degenerative changes as above. Interval resolution of disc extrusion at L5-S1  seen on prior study from 2014. Written by Corbin Giron, as dictated by Gregorio Jerry MD.  I, Dr. Gregorio Jerry confirm that all documentation is accurate.

## 2021-07-06 NOTE — PATIENT INSTRUCTIONS
Neck Arthritis: Exercises  Introduction  Here are some examples of exercises for you to try. The exercises may be suggested for a condition or for rehabilitation. Start each exercise slowly. Ease off the exercises if you start to have pain. You will be told when to start these exercises and which ones will work best for you. How to do the exercises  Neck stretches to the side   1. This stretch works best if you keep your shoulder down as you lean away from it. To help you remember to do this, start by relaxing your shoulders and lightly holding on to your thighs or your chair. 2. Tilt your head toward your shoulder and hold for 15 to 30 seconds. Let the weight of your head stretch your muscles. 3. Repeat 2 to 4 times toward each shoulder. Chin tuck   1. Lie on the floor with a rolled-up towel under your neck. Your head should be touching the floor. 2. Slowly bring your chin toward your chest.  3. Hold for a count of 6, and then relax for up to 10 seconds. 4. Repeat 8 to 12 times. Active cervical rotation   1. Sit in a firm chair, or stand up straight. 2. Keeping your chin level, turn your head to the right, and hold for 15 to 30 seconds. 3. Turn your head to the left and hold for 15 to 30 seconds. 4. Repeat 2 to 4 times to each side. Shoulder blade squeeze   1. While standing, squeeze your shoulder blades together. 2. Do not raise your shoulders up as you are squeezing. 3. Hold for 6 seconds. 4. Repeat 8 to 12 times. Shoulder rolls   1. Sit comfortably with your feet shoulder-width apart. You can also do this exercise standing up. 2. Roll your shoulders up, then back, and then down in a smooth, circular motion. 3. Repeat 2 to 4 times. Follow-up care is a key part of your treatment and safety. Be sure to make and go to all appointments, and call your doctor if you are having problems. It's also a good idea to know your test results and keep a list of the medicines you take.   Where can you learn more? Go to http://www.gray.com/  Enter P922 in the search box to learn more about \"Neck Arthritis: Exercises. \"  Current as of: November 16, 2020               Content Version: 12.8  © 2006-2021 [employer]. Care instructions adapted under license by [employer] (which disclaims liability or warranty for this information). If you have questions about a medical condition or this instruction, always ask your healthcare professional. [address] any warranty or liability for your use of this information. Low Back Arthritis: Exercises  Introduction  Here are some examples of typical rehabilitation exercises for your condition. Start each exercise slowly. Ease off the exercise if you start to have pain. Your doctor or physical therapist will tell you when you can start these exercises and which ones will work best for you. When you are not being active, find a comfortable position for rest. Some people are comfortable on the floor or a medium-firm bed with a small pillow under their head and another under their knees. Some people prefer to lie on their side with a pillow between their knees. Don't stay in one position for too long. Take short walks (10 to 20 minutes) every 2 to 3 hours. Avoid slopes, hills, and stairs until you feel better. Walk only distances you can manage without pain, especially leg pain. How to do the exercises  Pelvic tilt   4. Lie on your back with your knees bent. 5. \"Brace\" your stomach--tighten your muscles by pulling in and imagining your belly button moving toward your spine. 6. Press your lower back into the floor. You should feel your hips and pelvis rock back. 7. Hold for 6 seconds while breathing smoothly. 8. Relax and allow your pelvis and hips to rock forward. 9. Repeat 8 to 12 times. Back stretches   5. Get down on your hands and knees on the floor.   6. Relax your head and allow it to droop. Round your back up toward the ceiling until you feel a nice stretch in your upper, middle, and lower back. Hold this stretch for as long as it feels comfortable, or about 15 to 30 seconds. 7. Return to the starting position with a flat back while you are on your hands and knees. 8. Let your back sway by pressing your stomach toward the floor. Lift your buttocks toward the ceiling. 9. Hold this position for 15 to 30 seconds. 10. Repeat 2 to 4 times. Follow-up care is a key part of your treatment and safety. Be sure to make and go to all appointments, and call your doctor if you are having problems. It's also a good idea to know your test results and keep a list of the medicines you take. Where can you learn more? Go to http://www.wells.com/  Enter T094 in the search box to learn more about \"Low Back Arthritis: Exercises. \"  Current as of: November 16, 2020               Content Version: 12.8  © 2006-2021 [employer]. Care instructions adapted under license by [employer] (which disclaims liability or warranty for this information). If you have questions about a medical condition or this instruction, always ask your healthcare professional. [address] any warranty or liability for your use of this information. Learning About Medial Branch Block and Neurotomy  What are medial branch block and neurotomy? Facet joints connect your vertebrae to each other. Problems in these joints can cause chronic (long-term) pain in the neck or back. Medial branch nerves are the nerves that carry many of the pain messages from your facet joints. Radiofrequency medial branch neurotomy is a type of medial branch neurotomy that is used to relieve arthritis pain. It uses radio waves to damage nerves in your neck or back so that they can no longer send pain messages to your brain.   Before your doctor knows if a neurotomy will help you, you will get a medial branch block to find out if certain nerves are the ones that are a source of your pain. You will need two separate visits to the outpatient center or hospital to have both procedures. You will need someone to drive you home. How is a medial branch block done? The doctor will use a tiny needle to numb the skin where you will get the block. Then the doctor puts the block needle into the numbed area. You may feel some pressure, but you should not feel pain. Using fluoroscopy (live X-ray) to guide the needle, the doctor injects medicine onto one or more nerves to make them numb. If you get relief from your pain in the next 4 to 6 hours, it's a sign that those nerves may be contributing to your pain. The relief will last only a short time. You may then have a medial branch neurotomy at a later visit to try to get longer relief. How is a medial branch neurotomy done? The doctor will use a tiny needle to numb the skin where you will get the neurotomy. Then the doctor puts the neurotomy needle into the numbed area. You may feel some pressure. Using fluoroscopy (live X-ray) to guide the needle, the doctor sends radio waves through the needle to the nerve for 60 to 90 seconds. The radio waves heat the nerve, which damages it. The doctor may do this several times. And more than one nerve may be treated. How long do medial branch block and neurotomy take? It takes 20 to 30 minutes to get the block. You can go home after the doctor watches you for about an hour. It takes 45 to 90 minutes to get a neurotomy, depending on how many nerves are heated. You will probably go home 30 to 60 minutes after the procedure. What can you expect after a neurotomy? You will get instructions on how to report how much pain you have when you are at home. You may feel a little sore or tender at the injection site at first. But after a successful neurotomy, most people have pain relief right away.  It often lasts for several months, but your pain may come back. If your pain does come back, it may mean that the damaged nerve has healed and can send pain messages again. Or it can mean that a different nerve is causing pain. Your doctor will discuss your options with you. Follow-up care is a key part of your treatment and safety. Be sure to make and go to all appointments, and call your doctor if you are having problems. It's also a good idea to know your test results and keep a list of the medicines you take. Where can you learn more? Go to http://www.gray.com/  Enter T494 in the search box to learn more about \"Learning About Medial Branch Block and Neurotomy. \"  Current as of: August 4, 2020               Content Version: 12.8  © 2006-2021 [employer]. Care instructions adapted under license by [employer] (which disclaims liability or warranty for this information). If you have questions about a medical condition or this instruction, always ask your healthcare professional. [address] any warranty or liability for your use of this information.

## 2021-07-06 NOTE — LETTER
7/6/2021    Patient: Carey Christensen   YOB: 1940   Date of Visit: 7/6/2021     Gallo Goyal, [id number]  [address] [address]  Via In Basket    Dear Gallo Goyal MD,      Thank you for referring Ms. Mark Anthony Henderson to [facility] for evaluation. My notes for this consultation are attached. If you have questions, please do not hesitate to call me. I look forward to following your patient along with you.       Sincerely,    Susy Martin MD

## 2021-07-13 DIAGNOSIS — M51.36 DDD (DEGENERATIVE DISC DISEASE), LUMBAR: ICD-10-CM

## 2021-07-13 DIAGNOSIS — M62.838 MUSCLE SPASM: ICD-10-CM

## 2021-07-13 DIAGNOSIS — M54.16 LUMBAR RADICULOPATHY: ICD-10-CM

## 2021-07-13 DIAGNOSIS — M47.816 LUMBAR FACET ARTHROPATHY: ICD-10-CM

## 2021-07-19 ENCOUNTER — HOSPITAL ENCOUNTER (OUTPATIENT)
Age: 81
Discharge: HOME | End: 2021-07-19
Attending: PHYSICAL MEDICINE & REHABILITATION
Payer: MEDICARE

## 2021-07-19 PROCEDURE — 72148 MRI LUMBAR SPINE W/O DYE: CPT

## 2021-07-27 RX ORDER — TRIAMTERENE/HYDROCHLOROTHIAZID 37.5-25 MG
TABLET ORAL
Qty: 45 TABLET | Refills: 3 | Status: SHIPPED | OUTPATIENT
Start: 2021-07-27 | End: 2021-10-17

## 2021-08-02 ENCOUNTER — OFFICE VISIT (OUTPATIENT)
Dept: ORTHOPEDIC SURGERY | Age: 81
End: 2021-08-02
Payer: MEDICARE

## 2021-08-02 VITALS
TEMPERATURE: 98 F | DIASTOLIC BLOOD PRESSURE: 65 MMHG | RESPIRATION RATE: 16 BRPM | HEART RATE: 66 BPM | HEIGHT: 61 IN | OXYGEN SATURATION: 97 % | WEIGHT: 168.6 LBS | SYSTOLIC BLOOD PRESSURE: 120 MMHG | BODY MASS INDEX: 31.83 KG/M2

## 2021-08-02 DIAGNOSIS — M25.512 BILATERAL SHOULDER PAIN, UNSPECIFIED CHRONICITY: ICD-10-CM

## 2021-08-02 DIAGNOSIS — M50.30 DDD (DEGENERATIVE DISC DISEASE), CERVICAL: ICD-10-CM

## 2021-08-02 DIAGNOSIS — M25.511 BILATERAL SHOULDER PAIN, UNSPECIFIED CHRONICITY: ICD-10-CM

## 2021-08-02 DIAGNOSIS — M54.12 CERVICAL RADICULOPATHY: ICD-10-CM

## 2021-08-02 DIAGNOSIS — M79.2 NEURITIS: ICD-10-CM

## 2021-08-02 DIAGNOSIS — M25.511 BILATERAL SHOULDER PAIN, UNSPECIFIED CHRONICITY: Primary | ICD-10-CM

## 2021-08-02 DIAGNOSIS — M54.16 LUMBAR RADICULOPATHY: ICD-10-CM

## 2021-08-02 DIAGNOSIS — M25.512 BILATERAL SHOULDER PAIN, UNSPECIFIED CHRONICITY: Primary | ICD-10-CM

## 2021-08-02 DIAGNOSIS — M54.41 CHRONIC BILATERAL LOW BACK PAIN WITH RIGHT-SIDED SCIATICA: ICD-10-CM

## 2021-08-02 DIAGNOSIS — G89.29 CHRONIC BILATERAL LOW BACK PAIN WITH RIGHT-SIDED SCIATICA: ICD-10-CM

## 2021-08-02 PROCEDURE — G8536 NO DOC ELDER MAL SCRN: HCPCS | Performed by: PHYSICAL MEDICINE & REHABILITATION

## 2021-08-02 PROCEDURE — G8432 DEP SCR NOT DOC, RNG: HCPCS | Performed by: PHYSICAL MEDICINE & REHABILITATION

## 2021-08-02 PROCEDURE — G8427 DOCREV CUR MEDS BY ELIG CLIN: HCPCS | Performed by: PHYSICAL MEDICINE & REHABILITATION

## 2021-08-02 PROCEDURE — 99214 OFFICE O/P EST MOD 30 MIN: CPT | Performed by: PHYSICAL MEDICINE & REHABILITATION

## 2021-08-02 PROCEDURE — G8754 DIAS BP LESS 90: HCPCS | Performed by: PHYSICAL MEDICINE & REHABILITATION

## 2021-08-02 PROCEDURE — G8399 PT W/DXA RESULTS DOCUMENT: HCPCS | Performed by: PHYSICAL MEDICINE & REHABILITATION

## 2021-08-02 PROCEDURE — 1101F PT FALLS ASSESS-DOCD LE1/YR: CPT | Performed by: PHYSICAL MEDICINE & REHABILITATION

## 2021-08-02 PROCEDURE — 1090F PRES/ABSN URINE INCON ASSESS: CPT | Performed by: PHYSICAL MEDICINE & REHABILITATION

## 2021-08-02 PROCEDURE — G8417 CALC BMI ABV UP PARAM F/U: HCPCS | Performed by: PHYSICAL MEDICINE & REHABILITATION

## 2021-08-02 PROCEDURE — G8752 SYS BP LESS 140: HCPCS | Performed by: PHYSICAL MEDICINE & REHABILITATION

## 2021-08-02 RX ORDER — GABAPENTIN 600 MG/1
1200 TABLET, FILM COATED ORAL DAILY
Qty: 180 TABLET | Refills: 1 | Status: SHIPPED | OUTPATIENT
Start: 2021-08-02 | End: 2021-08-02 | Stop reason: SDUPTHER

## 2021-08-02 NOTE — PROGRESS NOTES
[facility]  Bradford Perez., [address] [place] [address], [facility]  Phone: [phone]  Fax: [phone]    Pt's YOB: 1940    ASSESSMENT   Diagnoses and all orders for this visit:    1. Bilateral shoulder pain, unspecified chronicity  -     XR SHOULDER LT AP/LAT MIN 2 V; Future  -     XR SHOULDER RT AP/LAT MIN 2 V; Future    2. DDD (degenerative disc disease), cervical  -     gabapentin (Gralise) 600 mg Tb24; Take 1,200 mg by mouth daily. Max Daily Amount: 1,200 mg. Indications: neuropathic pain    3. Neuritis  -     gabapentin (Gralise) 600 mg Tb24; Take 1,200 mg by mouth daily. Max Daily Amount: 1,200 mg. Indications: neuropathic pain    4. Cervical radiculopathy  -     gabapentin (Gralise) 600 mg Tb24; Take 1,200 mg by mouth daily. Max Daily Amount: 1,200 mg. Indications: neuropathic pain    5. Chronic bilateral low back pain with right-sided sciatica  -     gabapentin (Gralise) 600 mg Tb24; Take 1,200 mg by mouth daily. Max Daily Amount: 1,200 mg. Indications: neuropathic pain    6. Lumbar radiculopathy  -     gabapentin (Gralise) 600 mg Tb24; Take 1,200 mg by mouth daily. Max Daily Amount: 1,200 mg. Indications: neuropathic pain         IMPRESSION AND PLAN:  Alie Umana is a 80 y.o. female with history of cervical and lumbar pain. Pt complains of pain across the lower back with intermittent pain radiating down the right leg. She also reports constant pain in the shoulders. Pt continues to take Gabitril 600 mg 2 tabs in the evening. 1) Pt was given information on lumbar and shoulder arthritis exercises. 2) Bilateral shoulder x-rays were ordered. 3) She received a refill of Gralise 600 mg 2 tabs QHS-- last refill sent 08/02/2021 -- will need refill beginning 11/29/2021-- give #180/1 RF. 4) Ms. Hao Adkins has a reminder for a \"due or due soon\" health maintenance.  I have asked that she contact her primary care provider, Kerwin Quiroz MD, for follow-up on this health maintenance. 5)  demonstrated consistency with prescribing. Follow-up and Dispositions    · Return in about 4 weeks (around 8/30/2021) for Diagnostic Test follow up, Medication follow up. HISTORY OF PRESENT ILLNESS:  Alie Umnaa is a 80 y.o. female with history of cervical and lumbar pain and presents to the office today for lumbar MRI follow up. Pt complains of pain across the lower back with intermittent pain radiating down the right leg. She reports the onset of pain in the upper back and in both shoulder and is concerned this may be stemming from the lower back since it did not improve with physical therapy. Pt notes constant pain in the shoulders which worsens with overhead motion. She admits that her shoulder pain disrupts her sleep but notes improvement when taking Tylenol as needed. Pt continues to take Gralise 600 mg 2 tabs in the evening. She notes that she recently purchased OTC topical hemp ointment but has not yet used it. Pt at this time desires to proceed with shoulder x-rays.     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    Band keratopathy of right eye 2/3/2018    Cataract     Cylindrical bronchiectasis (HCC)     Fracture     rt ankle as an adult    History of pneumonia     Hypertension     Ill-defined condition     Spasms to left side    Left arm pain     Lumbar spinal stenosis     Myofascial pain     Osteopenia     Sciatica of right side         Social History     Socioeconomic History    Marital status:      Spouse name: Not on file    Number of children: Not on file    Years of education: Not on file    Highest education level: Not on file   Occupational History    Not on file   Tobacco Use    Smoking status: Former Smoker     Packs/day: 1.00 Years: 10.00     Pack years: 10.00     Types: Cigarettes     Quit date: 1970     Years since quittin.6    Smokeless tobacco: Never Used   Substance and Sexual Activity    Alcohol use: Yes     Alcohol/week: 1.0 standard drinks     Types: 1 Glasses of wine per week     Comment: occasional wine    Drug use: No    Sexual activity: Yes     Partners: Male     Birth control/protection: None   Other Topics Concern    Not on file   Social History Narrative    Not on file     Social Determinants of Health     Financial Resource Strain:     Difficulty of Paying Living Expenses:    Food Insecurity:     Worried About Running Out of Food in the Last Year:     Ran Out of Food in the Last Year:    Transportation Needs:     Lack of Transportation (Medical):  Lack of Transportation (Non-Medical):    Physical Activity:     Days of Exercise per Week:     Minutes of Exercise per Session:    Stress:     Feeling of Stress :    Social Connections:     Frequency of Communication with Friends and Family:     Frequency of Social Gatherings with Friends and Family:     Attends Jainism Services:     Active Member of Clubs or Organizations:     Attends Club or Organization Meetings:     Marital Status:    Intimate Partner Violence:     Fear of Current or Ex-Partner:     Emotionally Abused:     Physically Abused:     Sexually Abused:        Current Outpatient Medications   Medication Sig Dispense Refill    gabapentin (Gralise) 600 mg Tb24 Take 1,200 mg by mouth daily. Max Daily Amount: 1,200 mg. Indications: neuropathic pain 180 Tablet 1    triamterene-hydroCHLOROthiazide (MAXZIDE) 37.5-25 mg per tablet TAKE ONE-HALF (1/2) TABLET DAILY 45 Tablet 3    zinc sulfate (ZINC-15 PO) Take 1 Tablet by mouth daily.  fluticasone propionate (FLONASE) 50 mcg/actuation nasal spray USE 2 SPRAYS IN EACH NOSTRIL DAILY 48 g 3    mv-min/vit C/glut/lysine/hb124 (AIRBORNE, LYSINE HCL, PO) Take  by mouth.       fexofenadine (ALLEGRA) 180 mg tablet Take 180 mg by mouth daily.  PROAIR HFA 90 mcg/actuation inhaler USE 2 INHALATIONS EVERY 4 HOURS AS NEEDED 3 Inhaler 3    EPINEPHrine (EPIPEN) 0.3 mg/0.3 mL injection 0.3 mL by IntraMUSCular route once as needed for up to 1 dose. 1 Syringe 1    melatonin 3 mg tablet Take 3 mg by mouth daily as needed (sleep).  Cholecalciferol, Vitamin D3, (VITAMIN D3) 1,000 unit cap Take 3,000 Units by mouth daily.  cycloSPORINE (RESTASIS) 0.05 % ophthalmic emulsion Administer 1 Drop to both eyes two (2) times a day.  MULTIVITAMIN W-MINERALS/LUTEIN (CENTRUM SILVER PO) Take 1 Tab by mouth daily.  co-enzyme Q-10 (CO Q-10) 100 mg capsule Take 100 mg by mouth daily.  BIOTIN PO Take 5,000 mcg by mouth daily.  vitamin C-biotin (HAIR-SKIN-NAILS, VIT C-BIOTIN,) 50 mg -1,250 mcg chew Take  by mouth daily. (Patient not taking: Reported on 8/2/2021)         Allergies   Allergen Reactions    Naprosyn [Naproxen] Other (comments)     Blood in urine           REVIEW OF SYSTEMS    Constitutional: Negative for fever, chills, or weight change. Respiratory: Negative for cough or shortness of breath. Cardiovascular: Negative for chest pain or palpitations. Gastrointestinal: Negative for acid reflux, change in bowel habits, or constipation. Genitourinary: Negative for dysuria and flank pain. Musculoskeletal: Positive for lumbar and shoulder pain. Neurological: Negative for headaches, dizziness, or numbness. Endo/Heme/Allergies: Negative for increased bruising. Psychiatric/Behavioral: Positive for difficulty with sleep. As per HPI    PHYSICAL EXAMINATION  Visit Vitals  /65   Pulse 66   Temp 98 °F (36.7 °C) (Temporal)   Resp 16   Ht 5' 1\" (1.549 m)   Wt 168 lb 9.6 oz (76.5 kg)   SpO2 97%   BMI 31.86 kg/m²       Constitutional: Awake, alert, and in no acute distress. Neurological: 1+ symmetrical DTRs in the upper extremities.  1+ symmetrical DTRs in the lower extremities. Sensation to light touch is intact. Negative Perry's sign bilaterally. Skin: warm, dry, and intact. Musculoskeletal: Very tight across the left upper trapezius. Good range of motion in the left shoulder. Abduction of the right shoulder to about 85 degrees. Positive Luis A Grosser' test on the right. Pain with internal and external rotation of the right shoulder. Positive empty can test on the right; negative on the left. Weakness with resisted abduction on the right; good strength with resisted adduction bilaterally. No pain with extension, axial loading, or forward flexion. No pain with internal or external rotation of her hips. Negative straight leg raise bilaterally. Biceps  Triceps Deltoids Wrist Ext Wrist Flex Hand Intrin   Right +4/5 +4/5 +4/5 +4/5 +4/5 +4/5   Left +4/5 +4/5 +4/5 +4/5 +4/5 +4/5      Hip Flex  Quads Hamstrings Ankle DF EHL Ankle PF   Right +4/5 +4/5 +4/5 +4/5 +4/5 +4/5   Left +4/5 +4/5 +4/5 +4/5 +4/5 +4/5     IMAGING:    Cervical 2V x-rays from 2/2/2021 was personally reviewed with the Pt and demonstrated:  Some obstruction to visualization due to necklace. Possibly mild disc space narrowing at C5-C6. No acute pathology identified.      Lumbar spine MRI from 7/19/2021 was personally reviewed with the Pt and demonstrated:  Results from [facility] Only encounter on 07/13/21    MRI LUMB SPINE WO CONT    Narrative  EXAM: MRI LUMB SPINE WO CONT    CLINICAL INDICATIONS/HISTORY: increased lumbar pain with right radicular  symptoms despite physical therapy. COMPARISON: June 2016 MRI    Technique: Multi-sequence multiplanar MR imaging obtained centered on the lumbar  spine. FINDINGS:    Alignment: Intact lordosis  Vertebral body height: Normal  Marrow signal: Unremarkable  Conus: T12-L1    Axial imaging correlation:    T12-L1: Patent canal and foramina. L1-2: Patent canal and foramina. L2-3: Mild disc bulge. Some disc desiccation. Mild facet arthropathy.  Patent  canal and foramina. L3-4: Slightly more prominent disc bulge and facet arthropathy. Low-grade facet  inflammation on the left. Slight bulging posterior epidural fat. Minor spinal  stenosis in AP dimension. Mild to moderate left foraminal stenosis. L4-5: Mild disc bulge. Bilateral facet arthropathy with trace facet  inflammation. Patent canal and foramina. L5-S1: Focal central to right paracentral disc protrusion with annular tear. Bilateral facet arthropathy. Patent canal and foramina. Other structures: Unremarkable. Impression  1. Relatively mild multilevel degenerative findings along lumbar spine  -Overall slight progression compared with 2016  -There is no high-grade spinal stenosis  -There is some distortion of the exiting right L3 nerve from disc and facet  pathology at the right foramen which does not look appreciably changed.     Cervical Spine MRI from 11/08/2016 was personally reviewed with the Pt and demonstrated:  Results from [place] encounter on 11/08/16   MRI CERV SPINE WO CONT    Narrative MRI OF CERVICAL SPINE WITHOUT CONTRAST    CPT CODE: 08146    HISTORY: Chronic neck pain extending into the left shoulder with progressive  paresthesias left hand. COMPARISON: MRI cervical spine 4/10/2014. FINDINGS:     There is normal anatomic alignment of the cervical spine. Vertebral body heights  are maintained. Craniocervical junction and posterior elements are intact. Prevertebral soft tissues are normal. Incidentally imaged intracranial soft  tissues demonstrate no acute abnormalities. Within limitations of motion  artifact, cervical spinal cord maintains normal caliber, signal intensity and  morphology throughout. Cervical soft tissues demonstrate no acute abnormalities. C2/C3: Central canal and neural foramina are patent. C3/C4: Central canal and neural foramina are patent. C4/C5: Central canal and neural foramina are patent. C5/C6: Broad-based disc protrusion.  Contact of the ventral cord. CSF dorsal to  the cord is maintained. Probable moderate right greater than left foraminal  stenoses. C6/C7: Mild bulging of the disc. Probable mild bilateral foraminal narrowing. C7/T1: Central canal and neural foramina are patent. T1/T2 through T4/T5: Central canal and foramina are adequate on the sagittal  images.               Impression IMPRESSION:    Allowing for differences in technique, no substantial interval change since  2014. Degenerative changes of the cervical spine are most pronounced at C5/C6 where  there is a disc protrusion which contacts the ventral cord and moderate  bilateral foraminal stenosis. Rachel Hester           Lumbar Spine MRI from 06/30/2016 was personally reviewed with the Pt and demonstrated:  Results from [place] encounter on 06/30/16   MRI LUMB SPINE WO CONT    Narrative Procedure: MRI of the lumbar spine without contrast.    CPT code: 12065    Comparisons: April 10, 2014. Indications: new left radicular symptoms; increased muscle spasm and pain for 3  months     Technique: T1 weighted, T2 FSE with fat saturation, FSE inversion recovery  sagittal images are supplemented by T2 weighted with fat saturation and T1  weighted axial images. Findings:        Sagittal images reveal overall normal vertebral body morphology. No fractures  noted. No suspicious lesions. Conus medullaris ends at the L1 vertebral body level. Correlation of axial and sagittal images reveals the following:    At L1-L2: No significant disc pathology. No significant facet arthropathy. No  central canal or foraminal stenosis. At L2-L3: Mild disc osteophyte complex at the posterior lateral corners. Mild  facet arthropathy. No central canal stenosis. Mild to moderate bilateral  foraminal stenosis. At L3-L4: Mild disc osteophyte complex at the posterior lateral corners. Mild to  moderate facet arthropathy.  Posterior epidural lipomatosis with some mass  effect. No central canal stenosis. Mild left and mild to moderate right  foraminal stenosis. At L4-L5: Mild disc osteophyte complex at the posterior lateral corners. Mild to  moderate facet arthropathy. No central canal stenosis. Mild to moderate  foraminal stenosis. At L5-S1: Trace retrolisthesis L5 on S1. Overlying broad-based disc osteophyte  complex. Moderate facet arthropathy. No central canal stenosis. Moderate  foraminal stenosis. Visualized portions of the sacroiliac joints are unremarkable. Incidentally  imaged retroperitoneal structures are unremarkable as well.               Impression Impression:    Degenerative changes as above. Interval resolution of disc extrusion at L5-S1  seen on prior study from 2014.         Written by Ernesto Kelley, as dictated by Haim Wang MD.  I, Dr. Haim Wang confirm that all documentation is accurate. No

## 2021-08-02 NOTE — LETTER
8/4/2021    Patient: Marta Summers   YOB: 1940   Date of Visit: 8/2/2021     Huyen Leon, [address]  [place] [address]  Via In Basket    Dear Huyen Leon MD,      Thank you for referring Ms. Padma Pinzon to [facility] for evaluation. My notes for this consultation are attached. If you have questions, please do not hesitate to call me. I look forward to following your patient along with you.       Sincerely,    Jose Antonio Jain MD

## 2021-08-02 NOTE — PATIENT INSTRUCTIONS
Low Back Arthritis: Exercises  Introduction  Here are some examples of typical rehabilitation exercises for your condition. Start each exercise slowly. Ease off the exercise if you start to have pain. Your doctor or physical therapist will tell you when you can start these exercises and which ones will work best for you. When you are not being active, find a comfortable position for rest. Some people are comfortable on the floor or a medium-firm bed with a small pillow under their head and another under their knees. Some people prefer to lie on their side with a pillow between their knees. Don't stay in one position for too long. Take short walks (10 to 20 minutes) every 2 to 3 hours. Avoid slopes, hills, and stairs until you feel better. Walk only distances you can manage without pain, especially leg pain. How to do the exercises  Pelvic tilt   1. Lie on your back with your knees bent. 2. \"Brace\" your stomach--tighten your muscles by pulling in and imagining your belly button moving toward your spine. 3. Press your lower back into the floor. You should feel your hips and pelvis rock back. 4. Hold for 6 seconds while breathing smoothly. 5. Relax and allow your pelvis and hips to rock forward. 6. Repeat 8 to 12 times. Back stretches   1. Get down on your hands and knees on the floor. 2. Relax your head and allow it to droop. Round your back up toward the ceiling until you feel a nice stretch in your upper, middle, and lower back. Hold this stretch for as long as it feels comfortable, or about 15 to 30 seconds. 3. Return to the starting position with a flat back while you are on your hands and knees. 4. Let your back sway by pressing your stomach toward the floor. Lift your buttocks toward the ceiling. 5. Hold this position for 15 to 30 seconds. 6. Repeat 2 to 4 times. Follow-up care is a key part of your treatment and safety.  Be sure to make and go to all appointments, and call your doctor if you are having problems. It's also a good idea to know your test results and keep a list of the medicines you take. Where can you learn more? Go to http://www.[employer].com/  Enter T094 in the search box to learn more about \"Low Back Arthritis: Exercises. \"  Current as of: November 16, 2020               Content Version: 12.8  © 2727-3447 [employer]. Care instructions adapted under license by [employer] (which disclaims liability or warranty for this information). If you have questions about a medical condition or this instruction, always ask your healthcare professional. [place] [address] any warranty or liability for your use of this information.

## 2021-08-04 ENCOUNTER — HOSPITAL ENCOUNTER (OUTPATIENT)
Dept: GENERAL RADIOLOGY | Age: 81
Discharge: HOME | End: 2021-08-04
Payer: MEDICARE

## 2021-08-04 PROCEDURE — 73030 X-RAY EXAM OF SHOULDER: CPT

## 2021-08-04 RX ORDER — GABAPENTIN 600 MG/1
1200 TABLET, FILM COATED ORAL DAILY
Qty: 180 TABLET | Refills: 1 | Status: SHIPPED | OUTPATIENT
Start: 2021-08-04 | End: 2021-08-25 | Stop reason: SDUPTHER

## 2021-08-06 ENCOUNTER — TRANSCRIBE ORDER (OUTPATIENT)
Dept: SCHEDULING | Age: 81
End: 2021-08-06

## 2021-08-06 DIAGNOSIS — Z12.31 SCREENING MAMMOGRAM FOR HIGH-RISK PATIENT: Primary | ICD-10-CM

## 2021-08-24 DIAGNOSIS — M54.41 CHRONIC BILATERAL LOW BACK PAIN WITH RIGHT-SIDED SCIATICA: ICD-10-CM

## 2021-08-24 DIAGNOSIS — M54.16 LUMBAR RADICULOPATHY: ICD-10-CM

## 2021-08-24 DIAGNOSIS — M54.12 CERVICAL RADICULOPATHY: ICD-10-CM

## 2021-08-24 DIAGNOSIS — G89.29 CHRONIC BILATERAL LOW BACK PAIN WITH RIGHT-SIDED SCIATICA: ICD-10-CM

## 2021-08-24 DIAGNOSIS — M50.30 DDD (DEGENERATIVE DISC DISEASE), CERVICAL: ICD-10-CM

## 2021-08-24 DIAGNOSIS — M79.2 NEURITIS: ICD-10-CM

## 2021-08-24 NOTE — TELEPHONE ENCOUNTER
Patient called to request refill for gabapentin (Gralise) 600 mg Tb24     Confirmed pharmacy:   [address], [address] [place] [address]   Phone:  [phone]  Fax:  [phone]

## 2021-08-25 RX ORDER — GABAPENTIN 600 MG/1
1200 TABLET, FILM COATED ORAL DAILY
Qty: 180 TABLET | Refills: 1 | Status: SHIPPED | OUTPATIENT
Start: 2021-08-25 | End: 2021-10-23

## 2021-08-27 ENCOUNTER — HOSPITAL ENCOUNTER (OUTPATIENT)
Dept: MAMMOGRAPHY | Age: 81
Discharge: HOME | End: 2021-08-27
Attending: INTERNAL MEDICINE
Payer: MEDICARE

## 2021-08-27 DIAGNOSIS — Z12.31 SCREENING MAMMOGRAM FOR HIGH-RISK PATIENT: ICD-10-CM

## 2021-08-27 PROCEDURE — 77063 BREAST TOMOSYNTHESIS BI: CPT

## 2021-09-14 NOTE — PROGRESS NOTES
[facility]  [address] [place], [address]  Phone: [phone]  Fax: [phone]    Pt's YOB: 1940    ASSESSMENT   Diagnoses and all orders for this visit:    1. Neuritis    2. Primary osteoarthritis, right shoulder  -     diclofenac (Voltaren) 1 % gel; Apply 4 grams to affected joint up to 4 times per day, maximum 16 grams per joint per day. Dispense 100 gram tubes    3. Disorder of right rotator cuff  -     diclofenac (Voltaren) 1 % gel; Apply 4 grams to affected joint up to 4 times per day, maximum 16 grams per joint per day. Dispense 100 gram tubes    4. Bilateral shoulder pain, unspecified chronicity  -     diclofenac (Voltaren) 1 % gel; Apply 4 grams to affected joint up to 4 times per day, maximum 16 grams per joint per day. Dispense 100 gram tubes    5. Lumbar radiculopathy    6. Lumbar facet arthropathy    7. History of GI bleed    8. Muscle spasm         IMPRESSION AND PLAN:  Twanna Sandhoff is a 80 y.o. female with history of cervical, shoulder, and lumbar pain and presents to the office today for shoulder XR follow up. Pt at this time desires to continue with current care and proceed with medication evaluation and with HEP. 1) Pt was given information on lumbar and shoulder arthritis exercises; pt has weakness with right rotator cuff  . 2) She will continue taking Gralise 600 mg as prescribed and does not need a refill at this time. 3) Pt was prescribed Voltaren 1% gel for her shoulder pain. 4) Ms. Lonnie Pham has a reminder for a \"due or due soon\" health maintenance. I have asked that she contact her primary care provider, Fanny Piedra MD, for follow-up on this health maintenance. 5)  demonstrated consistency with prescribing.   6) Pt will use the Voltaren gel 2-3 x /day - especially in the evening and will also use Tylenol before bed  7) Pt will call the office if she would like to go to PT for her shoulder  8) Pt is not a candidate for oral NSAIDs due to history of gi bleed  Follow-up and Dispositions    · Return in about 4 months (around 1/15/2022) for Medication follow up. HISTORY OF PRESENT ILLNESS:  Manolo Reynaga is a 80 y.o. female with history of cervical, shoulder, and lumbar pain and presents to the office today for shoulder XR follow up. She complains of continued pain in the right shoulder, that is more severe at night, particularly when she lays on her right side. Pt takes Gralise 600 mg 2 tabs in the evening and recently received a refill. Discussed therapy and other treatment options for her shoulder. Pt is not able to take oral NSAIDs due to history of gi bleed. She has not tried Voltaren gel previously. Pt at this time desires to continue with current care and proceed with medication evaluation and with HEP.       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    Band keratopathy of right eye 2/3/2018    Cataract     Cylindrical bronchiectasis (HCC)     Fracture     rt ankle as an adult    History of pneumonia     Hypertension     Ill-defined condition     Spasms to left side    Left arm pain     Lumbar spinal stenosis     Myofascial pain     Osteopenia     Sciatica of right side         Social History     Socioeconomic History    Marital status:      Spouse name: Not on file    Number of children: Not on file    Years of education: Not on file    Highest education level: Not on file   Occupational History    Not on file   Tobacco Use    Smoking status: Former Smoker     Packs/day: 1.00     Years: 10.00     Pack years: 10.00     Types: Cigarettes     Quit date: 1970     Years since quittin.7    Smokeless tobacco: Never Used   Substance and Sexual Activity    Alcohol use: Yes     Alcohol/week: 1.0 standard drinks     Types: 1 Glasses of wine per week     Comment: occasional wine    Drug use: No    Sexual activity: Yes     Partners: Male     Birth control/protection: None   Other Topics Concern    Not on file   Social History Narrative    Not on file     Social Determinants of Health     Financial Resource Strain:     Difficulty of Paying Living Expenses:    Food Insecurity:     Worried About Running Out of Food in the Last Year:     Ran Out of Food in the Last Year:    Transportation Needs:     Lack of Transportation (Medical):  Lack of Transportation (Non-Medical):    Physical Activity:     Days of Exercise per Week:     Minutes of Exercise per Session:    Stress:     Feeling of Stress :    Social Connections:     Frequency of Communication with Friends and Family:     Frequency of Social Gatherings with Friends and Family:     Attends Protestant Services:     Active Member of Clubs or Organizations:     Attends Club or Organization Meetings:     Marital Status:    Intimate Partner Violence:     Fear of Current or Ex-Partner:     Emotionally Abused:     Physically Abused:     Sexually Abused:        Current Outpatient Medications   Medication Sig Dispense Refill    diclofenac (Voltaren) 1 % gel Apply 4 grams to affected joint up to 4 times per day, maximum 16 grams per joint per day. Dispense 100 gram tubes 100 g 2    gabapentin (Gralise) 600 mg Tb24 Take 1,200 mg by mouth daily. Max Daily Amount: 1,200 mg. Indications: neuropathic pain 180 Tablet 1    triamterene-hydroCHLOROthiazide (MAXZIDE) 37.5-25 mg per tablet TAKE ONE-HALF (1/2) TABLET DAILY 45 Tablet 3    fluticasone propionate (FLONASE) 50 mcg/actuation nasal spray USE 2 SPRAYS IN EACH NOSTRIL DAILY 48 g 3    mv-min/vit C/glut/lysine/hb124 (AIR[place]ORNE, LYSINE HCL, PO) Take  by mouth.  fexofenadine (ALLEGRA) 180 mg tablet Take 180 mg by mouth daily.      [address] 90 mcg/actuation inhaler USE 2 INHALATIONS EVERY 4 HOURS AS NEEDED 3 Inhaler 3    EPINEPHrine (EPIPEN) 0.3 mg/0.3 mL injection 0.3 mL by IntraMUSCular route once as needed for up to 1 dose. 1 Syringe 1    melatonin 3 mg tablet Take 3 mg by mouth daily as needed (sleep).  Cholecalciferol, Vitamin D3, (VITAMIN D3) 1,000 unit cap Take 3,000 Units by mouth daily.  cycloSPORINE (RESTASIS) 0.05 % ophthalmic emulsion Administer 1 Drop to both eyes two (2) times a day.  MULTIVITAMIN W-MINERALS/LUTEIN (CENTRUM SILVER PO) Take 1 Tab by mouth daily.  co-enzyme Q-10 (CO Q-10) 100 mg capsule Take 100 mg by mouth daily.  BIOTIN PO Take 5,000 mcg by mouth daily.  zinc sulfate (ZINC-15 PO) Take 1 Tablet by mouth daily. (Patient not taking: Reported on 9/15/2021)         Allergies   Allergen Reactions    Naprosyn [Naproxen] Other (comments)     Blood in urine           REVIEW OF SYSTEMS    Constitutional: Negative for fever, chills, or weight change. Respiratory: Negative for cough or shortness of breath. Cardiovascular: Negative for chest pain or palpitations. Gastrointestinal: Negative for acid reflux, change in bowel habits, or constipation. Genitourinary: Negative for dysuria and flank pain. Musculoskeletal: Positive for lumbar and bilateral shoulder pain. Neurological: Negative for headaches, dizziness, or numbness. Endo/Heme/Allergies: Negative for increased bruising. Psychiatric/Behavioral: Positive for difficulty with sleep. As per HPI    PHYSICAL EXAMINATION  Visit Vitals  Pulse 63   Temp (!) 96.7 °F (35.9 °C) (Tympanic)   Resp 16   Ht 5' 1\" (1.549 m)   Wt 166 lb (75.3 kg)   SpO2 97%   BMI 31.37 kg/m²       Constitutional: Awake, alert, and in no acute distress. Neurological: 1+ symmetrical DTRs in the upper extremities. 1+ symmetrical DTRs in the lower extremities. Sensation to light touch is intact. Negative Perry's sign bilaterally. Skin: warm, dry, and intact.    Musculoskeletal: Mild weakness with the empty can test on the right. Weakness with resisted abduction and adduction of the right arm. Tight across the left upper trapezius. No pain with extension, axial loading, or forward flexion. No pain with internal or external rotation of her hips. Negative straight leg raise bilaterally. Biceps  Triceps Deltoids Wrist Ext Wrist Flex Hand Intrin   Right +4/5 +4/5 +4/5 +4/5 +4/5 +4/5   Left +4/5 +4/5 +4/5 +4/5 +4/5 +4/5      Hip Flex  Quads Hamstrings Ankle DF EHL Ankle PF   Right +4/5 +4/5 +4/5 +4/5 +4/5 +4/5   Left +4/5 +4/5 +4/5 +4/5 +4/5 +4/5     IMAGING:    Right shoulder x-rays from 8/4/2021 were personally reviewed with the patient and demonstrated:  FINDINGS:   Bones/joints: Mild osteophyte formation at the acromion and acromioclavicular  joint. Otherwise unremarkable. Soft tissues: Unremarkable.     IMPRESSION  No significant radiographic abnormalities.     Mild acromial and AC joint degenerative change.     Left shoulder x-rays from 8/4/2021 were personally reviewed with the patient and demonstrated:  FINDINGS:   Bones/joints: Unremarkable. Soft tissues: Unremarkable.     IMPRESSION  No significant radiographic abnormalities    Cervical 2V x-rays from 2/2/2021 was personally reviewed with the Pt and demonstrated:  Some obstruction to visualization due to necklace. Possibly mild disc space narrowing at C5-C6.  No acute pathology identified.      Lumbar spine MRI from 7/19/2021 was personally reviewed with the Pt and demonstrated:  Results from [facility] encounter on 07/13/21     MRI LUMB SPINE WO CONT     Narrative  EXAM: MRI LUMB SPINE WO CONT     CLINICAL INDICATIONS/HISTORY: increased lumbar pain with right radicular  symptoms despite physical therapy.     COMPARISON: June 2016 MRI     Technique: Multi-sequence multiplanar MR imaging obtained centered on the lumbar  spine.     FINDINGS:     Alignment: Intact lordosis  Vertebral body height: Normal  Marrow signal: Unremarkable  Conus: T12-L1     Axial imaging correlation:     T12-L1: Patent canal and foramina.     L1-2: Patent canal and foramina.     L2-3: Mild disc bulge. Some disc desiccation. Mild facet arthropathy. Patent  canal and foramina.     L3-4: Slightly more prominent disc bulge and facet arthropathy. Low-grade facet  inflammation on the left. Slight bulging posterior epidural fat. Minor spinal  stenosis in AP dimension. Mild to moderate left foraminal stenosis.     L4-5: Mild disc bulge. Bilateral facet arthropathy with trace facet  inflammation. Patent canal and foramina.     L5-S1: Focal central to right paracentral disc protrusion with annular tear. Bilateral facet arthropathy. Patent canal and foramina.     Other structures: Unremarkable.     Impression  1. Relatively mild multilevel degenerative findings along lumbar spine  -Overall slight progression compared with 2016  -There is no high-grade spinal stenosis  -There is some distortion of the exiting right L3 nerve from disc and facet  pathology at the right foramen which does not look appreciably changed.     Cervical Spine MRI from 11/08/2016 was personally reviewed with the Pt and demonstrated:  Results from [place] encounter on 11/08/16   MRI CERV SPINE WO CONT    Narrative MRI OF CERVICAL SPINE WITHOUT CONTRAST    CPT CODE: 06879    HISTORY: Chronic neck pain extending into the left shoulder with progressive  paresthesias left hand. COMPARISON: MRI cervical spine 4/10/2014. FINDINGS:     There is normal anatomic alignment of the cervical spine. Vertebral body heights  are maintained. Craniocervical junction and posterior elements are intact. Prevertebral soft tissues are normal. Incidentally imaged intracranial soft  tissues demonstrate no acute abnormalities. Within limitations of motion  artifact, cervical spinal cord maintains normal caliber, signal intensity and  morphology throughout.  Cervical soft tissues demonstrate no acute abnormalities. C2/C3: Central canal and neural foramina are patent. C3/C4: Central canal and neural foramina are patent. C4/C5: Central canal and neural foramina are patent. C5/C6: Broad-based disc protrusion. Contact of the ventral cord. CSF dorsal to  the cord is maintained. Probable moderate right greater than left foraminal  stenoses. C6/C7: Mild bulging of the disc. Probable mild bilateral foraminal narrowing. C7/T1: Central canal and neural foramina are patent. T1/T2 through T4/T5: Central canal and foramina are adequate on the sagittal  images.               Impression IMPRESSION:    Allowing for differences in technique, no substantial interval change since  2014. Degenerative changes of the cervical spine are most pronounced at C5/C6 where  there is a disc protrusion which contacts the ventral cord and moderate  bilateral foraminal stenosis. [facility]           Lumbar Spine MRI from 06/30/2016 was personally reviewed with the Pt and demonstrated:  Results from [place] encounter on 06/30/16   MRI LUMB SPINE WO CONT    Narrative Procedure: MRI of the lumbar spine without contrast.    CPT code: 63765    Comparisons: April 10, 2014. Indications: new left radicular symptoms; increased muscle spasm and pain for 3  months     Technique: T1 weighted, T2 FSE with fat saturation, FSE inversion recovery  sagittal images are supplemented by T2 weighted with fat saturation and T1  weighted axial images. Findings:        Sagittal images reveal overall normal vertebral body morphology. No fractures  noted. No suspicious lesions. Conus medullaris ends at the L1 vertebral body level. Correlation of axial and sagittal images reveals the following:    At L1-L2: No significant disc pathology. No significant facet arthropathy. No  central canal or foraminal stenosis. At L2-L3: Mild disc osteophyte complex at the posterior lateral corners. Mild  facet arthropathy.  No central canal stenosis. Mild to moderate bilateral  foraminal stenosis. At L3-L4: Mild disc osteophyte complex at the posterior lateral corners. Mild to  moderate facet arthropathy. Posterior epidural lipomatosis with some mass  effect. No central canal stenosis. Mild left and mild to moderate right  foraminal stenosis. At L4-L5: Mild disc osteophyte complex at the posterior lateral corners. Mild to  moderate facet arthropathy. No central canal stenosis. Mild to moderate  foraminal stenosis. At L5-S1: Trace retrolisthesis L5 on S1. Overlying broad-based disc osteophyte  complex. Moderate facet arthropathy. No central canal stenosis. Moderate  foraminal stenosis. Visualized portions of the sacroiliac joints are unremarkable. Incidentally  imaged retroperitoneal structures are unremarkable as well.               Impression Impression:    Degenerative changes as above. Interval resolution of disc extrusion at L5-S1  seen on prior study from 2014.         Written by Traci Stokes, as dictated by Selma Sidhu MD.  I, Dr. Selma Sidhu confirm that all documentation is accurate.

## 2021-09-15 ENCOUNTER — OFFICE VISIT (OUTPATIENT)
Dept: ORTHOPEDIC SURGERY | Age: 81
End: 2021-09-15
Payer: MEDICARE

## 2021-09-15 VITALS
OXYGEN SATURATION: 97 % | WEIGHT: 166 LBS | BODY MASS INDEX: 31.34 KG/M2 | HEART RATE: 63 BPM | HEIGHT: 61 IN | RESPIRATION RATE: 16 BRPM | TEMPERATURE: 96.7 F

## 2021-09-15 DIAGNOSIS — M79.2 NEURITIS: Primary | ICD-10-CM

## 2021-09-15 DIAGNOSIS — M25.512 BILATERAL SHOULDER PAIN, UNSPECIFIED CHRONICITY: ICD-10-CM

## 2021-09-15 DIAGNOSIS — M54.16 LUMBAR RADICULOPATHY: ICD-10-CM

## 2021-09-15 DIAGNOSIS — M19.011 PRIMARY OSTEOARTHRITIS, RIGHT SHOULDER: ICD-10-CM

## 2021-09-15 DIAGNOSIS — M67.911 DISORDER OF RIGHT ROTATOR CUFF: ICD-10-CM

## 2021-09-15 DIAGNOSIS — M25.511 BILATERAL SHOULDER PAIN, UNSPECIFIED CHRONICITY: ICD-10-CM

## 2021-09-15 DIAGNOSIS — M62.838 MUSCLE SPASM: ICD-10-CM

## 2021-09-15 DIAGNOSIS — Z87.19 HISTORY OF GI BLEED: ICD-10-CM

## 2021-09-15 DIAGNOSIS — M47.816 LUMBAR FACET ARTHROPATHY: ICD-10-CM

## 2021-09-15 PROCEDURE — G8756 NO BP MEASURE DOC: HCPCS | Performed by: PHYSICAL MEDICINE & REHABILITATION

## 2021-09-15 PROCEDURE — G8417 CALC BMI ABV UP PARAM F/U: HCPCS | Performed by: PHYSICAL MEDICINE & REHABILITATION

## 2021-09-15 PROCEDURE — 99214 OFFICE O/P EST MOD 30 MIN: CPT | Performed by: PHYSICAL MEDICINE & REHABILITATION

## 2021-09-15 PROCEDURE — G8536 NO DOC ELDER MAL SCRN: HCPCS | Performed by: PHYSICAL MEDICINE & REHABILITATION

## 2021-09-15 PROCEDURE — G8510 SCR DEP NEG, NO PLAN REQD: HCPCS | Performed by: PHYSICAL MEDICINE & REHABILITATION

## 2021-09-15 PROCEDURE — 1090F PRES/ABSN URINE INCON ASSESS: CPT | Performed by: PHYSICAL MEDICINE & REHABILITATION

## 2021-09-15 PROCEDURE — G8399 PT W/DXA RESULTS DOCUMENT: HCPCS | Performed by: PHYSICAL MEDICINE & REHABILITATION

## 2021-09-15 PROCEDURE — G8427 DOCREV CUR MEDS BY ELIG CLIN: HCPCS | Performed by: PHYSICAL MEDICINE & REHABILITATION

## 2021-09-15 PROCEDURE — 1101F PT FALLS ASSESS-DOCD LE1/YR: CPT | Performed by: PHYSICAL MEDICINE & REHABILITATION

## 2021-09-15 RX ORDER — DICLOFENAC SODIUM 10 MG/G
GEL TOPICAL
Qty: 100 G | Refills: 2 | Status: SHIPPED | OUTPATIENT
Start: 2021-09-15 | End: 2022-05-23

## 2021-09-15 NOTE — LETTER
9/15/2021    Patient: Zoraida Ayala   YOB: 1940   Date of Visit: 9/15/2021     Snow Baez, [address]  [place] [address]  V[address] In [address]    Dear Snow Baez MD,      Thank you for referring Ms. Polly Avelar to [facility] for evaluation. My notes for this consultation are attached. If you have questions, please do not hesitate to call me. I look forward to following your patient along with you.       Sincerely,    Romana Degree, MD

## 2021-09-15 NOTE — PATIENT INSTRUCTIONS
Low Back Arthritis: Exercises  Introduction  Here are some examples of typical rehabilitation exercises for your condition. Start each exercise slowly. Ease off the exercise if you start to have pain. Your doctor or physical therapist will tell you when you can start these exercises and which ones will work best for you. When you are not being active, find a comfortable position for rest. Some people are comfortable on the floor or a medium-firm bed with a small pillow under their head and another under their knees. Some people prefer to lie on their side with a pillow between their knees. Don't stay in one position for too long. Take short walks (10 to 20 minutes) every 2 to 3 hours. Avoid slopes, hills, and stairs until you feel better. Walk only distances you can manage without pain, especially leg pain. How to do the exercises  Pelvic tilt   1. Lie on your back with your knees bent. 2. \"Brace\" your stomach--tighten your muscles by pulling in and imagining your belly button moving toward your spine. 3. Press your lower back into the floor. You should feel your hips and pelvis rock back. 4. Hold for 6 seconds while breathing smoothly. 5. Relax and allow your pelvis and hips to rock forward. 6. Repeat 8 to 12 times. Back stretches   1. Get down on your hands and knees on the floor. 2. Relax your head and allow it to droop. Round your back up toward the ceiling until you feel a nice stretch in your upper, middle, and lower back. Hold this stretch for as long as it feels comfortable, or about 15 to 30 seconds. 3. Return to the starting position with a flat back while you are on your hands and knees. 4. Let your back sway by pressing your stomach toward the floor. Lift your buttocks toward the ceiling. 5. Hold this position for 15 to 30 seconds. 6. Repeat 2 to 4 times. Follow-up care is a key part of your treatment and safety.  Be sure to make and go to all appointments, and call your doctor if you are having problems. It's also a good idea to know your test results and keep a list of the medicines you take. Where can you learn more? Go to http://www.[employer].com/  Enter T094 in the search box to learn more about \"Low Back Arthritis: Exercises. \"  Current as of: November 16, 2020               Content Version: 12.8  © 2006-2021 [employer]. Care instructions adapted under license by [employer] (which disclaims liability or warranty for this information). If you have questions about a medical condition or this instruction, always ask your healthcare professional. [place] [address] any warranty or liability for your use of this information. Shoulder Arthritis: Exercises  Introduction  Here are some examples of exercises for you to try. The exercises may be suggested for a condition or for rehabilitation. Start each exercise slowly. Ease off the exercises if you start to have pain. You will be told when to start these exercises and which ones will work best for you. How to do the exercises  Shoulder flexion (lying down)   To make a wand for this exercise, use a piece of PVC pipe or a broom handle with the broom removed. Make the wand about a foot wider than your shoulders. 7. Lie on your back, holding a wand with both hands. Your palms should face down as you hold the wand. 8. Keeping your elbows straight, slowly raise your arms over your head. Raise them until you feel a stretch in your shoulders, upper back, and chest.  9. Hold for 15 to 30 seconds. 10. Repeat 2 to 4 times. Shoulder rotation (lying down)   To make a wand for this exercise, use a piece of PVC pipe or a broom handle with the broom removed. Make the wand about a foot wider than your shoulders. 7. Lie on your back. Hold a wand with both hands with your elbows bent and palms up.   8. Keep your elbows close to your body, and move the wand across your body toward the sore arm. 9. Hold for 8 to 12 seconds. 10. Repeat 2 to 4 times. Shoulder internal rotation with towel   1. Hold a towel above and behind your head with the arm that is not sore. 2. With your sore arm, reach behind your back and grasp the towel. 3. With the arm above your head, pull the towel upward. Do this until you feel a stretch on the front and outside of your sore shoulder. 4. Hold 15 to 30 seconds. 5. Repeat 2 to 4 times. Shoulder blade squeeze   1. Stand with your arms at your sides, and squeeze your shoulder blades together. Do not raise your shoulders up as you squeeze. 2. Hold 6 seconds. 3. Repeat 8 to 12 times. Resisted rows   For this exercise, you will need elastic exercise material, such as surgical tubing or Thera-Band. 1. Put the band around a solid object at about waist level. (A bedpost will work well.) Each hand should hold an end of the band. 2. With your elbows at your sides and bent to 90 degrees, pull the band back. Your shoulder blades should move toward each other. Return to the starting position. 3. Repeat 8 to 12 times. External rotator strengthening exercise   1. Start by tying a piece of elastic exercise material to a doorknob. You can use surgical tubing or Thera-Band. (You may also hold one end of the band in each hand.)  2. Stand or sit with your shoulder relaxed and your elbow bent 90 degrees. Your upper arm should rest comfortably against your side. Squeeze a rolled towel between your elbow and your body for comfort. This will help keep your arm at your side. 3. Hold one end of the elastic band with the hand of the painful arm. 4. Start with your forearm across your belly. Slowly rotate the forearm out away from your body. Keep your elbow and upper arm tucked against the towel roll or the side of your body until you begin to feel tightness in your shoulder. Slowly move your arm back to where you started. 5. Repeat 8 to 12 times.     Internal rotator strengthening exercise   1. Start by tying a piece of elastic exercise material to a doorknob. You can use surgical tubing or Thera-Band. 2. Stand or sit with your shoulder relaxed and your elbow bent 90 degrees. Your upper arm should rest comfortably against your side. Squeeze a rolled towel between your elbow and your body for comfort. This will help keep your arm at your side. 3. Hold one end of the elastic band in the hand of the painful arm. 4. Slowly rotate your forearm toward your body until it touches your belly. Slowly move it back to where you started. 5. Keep your elbow and upper arm firmly tucked against the towel roll or at your side. 6. Repeat 8 to 12 times. Pendulum swing   If you have pain in your back, do not do this exercise. 1. Hold on to a table or the back of a chair with your good arm. Then bend forward a little and let your sore arm hang straight down. This exercise does not use the arm muscles. Rather, use your legs and your hips to create movement that makes your arm swing freely. 2. Use the movement from your hips and legs to guide the slightly swinging arm back and forth like a pendulum (or elephant trunk). Then guide it in circles that start small (about the size of a dinner plate). Make the circles a bit larger each day, as your pain allows. 3. Do this exercise for 5 minutes, 5 to 7 times each day. 4. As you have less pain, try bending over a little farther to do this exercise. This will increase the amount of movement at your shoulder. Follow-up care is a key part of your treatment and safety. Be sure to make and go to all appointments, and call your doctor if you are having problems. It's also a good idea to know your test results and keep a list of the medicines you take. Where can you learn more? Go to http://www.gray.com/  Enter H562 in the search box to learn more about \"Shoulder Arthritis: Exercises. \"  Current as of: November 16, 2020               Content Version: 12.8  © 2448-1588 [employer]. Care instructions adapted under license by [employer] (which disclaims liability or warranty for this information). If you have questions about a medical condition or this instruction, always ask your healthcare professional. [address] any warranty or liability for your use of this information.

## 2021-09-15 NOTE — PROGRESS NOTES
Chief Complaint   Patient presents with    Follow-up       Pt preferred language for health care discussion is english. Is someone accompanying this pt? no    Is the patient using any DME equipment during OV? no    Depression Screening:  3 most recent [facility] Screens 9/15/2021 4/12/2021 2/18/2021 10/27/2020 8/3/2020 8/22/2019 7/10/2019   PHQ Not Done - - - - Patient Decline - -   Little interest or pleasure in doing things Not at all Not at all Not at all Not at all - Not at all Not at all   Feeling down, depressed, irritable, or hopeless Not at all Not at all Not at all Several days - Not at all Not at all   Total Score PHQ 2 0 0 0 1 - 0 0       Learning Assessment:  Learning Assessment 10/4/2019 3/19/2014 11/16/2012   PRIMARY LEARNER Patient Patient Patient   HIGHEST LEVEL OF EDUCATION - PRIMARY LEARNER  4 YEARS OF COLLEGE 4 0011 Claudia Omalleye LEARNER NONE NONE -   CO-LEARNER CAREGIVER No No -   PRIMARY LANGUAGE ENGLISH ENGLISH ENGLISH   LEARNER PREFERENCE PRIMARY DEMONSTRATION LISTENING LISTENING   ANSWERED BY patient patient patient   RELATIONSHIP SELF SELF SELF       Abuse Screening:  Abuse Screening Questionnaire 5/14/2021 10/5/2020 10/4/2019 8/22/2019 4/2/2014   Do you ever feel afraid of your partner? N N N N N   Are you in a relationship with someone who physically or mentally threatens you? N N N N N   Is it safe for you to go home? Gardenia BUITRAGO       Fall Risk  Fall Risk Assessment, last 12 mths 9/15/2021   Able to walk? Yes   Fall in past 12 months? 0   Do you feel unsteady? 0   Are you worried about falling 0           Advance Directive:  1. Do you have an advance directive in place? Patient Reply:no    2. If not, would you like material regarding how to put one in place? Patient Reply: no    2. Per patient no changes to their ACP contact no. Coordination of Care:  1. Have you been to the ER, urgent care clinic since your last visit? Hospitalized since your last visit? no    2. Have you seen or consulted any other health care providers outside of the [address] [place] [address] since your last visit? Include any pap smears or colon screening.  no

## 2021-10-04 ENCOUNTER — APPOINTMENT (OUTPATIENT)
Dept: INTERNAL MEDICINE CLINIC | Age: 81
End: 2021-10-04

## 2021-10-04 ENCOUNTER — HOSPITAL ENCOUNTER (OUTPATIENT)
Dept: LAB | Age: 81
Discharge: HOME | End: 2021-10-04
Payer: MEDICARE

## 2021-10-04 DIAGNOSIS — R73.9 HYPERGLYCEMIA: ICD-10-CM

## 2021-10-04 LAB
EST. AVERAGE GLUCOSE BLD GHB EST-MCNC: 111 MG/DL
HBA1C MFR BLD: 5.5 % (ref 4.2–5.6)

## 2021-10-04 PROCEDURE — 36415 COLL VENOUS BLD VENIPUNCTURE: CPT

## 2021-10-04 PROCEDURE — 83036 HEMOGLOBIN GLYCOSYLATED A1C: CPT

## 2021-10-05 NOTE — PROGRESS NOTES
I will discuss her A1c of 5.5 at her upcoming clinic.     Dr. Rios Wallace  [facility], [facility], [address] [place] [address] Str.  Phone: [phone]  Fax: [phone]

## 2021-10-08 NOTE — PROGRESS NOTES
Alphonso Closs presents today for   Chief Complaint   Patient presents with   [address]     10-4-21   [facility] Wellness Visit            Coordination of Care:  1. Have you been to the ER, urgent care clinic since your last visit? Hospitalized since your last visit? no    2. Have you seen or consulted any other health care providers outside of the [address] [place] [address] since your last visit? Include any pap smears or colon screening.  yes

## 2021-10-11 ENCOUNTER — OFFICE VISIT (OUTPATIENT)
Dept: INTERNAL MEDICINE CLINIC | Age: 81
End: 2021-10-11
Payer: MEDICARE

## 2021-10-11 VITALS
WEIGHT: 167 LBS | HEART RATE: 66 BPM | BODY MASS INDEX: 31.53 KG/M2 | TEMPERATURE: 97.8 F | SYSTOLIC BLOOD PRESSURE: 122 MMHG | DIASTOLIC BLOOD PRESSURE: 73 MMHG | OXYGEN SATURATION: 97 % | HEIGHT: 61 IN | RESPIRATION RATE: 16 BRPM

## 2021-10-11 DIAGNOSIS — Z00.00 MEDICARE ANNUAL WELLNESS VISIT, SUBSEQUENT: ICD-10-CM

## 2021-10-11 DIAGNOSIS — J45.909 UNCOMPLICATED ASTHMA, UNSPECIFIED ASTHMA SEVERITY, UNSPECIFIED WHETHER PERSISTENT: ICD-10-CM

## 2021-10-11 DIAGNOSIS — R73.9 HYPERGLYCEMIA: ICD-10-CM

## 2021-10-11 DIAGNOSIS — F43.9 STRESS: ICD-10-CM

## 2021-10-11 DIAGNOSIS — R87.612 LGSIL OF CERVIX OF UNDETERMINED SIGNIFICANCE: Primary | ICD-10-CM

## 2021-10-11 DIAGNOSIS — I10 PRIMARY HYPERTENSION: ICD-10-CM

## 2021-10-11 DIAGNOSIS — Z71.89 ADVANCE CARE PLANNING: ICD-10-CM

## 2021-10-11 PROCEDURE — 1101F PT FALLS ASSESS-DOCD LE1/YR: CPT | Performed by: INTERNAL MEDICINE

## 2021-10-11 PROCEDURE — 1090F PRES/ABSN URINE INCON ASSESS: CPT | Performed by: INTERNAL MEDICINE

## 2021-10-11 PROCEDURE — 99214 OFFICE O/P EST MOD 30 MIN: CPT | Performed by: INTERNAL MEDICINE

## 2021-10-11 PROCEDURE — G8754 DIAS BP LESS 90: HCPCS | Performed by: INTERNAL MEDICINE

## 2021-10-11 PROCEDURE — G8399 PT W/DXA RESULTS DOCUMENT: HCPCS | Performed by: INTERNAL MEDICINE

## 2021-10-11 PROCEDURE — G0463 HOSPITAL OUTPT CLINIC VISIT: HCPCS | Performed by: INTERNAL MEDICINE

## 2021-10-11 PROCEDURE — G8510 SCR DEP NEG, NO PLAN REQD: HCPCS | Performed by: INTERNAL MEDICINE

## 2021-10-11 PROCEDURE — G8536 NO DOC ELDER MAL SCRN: HCPCS | Performed by: INTERNAL MEDICINE

## 2021-10-11 PROCEDURE — G8427 DOCREV CUR MEDS BY ELIG CLIN: HCPCS | Performed by: INTERNAL MEDICINE

## 2021-10-11 PROCEDURE — G0439 PPPS, SUBSEQ VISIT: HCPCS | Performed by: INTERNAL MEDICINE

## 2021-10-11 PROCEDURE — G8417 CALC BMI ABV UP PARAM F/U: HCPCS | Performed by: INTERNAL MEDICINE

## 2021-10-11 PROCEDURE — G8752 SYS BP LESS 140: HCPCS | Performed by: INTERNAL MEDICINE

## 2021-10-11 NOTE — ACP (ADVANCE CARE PLANNING)
Advance Care Planning  Advance Care Planning (ACP) Provider Conversation     Date of ACP Conversation: 10/11/21  Persons included in Conversation:  patient    Authorized Decision Maker (if patient is incapable of making informed decisions): This person is:   Named in Advance Directive or Healthcare Power of           For Patients with Decision Making Capacity:   Values/Goals: Exploration of values, goals, and preferences if recovery is not expected, even with continued medical treatment in the event of:  Imminent death  Severe, permanent brain injury    Conversation Outcomes / Follow-Up Plan:   ACP complete - no further action today. She has no changes to make to her pre-existing advance directive.         Dr. Lockhart Guard  [facility] [address], [address] [place] [address].  Phone: [phone]  Fax: [phone]

## 2021-10-11 NOTE — PATIENT INSTRUCTIONS
Eating Healthy Foods: Care Instructions  Your Care Instructions     Eating healthy foods can help lower your risk for disease. Healthy food gives you energy and keeps your heart strong, your brain active, your muscles working, and your bones strong. A healthy diet includes a variety of foods from the basic food groups: grains, vegetables, fruits, milk and milk products, and meat and beans. Some people may eat more of their favorite foods from only one food group and, as a result, miss getting the nutrients they need. So, it is important to pay attention not only to what you eat but also to what you are missing from your diet. You can eat a healthy, balanced diet by making a few small changes. Follow-up care is a key part of your treatment and safety. Be sure to make and go to all appointments, and call your doctor if you are having problems. It's also a good idea to know your test results and keep a list of the medicines you take. How can you care for yourself at home? Look at what you eat  · Keep a food diary for a week or two and record everything you eat or drink. Track the number of servings you eat from each food group. · For a balanced diet every day, eat a variety of:  ? 6 or more ounce-equivalents of grains, such as cereals, breads, crackers, rice, or pasta, every day. An ounce-equivalent is 1 slice of bread, 1 cup of ready-to-eat cereal, or ½ cup of cooked rice, cooked pasta, or cooked cereal.  ? 2½ cups of vegetables, especially:  § Dark-green vegetables such as broccoli and spinach. § Orange vegetables such as carrots and sweet potatoes. § Dry beans (such as mcdaniel and kidney beans) and peas (such as lentils). ? 2 cups of fresh, frozen, or canned fruit. A small apple or 1 banana or orange equals 1 cup. ? 3 cups of nonfat or low-fat milk, yogurt, or other milk products. ? 5½ ounces of meat and beans, such as chicken, fish, lean meat, beans, nuts, and seeds.  One egg, 1 tablespoon of peanut butter, ½ ounce nuts or seeds, or ¼ cup of cooked beans equals 1 ounce of meat. · Learn how to read food labels for serving sizes and ingredients. Fast-food and convenience-food meals often contain few or no fruits or vegetables. Make sure you eat some fruits and vegetables to make the meal more nutritious. · Look at your food diary. For each food group, add up what you have eaten and then divide the total by the number of days. This will give you an idea of how much you are eating from each food group. See if you can find some ways to change your diet to make it more healthy. Start small  · Do not try to make dramatic changes to your diet all at once. You might feel that you are missing out on your favorite foods and then be more likely to fail. · Start slowly, and gradually change your habits. Try some of the following:  ? Use whole wheat bread instead of white bread. ? Use nonfat or low-fat milk instead of whole milk. ? Eat brown rice instead of white rice, and eat whole wheat pasta instead of white-flour pasta. ? Try low-fat cheeses and low-fat yogurt. ? Add more fruits and vegetables to meals and have them for snacks. ? Add lettuce, tomato, cucumber, and onion to sandwiches. ? Add fruit to yogurt and cereal.  Enjoy food  · You can still eat your favorite foods. You just may need to eat less of them. If your favorite foods are high in fat, salt, and sugar, limit how often you eat them, but do not cut them out entirely. · Eat a wide variety of foods. Make healthy choices when eating out  · The type of restaurant you choose can help you make healthy choices. Even fast-food chains are now offering more low-fat or healthier choices on the menu. · Choose smaller portions, or take half of your meal home. · When eating out, try:  ? A veggie pizza with a whole wheat crust or grilled chicken (instead of sausage or pepperoni).   ? Pasta with roasted vegetables, grilled chicken, or marinara sauce instead of cream sauce. ? A vegetable wrap or grilled chicken wrap. ? Broiled or poached food instead of fried or breaded items. Make healthy choices easy  · Buy packaged, prewashed, ready-to-eat fresh vegetables and fruits, such as baby carrots, salad mixes, and chopped or shredded broccoli and cauliflower. · Buy packaged, presliced fruits, such as melon or pineapple. · Choose 100% fruit or vegetable juice instead of soda. Limit juice intake to 4 to 6 oz (½ to ¾ cup) a day. · Blend low-fat yogurt, fruit juice, and canned or frozen fruit to make a smoothie for breakfast or a snack. Where can you learn more? Go to http://www.wells.com/  Enter T756 in the search box to learn more about \"Eating Healthy Foods: Care Instructions. \"  Current as of: December 17, 2020               Content Version: 13.0  © 9003-5709 [employer]. Care instructions adapted under license by [employer] (which disclaims liability or warranty for this information). If you have questions about a medical condition or this instruction, always ask your healthcare professional. [place] [address] any warranty or liability for your use of this information. Medicare Part B Preventive Services Limitations Recommendation Scheduled   Bone Mass Measurement  (age 72 & older, biennial) Requires diagnosis related to osteoporosis or estrogen deficiency. Biennial benefit unless patient has history of long-term glucocorticoid tx or baseline is needed because initial test was by other method This should be done at age 72 and again if on osteoporosis medication at 2-3 year intervals. Up to date   Cardiovascular Screening Blood Tests (every 5 years)  Total cholesterol, HDL, Triglycerides Order as a panel if possible We should check your cholesterol panel at least once every 5 years.  Up to date   Colorectal Cancer Screening  -Fecal occult blood test (annual)  -Flexible sigmoidoscopy (5y)  -Screening colonoscopy (10y)  -Barium Enema  Due per your Gastroenterologist's recommendations. Up to date   Counseling to Prevent Tobacco Use (up to 8 sessions per year)  - Counseling greater than 3 and up to 10 minutes  - Counseling greater than 10 minutes Patients must be asymptomatic of tobacco-related conditions to receive as preventive service Continue with smoking cessation Not applicable   Diabetes Screening Tests (at least every 3 years, Medicare covers annually or at 6-month intervals for prediabetic patients)    Fasting blood sugar (FBS) or glucose tolerance test (GTT) Patient must be diagnosed with one of the following:  -Hypertension, Dyslipidemia, obesity, previous impaired FBS or GTT  Or any two of the following: overweight, FH of diabetes, age ? 72, history of gestational diabetes, birth of baby weighing more than 9 pounds Should be done yearly Up to date   Diabetes Self-Management Training (DSMT) (no USPSTF recommendation) Requires referral by treating physician for patient with diabetes or renal disease. 10 hours of initial DSMT session of no less than 30 minutes each in a continuous 12-month period. 2 hours of follow-up DSMT in subsequent years. Not applicable Not applicable   Glaucoma Screening (no USPSTF recommendation) Diabetes mellitus, family history, , age 48 or over,  American, age 72 or over Continue with annual eye exams. Up to date   Human Immunodeficiency Virus (HIV) Screening (annually for increased risk patients)  HIV-1 and HIV-2 by EIA, LC, rapid antibody test, or oral mucosa transudate Patient must be at increased risk for HIV infection per USPSTF guidelines or pregnant. Tests covered annually for patients at increased risk. Pregnant patients may receive up to 3 test during pregnancy.  Not applicable Not applicable   Medical Nutrition Therapy (MNT) (for diabetes or renal disease not recommended schedule) Requires referral by treating physician for patient with diabetes or renal disease. Can be provided in same year as diabetes self-management training (DSMT), and CMS recommends medical nutrition therapy take place after DSMT. Up to 3 hours for initial year and 2 hours in subsequent years. Not applicable Not applicable   Shingles Vaccination  Vaccination recommended for shingles vaccination. Up to date   Seasonal Influenza Vaccination (annually)  Continue with yearly \"flu\" shot annually Up to date   Pneumococcal Vaccination (once after 65)  Please receive this vaccination at age 72. Up to date   Hepatitis B Vaccinations (if medium/high risk) Medium/high risk factors:  End-stage renal disease,  Hemophiliacs who received Factor VIII or IX concentrates, Clients of institutions for the mentally retarded, Persons who live in the same house as a HepB virus carrier, Homosexual men, Illicit injectable drug abusers. Not applicable Not applicable   Screening Mammography (biennial age 54-69) Annually (age 36 or over) You need a mammogram yearly to screen for breast cancer. Up to date   Screening Pap Tests and Pelvic Examination (up to age 79 and after 79 if unknown history or abnormal study last 10 years) Every 24 months except high risk You need no Pap smear at this time. Discussed today   Ultrasound Screening for Abdominal Aortic Aneurysm (AAA) (once) Patient must be referred through IPPE and not have had a screening for abdominal aortic aneurysm before under Medicare.   Limited to patients who meet one of the following criteria:  - Men who are 73-68 years old and have smoked more than 100 cigarettes in their lifetime.  -Anyone with a FH of AAA  -Anyone recommended for screening by USPSTF Not applicable Not applicable

## 2021-10-11 NOTE — PROGRESS NOTES
INTERNISTS OF [facility] [place]:  10/11/2021, MRN: [id number]      Dez Ingram is a 80 y.o. female and presents to clinic for Follow-up, Labs (10-4-21), and Annual Wellness Visit    Subjective: The patient is a 82-year-old female with history of hypertension, LGSIL 4/19, osteopenia, tubular adenomas colon polyps, obesity, vitamin D deficiency, stable pulmonary nodules, asthma (followed by John Dennis), cervical disc disease, and lumbar disc disease (followed by Orthopedics).     1. Insomnia/Stress: Reported at her last appointment. Insomnia symptoms improved with melatonin use. At her last appointment, she reported that her marriage was stressful due to her  being irritable. She denied domestic violence. Yarsanism. She declined medication at her last appointment for stress. Since then, she reports: she takes melatonin nightly for persistent insomnia sx.      2. Prediabetes: Her most recent labs show that her A1c is normal at 5.5.    3.  Asthma: Today she reports: \"my breathing is fine. \" Followed by Dr. Anabell Fernandez. +Albuterol. 4.  Hypertension: Blood pressure is 122/73 on Maxide. 5. LGSIL: She was referred to a gynecologist at her last appointment. Today she reports: she met with her GYN team; it sounds like a coloposcopy was recommended but she has yet to schedule the f/u apt for this procedure.           Patient Active Problem List    Diagnosis Date Noted    Severe obesity (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 Dispense 100 gram tubes 100 g 2    gabapentin (Gralise) 600 mg Tb24 Take 1,200 mg by mouth daily. Max Daily Amount: 1,200 mg. Indications: neuropathic pain 180 Tablet 1    triamterene-hydroCHLOROthiazide (MAXZIDE) 37.5-25 mg per tablet TAKE ONE-HALF (1/2) TABLET DAILY (Patient taking differently: Taking 1/2 tablet daily.) 45 Tablet 3    fluticasone propionate (FLONASE) 50 mcg/actuation nasal spray USE 2 SPRAYS IN EACH NOSTRIL DAILY 48 g 3    mv-min/vit C/glut/lysine/hb124 (AIRBORNE, LYSINE HCL, PO) Take  by mouth.  fexofenadine (ALLEGRA) 180 mg tablet Take 180 mg by mouth daily.  PROAIR HFA 90 mcg/actuation inhaler USE 2 INHALATIONS EVERY 4 HOURS AS NEEDED 3 Inhaler 3    melatonin 3 mg tablet Take 3 mg by mouth daily as needed (sleep).  Cholecalciferol, Vitamin D3, (VITAMIN D3) 1,000 unit cap Take 3,000 Units by mouth daily.  cycloSPORINE (RESTASIS) 0.05 % ophthalmic emulsion Administer 1 Drop to both eyes two (2) times a day.  MULTIVITAMIN W-MINERALS/LUTEIN (CENTRUM SILVER PO) Take 1 Tab by mouth daily.  co-enzyme Q-10 (CO Q-10) 100 mg capsule Take 100 mg by mouth daily.  BIOTIN PO Take 5,000 mcg by mouth daily.  zinc sulfate (ZINC-15 PO) Take 1 Tablet by mouth daily. (Patient not taking: Reported on 9/15/2021)      EPINEPHrine (EPIPEN) 0.3 mg/0.3 mL injection 0.3 mL by IntraMUSCular route once as needed for up to 1 dose.  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 ([facility] [address]     Fracture     rt ankle as an adult    History of pneumonia     Hypertension     Ill-defined condition     Spasms to left side    Left arm pain     Lumbar spinal stenosis     Myofascial pain     Osteopenia     Sciatica of right side        Past Surgical History:   Procedure Laterality Date    COLONOSCOPY N/A 2019    COLONOSCOPY w polypectomies performed by Gordon Goldstein MD at [facility]    HX CATARACT REMOVAL  8/15/2011    HX HEENT      sinus surgery    HX KERATOPLASTY Left 10/02/2017    HX ORTHOPAEDIC      left 4th finger trigger release    HX SHIELA AND BSO  1970s    uterine fibroids       Family History   Problem Relation Age of Onset    Asthma Mother     COPD Mother    [facility] MS Mother     Heart Disease Mother     Hypertension Mother     Stroke Father     Heart Disease Father     Hypertension Father     Allergic Rhinitis Sister     Asthma Brother     Hypertension Brother     Diabetes Brother     Migraines Paternal Grandmother        Social History     Tobacco Use    Smoking status: Former Smoker     Packs/day: 1.00     Years: 10.00     Pack years: 10.00     Types: Cigarettes     Quit date: 1970     Years since quittin.8    Smokeless tobacco: Never Used   Substance Use Topics    Alcohol use: Yes     Alcohol/week: 1.0 standard drinks     Types: 1 Glasses of wine per week     Comment: occasional wine       ROS   Review of Systems   Constitutional: Negative for chills and fever. HENT: Negative for ear pain and sore throat. Eyes: Negative for blurred vision and pain. Respiratory: Negative for cough and shortness of breath. Cardiovascular: Negative for chest pain. Gastrointestinal: Negative for abdominal pain, blood in stool and melena. Genitourinary: Negative for dysuria and hematuria. Musculoskeletal: Positive for back pain, joint pain and neck pain. Skin: Negative for rash.    Neurological: Negative for headaches. Endo/Heme/Allergies: Does not bruise/bleed easily. Psychiatric/Behavioral: Negative for depression and substance abuse. Objective     Vitals:    10/11/21 0831   BP: 122/73   Pulse: 66   Resp: 16   Temp: 97.8 °F (36.6 °C)   TempSrc: Temporal   SpO2: 97%   Weight: 167 lb (75.8 kg)   Height: 5' 1\" (1.549 m)   PainSc:   0 - No pain       Physical Exam  Vitals and nursing note reviewed. HENT:      Head: Normocephalic and atraumatic. Right Ear: External ear normal.      Left Ear: External ear normal.   Eyes:      General: No scleral icterus. Right eye: No discharge. Left eye: No discharge. Conjunctiva/sclera: Conjunctivae normal.   Cardiovascular:      Rate and Rhythm: Normal rate and regular rhythm. Heart sounds: Normal heart sounds. No murmur heard. No friction rub. No gallop. Pulmonary:      Effort: Pulmonary effort is normal. No respiratory distress. Breath sounds: Normal breath sounds. No wheezing or rales. Abdominal:      General: Bowel sounds are normal. There is no distension. Palpations: Abdomen is soft. There is no mass. Tenderness: There is no abdominal tenderness. There is no guarding or rebound. Musculoskeletal:         General: No swelling (BUE) or tenderness (BUE). Cervical back: Neck supple. Lymphadenopathy:      Cervical: No cervical adenopathy. Skin:     General: Skin is warm and dry. Findings: No erythema or rash. Neurological:      Mental Status: She is alert. Motor: No abnormal muscle tone.       Gait: Gait normal.   Psychiatric:         Mood and Affect: Mood normal.         LABS   Data Review:   Lab Results   Component Value Date/Time    WBC 6.1 10/13/2020 09:43 AM    HGB 14.5 10/13/2020 09:43 AM    HCT 43.9 10/13/2020 09:43 AM    PLATELET 427 03/59/5380 09:43 AM    MCV 88.3 10/13/2020 09:43 AM       Lab Results   Component Value Date/Time    Sodium 143 04/27/2021 06:24 AM    Potassium 4.2 04/27/2021 06:24 AM    Chloride 106 04/27/2021 06:24 AM    CO2 34 (H) 04/27/2021 06:24 AM    Anion gap 3 04/27/2021 06:24 AM    Glucose 92 04/27/2021 06:24 AM    BUN 14 04/27/2021 06:24 AM    Creatinine 0.77 04/27/2021 06:24 AM    BUN/Creatinine ratio 18 04/27/2021 06:24 AM    GFR est AA >60 04/27/2021 06:24 AM    GFR est non-AA >60 04/27/2021 06:24 AM    Calcium 9.5 04/27/2021 06:24 AM       Lab Results   Component Value Date/Time    Cholesterol, total 169 04/27/2021 06:24 AM    HDL Cholesterol 55 04/27/2021 06:24 AM    LDL, calculated 93 04/27/2021 06:24 AM    VLDL, calculated 21 04/27/2021 06:24 AM    Triglyceride 105 04/27/2021 06:24 AM    CHOL/HDL Ratio 3.1 04/27/2021 06:24 AM       Lab Results   Component Value Date/Time    Hemoglobin A1c 5.5 10/04/2021 08:30 AM       Assessment/Plan:   1. Insomnia and Anxiety:  - C/w melatonin. 2. Prediabetes:   - Checking labs in 6 months. - C/w a low carb diet    3. HTN: Stable. - C/w rx as prescribed. - Checking labs in 6 months. 4. LGSIL:   -I instructed her to schedule an appoint with her gynecologist for a colposcopy, to rule out cancer. 5.  Asthma: Stable. -Continue medication as prescribed. Lab review: labs are reviewed in the EHR and ordered as mentioned above. I have discussed the diagnosis with the patient and the intended plan as seen in the above orders. The patient has received an after-visit summary and questions were answered concerning future plans. I have discussed medication side effects and warnings with the patient as well. I have reviewed the plan of care with the patient, accepted their input and they are in agreement with the treatment goals. All questions were answered. The patient understands the plan of care. Handouts provided today with above information. Pt instructed if symptoms worsen to call the office or report to the ED for continued care. Greater than 50% of the visit time was spent in counseling and/or coordination of care. MEDICARE ANNUAL WELLNESS VISIT/EXAM   INTERNISTS OF [facility]:  10/11/21, [id number]      The Subsequent AWV PROGRESS NOTE    I have reviewed the patient's medical history in detail and updated the computerized patient record. Ever Farmer is a 80 y.o.  female and presents for an annual wellness exam.    Subjective:   Health Maintenance History:  Immunizations reviewed: Tdap up to date   Pneumovax: up to date   Flu: up to date  Zoster: up to date    Immunization History   Administered Date(s) Administered    COVID-19, PFIZER, MRNA, LNP-S, PF, 30MCG/0.3ML DOSE 03/06/2021, 03/26/2021, 10/08/2021    Influenza High Dose Vaccine PF 09/22/2016, 09/25/2017, 09/10/2020    Influenza Vaccine 10/08/2013, 09/26/2014, 10/20/2015, 10/28/2019    Influenza Vaccine Split 10/06/2011    Influenza, Quadrivalent, Adjuvanted (>65 Yrs FLUAD QUAD [id number]) 09/10/2020, 09/29/2021    Pneumococcal Conjugate (PCV-13) 05/11/2015    Pneumococcal Polysaccharide (PPSV-23) 06/14/2016    Tdap 10/24/2020    Zoster Recombinant 01/14/2020, 06/08/2020         Colonoscopy: UTD, no bleeding. Eye exam: Up to date.     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    Band keratopathy of right eye 2/3/2018    Cataract     Cylindrical bronchiectasis ([address]     Fracture     rt ankle as an adult    History of pneumonia     Hypertension     Ill-defined condition     Spasms to left side    Left arm pain     Lumbar spinal stenosis     Myofascial pain     Osteopenia     Sciatica of right side         Past Surgical History:   Procedure Laterality Date    COLONOSCOPY N/A 11/11/2019    COLONOSCOPY w polypectomies performed by Paschal Cogan, MD at [address] HX CATARACT REMOVAL  8/15/2011    FRANKLYN STAHL sinus surgery    HX KERATOPLASTY Left 10/02/2017    HX ORTHOPAEDIC      left 4th finger trigger release    HX SHIELA AND BSO  1970s    uterine fibroids       Current Outpatient Medications   Medication Sig Dispense Refill    diclofenac (Voltaren) 1 % gel Apply 4 grams to affected joint up to 4 times per day, maximum 16 grams per joint per day. Dispense 100 gram tubes 100 g 2    gabapentin (Gralise) 600 mg Tb24 Take 1,200 mg by mouth daily. Max Daily Amount: 1,200 mg. Indications: neuropathic pain 180 Tablet 1    triamterene-hydroCHLOROthiazide (MAXZIDE) 37.5-25 mg per tablet TAKE ONE-HALF (1/2) TABLET DAILY (Patient taking differently: Taking 1/2 tablet daily.) 45 Tablet 3    fluticasone propionate (FLONASE) 50 mcg/actuation nasal spray USE 2 SPRAYS IN EACH NOSTRIL DAILY 48 g 3    mv-min/vit C/glut/lysine/hb124 (AIRBORNE, LYSINE HCL, PO) Take  by mouth.  fexofenadine (ALLEGRA) 180 mg tablet Take 180 mg by mouth daily.  PROAIR HFA 90 mcg/actuation inhaler USE 2 INHALATIONS EVERY 4 HOURS AS NEEDED 3 Inhaler 3    melatonin 3 mg tablet Take 3 mg by mouth daily as needed (sleep).  Cholecalciferol, Vitamin D3, (VITAMIN D3) 1,000 unit cap Take 3,000 Units by mouth daily.  cycloSPORINE (RESTASIS) 0.05 % ophthalmic emulsion Administer 1 Drop to both eyes two (2) times a day.  MULTIVITAMIN W-MINERALS/LUTEIN (CENTRUM SILVER PO) Take 1 Tab by mouth daily.  co-enzyme Q-10 (CO Q-10) 100 mg capsule Take 100 mg by mouth daily.  BIOTIN PO Take 5,000 mcg by mouth daily.  zinc sulfate (ZINC-15 PO) Take 1 Tablet by mouth daily. (Patient not taking: Reported on 9/15/2021)      EPINEPHrine (EPIPEN) 0.3 mg/0.3 mL injection 0.3 mL by IntraMUSCular route once as needed for up to 1 dose.  1 Syringe 1       Allergies   Allergen Reactions    Naprosyn [Naproxen] Other (comments)     Blood in urine         Family History   Problem Relation Age of Onset    Asthma Mother     COPD Mother     MS Mother     Heart Disease Mother     Hypertension Mother     Stroke Father     Heart Disease Father     Hypertension Father     Allergic Rhinitis Sister     Asthma Brother     Hypertension Brother     Diabetes Brother     Migraines Paternal Grandmother        Social History     Tobacco Use    Smoking status: Former Smoker     Packs/day: 1.00     Years: 10.00     Pack years: 10.00     Types: Cigarettes     Quit date: 1970     Years since quittin.8    Smokeless tobacco: Never Used   Substance Use Topics    Alcohol use: Yes     Alcohol/week: 1.0 standard drinks     Types: 1 Glasses of wine per week     Comment: occasional wine       Patient Active Problem List   Diagnosis Code    Multiple lung nodules R91.8    Allergic rhinitis J30.9    HTN (hypertension) I10    Asthma J45.909    Osteopenia M85.80    Vitamin D deficiency E55.9    Right knee DJD, 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    Tubular adenoma of colon D12.6    Lumbar spinal stenosis M48.061    HNP (herniated nucleus pulposus), lumbar M51.26    Lumbar facet arthropathy M47.816    Cervical stenosis of spine M48.02    LGSIL of cervix of undetermined significance R87.612    Heel spur, left M77.32    Severe obesity ([address] E66.01       Gen: No fever/chlls  HEENT: No sore throat, eye pain, ear pain, or congestion. No HA  CV: No CP  Resp: No cough/SOB  GI: No abdominal pain  : No hematuria/dysuria  Derm: No rash  Neuro: No new paresthesias/weakness  Musc: No new myalgias/jt pain  Psych: No depression sx      Depression Risk Factor Screening:    Patient Health Questionnaire (PHQ-2)   Over the last 2 weeks, how often have you been bothered by any of the following problems? · Little interest or pleasure in doing things? · Not at all. [0]  · Feeling down, depressed, or hopeless?    · Not at all. [0]    Total Score: 0/6  PHQ-2 Assessment Scoring:   A score of 2 or more requires further screening with the PHQ-9    Alcohol Risk Factor Screening:   Women: 1. On any occasion during the past 3 months, have you had more than 3 drinks containing alcohol? no  2. Do you average more than 7 drinks per week?no    Tobacco Use Screening:     Social History     Tobacco Use   Smoking Status Former Smoker    Packs/day: 1.00    Years: 10.00    Pack years: 10.00    Types: Cigarettes    Quit date: 1970    Years since quittin.8   Smokeless Tobacco Never Used       Hearing Loss    Hearing is good. Activities of Daily Living   Self-care. Requires assistance with: no ADLs  She does not need help with ADLS/IADLs    Fall Risk   no falls w/i the past yr    Abuse Screen   None    Additional Examination Findings:  Vitals:    10/11/21 0831   BP: 122/73   Pulse: 66   Resp: 16   Temp: 97.8 °F (36.6 °C)   TempSrc: Temporal   SpO2: 97%   Weight: 167 lb (75.8 kg)   Height: 5' 1\" (1.549 m)   PainSc:   0 - No pain      Body mass index is 31.55 kg/m². Evaluation of Cognitive Function:  Mood/affect: Euthymic  Appearance: Well-groomed  Family member/caregiver input: She is not with a family member today    General:  Alert, cooperative, no distress   Head:  Normocephalic, without obvious abnormality, atraumatic. Eyes:  Conjunctivae clear   Ears:  Clear external auditory canals   Nose: Nares normal. Septum midline. Mucosa normal. No drainage or sinus tenderness. Throat: Lips, mucosa, and tongue normal. Unremarkable oropharynx   Neck: Supple, symmetrical, trachea midline, no adenopathy. Lungs:   Clear to auscultation bilaterally. Heart:  Regular rate and rhythm, S1, S2 normal, no murmur, click, rub or gallop. Abdomen:   Soft, non-tender. Bowel sounds normal. No masses. Extremities: Extremities normal no edema. Pulses: +2 radial pulses b/l   Skin:  No rashes or lesions. Lymph nodes: Cervical LN normal.   Neurologic:  Psych: Stable gait  Euthymic       Dementia Screen:  Clock Drawing (ten past eleven) Exercise: Unremarkable.       LABS   Data Review:   Lab Results   Component Value Date/Time    Sodium 143 04/27/2021 06:24 AM    Potassium 4.2 04/27/2021 06:24 AM    Chloride 106 04/27/2021 06:24 AM    CO2 34 (H) 04/27/2021 06:24 AM    Anion gap 3 04/27/2021 06:24 AM    Glucose 92 04/27/2021 06:24 AM    BUN 14 04/27/2021 06:24 AM    Creatinine 0.77 04/27/2021 06:24 AM    BUN/Creatinine ratio 18 04/27/2021 06:24 AM    GFR est AA >60 04/27/2021 06:24 AM    GFR est non-AA >60 04/27/2021 06:24 AM    Calcium 9.5 04/27/2021 06:24 AM       Lab Results   Component Value Date/Time    WBC 6.1 10/13/2020 09:43 AM    HGB 14.5 10/13/2020 09:43 AM    HCT 43.9 10/13/2020 09:43 AM    PLATELET 350 93/53/6772 09:43 AM    MCV 88.3 10/13/2020 09:43 AM       Lab Results   Component Value Date/Time    Hemoglobin A1c 5.5 10/04/2021 08:30 AM       Lab Results   Component Value Date/Time    Cholesterol, total 169 04/27/2021 06:24 AM    HDL Cholesterol 55 04/27/2021 06:24 AM    LDL, calculated 93 04/27/2021 06:24 AM    VLDL, calculated 21 04/27/2021 06:24 AM    Triglyceride 105 04/27/2021 06:24 AM    CHOL/HDL Ratio 3.1 04/27/2021 06:24 AM         Patient Care Team:  Charlie Meneses MD as PCP - General (Family Medicine)  Charlie Meneses MD as PCP - [facility]ILITATION [facility]  Priya Sprague MD (Pulmonary Disease)    End-of-life planning  Advanced Directive in the case that an injury or illness causes the patient to be unable to make health care decisions was discussed with the patient.     Advice/Referrals/Counselling/Plan:   Education and counseling provided:  Are appropriate based on today's review and evaluation  End-of-Life planning (with patient's consent)  Pneumococcal Vaccine  Influenza Vaccine  Screening Mammography  Screening Pap and pelvic (covered once every 2 years)  Colorectal cancer screening tests  Cardiovascular screening blood test  Bone mass measurement (DEXA)  Screening for glaucoma  Diabetes screening test  Include in education list (weight loss, physical activity, smoking cessation, fall prevention, and nutrition)    ICD-10-CM ICD-9-CM    1. LGSIL of cervix of undetermined significance  X32.585 084.60 METABOLIC PANEL, COMPREHENSIVE      CBC WITH AUTOMATED DIFF   2. Primary hypertension  G84 996.9 METABOLIC PANEL, COMPREHENSIVE      LIPID PANEL      MICROALBUMIN, UR, RAND W/ MICROALB/CREAT RATIO   3. Uncomplicated asthma, unspecified asthma severity, unspecified whether persistent  T44.682 149.18 METABOLIC PANEL, COMPREHENSIVE      CBC WITH AUTOMATED DIFF   4. Stress  F43.9 V62.89    5. Hyperglycemia  I95.0 586.67 METABOLIC PANEL, COMPREHENSIVE      HEMOGLOBIN A1C WITH EAG   6. Medicare annual wellness visit, subsequent  Z00.00 V70.0    7. Advance care planning  Z71.89 V65.49      reviewed diet, exercise and weight control. Brief written plan, checklist - health maintenance - given to patient. Assessment/Plan:    Lab review: labs are reviewed in the [facility] ([facility] Provider Conversation     Date of ACP Conversation: 10/11/21  Persons included in Conversation:  patient    Authorized Decision Maker (if patient is incapable of making informed decisions): This person is:   Named in Advance Directive or Healthcare Power of           For Patients with Decision Making Capacity:   Values/Goals: Exploration of values, goals, and preferences if recovery is not expected, even with continued medical treatment in the event of:  Imminent death  Severe, permanent brain injury    Conversation Outcomes / Follow-Up Plan:   ACP complete - no further action today. She has no changes to make to her pre-existing advance directive. I have discussed the diagnosis/diagnoses with the patient and the intended plan as seen in the above orders. The patient has received an after-visit summary and questions were answered concerning future plans. I have discussed medication side effects and warnings with the patient as well.  I have reviewed the plan of care with the patient, accepted their input and they are in agreement with the treatment goals. All questions were answered.

## 2021-10-17 RX ORDER — TRIAMTERENE/HYDROCHLOROTHIAZID 37.5-25 MG
TABLET ORAL
Qty: 45 TABLET | Refills: 3 | Status: SHIPPED | OUTPATIENT
Start: 2021-10-17 | End: 2022-09-26

## 2021-10-23 DIAGNOSIS — G89.29 CHRONIC BILATERAL LOW BACK PAIN WITH RIGHT-SIDED SCIATICA: ICD-10-CM

## 2021-10-23 DIAGNOSIS — M54.12 CERVICAL RADICULOPATHY: ICD-10-CM

## 2021-10-23 DIAGNOSIS — M54.41 CHRONIC BILATERAL LOW BACK PAIN WITH RIGHT-SIDED SCIATICA: ICD-10-CM

## 2021-10-23 DIAGNOSIS — M79.2 NEURITIS: ICD-10-CM

## 2021-10-23 DIAGNOSIS — M54.16 LUMBAR RADICULOPATHY: ICD-10-CM

## 2021-10-23 DIAGNOSIS — M50.30 DDD (DEGENERATIVE DISC DISEASE), CERVICAL: ICD-10-CM

## 2021-10-23 RX ORDER — GABAPENTIN 600 MG/1
TABLET, FILM COATED ORAL
Qty: 180 TABLET | Refills: 1 | Status: SHIPPED | OUTPATIENT
Start: 2021-10-23 | End: 2021-10-25 | Stop reason: SDUPTHER

## 2021-10-25 NOTE — ADDENDUM NOTE
Addended [facility] on: 10/25/2021 03:30 PM     Modules accepted: Orders

## 2021-10-25 NOTE — TELEPHONE ENCOUNTER
It appears this Rx \"failed during e-scribe transmission\" so it was never received by the pharmacy. Med has been pended again. Patient has 2 weeks of medication left. Requested Prescriptions     Pending Prescriptions Disp Refills    gabapentin (Gralise) 600 mg Tb24 180 Tablet 1     Sig: Take 2 Tablets by mouth daily. Max Daily Amount: 2 Tablets.  Indications: neuropathic pain     Signed Prescriptions Disp Refills    Gralise 600 mg Tb24 180 Tablet 1     Sig: TAKE 2 TABLETS (1,200 MG) DAILY FOR NEUROPATHIC PAIN     Authorizing Provider: Lily Martinez

## 2021-10-26 RX ORDER — GABAPENTIN 600 MG/1
2 TABLET, FILM COATED ORAL DAILY
Qty: 180 TABLET | Refills: 1 | Status: SHIPPED | OUTPATIENT
Start: 2021-10-26 | End: 2021-10-29

## 2021-10-29 DIAGNOSIS — G89.29 CHRONIC BILATERAL LOW BACK PAIN WITH RIGHT-SIDED SCIATICA: ICD-10-CM

## 2021-10-29 DIAGNOSIS — M54.41 CHRONIC BILATERAL LOW BACK PAIN WITH RIGHT-SIDED SCIATICA: ICD-10-CM

## 2021-10-29 DIAGNOSIS — M54.16 LUMBAR RADICULOPATHY: ICD-10-CM

## 2021-10-29 DIAGNOSIS — M50.30 DDD (DEGENERATIVE DISC DISEASE), CERVICAL: ICD-10-CM

## 2021-10-29 DIAGNOSIS — M79.2 NEURITIS: ICD-10-CM

## 2021-10-29 DIAGNOSIS — M54.12 CERVICAL RADICULOPATHY: ICD-10-CM

## 2021-10-29 RX ORDER — GABAPENTIN 600 MG/1
TABLET, FILM COATED ORAL
Qty: 180 TABLET | Refills: 1 | Status: SHIPPED | OUTPATIENT
Start: 2021-10-29 | End: 2022-02-09 | Stop reason: SDUPTHER

## 2021-11-24 ENCOUNTER — TELEPHONE (OUTPATIENT)
Dept: INTERNAL MEDICINE CLINIC | Age: 81
End: 2021-11-24

## 2021-11-24 NOTE — TELEPHONE ENCOUNTER
Called DR. Mills's office they stated they haven't received a call from the patient, and that she may call now. Patient reached and she states that she did call and was offered an appointment but declined, only wanting a Rx. Patient reports having white discharge and spotting after a colposcopy procedure with DR. Mills on 11-8-21. Patient denies any odor, or itching. Patient Advised that she would need an evaluation before anyone could prescribed any medications, and we have no openings today. Patient states that she will call to DR. Guerrero office for an appointment.

## 2021-11-24 NOTE — TELEPHONE ENCOUNTER
Pt calling, says we gave her a referral to gyn, Dr. Oscar Lobo. Says he did a procedure on her now she is having a discharge. Says she has called them for an rx to clear it up but they are not returning her calls. Wants to speak to Dr. Benny Arnold and see if she will give her the rx?

## 2021-12-15 ENCOUNTER — NURSE TRIAGE (OUTPATIENT)
Dept: OTHER | Facility: CLINIC | Age: 81
End: 2021-12-15

## 2021-12-15 ENCOUNTER — TELEPHONE (OUTPATIENT)
Dept: INTERNAL MEDICINE CLINIC | Age: 81
End: 2021-12-15

## 2021-12-15 NOTE — TELEPHONE ENCOUNTER
Received call from Ernestine at [facility] with [employer] Complaint. Brief description of triage: Weakness    Triage indicates for patient to see PCP next 4 hours. Go to [facility] if no appointments available. Care advice provided, patient verbalizes understanding; denies any other questions or concerns; instructed to call back for any new or worsening symptoms. Writer provided warm transfer to [facility] for appointment scheduling. Attention Provider: Thank you for allowing me to participate in the care of your patient. The patient was connected to triage in response to information provided to the ECC. Please do not respond through this encounter as the response is not directed to a shared pool. Reason for Disposition   MODERATE weakness (i.e., interferes with work, school, normal activities) and cause unknown (Exceptions: weakness with acute minor illness, or weakness from poor fluid intake)    Answer Assessment - Initial Assessment Questions  1. DESCRIPTION: \"Describe how you are feeling. \"      No energy, started on Monday,felt dizzy,TV and clock spinning, couldn't focus felt nausea, off balance,\"like drunk person\". Felt better yest, bent down yesterday and dizzy again. This morning feel low energy    2. SEVERITY: \"How bad is it? \"  \"Can you stand and walk? \"    - MILD - Feels weak or tired, but does not interfere with work, school or normal activities    - [facility] to stand and walk; weakness interferes with work, school, or normal activities    - SEVERE - Unable to stand or walk      Moderate    3. ONSET:  \"When did the weakness begin? \"      Monday    4. CAUSE: \"What do you think is causing the weakness? \"      Unsure    5. MEDICINES: Gretel Ward you recently started a new medicine or had a change in the amount of a medicine? \"      Same    6. OTHER SYMPTOMS: \"Do you have any other symptoms? \" (e.g., chest pain, fever, cough, SOB, vomiting, diarrhea, bleeding, other areas of pain)      Finished antibiotic last week for vaginal discharge    7. PREGNANCY: \"Is there any chance you are pregnant? \" \"When was your last menstrual period? \"      N/A    Protocols used: WEAKNESS (GENERALIZED) AND FATIGUE-ADULT-OH

## 2021-12-15 NOTE — PROGRESS NOTES
Stuart Viramontes presents today for   Chief Complaint   Patient presents with    Dizziness     x 4 days intermittent    Fatigue     Patient reports no energy            1. \"Have you been to the ER, urgent care clinic since your last visit? Hospitalized since your last visit? \" {no    2. \"Have you seen or consulted any other health care providers outside of the [address] [place] [address] since your last visit? \" yes

## 2021-12-15 NOTE — TELEPHONE ENCOUNTER
Pt calling, says Monday she was dizzy and head was spinning, yesterday she felt ok but last night she was dizzy. Today she has no no energy. Wants to know if Dr. Vernon Villalobos can see her.     No fever, no chills

## 2021-12-16 ENCOUNTER — OFFICE VISIT (OUTPATIENT)
Dept: INTERNAL MEDICINE CLINIC | Age: 81
End: 2021-12-16
Payer: MEDICARE

## 2021-12-16 ENCOUNTER — HOSPITAL ENCOUNTER (OUTPATIENT)
Dept: LAB | Age: 81
Discharge: HOME | End: 2021-12-16
Payer: MEDICARE

## 2021-12-16 ENCOUNTER — PATIENT MESSAGE (OUTPATIENT)
Dept: INTERNAL MEDICINE CLINIC | Age: 81
End: 2021-12-16

## 2021-12-16 VITALS
SYSTOLIC BLOOD PRESSURE: 124 MMHG | OXYGEN SATURATION: 99 % | WEIGHT: 164 LBS | TEMPERATURE: 97.1 F | DIASTOLIC BLOOD PRESSURE: 70 MMHG | RESPIRATION RATE: 14 BRPM | BODY MASS INDEX: 30.96 KG/M2 | HEIGHT: 61 IN | HEART RATE: 63 BPM

## 2021-12-16 DIAGNOSIS — R42 DIZZINESS: ICD-10-CM

## 2021-12-16 DIAGNOSIS — R42 VERTIGO: ICD-10-CM

## 2021-12-16 DIAGNOSIS — N71.9 ENDOMETRITIS: ICD-10-CM

## 2021-12-16 DIAGNOSIS — R42 DIZZINESS: Primary | ICD-10-CM

## 2021-12-16 LAB
ALBUMIN SERPL-MCNC: 3.9 G/DL (ref 3.4–5)
ALBUMIN/GLOB SERPL: 1.1 {RATIO} (ref 0.8–1.7)
ALP SERPL-CCNC: 83 U/L (ref 45–117)
ALT SERPL-CCNC: 23 U/L (ref 13–56)
ANION GAP SERPL CALC-SCNC: 3 MMOL/L (ref 3–18)
AST SERPL-CCNC: 18 U/L (ref 10–38)
ATRIAL RATE: 63 BPM
BASOPHILS # BLD: 0 K/UL (ref 0–0.1)
BASOPHILS NFR BLD: 0 % (ref 0–2)
BILIRUB SERPL-MCNC: 0.5 MG/DL (ref 0.2–1)
BUN SERPL-MCNC: 10 MG/DL (ref 7–18)
BUN/CREAT SERPL: 15 (ref 12–20)
CALCIUM SERPL-MCNC: 9.6 MG/DL (ref 8.5–10.1)
CALCULATED P AXIS, ECG09: 48 DEGREES
CALCULATED R AXIS, ECG10: -13 DEGREES
CALCULATED T AXIS, ECG11: 13 DEGREES
CHLORIDE SERPL-SCNC: 107 MMOL/L (ref 100–111)
CO2 SERPL-SCNC: 32 MMOL/L (ref 21–32)
CREAT SERPL-MCNC: 0.66 MG/DL (ref 0.6–1.3)
CRP SERPL-MCNC: 1 MG/DL (ref 0–0.3)
DIAGNOSIS, 93000: NORMAL
DIFFERENTIAL METHOD BLD: NORMAL
EOSINOPHIL # BLD: 0 K/UL (ref 0–0.4)
EOSINOPHIL NFR BLD: 0 % (ref 0–5)
ERYTHROCYTE [DISTWIDTH] IN BLOOD BY AUTOMATED COUNT: 14.4 % (ref 11.6–14.5)
GLOBULIN SER CALC-MCNC: 3.7 G/DL (ref 2–4)
GLUCOSE SERPL-MCNC: 94 MG/DL (ref 74–99)
HCT VFR BLD AUTO: 41.6 % (ref 35–45)
HGB BLD-MCNC: 13.8 G/DL (ref 12–16)
IMM GRANULOCYTES # BLD AUTO: 0 K/UL (ref 0–0.04)
IMM GRANULOCYTES NFR BLD AUTO: 0 % (ref 0–0.5)
LYMPHOCYTES # BLD: 1.8 K/UL (ref 0.9–3.6)
LYMPHOCYTES NFR BLD: 34 % (ref 21–52)
MCH RBC QN AUTO: 29.4 PG (ref 24–34)
MCHC RBC AUTO-ENTMCNC: 33.2 G/DL (ref 31–37)
MCV RBC AUTO: 88.5 FL (ref 78–100)
MONOCYTES # BLD: 0.4 K/UL (ref 0.05–1.2)
MONOCYTES NFR BLD: 8 % (ref 3–10)
NEUTS SEG # BLD: 3 K/UL (ref 1.8–8)
NEUTS SEG NFR BLD: 58 % (ref 40–73)
NRBC # BLD: 0 K/UL (ref 0–0.01)
NRBC BLD-RTO: 0 PER 100 WBC
P-R INTERVAL, ECG05: 176 MS
PLATELET # BLD AUTO: 339 K/UL (ref 135–420)
PMV BLD AUTO: 10.7 FL (ref 9.2–11.8)
POTASSIUM SERPL-SCNC: 4 MMOL/L (ref 3.5–5.5)
PROT SERPL-MCNC: 7.6 G/DL (ref 6.4–8.2)
Q-T INTERVAL, ECG07: 440 MS
QRS DURATION, ECG06: 80 MS
QTC CALCULATION (BEZET), ECG08: 450 MS
RBC # BLD AUTO: 4.7 M/UL (ref 4.2–5.3)
SODIUM SERPL-SCNC: 142 MMOL/L (ref 136–145)
VENTRICULAR RATE, ECG03: 63 BPM
WBC # BLD AUTO: 5.2 K/UL (ref 4.6–13.2)

## 2021-12-16 PROCEDURE — G8427 DOCREV CUR MEDS BY ELIG CLIN: HCPCS | Performed by: INTERNAL MEDICINE

## 2021-12-16 PROCEDURE — G0463 HOSPITAL OUTPT CLINIC VISIT: HCPCS | Performed by: INTERNAL MEDICINE

## 2021-12-16 PROCEDURE — G8536 NO DOC ELDER MAL SCRN: HCPCS | Performed by: INTERNAL MEDICINE

## 2021-12-16 PROCEDURE — G8752 SYS BP LESS 140: HCPCS | Performed by: INTERNAL MEDICINE

## 2021-12-16 PROCEDURE — 1101F PT FALLS ASSESS-DOCD LE1/YR: CPT | Performed by: INTERNAL MEDICINE

## 2021-12-16 PROCEDURE — 1090F PRES/ABSN URINE INCON ASSESS: CPT | Performed by: INTERNAL MEDICINE

## 2021-12-16 PROCEDURE — 84146 ASSAY OF PROLACTIN: CPT

## 2021-12-16 PROCEDURE — 80053 COMPREHEN METABOLIC PANEL: CPT

## 2021-12-16 PROCEDURE — G8754 DIAS BP LESS 90: HCPCS | Performed by: INTERNAL MEDICINE

## 2021-12-16 PROCEDURE — 99214 OFFICE O/P EST MOD 30 MIN: CPT | Performed by: INTERNAL MEDICINE

## 2021-12-16 PROCEDURE — G8399 PT W/DXA RESULTS DOCUMENT: HCPCS | Performed by: INTERNAL MEDICINE

## 2021-12-16 PROCEDURE — G8417 CALC BMI ABV UP PARAM F/U: HCPCS | Performed by: INTERNAL MEDICINE

## 2021-12-16 PROCEDURE — G8432 DEP SCR NOT DOC, RNG: HCPCS | Performed by: INTERNAL MEDICINE

## 2021-12-16 PROCEDURE — 93005 ELECTROCARDIOGRAM TRACING: CPT

## 2021-12-16 PROCEDURE — 85025 COMPLETE CBC W/AUTO DIFF WBC: CPT

## 2021-12-16 PROCEDURE — 86140 C-REACTIVE PROTEIN: CPT

## 2021-12-16 PROCEDURE — 36415 COLL VENOUS BLD VENIPUNCTURE: CPT

## 2021-12-16 RX ORDER — MECLIZINE HCL 12.5 MG 12.5 MG/1
12.5 TABLET ORAL
Qty: 30 TABLET | Refills: 1 | Status: SHIPPED | OUTPATIENT
Start: 2021-12-16 | End: 2021-12-26

## 2021-12-16 NOTE — PROGRESS NOTES
[facility]:  12/16/2021, MRN: [id number]      Amy Bowie is a 80 y.o. female and presents to clinic for Dizziness (x 4 days intermittent) and Fatigue (Patient reports no energy)      Subjective: The patient is a 70-year-old female with history of hypertension, LGSIL 4/19, osteopenia, tubular adenomas colon polyps, obesity, vitamin D deficiency, stable pulmonary nodules, asthma (followed by Swapna Scott), cervical disc disease, and lumbar disc disease (followed by Orthopedics).     Dizziness: Sx began Sunday night. They continued Monday. On Tuesday her sx have resolved until that night. She developed ataxia. \"I felt like I was walking sideways. \" Yesterday she had severe fatigue in the morning. She felt better after the morning though. W/i the past month, she was placed on keflex after having a D&C done, given her h/o LGSIL. She was treated for presumed endometritis s/p D&C per her pelvic exam findings after the procedure. She completed abx 2 wks ago. Today: BP is 124/70, on maxide. HR is 63. She reports dizziness sx are associated with vertigo, nausea, and lightheadedness. No CP, SOB, or cough sx. No fever. No d/c. No dysuria. No triggers are known. No paresthesias. +Sinus HAs - but \"that's normal for me this time of the year. \"    She has a f/u apt with her GYN team w/i the next wk.          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 Dispense 100 gram tubes 100 g 2    mv-min/vit C/glut/lysine/hb124 (AIRBORNE, LYSINE HCL, PO) Take  by mouth.  fexofenadine (ALLEGRA) 180 mg tablet Take 180 mg by mouth daily.  PROAIR HFA 90 mcg/actuation inhaler USE 2 INHALATIONS EVERY 4 HOURS AS NEEDED 3 Inhaler 3    EPINEPHrine (EPIPEN) 0.3 mg/0.3 mL injection 0.3 mL by IntraMUSCular route once as needed for up to 1 dose. 1 Syringe 1    melatonin 3 mg tablet Take 3 mg by mouth daily as needed (sleep).  Cholecalciferol, Vitamin D3, (VITAMIN D3) 1,000 unit cap Take 3,000 Units by mouth daily.  cycloSPORINE (RESTASIS) 0.05 % ophthalmic emulsion Administer 1 Drop to both eyes two (2) times a day.  MULTIVITAMIN W-MINERALS/LUTEIN (CENTRUM SILVER PO) Take 1 Tab by mouth daily.  co-enzyme Q-10 (CO Q-10) 100 mg capsule Take 100 mg by mouth daily.  BIOTIN PO Take 5,000 mcg by mouth daily.      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 (HCC)     Fracture     rt ankle as an adult    History of pneumonia     Hypertension     Ill-defined condition     Spasms to left side    Left arm pain     Lumbar spinal stenosis  Myofascial pain     Osteopenia     Sciatica of right side        Past Surgical History:   Procedure Laterality Date    COLONOSCOPY N/A 2019    COLONOSCOPY w polypectomies performed by Deshaun Tobin MD at [facility]COPY    HX CATARACT REMOVAL  8/15/2011    HX HEENT      sinus surgery    HX KERATOPLASTY Left 10/02/2017    HX ORTHOPAEDIC      left 4th finger trigger release    HX SHIELA AND BSO  1970s    uterine fibroids       Family History   Problem Relation Age of Onset    Asthma Mother     COPD Mother    Maia Morel Mult Sclerosis Mother     Heart Disease Mother     Hypertension Mother     Stroke Father     Heart Disease Father     Hypertension Father     Allergic Rhinitis Sister     Asthma Brother     Hypertension Brother     Diabetes Brother     Migraines Paternal Grandmother        Social History     Tobacco Use    Smoking status: Former Smoker     Packs/day: 1.00     Years: 10.00     Pack years: 10.00     Types: Cigarettes     Quit date: 1970     Years since quittin.9    Smokeless tobacco: Never Used   Substance Use Topics    Alcohol use: Yes     Alcohol/week: 1.0 standard drink     Types: 1 Glasses of wine per week     Comment: occasional wine       ROS   Review of Systems   Constitutional: Negative for chills and fever. HENT: Negative for ear pain and sore throat. Eyes: Negative for blurred vision and pain. Respiratory: Negative for cough and shortness of breath. Cardiovascular: Negative for chest pain. Gastrointestinal: Negative for abdominal pain, blood in stool and melena. Genitourinary: Negative for dysuria and hematuria. Musculoskeletal: Positive for back pain and joint pain. Negative for myalgias. Chronic sx are intermittent and unchanged   Skin: Negative for rash. Neurological: Positive for dizziness (asymptomatic during her apt). Negative for headaches. Endo/Heme/Allergies: Does not bruise/bleed easily.    Psychiatric/Behavioral: Negative for substance abuse. Objective     Vitals:    12/16/21 1000   BP: 124/70   Pulse: 63   Resp: 14   Temp: 97.1 °F (36.2 °C)   TempSrc: Temporal   SpO2: 99%   Weight: 164 lb (74.4 kg)   Height: 5' 1\" (1.549 m)   PainSc:   0 - No pain       Physical Exam  Vitals and nursing note reviewed. HENT:      Head: Normocephalic and atraumatic. Right Ear: External ear normal.      Left Ear: External ear normal.   Eyes:      General: No scleral icterus. Right eye: No discharge. Left eye: No discharge. Conjunctiva/sclera: Conjunctivae normal.   Cardiovascular:      Rate and Rhythm: Normal rate and regular rhythm. Heart sounds: Normal heart sounds. No murmur heard. No friction rub. No gallop. Pulmonary:      Effort: Pulmonary effort is normal. No respiratory distress. Breath sounds: Normal breath sounds. No wheezing or rales. Abdominal:      General: Bowel sounds are normal. There is no distension. Palpations: Abdomen is soft. There is no mass. Tenderness: There is no abdominal tenderness. There is no guarding or rebound. Musculoskeletal:         General: No swelling (BUE) or tenderness (BUE). Cervical back: Neck supple. Lymphadenopathy:      Cervical: No cervical adenopathy. Skin:     General: Skin is warm and dry. Findings: No erythema or rash. Neurological:      Mental Status: She is alert. Motor: No abnormal muscle tone.       Gait: Gait normal.   Psychiatric:         Mood and Affect: Mood normal.         LABS   Data Review:   Lab Results   Component Value Date/Time    WBC 6.1 10/13/2020 09:43 AM    HGB 14.5 10/13/2020 09:43 AM    HCT 43.9 10/13/2020 09:43 AM    PLATELET 645 60/30/5349 09:43 AM    MCV 88.3 10/13/2020 09:43 AM       Lab Results   Component Value Date/Time    Sodium 143 04/27/2021 06:24 AM    Potassium 4.2 04/27/2021 06:24 AM    Chloride 106 04/27/2021 06:24 AM    CO2 34 (H) 04/27/2021 06:24 AM    Anion gap 3 04/27/2021 06:24 AM Glucose 92 04/27/2021 06:24 AM    BUN 14 04/27/2021 06:24 AM    Creatinine 0.77 04/27/2021 06:24 AM    BUN/Creatinine ratio 18 04/27/2021 06:24 AM    GFR est AA >60 04/27/2021 06:24 AM    GFR est non-AA >60 04/27/2021 06:24 AM    Calcium 9.5 04/27/2021 06:24 AM       Lab Results   Component Value Date/Time    Cholesterol, total 169 04/27/2021 06:24 AM    HDL Cholesterol 55 04/27/2021 06:24 AM    LDL, calculated 93 04/27/2021 06:24 AM    VLDL, calculated 21 04/27/2021 06:24 AM    Triglyceride 105 04/27/2021 06:24 AM    CHOL/HDL Ratio 3.1 04/27/2021 06:24 AM       Lab Results   Component Value Date/Time    Hemoglobin A1c 5.5 10/04/2021 08:30 AM       Assessment/Plan:   1. Endometritis: S/p D&C and abx. - Checking labs. - I encouraged the patient to follow-up with her gynecologist for a follow-up pelvic exam.    ORDERS:  - METABOLIC PANEL, COMPREHENSIVE; Future  - CBC WITH AUTOMATED DIFF; Future  - C REACTIVE PROTEIN, QT; Future    2. Vertigo: Her BP rx can be a potential cause. Trial of meclizine. I encouraged her to notify me if she develops worsening symptoms. ORDERS:  - meclizine (ANTIVERT) 12.5 mg tablet; Take 1 Tablet by mouth three (3) times daily as needed for Dizziness for up to 10 days. Dispense: 30 Tablet; Refill: 1    3. Dizziness:   Ordering an EKG. ORDERS:  - EKG, 12 LEAD, INITIAL; Future        Health Maintenance Due   Topic Date Due    Hepatitis C Screening  Never done     Lab review: labs are reviewed in the EHR and ordered as mentioned above. I have discussed the diagnosis with the patient and the intended plan as seen in the above orders. The patient has received an after-visit summary and questions were answered concerning future plans. I have discussed medication side effects and warnings with the patient as well. I have reviewed the plan of care with the patient, accepted their input and they are in agreement with the treatment goals. All questions were answered.  The patient understands the plan of care. Handouts provided today with above information. Pt instructed if symptoms worsen to call the office or report to the ED for continued care. Greater than 50% of the visit time was spent in counseling and/or coordination of care. Voice recognition was used to generate this report, which may have resulted in some phonetic based errors in grammar and contents. Even though attempts were made to correct all the mistakes, some may have been missed, and remained in the body of the document.           Froylan Lombard, MD

## 2021-12-16 NOTE — TELEPHONE ENCOUNTER
----- Message from Neil Saini  [place] sent at 12/16/2021  3:56 PM EST -----  Regarding: elevated CRP  my crp is elevated what is the next step

## 2021-12-16 NOTE — PATIENT INSTRUCTIONS
Dizziness: Care Instructions  Your Care Instructions  Dizziness is the feeling of unsteadiness or fuzziness in your head. It is different than having vertigo, which is a feeling that the room is spinning or that you are moving or falling. It is also different from lightheadedness, which is the feeling that you are about to faint. It can be hard to know what causes dizziness. Some people feel dizzy when they have migraine headaches. Sometimes bouts of flu can make you feel dizzy. Some medical conditions, such as heart problems or high blood pressure, can make you feel dizzy. Many medicines can cause dizziness, including medicines for high blood pressure, pain, or anxiety. If a medicine causes your symptoms, your doctor may recommend that you stop or change the medicine. If it is a problem with your heart, you may need medicine to help your heart work better. If there is no clear reason for your symptoms, your doctor may suggest watching and waiting for a while to see if the dizziness goes away on its own. Follow-up care is a key part of your treatment and safety. Be sure to make and go to all appointments, and call your doctor if you are having problems. It's also a good idea to know your test results and keep a list of the medicines you take. How can you care for yourself at home? · If your doctor recommends or prescribes medicine, take it exactly as directed. Call your doctor if you think you are having a problem with your medicine. · Do not drive while you feel dizzy. · Try to prevent falls. Steps you can take include:  ? Using nonskid mats, adding grab bars near the tub, and using night-lights. ? Clearing your home so that walkways are free of anything you might trip on.  ? Letting family and friends know that you have been feeling dizzy. This will help them know how to help you. When should you call for help? Call 911 anytime you think you may need emergency care.  For example, call if:    · You passed out (lost consciousness).     · You have dizziness along with symptoms of a heart attack. These may include:  ? Chest pain or pressure, or a strange feeling in the chest.  ? Sweating. ? Shortness of breath. ? Nausea or vomiting. ? Pain, pressure, or a strange feeling in the back, neck, jaw, or upper belly or in one or both shoulders or arms. ? Lightheadedness or sudden weakness. ? A fast or irregular heartbeat.     · You have symptoms of a stroke. These may include:  ? Sudden numbness, tingling, weakness, or loss of movement in your face, arm, or leg, especially on only one side of your body. ? Sudden vision changes. ? Sudden trouble speaking. ? Sudden confusion or trouble understanding simple statements. ? Sudden problems with walking or balance. ? A sudden, severe headache that is different from past headaches. Call your doctor now or seek immediate medical care if:    · You feel dizzy and have a fever, headache, or ringing in your ears.     · You have new or increased nausea and vomiting.     · Your dizziness does not go away or comes back. Watch closely for changes in your health, and be sure to contact your doctor if:    · You do not get better as expected. Where can you learn more? Go to http://www.gray.com/  Enter Q823 in the search box to learn more about \"Dizziness: Care Instructions. \"  Current as of: July 1, 2021               Content Version: 13.0  © 1225-7933 [employer]. Care instructions adapted under license by [employer] (which disclaims liability or warranty for this information). If you have questions about a medical condition or this instruction, always ask your healthcare professional. [place] [address] any warranty or liability for your use of this information.          Vertigo: Care Instructions  Your Care Instructions     Vertigo is the feeling that you or your surroundings are moving when there is no actual movement. It is often described as a feeling of spinning, whirling, falling, or tilting. Vertigo may make you vomit or feel nauseated. You may have trouble standing or walking and may lose your balance. Vertigo is often related to an inner ear problem, but it can have other more serious causes. If vertigo continues, you may need more tests to find its cause. Follow-up care is a key part of your treatment and safety. Be sure to make and go to all appointments, and call your doctor if you are having problems. It's also a good idea to know your test results and keep a list of the medicines you take. How can you care for yourself at home? · Do not lie flat on your back. Prop yourself up slightly. This may reduce the spinning feeling. Keep your eyes open. · Move slowly so that you do not fall. · If your doctor recommends medicine, take it exactly as directed. · Do not drive while you are having vertigo. Certain exercises, called Jordan-Daroff exercises, can help decrease vertigo. To do Jordan-Daroff exercises:  · Sit on the edge of a bed or sofa and quickly lie down on the side that causes the worst vertigo. Lie on your side with your ear down. · Stay in this position for at least 30 seconds or until the vertigo goes away. · Sit up. If this causes vertigo, wait for it to stop. · Repeat the procedure on the other side. · Repeat this 10 times. Do these exercises 2 times a day until the vertigo is gone. When should you call for help? Call 911 anytime you think you may need emergency care. For example, call if:    · You passed out (lost consciousness).     · You have symptoms of a stroke. These may include:  ? Sudden numbness, tingling, weakness, or loss of movement in your face, arm, or leg, especially on only one side of your body. ? Sudden vision changes. ? Sudden trouble speaking. ? Sudden confusion or trouble understanding simple statements. ? Sudden problems with walking or balance. ?  A sudden, severe headache that is different from past headaches. Call your doctor now or seek immediate medical care if:    · Vertigo occurs with a fever, a headache, or ringing in your ears.     · You have new or increased nausea and vomiting. Watch closely for changes in your health, and be sure to contact your doctor if:    · Vertigo gets worse or happens more often.     · Vertigo has not gotten better after 2 weeks. Where can you learn more? Go to http://www.gray.com/  Enter E721 in the search box to learn more about \"Vertigo: Care Instructions. \"  Current as of: December 2, 2020               Content Version: 13.0  © 2908-9428 [employer]. Care instructions adapted under license by [employer] (which disclaims liability or warranty for this information). If you have questions about a medical condition or this instruction, always ask your healthcare professional. [place] [address] any warranty or liability for your use of this information.

## 2021-12-17 DIAGNOSIS — R79.82 ELEVATED C-REACTIVE PROTEIN (CRP): ICD-10-CM

## 2021-12-17 DIAGNOSIS — N71.9 ENDOMETRITIS: Primary | ICD-10-CM

## 2021-12-17 LAB — PROLACTIN SERPL-MCNC: 6 NG/ML

## 2021-12-17 NOTE — TELEPHONE ENCOUNTER
Lab appt scheduled with pt for 12/29/21 per dr Hina Clement note:    CRP is likely elevated due to her recent infection.  We will trend this and repeat this lab in 1-2 wks.

## 2021-12-29 ENCOUNTER — TELEPHONE (OUTPATIENT)
Dept: INTERNAL MEDICINE CLINIC | Age: 81
End: 2021-12-29

## 2021-12-29 NOTE — TELEPHONE ENCOUNTER
Please let her know that estrogen cream is considered safe to use, even despite her history of abnormal Pap smears.     Dr. Sharee Thurman  [facility], [address] [place] [address], [address] [place] [address] Str.  Phone: [phone]  Fax: [phone]

## 2021-12-29 NOTE — TELEPHONE ENCOUNTER
Pt calling, says Dr. Gordon Little at the [facility] put her on Estrogen, vaginal. Wants to know if that is safe since she had pre cancerous cells.

## 2022-01-03 ENCOUNTER — HOSPITAL ENCOUNTER (EMERGENCY)
Age: 82
Discharge: HOME | End: 2022-01-03
Attending: STUDENT IN AN ORGANIZED HEALTH CARE EDUCATION/TRAINING PROGRAM
Payer: MEDICARE

## 2022-01-03 VITALS
BODY MASS INDEX: 31.15 KG/M2 | TEMPERATURE: 98.4 F | SYSTOLIC BLOOD PRESSURE: 142 MMHG | HEART RATE: 75 BPM | OXYGEN SATURATION: 98 % | RESPIRATION RATE: 20 BRPM | WEIGHT: 165 LBS | DIASTOLIC BLOOD PRESSURE: 82 MMHG | HEIGHT: 61 IN

## 2022-01-03 DIAGNOSIS — Z20.822 PERSON UNDER INVESTIGATION FOR COVID-19: ICD-10-CM

## 2022-01-03 DIAGNOSIS — J18.9 PNEUMONIA DUE TO INFECTIOUS ORGANISM, UNSPECIFIED LATERALITY, UNSPECIFIED PART OF LUNG: Primary | ICD-10-CM

## 2022-01-03 LAB
SARS-COV-2, COV2: NORMAL
SARS-COV-2, NAA: NOT DETECTED

## 2022-01-03 PROCEDURE — 99282 EMERGENCY DEPT VISIT SF MDM: CPT

## 2022-01-03 PROCEDURE — U0003 INFECTIOUS AGENT DETECTION BY NUCLEIC ACID (DNA OR RNA); SEVERE ACUTE RESPIRATORY SYNDROME CORONAVIRUS 2 (SARS-COV-2) (CORONAVIRUS DISEASE [COVID-19]), AMPLIFIED PROBE TECHNIQUE, MAKING USE OF HIGH THROUGHPUT TECHNOLOGIES AS DESCRIBED BY CMS-2020-01-R: HCPCS

## 2022-01-03 RX ORDER — ESTRADIOL 0.1 MG/G
CREAM VAGINAL
COMMUNITY
Start: 2021-12-22 | End: 2022-05-23

## 2022-01-03 RX ORDER — DOXYCYCLINE HYCLATE 100 MG
100 TABLET ORAL 2 TIMES DAILY
Qty: 14 TABLET | Refills: 0 | Status: SHIPPED | OUTPATIENT
Start: 2022-01-03 | End: 2022-01-10

## 2022-01-03 NOTE — DISCHARGE INSTRUCTIONS
You were tested for COVID-19 in the ER today, the results take 1 to 2 days. Please quarantine in the meantime and isolate yourself from others. Given your symptoms are worsening over the last week and now you are having a productive cough, will treat for possible underlying pneumonia with doxycycline. Your vital signs were overall normal in the ER. Follow-up with your primary care physician within 24 to 48 hours.

## 2022-01-03 NOTE — ED NOTES
EMERGENCY DEPARTMENT HISTORY AND PHYSICAL EXAM      Patient Name: Isaiah Avelar    History of Presenting Illness     HPI:     77-year-old female presents to the ED for evaluation of cough x7 days, worsening over last 2 days with green mucous and now sinus congestion. Also complains of body aches. Denies headache, nausea vomiting, chest pain, dyspnea, abdominal pain, diarrhea, dysuria. PCP: Sussy Kemp MD    No current facility-administered medications on file prior to encounter. Current Outpatient Medications on File Prior to Encounter   Medication Sig Dispense Refill    Gralise 600 mg Tb24 TAKE 2 TABLETS (1,200 MG) DAILY FOR NEUROPATHIC PAIN 180 Tablet 1    triamterene-hydroCHLOROthiazide (MAXZIDE) 37.5-25 mg per tablet TAKE ONE-HALF (1/2) TABLET DAILY 45 Tablet 3    diclofenac (Voltaren) 1 % gel Apply 4 grams to affected joint up to 4 times per day, maximum 16 grams per joint per day. Dispense 100 gram tubes 100 g 2    fluticasone propionate (FLONASE) 50 mcg/actuation nasal spray USE 2 SPRAYS IN EACH NOSTRIL DAILY 48 g 3    mv-min/vit C/glut/lysine/hb124 (AIR[place]ORNE, LYSINE HCL, PO) Take  by mouth.  fexofenadine (ALLEGRA) 180 mg tablet Take 180 mg by mouth daily.  PROAIR HFA 90 mcg/actuation inhaler USE 2 INHALATIONS EVERY 4 HOURS AS NEEDED 3 Inhaler 3    EPINEPHrine (EPIPEN) 0.3 mg/0.3 mL injection 0.3 mL by IntraMUSCular route once as needed for up to 1 dose. 1 Syringe 1    melatonin 3 mg tablet Take 3 mg by mouth daily as needed (sleep).  Cholecalciferol, Vitamin D3, (VITAMIN D3) 1,000 unit cap Take 3,000 Units by mouth daily.  cycloSPORINE (RESTASIS) 0.05 % ophthalmic emulsion Administer 1 Drop to both eyes two (2) times a day.  MULTIVITAMIN W-MINERALS/LUTEIN (CENTRUM SILVER PO) Take 1 Tab by mouth daily.  co-enzyme Q-10 (CO Q-10) 100 mg capsule Take 100 mg by mouth daily.  BIOTIN PO Take 5,000 mcg by mouth daily.          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    Band keratopathy of right eye 2/3/2018    Cataract     Cylindrical bronchiectasis (HCC)     Fracture     rt ankle as an adult    History of pneumonia     Hypertension     Ill-defined condition     Spasms to left side    Left arm pain     Lumbar spinal stenosis     Myofascial pain     Osteopenia     Sciatica of right side        Past Surgical History:  Past Surgical History:   Procedure Laterality Date    COLONOSCOPY N/A 2019    COLONOSCOPY w polypectomies performed by Norma Isaac MD at [facility]    HX CATARACT REMOVAL  8/15/2011    HX HEENT      sinus surgery    HX KERATOPLASTY Left 10/02/2017    HX ORTHOPAEDIC      left 4th finger trigger release    HX SHIELA AND BSO  1970s    uterine fibroids       Family History:  Family History   Problem Relation Age of Onset    Asthma Mother     COPD Mother     Mult Sclerosis Mother     Heart Disease Mother     Hypertension Mother     Stroke Father     Heart Disease Father     Hypertension Father     Allergic Rhinitis Sister     Asthma Brother     Hypertension Brother     Diabetes Brother     Migraines Paternal Grandmother        Social History:  Social History     Tobacco Use    Smoking status: Former Smoker     Packs/day: 1.00     Years: 10.00     Pack years: 10.00     Types: Cigarettes     Quit date: 1970     Years since quittin.0    Smokeless tobacco: Never Used   Substance Use Topics    Alcohol use: Yes     Alcohol/week: 1.0 standard drink     Types: 1 Glasses of wine per week     Comment: occasional wine    Drug use: No       Allergies:   Allergies   Allergen Reactions    Naprosyn [Naproxen] Other (comments)     Blood in urine         Review of Nursing Notes: I have reviewed the relevant nursing notes that were available at the time of this entry. Portions of the Family and Social history, as well as medications and allergies, may have been entered into my documentation by nursing or other ancillary staff; I have confirmed and may have supplemented that information to the best of my ability at the time the note was reviewed. There are some disagreements between the nursing notes and my evaluation at times. Some of the above referenced nursing documentation appears in my note after completion of my review. Review of Systems     CONSTITUTIONAL: Denies fevers, chills, sweats, or weight changes. EYES: Denies any visual changes or symptoms  HENT: Denies headaches, changes to hearing, sore throat, dysphagia, or change in voice. CV: Denies chest pains or palpitations. Lungs/Chest: Denies dyspnea, wheezing. GI: Denies nausea, vomiting, diarrhea, constipation, abdominal pain, hematochezia, or melena. : Denies urinary retention or incontinence. No genital discharge. No dysuria. MSK: Denies myalgias or arthralgias. NEURO: Denies chronic headaches or seizures. No numbness, tingling or weakness. PSYCHIATRIC: Denies problems with mood disturbance and anxiety. DERMATOLOGIC: Denies any rashes or skin changes. Physical Exam     General: In no apparent distress. Well-nourished/well-developed. Head/Neck: Normocephalic, atraumatic. Nontender midline neck, normal ROM. EENT: PERRLA. EOM intact bilaterally. Oropharyngeal mucosa is moist, lesions or erythema. No nasal discharge. Cardiovascular: RRR, no murmurs, gallops, or rubs. Peripheral pulses normal and intact in BUE and BLE. Lungs/Chest: CTAB, no wheezing, rhonchi, or rales. Abdomen: No distention. No organomegaly. No rebound, rigidity, or guarding. Nontender. Extremities/Skin: Warm distal extremities No deformities. No edema. No rashes. Neuro: A&O x 3. Grossly intact sensations and motor function in upper and lower extremities bilaterally.   Psych: Appropriate mood and affect. Diagnostic Study Results     Labs -   No results found for this or any previous visit (from the past 12 hour(s)). Radiologic Studies -   No orders to display     CT Results  (Last 48 hours)    None        CXR Results  (Last 48 hours)    None          Medical Decision Making     I am the first provider for this patient. Vital Signs- I personally reviewed and interpreted the patient's vital signs. No data found. Records Reviewed: I reviewed the patient's records to interpret any previous medical data available to me including EKGs, previous medical records, previous images, previous labs. Provider Notes (Medical Decision Making):     81-year-old female presents to the ED for evaluation of 1 week history of productive cough, worsening, now has sinus congestion. Overall well-appearing, vital signs normal.  Given the chronicity will treat with doxycycline for possible underlying pneumonia. He was also tested for COVID-19, discussed the results may take 1 to 3 days. Patient remained stable throughout their ED course. I discussed relevant findings with patient. My plan is to discharge home with return precautions and PCP follow-up within two days. Additional verbal discharge instructions were given and discussed with the patient. Patient expressed understanding, agrees to plan, and all of their questions were answered.     Mela Pate DO

## 2022-01-03 NOTE — ED NOTES
I have reviewed discharge instructions with the patient. The patient verbalized understanding. Patient armband removed and shredded. Current Discharge Medication List      START taking these medications    Details   doxycycline (VIBRA-TABS) 100 mg tablet Take 1 Tablet by mouth two (2) times a day for 7 days.   Qty: 14 Tablet, Refills: 0  Start date: 1/3/2022, End date: 1/10/2022

## 2022-01-14 ENCOUNTER — TELEPHONE (OUTPATIENT)
Dept: INTERNAL MEDICINE CLINIC | Age: 82
End: 2022-01-14

## 2022-01-14 NOTE — TELEPHONE ENCOUNTER
----- Message from [facility] sent at 1/14/2022 11:16 AM EST -----  Subject: Referral Request    QUESTIONS   Reason for referral request? blood work   Has the physician seen you for this condition before? No   Preferred Specialist (if applicable)? Do you already have an appointment scheduled? Yes  Select Scheduled Date? 2022-01-25  Select Scheduled Physician? Additional Information for Provider? pt is requesting repeat lab that was   scheduled for 1/3/22  ---------------------------------------------------------------------------  --------------  CALL BACK INFO  What is the best way for the office to contact you? OK to leave message on   voicemail  Preferred Call Back Phone Number?  [phone]

## 2022-01-17 ENCOUNTER — HOSPITAL ENCOUNTER (OUTPATIENT)
Dept: LAB | Age: 82
Discharge: HOME | End: 2022-01-17
Payer: MEDICARE

## 2022-01-17 ENCOUNTER — APPOINTMENT (OUTPATIENT)
Dept: INTERNAL MEDICINE CLINIC | Age: 82
End: 2022-01-17

## 2022-01-17 DIAGNOSIS — R79.82 ELEVATED C-REACTIVE PROTEIN (CRP): ICD-10-CM

## 2022-01-17 DIAGNOSIS — N71.9 ENDOMETRITIS: ICD-10-CM

## 2022-01-17 LAB — CRP SERPL-MCNC: 1.3 MG/DL (ref 0–0.3)

## 2022-01-17 PROCEDURE — 36415 COLL VENOUS BLD VENIPUNCTURE: CPT

## 2022-01-17 PROCEDURE — 86140 C-REACTIVE PROTEIN: CPT

## 2022-01-17 RX ORDER — FLUTICASONE PROPIONATE 50 MCG
SPRAY, SUSPENSION (ML) NASAL
Qty: 48 G | Refills: 3 | Status: SHIPPED | OUTPATIENT
Start: 2022-01-17

## 2022-01-18 NOTE — PROGRESS NOTES
Please schedule her for a follow-up visit with me next month (30 minutes), in office or virtual.  Also see how she is feeling at this time. Any abdominal pain? Fever? Chills? Her CRP is about the same but slightly higher. I can see that she was treated for a sinus infection though earlier this month. This would explain her inflammatory marker (CRP) being mildly elevated still, after her D&C for endometritis (uterine infection).     Dr. Pawel Alfaro  [facility] [address].  Phone: [phone]  Fax: [phone]

## 2022-01-19 NOTE — PROGRESS NOTES
Patient contacted, patient identified with two identifiers (Name & ). Patient aware of results per DR. Argueta and verbalizes understanding. Pt scheduled for in office visit.

## 2022-01-26 NOTE — PROGRESS NOTES
Keke Rolon presents today for   Chief Complaint   Patient presents with    Rib Pain     Patient reports left side ribcage pain x 2 days. Patient reports taking a \"old vicodin\" w/o relief    Labs     1-17-22        1. \"Have you been to the ER, urgent care clinic since your last visit? Hospitalized since your last visit? \" {yes    2. \"Have you seen or consulted any other health care providers outside of the [address] [place] [address] since your last visit? \" yes

## 2022-01-27 ENCOUNTER — HOSPITAL ENCOUNTER (OUTPATIENT)
Dept: ULTRASOUND IMAGING | Age: 82
Discharge: HOME | End: 2022-01-27
Attending: INTERNAL MEDICINE
Payer: MEDICARE

## 2022-01-27 ENCOUNTER — HOSPITAL ENCOUNTER (OUTPATIENT)
Dept: LAB | Age: 82
Discharge: HOME | End: 2022-01-27
Payer: MEDICARE

## 2022-01-27 ENCOUNTER — OFFICE VISIT (OUTPATIENT)
Dept: INTERNAL MEDICINE CLINIC | Age: 82
End: 2022-01-27
Payer: MEDICARE

## 2022-01-27 VITALS
HEIGHT: 61 IN | OXYGEN SATURATION: 96 % | WEIGHT: 164 LBS | TEMPERATURE: 97.8 F | BODY MASS INDEX: 30.96 KG/M2 | DIASTOLIC BLOOD PRESSURE: 77 MMHG | SYSTOLIC BLOOD PRESSURE: 125 MMHG | HEART RATE: 68 BPM | RESPIRATION RATE: 12 BRPM

## 2022-01-27 DIAGNOSIS — R10.9 LEFT FLANK PAIN: ICD-10-CM

## 2022-01-27 DIAGNOSIS — M25.511 CHRONIC RIGHT SHOULDER PAIN: ICD-10-CM

## 2022-01-27 DIAGNOSIS — N20.0 NEPHROLITHIASIS: ICD-10-CM

## 2022-01-27 DIAGNOSIS — R10.9 LEFT FLANK PAIN: Primary | ICD-10-CM

## 2022-01-27 DIAGNOSIS — G89.29 CHRONIC RIGHT SHOULDER PAIN: ICD-10-CM

## 2022-01-27 LAB
ALBUMIN SERPL-MCNC: 3.5 G/DL (ref 3.4–5)
ALBUMIN/GLOB SERPL: 0.9 {RATIO} (ref 0.8–1.7)
ALP SERPL-CCNC: 85 U/L (ref 45–117)
ALT SERPL-CCNC: 19 U/L (ref 13–56)
ANION GAP SERPL CALC-SCNC: 4 MMOL/L (ref 3–18)
AST SERPL-CCNC: 13 U/L (ref 10–38)
BASOPHILS # BLD: 0 K/UL (ref 0–0.1)
BASOPHILS NFR BLD: 0 % (ref 0–2)
BILIRUB SERPL-MCNC: 0.5 MG/DL (ref 0.2–1)
BILIRUB UR QL STRIP: NEGATIVE
BUN SERPL-MCNC: 13 MG/DL (ref 7–18)
BUN/CREAT SERPL: 18 (ref 12–20)
CALCIUM SERPL-MCNC: 9 MG/DL (ref 8.5–10.1)
CHLORIDE SERPL-SCNC: 107 MMOL/L (ref 100–111)
CO2 SERPL-SCNC: 32 MMOL/L (ref 21–32)
CREAT SERPL-MCNC: 0.73 MG/DL (ref 0.6–1.3)
CRP SERPL-MCNC: 2.3 MG/DL (ref 0–0.3)
DIFFERENTIAL METHOD BLD: NORMAL
EOSINOPHIL # BLD: 0 K/UL (ref 0–0.4)
EOSINOPHIL NFR BLD: 0 % (ref 0–5)
ERYTHROCYTE [DISTWIDTH] IN BLOOD BY AUTOMATED COUNT: 14.5 % (ref 11.6–14.5)
GLOBULIN SER CALC-MCNC: 3.7 G/DL (ref 2–4)
GLUCOSE SERPL-MCNC: 134 MG/DL (ref 74–99)
GLUCOSE UR-MCNC: NEGATIVE MG/DL
HCT VFR BLD AUTO: 42.3 % (ref 35–45)
HGB BLD-MCNC: 13.5 G/DL (ref 12–16)
IMM GRANULOCYTES # BLD AUTO: 0 K/UL (ref 0–0.04)
IMM GRANULOCYTES NFR BLD AUTO: 0 % (ref 0–0.5)
KETONES P FAST UR STRIP-MCNC: NEGATIVE MG/DL
LYMPHOCYTES # BLD: 1.5 K/UL (ref 0.9–3.6)
LYMPHOCYTES NFR BLD: 26 % (ref 21–52)
MCH RBC QN AUTO: 28.3 PG (ref 24–34)
MCHC RBC AUTO-ENTMCNC: 31.9 G/DL (ref 31–37)
MCV RBC AUTO: 88.7 FL (ref 78–100)
MONOCYTES # BLD: 0.4 K/UL (ref 0.05–1.2)
MONOCYTES NFR BLD: 7 % (ref 3–10)
NEUTS SEG # BLD: 4 K/UL (ref 1.8–8)
NEUTS SEG NFR BLD: 67 % (ref 40–73)
NRBC # BLD: 0 K/UL (ref 0–0.01)
NRBC BLD-RTO: 0 PER 100 WBC
PH UR STRIP: 5 [PH] (ref 4.6–8)
PLATELET # BLD AUTO: 345 K/UL (ref 135–420)
PMV BLD AUTO: 9.9 FL (ref 9.2–11.8)
POTASSIUM SERPL-SCNC: 3.7 MMOL/L (ref 3.5–5.5)
PROT SERPL-MCNC: 7.2 G/DL (ref 6.4–8.2)
PROT UR QL STRIP: NEGATIVE
RBC # BLD AUTO: 4.77 M/UL (ref 4.2–5.3)
SODIUM SERPL-SCNC: 143 MMOL/L (ref 136–145)
SP GR UR STRIP: 1.02 (ref 1–1.03)
UA UROBILINOGEN AMB POC: NORMAL (ref 0.2–1)
URINALYSIS CLARITY POC: CLEAR
URINALYSIS COLOR POC: YELLOW
URINE BLOOD POC: NEGATIVE
URINE LEUKOCYTES POC: NEGATIVE
URINE NITRITES POC: NEGATIVE
WBC # BLD AUTO: 6 K/UL (ref 4.6–13.2)

## 2022-01-27 PROCEDURE — 87086 URINE CULTURE/COLONY COUNT: CPT

## 2022-01-27 PROCEDURE — G0463 HOSPITAL OUTPT CLINIC VISIT: HCPCS | Performed by: INTERNAL MEDICINE

## 2022-01-27 PROCEDURE — G8754 DIAS BP LESS 90: HCPCS | Performed by: INTERNAL MEDICINE

## 2022-01-27 PROCEDURE — G8536 NO DOC ELDER MAL SCRN: HCPCS | Performed by: INTERNAL MEDICINE

## 2022-01-27 PROCEDURE — 85025 COMPLETE CBC W/AUTO DIFF WBC: CPT

## 2022-01-27 PROCEDURE — 1101F PT FALLS ASSESS-DOCD LE1/YR: CPT | Performed by: INTERNAL MEDICINE

## 2022-01-27 PROCEDURE — 1090F PRES/ABSN URINE INCON ASSESS: CPT | Performed by: INTERNAL MEDICINE

## 2022-01-27 PROCEDURE — 81001 URINALYSIS AUTO W/SCOPE: CPT | Performed by: INTERNAL MEDICINE

## 2022-01-27 PROCEDURE — 80053 COMPREHEN METABOLIC PANEL: CPT

## 2022-01-27 PROCEDURE — G8417 CALC BMI ABV UP PARAM F/U: HCPCS | Performed by: INTERNAL MEDICINE

## 2022-01-27 PROCEDURE — G8399 PT W/DXA RESULTS DOCUMENT: HCPCS | Performed by: INTERNAL MEDICINE

## 2022-01-27 PROCEDURE — G8752 SYS BP LESS 140: HCPCS | Performed by: INTERNAL MEDICINE

## 2022-01-27 PROCEDURE — G8432 DEP SCR NOT DOC, RNG: HCPCS | Performed by: INTERNAL MEDICINE

## 2022-01-27 PROCEDURE — 76770 US EXAM ABDO BACK WALL COMP: CPT

## 2022-01-27 PROCEDURE — 86140 C-REACTIVE PROTEIN: CPT

## 2022-01-27 PROCEDURE — G8427 DOCREV CUR MEDS BY ELIG CLIN: HCPCS | Performed by: INTERNAL MEDICINE

## 2022-01-27 PROCEDURE — 36415 COLL VENOUS BLD VENIPUNCTURE: CPT

## 2022-01-27 PROCEDURE — 99214 OFFICE O/P EST MOD 30 MIN: CPT | Performed by: INTERNAL MEDICINE

## 2022-01-27 NOTE — PATIENT INSTRUCTIONS
Rotator Cuff: Exercises  Introduction  Here are some examples of exercises for you to try. The exercises may be suggested for a condition or for rehabilitation. Start each exercise slowly. Ease off the exercises if you start to have pain. You will be told when to start these exercises and which ones will work best for you. How to do the exercises  Pendulum swing    If you have pain in your back, do not do this exercise. 1. Hold on to a table or the back of a chair with your good arm. Then bend forward a little and let your sore arm hang straight down. This exercise does not use the arm muscles. Rather, use your legs and your hips to create movement that makes your arm swing freely. 2. Use the movement from your hips and legs to guide the slightly swinging arm back and forth like a pendulum (or elephant trunk). Then guide it in circles that start small (about the size of a dinner plate). Make the circles a bit larger each day, as your pain allows. 3. Do this exercise for 5 minutes, 5 to 7 times each day. 4. As you have less pain, try bending over a little farther to do this exercise. This will increase the amount of movement at your shoulder. Posterior stretching exercise    1. Hold the elbow of your injured arm with your other hand. 2. Use your hand to pull your injured arm gently up and across your body. You will feel a gentle stretch across the back of your injured shoulder. 3. Hold for at least 15 to 30 seconds. Then slowly lower your arm. 4. Repeat 2 to 4 times. Up-the-back stretch    Your doctor or physical therapist may want you to wait to do this stretch until you have regained most of your range of motion and strength. You can do this stretch in different ways. Hold any of these stretches for at least 15 to 30 seconds. Repeat them 2 to 4 times. 1. Light stretch: Put your hand in your back pocket. Let it rest there to stretch your shoulder. 2. Moderate stretch:  With your other hand, hold your injured arm (palm outward) behind your back by the wrist. Pull your arm up gently to stretch your shoulder. 3. Advanced stretch: Put a towel over your other shoulder. Put the hand of your injured arm behind your back. Now hold the back end of the towel. With the other hand, hold the front end of the towel in front of your body. Pull gently on the front end of the towel. This will bring your hand farther up your back to stretch your shoulder. Overhead stretch    1. Standing about an arm's length away, grasp onto a solid surface. You could use a countertop, a doorknob, or the back of a sturdy chair. 2. With your knees slightly bent, bend forward with your arms straight. Lower your upper body, and let your shoulders stretch. 3. As your shoulders are able to stretch farther, you may need to take a step or two backward. 4. Hold for at least 15 to 30 seconds. Then stand up and relax. If you had stepped back during your stretch, step forward so you can keep your hands on the solid surface. 5. Repeat 2 to 4 times. Shoulder flexion (lying down)    To make a wand for this exercise, use a piece of PVC pipe or a broom handle with the broom removed. Make the wand about a foot wider than your shoulders. 1. Lie on your back, holding a wand with both hands. Your palms should face down as you hold the wand. 2. Keeping your elbows straight, slowly raise your arms over your head. Raise them until you feel a stretch in your shoulders, upper back, and chest.  3. Hold for 15 to 30 seconds. 4. Repeat 2 to 4 times. Shoulder rotation (lying down)    To make a wand for this exercise, use a piece of PVC pipe or a broom handle with the broom removed. Make the wand about a foot wider than your shoulders. 1. Lie on your back. Hold a wand with both hands with your elbows bent and palms up. 2. Keep your elbows close to your body, and move the wand across your body toward the sore arm. 3. Hold for 8 to 12 seconds.   4. Repeat 2 to 4 times. Wall climbing (to the side)    Avoid any movement that is straight to your side, and be careful not to arch your back. Your arm should stay about 30 degrees to the front of your side. 1. Stand with your side to a wall so that your fingers can just touch it at an angle about 30 degrees toward the front of your body. 2. Walk the fingers of your injured arm up the wall as high as pain permits. Try not to shrug your shoulder up toward your ear as you move your arm up. 3. Hold that position for a count of at least 15 to 20.  4. Walk your fingers back down to the starting position. 5. Repeat at least 2 to 4 times. Try to reach higher each time. Wall climbing (to the front)    During this stretching exercise, be careful not to arch your back. 1. Face a wall, and stand so your fingers can just touch it. 2. Keeping your shoulder down, walk the fingers of your injured arm up the wall as high as pain permits. (Don't shrug your shoulder up toward your ear.)  3. Hold your arm in that position for at least 15 to 30 seconds. 4. Slowly walk your fingers back down to where you started. 5. Repeat at least 2 to 4 times. Try to reach higher each time. Shoulder blade squeeze    1. Stand with your arms at your sides, and squeeze your shoulder blades together. Do not raise your shoulders up as you squeeze. 2. Hold 6 seconds. 3. Repeat 8 to 12 times. Scapular exercise: Arm reach    1. Lie flat on your back. This exercise is a very slight motion that starts with your arms raised (elbows straight, arms straight). 2. From this position, reach higher toward the robb or ceiling. Keep your elbows straight. All motion should be from your shoulder blade only. 3. Relax your arms back to where you started. 4. Repeat 8 to 12 times. Arm raise to the side    During this strengthening exercise, your arm should stay about 30 degrees to the front of your side.   1. Slowly raise your injured arm to the side, with your thumb facing up. Raise your arm 60 degrees at the most (shoulder level is 90 degrees). 2. Hold the position for 3 to 5 seconds. Then lower your arm back to your side. If you need to, bring your \"good\" arm across your body and place it under the elbow as you lower your injured arm. Use your good arm to keep your injured arm from dropping down too fast.  3. Repeat 8 to 12 times. 4. When you first start out, don't hold any extra weight in your hand. As you get stronger, you may use a 1-pound to 2-pound dumbbell or a small can of food. Shoulder flexor and extensor exercise    These are isometric exercises. That means you contract your muscles without actually moving. 1. Push forward (flex): Stand facing a wall or doorjamb, about 6 inches or less back. Hold your injured arm against your body. Make a closed fist with your thumb on top. Then gently push your hand forward into the wall with about 25% to 50% of your strength. Don't let your body move backward as you push. Hold for about 6 seconds. Relax for a few seconds. Repeat 8 to 12 times. 2. Push backward (extend): Stand with your back flat against a wall. Your upper arm should be against the wall, with your elbow bent 90 degrees (your hand straight ahead). Push your elbow gently back against the wall with about 25% to 50% of your strength. Don't let your body move forward as you push. Hold for about 6 seconds. Relax for a few seconds. Repeat 8 to 12 times. Scapular exercise: Wall push-ups    This exercise is best done with your fingers somewhat turned out, rather than straight up and down. 1. Stand facing a wall, about 12 inches to 18 inches away. 2. Place your hands on the wall at shoulder height. 3. Slowly bend your elbows and bring your face to the wall. Keep your back and hips straight. 4. Push back to where you started. 5. Repeat 8 to 12 times. 6. When you can do this exercise against a wall comfortably, you can try it against a counter.  You can then slowly progress to the end of a couch, then to a sturdy chair, and finally to the floor. Scapular exercise: Retraction    For this exercise, you will need elastic exercise material, such as surgical tubing or Thera-Band. 1. Put the band around a solid object at about waist level. (A bedpost will work well.) Each hand should hold an end of the band. 2. With your elbows at your sides and bent to 90 degrees, pull the band back. Your shoulder blades should move toward each other. Then move your arms back where you started. 3. Repeat 8 to 12 times. 4. If you have good range of motion in your shoulders, try this exercise with your arms lifted out to the sides. Keep your elbows at a 90-degree angle. Raise the elastic band up to about shoulder level. Pull the band back to move your shoulder blades toward each other. Then move your arms back where you started. Internal rotator strengthening exercise    1. Start by tying a piece of elastic exercise material to a doorknob. You can use surgical tubing or Thera-Band. 2. Stand or sit with your shoulder relaxed and your elbow bent 90 degrees. Your upper arm should rest comfortably against your side. Squeeze a rolled towel between your elbow and your body for comfort. This will help keep your arm at your side. 3. Hold one end of the elastic band in the hand of the painful arm. 4. Slowly rotate your forearm toward your body until it touches your belly. Slowly move it back to where you started. 5. Keep your elbow and upper arm firmly tucked against the towel roll or at your side. 6. Repeat 8 to 12 times. External rotator strengthening exercise    1. Start by tying a piece of elastic exercise material to a doorknob. You can use surgical tubing or Thera-Band. (You may also hold one end of the band in each hand.)  2. Stand or sit with your shoulder relaxed and your elbow bent 90 degrees. Your upper arm should rest comfortably against your side.  Squeeze a rolled towel between your elbow and your body for comfort. This will help keep your arm at your side. 3. Hold one end of the elastic band with the hand of the painful arm. 4. Start with your forearm across your belly. Slowly rotate the forearm out away from your body. Keep your elbow and upper arm tucked against the towel roll or the side of your body until you begin to feel tightness in your shoulder. Slowly move your arm back to where you started. 5. Repeat 8 to 12 times. Follow-up care is a key part of your treatment and safety. Be sure to make and go to all appointments, and call your doctor if you are having problems. It's also a good idea to know your test results and keep a list of the medicines you take. Where can you learn more? Go to http://www.gray.com/  Enter J005 in the search box to learn more about \"Rotator Cuff: Exercises. \"  Current as of: July 1, 2021               Content Version: 13.0  © 2006-2021 [employer]. Care instructions adapted under license by [employer] (which disclaims liability or warranty for this information). If you have questions about a medical condition or this instruction, always ask your healthcare professional. [place] [address] any warranty or liability for your use of this information.

## 2022-01-27 NOTE — PROGRESS NOTES
[facility] OF [facility]:  1/27/2022, MRN: [id number]      Jaz Clark is a 80 y.o. female and presents to clinic for Rib Pain (Patient reports left side ribcage pain x 2 days. Patient reports taking a \"old vicodin\" w/o relief) and Labs (1-17-22)      Subjective: The patient is a 53-year-old female with history of hypertension, LGSIL 4/19, osteopenia, tubular adenomas colon polyps, obesity, vitamin D deficiency, stable pulmonary nodules, asthma (followed by Safia Benton), cervical disc disease, and lumbar disc disease (followed by Orthopedics).     1. Left Flank Pain: Not relieved with a tab of \"old vicodin. \" No relief with old lidocaine patches from 2017. Present x 2 days. She had a urine study that was done 2 days ago. The pain is better today but still present. She had a hard time putting on her clothes 2/2 pain. No trauma hx. No hematuria. No dysuria. No frequency. No paresthesias or weakness. No SOB. When she breathes deeply, it hurts. Pain is 8/10 and constant. \"It feels like the bottom part is heavy\" [lower abdomen]. Last month, she had a D&C. She was treated for infection along her uterus after that procedure with a course of doxycycline. She completed this course of abx.     2. Right Shoulder Pain: Worsening. It hurts to lift things above her head. S/p xray. 8/4/21 Right Shoulder Xray: No significant radiographic abnormalities. Mild acromial and AC joint degenerative change.       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 01/21/2014    Osteopenia 01/15/2013    Vitamin D deficiency 01/15/2013    Asthma 10/06/2011    Multiple lung nodules 11/03/2010    Allergic rhinitis 11/03/2010    HTN (hypertension) 11/03/2010       Current Outpatient Medications   Medication Sig Dispense Refill    fluticasone propionate (FLONASE) 50 mcg/actuation nasal spray USE 2 SPRAYS IN EACH NOSTRIL DAILY 48 g 3    estradioL (ESTRACE) 0.01 % (0.1 mg/gram) vaginal cream insert 1 gram vaginally once daily for 14 days      Gralise 600 mg Tb24 TAKE 2 TABLETS (1,200 MG) DAILY FOR NEUROPATHIC PAIN 180 Tablet 1    triamterene-hydroCHLOROthiazide (MAXZIDE) 37.5-25 mg per tablet TAKE ONE-HALF (1/2) TABLET DAILY 45 Tablet 3    diclofenac (Voltaren) 1 % gel Apply 4 grams to affected joint up to 4 times per day, maximum 16 grams per joint per day. Dispense 100 gram tubes 100 g 2    mv-min/vit C/glut/lysine/hb124 (AIRBORNE, LYSINE HCL, PO) Take  by mouth.  fexofenadine (ALLEGRA) 180 mg tablet Take 180 mg by mouth daily.  PROAIR HFA 90 mcg/actuation inhaler USE 2 INHALATIONS EVERY 4 HOURS AS NEEDED 3 Inhaler 3    EPINEPHrine (EPIPEN) 0.3 mg/0.3 mL injection 0.3 mL by IntraMUSCular route once as needed for up to 1 dose. 1 Syringe 1    melatonin 3 mg tablet Take 3 mg by mouth daily as needed (sleep).  Cholecalciferol, Vitamin D3, (VITAMIN D3) 1,000 unit cap Take 3,000 Units by mouth daily.  cycloSPORINE (RESTASIS) 0.05 % ophthalmic emulsion Administer 1 Drop to both eyes two (2) times a day.  MULTIVITAMIN W-MINERALS/LUTEIN (CENTRUM SILVER PO) Take 1 Tab by mouth daily.  co-enzyme Q-10 (CO Q-10) 100 mg capsule Take 100 mg by mouth daily.  BIOTIN PO Take 5,000 mcg by mouth daily.          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 [address] [address]     Fracture     rt ankle as an adult    History of pneumonia     Hypertension     Ill-defined condition     Spasms to left side    Left arm pain     Lumbar spinal stenosis     Myofascial pain     Osteopenia     Sciatica of right side        Past Surgical History:   Procedure Laterality Date    COLONOSCOPY N/A 2019    COLONOSCOPY w polypectomies performed by Yazmin Fowler MD at [address] HX CATARACT REMOVAL  8/15/2011    HX HEENT      sinus surgery    HX KERATOPLASTY Left 10/02/2017    HX ORTHOPAEDIC      left 4th finger trigger release    HX SHIELA AND BSO  1970s    uterine fibroids       Family History   Problem Relation Age of Onset    Asthma Mother     COPD Mother    Melvi Alu Mult Sclerosis Mother     Heart Disease Mother     Hypertension Mother     Stroke Father     Heart Disease Father     Hypertension Father     Allergic Rhinitis Sister     Asthma Brother     Hypertension Brother     Diabetes Brother     Migraines Paternal Grandmother        Social History     Tobacco Use    Smoking status: Former Smoker     Packs/day: 1.00     Years: 10.00     Pack years: 10.00     Types: Cigarettes     Quit date: 1970     Years since quittin.1    Smokeless tobacco: Never Used   Substance Use Topics    Alcohol use: Yes     Alcohol/week: 1.0 standard drink     Types: 1 Glasses of wine per week     Comment: occasional wine       ROS   Review of Systems   Constitutional: Negative for chills and fever. HENT: Negative for ear pain and sore throat. Eyes: Negative for blurred vision and pain. Respiratory: Negative for cough and shortness of breath. Cardiovascular: Negative for chest pain. Gastrointestinal: Positive for abdominal pain. Negative for blood in stool and melena. Genitourinary: Positive for flank pain. Negative for dysuria and hematuria. Musculoskeletal: Positive for back pain, joint pain (right shoulder pain), myalgias and neck pain. Skin: Negative for rash. Neurological: Negative for headaches. Endo/Heme/Allergies: Does not bruise/bleed easily. Psychiatric/Behavioral: Negative for substance abuse. Objective     Vitals:    01/27/22 0826   BP: 125/77   Pulse: 68   Resp: 12   Temp: 97.8 °F (36.6 °C)   TempSrc: Temporal   SpO2: 96%   Weight: 164 lb (74.4 kg)   Height: 5' 0.5\" (1.537 m)   PainSc:   8   PainLoc: Rib Cage       Physical Exam  Vitals and nursing note reviewed. HENT:      Head: Normocephalic and atraumatic. Right Ear: External ear normal.      Left Ear: External ear normal.   Eyes:      General: No scleral icterus. Right eye: No discharge. Left eye: No discharge. Conjunctiva/sclera: Conjunctivae normal.   Cardiovascular:      Rate and Rhythm: Normal rate and regular rhythm. Heart sounds: Normal heart sounds. No murmur heard. No friction rub. No gallop. Pulmonary:      Effort: Pulmonary effort is normal. No respiratory distress. Breath sounds: Normal breath sounds. No wheezing or rales. Abdominal:      General: Bowel sounds are normal. There is no distension. Palpations: Abdomen is soft. There is no mass. Tenderness: There is no abdominal tenderness. There is no guarding or rebound. Musculoskeletal:         General: No swelling (BUE). Cervical back: Neck supple. Comments: Spinous processes are NTTP. No paraspinal muscles are TTP. She has decreased range of motion along her right shoulder secondary to pain. Her right shoulder is mildly tender to palpation along the bursa areas. Lymphadenopathy:      Cervical: No cervical adenopathy. Skin:     General: Skin is warm and dry. Findings: No erythema or rash. Neurological:      Mental Status: She is alert. Motor: No abnormal muscle tone.       Gait: Gait normal.   Psychiatric:         Mood and Affect: Mood normal.         LABS   Data Review:   Lab Results   Component Value Date/Time    WBC 5.2 12/16/2021 11:23 AM    HGB 13.8 12/16/2021 11:23 AM    HCT 41.6 12/16/2021 11:23 AM    PLATELET 388 28/75/9430 11:23 AM    MCV 88.5 12/16/2021 11:23 AM       Lab Results   Component Value Date/Time    Sodium 142 12/16/2021 11:23 AM    Potassium 4.0 12/16/2021 11:23 AM    Chloride 107 12/16/2021 11:23 AM    CO2 32 12/16/2021 11:23 AM    Anion gap 3 12/16/2021 11:23 AM    Glucose 94 12/16/2021 11:23 AM    BUN 10 12/16/2021 11:23 AM    Creatinine 0.66 12/16/2021 11:23 AM    BUN/Creatinine ratio 15 12/16/2021 11:23 AM    GFR est AA >60 12/16/2021 11:23 AM    GFR est non-AA >60 12/16/2021 11:23 AM    Calcium 9.6 12/16/2021 11:23 AM       Lab Results   Component Value Date/Time    Cholesterol, total 169 04/27/2021 06:24 AM    HDL Cholesterol 55 04/27/2021 06:24 AM    LDL, calculated 93 04/27/2021 06:24 AM    VLDL, calculated 21 04/27/2021 06:24 AM    Triglyceride 105 04/27/2021 06:24 AM    CHOL/HDL Ratio 3.1 04/27/2021 06:24 AM       Lab Results   Component Value Date/Time    Hemoglobin A1c 5.5 10/04/2021 08:30 AM       Assessment/Plan:   1. Left flank pain: Etiology is unknown. S/p a recent D&C  Ordering urine studies and labs. Ordering a retroperitoneal ultrasound to rule out obstructive pathologies like nephrolithiasis. I encouraged her to follow-up with her gynecologist for a pelvic exam for persistent symptoms. ORDERS:  - CULTURE, URINE; Future  - METABOLIC PANEL, COMPREHENSIVE; Future  - C REACTIVE PROTEIN, QT; Future  - CBC WITH AUTOMATED DIFF; Future  - US RETROPERITONEUM COMP; Future  - US RETROPERITONEUM COMP; Future  - AMB POC URINALYSIS DIP STICK AUTO W/ MICRO    2. Chronic right shoulder pain: From OA (mild)  Placing a referral to PT  Activity as tolerated. Okay to use Tylenol as needed. ORDERS:  - REFERRAL TO PHYSICAL THERAPY      Health Maintenance Due   Topic Date Due    Hepatitis C Screening  Never done         Lab review: labs are reviewed in the EHR and ordered as mentioned above.     I have discussed the diagnosis with the patient and the intended plan as seen in the above orders. The patient has received an after-visit summary and questions were answered concerning future plans. I have discussed medication side effects and warnings with the patient as well. I have reviewed the plan of care with the patient, accepted their input and they are in agreement with the treatment goals. All questions were answered. The patient understands the plan of care. Handouts provided today with above information. Pt instructed if symptoms worsen to call the office or report to the ED for continued care. Greater than 50% of the visit time was spent in counseling and/or coordination of care. Voice recognition was used to generate this report, which may have resulted in some phonetic based errors in grammar and contents. Even though attempts were made to correct all the mistakes, some may have been missed, and remained in the body of the document.           Amanda Dodd MD

## 2022-01-28 ENCOUNTER — TELEPHONE (OUTPATIENT)
Dept: INTERNAL MEDICINE CLINIC | Age: 82
End: 2022-01-28

## 2022-01-28 DIAGNOSIS — N71.9 ENDOMETRITIS: ICD-10-CM

## 2022-01-28 DIAGNOSIS — R87.612 LGSIL OF CERVIX OF UNDETERMINED SIGNIFICANCE: ICD-10-CM

## 2022-01-28 DIAGNOSIS — N71.9 ENDOMETRITIS: Primary | ICD-10-CM

## 2022-01-28 RX ORDER — CIPROFLOXACIN 500 MG/1
500 TABLET ORAL 2 TIMES DAILY
Qty: 14 TABLET | Refills: 0 | Status: SHIPPED | OUTPATIENT
Start: 2022-01-28 | End: 2022-02-04

## 2022-01-28 RX ORDER — METRONIDAZOLE 500 MG/1
500 TABLET ORAL 2 TIMES DAILY
Qty: 14 TABLET | Refills: 0 | Status: SHIPPED | OUTPATIENT
Start: 2022-01-28 | End: 2022-04-20 | Stop reason: ALTCHOICE

## 2022-01-28 NOTE — TELEPHONE ENCOUNTER
I spoke with her about her result. There is no evidence of her having a kidney stone or UTI. Her CRP is climbing. She had an infection after a gynecologic procedure. Status post antibiotics. No fever. No bleeding. She continues to have left flank pain though. I will treat her for a presumed intra-abdominal abscess. I encouraged her to schedule a pelvic exam with her gynecologist but she declines, wanting to see a different provider for her GYN needs. I will place a referral.  In the meanwhile, I will order a CT scan to further investigate her symptoms.     Dr. Tracy Owens  [facility], [address] [place] [address], [address] [place] [address].  Phone: [phone]  Fax: [phone]

## 2022-01-29 LAB
BACTERIA SPEC CULT: ABNORMAL
CC UR VC: ABNORMAL
SERVICE CMNT-IMP: ABNORMAL

## 2022-01-31 ENCOUNTER — TELEPHONE (OUTPATIENT)
Dept: INTERNAL MEDICINE CLINIC | Age: 82
End: 2022-01-31

## 2022-01-31 NOTE — TELEPHONE ENCOUNTER
Patient reached and advised that there is no specific doctor that she has to see. Patient also advised to go ahead and schedule with OBGYN since there is a wait to see them. Patient verbalizes understanding and will call them back to schedule.

## 2022-01-31 NOTE — TELEPHONE ENCOUNTER
LVM informing patient referral was refaxed to [facility]. Pt can call and schedule appt if she doesn't hear anything from their office.     [address] [facility]  P# [phone]

## 2022-01-31 NOTE — TELEPHONE ENCOUNTER
----- Message from Anna Wilkinson sent at 4/67/4040 11:16 AM EST -----  Subject: Message to Provider    QUESTIONS  Information for Provider? nanette beltran/Caio (GYN) has not received   referral fax [phone] Pt is needing to schedule appt   ---------------------------------------------------------------------------  --------------  CALL BACK INFO  What is the best way for the office to contact you? OK to leave message on   voicemail  Preferred Call Back Phone Number? [phone]  ---------------------------------------------------------------------------  --------------  SCRIPT ANSWERS  Relationship to Patient?  Self

## 2022-01-31 NOTE — TELEPHONE ENCOUNTER
Patient stating we contacted her and told her she needed to see some doctors with [facility]. She wants to know if she is supposed to see Hao Perez or Connor Staton. She also wants to know if she should wait to make the appt after she has the CT scan and finishes the antibiotic.

## 2022-02-02 NOTE — PROGRESS NOTES
Please call and let her know that her urine culture result came back positive for lactobacillus, which is not atypical bacteria that causes bladder infections. But given the amount in her urine, it is very likely that she had symptoms from a bladder infection and less likely from an infection from her D&C. The antibiotics that were given to her should cover this atypical type of bladder infection in addition to infection from a procedure such as a D&C. Did her symptoms go away? How does she feel?     Dr. Millie Kaye  [facility] of [address].  Phone: [phone]  Fax: [phone]

## 2022-02-02 NOTE — PROGRESS NOTES
Patient contacted, patient identified with two identifiers (Name & ). Patient aware of results per DR. Argueta and verbalizes understanding. Patient states that she no longer has pain, she just feels tired, but overall she is doing well.

## 2022-02-04 ENCOUNTER — HOSPITAL ENCOUNTER (OUTPATIENT)
Dept: CT IMAGING | Age: 82
Discharge: HOME | End: 2022-02-04
Attending: INTERNAL MEDICINE
Payer: MEDICARE

## 2022-02-04 DIAGNOSIS — N71.9 ENDOMETRITIS: ICD-10-CM

## 2022-02-04 PROCEDURE — 74177 CT ABD & PELVIS W/CONTRAST: CPT

## 2022-02-04 PROCEDURE — 74011000636 HC RX REV CODE- 636: Performed by: INTERNAL MEDICINE

## 2022-02-04 RX ADMIN — IOPAMIDOL 100 ML: 612 INJECTION, SOLUTION INTRAVENOUS at 10:25

## 2022-02-04 RX ADMIN — DIATRIZOATE MEGLUMINE AND DIATRIZOATE SODIUM 30 ML: 660; 100 LIQUID ORAL; RECTAL at 10:25

## 2022-02-06 PROBLEM — K44.9 HIATAL HERNIA: Status: ACTIVE | Noted: 2022-02-06

## 2022-02-06 PROBLEM — K76.0 NAFLD (NONALCOHOLIC FATTY LIVER DISEASE): Status: ACTIVE | Noted: 2022-02-06

## 2022-02-07 ENCOUNTER — HOSPITAL ENCOUNTER (OUTPATIENT)
Dept: PHYSICAL THERAPY | Age: 82
Discharge: HOME | End: 2022-02-07
Payer: MEDICARE

## 2022-02-07 PROCEDURE — 97530 THERAPEUTIC ACTIVITIES: CPT

## 2022-02-07 PROCEDURE — 97161 PT EVAL LOW COMPLEX 20 MIN: CPT

## 2022-02-07 PROCEDURE — 97110 THERAPEUTIC EXERCISES: CPT

## 2022-02-07 NOTE — PROGRESS NOTES
Please let her know that her CT scan of her abdomen and pelvis showed no new findings other than a small hiatal hernia (which can predispose her to heartburn), an umbilical hernia, and fatty liver disease or NAFLD (nonalcoholic fatty liver disease). In order to reduce the fatty content of her liver, she needs to reduce her weight by maintaining a heart healthy diet and exercising regularly. She does not need to be on any additional medication for these findings. No additional studies are warranted for these findings.     Dr. Camelia Gilbert  [facility] of [address].  Phone: [phone]  Fax: [phone]

## 2022-02-07 NOTE — PROGRESS NOTES
Patient is aware Please let her know that her CT scan of her abdomen and pelvis showed no new findings other than a small hiatal hernia (which can predispose her to heartburn), an umbilical hernia, and fatty liver disease or NAFLD (nonalcoholic fatty liver disease). In order to reduce the fatty content of her liver, she needs to reduce her weight by maintaining a heart healthy diet and exercising regularly. She does not need to be on any additional medication for these findings. No additional studies are warranted for these findings. Patient verbalizes understanding.

## 2022-02-07 NOTE — PROGRESS NOTES
PT DAILY TREATMENT NOTE     Patient Name: Jovany Chen  Date:2022  : 1940  [x]  Patient  Verified  [place]or: [place] MED[place] / Plan: [place] MEDICARE PART A & B / Product Type: Medicare /    In time: 599  Out time: 903  Total Treatment Time (min): 38  Visit #: 1 of 8    Medicare/BCBS Only   Total Timed Codes (min):  23 1:1 Treatment Time:  38       Treatment Area: Pain in right shoulder [M25.511]    SUBJECTIVE  Pain Level (0-10 scale): 5  Any medication changes, allergies to medications, adverse drug reactions, diagnosis change, or new procedure performed?: [x] No    [] Yes (see summary sheet for update)  Subjective functional status/changes:   [] No changes reported  Patient reports chronic right shoulder pain with exacerbation in symptoms 2-3 months ago. She reports aching, worse in the morning and with overactivity, and \"twinge\" sensation with quick movements of right arm or OH reaching. She has had previous x-ray completed though she was unsure of timeframe that she reports showed \"arthritis and RTC involvement\". Of note, patient reports a fall on [employer] where her slippers \"caught\" causing her to fall. She reports her left side was more \"scuffed up\" but reports she may have tried to catch herself with the arm. Patient reports she does not feel this changed her right shoulder pain/symptoms. OBJECTIVE    15 min [x]Eval                  []Re-Eval       15 min Therapeutic Exercise:  [x] See flow sheet : Patient provided printed HEP to begin addressing impairments. Patient provided demonstration of exercises and rationale for each exercise to improve compliance to HEP. Education provided on importance of compliance to HEP for improved carry over between therapy session.    Rationale: increase ROM, increase strength and improve coordination to improve the patients ability to perform ADLs and self care tasks with greater ease     8 min Therapeutic Activity:  [x]  See flow sheet : Patient education provided on pathology, findings, and treatment plan of care. Education provided on use of modalities as needed for symptom management. Rationale: increase strength and improve coordination  to improve the patients ability to perform functional tasks with greater ease           With   [] TE   [] TA   [] neuro   [] other: Patient Education: [x] Review HEP    [] Progressed/Changed HEP based on:   [] positioning   [] body mechanics   [] transfers   [] heat/ice application    [] other:      Other Objective/Functional Measures: see paper chart for evaluation form      Pain Level (0-10 scale) post treatment: 5    ASSESSMENT/Changes in Function: Patient is an [de-identified]year old female presenting with acute on chronic right shoulder pain. She reports insidious onset ~2-3 months prior to [facility] with subjective report of \"aching\" and intermittent \"twinge\" to right shoulder with specific movements. Patient is right hand dominant and denies limitations prior to most recent exacerbation in symptoms. Patient currently with difficulty sleeping, carrying/lifting groceries, reaching overhead, and performance of self care tasks. Patient demonstrates decreased right shoulder strength grossly, decreased right shoulder ROM, poor posture with rounded shoulders, decreased scapular mobility, and (+) TTP through right upper trap, pec, and deltoid. Patient's signs and symptoms appear to be arthritic in nature with possible RTC involvement due to weakness in shoulder. Patient would benefit from skilled PT 2x/week for 4 weeks to address the above limitations to promote return to PLOF.      Patient will continue to benefit from skilled PT services to modify and progress therapeutic interventions, address functional mobility deficits, address ROM deficits, address strength deficits, analyze and address soft tissue restrictions, analyze and cue movement patterns, analyze and modify body mechanics/ergonomics, assess and modify postural abnormalities and instruct in home and community integration to attain remaining goals. [x]  See Plan of Care  []  See progress note/recertification  []  See Discharge Summary         Progress towards goals / Updated goals:  Short Term Goals: To be accomplished in 2 weeks:  1. Patient will report performance of home exercise program 4 of 7 days in the next week demonstrating compliance to therapy program and progress towards independent management of symptoms. Eval: HEP provided and instructed   2. Patient will demonstrate at least 140 degrees right shoulder flexion PROM to improve ease with reaching overhead. Eval: 132 degrees with discomfort at end range     Long Term Goals: To be accomplished in 4 weeks:  1. Patient will increase right shoulder flexion AROM to at least 130 degrees to improve ease with daily activities. Eval: 73 degrees   2. Patient will increase right shoulder functional ER to at least C7 to improve ease with self care tasks and fixing her hair. Eval: C2 with slight head turn and forward flexion  3. Patient will increase right shoulder strength grossly to 4/5 to improve ease with carrying groceries. Eval: flexion 2/5, abduction 2+/5, ER and IR 3/5 with pain in all planes   4. Patient will increase FOTO score to at least 58 to improve ease with daily activities and household activities.     Eval: 52     PLAN  []  Upgrade activities as tolerated     [x]  Continue plan of care  []  Update interventions per flow sheet       []  Discharge due to:_  []  Other:_      Kirstin Renee, PT, DPT 2/7/2022  8:47 AM    Future Appointments   Date Time Provider Kin Dai   2/9/2022  8:45 AM Trey Mccarthy MD [facility] BS AMB   4/5/2022  9:05 AM [facility] LAB VISIT [facility] BS AMB   4/12/2022  8:30 AM Shaniqua Aparicio MD [facility] BS AMB

## 2022-02-09 ENCOUNTER — OFFICE VISIT (OUTPATIENT)
Dept: ORTHOPEDIC SURGERY | Age: 82
End: 2022-02-09
Payer: MEDICARE

## 2022-02-09 VITALS
OXYGEN SATURATION: 96 % | SYSTOLIC BLOOD PRESSURE: 122 MMHG | HEART RATE: 66 BPM | WEIGHT: 165.2 LBS | TEMPERATURE: 97.4 F | DIASTOLIC BLOOD PRESSURE: 62 MMHG | HEIGHT: 61 IN | BODY MASS INDEX: 31.19 KG/M2 | RESPIRATION RATE: 15 BRPM

## 2022-02-09 DIAGNOSIS — M25.512 BILATERAL SHOULDER PAIN, UNSPECIFIED CHRONICITY: ICD-10-CM

## 2022-02-09 DIAGNOSIS — G89.29 CHRONIC BILATERAL LOW BACK PAIN WITH RIGHT-SIDED SCIATICA: ICD-10-CM

## 2022-02-09 DIAGNOSIS — M25.511 BILATERAL SHOULDER PAIN, UNSPECIFIED CHRONICITY: ICD-10-CM

## 2022-02-09 DIAGNOSIS — M50.30 DDD (DEGENERATIVE DISC DISEASE), CERVICAL: ICD-10-CM

## 2022-02-09 DIAGNOSIS — M54.16 LUMBAR RADICULOPATHY: ICD-10-CM

## 2022-02-09 DIAGNOSIS — M79.2 NEURITIS: ICD-10-CM

## 2022-02-09 DIAGNOSIS — M54.41 CHRONIC BILATERAL LOW BACK PAIN WITH RIGHT-SIDED SCIATICA: ICD-10-CM

## 2022-02-09 DIAGNOSIS — M62.838 MUSCLE SPASM: ICD-10-CM

## 2022-02-09 DIAGNOSIS — M54.12 CERVICAL RADICULOPATHY: Primary | ICD-10-CM

## 2022-02-09 PROCEDURE — G8417 CALC BMI ABV UP PARAM F/U: HCPCS | Performed by: PHYSICAL MEDICINE & REHABILITATION

## 2022-02-09 PROCEDURE — 1090F PRES/ABSN URINE INCON ASSESS: CPT | Performed by: PHYSICAL MEDICINE & REHABILITATION

## 2022-02-09 PROCEDURE — G8432 DEP SCR NOT DOC, RNG: HCPCS | Performed by: PHYSICAL MEDICINE & REHABILITATION

## 2022-02-09 PROCEDURE — G8399 PT W/DXA RESULTS DOCUMENT: HCPCS | Performed by: PHYSICAL MEDICINE & REHABILITATION

## 2022-02-09 PROCEDURE — G8752 SYS BP LESS 140: HCPCS | Performed by: PHYSICAL MEDICINE & REHABILITATION

## 2022-02-09 PROCEDURE — G8536 NO DOC ELDER MAL SCRN: HCPCS | Performed by: PHYSICAL MEDICINE & REHABILITATION

## 2022-02-09 PROCEDURE — G8427 DOCREV CUR MEDS BY ELIG CLIN: HCPCS | Performed by: PHYSICAL MEDICINE & REHABILITATION

## 2022-02-09 PROCEDURE — 99213 OFFICE O/P EST LOW 20 MIN: CPT | Performed by: PHYSICAL MEDICINE & REHABILITATION

## 2022-02-09 PROCEDURE — 1101F PT FALLS ASSESS-DOCD LE1/YR: CPT | Performed by: PHYSICAL MEDICINE & REHABILITATION

## 2022-02-09 PROCEDURE — G8754 DIAS BP LESS 90: HCPCS | Performed by: PHYSICAL MEDICINE & REHABILITATION

## 2022-02-09 RX ORDER — GABAPENTIN 600 MG/1
2 TABLET, FILM COATED ORAL
Qty: 180 TABLET | Refills: 1 | Status: SHIPPED | OUTPATIENT
Start: 2022-04-12 | End: 2022-04-27 | Stop reason: SDUPTHER

## 2022-02-09 RX ORDER — METHYLPREDNISOLONE 4 MG/1
TABLET ORAL
Qty: 1 DOSE PACK | Refills: 0 | Status: SHIPPED | OUTPATIENT
Start: 2022-02-09 | End: 2022-04-20 | Stop reason: ALTCHOICE

## 2022-02-09 RX ORDER — GABAPENTIN 600 MG/1
2 TABLET, FILM COATED ORAL
Qty: 180 TABLET | Refills: 1 | Status: SHIPPED | OUTPATIENT
Start: 2022-04-12 | End: 2022-02-09 | Stop reason: SDUPTHER

## 2022-02-09 NOTE — PROGRESS NOTES
[facility]  Bradford Perez., [address] [place] [address], [facility] [address]  Phone: [phone]  Fax: [phone]    Pt's YOB: 1940    ASSESSMENT   Diagnoses and all orders for this visit:    1. Cervical radiculopathy  -     gabapentin (Gralise) 600 mg Tb24; Take 2 Tablets by mouth nightly. Max Daily Amount: 2 Tablets. 2. Neuritis  -     gabapentin (Gralise) 600 mg Tb24; Take 2 Tablets by mouth nightly. Max Daily Amount: 2 Tablets. 3. DDD (degenerative disc disease), cervical  -     gabapentin (Gralise) 600 mg Tb24; Take 2 Tablets by mouth nightly. Max Daily Amount: 2 Tablets. 4. Bilateral shoulder pain, unspecified chronicity  -     methylPREDNISolone (MEDROL DOSEPACK) 4 mg tablet; Per dose pack instructions    5. Chronic bilateral low back pain with right-sided sciatica  -     methylPREDNISolone (MEDROL DOSEPACK) 4 mg tablet; Per dose pack instructions    6. Lumbar radiculopathy  -     gabapentin (Gralise) 600 mg Tb24; Take 2 Tablets by mouth nightly. Max Daily Amount: 2 Tablets. 7. Muscle spasm         IMPRESSION AND PLAN:  Mindy Mccurdy is a [de-identified] y.o. female with history of cervical, shoulder, and lumbar pain. She complains of  continued right shoulder pain that sometimes precludes her from raising her arm above her head. Pt is taking Gralise 600 mg 2 tabs QHS and Tylenol as needed. 1) Pt was given information on shoulder arthritis and rotator cuff exercises. 2) Pt will continue to take Gralise 600 mg as prescribed and does not require a refill at this time. 3) Pt was prescribed a Medrol Dosepak for inflammatory pain. 4) Shoulder MRI discussed pending results of physical therapy. 5) I recommended the patient try OTC Aspercaine, Salonpas, or capsaicin patches and BioFreeze if needed. Pt was advised not to use heat and topical treatments on the same area concurrently. 6) Ms. Isabel Faustin has a reminder for a \"due or due soon\" health maintenance. I have asked that she contact her primary care provider, Lavone Gottron, MD, for follow-up on this health maintenance. 7)  demonstrated consistency with prescribing. Follow-up and Dispositions    · Return in about 2 months (around 4/9/2022) for Medication follow up, PT follow up. HISTORY OF PRESENT ILLNESS:  Daniela Ramírez is a [de-identified] y.o. female with history of cervical, shoulder, and lumbar pain and presents to the office today for follow up. Pt complains of continued right shoulder pain that sometimes precludes her from raising her arm above her head to comb her hair or reach objects on high shelves. She also notes an episode of vertigo on 02/01/2022. Pt reports that she is scheduled to begin shoulder physical therapy, prescribed by her PCP, at [facility] on 02/11/2022. She notes pain with some of the home exercises she received from physical therapy. She states that she uses heat and Salonpas patches on her shoulder with some relief, used Voltaren gel with little relief, and is uncertain about relief with lidocaine patches. (Of note, she states the Salonpas and lidocaine patches were both quite old. ). Pt is taking Gralise 600 mg 2 tabs QHS and Tylenol as needed. She denies recently taking steroids. Pt at this time desires to continue with current care.     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    Band keratopathy of right eye 2/3/2018    Cataract     Cylindrical bronchiectasis ([address]     Fracture     rt ankle as an adult    History of pneumonia     Hypertension     Ill-defined condition     Spasms to left side    Left arm pain     Lumbar spinal stenosis     Myofascial pain     Osteopenia     Sciatica of right side         Social History     Socioeconomic History    Marital status:      Spouse name: Not on file    Number of children: Not on file    Years of education: Not on file    Highest education level: Not on file   Occupational History    Not on file   Tobacco Use    Smoking status: Former Smoker     Packs/day: 1.00     Years: 10.00     Pack years: 10.00     Types: Cigarettes     Quit date: 1970     Years since quittin.1    Smokeless tobacco: Never Used   Substance and Sexual Activity    Alcohol use: Yes     Alcohol/week: 1.0 standard drink     Types: 1 Glasses of wine per week     Comment: occasional wine    Drug use: No    Sexual activity: Yes     Partners: Male     Birth control/protection: None   Other Topics Concern    Not on file   Social History Narrative    Not on file     Social Determinants of Health     Financial Resource Strain:     Difficulty of Paying Living Expenses: Not on file   Food Insecurity:     Worried About Running Out of Food in the Last Year: Not on file    [place] of Food in the Last Year: Not on file   Transportation Needs:     Lack of Transportation (Medical): Not on file    Lack of Transportation (Non-Medical):  Not on file   Physical Activity:     Days of Exercise per Week: Not on file    Minutes of Exercise per Session: Not on file   Stress:     Feeling of Stress : Not on file   Social Connections:     Frequency of Communication with Friends and Family: Not on file    Frequency of Social Gatherings with Friends and Family: Not on file    Attends [employer] Services: Not on file    Active Member of Clubs or Organizations: Not on file    Attends Club or Organization Meetings: Not on file    Marital Status: Not on file   Intimate Partner Violence:     Fear of Current or Ex-Partner: Not on file    Emotionally Abused: Not on file    Physically Abused: Not on file    Sexually Abused: Not on file   Housing Stability:     Unable to Pay for Housing in the Last Year: Not on file    Number of [place] in the Last Year: Not on file    Unstable Housing in the Last Year: Not on file       Current Outpatient Medications   Medication Sig Dispense Refill    methylPREDNISolone (MEDROL DOSEPACK) 4 mg tablet Per dose pack instructions 1 Dose Pack 0    [START ON 4/12/2022] gabapentin (Gralise) 600 mg Tb24 Take 2 Tablets by mouth nightly. Max Daily Amount: 2 Tablets. 180 Tablet 1    triamterene-hydroCHLOROthiazide (MAXZIDE) 37.5-25 mg per tablet TAKE ONE-HALF (1/2) TABLET DAILY 45 Tablet 3    diclofenac (Voltaren) 1 % gel Apply 4 grams to affected joint up to 4 times per day, maximum 16 grams per joint per day. Dispense 100 gram tubes 100 g 2    mv-min/vit C/glut/lysine/hb124 ([place], LYSINE HCL, PO) Take  by mouth.  melatonin 3 mg tablet Take 3 mg by mouth daily as needed (sleep).  Cholecalciferol, Vitamin D3, (VITAMIN D3) 1,000 unit cap Take 3,000 Units by mouth daily.  cycloSPORINE (RESTASIS) 0.05 % ophthalmic emulsion Administer 1 Drop to both eyes two (2) times a day.  co-enzyme Q-10 (CO Q-10) 100 mg capsule Take 100 mg by mouth daily.  BIOTIN PO Take 5,000 mcg by mouth daily.  metroNIDAZOLE (FLAGYL) 500 mg tablet Take 1 Tablet by mouth two (2) times a day. (Patient not taking: Reported on 2/9/2022) 14 Tablet 0    fluticasone propionate (FLONASE) 50 mcg/actuation nasal spray USE 2 SPRAYS IN EACH NOSTRIL DAILY 48 g 3    estradioL (ESTRACE) 0.01 % (0.1 mg/gram) vaginal cream insert 1 gram vaginally once daily for 14 days (Patient not taking: Reported on 2/9/2022)      fexofenadine (ALLEGRA) 180 mg tablet Take 180 mg by mouth daily.  PROAIR HFA 90 mcg/actuation inhaler USE 2 INHALATIONS EVERY 4 HOURS AS NEEDED 3 Inhaler 3    EPINEPHrine (EPIPEN) 0.3 mg/0.3 mL injection 0.3 mL by IntraMUSCular route once as needed for up to 1 dose. 1 Syringe 1    MULTIVITAMIN W-MINERALS/LUTEIN (CENTRUM SILVER PO) Take 1 Tab by mouth daily.  (Patient not taking: Reported on 2/9/2022)         Allergies Allergen Reactions    Naprosyn [Naproxen] Other (comments)     Blood in urine           REVIEW OF SYSTEMS    Constitutional: Negative for fever, chills, or weight change. Respiratory: Negative for cough or shortness of breath. Cardiovascular: Negative for chest pain or palpitations. Gastrointestinal: Negative for acid reflux, change in bowel habits, or constipation. Genitourinary: Negative for dysuria and flank pain. Musculoskeletal: Positive for right shoulder pain. Skin: Negative for rash. Neurological: Negative for headaches and numbness. Positive for dizziness. Endo/Heme/Allergies: Negative for increased bruising. Psychiatric/Behavioral: Positive for difficulty with sleep. As per HPI    PHYSICAL EXAMINATION  Visit Vitals  /62 (BP 1 Location: Right arm, BP Patient Position: Sitting)   Pulse 66   Temp 97.4 °F (36.3 °C) (Temporal)   Resp 15   Ht 5' 0.5\" (1.537 m)   Wt 165 lb 3.2 oz (74.9 kg)   SpO2 96%   BMI 31.73 kg/m²       Constitutional: Awake, alert, and in no acute distress. Neurological: Sensation to light touch is intact. Skin: warm, dry, and intact. Musculoskeletal: Pain with inferior adduction of the right shoulder. No pain with extension, axial loading, or forward flexion. No pain with internal or external rotation of her hips. Negative straight leg raise bilaterally. Biceps  Triceps Deltoids Wrist Ext Wrist Flex Hand Intrin   Right +4/5 +4/5 +4/5 +4/5 +4/5 +4/5   Left +4/5 +4/5 +4/5 +4/5 +4/5 +4/5      Hip Flex  Quads Hamstrings Ankle DF EHL Ankle PF   Right +4/5 +4/5 +4/5 +4/5 +4/5 +4/5   Left +4/5 +4/5 +4/5 +4/5 +4/5 +4/5     IMAGING:    Abdominal/pelvic CT from 02/04/2022 was personally reviewed with the patient and demonstrated:    CT ABDOMEN FINDINGS: Visualized portions of the lung bases demonstrate mild  fibrotic changes. There are 2 small nodules in the right middle lobe on images 4  and 5 with the largest measuring 5 mm in size.  Visualized portions of the heart  and pericardium are normal.     Hepatic steatosis is present. The liver is otherwise normal in appearance. The  gallbladder is normal. The spleen is normal. A small hiatal hernia is present. The pancreas is normal in appearance. Adrenal glands are normal. There are  numerous small cysts involving the right kidney. Kidneys are otherwise normal.  There is no intra-abdominal or retroperitoneal adenopathy. Vascular structures  are normal. There is a small umbilical hernia containing fat without  incarceration.     CT PELVIS FINDINGS: Status post hysterectomy. The bladder is normal. There is no  free fluid. There is no pelvic adenopathy. Postoperative changes are seen  involving the anterior pelvic wall. I see no hernia.     No specific bowel abnormalities are seen.     No significant osseous abnormalities.     IMPRESSION        1. No acute abnormalities. 2. There are couple of small nodules in the right middle lobe unchanged from  3/7/2017 presumably benign. Mild fibrotic changes are also present in the lung  bases. 3. Hepatic steatosis. Small hiatal hernia. 4. Status post hysterectomy. Small umbilical hernia containing fat without  incarceration. Additional chronic changes as described above. Right shoulder x-rays from 8/4/2021 were personally reviewed with the patient and demonstrated:  FINDINGS:   Bones/joints: Mild osteophyte formation at the acromion and acromioclavicular  joint. Otherwise unremarkable. Soft tissues: Unremarkable.     IMPRESSION  No significant radiographic abnormalities.     Mild acromial and AC joint degenerative change.     Left shoulder x-rays from 8/4/2021 were personally reviewed with the patient and demonstrated:  FINDINGS:   Bones/joints: Unremarkable.   Soft tissues: Unremarkable.     IMPRESSION  No significant radiographic abnormalities     Cervical 2V x-rays from 2/2/2021 was personally reviewed with the Pt and demonstrated:  Some obstruction to visualization due to necklace. Possibly mild disc space narrowing at C5-C6. No acute pathology identified.      Lumbar spine MRI from 7/19/2021 was personally reviewed with the Pt and demonstrated:  Results from [facility] Only encounter on 07/13/21     MRI LUMB SPINE WO CONT     Narrative  EXAM: MRI LUMB SPINE WO CONT     CLINICAL INDICATIONS/HISTORY: increased lumbar pain with right radicular  symptoms despite physical therapy.     COMPARISON: June 2016 MRI     Technique: Multi-sequence multiplanar MR imaging obtained centered on the lumbar  spine.     FINDINGS:     Alignment: Intact lordosis  Vertebral body height: Normal  Marrow signal: Unremarkable  Conus: T12-L1     Axial imaging correlation:     T12-L1: Patent canal and foramina.     L1-2: Patent canal and foramina.     L2-3: Mild disc bulge. Some disc desiccation. Mild facet arthropathy. Patent  canal and foramina.     L3-4: Slightly more prominent disc bulge and facet arthropathy. Low-grade facet  inflammation on the left. Slight bulging posterior epidural fat. Minor spinal  stenosis in AP dimension. Mild to moderate left foraminal stenosis.     L4-5: Mild disc bulge. Bilateral facet arthropathy with trace facet  inflammation. Patent canal and foramina.     L5-S1: Focal central to right paracentral disc protrusion with annular tear. Bilateral facet arthropathy. Patent canal and foramina.     Other structures: Unremarkable.     Impression  1.  Relatively mild multilevel degenerative findings along lumbar spine  -Overall slight progression compared with 2016  -There is no high-grade spinal stenosis  -There is some distortion of the exiting right L3 nerve from disc and facet  pathology at the right foramen which does not look appreciably changed.     Cervical Spine MRI from 11/08/2016 was personally reviewed with the Pt and demonstrated:  Results from [place] encounter on 11/08/16   MRI CERV SPINE WO CONT    Narrative MRI OF CERVICAL SPINE WITHOUT CONTRAST    CPT CODE: 36283    HISTORY: Chronic neck pain extending into the left shoulder with progressive  paresthesias left hand. COMPARISON: MRI cervical spine 4/10/2014. FINDINGS:     There is normal anatomic alignment of the cervical spine. Vertebral body heights  are maintained. Craniocervical junction and posterior elements are intact. Prevertebral soft tissues are normal. Incidentally imaged intracranial soft  tissues demonstrate no acute abnormalities. Within limitations of motion  artifact, cervical spinal cord maintains normal caliber, signal intensity and  morphology throughout. Cervical soft tissues demonstrate no acute abnormalities. C2/C3: Central canal and neural foramina are patent. C3/C4: Central canal and neural foramina are patent. C4/C5: Central canal and neural foramina are patent. C5/C6: Broad-based disc protrusion. Contact of the ventral cord. CSF dorsal to  the cord is maintained. Probable moderate right greater than left foraminal  stenoses. C6/C7: Mild bulging of the disc. Probable mild bilateral foraminal narrowing. C7/T1: Central canal and neural foramina are patent. T1/T2 through T4/T5: Central canal and foramina are adequate on the sagittal  images.               Impression IMPRESSION:    Allowing for differences in technique, no substantial interval change since  2014. Degenerative changes of the cervical spine are most pronounced at C5/C6 where  there is a disc protrusion which contacts the ventral cord and moderate  bilateral foraminal stenosis. [place] [facility]           Lumbar Spine MRI from 06/30/2016 was personally reviewed with the Pt and demonstrated:  Results from [place] encounter on 06/30/16   MRI LUMB SPINE WO CONT    Narrative Procedure: MRI of the lumbar spine without contrast.    CPT code: 36111    Comparisons: April 10, 2014.     Indications: new left radicular symptoms; increased muscle spasm and pain for 3  months     Technique: T1 weighted, T2 FSE with fat saturation, FSE inversion recovery  sagittal images are supplemented by T2 weighted with fat saturation and T1  weighted axial images. Findings:        Sagittal images reveal overall normal vertebral body morphology. No fractures  noted. No suspicious lesions. Conus medullaris ends at the L1 vertebral body level. Correlation of axial and sagittal images reveals the following:    At L1-L2: No significant disc pathology. No significant facet arthropathy. No  central canal or foraminal stenosis. At L2-L3: Mild disc osteophyte complex at the posterior lateral corners. Mild  facet arthropathy. No central canal stenosis. Mild to moderate bilateral  foraminal stenosis. At L3-L4: Mild disc osteophyte complex at the posterior lateral corners. Mild to  moderate facet arthropathy. Posterior epidural lipomatosis with some mass  effect. No central canal stenosis. Mild left and mild to moderate right  foraminal stenosis. At L4-L5: Mild disc osteophyte complex at the posterior lateral corners. Mild to  moderate facet arthropathy. No central canal stenosis. Mild to moderate  foraminal stenosis. At L5-S1: Trace retrolisthesis L5 on S1. Overlying broad-based disc osteophyte  complex. Moderate facet arthropathy. No central canal stenosis. Moderate  foraminal stenosis. Visualized portions of the sacroiliac joints are unremarkable. Incidentally  imaged retroperitoneal structures are unremarkable as well.               Impression Impression:    Degenerative changes as above. Interval resolution of disc extrusion at L5-S1  seen on prior study from 2014.           Written by Lafrances Skiff, as dictated by Junaid Rodas MD.  I, Dr. Junaid Rodas confirm that all documentation is accurate.

## 2022-02-09 NOTE — PATIENT INSTRUCTIONS
Shoulder Arthritis: Exercises  Introduction  Here are some examples of exercises for you to try. The exercises may be suggested for a condition or for rehabilitation. Start each exercise slowly. Ease off the exercises if you start to have pain. You will be told when to start these exercises and which ones will work best for you. How to do the exercises  Shoulder flexion (lying down)    To make a wand for this exercise, use a piece of PVC pipe or a broom handle with the broom removed. Make the wand about a foot wider than your shoulders. 1. Lie on your back, holding a wand with both hands. Your palms should face down as you hold the wand. 2. Keeping your elbows straight, slowly raise your arms over your head. Raise them until you feel a stretch in your shoulders, upper back, and chest.  3. Hold for 15 to 30 seconds. 4. Repeat 2 to 4 times. Shoulder rotation (lying down)    To make a wand for this exercise, use a piece of PVC pipe or a broom handle with the broom removed. Make the wand about a foot wider than your shoulders. 1. Lie on your back. Hold a wand with both hands with your elbows bent and palms up. 2. Keep your elbows close to your body, and move the wand across your body toward the sore arm. 3. Hold for 8 to 12 seconds. 4. Repeat 2 to 4 times. Shoulder internal rotation with towel    1. Hold a towel above and behind your head with the arm that is not sore. 2. With your sore arm, reach behind your back and grasp the towel. 3. With the arm above your head, pull the towel upward. Do this until you feel a stretch on the front and outside of your sore shoulder. 4. Hold 15 to 30 seconds. 5. Repeat 2 to 4 times. Shoulder blade squeeze    1. Stand with your arms at your sides, and squeeze your shoulder blades together. Do not raise your shoulders up as you squeeze. 2. Hold 6 seconds. 3. Repeat 8 to 12 times.   Resisted rows    For this exercise, you will need elastic exercise material, such as surgical tubing or Thera-Band. 1. Put the band around a solid object at about waist level. (A bedpost will work well.) Each hand should hold an end of the band. 2. With your elbows at your sides and bent to 90 degrees, pull the band back. Your shoulder blades should move toward each other. Return to the starting position. 3. Repeat 8 to 12 times. External rotator strengthening exercise    1. Start by tying a piece of elastic exercise material to a doorknob. You can use surgical tubing or Thera-Band. (You may also hold one end of the band in each hand.)  2. Stand or sit with your shoulder relaxed and your elbow bent 90 degrees. Your upper arm should rest comfortably against your side. Squeeze a rolled towel between your elbow and your body for comfort. This will help keep your arm at your side. 3. Hold one end of the elastic band with the hand of the painful arm. 4. Start with your forearm across your belly. Slowly rotate the forearm out away from your body. Keep your elbow and upper arm tucked against the towel roll or the side of your body until you begin to feel tightness in your shoulder. Slowly move your arm back to where you started. 5. Repeat 8 to 12 times. Internal rotator strengthening exercise    1. Start by tying a piece of elastic exercise material to a doorknob. You can use surgical tubing or Thera-Band. 2. Stand or sit with your shoulder relaxed and your elbow bent 90 degrees. Your upper arm should rest comfortably against your side. Squeeze a rolled towel between your elbow and your body for comfort. This will help keep your arm at your side. 3. Hold one end of the elastic band in the hand of the painful arm. 4. Slowly rotate your forearm toward your body until it touches your belly. Slowly move it back to where you started. 5. Keep your elbow and upper arm firmly tucked against the towel roll or at your side. 6. Repeat 8 to 12 times.   Pendulum swing    If you have pain in your back, do not do this exercise. 1. Hold on to a table or the back of a chair with your good arm. Then bend forward a little and let your sore arm hang straight down. This exercise does not use the arm muscles. Rather, use your legs and your hips to create movement that makes your arm swing freely. 2. Use the movement from your hips and legs to guide the slightly swinging arm back and forth like a pendulum (or elephant trunk). Then guide it in circles that start small (about the size of a dinner plate). Make the circles a bit larger each day, as your pain allows. 3. Do this exercise for 5 minutes, 5 to 7 times each day. 4. As you have less pain, try bending over a little farther to do this exercise. This will increase the amount of movement at your shoulder. Follow-up care is a key part of your treatment and safety. Be sure to make and go to all appointments, and call your doctor if you are having problems. It's also a good idea to know your test results and keep a list of the medicines you take. Where can you learn more? Go to http://www.gray.com/  Enter H562 in the search box to learn more about \"Shoulder Arthritis: Exercises. \"  Current as of: July 1, 2021               Content Version: 13.0  © 2006-2021 Healthwise[employer]. Care instructions adapted under license by [employer] (which disclaims liability or warranty for this information). If you have questions about a medical condition or this instruction, always ask your healthcare professional. [place] [address] any warranty or liability for your use of this information. Rotator Cuff: Exercises  Introduction  Here are some examples of exercises for you to try. The exercises may be suggested for a condition or for rehabilitation. Start each exercise slowly. Ease off the exercises if you start to have pain. You will be told when to start these exercises and which ones will work best for you.   How to do the exercises  Pendulum swing    If you have pain in your back, do not do this exercise. 5. Hold on to a table or the back of a chair with your good arm. Then bend forward a little and let your sore arm hang straight down. This exercise does not use the arm muscles. Rather, use your legs and your hips to create movement that makes your arm swing freely. 6. Use the movement from your hips and legs to guide the slightly swinging arm back and forth like a pendulum (or elephant trunk). Then guide it in circles that start small (about the size of a dinner plate). Make the circles a bit larger each day, as your pain allows. 7. Do this exercise for 5 minutes, 5 to 7 times each day. 8. As you have less pain, try bending over a little farther to do this exercise. This will increase the amount of movement at your shoulder. Posterior stretching exercise    5. Hold the elbow of your injured arm with your other hand. 6. Use your hand to pull your injured arm gently up and across your body. You will feel a gentle stretch across the back of your injured shoulder. 7. Hold for at least 15 to 30 seconds. Then slowly lower your arm. 8. Repeat 2 to 4 times. Up-the-back stretch    Your doctor or physical therapist may want you to wait to do this stretch until you have regained most of your range of motion and strength. You can do this stretch in different ways. Hold any of these stretches for at least 15 to 30 seconds. Repeat them 2 to 4 times. 6. Light stretch: Put your hand in your back pocket. Let it rest there to stretch your shoulder. 7. Moderate stretch: With your other hand, hold your injured arm (palm outward) behind your back by the wrist. Pull your arm up gently to stretch your shoulder. 8. Advanced stretch: Put a towel over your other shoulder. Put the hand of your injured arm behind your back. Now hold the back end of the towel. With the other hand, hold the front end of the towel in front of your body. Pull gently on the front end of the towel. This will bring your hand farther up your back to stretch your shoulder. Overhead stretch    4. Standing about an arm's length away, grasp onto a solid surface. You could use a countertop, a doorknob, or the back of a sturdy chair. 5. With your knees slightly bent, bend forward with your arms straight. Lower your upper body, and let your shoulders stretch. 6. As your shoulders are able to stretch farther, you may need to take a step or two backward. 7. Hold for at least 15 to 30 seconds. Then stand up and relax. If you had stepped back during your stretch, step forward so you can keep your hands on the solid surface. 8. Repeat 2 to 4 times. Shoulder flexion (lying down)    To make a wand for this exercise, use a piece of PVC pipe or a broom handle with the broom removed. Make the wand about a foot wider than your shoulders. 4. Lie on your back, holding a wand with both hands. Your palms should face down as you hold the wand. 5. Keeping your elbows straight, slowly raise your arms over your head. Raise them until you feel a stretch in your shoulders, upper back, and chest.  6. Hold for 15 to 30 seconds. 7. Repeat 2 to 4 times. Shoulder rotation (lying down)    To make a wand for this exercise, use a piece of PVC pipe or a broom handle with the broom removed. Make the wand about a foot wider than your shoulders. 6. Lie on your back. Hold a wand with both hands with your elbows bent and palms up. 7. Keep your elbows close to your body, and move the wand across your body toward the sore arm. 8. Hold for 8 to 12 seconds. 9. Repeat 2 to 4 times. Wall climbing (to the side)    Avoid any movement that is straight to your side, and be careful not to arch your back. Your arm should stay about 30 degrees to the front of your side.   7. Stand with your side to a wall so that your fingers can just touch it at an angle about 30 degrees toward the front of your body.  8. Walk the fingers of your injured arm up the wall as high as pain permits. Try not to shrug your shoulder up toward your ear as you move your arm up. 9. Hold that position for a count of at least 15 to 20.  10. Walk your fingers back down to the starting position. 11. Repeat at least 2 to 4 times. Try to reach higher each time. Wall climbing (to the front)    During this stretching exercise, be careful not to arch your back. 5. Face a wall, and stand so your fingers can just touch it. 6. Keeping your shoulder down, walk the fingers of your injured arm up the wall as high as pain permits. (Don't shrug your shoulder up toward your ear.)  7. Hold your arm in that position for at least 15 to 30 seconds. 8. Slowly walk your fingers back down to where you started. 9. Repeat at least 2 to 4 times. Try to reach higher each time. Shoulder blade squeeze    1. Stand with your arms at your sides, and squeeze your shoulder blades together. Do not raise your shoulders up as you squeeze. 2. Hold 6 seconds. 3. Repeat 8 to 12 times. Scapular exercise: Arm reach    1. Lie flat on your back. This exercise is a very slight motion that starts with your arms raised (elbows straight, arms straight). 2. From this position, reach higher toward the robb or ceiling. Keep your elbows straight. All motion should be from your shoulder blade only. 3. Relax your arms back to where you started. 4. Repeat 8 to 12 times. Arm raise to the side    During this strengthening exercise, your arm should stay about 30 degrees to the front of your side. 1. Slowly raise your injured arm to the side, with your thumb facing up. Raise your arm 60 degrees at the most (shoulder level is 90 degrees). 2. Hold the position for 3 to 5 seconds. Then lower your arm back to your side. If you need to, bring your \"good\" arm across your body and place it under the elbow as you lower your injured arm.  Use your good arm to keep your injured arm from dropping down too fast.  3. Repeat 8 to 12 times. 4. When you first start out, don't hold any extra weight in your hand. As you get stronger, you may use a 1-pound to 2-pound dumbbell or a small can of food. Shoulder flexor and extensor exercise    These are isometric exercises. That means you contract your muscles without actually moving. 1. Push forward (flex): Stand facing a wall or doorjamb, about 6 inches or less back. Hold your injured arm against your body. Make a closed fist with your thumb on top. Then gently push your hand forward into the wall with about 25% to 50% of your strength. Don't let your body move backward as you push. Hold for about 6 seconds. Relax for a few seconds. Repeat 8 to 12 times. 2. Push backward (extend): Stand with your back flat against a wall. Your upper arm should be against the wall, with your elbow bent 90 degrees (your hand straight ahead). Push your elbow gently back against the wall with about 25% to 50% of your strength. Don't let your body move forward as you push. Hold for about 6 seconds. Relax for a few seconds. Repeat 8 to 12 times. Scapular exercise: Wall push-ups    This exercise is best done with your fingers somewhat turned out, rather than straight up and down. 1. Stand facing a wall, about 12 inches to 18 inches away. 2. Place your hands on the wall at shoulder height. 3. Slowly bend your elbows and bring your face to the wall. Keep your back and hips straight. 4. Push back to where you started. 5. Repeat 8 to 12 times. 6. When you can do this exercise against a wall comfortably, you can try it against a counter. You can then slowly progress to the end of a couch, then to a sturdy chair, and finally to the floor. Scapular exercise: Retraction    For this exercise, you will need elastic exercise material, such as surgical tubing or Thera-Band. 1. Put the band around a solid object at about waist level.  (A bedpost will work well.) Each hand should hold an end of the band. 2. With your elbows at your sides and bent to 90 degrees, pull the band back. Your shoulder blades should move toward each other. Then move your arms back where you started. 3. Repeat 8 to 12 times. 4. If you have good range of motion in your shoulders, try this exercise with your arms lifted out to the sides. Keep your elbows at a 90-degree angle. Raise the elastic band up to about shoulder level. Pull the band back to move your shoulder blades toward each other. Then move your arms back where you started. Internal rotator strengthening exercise    1. Start by tying a piece of elastic exercise material to a doorknob. You can use surgical tubing or Thera-Band. 2. Stand or sit with your shoulder relaxed and your elbow bent 90 degrees. Your upper arm should rest comfortably against your side. Squeeze a rolled towel between your elbow and your body for comfort. This will help keep your arm at your side. 3. Hold one end of the elastic band in the hand of the painful arm. 4. Slowly rotate your forearm toward your body until it touches your belly. Slowly move it back to where you started. 5. Keep your elbow and upper arm firmly tucked against the towel roll or at your side. 6. Repeat 8 to 12 times. External rotator strengthening exercise    1. Start by tying a piece of elastic exercise material to a doorknob. You can use surgical tubing or Thera-Band. (You may also hold one end of the band in each hand.)  2. Stand or sit with your shoulder relaxed and your elbow bent 90 degrees. Your upper arm should rest comfortably against your side. Squeeze a rolled towel between your elbow and your body for comfort. This will help keep your arm at your side. 3. Hold one end of the elastic band with the hand of the painful arm. 4. Start with your forearm across your belly. Slowly rotate the forearm out away from your body.  Keep your elbow and upper arm tucked against the towel roll or the side of your body until you begin to feel tightness in your shoulder. Slowly move your arm back to where you started. 5. Repeat 8 to 12 times. Follow-up care is a key part of your treatment and safety. Be sure to make and go to all appointments, and call your doctor if you are having problems. It's also a good idea to know your test results and keep a list of the medicines you take. Where can you learn more? Go to http://www.gray.com/  Enter J005 in the search box to learn more about \"Rotator Cuff: Exercises. \"  Current as of: July 1, 2021               Content Version: 13.0  © 2265-3356 Healthwise, [place]. Care instructions adapted under license by [employer] (which disclaims liability or warranty for this information). If you have questions about a medical condition or this instruction, always ask your healthcare professional. [address] any warranty or liability for your use of this information.

## 2022-02-11 ENCOUNTER — HOSPITAL ENCOUNTER (OUTPATIENT)
Dept: PHYSICAL THERAPY | Age: 82
Discharge: HOME | End: 2022-02-11
Payer: MEDICARE

## 2022-02-11 PROCEDURE — 97112 NEUROMUSCULAR REEDUCATION: CPT

## 2022-02-11 PROCEDURE — 97110 THERAPEUTIC EXERCISES: CPT

## 2022-02-11 PROCEDURE — 97140 MANUAL THERAPY 1/> REGIONS: CPT

## 2022-02-11 NOTE — PROGRESS NOTES
PT DAILY TREATMENT NOTE     Patient Name: Dee Molina  Date:2022  : 1940  [x]  Patient  Verified  Payor: [place] MED[place] / Plan: [place] MEDICARE PART A & B / Product Type: Medicare /    In time:7:46  Out time:8:38  Total Treatment Time (min): 52  Visit #: 2 of 8    Medicare/BCBS Only   Total Timed Codes (min):  42 1:1 Treatment Time:  42       Treatment Area: Pain in right shoulder [M25.511]    SUBJECTIVE  Pain Level (0-10 scale): 4/10  Any medication changes, allergies to medications, adverse drug reactions, diagnosis change, or new procedure performed?: [x] No    [] Yes (see summary sheet for update)  Subjective functional status/changes:   [] No changes reported  The patient states that her shoulder has been painful constantly. She does report compliance with HEP, but pain with wand exercises. OBJECTIVE  Modality rationale: decrease edema, decrease inflammation and decrease pain to improve the patients ability to improve ADL ease. Min Type Additional Details   10 []  Ice     [x]  heat  []  Ice massage  []  Laser   []  Anodyne Position: seated  Location:    [] Skin assessment post-treatment:  []intact []redness- no adverse reaction    []redness  adverse reaction:     22 min Therapeutic Exercise:  [] See flow sheet :   Rationale: increase ROM and increase strength to improve the patients ability to improve ADL ease. 10 min Neuromuscular Re-education:  [x]  See flow sheet :   Rationale: increase ROM and increase strength  to improve the patients ability to improve ADL ease. 10 min Manual Therapy:  Right GHJ inferior/posterior capsular mobs grade I to II. UT/LS TPR with the patient in supine. The manual therapy interventions were performed at a separate and distinct time from the therapeutic activities interventions. Rationale: decrease pain, increase ROM and increase tissue extensibility to improve ADL ease.         With   [] TE   [] TA   [] neuro   [] other: Patient Education: [x] Review HEP    [] Progressed/Changed HEP based on:   [] positioning   [] body mechanics   [] transfers   [] heat/ice application    [] other:      Other Objective/Functional Measures:   Cues required to perform wand exercises gently and in pain controlled ranges. Pain Level (0-10 scale) post treatment: 5/10    ASSESSMENT/Changes in Function: Opted to hold further exercises after performing wand exercises. The patient did have pain increase post session. Plan to progress interventions as tolerated but without significant exacerbation of symptoms. Patient will continue to benefit from skilled PT services to modify and progress therapeutic interventions, address functional mobility deficits, address ROM deficits, address strength deficits, analyze and address soft tissue restrictions, analyze and cue movement patterns, analyze and modify body mechanics/ergonomics, assess and modify postural abnormalities and instruct in home and community integration to attain remaining goals. []  See Plan of Care  []  See progress note/recertification  []  See Discharge Summary         Progress towards goals / Updated goals:  Short Term Goals: To be accomplished in 2 weeks:  1. Patient will report performance of home exercise program 4 of 7 days in the next week demonstrating compliance to therapy program and progress towards independent management of symptoms. Eval: HEP provided and instructed    Current: Met - the patient reports compliance of HEP 2/11/2022  2. Patient will demonstrate at least 140 degrees right shoulder flexion PROM to improve ease with reaching overhead. Eval: 132 degrees with discomfort at end range      Long Term Goals: To be accomplished in 4 weeks:  1. Patient will increase right shoulder flexion AROM to at least 130 degrees to improve ease with daily activities. Eval: 73 degrees   2.  Patient will increase right shoulder functional ER to at least C7 to improve ease with self care tasks and fixing her hair. Eval: C2 with slight head turn and forward flexion  3. Patient will increase right shoulder strength grossly to 4/5 to improve ease with carrying groceries. Eval: flexion 2/5, abduction 2+/5, ER and IR 3/5 with pain in all planes   4. Patient will increase FOTO score to at least 58 to improve ease with daily activities and household activities.                Eval: 52     PLAN  [x]  Upgrade activities as tolerated     []  Continue plan of care  []  Update interventions per flow sheet       []  Discharge due to:_  []  Other:_      Alvaro Whitley, PT 2/11/2022  7:55 AM    Future Appointments   Date Time Provider Kin Dai   2/15/2022  8:15 AM Harjeet Aquino [facility]   2/18/2022  8:30 AM Dilshad Camp, PT [facility]   2/22/2022  8:15 AM Madi Rawls [facility]   2/25/2022  8:30 AM Dilshad Camp PT [facility]   3/1/2022  8:15 AM Madi Rawls [facility]   3/4/2022  8:30 AM Dilshad Camp, PT [facility]   4/5/2022  9:05 AM [facility]   4/12/2022  8:30 AM Nivia Tolliver MD [facility] AM[facility]   4/20/2022  9:00 AM Nickie Lynch MD [facility] AM[facility]

## 2022-02-15 ENCOUNTER — HOSPITAL ENCOUNTER (OUTPATIENT)
Dept: PHYSICAL THERAPY | Age: 82
Discharge: HOME | End: 2022-02-15
Payer: MEDICARE

## 2022-02-15 PROCEDURE — 97110 THERAPEUTIC EXERCISES: CPT

## 2022-02-15 PROCEDURE — 97140 MANUAL THERAPY 1/> REGIONS: CPT

## 2022-02-15 PROCEDURE — 97112 NEUROMUSCULAR REEDUCATION: CPT

## 2022-02-15 NOTE — PROGRESS NOTES
PT DAILY TREATMENT NOTE     Patient Name: Sonam Lugo  Date:2/15/2022  : 1940  [x]  Patient  Verified  Payor: [place] MEDICARE / Plan: [place] MEDICARE PART A & B / Product Type: Medicare /    In time: 12  Out time:910  Total Treatment Time (min): 52  Visit #: 3 of 8    Medicare/BCBS Only   Total Timed Codes (min):  42 1:1 Treatment Time:  42       Treatment Area: Pain in right shoulder [M25.511]    SUBJECTIVE  Pain Level (0-10 scale): 6  Any medication changes, allergies to medications, adverse drug reactions, diagnosis change, or new procedure performed?: [x] No    [] Yes (see summary sheet for update)  Subjective functional status/changes:   [] No changes reported  Patient reports waking up in the middle of the night with 10/10 pain requiring use of Aspercreme. She reports her pain was slightly better after that and she was able to get back to sleep. She reports some soreness initially after the session but feeling better later in the day following last therapy session.      OBJECTIVE    Modality rationale: decrease pain and increase tissue extensibility to improve the patients ability to perform functional tasks with greater ease    Min Type Additional Details    [] Estim:  []Unatt       []IFC  []Premod                        []Other:  []w/ice   []w/heat  Position:  Location:    [] Estim: []Att    []TENS instruct  []NMES                    []Other:  []w/US   []w/ice   []w/heat  Position:  Location:    []  Traction: [] Cervical       []Lumbar                       [] Prone          []Supine                       []Intermittent   []Continuous Lbs:  [] before manual  [] after manual    []  Ultrasound: []Continuous   [] Pulsed                           []1MHz   []3MHz W/cm2:  Location:    []  Iontophoresis with dexamethasone         Location: [] Take home patch   [] In clinic   10 []  Ice     [x]  heat  []  Ice massage  []  Laser   []  Anodyne Position: seated  Location: right shoulder    []  Laser with stim  []  Other:  Position:   Location:    []  Vasopneumatic Device    []  Right     []  Left  Pre-treatment girth:  Post-treatment girth:  Measured at (location):  Pressure:       [] lo [] med [] hi   Temperature: [] lo [] med [] hi   [] Skin assessment post-treatment:  []intact []redness- no adverse reaction    []redness  adverse reaction:     24 min Therapeutic Exercise:  [x] See flow sheet :   Rationale: increase ROM, increase strength and improve coordination to improve the patients ability to perform ADLs and self care tasks with greater ease     8 min Neuromuscular Re-education:  [x]  See flow sheet :   Rationale: increase strength, improve coordination and increase proprioception  to improve the patients ability to perform functional tasks with improved stabilization     10 min Manual Therapy:  Supine - grade I/II right GHJ inferior and posterior mobilizations followed by distraction with oscillation intermittent for pain relief, STM/DTM to right UT    The manual therapy interventions were performed at a separate and distinct time from the therapeutic activities interventions. Rationale: decrease pain, increase ROM and increase tissue extensibility to perform functional tasks with greater ease         With   [] TE   [] TA   [] neuro   [] other: Patient Education: [x] Review HEP    [] Progressed/Changed HEP based on:   [] positioning   [] body mechanics   [] transfers   [] heat/ice application    [] other:      Other Objective/Functional Measures: added UT stretch to left, table slides in scaption    AAROM dowel flexion 141 degrees      Pain Level (0-10 scale) post treatment: 2-3     ASSESSMENT/Changes in Function: Patient completing exercises as prescribed today. Patient reporting mild \"burning\" to anterior shoulder with table slides, though reports more of a \"stretch\" than pain. Patient with improving tolerance to supine dowel exercises. Though pain persists  , improving ROM noted since POC.  Patient reporting less pain post-session and reports feeling good. Patient will continue to benefit from skilled PT services to modify and progress therapeutic interventions, address functional mobility deficits, address ROM deficits, address strength deficits, analyze and address soft tissue restrictions, analyze and cue movement patterns, analyze and modify body mechanics/ergonomics, assess and modify postural abnormalities and instruct in home and community integration to attain remaining goals. []  See Plan of Care  []  See progress note/recertification  []  See Discharge Summary         Progress towards goals / Updated goals:  Short Term Goals: To be accomplished in 2 weeks:  1. Patient will report performance of home exercise program 4 of 7 days in the next week demonstrating compliance to therapy program and progress towards independent management of symptoms. Eval: HEP provided and instructed               Current: Met - the patient reports compliance of HEP 2/11/2022  2. Patient will demonstrate at least 140 degrees right shoulder flexion PROM to improve ease with reaching overhead. Eval: 132 degrees with discomfort at end range    Current: Met 2/15/2022 - 141 degrees AAROM flexion in supine      Long Term Goals: To be accomplished in 4 weeks:  1. Patient will increase right shoulder flexion AROM to at least 130 degrees to improve ease with daily activities. Eval: 73 degrees   2. Patient will increase right shoulder functional ER to at least C7 to improve ease with self care tasks and fixing her hair. Eval: C2 with slight head turn and forward flexion  3. Patient will increase right shoulder strength grossly to 4/5 to improve ease with carrying groceries. Eval: flexion 2/5, abduction 2+/5, ER and IR 3/5 with pain in all planes   4. Patient will increase FOTO score to at least 58 to improve ease with daily activities and household activities. Eval: 52     PLAN  [x]  Upgrade activities as tolerated     []  Continue plan of care  []  Update interventions per flow sheet       []  Discharge due to:_  []  Other:_      Nicole Casillas, PT, DPT 2/15/2022  8:21 AM    Future Appointments   Date Time Provider Kin Dai   2/18/2022  8:30 AM Tabatha Perry, PT [facility]   2/22/2022  8:15 AM Reena Sagastume [facility]   2/25/2022  8:30 AM Tabatha Perry, PT [facility]   3/1/2022  8:15 AM Reena Sagastume [facility]   3/4/2022  8:30 AM Tabatha Perry, PT [facility]   4/5/2022  9:05 AM IOC LAB VISIT [facility] BS AMB   4/12/2022  8:30 AM Barbara Rodriguez MD [facility] BS AMB   4/20/2022  9:00 AM Griffin Lozano MD [facility] BS AMB

## 2022-02-18 ENCOUNTER — HOSPITAL ENCOUNTER (OUTPATIENT)
Dept: PHYSICAL THERAPY | Age: 82
Discharge: HOME | End: 2022-02-18
Payer: MEDICARE

## 2022-02-18 PROCEDURE — 97112 NEUROMUSCULAR REEDUCATION: CPT

## 2022-02-18 PROCEDURE — 97140 MANUAL THERAPY 1/> REGIONS: CPT

## 2022-02-18 PROCEDURE — 97110 THERAPEUTIC EXERCISES: CPT

## 2022-02-18 NOTE — PROGRESS NOTES
PT DAILY TREATMENT NOTE     Patient Name: Sonam Lugo  Date:2022  : 1940  [x]  Patient  Verified  Payor: [place] MEDICARE / Plan: [place] MEDICARE PART A & B / Product Type: Medicare /    In time:8:30  Out time:9:25  Total Treatment Time (min): 54  Visit #: 4 of 8    Medicare/BCBS Only   Total Timed Codes (min):  45 1:1 Treatment Time:  45       Treatment Area: Pain in right shoulder [M25.511]    SUBJECTIVE  Pain Level (0-10 scale): 6/10  Any medication changes, allergies to medications, adverse drug reactions, diagnosis change, or new procedure performed?: [x] No    [] Yes (see summary sheet for update)  Subjective functional status/changes:   [] No changes reported  The patient states her shoulder \"isn't too bad today\". OBJECTIVE  Modality rationale: decrease pain and increase tissue extensibility to improve the patients ability to improve ADL ease. Min Type Additional Details   10 []  Ice     []  heat  []  Ice massage  []  Laser   []  Anodyne Position: seated  Location: right shoulder   [] Skin assessment post-treatment:  []intact []redness- no adverse reaction    []redness  adverse reaction:     20 min Therapeutic Exercise:  [] See flow sheet :   Rationale: increase ROM and increase strength to improve the patients ability to improve ADL ease. 15 min Neuromuscular Re-education:  [x]  See flow sheet :   Rationale: increase ROM and increase strength  to improve the patients ability to improve ADL ease. 10 min Manual Therapy:  Right GHJ inferior/posterior capsular mobs grade I to II with the patient in supine. UT/LS TPR with the patient in sitting. The manual therapy interventions were performed at a separate and distinct time from the therapeutic activities interventions. Rationale: decrease pain, increase ROM and increase tissue extensibility to improve ADl ease.        With   [] TE   [] TA   [] neuro   [] other: Patient Education: [x] Review HEP    [] Progressed/Changed HEP based on:   [] positioning   [] body mechanics   [] transfers   [] heat/ice application    [] other:      Other Objective/Functional Measures:      Pain Level (0-10 scale) post treatment: 2/10    ASSESSMENT/Changes in Function: Continued pain especially with AAROM and AROM into ABD/scaption. Cued the patient to perform isometrics as tolerated and not to push through her pain threshold. She completed session with good pain control and left with all questions answered in no apparent distress. Patient will continue to benefit from skilled PT services to modify and progress therapeutic interventions, address functional mobility deficits, address ROM deficits, address strength deficits, analyze and address soft tissue restrictions, analyze and cue movement patterns, analyze and modify body mechanics/ergonomics, assess and modify postural abnormalities and instruct in home and community integration to attain remaining goals. []  See Plan of Care  []  See progress note/recertification  []  See Discharge Summary         Progress towards goals / Updated goals:  Short Term Goals: To be accomplished in 2 weeks:  1. Patient will report performance of home exercise program 4 of 7 days in the next week demonstrating compliance to therapy program and progress towards independent management of symptoms.             Eval: HEP provided and instructed               RKHOPYQ: Met - the patient reports compliance of HEP 2/11/2022  2. Patient will demonstrate at least 140 degrees right shoulder flexion PROM to improve ease with reaching overhead.              Eval: 132 degrees with discomfort at end range               Current: Met 2/15/2022 - 141 degrees AAROM flexion in supine      Long Term Goals: To be accomplished in 4 weeks:  1. Patient will increase right shoulder flexion AROM to at least 130 degrees to improve ease with daily activities.                Eval: 73 degrees   2.  Patient will increase right shoulder functional ER to at least C7 to improve ease with self care tasks and fixing her hair.              Eval: C2 with slight head turn and forward flexion  3. Patient will increase right shoulder strength grossly to 4/5 to improve ease with carrying groceries.             Eval: flexion 2/5, abduction 2+/5, ER and IR 3/5 with pain in all planes   4. Patient will increase FOTO score to at least 58 to improve ease with daily activities and household activities.                Eval: 49      PLAN  [x]  Upgrade activities as tolerated     []  Continue plan of care  []  Update interventions per flow sheet       []  Discharge due to:_  []  Other:_      Kathy Torres, PT 2/18/2022  8:35 AM    Future Appointments   Date Time Provider Kin Dai   2/22/2022  8:15 AM Marcos Celestin [facility]   2/25/2022  8:30 AM [facility]   3/1/2022  8:15 AM Norma Velásquez [facility]   3/4/2022  8:30 AM [facility]   4/5/2022  9:05 AM IO LAB VISIT [facility] BS AMB   4/12/2022  8:30 AM Keily Freeman MD [facility] BS AMB   4/20/2022  9:00 AM Vignesh Silvestre MD [facility] BS AMB

## 2022-02-22 ENCOUNTER — HOSPITAL ENCOUNTER (OUTPATIENT)
Dept: PHYSICAL THERAPY | Age: 82
Discharge: HOME | End: 2022-02-22
Payer: MEDICARE

## 2022-02-22 PROCEDURE — 97110 THERAPEUTIC EXERCISES: CPT

## 2022-02-22 PROCEDURE — 97112 NEUROMUSCULAR REEDUCATION: CPT

## 2022-02-22 PROCEDURE — 97140 MANUAL THERAPY 1/> REGIONS: CPT

## 2022-02-22 NOTE — PROGRESS NOTES
PT DAILY TREATMENT NOTE     Patient Name: Kaye Torres  Date:2022  : 1940  [x]  Patient  Verified  Payor: [place] MEDICARE / Plan: [place] MEDICARE PART A & B / Product Type: Medicare /    In time: 812  Out time: 906  Total Treatment Time (min): 49  Visit #: 5 of 8    Medicare/BCBS Only   Total Timed Codes (min):  39 1:1 Treatment Time:  39       Treatment Area: Pain in right shoulder [M25.511]    SUBJECTIVE  Pain Level (0-10 scale): 3  Any medication changes, allergies to medications, adverse drug reactions, diagnosis change, or new procedure performed?: [x] No    [] Yes (see summary sheet for update)  Subjective functional status/changes:   [] No changes reported  \"Not too bad. I still have the most pain with sleeping when I forget and roll on that side. \"     OBJECTIVE    Modality rationale: decrease pain and increase tissue extensibility to improve the patients ability to perform functional tasks with greater ease    Min Type Additional Details    [] Estim:  []Unatt       []IFC  []Premod                        []Other:  []w/ice   []w/heat  Position:  Location:    [] Estim: []Att    []TENS instruct  []NMES                    []Other:  []w/US   []w/ice   []w/heat  Position:  Location:    []  Traction: [] Cervical       []Lumbar                       [] Prone          []Supine                       []Intermittent   []Continuous Lbs:  [] before manual  [] after manual    []  Ultrasound: []Continuous   [] Pulsed                           []1MHz   []3MHz W/cm2:  Location:    []  Iontophoresis with dexamethasone         Location: [] Take home patch   [] In clinic   10 []  Ice     [x]  heat  []  Ice massage  []  Laser   []  Anodyne Position: seated   Location: right shoulder     []  Laser with stim  []  Other:  Position:  Location:    []  Vasopneumatic Device    []  Right     []  Left  Pre-treatment girth:  Post-treatment girth:  Measured at (location):  Pressure:       [] lo [] med [] hi   Temperature: [] lo [] med [] hi   [] Skin assessment post-treatment:  []intact []redness- no adverse reaction    []redness  adverse reaction:     17 min Therapeutic Exercise:  [x] See flow sheet :   Rationale: increase ROM, increase strength and improve coordination to improve the patients ability to perform ADLs and self care tasks with greater ease      12 min Neuromuscular Re-education:  [x]  See flow sheet : scap stabilization exercises    Rationale: increase strength, improve coordination and increase proprioception  to improve the patients ability to perform functional tasks with improved stabilization    10 min Manual Therapy:  Supine - grade I/II right GHJ mobilizations posteriorly and inferiorly followed by PROM in flexion, scaption, abduction, and ER/IR; STM/DTM to right UT and levator scap   The manual therapy interventions were performed at a separate and distinct time from the therapeutic activities interventions. Rationale: decrease pain, increase ROM and increase tissue extensibility to perform functional tasks with improved ease           With   [] TE   [] TA   [] neuro   [] other: Patient Education: [x] Review HEP    [] Progressed/Changed HEP based on:   [] positioning   [] body mechanics   [] transfers   [] heat/ice application    [] other:      Other Objective/Functional Measures: increased resistance with TB rows and extensions, progressed table slides in flexion and scaption to wall slides with bilateral UE, added functional ER stretch in supine        Pain Level (0-10 scale) post treatment: 0    ASSESSMENT/Changes in Function: Patient continues to be challenged with scaption movements, though improved since SOC. Some discomfort reported with functional ER stretch in supine. Improving capsular mobility and PROM with each visit. No pain reported in the right shoulder post-session. Continue progressing towards LTGs as tolerated to return to PLOF.      Patient will continue to benefit from skilled PT services to modify and progress therapeutic interventions, address functional mobility deficits, address ROM deficits, address strength deficits, analyze and address soft tissue restrictions, analyze and cue movement patterns, analyze and modify body mechanics/ergonomics, assess and modify postural abnormalities and instruct in home and community integration to attain remaining goals. []  See Plan of Care  []  See progress note/recertification  []  See Discharge Summary         Progress towards goals / Updated goals:  Short Term Goals: To be accomplished in 2 weeks:  1. Patient will report performance of home exercise program 4 of 7 days in the next week demonstrating compliance to therapy program and progress towards independent management of symptoms.             Eval: HEP provided and instructed               DIPIKA: Met - the patient reports compliance of HEP 2/11/2022  2. Patient will demonstrate at least 140 degrees right shoulder flexion PROM to improve ease with reaching overhead.              Eval: 132 degrees with discomfort at end range               Current: Met 2/15/2022 - 141 degrees AAROM flexion in supine      Long Term Goals: To be accomplished in 4 weeks:  1. Patient will increase right shoulder flexion AROM to at least 130 degrees to improve ease with daily activities.                Eval: 73 degrees   2. Patient will increase right shoulder functional ER to at least C7 to improve ease with self care tasks and fixing her hair.              Eval: C2 with slight head turn and forward flexion   Current: progressing 2/22/2022 - C4 though slow and cautious    3. Patient will increase right shoulder strength grossly to 4/5 to improve ease with carrying groceries.             Eval: flexion 2/5, abduction 2+/5, ER and IR 3/5 with pain in all planes   4. Patient will increase FOTO score to at least 58 to improve ease with daily activities and household activities.                Eval: 47     PLAN  [] Upgrade activities as tolerated     [x]  Continue plan of care  []  Update interventions per flow sheet       []  Discharge due to:_  []  Other:_      Yenny Belle, PT, DPT 2/22/2022  8:20 AM    Future Appointments   Date Time Provider iKn Dai   2/25/2022  8:30 AM Sera Loera, PT [facility]   3/2/2022  8:30 AM Sera Loera, PT [facility]   3/4/2022  8:30 AM Leann Leon, PT [facility]   4/5/2022  9:05 AM IO LAB VISIT [facility] BS AMB   4/12/2022  8:30 AM Toro Mendez MD [facility] BS AMB   4/20/2022  9:00 AM Rebecca Campa MD [facility] BS AMB

## 2022-02-23 RX ORDER — GABAPENTIN 600 MG/1
2 TABLET, FILM COATED ORAL
Qty: 180 TABLET | Refills: 1 | Status: CANCELLED | OUTPATIENT
Start: 2022-04-12

## 2022-02-25 ENCOUNTER — HOSPITAL ENCOUNTER (OUTPATIENT)
Dept: PHYSICAL THERAPY | Age: 82
Discharge: HOME | End: 2022-02-25
Payer: MEDICARE

## 2022-02-25 PROCEDURE — 97112 NEUROMUSCULAR REEDUCATION: CPT

## 2022-02-25 PROCEDURE — 97110 THERAPEUTIC EXERCISES: CPT

## 2022-02-25 PROCEDURE — 97140 MANUAL THERAPY 1/> REGIONS: CPT

## 2022-02-25 NOTE — PROGRESS NOTES
PT DAILY TREATMENT NOTE     Patient Name: Florence Mcfarland  Date:2022  : 1940  [x]  Patient  Verified  Payor: [place] MEDICARE / Plan: VA MEDICARE PART A & B / Product Type: Medicare /    In time:8:32  Out time:9:20  Total Treatment Time (min): 48  Visit #: 6 of 8    Medicare/BCBS Only   Total Timed Codes (min):  38 1:1 Treatment Time:  38       Treatment Area: Pain in right shoulder [M25.511]    SUBJECTIVE  Pain Level (0-10 scale): 3/10  Any medication changes, allergies to medications, adverse drug reactions, diagnosis change, or new procedure performed?: [x] No    [] Yes (see summary sheet for update)  Subjective functional status/changes:   [] No changes reported  The patient states that her shoulder has been doing pretty well, but is sore when elevating. OBJECTIVE    Modality rationale: decrease pain and increase tissue extensibility to improve the patients ability to improve ADL ease. Min Type Additional Details   10 []  Ice     [x]  heat  []  Ice massage  []  Laser   []  Anodyne Position: seated  Location: right UT/shoulder   [] Skin assessment post-treatment:  []intact []redness- no adverse reaction    []redness  adverse reaction:     20 min Therapeutic Exercise:  [] See flow sheet :   Rationale: increase ROM and increase strength to improve the patients ability to improve ADL ease. 10 min Neuromuscular Re-education:  []  See flow sheet :   Rationale: increase ROM and increase strength  to improve the patients ability to improve ADL ease. 8 min Manual Therapy:  Right GHJ inferior/posterior capsular mobs grade I to II with the patient in supine. UT/LS TPR with the patient in supine   The manual therapy interventions were performed at a separate and distinct time from the therapeutic activities interventions. Rationale: decrease pain, increase ROM and increase tissue extensibility to improve ADL ease.         With   [] TE   [] TA   [] neuro   [] other: Patient Education: [x] Review HEP    [] Progressed/Changed HEP based on:   [] positioning   [] body mechanics   [] transfers   [] heat/ice application    [] other:      Other Objective/Functional Measures:   AROM right shoulder: 83 degrees flexion     Pain Level (0-10 scale) post treatment: 0/10    ASSESSMENT/Changes in Function: The patient has made slow progress regarding AROM, but demonstrates about 83 degrees today, noting a 10 degree improvement since IE. She gets most pain with AAROM and AROM into frontal plane. Patient will continue to benefit from skilled PT services to modify and progress therapeutic interventions, address functional mobility deficits, address ROM deficits, address strength deficits, analyze and address soft tissue restrictions, analyze and cue movement patterns, analyze and modify body mechanics/ergonomics and address imbalance/dizziness to attain remaining goals. []  See Plan of Care  []  See progress note/recertification  []  See Discharge Summary         Progress towards goals / Updated goals:  Short Term Goals: To be accomplished in 2 weeks:  1. Patient will report performance of home exercise program 4 of 7 days in the next week demonstrating compliance to therapy program and progress towards independent management of symptoms.             Eval: HEP provided and instructed               CAMRONHN: Met - the patient reports compliance of HEP 2/11/2022  2. Patient will demonstrate at least 140 degrees right shoulder flexion PROM to improve ease with reaching overhead.              Eval: 132 degrees with discomfort at end range               Current: Met 2/15/2022 - 141 degrees AAROM flexion in supine      Long Term Goals: To be accomplished in 4 weeks:  1. Patient will increase right shoulder flexion AROM to at least 130 degrees to improve ease with daily activities.                Eval: 68 degrees    Current: Progressed to 83 degrees 2/25/2022  2.  Patient will increase right shoulder functional ER to at least C7 to improve ease with self care tasks and fixing her hair.              Eval: C2 with slight head turn and forward flexion              Current: progressing 2/22/2022 - C4 though slow and cautious    3. Patient will increase right shoulder strength grossly to 4/5 to improve ease with carrying groceries.             Eval: flexion 2/5, abduction 2+/5, ER and IR 3/5 with pain in all planes   4. Patient will increase FOTO score to at least 58 to improve ease with daily activities and household activities.                Eval: 49     PLAN  []  Upgrade activities as tolerated     []  Continue plan of care  []  Update interventions per flow sheet       []  Discharge due to:_  []  Other:_      Anna Contreras, PT 2/25/2022  8:12 AM    Future Appointments   Date Time Provider Kin Dai   2/25/2022  8:30 AM Sarah Loera, PT [facility]   3/2/2022  8:30 AM Nedra Moore, PT [facility]   3/4/2022  8:30 AM Nedra Moore, PT [facility]   4/5/2022  9:05 AM IO LAB VISIT [facility] BS AMB   4/12/2022  8:40 AM Dyan Wallace MD [facility] BS AMB   4/20/2022  9:00 AM Lalo Cadena MD [facility] BS AMB

## 2022-03-01 ENCOUNTER — APPOINTMENT (OUTPATIENT)
Dept: PHYSICAL THERAPY | Age: 82
End: 2022-03-01
Payer: MEDICARE

## 2022-03-02 ENCOUNTER — HOSPITAL ENCOUNTER (OUTPATIENT)
Dept: PHYSICAL THERAPY | Age: 82
Discharge: HOME | End: 2022-03-02
Payer: MEDICARE

## 2022-03-02 PROCEDURE — 97110 THERAPEUTIC EXERCISES: CPT

## 2022-03-02 PROCEDURE — 97112 NEUROMUSCULAR REEDUCATION: CPT

## 2022-03-02 PROCEDURE — 97140 MANUAL THERAPY 1/> REGIONS: CPT

## 2022-03-02 NOTE — PROGRESS NOTES
PT DAILY TREATMENT NOTE     Patient Name: Demetris Carreon  Date:3/2/2022  : 1940  [x]  Patient  Verified  Payor: [place] MEDICARE / Plan: [place] MEDICARE PART A & B / Product Type: Medicare /    In time:8:32  Out time:9:27  Total Treatment Time (min): 55  Visit #: 7 of 8    Medicare/BCBS Only   Total Timed Codes (min):  45 1:1 Treatment Time:  45       Treatment Area: Pain in right shoulder [M25.511]    SUBJECTIVE  Pain Level (0-10 scale): 0/10  Any medication changes, allergies to medications, adverse drug reactions, diagnosis change, or new procedure performed?: [x] No    [] Yes (see summary sheet for update)  Subjective functional status/changes:   [] No changes reported  The patient reports that sleeping is still a problem as well as \"trying to reach up into the spice cabinet. \"     OBJECTIVE  Modality rationale: decrease pain and increase tissue extensibility to improve the patients ability to improve ADL ease. Min Type Additional Details   10 []  Ice     [x]  heat  []  Ice massage  []  Laser   []  Anodyne Position: seated  Location: right shoulder   [] Skin assessment post-treatment:  []intact []redness- no adverse reaction    []redness  adverse reaction:     25 min Therapeutic Exercise:  [] See flow sheet :   Rationale: increase ROM and increase strength to improve the patients ability to improve ADL ease. 12 min Neuromuscular Re-education:  []  See flow sheet :   Rationale: increase ROM, increase strength and improve coordination  to improve the patients ability to improve ADL ease. 8 min Manual Therapy:  Right GHJ inferior/posterior capsular mobs grade I to II with the patient in supine. UT/LS TPR with the patient in supine   The manual therapy interventions were performed at a separate and distinct time from the therapeutic activities interventions. Rationale: decrease pain, increase ROM and increase tissue extensibility to improve ADL ease.            With   [] TE   [] TA   [] neuro   [] other: Patient Education: [x] Review HEP    [] Progressed/Changed HEP based on:   [] positioning   [] body mechanics   [] transfers   [] heat/ice application    [] other:      Other Objective/Functional Measures:     Right shoulder strength:  ABD: 3-/5 MMT  ER: 4/5 MMT  IR: 4+/5 MMT  Flexion: 3-/5 MMT     Pain Level (0-10 scale) post treatment: 0/10    ASSESSMENT/Changes in Function: The patient's pain levels are coming down noting good progress upon presentation to clinic over the last 7 visits. The patient does require occasional cues for adequate scapular retraction during movement patterns. She has made progress concerning strength in all directions of shoulder (flexion, ABD, ER, IR). The patient may benefit from an additional 2-3 weeks of PT in order to progress AROM and strength. Patient will continue to benefit from skilled PT services to modify and progress therapeutic interventions, address functional mobility deficits, address ROM deficits, address strength deficits, analyze and address soft tissue restrictions, analyze and cue movement patterns, analyze and modify body mechanics/ergonomics, assess and modify postural abnormalities and instruct in home and community integration to attain remaining goals. []  See Plan of Care  []  See progress note/recertification  []  See Discharge Summary         Progress towards goals / Updated goals:  Short Term Goals: To be accomplished in 2 weeks:  1. Patient will report performance of home exercise program 4 of 7 days in the next week demonstrating compliance to therapy program and progress towards independent management of symptoms.             Eval: HEP provided and instructed               VTINMQS: Met - the patient reports compliance of HEP 2/11/2022  2.  Patient will demonstrate at least 140 degrees right shoulder flexion PROM to improve ease with reaching overhead.              Eval: 132 degrees with discomfort at end range               VJECZQV: Met 2/15/2022 - 141 degrees AAROM flexion in supine      Long Term Goals: To be accomplished in 4 weeks:  1. Patient will increase right shoulder flexion AROM to at least 130 degrees to improve ease with daily activities.                Eval: 68 degrees               Current: Progressed to 83 degrees 2/25/2022  2. Patient will increase right shoulder functional ER to at least C7 to improve ease with self care tasks and fixing her hair.              Eval: C2 with slight head turn and forward flexion              Current: progressing 2/22/2022 - C4 though slow and cautious    3. Patient will increase right shoulder strength grossly to 4/5 to improve ease with carrying groceries.             Eval: flexion 2/5, abduction 2+/5, ER and IR 3/5 with pain in all planes   Current: Progressing;   Right shoulder strength:   ABD: 3-/5 MMT   ER: 4/5 MMT   IR: 4+/5 MMT   Flexion: 3-/5 MMT  4. Patient will increase FOTO score to at least 58 to improve ease with daily activities and household activities.                Eval: 52    PLAN  [x]  Upgrade activities as tolerated     []  Continue plan of care  []  Update interventions per flow sheet       []  Discharge due to:_  []  Other:_      Gary Bush, PT 3/2/2022  8:37 AM    Future Appointments   Date Time Provider Kin Dai   3/8/2022  8:15 AM Nica Valdes [facility]   4/5/2022  9:05 AM [facility] LAB VISIT [facility] AM[facility]   4/12/2022  8:40 AM Antionette Mukherjee MD [facility] BS AMB   4/20/2022  9:00 AM Jazmine Pedro MD [facility] BS AMB

## 2022-03-04 ENCOUNTER — APPOINTMENT (OUTPATIENT)
Dept: PHYSICAL THERAPY | Age: 82
End: 2022-03-04
Payer: MEDICARE

## 2022-03-05 NOTE — PATIENT INSTRUCTIONS
.      CERTIFICATE OF RETURN TO SCHOOL    March 5, 2022      Re:   Cl Weinstein  [address]  [facility] [address]                        This is to certify that Cl Weinstein has been under my care from 3/5/2022 and can return to school on 03/08/2022    RESTRICTIONS:  None            SIGNATURE:___________________________________________,   3/5/2022      Esa Gallegos MD           [facility]  [address], [place]  [id number] [phone]  Ext. [phone]   Human Papillomavirus (HPV): Care Instructions  Your Care Instructions  The human papillomavirus (HPV) is a very common virus. There are many types of HPV. Some types cause the common skin wart. Other types cause genital warts, which can be spread by sexual contact. Some types can increase the risk for cervical and anal cancer. Having one type of HPV does not lead to having another type. Many women who have HPV may not know that they are infected until it is found with a Pap test. Your doctor uses this test to look for abnormal cells on your cervix. If you have had an abnormal Pap test, your doctor may recommend that you have an HPV test.  Like a Pap test, an HPV test is done on a sample of cells collected from the cervix. If the test finds that you have the types of HPV that might lead to cancer, your doctor may suggest more tests. This does not mean that you will develop cancer; it means that you may have an increased risk. Abnormal cell changes caused by HPV often go away on their own. If the changes do not go away, they can be treated. But because HPV can stay inside the body, the abnormal cervical cells sometimes come back. This is why it is important to follow up with your doctor and have regular Pap tests. Follow-up care is a key part of your treatment and safety. Be sure to make and go to all appointments, and call your doctor if you are having problems. It's also a good idea to know your test results and keep a list of the medicines you take. How can you care for yourself at home? · If you are going to have a Pap or HPV test, do not douche or use tampons or vaginal creams in the 24 hours before the test.  · Do not smoke. Smoking increases the risk for cervical problems and abnormal Pap tests. If you need help quitting, talk to your doctor about stop-smoking programs and medicines. These can increase your chances of quitting for good. · Use latex condoms every time you have sex.  Use them from the beginning to the end of sexual contact. · Be sure to tell your sexual partner or partners that you have HPV. Even if you do not have symptoms, you can still pass HPV to others. · Having one sex partner (who does not have STIs and does not have sex with anyone else) is a good way to avoid STIs. When should you call for help? Watch closely for changes in your health, and be sure to contact your doctor if:    · You have vaginal pain during or after sex.     · You have vaginal bleeding when you are not in your menstrual period. Where can you learn more? Go to http://keke-josé luis.info/. Enter F690 in the search box to learn more about \"Human Papillomavirus (HPV): Care Instructions. \"  Current as of: September 11, 2018  Content Version: 11.9  © 6754-7752 [employer]. Care instructions adapted under license by [employer] (which disclaims liability or warranty for this information). If you have questions about a medical condition or this instruction, always ask your healthcare professional. [place] [address] any warranty or liability for your use of this information. Learning About Cervical Cancer Screening  What is a cervical cancer screening test?    Cervical cancer screening tests check for cancer of the cervix. The cervix is the lower part of the uterus that opens into the vagina. The test can help your doctor find early changes in the cells that could lead to cancer. Two tests can be used to screen for cervical cancer. They may be used alone or together. A Pap test.   This test looks for changes in the cells of the cervix. Abnormal cells may be a sign of cancer. A human papillomavirus (HPV) test.   This test looks for the HPV virus. Some types of HPV can cause cancer. During either test, the doctor or nurse will insert a tool called a speculum into your vagina. The speculum gently spreads apart the vaginal walls.  It allows your doctor to see inside the vagina and the cervix. He or she uses a small swab or brush to collect cell samples from your cervix. Try to schedule the test when you're not having your period. To get ready for the test, avoid douches, tampons, vaginal medicines, sprays, and powders for at least a day before you have the test.  When should you have a screening test?  These guidelines apply to women who are at average risk of cervical cancer. If you don't know your risk, talk with your doctor. Women 21 to 34  · You can start having a Pap test at age 24. It is done every 3 years. · You can start having the primary HPV test at age 22. It is done every 3 years. Women 30 to 59  · If you had both a Pap test and an HPV test last time and both were normal, you can have a Pap plus an HPV test every 5 years. · If you had only a Pap test last time, as long as your results are normal, you can have a Pap test every 3 years. · If you had only an HPV test last time, as long as your results are normal, you can have an HPV test every 3 years. Women 72 and older  · If you are age 72 or older, talk with your doctor about what's right for you. Women ages 72 and older may no longer need to be screened for cervical cancer. When to stop having screening tests depends on your medical history, your overall health, and your risk of cervical cell changes or cervical cancer. What happens after the test?  The sample of cells taken during your test will be sent to a lab so that an expert can look at the cells. It usually takes a week or two to get the results back. Pap tests  · A normal result means that the test didn't find any abnormal cells in the sample. · An abnormal result can mean many things. Most of these aren't cancer. The results of your test may be abnormal because:  ? You have an infection of the vagina or cervix, such as a yeast infection. ? You have an IUD (intrauterine device for birth control).   ? You have low estrogen levels after menopause that are causing the cells to change. ? You have cell changes that may be a sign of precancer or cancer. The results are ranked based on how serious the changes might be. HPV tests  · A normal result means that the test didn't find any high-risk HPV in the sample. · An abnormal HPV test doesn't mean that you have cervical cancer. It may mean that you are infected with one or more high-risk types of HPV. This increases your chance of having cell changes that may be a sign of precancer or cancer. Follow-up care is a key part of your treatment and safety. Be sure to make and go to all appointments, and call your doctor if you are having problems. It's also a good idea to know your test results and keep a list of the medicines you take. Where can you learn more? Go to http://keke-josé luis.info/. Enter P919 in the search box to learn more about \"Learning About Cervical Cancer Screening. \"  Current as of: March 27, 2018  Content Version: 11.9  © 4566-1738 [employer]. Care instructions adapted under license by [employer] (which disclaims liability or warranty for this information). If you have questions about a medical condition or this instruction, always ask your healthcare professional. [address] any warranty or liability for your use of this information.

## 2022-03-08 ENCOUNTER — HOSPITAL ENCOUNTER (OUTPATIENT)
Dept: PHYSICAL THERAPY | Age: 82
Discharge: HOME | End: 2022-03-08
Payer: MEDICARE

## 2022-03-08 PROCEDURE — 97110 THERAPEUTIC EXERCISES: CPT

## 2022-03-08 PROCEDURE — 97112 NEUROMUSCULAR REEDUCATION: CPT

## 2022-03-08 PROCEDURE — 97140 MANUAL THERAPY 1/> REGIONS: CPT

## 2022-03-08 NOTE — PROGRESS NOTES
PT DAILY TREATMENT NOTE     Patient Name: Ariana Casiano  Date:3/8/2022  : 1940  [x]  Patient  Verified  Payor: [place] MEDICARE / Plan: [place] MEDICARE PART A & B / Product Type: Medicare /    In time: 26  Out time: 910  Total Treatment Time (min): 55  Visit #: 8 of 8    Medicare/BCBS Only   Total Timed Codes (min):  45 1:1 Treatment Time:  45       Treatment Area: Pain in right shoulder [M25.511]    SUBJECTIVE  Pain Level (0-10 scale): 0  Any medication changes, allergies to medications, adverse drug reactions, diagnosis change, or new procedure performed?: [x] No    [] Yes (see summary sheet for update)  Subjective functional status/changes:   [] No changes reported  Patient reports her shoulder was a little irritated last night, however this morning she is doing well.      OBJECTIVE    Modality rationale: decrease pain and increase tissue extensibility to improve the patients ability to perform functional tasks with greater ease   Min Type Additional Details    [] Estim:  []Unatt       []IFC  []Premod                        []Other:  []w/ice   []w/heat  Position:  Location:    [] Estim: []Att    []TENS instruct  []NMES                    []Other:  []w/US   []w/ice   []w/heat  Position:  Location:    []  Traction: [] Cervical       []Lumbar                       [] Prone          []Supine                       []Intermittent   []Continuous Lbs:  [] before manual  [] after manual    []  Ultrasound: []Continuous   [] Pulsed                           []1MHz   []3MHz W/cm2:  Location:    []  Iontophoresis with dexamethasone         Location: [] Take home patch   [] In clinic   10 []  Ice     [x]  heat  []  Ice massage  []  Laser   []  Anodyne Position: seated   Location: right shoulder     []  Laser with stim  []  Other:  Position:  Location:    []  Vasopneumatic Device    []  Right     []  Left  Pre-treatment girth:  Post-treatment girth:  Measured at (location):  Pressure:       [] lo [] med [] hi Temperature: [] lo [] med [] hi   [] Skin assessment post-treatment:  []intact []redness- no adverse reaction    []redness  adverse reaction:     27 min Therapeutic Exercise:  [x] See flow sheet :   Rationale: increase ROM, increase strength and improve coordination to improve the patients ability to perform ADLs and self care tasks with greater ease    10 min Neuromuscular Re-education:  [x]  See flow sheet: scap stabilization   Rationale: increase strength, improve coordination and increase proprioception  to improve the patients ability to perform functional tasks with improved stabilization and control     8 min Manual Therapy:  Supine - right GHJ mobilizations grade I/II inferiorly and posteriorly with passive scaption/abduction within tolerance; STM/DTM to right  Upper trap and levator scap   The manual therapy interventions were performed at a separate and distinct time from the therapeutic activities interventions. Rationale: decrease pain, increase ROM and increase tissue extensibility to perform functional tasks with improved ease           With   [] TE   [] TA   [] neuro   [] other: Patient Education: [x] Review HEP    [] Progressed/Changed HEP based on:   [] positioning   [] body mechanics   [] transfers   [] heat/ice application    [] other:      Other Objective/Functional Measures: FOTO score 57     Pain Level (0-10 scale) post treatment: 0    ASSESSMENT/Changes in Function: Patient presents for last visit under current plan of care. She reports improved ease with daily activities, though continues to have some discomfort at night with sleeping and reaching overhead into her cabinets. Overall, she demonstrates decreased right shoulder pain levels, increased right shoulder ROM though still deficit in AROM, and increased right shoulder strength.  She would benefit from continued skilled PT 2x/week for 2 more weeks to further improve AROM and strength to improve ease with daily activities and sleep. Patient will continue to benefit from skilled PT services to modify and progress therapeutic interventions, address functional mobility deficits, address ROM deficits, address strength deficits, analyze and address soft tissue restrictions, analyze and cue movement patterns, analyze and modify body mechanics/ergonomics, assess and modify postural abnormalities and instruct in home and community integration to attain remaining goals. []  See Plan of Care  [x]  See progress note/recertification  []  See Discharge Summary         Progress towards goals / Updated goals:  Short Term Goals: To be accomplished in 2 weeks:  1. Patient will report performance of home exercise program 4 of 7 days in the next week demonstrating compliance to therapy program and progress towards independent management of symptoms.             Eval: HEP provided and instructed               BCZNNPL: Met - the patient reports compliance of HEP 2/11/2022  2. Patient will demonstrate at least 140 degrees right shoulder flexion PROM to improve ease with reaching overhead.              Eval: 132 degrees with discomfort at end range               Current: Met 2/15/2022 - 141 degrees AAROM flexion in supine      Long Term Goals: To be accomplished in 4 weeks:  1. Patient will increase right shoulder flexion AROM to at least 130 degrees to improve ease with daily activities.                Eval: 73 degrees               Current: Progressed to 83 degrees 2/25/2022  2. Patient will increase right shoulder functional ER to at least C7 to improve ease with self care tasks and fixing her hair.              Eval: C2 with slight head turn and forward flexion              Current: progressing 2/22/2022 - C4 though slow and cautious    3. Patient will increase right shoulder strength grossly to 4/5 to improve ease with carrying groceries.                Eval: flexion 2/5, abduction 2+/5, ER and IR 3/5 with pain in all planes              Current: Progressing;    Right shoulder strength:              ABD: 3-/5 MMT              ER: 4/5 MMT              IR: 4+/5 MMT              Flexion: 3-/5 MMT  4. Patient will increase FOTO score to at least 58 to improve ease with daily activities and household activities.                Eval: 47   Current: progressing 3/8/2022 - 62    PLAN  [x]  Upgrade activities as tolerated     []  Continue plan of care  []  Update interventions per flow sheet       []  Discharge due to:_  []  Other:_      Orval [place], PT, DPT  3/8/2022  8:20 AM    Future Appointments   Date Time Provider Kin Dai   4/5/2022  9:05 AM [facility] LAB VISIT [facility] BS AMB   4/12/2022  8:40 AM Severiano Laughter, MD [facility] BS AMB   4/20/2022  9:00 AM Nestor Velazquez MD [facility] BS AMB

## 2022-03-08 NOTE — PROGRESS NOTES
[facility]  [address]  [place], [address]  [phone] fax    Continued Plan of Care/ Re-certification for Physical Therapy Services    Patient name: Sonam Lugo Start of Care: 2022   Referral source: Siobhan Vega MD : 1940   Medical/Treatment Diagnosis: Pain in right shoulder [M25.511]  Payor: Marzena Torres / Plan: [place] MEDICARE PART A & B / Product Type: Medicare /  Onset Date: chronic, exacerbation in symptoms ~2-3 months prior to [facility]     Prior Hospitalization: see medical history Provider#: [id number]   Medications: Verified on Patient Summary List    Comorbidities: allergies, arthritis, back pain, BMI >30, headaches, HTN, sleep dysfunction, visual impairment   Prior Level of Function: right hand dominant, reports no limitations with daily activities prior to recent exacerbation Visits from Start of Care: 8    Missed Visits: 0    The Plan of Care and following information is based on the patient's current status:  Short Term Goals: To be accomplished in 2 weeks:  1. Patient will report performance of home exercise program 4 of 7 days in the next week demonstrating compliance to therapy program and progress towards independent management of symptoms.             Eval: HEP provided and instructed               XBECZUR: Met - the patient reports compliance of HEP   2. Patient will demonstrate at least 140 degrees right shoulder flexion PROM to improve ease with reaching overhead.              Eval: 132 degrees with discomfort at end range               Current: Met - 141 degrees AAROM flexion in [address] be accomplished in 4 weeks:  1. Patient will increase right shoulder flexion AROM to at least 130 degrees to improve ease with daily activities.                Eval: 68 degrees               Current: Progressed to 83 degrees   2.  Patient will increase right shoulder functional ER to at least C7 to improve ease with self care tasks and fixing her hair.              Eval: C2 with slight head turn and forward flexion              Current: progressing - C4 though slow and cautious    3. Patient will increase right shoulder strength grossly to 4/5 to improve ease with carrying groceries.             Eval: flexion 2/5, abduction 2+/5, ER and IR 3/5 with pain in all planes              Current: Progressing -              ABD: 3-/5 MMT              ER: 4/5 MMT              IR: 4+/5 MMT              Flexion: 3-/5 MMT  4. Patient will increase FOTO score to at least 58 to improve ease with daily activities and household activities.                Eval: 47              Current:progressing - 62    Key functional changes: improved right shoulder AAROM flexion, improved right shoulder AROM flexion, improved right shoulder functional ER to C4, improved right shoulder strength to ABD: 3-/5 MMT, ER: 4/5 MMT, IR: 4+/5 MMT, Flexion: 3-/5 MMT     Problems/ barriers to goal attainment: N/A    Problem List: pain affecting function, decrease ROM, decrease strength, decrease ADL/ functional abilitiies, decrease activity tolerance, decrease flexibility/ joint mobility and decrease transfer abilities    Treatment Plan: Therapeutic exercise, Therapeutic activities, Neuromuscular re-education, Physical agent/modality, Manual therapy, Patient education, Self Care training, Functional mobility training and Home safety training     Patient Goal (s) has been updated and includes: \"move my arm out to the side by myself\"    Goals for this certification period to be accomplished in 2 weeks:  1. Patient will increase right shoulder flexion AROM to at least 130 degrees to improve ease with daily activities.                Re-cert: Progressed to 83 degrees   2. Patient will increase right shoulder functional ER to at least C7 to improve ease with self care tasks and fixing her hair.              Re-cert: progressed to C4 though slow and cautious    3. Patient will increase right shoulder strength grossly to 4/5 to improve ease with carrying groceries.             Re-cert: progressed to ABD: 3-/5 MMT, ER: 4/5 MMT, IR: 4+/5 MMT, Flexion: 3-/5 MMT  4. Patient will increase FOTO score to at least 58 to improve ease with daily activities and household activities.                Re-cert: progressed to 62    Frequency / Duration: Patient to be seen 2 times per week for 2 weeks:    Assessment / Recommendations: Patient presents for last visit under current plan of care. She reports improved ease with daily activities, though continues to have some discomfort at night with sleeping and reaching overhead into her cabinets. Overall, she demonstrates decreased right shoulder pain levels, increased right shoulder ROM though still deficit in AROM, and increased right shoulder strength. She would benefit from continued skilled PT 2x/week for 2 more weeks to further improve AROM and strength to improve ease with daily activities and sleep. Certification Period: 3/9/2022 - 4/7/2022    Yenny Belle, PT, DPT 3/8/2022 8:28 AM    ________________________________________________________________________  I certify that the above Therapy Services are being furnished while the patient is under my care. I agree with the treatment plan and certify that this therapy is necessary. [] I have read the above and request that my patient continue as recommended.   [] I have read the above report and request that my patient continue therapy with the following changes/special instructions: _____________________________________________  [] I have read the above report and request that my patient be discharged from therapy    Physician's Signature:____________Date:_________TIME:________     Toro Mendez MD  ** Signature, Date and Time must be completed for valid certification **    Please sign and return to [facility] [address] [place] [address] [address]  [phone] fax

## 2022-03-15 ENCOUNTER — APPOINTMENT (OUTPATIENT)
Dept: PHYSICAL THERAPY | Age: 82
End: 2022-03-15
Payer: MEDICARE

## 2022-03-18 ENCOUNTER — APPOINTMENT (OUTPATIENT)
Dept: PHYSICAL THERAPY | Age: 82
End: 2022-03-18
Payer: MEDICARE

## 2022-03-18 PROBLEM — K44.9 HIATAL HERNIA: Status: ACTIVE | Noted: 2022-02-06

## 2022-03-18 PROBLEM — K76.0 NAFLD (NONALCOHOLIC FATTY LIVER DISEASE): Status: ACTIVE | Noted: 2022-02-06

## 2022-03-19 PROBLEM — R87.612 LGSIL OF CERVIX OF UNDETERMINED SIGNIFICANCE: Status: ACTIVE | Noted: 2018-12-26

## 2022-03-19 PROBLEM — E66.01 SEVERE OBESITY (HCC): Status: ACTIVE | Noted: 2019-02-12

## 2022-03-19 PROBLEM — M77.32 HEEL SPUR, LEFT: Status: ACTIVE | Noted: 2018-12-26

## 2022-03-20 PROBLEM — M48.02 CERVICAL STENOSIS OF SPINE: Status: ACTIVE | Noted: 2017-09-20

## 2022-03-22 ENCOUNTER — HOSPITAL ENCOUNTER (OUTPATIENT)
Dept: PHYSICAL THERAPY | Age: 82
Discharge: HOME | End: 2022-03-22
Payer: MEDICARE

## 2022-03-22 PROCEDURE — 97110 THERAPEUTIC EXERCISES: CPT

## 2022-03-22 PROCEDURE — 97140 MANUAL THERAPY 1/> REGIONS: CPT

## 2022-03-22 NOTE — PROGRESS NOTES
PT DAILY TREATMENT NOTE     Patient Name: Niru Will  Date:3/22/2022  : 1940  [x]  Patient  Verified  Payor: [place] MEDICARE / Plan: [place] MEDICARE PART A & B / Product Type: Medicare /    In time:900  Out time: 943  Total Treatment Time (min): 43  Visit #: 1 of 4    Medicare/BCBS Only   Total Timed Codes (min):  33 1:1 Treatment Time:  33       Treatment Area: Pain in right shoulder [M25.511]    SUBJECTIVE  Pain Level (0-10 scale): 8  Any medication changes, allergies to medications, adverse drug reactions, diagnosis change, or new procedure performed?: [x] No    [] Yes (see summary sheet for update)  Subjective functional status/changes:   [] No changes reported  Patient reports she has not been able to do anything due to new onset of vertigo which has her shoulder feeling worse.      OBJECTIVE    Modality rationale: decrease pain and increase tissue extensibility to improve the patients ability to perform functional tasks with greater ease    Min Type Additional Details    [] Estim:  []Unatt       []IFC  []Premod                        []Other:  []w/ice   []w/heat  Position:  Location:    [] Estim: []Att    []TENS instruct  []NMES                    []Other:  []w/US   []w/ice   []w/heat  Position:  Location:    []  Traction: [] Cervical       []Lumbar                       [] Prone          []Supine                       []Intermittent   []Continuous Lbs:  [] before manual  [] after manual    []  Ultrasound: []Continuous   [] Pulsed                           []1MHz   []3MHz W/cm2:  Location:    []  Iontophoresis with dexamethasone         Location: [] Take home patch   [] In clinic   10 []  Ice     [x]  heat  []  Ice massage  []  Laser   []  Anodyne Position: sitting   Location: right shoulder     []  Laser with stim  []  Other:  Position:  Location:    []  Vasopneumatic Device    []  Right     []  Left  Pre-treatment girth:  Post-treatment girth:  Measured at (location):  Pressure:       [] lo [] med [] hi   Temperature: [] lo [] med [] hi   [] Skin assessment post-treatment:  []intact []redness- no adverse reaction    []redness  adverse reaction:     17 min Therapeutic Exercise:  [x] See flow sheet :   Rationale: increase ROM, increase strength and improve coordination to improve the patients ability to perform ADLs and self care tasks with greater ease     8 min Neuromuscular Re-education:  [x]  See flow sheet : scap stabilization    Rationale: increase strength, improve coordination and increase proprioception  to improve the patients ability to perform functional tasks with greater ease     8 min Manual Therapy:  Reclined - PROM to right shoulder in flexion, scaption, and ER/IR within tolerance; STM to right UT and levator scap   The manual therapy interventions were performed at a separate and distinct time from the therapeutic activities interventions. Rationale: decrease pain, increase ROM and increase tissue extensibility to perform functional tasks with greater ease          With   [] TE   [] TA   [] neuro   [] other: Patient Education: [x] Review HEP    [] Progressed/Changed HEP based on:   [] positioning   [] body mechanics   [] transfers   [] heat/ice application    [] other:      Other Objective/Functional Measures: held/modified some exercises due to vertigo symptoms      Pain Level (0-10 scale) post treatment: 4    ASSESSMENT/Changes in Function: Modified all exercises due to patient's vertigo spell today. All supine exercises completed with HOB elevated to prevent exacerbation of symptoms. Patient leaving with significantly less pain post-session and demonstrates slightly decreased ROM today compared to last session likely due to immobility over the last 2 weeks due to vertigo. Recommend continued progression of ROM activities and strengthening to right shoulder to improve ease with daily activities.      Patient will continue to benefit from skilled PT services to modify and progress therapeutic interventions, address functional mobility deficits, address ROM deficits, address strength deficits, analyze and address soft tissue restrictions, analyze and cue movement patterns, analyze and modify body mechanics/ergonomics, assess and modify postural abnormalities and instruct in home and community integration to attain remaining goals. []  See Plan of Care  []  See progress note/recertification  []  See Discharge Summary         Progress towards goals / Updated goals:  1. Patient will increase right shoulder flexion AROM to at least 130 degrees to improve ease with daily activities.                Re-cert: Progressed to 83 degrees   2. Patient will increase right shoulder functional ER to at least C7 to improve ease with self care tasks and fixing her hair.              Re-cert: progressed to C4 though slow and cautious    3. Patient will increase right shoulder strength grossly to 4/5 to improve ease with carrying groceries.             Re-cert: progressed to ABD: 3-/5 MMT, ER: 4/5 MMT, IR: 4+/5 MMT, Flexion: 3-/5 MMT  4. Patient will increase FOTO score to at least 58 to improve ease with daily activities and household activities.                Re-cert: progressed to 62    PLAN  []  Upgrade activities as tolerated     [x]  Continue plan of care  []  Update interventions per flow sheet       []  Discharge due to:_  []  Other:_      Bella Florian, PT, DPT 3/22/2022  9:08 AM    Future Appointments   Date Time Provider [place]   3/25/2022 10:30 AM Corina Whitlock [facility]   3/29/2022  9:45 AM [id number]   4/5/2022  9:05 AM [facility] LAB VISIT [facility] BS AMB   4/12/2022  8:40 AM Savage Reyes MD [facility] BS AMB   4/20/2022  9:00 AM Analisa Bass MD [facility] BS AMB

## 2022-03-25 ENCOUNTER — HOSPITAL ENCOUNTER (OUTPATIENT)
Dept: PHYSICAL THERAPY | Age: 82
Discharge: HOME | End: 2022-03-25
Payer: MEDICARE

## 2022-03-25 PROCEDURE — 97112 NEUROMUSCULAR REEDUCATION: CPT

## 2022-03-25 PROCEDURE — 97110 THERAPEUTIC EXERCISES: CPT

## 2022-03-25 PROCEDURE — 97140 MANUAL THERAPY 1/> REGIONS: CPT

## 2022-03-25 NOTE — PROGRESS NOTES
PT DAILY TREATMENT NOTE 10-18    Patient Name: Pearl Wynne  Date:3/25/2022  : 1940  [x]  Patient  Verified  Payor: VA MEDICARE / Plan: VA MEDICARE PART A & B / Product Type: Medicare /    In time:942  Out time:1032  Total Treatment Time (min): 50  Visit #: 2 of 4    Medicare/BCBS Only   Total Timed Codes (min):  40 1:1 Treatment Time:  40       Treatment Area: Pain in right shoulder [M25.511]    SUBJECTIVE  Pain Level (0-10 scale): 5  Any medication changes, allergies to medications, adverse drug reactions, diagnosis change, or new procedure performed?: [x] No    [] Yes (see summary sheet for update)  Subjective functional status/changes:   [] No changes reported  The vertigo is a little better today. OBJECTIVE    Modality rationale: decrease pain to improve the patients ability to tolerate post exercise soreness   10 []  Ice     [x]  heat  []  Ice massage  []  Laser   []  Anodyne Position:seated  Location:right shoulder       24 min Therapeutic Exercise:  [] See flow sheet :   Rationale: increase ROM and increase strength to improve the patients ability to perform ALDs     8 min Neuromuscular Re-education:  []  See flow sheet :   Rationale: increase strength  to improve the patients ability to perform overhead activities     8 min Manual Therapy:  Reclined - PROM to right shoulder in flexion, scaption, and ER/IR within tolerance; STM to right UT and levator scap   The manual therapy interventions were performed at a separate and distinct time from the therapeutic activities interventions.   Rationale: decrease pain, increase ROM and increase tissue extensibility to perform ADLs  With   [] TE   [] TA   [] neuro   [] other: Patient Education: [x] Review HEP    [] Progressed/Changed HEP based on:   [] positioning   [] body mechanics   [] transfers   [] heat/ice application    [] other:      Other Objective/Functional Measures:      Pain Level (0-10 scale) post treatment: 0    ASSESSMENT/Changes in Function: Pain at end-range abd. Poor force coupling noted with abduction; TrP noted right lev scap. Patient will continue to benefit from skilled PT services to modify and progress therapeutic interventions, address functional mobility deficits, address ROM deficits, address strength deficits, analyze and address soft tissue restrictions and analyze and cue movement patterns to attain remaining goals. []  See Plan of Care  []  See progress note/recertification  []  See Discharge Summary         Progress towards goals / Updated goals:  1. Patient will increase right shoulder flexion AROM to at least 130 degrees to improve ease with daily activities.                Re-cert: Progressed to 83 degrees   2. Patient will increase right shoulder functional ER to at least C7 to improve ease with self care tasks and fixing her hair.              Re-cert: progressed to C4 though slow and cautious    3. Patient will increase right shoulder strength grossly to 4/5 to improve ease with carrying groceries.             Re-cert: progressed to ABD: 3-/5 MMT, ER: 4/5 MMT, IR: 4+/5 MMT, Flexion: 3-/5 MMT  4. Patient will increase FOTO score to at least 58 to improve ease with daily activities and household activities.                Re-cert: progressed to 62    PLAN  [x]  Upgrade activities as tolerated     []  Continue plan of care  []  Update interventions per flow sheet       []  Discharge due to:_  []  Other:_      GAYATRI Joy, CMTPT 3/25/2022  9:47 AM    Future Appointments   Date Time Provider [place]   3/29/2022  9:45 AM Dilshad Camp PT [facility]   4/5/2022  9:05 AM [facility] LAB VISIT [facility] BS AMB   4/12/2022  8:40 AM Nivia Tolliver MD [facility] BS AMB   4/20/2022  9:00 AM Nickie Lynch MD [facility] BS AMB

## 2022-03-28 NOTE — PROGRESS NOTES
INTERNISTS OF [facility]:  5/14/2021, MRN: [id number]      Abraham Vital is a 80 y.o. female and presents to clinic for Follow-up, Hypertension, and Labs (4-27-21)    Subjective: The patient is a 81-year-old female with history of hypertension, LGSIL 4/19, osteopenia, tubular adenomas colon polyps, obesity, vitamin D deficiency, stable pulmonary nodules, asthma (followed by Carol Gillis), cervical disc disease, and lumbar disc disease (followed by Orthopedics).     1. Insomnia and Stress: She has a hard time falling asleep and staying asleep. Melatonin helps some times. She states she is in a [place]" marriage. She states that her  is very \"short\" with her. No domestic violence. No energy drinks. She rarely drinks wine. +Depression but \"not to the point that I wants to try medication. \" +Good sleep hygiene. +Uatsdin. 2. Prediabetes: Her labs this month show that her A1C is 5.6. Weight is 171lbs. She's eating less carbs. 3. Asthma: She met with Carol Gillis in between apts. No changes were made to her rx regimen. Asymptomatic. 4. HTN: VSS. Taking maxide. Her renal labs are normal.     5. LGSIL: She hasn't seen a GYN physician in the past year.  No bleeding.     6. 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 Max Daily Amount: 1,200 mg. Indications: neuropathic pain 180 Tab 1    fluticasone propionate (FLONASE) 50 mcg/actuation nasal spray USE 2 SPRAYS IN EACH NOSTRIL DAILY 48 g 3    triamterene-hydroCHLOROthiazide (MAXZIDE) 37.5-25 mg per tablet Take 0.5 Tabs by mouth daily. 45 Tab 3    mv-min/vit C/glut/lysine/hb124 (AIRBORNE, LYSINE HCL, PO) Take  by mouth.  vitamin C-biotin (HAIR-SKIN-NAILS, VIT C-BIOTIN,) 50 mg -1,250 mcg chew Take  by mouth daily.  fexofenadine (ALLEGRA) 180 mg tablet Take 180 mg by mouth daily.  PROAIR HFA 90 mcg/actuation inhaler USE 2 INHALATIONS EVERY 4 HOURS AS NEEDED 3 Inhaler 3    EPINEPHrine (EPIPEN) 0.3 mg/0.3 mL injection 0.3 mL by IntraMUSCular route once as needed for up to 1 dose. 1 Syringe 1    melatonin 3 mg tablet Take 3 mg by mouth daily as needed (sleep).  Cholecalciferol, Vitamin D3, (VITAMIN D3) 1,000 unit cap Take 3,000 Units by mouth daily.  cycloSPORINE (RESTASIS) 0.05 % ophthalmic emulsion Administer 1 Drop to both eyes two (2) times a day.  MULTIVITAMIN W-MINERALS/LUTEIN (CENTRUM SILVER PO) Take 1 Tab by mouth daily.  co-enzyme Q-10 (CO Q-10) 100 mg capsule Take 100 mg by mouth daily.  BIOTIN PO Take 5,000 mcg by mouth daily.          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 (HCC)     Fracture     rt ankle as an adult    History of pneumonia     Hypertension     Ill-defined condition     Spasms to left side    Left arm pain     Lumbar spinal stenosis     Myofascial pain     Osteopenia     Sciatica of right side        Past Surgical History: Procedure Laterality Date    COLONOSCOPY N/A 2019    COLONOSCOPY w polypectomies performed by Ar Sosa MD at [facility]    HX CATARACT REMOVAL  8/15/2011    HX HEENT      sinus surgery    HX KERATOPLASTY Left 10/02/2017    HX ORTHOPAEDIC      left 4th finger trigger release    HX SHIELA AND BSO  1970s    uterine fibroids       Family History   Problem Relation Age of Onset    Asthma Mother     COPD Mother    [place] Mother     Heart Disease Mother     Hypertension Mother     Stroke Father     Heart Disease Father     Hypertension Father     Allergic Rhinitis Sister     Asthma Brother     Hypertension Brother     Diabetes Brother     Migraines Paternal Grandmother        Social History     Tobacco Use    Smoking status: Former Smoker     Packs/day: 1.00     Years: 10.00     Pack years: 10.00     Types: Cigarettes     Quit date: 1970     Years since quittin.4    Smokeless tobacco: Never Used   Substance Use Topics    Alcohol use: Yes     Alcohol/week: 1.0 standard drinks     Types: 1 Glasses of wine per week     Frequency: Monthly or less     Comment: occasional wine       ROS   Review of Systems   Constitutional: Negative for chills, fever and malaise/fatigue. HENT: Negative for ear pain and sore throat. Eyes: Negative for blurred vision and pain. Respiratory: Negative for shortness of breath. Cardiovascular: Negative for chest pain. Gastrointestinal: Negative for abdominal pain, blood in stool and melena. Genitourinary: Negative for dysuria and hematuria. Musculoskeletal: Positive for joint pain (left knee pain has improved). Negative for myalgias. +Right shoulder pain   Skin: Negative for rash. Neurological: Negative for tingling, focal weakness and headaches. Endo/Heme/Allergies: Does not bruise/bleed easily. Psychiatric/Behavioral: Negative for substance abuse.        Objective     Vitals:    21 0819   BP: 115/69   Pulse: 69   Resp: 14 Temp: 97.5 °F (36.4 °C)   TempSrc: Temporal   SpO2: 96%   Weight: 171 lb (77.6 kg)   Height: 5' 0.5\" (1.537 m)   PainSc:   0 - No pain       Physical Exam  Vitals and nursing note reviewed. HENT:      Head: Normocephalic and atraumatic. Right Ear: External ear normal.      Left Ear: External ear normal.   Eyes:      General: No scleral icterus. Right eye: No discharge. Left eye: No discharge. Conjunctiva/sclera: Conjunctivae normal.   Cardiovascular:      Rate and Rhythm: Normal rate and regular rhythm. Heart sounds: Normal heart sounds. No murmur heard. No friction rub. No gallop. Pulmonary:      Effort: Pulmonary effort is normal. No respiratory distress. Breath sounds: Normal breath sounds. No wheezing or rales. Abdominal:      General: Bowel sounds are normal. There is no distension. Palpations: Abdomen is soft. There is no mass. Tenderness: There is no abdominal tenderness. There is no guarding or rebound. Musculoskeletal:         General: No swelling (BUE) or tenderness (BUE). Cervical back: Neck supple. Lymphadenopathy:      Cervical: No cervical adenopathy. Skin:     General: Skin is warm and dry. Findings: No erythema. Comments: She has pain with right shoulder ROM exercises   Neurological:      General: No focal deficit present. Mental Status: She is alert. Mental status is at baseline. Motor: No abnormal muscle tone. Gait: Gait is intact.  Gait normal.   Psychiatric:         Mood and Affect: Mood and affect normal.         LABS   Data Review:   Lab Results   Component Value Date/Time    WBC 6.1 10/13/2020 09:43 AM    HGB 14.5 10/13/2020 09:43 AM    HCT 43.9 10/13/2020 09:43 AM    PLATELET 407 62/89/6271 09:43 AM    MCV 88.3 10/13/2020 09:43 AM       Lab Results   Component Value Date/Time    Sodium 143 04/27/2021 06:24 AM    Potassium 4.2 04/27/2021 06:24 AM    Chloride 106 04/27/2021 06:24 AM    CO2 34 (H) 04/27/2021 06:24 AM    Anion gap 3 04/27/2021 06:24 AM    Glucose 92 04/27/2021 06:24 AM    BUN 14 04/27/2021 06:24 AM    Creatinine 0.77 04/27/2021 06:24 AM    BUN/Creatinine ratio 18 04/27/2021 06:24 AM    GFR est AA >60 04/27/2021 06:24 AM    GFR est non-AA >60 04/27/2021 06:24 AM    Calcium 9.5 04/27/2021 06:24 AM       Lab Results   Component Value Date/Time    Cholesterol, total 169 04/27/2021 06:24 AM    HDL Cholesterol 55 04/27/2021 06:24 AM    LDL, calculated 93 04/27/2021 06:24 AM    VLDL, calculated 21 04/27/2021 06:24 AM    Triglyceride 105 04/27/2021 06:24 AM    CHOL/HDL Ratio 3.1 04/27/2021 06:24 AM       Lab Results   Component Value Date/Time    Hemoglobin A1c 5.6 04/27/2021 08:24 AM       Assessment/Plan:   1. Obesity/Prediabetes hx:   - I encouraged her to reduce her carb intake as a means to also reduce her weight in addition to her A1C  - Rechecking an A1C later this year    2. LGSIL of cervix of undetermined significance:   - Referral to GYN for a PAP    ORDERS:  - REFERRAL TO GYNECOLOGY    3. Right rotator cuff tear arthropathy  - Referral to PT per pt request.  - Activity as tolerated. - We discussed a referral to Orthopedics but she wants to hold off on this option at this time. ORDERS:  - REFERRAL TO PHYSICAL THERAPY    4. Asthma: Stable. - C/w rx per Aracely Salmeron. 5. Stress/Insomnia:   - We discussed ways to cope w/ underlying stress. - She declines pharmacotherapy at this time. - I encouraged her to notify me if sx worsen. 6. HTN: Stable. - C/w rx as prescribed. - RTC for another BP check      Health Maintenance Due   Topic Date Due    Hepatitis C Screening  Never done    COVID-19 Vaccine (1) Never done         Lab review: labs are reviewed in the EHR and ordered as mentioned above    I have discussed the diagnosis with the patient and the intended plan as seen in the above orders.   The patient has received an after-visit summary and questions were answered concerning future plans.  I have discussed medication side effects and warnings with the patient as well. I have reviewed the plan of care with the patient, accepted their input and they are in agreement with the treatment goals. All questions were answered. The patient understands the plan of care. Handouts provided today with above information. Pt instructed if symptoms worsen to call the office or report to the ED for continued care. Greater than 50% of the visit time was spent in counseling and/or coordination of care. Voice recognition was used to generate this report, which may have resulted in some phonetic based errors in grammar and contents. Even though attempts were made to correct all the mistakes, some may have been missed, and remained in the body of the document.           Gilma Marquez MD

## 2022-03-29 ENCOUNTER — HOSPITAL ENCOUNTER (OUTPATIENT)
Dept: PHYSICAL THERAPY | Age: 82
Discharge: HOME | End: 2022-03-29
Payer: MEDICARE

## 2022-03-29 PROCEDURE — 97110 THERAPEUTIC EXERCISES: CPT

## 2022-03-29 PROCEDURE — 97112 NEUROMUSCULAR REEDUCATION: CPT

## 2022-03-29 PROCEDURE — 97140 MANUAL THERAPY 1/> REGIONS: CPT

## 2022-03-29 NOTE — PROGRESS NOTES
PT DAILY TREATMENT NOTE     Patient Name: Sasha Correa  Date:3/29/2022  : 1940  [x]  Patient  Verified  [place]or: [place] MED[place] / Plan: [place] MEDICARE PART A & B / Product Type: Medicare /    In time:9:45  Out time:10:24  Total Treatment Time (min): 39  Visit #: 3 of 4    Medicare/BCBS Only   Total Timed Codes (min):  39 1:1 Treatment Time:  39     Treatment Area: Pain in right shoulder [M25.511]    SUBJECTIVE  Pain Level (0-10 scale): 2/10  Any medication changes, allergies to medications, adverse drug reactions, diagnosis change, or new procedure performed?: [x] No    [] Yes (see summary sheet for update)  Subjective functional status/changes:   [] No changes reported  The patient reports that her shoulder is doing well. OBJECTIVE    Modality rationale: decrease pain and increase tissue extensibility to improve the patients ability to improve ADL ease. Min Type Additional Details   10 []  Ice     [x]  heat Position: supine  Location: right shoulder   [] Skin assessment post-treatment:  []intact []redness- no adverse reaction    []redness  adverse reaction:     16 min Therapeutic Exercise:  [x] See flow sheet :   Rationale: increase ROM and increase strength to improve the patients ability to improve ADL ease. 15 min Neuromuscular Re-education:  [x]  See flow sheet :   Rationale: increase ROM, increase strength and improve coordination  to improve the patients ability to improve ADL ease. 8 min Manual Therapy:  Right shoulder inferior/posterior capsular mobs grade II to III. pec stretching, UT/LS with TPR/LS   The manual therapy interventions were performed at a separate and distinct time from the therapeutic activities interventions. Rationale: decrease pain, increase ROM and increase tissue extensibility to improve ADL ease.           With   [] TE   [] TA   [] neuro   [] other: Patient Education: [x] Review HEP    [] Progressed/Changed HEP based on:   [] positioning   [] body mechanics   [] transfers   [] heat/ice application    [] other:      Other Objective/Functional Measures:   Right shoulder functional ER: C7  Right shoulder flexion AROM: 103 degrees     Pain Level (0-10 scale) post treatment: 0/10    ASSESSMENT/Changes in Function: The patient has attained C7 indicating improved ease of grooming. Her AROM of right shoulder is 103 degrees, indicating improved ease of reaching overhead. Continued chief complaint of difficulty with strength through ABD of right shoulder with probable RTC involvement. The patient has one additional visit, but has made good progress. To ascertain D/C vs continue PT at that time. Patient will continue to benefit from skilled PT services to modify and progress therapeutic interventions, address functional mobility deficits, address ROM deficits, address strength deficits, analyze and address soft tissue restrictions, analyze and cue movement patterns, analyze and modify body mechanics/ergonomics, assess and modify postural abnormalities and instruct in home and community integration to attain remaining goals. []  See Plan of Care  []  See progress note/recertification  []  See Discharge Summary         Progress towards goals / Updated goals:  1. Patient will increase right shoulder flexion AROM to at least 130 degrees to improve ease with daily activities.                Re-cert: Progressed to 83 degrees    Current: Improved to 103 degrees 3/29/2022  2. Patient will increase right shoulder functional ER to at least C7 to improve ease with self care tasks and fixing her hair.              Re-cert: progressed to C4 though slow and cautious    Current: Met - demonstrates C7 3/29/2022  3. Patient will increase right shoulder strength grossly to 4/5 to improve ease with carrying groceries.             Re-cert: progressed to ABD: 3-/5 MMT, ER: 4/5 MMT, IR: 4+/5 MMT, Flexion: 3-/5 MMT  4.  Patient will increase FOTO score to at least 58 to improve ease with daily activities and household activities.                Re-cert: progressed to 62     PLAN  [x]  Upgrade activities as tolerated     []  Continue plan of care  []  Update interventions per flow sheet       []  Discharge due to:_  []  Other:_      Swati Blackwood, PT 3/29/2022  9:47 AM    Future Appointments   Date Time Provider Kin Malaika   4/5/2022  9:05 AM [facility] LAB VISIT [facility] BS AMB   4/12/2022  8:40 AM Parveen Cain MD [facility] BS AMB   4/20/2022  9:00 AM Rosetta Pardo MD [facility] BS AMB

## 2022-04-04 ENCOUNTER — HOSPITAL ENCOUNTER (OUTPATIENT)
Dept: PHYSICAL THERAPY | Age: 82
Discharge: HOME | End: 2022-04-04
Payer: MEDICARE

## 2022-04-04 PROCEDURE — 97140 MANUAL THERAPY 1/> REGIONS: CPT

## 2022-04-04 PROCEDURE — 97110 THERAPEUTIC EXERCISES: CPT

## 2022-04-04 NOTE — PROGRESS NOTES
Physical Therapy Discharge Instructions      [facility] [employer]  [address]  [place], [address]  [phone] fax    Patient: Hawa England  : 1940      Continue Home Exercise Program 1 times per day for 2 weeks, then decrease to 3-5 times per week      Continue with    [] Ice  as needed     [x] Heat           Follow up with MD:     [] Upon completion of therapy     [x] As needed      Recommendations:     [x]   Return to activity with home program    []   Return to activity with the following modifications:       []Post Rehab Program    []Join Independent aquatic program     []Return to/join local gym    Marj Hood PT, DPT 2022 7:16 AM

## 2022-04-04 NOTE — PROGRESS NOTES
PT DISCHARGE DAILY NOTE AND [id number]    Date:2022  Patient name: Annalisa Frey Start of Care: 2022   Referral source: Cristian Salazar MD : 1940   Medical/Treatment Diagnosis: Pain in right shoulder [M25.511] Onset Date: chronic, exacerbation in symptoms ~2-3 months prior to [facility]     Prior Hospitalization: see medical history Provider#: [id number]   Medications: Verified on Patient Summary List    Comorbidities: allergies, arthritis, back pain, BMI >30, headaches, HTN, sleep dysfunction, visual impairment   Prior Level of Function: right hand dominant, reports no limitations with daily activities prior to recent exacerbation     Visits from Start of Care: 12    Missed Visits: 1    Reporting Period : 3/8/2022 to 2022    [x]  Patient  Verified  Payor: [place] MEDICARE / Plan: [place] MEDICARE PART A & B / Product Type: Medicare /    In time: 0  Out time: 749  Total Treatment Time (min): 52  Visit #: 4 of 4    Medicare/BCBS Only   Total Timed Codes (min):  39 1:1 Treatment Time:  39       SUBJECTIVE  Pain Level (0-10 scale): 0  Any medication changes, allergies to medications, adverse drug reactions, diagnosis change, or new procedure performed?: [x] No    [] Yes (see summary sheet for update)  Subjective functional status/changes:   [] No changes reported  \"I still have some difficulty reaching overhead but I think I can live with it. \"     OBJECTIVE    Modality rationale: decrease pain and increase tissue extensibility to improve the patients ability to perform functional tasks with greater ease    Min Type Additional Details    [] Estim:  []Unatt       []IFC  []Premod                        []Other:  []w/ice   []w/heat  Position:  Location:    [] Estim: []Att    []TENS instruct  []NMES                    []Other:  []w/US   []w/ice   []w/heat  Position:  Location:    []  Traction: [] Cervical       []Lumbar                       [] Prone          []Supine                       []Intermittent []Continuous Lbs:  [] before manual  [] after manual    []  Ultrasound: []Continuous   [] Pulsed                           []1MHz   []3MHz W/cm2:  Location:    []  Iontophoresis with dexamethasone         Location: [] Take home patch   [] In clinic   10 []  Ice     [x]  heat  []  Ice massage  []  Laser   []  Anodyne Position: seated   Location: right shoulder     []  Laser with stim  []  Other:  Position:  Location:    []  Vasopneumatic Device    []  Right     []  Left  Pre-treatment girth:  Post-treatment girth:  Measured at (location):  Pressure:       [] lo [] med [] hi   Temperature: [] lo [] med [] hi   [] Skin assessment post-treatment:  []intact []redness- no adverse reaction    []redness  adverse reaction:     31 min Therapeutic Exercise:  [x] See flow sheet :   Rationale: increase ROM, increase strength and improve coordination to improve the patients ability to perform ADLs and self care tasks with greater ease    8 min Manual Therapy:  Reclined - STM to right upper trap, grade I/II posterior right GHJ mobilizations with PROM into flexion and scaption within tolerance    Rationale: decrease pain, increase ROM and increase tissue extensibility to perform functional tasks with greater ease           With   [] TE   [] TA   [] neuro   [] other: Patient Education: [x] Review HEP    [] Progressed/Changed HEP based on:   [] positioning   [] body mechanics   [] transfers   [] heat/ice application    [] other:      Other Objective/Functional Measures: right shoulder flexion 4-/5, abduction 4-/5, ER and IR 4+/5, FOTO = 53     Pain Level (0-10 scale) post treatment: 0    Summary of Care:  1. Patient will increase right shoulder flexion AROM to at least 130 degrees to improve ease with daily activities.                Re-cert: Progressed to 83 degrees               Current: Improved to 103 degrees  2.  Patient will increase right shoulder functional ER to at least C7 to improve ease with self care tasks and fixing her hair.              Re-cert: progressed to C4 though slow and cautious     Current: Met - demonstrates C7  3. Patient will increase right shoulder strength grossly to 4/5 to improve ease with carrying groceries.             Re-cert: progressed to ABD: 3-/5 MMT, ER: 4/5 MMT, IR: 4+/5 MMT, Flexion: 3-/5 MMT   Current: progressing - flexion 4-/5, abduction 4-/5, ER and IR 4+/5   4. Patient will increase FOTO score to at least 58 to improve ease with daily activities and household activities.                Re-cert: progressed to 62   Current: regressed - 53     ASSESSMENT/Changes in Function: Patient presents for last visit under current plan of care. She demonstrates progress in AROM, shoulder strength, and improved ease with daily activities. Strength continues to be slightly limited compared to left shoulder, though much improved since [facility]. Some concern for possible RTC pathology involvement as well. Patient provided updated HEP today with appropriate theraband to continue progressing towards LTGs independently. She has no further questions at this time.      Thank you for this referral!     PLAN  [x]Discontinue therapy: [x]Patient has reached or is progressing toward set goals      []Patient is non-compliant or has abdicated      []Due to lack of appreciable progress towards set goals    Yao Barnes, PT, DPT 4/4/2022  7:02 AM

## 2022-04-05 ENCOUNTER — APPOINTMENT (OUTPATIENT)
Dept: INTERNAL MEDICINE CLINIC | Age: 82
End: 2022-04-05

## 2022-04-05 ENCOUNTER — HOSPITAL ENCOUNTER (OUTPATIENT)
Dept: LAB | Age: 82
Discharge: HOME | End: 2022-04-05
Payer: MEDICARE

## 2022-04-05 LAB
ALBUMIN SERPL-MCNC: 3.7 G/DL (ref 3.4–5)
ALBUMIN/GLOB SERPL: 1 {RATIO} (ref 0.8–1.7)
ALP SERPL-CCNC: 82 U/L (ref 45–117)
ALT SERPL-CCNC: 22 U/L (ref 13–56)
ANION GAP SERPL CALC-SCNC: 3 MMOL/L (ref 3–18)
AST SERPL-CCNC: 17 U/L (ref 10–38)
BASOPHILS # BLD: 0 K/UL (ref 0–0.1)
BASOPHILS NFR BLD: 0 % (ref 0–2)
BILIRUB SERPL-MCNC: 0.8 MG/DL (ref 0.2–1)
BUN SERPL-MCNC: 11 MG/DL (ref 7–18)
BUN/CREAT SERPL: 15 (ref 12–20)
CALCIUM SERPL-MCNC: 8.9 MG/DL (ref 8.5–10.1)
CHLORIDE SERPL-SCNC: 105 MMOL/L (ref 100–111)
CO2 SERPL-SCNC: 32 MMOL/L (ref 21–32)
CREAT SERPL-MCNC: 0.72 MG/DL (ref 0.6–1.3)
CRP SERPL-MCNC: 1 MG/DL (ref 0–0.3)
DIFFERENTIAL METHOD BLD: ABNORMAL
EOSINOPHIL # BLD: 0 K/UL (ref 0–0.4)
EOSINOPHIL NFR BLD: 0 % (ref 0–5)
ERYTHROCYTE [DISTWIDTH] IN BLOOD BY AUTOMATED COUNT: 14.8 % (ref 11.6–14.5)
GLOBULIN SER CALC-MCNC: 3.6 G/DL (ref 2–4)
GLUCOSE SERPL-MCNC: 89 MG/DL (ref 74–99)
HCT VFR BLD AUTO: 43.7 % (ref 35–45)
HGB BLD-MCNC: 14 G/DL (ref 12–16)
IMM GRANULOCYTES # BLD AUTO: 0 K/UL (ref 0–0.04)
IMM GRANULOCYTES NFR BLD AUTO: 0 % (ref 0–0.5)
LYMPHOCYTES # BLD: 1.2 K/UL (ref 0.9–3.6)
LYMPHOCYTES NFR BLD: 25 % (ref 21–52)
MCH RBC QN AUTO: 29 PG (ref 24–34)
MCHC RBC AUTO-ENTMCNC: 32 G/DL (ref 31–37)
MCV RBC AUTO: 90.7 FL (ref 78–100)
MONOCYTES # BLD: 0.3 K/UL (ref 0.05–1.2)
MONOCYTES NFR BLD: 7 % (ref 3–10)
NEUTS SEG # BLD: 3.4 K/UL (ref 1.8–8)
NEUTS SEG NFR BLD: 68 % (ref 40–73)
NRBC # BLD: 0 K/UL (ref 0–0.01)
NRBC BLD-RTO: 0 PER 100 WBC
PLATELET # BLD AUTO: 343 K/UL (ref 135–420)
PMV BLD AUTO: 10.6 FL (ref 9.2–11.8)
POTASSIUM SERPL-SCNC: 4 MMOL/L (ref 3.5–5.5)
PROT SERPL-MCNC: 7.3 G/DL (ref 6.4–8.2)
RBC # BLD AUTO: 4.82 M/UL (ref 4.2–5.3)
SODIUM SERPL-SCNC: 140 MMOL/L (ref 136–145)
WBC # BLD AUTO: 5 K/UL (ref 4.6–13.2)

## 2022-04-05 PROCEDURE — 36415 COLL VENOUS BLD VENIPUNCTURE: CPT

## 2022-04-05 PROCEDURE — 80053 COMPREHEN METABOLIC PANEL: CPT

## 2022-04-05 PROCEDURE — 86140 C-REACTIVE PROTEIN: CPT

## 2022-04-05 PROCEDURE — 85025 COMPLETE CBC W/AUTO DIFF WBC: CPT

## 2022-04-12 ENCOUNTER — OFFICE VISIT (OUTPATIENT)
Dept: INTERNAL MEDICINE CLINIC | Age: 82
End: 2022-04-12
Payer: MEDICARE

## 2022-04-12 VITALS
OXYGEN SATURATION: 96 % | HEART RATE: 70 BPM | TEMPERATURE: 97.3 F | RESPIRATION RATE: 16 BRPM | SYSTOLIC BLOOD PRESSURE: 123 MMHG | BODY MASS INDEX: 32.79 KG/M2 | HEIGHT: 60 IN | DIASTOLIC BLOOD PRESSURE: 79 MMHG | WEIGHT: 167 LBS

## 2022-04-12 DIAGNOSIS — I10 PRIMARY HYPERTENSION: ICD-10-CM

## 2022-04-12 DIAGNOSIS — J45.909 UNCOMPLICATED ASTHMA, UNSPECIFIED ASTHMA SEVERITY, UNSPECIFIED WHETHER PERSISTENT: ICD-10-CM

## 2022-04-12 DIAGNOSIS — R21 VULVAR RASH: ICD-10-CM

## 2022-04-12 DIAGNOSIS — R10.9 LEFT FLANK PAIN: ICD-10-CM

## 2022-04-12 DIAGNOSIS — M51.26 HNP (HERNIATED NUCLEUS PULPOSUS), LUMBAR: ICD-10-CM

## 2022-04-12 DIAGNOSIS — Z13.220 SCREENING FOR HYPERLIPIDEMIA: ICD-10-CM

## 2022-04-12 DIAGNOSIS — R73.9 HYPERGLYCEMIA: Primary | ICD-10-CM

## 2022-04-12 PROCEDURE — G0463 HOSPITAL OUTPT CLINIC VISIT: HCPCS | Performed by: INTERNAL MEDICINE

## 2022-04-12 PROCEDURE — G8427 DOCREV CUR MEDS BY ELIG CLIN: HCPCS | Performed by: INTERNAL MEDICINE

## 2022-04-12 PROCEDURE — 1101F PT FALLS ASSESS-DOCD LE1/YR: CPT | Performed by: INTERNAL MEDICINE

## 2022-04-12 PROCEDURE — 99214 OFFICE O/P EST MOD 30 MIN: CPT | Performed by: INTERNAL MEDICINE

## 2022-04-12 PROCEDURE — G8752 SYS BP LESS 140: HCPCS | Performed by: INTERNAL MEDICINE

## 2022-04-12 PROCEDURE — G8536 NO DOC ELDER MAL SCRN: HCPCS | Performed by: INTERNAL MEDICINE

## 2022-04-12 PROCEDURE — G8399 PT W/DXA RESULTS DOCUMENT: HCPCS | Performed by: INTERNAL MEDICINE

## 2022-04-12 PROCEDURE — G8417 CALC BMI ABV UP PARAM F/U: HCPCS | Performed by: INTERNAL MEDICINE

## 2022-04-12 PROCEDURE — 1090F PRES/ABSN URINE INCON ASSESS: CPT | Performed by: INTERNAL MEDICINE

## 2022-04-12 PROCEDURE — G8754 DIAS BP LESS 90: HCPCS | Performed by: INTERNAL MEDICINE

## 2022-04-12 PROCEDURE — G8510 SCR DEP NEG, NO PLAN REQD: HCPCS | Performed by: INTERNAL MEDICINE

## 2022-04-12 RX ORDER — MECLIZINE HCL 12.5 MG 12.5 MG/1
12.5 TABLET ORAL
COMMUNITY

## 2022-04-12 NOTE — PROGRESS NOTES
Feli Marie presents today for   Chief Complaint   Patient presents with    Follow-up    Hypertension        1. \"Have you been to the ER, urgent care clinic since your last visit? Hospitalized since your last visit? \" no     2. \"Have you seen or consulted any other health care providers outside of the [address] [place] [address] since your last visit? \" no     3. For patients aged 39-70: Has the patient had a colonoscopy / FIT/ Cologuard? NA - based on age      If the patient is female:    4. For patients aged 41-77: Has the patient had a mammogram within the past 2 years? NA - based on age or sex  See top three    5. For patients aged 21-65: Has the patient had a pap smear?  NA - based on age or sex

## 2022-04-12 NOTE — PROGRESS NOTES
[facility]:  4/12/2022, MRN: [id number]      Nalini Sawyer is a [de-identified] y.o. female and presents to clinic for Follow-up and Hypertension      Subjective: The patient is a 82-year-old female with history of hypertension, LGSIL 4/19, osteopenia, tubular adenomas colon polyps, obesity, vitamin D deficiency, stable pulmonary nodules, small hiatal hernia, NAFLD, asthma (followed by Dar Rouse), cervical disc disease, and lumbar disc disease (followed by Orthopedics, ).     1. Asthma: Asymptomatic. Albuterol use: none. +Runny eyes and itchy nose sx she attributes to allergies. Using nasonex daily and azelastine drops prn.    2. Lumbar Disc Disease: Followed by Dr. Mary Chandler. Status post visit in between appointments with Dr. Mary Chandler. She was placed on 1200 mg nightly of gabapentin. She was also prescribed a Medrol Dosepak but she never took this rx as it was for her right shoulder. Today she reports:  Her pain is 0/10 today. She is doing PT for her right shoulder pain. Warm compresses help her shoulder pain. 8/4/21 Right Shoulder Xray: No significant radiographic abnormalities. Mild acromial and AC joint degenerative change. 3.  Left flank pain: Reported at her last visit. Status post a CT of her abdomen and pelvis. Findings are listed below. Today she reports: her sx resolved. S/p abx for an infection along her uterus s/p D&C. Her CRP is trending down. 2/4/22 Abdomen/Pelvis CT: No acute abnormalities. There are couple of small nodules in the right middle lobe unchanged from 3/7/2017 presumably benign. Mild fibrotic changes are also present in the lung bases. Hepatic steatosis. Small hiatal hernia. Status post hysterectomy. Small umbilical hernia containing fat without incarceration. Additional chronic changes as described above. 4.  Hypertension: Blood pressure is stable on Maxide. Overdue for a lipid panel. She wants her A1C checked. +H/o elevated BG noted on previous labs. 5. Vaginitis/Rash: She has some \"irritation\" with use of an estrogen based cream. \"It's a little raw. \" Urinating causes some burning along her inguinal creases. No dysuria. No hematuria. No frequency. No urgency. 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  gabapentin (Gralise) 600 mg Tb24 Take 2 Tablets by mouth nightly. Max Daily Amount: 2 Tablets. 180 Tablet 1    fluticasone propionate (FLONASE) 50 mcg/actuation nasal spray USE 2 SPRAYS IN EACH NOSTRIL DAILY 48 g 3    estradioL (ESTRACE) 0.01 % (0.1 mg/gram) vaginal cream insert 1 gram vaginally once daily for 14 days      triamterene-hydroCHLOROthiazide (MAXZIDE) 37.5-25 mg per tablet TAKE ONE-HALF (1/2) TABLET DAILY 45 Tablet 3    diclofenac (Voltaren) 1 % gel Apply 4 grams to affected joint up to 4 times per day, maximum 16 grams per joint per day. Dispense 100 gram tubes 100 g 2    mv-min/vit C/glut/lysine/hb124 (AIRBORNE, LYSINE HCL, PO) Take  by mouth.  fexofenadine (ALLEGRA) 180 mg tablet Take 180 mg by mouth daily.       PROAIR HFA 90 mcg/actuation inhaler USE 2 INHALATIONS EVERY 4 HOURS AS NEEDED 3 Inhaler 3    EPINEPHrine (EPIPEN) 0.3 mg/0.3 mL injection 0.3 mL by IntraMUSCular route once as needed for up to 1 dose. 1 Syringe 1    melatonin 3 mg tablet Take 3 mg by mouth daily as needed (sleep).  Cholecalciferol, Vitamin D3, (VITAMIN D3) 1,000 unit cap Take 3,000 Units by mouth daily.  cycloSPORINE (RESTASIS) 0.05 % ophthalmic emulsion Administer 1 Drop to both eyes two (2) times a day.  MULTIVITAMIN W-MINERALS/LUTEIN (CENTRUM SILVER PO) Take 1 Tablet by mouth daily.  co-enzyme Q-10 (CO Q-10) 100 mg capsule Take 100 mg by mouth daily.  BIOTIN PO Take 5,000 mcg by mouth daily.  methylPREDNISolone (MEDROL DOSEPACK) 4 mg tablet Per dose pack instructions (Patient not taking: Reported on 4/12/2022) 1 Dose Pack 0    metroNIDAZOLE (FLAGYL) 500 mg tablet Take 1 Tablet by mouth two (2) times a day.  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 (HCC)     Fracture     rt ankle as an adult    History of pneumonia     Hypertension     Ill-defined condition     Spasms to left side    Left arm pain     Lumbar spinal stenosis     Myofascial pain     Osteopenia     Sciatica of right side        Past Surgical History:   Procedure Laterality Date    COLONOSCOPY N/A 11/11/2019    COLONOSCOPY w polypectomies performed by Michele Estrada MD at [address] [place] [address] Rd  8/15/2011    HX HEENT      sinus surgery    HX KERATOPLASTY Left 10/02/2017    HX ORTHOPAEDIC      left 4th finger trigger release    HX SHIELA AND BSO  1970s    uterine fibroids       Family History   Problem Relation Age of Onset    Asthma Mother     COPD Mother    Marine Isaias Sclerosis Mother     Heart Disease Mother     Hypertension Mother     Stroke Father     Heart Disease Father     Hypertension Father     Allergic Rhinitis Sister     Asthma Brother     Hypertension Brother     Diabetes Brother     Migraines Paternal Grandmother        Social History     Tobacco Use    Smoking status: Former Smoker     Packs/day: 1.00     Years: 10.00     Pack years: 10.00     Types: Cigarettes     Quit date: 1970     Years since quittin.3    Smokeless tobacco: Never Used   Substance Use Topics    Alcohol use: Yes     Alcohol/week: 1.0 standard drink     Types: 1 Glasses of wine per week     Comment: occasional wine       ROS   Review of Systems   Constitutional: Negative for chills and fever. HENT: Negative for ear pain and sore throat. Eyes: Negative for blurred vision and pain. Respiratory: Negative for cough and shortness of breath. Cardiovascular: Negative for chest pain. Gastrointestinal: Negative for abdominal pain, blood in stool and melena. Genitourinary: Negative for dysuria and hematuria. Musculoskeletal: Positive for back pain and joint pain. Negative for myalgias. Skin: Positive for rash. Neurological: Negative for headaches. Endo/Heme/Allergies: Does not bruise/bleed easily. Psychiatric/Behavioral: Negative for substance abuse. Objective     Vitals:    22 0847   BP: 123/79   Pulse: 70   Resp: 16   Temp: 97.3 °F (36.3 °C)   TempSrc: Temporal   SpO2: 96%   Weight: 167 lb (75.8 kg)   Height: 5' (1.524 m)   PainSc:   0 - No pain       Physical Exam  Vitals and nursing note reviewed. HENT:      Head: Normocephalic and atraumatic. Right Ear: External ear normal.      Left Ear: External ear normal.   Eyes:      General: No scleral icterus. Right eye: No discharge. Left eye: No discharge.       Conjunctiva/sclera: Conjunctivae normal.   Cardiovascular:      Rate and Rhythm: Normal rate and regular rhythm. Heart sounds: Normal heart sounds. No murmur heard. No friction rub. No gallop. Pulmonary:      Effort: Pulmonary effort is normal. No respiratory distress. Breath sounds: Normal breath sounds. No wheezing or rales. Abdominal:      General: Bowel sounds are normal. There is no distension. Palpations: Abdomen is soft. There is no mass. Tenderness: There is no abdominal tenderness. There is no guarding or rebound. Musculoskeletal:         General: No swelling (BUE) or tenderness (BUE). Cervical back: Neck supple. Comments: She has decreased range of motion along her right shoulder secondary to pain from her known rotator cuff tendinopathy. Lymphadenopathy:      Cervical: No cervical adenopathy. Skin:     General: Skin is warm and dry. Findings: No erythema or rash. Neurological:      Mental Status: She is alert. Motor: No abnormal muscle tone.       Gait: Gait normal.   Psychiatric:         Mood and Affect: Mood normal.         LABS   Data Review:   Lab Results   Component Value Date/Time    WBC 5.0 04/05/2022 08:57 AM    HGB 14.0 04/05/2022 08:57 AM    HCT 43.7 04/05/2022 08:57 AM    PLATELET 598 17/12/1801 08:57 AM    MCV 90.7 04/05/2022 08:57 AM       Lab Results   Component Value Date/Time    Sodium 140 04/05/2022 08:57 AM    Potassium 4.0 04/05/2022 08:57 AM    Chloride 105 04/05/2022 08:57 AM    CO2 32 04/05/2022 08:57 AM    Anion gap 3 04/05/2022 08:57 AM    Glucose 89 04/05/2022 08:57 AM    BUN 11 04/05/2022 08:57 AM    Creatinine 0.72 04/05/2022 08:57 AM    BUN/Creatinine ratio 15 04/05/2022 08:57 AM    GFR est AA >60 04/05/2022 08:57 AM    GFR est non-AA >60 04/05/2022 08:57 AM    Calcium 8.9 04/05/2022 08:57 AM       Lab Results   Component Value Date/Time    Cholesterol, total 169 04/27/2021 06:24 AM    HDL Cholesterol 55 04/27/2021 06:24 AM    LDL, calculated 93 04/27/2021 06:24 AM    VLDL, calculated 21 04/27/2021 06:24 AM Triglyceride 105 04/27/2021 06:24 AM    CHOL/HDL Ratio 3.1 04/27/2021 06:24 AM       Lab Results   Component Value Date/Time    Hemoglobin A1c 5.5 10/04/2021 08:30 AM       Assessment/Plan:   1. Hypertension: Stable. Continue medication as prescribed. 2.  Hyperglycemia Hx (Mild per previous labs but her last A1c was normal): Her last A1c was normal in October. We will check an A1c and lipid panel in 3 months. I encouraged her to reduce her sweets. 3.  Left flank pain: Resolved. S/p D&C. Her CT scan findings are reassuring. Observation. Her CRP is trending down but I will recheck this in 3 months. 4.  Asthma: +Allergic rhinitis. Okay to use albuterol as needed. Continue with allergy medications over-the-counter. 5.  Lumbar Disc Disease: Improving. She also has a right rotator cuff tendinopathy. I encouraged her to continue with exercises, recommended by her PT team.  Darshan Parker encouraged her to follow-up with her orthopedic for long-term treatment recommendations. Activity as tolerated. - Ok to take her medrol dose pack prn.     6.  Rash:  Along her vulva. From a contact dermatitis from her estrogen based cream?  I encouraged her to follow-up with her gynecologist.  - Amrit Apodaca to stop the estrogen based cream.     There are no preventive care reminders to display for this patient. Lab review: labs are reviewed in the EHR and ordered mention above. I have discussed the diagnosis with the patient and the intended plan as seen in the above orders. The patient has received an after-visit summary and questions were answered concerning future plans. I have discussed medication side effects and warnings with the patient as well. I have reviewed the plan of care with the patient, accepted their input and they are in agreement with the treatment goals. All questions were answered. The patient understands the plan of care. Handouts provided today with above information.  Pt instructed if symptoms worsen to call the office or report to the ED for continued care. Greater than 50% of the visit time was spent in counseling and/or coordination of care. Voice recognition was used to generate this report, which may have resulted in some phonetic based errors in grammar and contents. Even though attempts were made to correct all the mistakes, some may have been missed, and remained in the body of the document.           Deanna Souza MD

## 2022-04-12 NOTE — PATIENT INSTRUCTIONS
Body Mass Index: Care Instructions  Your Care Instructions     Body mass index (BMI) can help you see if your weight is raising your risk for health problems. It uses a formula to compare how much you weigh with how tall you are. · A BMI lower than 18.5 is considered underweight. · A BMI between 18.5 and 24.9 is considered healthy. · A BMI between 25 and 29.9 is considered overweight. A BMI of 30 or higher is considered obese. If your BMI is in the normal range, it means that you have a lower risk for weight-related health problems. If your BMI is in the overweight or obese range, you may be at increased risk for weight-related health problems, such as high blood pressure, heart disease, stroke, arthritis or joint pain, and diabetes. If your BMI is in the underweight range, you may be at increased risk for health problems such as fatigue, lower protection (immunity) against illness, muscle loss, bone loss, hair loss, and hormone problems. BMI is just one measure of your risk for weight-related health problems. You may be at higher risk for health problems if you are not active, you eat an unhealthy diet, or you drink too much alcohol or use tobacco products. Follow-up care is a key part of your treatment and safety. Be sure to make and go to all appointments, and call your doctor if you are having problems. It's also a good idea to know your test results and keep a list of the medicines you take. How can you care for yourself at home? · Practice healthy eating habits. This includes eating plenty of fruits, vegetables, whole grains, lean protein, and low-fat dairy. · If your doctor recommends it, get more exercise. Walking is a good choice. Bit by bit, increase the amount you walk every day. Try for at least 30 minutes on most days of the week. · Do not smoke. Smoking can increase your risk for health problems. If you need help quitting, talk to your doctor about stop-smoking programs and medicines. These can increase your chances of quitting for good. · Limit alcohol to 2 drinks a day for men and 1 drink a day for women. Too much alcohol can cause health problems. If you have a BMI higher than 25  · Your doctor may do other tests to check your risk for weight-related health problems. This may include measuring the distance around your waist. A waist measurement of more than 40 inches in men or 35 inches in women can increase the risk of weight-related health problems. · Talk with your doctor about steps you can take to stay healthy or improve your health. You may need to make lifestyle changes to lose weight and stay healthy, such as changing your diet and getting regular exercise. If you have a BMI lower than 18.5  · Your doctor may do other tests to check your risk for health problems. · Talk with your doctor about steps you can take to stay healthy or improve your health. You may need to make lifestyle changes to gain or maintain weight and stay healthy, such as getting more healthy foods in your diet and doing exercises to build muscle. Where can you learn more? Go to http://www.wells.com/  Enter S176 in the search box to learn more about \"Body Mass Index: Care Instructions. \"  Current as of: December 27, 2021               Content Version: 13.2  © 2006-2022 [employer]. Care instructions adapted under license by [employer] (which disclaims liability or warranty for this information). If you have questions about a medical condition or this instruction, always ask your healthcare professional. [place] [address] any warranty or liability for your use of this information.

## 2022-04-19 ENCOUNTER — TRANSCRIBE ORDER (OUTPATIENT)
Dept: SCHEDULING | Age: 82
End: 2022-04-19

## 2022-04-19 DIAGNOSIS — E04.1 NONTOXIC UNINODULAR GOITER: Primary | ICD-10-CM

## 2022-04-20 ENCOUNTER — OFFICE VISIT (OUTPATIENT)
Dept: ORTHOPEDIC SURGERY | Age: 82
End: 2022-04-20
Payer: MEDICARE

## 2022-04-20 VITALS
HEIGHT: 60 IN | SYSTOLIC BLOOD PRESSURE: 125 MMHG | RESPIRATION RATE: 16 BRPM | WEIGHT: 164.4 LBS | BODY MASS INDEX: 32.28 KG/M2 | OXYGEN SATURATION: 98 % | DIASTOLIC BLOOD PRESSURE: 71 MMHG | HEART RATE: 72 BPM

## 2022-04-20 DIAGNOSIS — M79.2 NEURITIS: ICD-10-CM

## 2022-04-20 DIAGNOSIS — M54.16 LUMBAR RADICULOPATHY: ICD-10-CM

## 2022-04-20 DIAGNOSIS — R29.898 RIGHT ARM WEAKNESS: ICD-10-CM

## 2022-04-20 DIAGNOSIS — M25.512 BILATERAL SHOULDER PAIN, UNSPECIFIED CHRONICITY: ICD-10-CM

## 2022-04-20 DIAGNOSIS — M25.511 BILATERAL SHOULDER PAIN, UNSPECIFIED CHRONICITY: ICD-10-CM

## 2022-04-20 DIAGNOSIS — M54.41 CHRONIC BILATERAL LOW BACK PAIN WITH RIGHT-SIDED SCIATICA: ICD-10-CM

## 2022-04-20 DIAGNOSIS — G89.29 CHRONIC BILATERAL LOW BACK PAIN WITH RIGHT-SIDED SCIATICA: ICD-10-CM

## 2022-04-20 DIAGNOSIS — M54.12 CERVICAL RADICULOPATHY: Primary | ICD-10-CM

## 2022-04-20 DIAGNOSIS — M62.838 MUSCLE SPASM: ICD-10-CM

## 2022-04-20 DIAGNOSIS — S43.421A SPRAIN OF RIGHT ROTATOR CUFF CAPSULE, INITIAL ENCOUNTER: ICD-10-CM

## 2022-04-20 PROCEDURE — G8752 SYS BP LESS 140: HCPCS | Performed by: PHYSICAL MEDICINE & REHABILITATION

## 2022-04-20 PROCEDURE — G8754 DIAS BP LESS 90: HCPCS | Performed by: PHYSICAL MEDICINE & REHABILITATION

## 2022-04-20 PROCEDURE — 99214 OFFICE O/P EST MOD 30 MIN: CPT | Performed by: PHYSICAL MEDICINE & REHABILITATION

## 2022-04-20 PROCEDURE — G8427 DOCREV CUR MEDS BY ELIG CLIN: HCPCS | Performed by: PHYSICAL MEDICINE & REHABILITATION

## 2022-04-20 PROCEDURE — G8536 NO DOC ELDER MAL SCRN: HCPCS | Performed by: PHYSICAL MEDICINE & REHABILITATION

## 2022-04-20 PROCEDURE — G8432 DEP SCR NOT DOC, RNG: HCPCS | Performed by: PHYSICAL MEDICINE & REHABILITATION

## 2022-04-20 PROCEDURE — G8399 PT W/DXA RESULTS DOCUMENT: HCPCS | Performed by: PHYSICAL MEDICINE & REHABILITATION

## 2022-04-20 PROCEDURE — 1090F PRES/ABSN URINE INCON ASSESS: CPT | Performed by: PHYSICAL MEDICINE & REHABILITATION

## 2022-04-20 PROCEDURE — G8417 CALC BMI ABV UP PARAM F/U: HCPCS | Performed by: PHYSICAL MEDICINE & REHABILITATION

## 2022-04-20 PROCEDURE — 1101F PT FALLS ASSESS-DOCD LE1/YR: CPT | Performed by: PHYSICAL MEDICINE & REHABILITATION

## 2022-04-20 RX ORDER — AZELASTINE HYDROCHLORIDE AND FLUTICASONE PROPIONATE 137; 50 UG/1; UG/1
SPRAY, METERED NASAL
COMMUNITY
Start: 2022-04-15 | End: 2022-04-20

## 2022-04-20 NOTE — PATIENT INSTRUCTIONS
Low Back Arthritis: Exercises  Introduction  Here are some examples of typical rehabilitation exercises for your condition. Start each exercise slowly. Ease off the exercise if you start to have pain. Your doctor or physical therapist will tell you when you can start these exercises and which ones will work best for you. When you are not being active, find a comfortable position for rest. Some people are comfortable on the floor or a medium-firm bed with a small pillow under their head and another under their knees. Some people prefer to lie on their side with a pillow between their knees. Don't stay in one position for too long. Take short walks (10 to 20 minutes) every 2 to 3 hours. Avoid slopes, hills, and stairs until you feel better. Walk only distances you can manage without pain, especially leg pain. How to do the exercises  Pelvic tilt    1. Lie on your back with your knees bent. 2. \"Brace\" your stomach--tighten your muscles by pulling in and imagining your belly button moving toward your spine. 3. Press your lower back into the floor. You should feel your hips and pelvis rock back. 4. Hold for 6 seconds while breathing smoothly. 5. Relax and allow your pelvis and hips to rock forward. 6. Repeat 8 to 12 times. Back stretches    1. Get down on your hands and knees on the floor. 2. Relax your head and allow it to droop. Round your back up toward the ceiling until you feel a nice stretch in your upper, middle, and lower back. Hold this stretch for as long as it feels comfortable, or about 15 to 30 seconds. 3. Return to the starting position with a flat back while you are on your hands and knees. 4. Let your back sway by pressing your stomach toward the floor. Lift your buttocks toward the ceiling. 5. Hold this position for 15 to 30 seconds. 6. Repeat 2 to 4 times. Follow-up care is a key part of your treatment and safety.  Be sure to make and go to all appointments, and call your doctor if you are having problems. It's also a good idea to know your test results and keep a list of the medicines you take. Where can you learn more? Go to http://www.[employer].com/  Enter T094 in the search box to learn more about \"Low Back Arthritis: Exercises. \"  Current as of: July 1, 2021               Content Version: 13.2  © 2006-2022 [employer]. Care instructions adapted under license by [employer] (which disclaims liability or warranty for this information). If you have questions about a medical condition or this instruction, always ask your healthcare professional. [place] [address] any warranty or liability for your use of this information. Neck Arthritis: Exercises  Introduction  Here are some examples of exercises for you to try. The exercises may be suggested for a condition or for rehabilitation. Start each exercise slowly. Ease off the exercises if you start to have pain. You will be told when to start these exercises and which ones will work best for you. How to do the exercises  Neck stretches to the side    1. This stretch works best if you keep your shoulder down as you lean away from it. To help you remember to do this, start by relaxing your shoulders and lightly holding on to your thighs or your chair. 2. Tilt your head toward your shoulder and hold for 15 to 30 seconds. Let the weight of your head stretch your muscles. 3. Repeat 2 to 4 times toward each shoulder. Chin tuck    1. Lie on the floor with a rolled-up towel under your neck. Your head should be touching the floor. 2. Slowly bring your chin toward your chest.  3. Hold for a count of 6, and then relax for up to 10 seconds. 4. Repeat 8 to 12 times. Active cervical rotation    1. Sit in a firm chair, or stand up straight. 2. Keeping your chin level, turn your head to the right, and hold for 15 to 30 seconds.   3. Turn your head to the left and hold for 15 to 30 seconds. 4. Repeat 2 to 4 times to each side. Shoulder blade squeeze    1. While standing, squeeze your shoulder blades together. 2. Do not raise your shoulders up as you are squeezing. 3. Hold for 6 seconds. 4. Repeat 8 to 12 times. Shoulder rolls    1. Sit comfortably with your feet shoulder-width apart. You can also do this exercise standing up. 2. Roll your shoulders up, then back, and then down in a smooth, circular motion. 3. Repeat 2 to 4 times. Follow-up care is a key part of your treatment and safety. Be sure to make and go to all appointments, and call your doctor if you are having problems. It's also a good idea to know your test results and keep a list of the medicines you take. Where can you learn more? Go to http://www.gray.com/  Enter E139 in the search box to learn more about \"Neck Arthritis: Exercises. \"  Current as of: July 1, 2021               Content Version: 13.2  © 2006-2022 [employer]. Care instructions adapted under license by [employer] (which disclaims liability or warranty for this information). If you have questions about a medical condition or this instruction, always ask your healthcare professional. [address] any warranty or liability for your use of this information. Shoulder Arthritis: Exercises  Introduction  Here are some examples of exercises for you to try. The exercises may be suggested for a condition or for rehabilitation. Start each exercise slowly. Ease off the exercises if you start to have pain. You will be told when to start these exercises and which ones will work best for you. How to do the exercises  Shoulder flexion (lying down)    To make a wand for this exercise, use a piece of PVC pipe or a broom handle with the broom removed. Make the wand about a foot wider than your shoulders. 7. Lie on your back, holding a wand with both hands.  Your palms should face down as you hold the wand. 8. Keeping your elbows straight, slowly raise your arms over your head. Raise them until you feel a stretch in your shoulders, upper back, and chest.  9. Hold for 15 to 30 seconds. 10. Repeat 2 to 4 times. Shoulder rotation (lying down)    To make a wand for this exercise, use a piece of PVC pipe or a broom handle with the broom removed. Make the wand about a foot wider than your shoulders. 7. Lie on your back. Hold a wand with both hands with your elbows bent and palms up. 8. Keep your elbows close to your body, and move the wand across your body toward the sore arm. 9. Hold for 8 to 12 seconds. 10. Repeat 2 to 4 times. Shoulder internal rotation with towel    1. Hold a towel above and behind your head with the arm that is not sore. 2. With your sore arm, reach behind your back and grasp the towel. 3. With the arm above your head, pull the towel upward. Do this until you feel a stretch on the front and outside of your sore shoulder. 4. Hold 15 to 30 seconds. 5. Repeat 2 to 4 times. Shoulder blade squeeze    1. Stand with your arms at your sides, and squeeze your shoulder blades together. Do not raise your shoulders up as you squeeze. 2. Hold 6 seconds. 3. Repeat 8 to 12 times. Resisted rows    For this exercise, you will need elastic exercise material, such as surgical tubing or Thera-Band. 1. Put the band around a solid object at about waist level. (A bedpost will work well.) Each hand should hold an end of the band. 2. With your elbows at your sides and bent to 90 degrees, pull the band back. Your shoulder blades should move toward each other. Return to the starting position. 3. Repeat 8 to 12 times. External rotator strengthening exercise    1. Start by tying a piece of elastic exercise material to a doorknob. You can use surgical tubing or Thera-Band. (You may also hold one end of the band in each hand.)  2.  Stand or sit with your shoulder relaxed and your elbow bent 90 degrees. Your upper arm should rest comfortably against your side. Squeeze a rolled towel between your elbow and your body for comfort. This will help keep your arm at your side. 3. Hold one end of the elastic band with the hand of the painful arm. 4. Start with your forearm across your belly. Slowly rotate the forearm out away from your body. Keep your elbow and upper arm tucked against the towel roll or the side of your body until you begin to feel tightness in your shoulder. Slowly move your arm back to where you started. 5. Repeat 8 to 12 times. Internal rotator strengthening exercise    1. Start by tying a piece of elastic exercise material to a doorknob. You can use surgical tubing or Thera-Band. 2. Stand or sit with your shoulder relaxed and your elbow bent 90 degrees. Your upper arm should rest comfortably against your side. Squeeze a rolled towel between your elbow and your body for comfort. This will help keep your arm at your side. 3. Hold one end of the elastic band in the hand of the painful arm. 4. Slowly rotate your forearm toward your body until it touches your belly. Slowly move it back to where you started. 5. Keep your elbow and upper arm firmly tucked against the towel roll or at your side. 6. Repeat 8 to 12 times. Pendulum swing    If you have pain in your back, do not do this exercise. 1. Hold on to a table or the back of a chair with your good arm. Then bend forward a little and let your sore arm hang straight down. This exercise does not use the arm muscles. Rather, use your legs and your hips to create movement that makes your arm swing freely. 2. Use the movement from your hips and legs to guide the slightly swinging arm back and forth like a pendulum (or elephant trunk). Then guide it in circles that start small (about the size of a dinner plate). Make the circles a bit larger each day, as your pain allows. 3. Do this exercise for 5 minutes, 5 to 7 times each day.   4. As you have less pain, try bending over a little farther to do this exercise. This will increase the amount of movement at your shoulder. Follow-up care is a key part of your treatment and safety. Be sure to make and go to all appointments, and call your doctor if you are having problems. It's also a good idea to know your test results and keep a list of the medicines you take. Where can you learn more? Go to http://keke-josé luis.info/  Enter H562 in the search box to learn more about \"Shoulder Arthritis: Exercises. \"  Current as of: July 1, 2021               Content Version: 13.2  © 2006-2022 [employer]. Care instructions adapted under license by [employer] (which disclaims liability or warranty for this information). If you have questions about a medical condition or this instruction, always ask your healthcare professional. [place] [address] any warranty or liability for your use of this information. Rotator Cuff: Exercises  Introduction  Here are some examples of exercises for you to try. The exercises may be suggested for a condition or for rehabilitation. Start each exercise slowly. Ease off the exercises if you start to have pain. You will be told when to start these exercises and which ones will work best for you. How to do the exercises  Pendulum swing    If you have pain in your back, do not do this exercise. 1. Hold on to a table or the back of a chair with your good arm. Then bend forward a little and let your sore arm hang straight down. This exercise does not use the arm muscles. Rather, use your legs and your hips to create movement that makes your arm swing freely. 2. Use the movement from your hips and legs to guide the slightly swinging arm back and forth like a pendulum (or elephant trunk). Then guide it in circles that start small (about the size of a dinner plate).  Make the circles a bit larger each day, as your pain allows. 3. Do this exercise for 5 minutes, 5 to 7 times each day. 4. As you have less pain, try bending over a little farther to do this exercise. This will increase the amount of movement at your shoulder. Posterior stretching exercise    1. Hold the elbow of your injured arm with your other hand. 2. Use your hand to pull your injured arm gently up and across your body. You will feel a gentle stretch across the back of your injured shoulder. 3. Hold for at least 15 to 30 seconds. Then slowly lower your arm. 4. Repeat 2 to 4 times. Up-the-back stretch    Your doctor or physical therapist may want you to wait to do this stretch until you have regained most of your range of motion and strength. You can do this stretch in different ways. Hold any of these stretches for at least 15 to 30 seconds. Repeat them 2 to 4 times. 1. Light stretch: Put your hand in your back pocket. Let it rest there to stretch your shoulder. 2. Moderate stretch: With your other hand, hold your injured arm (palm outward) behind your back by the wrist. Pull your arm up gently to stretch your shoulder. 3. Advanced stretch: Put a towel over your other shoulder. Put the hand of your injured arm behind your back. Now hold the back end of the towel. With the other hand, hold the front end of the towel in front of your body. Pull gently on the front end of the towel. This will bring your hand farther up your back to stretch your shoulder. Overhead stretch    1. Standing about an arm's length away, grasp onto a solid surface. You could use a countertop, a doorknob, or the back of a sturdy chair. 2. With your knees slightly bent, bend forward with your arms straight. Lower your upper body, and let your shoulders stretch. 3. As your shoulders are able to stretch farther, you may need to take a step or two backward. 4. Hold for at least 15 to 30 seconds. Then stand up and relax.  If you had stepped back during your stretch, step forward so you can keep your hands on the solid surface. 5. Repeat 2 to 4 times. Shoulder flexion (lying down)    To make a wand for this exercise, use a piece of PVC pipe or a broom handle with the broom removed. Make the wand about a foot wider than your shoulders. 1. Lie on your back, holding a wand with both hands. Your palms should face down as you hold the wand. 2. Keeping your elbows straight, slowly raise your arms over your head. Raise them until you feel a stretch in your shoulders, upper back, and chest.  3. Hold for 15 to 30 seconds. 4. Repeat 2 to 4 times. Shoulder rotation (lying down)    To make a wand for this exercise, use a piece of PVC pipe or a broom handle with the broom removed. Make the wand about a foot wider than your shoulders. 1. Lie on your back. Hold a wand with both hands with your elbows bent and palms up. 2. Keep your elbows close to your body, and move the wand across your body toward the sore arm. 3. Hold for 8 to 12 seconds. 4. Repeat 2 to 4 times. Wall climbing (to the side)    Avoid any movement that is straight to your side, and be careful not to arch your back. Your arm should stay about 30 degrees to the front of your side. 1. Stand with your side to a wall so that your fingers can just touch it at an angle about 30 degrees toward the front of your body. 2. Walk the fingers of your injured arm up the wall as high as pain permits. Try not to shrug your shoulder up toward your ear as you move your arm up. 3. Hold that position for a count of at least 15 to 20.  4. Walk your fingers back down to the starting position. 5. Repeat at least 2 to 4 times. Try to reach higher each time. Wall climbing (to the front)    During this stretching exercise, be careful not to arch your back. 1. Face a wall, and stand so your fingers can just touch it. 2. Keeping your shoulder down, walk the fingers of your injured arm up the wall as high as pain permits.  (Don't shrug your shoulder up toward your ear.)  3. Hold your arm in that position for at least 15 to 30 seconds. 4. Slowly walk your fingers back down to where you started. 5. Repeat at least 2 to 4 times. Try to reach higher each time. Shoulder blade squeeze    1. Stand with your arms at your sides, and squeeze your shoulder blades together. Do not raise your shoulders up as you squeeze. 2. Hold 6 seconds. 3. Repeat 8 to 12 times. Scapular exercise: Arm reach    1. Lie flat on your back. This exercise is a very slight motion that starts with your arms raised (elbows straight, arms straight). 2. From this position, reach higher toward the robb or ceiling. Keep your elbows straight. All motion should be from your shoulder blade only. 3. Relax your arms back to where you started. 4. Repeat 8 to 12 times. Arm raise to the side    During this strengthening exercise, your arm should stay about 30 degrees to the front of your side. 1. Slowly raise your injured arm to the side, with your thumb facing up. Raise your arm 60 degrees at the most (shoulder level is 90 degrees). 2. Hold the position for 3 to 5 seconds. Then lower your arm back to your side. If you need to, bring your \"good\" arm across your body and place it under the elbow as you lower your injured arm. Use your good arm to keep your injured arm from dropping down too fast.  3. Repeat 8 to 12 times. 4. When you first start out, don't hold any extra weight in your hand. As you get stronger, you may use a 1-pound to 2-pound dumbbell or a small can of food. Shoulder flexor and extensor exercise    These are isometric exercises. That means you contract your muscles without actually moving. 1. Push forward (flex): Stand facing a wall or doorjamb, about 6 inches or less back. Hold your injured arm against your body. Make a closed fist with your thumb on top. Then gently push your hand forward into the wall with about 25% to 50% of your strength.  Don't let your body move backward as you push. Hold for about 6 seconds. Relax for a few seconds. Repeat 8 to 12 times. 2. Push backward (extend): Stand with your back flat against a wall. Your upper arm should be against the wall, with your elbow bent 90 degrees (your hand straight ahead). Push your elbow gently back against the wall with about 25% to 50% of your strength. Don't let your body move forward as you push. Hold for about 6 seconds. Relax for a few seconds. Repeat 8 to 12 times. Scapular exercise: Wall push-ups    This exercise is best done with your fingers somewhat turned out, rather than straight up and down. 1. Stand facing a wall, about 12 inches to 18 inches away. 2. Place your hands on the wall at shoulder height. 3. Slowly bend your elbows and bring your face to the wall. Keep your back and hips straight. 4. Push back to where you started. 5. Repeat 8 to 12 times. 6. When you can do this exercise against a wall comfortably, you can try it against a counter. You can then slowly progress to the end of a couch, then to a sturdy chair, and finally to the floor. Scapular exercise: Retraction    For this exercise, you will need elastic exercise material, such as surgical tubing or Thera-Band. 1. Put the band around a solid object at about waist level. (A bedpost will work well.) Each hand should hold an end of the band. 2. With your elbows at your sides and bent to 90 degrees, pull the band back. Your shoulder blades should move toward each other. Then move your arms back where you started. 3. Repeat 8 to 12 times. 4. If you have good range of motion in your shoulders, try this exercise with your arms lifted out to the sides. Keep your elbows at a 90-degree angle. Raise the elastic band up to about shoulder level. Pull the band back to move your shoulder blades toward each other. Then move your arms back where you started. Internal rotator strengthening exercise    1.  Start by tying a piece of elastic exercise material to a doorknob. You can use surgical tubing or Thera-Band. 2. Stand or sit with your shoulder relaxed and your elbow bent 90 degrees. Your upper arm should rest comfortably against your side. Squeeze a rolled towel between your elbow and your body for comfort. This will help keep your arm at your side. 3. Hold one end of the elastic band in the hand of the painful arm. 4. Slowly rotate your forearm toward your body until it touches your belly. Slowly move it back to where you started. 5. Keep your elbow and upper arm firmly tucked against the towel roll or at your side. 6. Repeat 8 to 12 times. External rotator strengthening exercise    1. Start by tying a piece of elastic exercise material to a doorknob. You can use surgical tubing or Thera-Band. (You may also hold one end of the band in each hand.)  2. Stand or sit with your shoulder relaxed and your elbow bent 90 degrees. Your upper arm should rest comfortably against your side. Squeeze a rolled towel between your elbow and your body for comfort. This will help keep your arm at your side. 3. Hold one end of the elastic band with the hand of the painful arm. 4. Start with your forearm across your belly. Slowly rotate the forearm out away from your body. Keep your elbow and upper arm tucked against the towel roll or the side of your body until you begin to feel tightness in your shoulder. Slowly move your arm back to where you started. 5. Repeat 8 to 12 times. Follow-up care is a key part of your treatment and safety. Be sure to make and go to all appointments, and call your doctor if you are having problems. It's also a good idea to know your test results and keep a list of the medicines you take. Where can you learn more? Go to http://www.gray.com/  Enter J005 in the search box to learn more about \"Rotator Cuff: Exercises. \"  Current as of: July 1, 2021               Content Version: 13.2  © 1417-6687 [employer]. Care instructions adapted under license by [employer] (which disclaims liability or warranty for this information). If you have questions about a medical condition or this instruction, always ask your healthcare professional. [address] any warranty or liability for your use of this information.

## 2022-04-20 NOTE — PROGRESS NOTES
[facility]  Bradford Perez[address], [facility] [address]  Phone: [phone]  Fax: [phone]    Pt's YOB: 1940    ASSESSMENT   Diagnoses and all orders for this visit:    1. Cervical radiculopathy    2. Bilateral shoulder pain, unspecified chronicity  -     MRI SHOULDER RT WO CONT; Future    3. Sprain of right rotator cuff capsule, initial encounter  -     MRI SHOULDER RT WO CONT; Future    4. Right arm weakness  -     MRI SHOULDER RT WO CONT; Future    5. Muscle spasm  -     MRI SHOULDER RT WO CONT; Future    6. Neuritis    7. Chronic bilateral low back pain with right-sided sciatica    8. Lumbar radiculopathy         IMPRESSION AND PLAN:  Chalino Perez is a [de-identified] y.o. right hand dominant  female with history of cervical, shoulder, and lumbar pain. She complains of continued right shoulder pain, occasional right upper trapezius pain, and limited range of motion with shoulder abduction and forward flexion. Pt is taking Gralise 600 mg 2 caps in the evening (around 5:00p) with benefit. 1) Pt was given information on cervical, shoulder, and lumbar arthritis exercises. 2) Discussed treatment options for shoulder replacement with the patient including arthroscopic surgery. 3) A right shoulder MRI was ordered to assess for labral tear and rotator cuff pathology  4) Pt received refills of Gralise 600 mg-- she takes 2 in the evening. 5) I recommended the pt continue to exercise her shoulder to avoid adhesive capsulitis. 6) Ms. Ashwini Adam has a reminder for a \"due or due soon\" health maintenance. I have asked that she contact her primary care provider, Phyllis Tian MD, for follow-up on this health maintenance. 7)  demonstrated consistency with prescribing. Follow-up and Dispositions    · Return in about 4 weeks (around 5/18/2022) for Medication follow up, Diagnostic Test follow up.        HISTORY OF PRESENT ILLNESS:  Taylor Baer Bernardino Salamanca is a [de-identified] y.o. right hand dominant  female with history of cervical, shoulder, and lumbar pain and presents to the office today for medication follow up. Pt complains of continued right shoulder pain, occasional right upper trapezius pain, and limited range of motion with shoulder abduction and forward flexion, stating (and demonstrating) that she needs to use her left arm to raise her right arm above her head. She states she has undergone physical therapy with temporary benefit and notes that she usually receives temporary benefit with shoulder physical therapy. Pt is taking Gralise 600 mg 2 caps in the evening (around 5:00p) with benefit and without excessive or morning sedation. She also notes that she has not yet taken the Medrol Dosepak prescribed at her last office visit and uses Aspercreme on her shoulder with relief. Pt mentions a prior rotator cuff injury seen on x-rays of her shoulder. She further states that she has followed up with an otorhinolaryngologist regarding her vertigo and prescribed a steroid nasal spray. Pt at this time desires to proceed with a right shoulder MRI.     Of note, pt is a retired nurse, having worked as a nurse since 1972, and volunteers at the [place].    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    Band keratopathy of right eye 2/3/2018    Cataract     Cylindrical bronchiectasis ([address]     Fracture     rt ankle as an adult    History of pneumonia     Hypertension     Ill-defined condition     Spasms to left side    Left arm pain     Lumbar spinal stenosis     Myofascial pain     Osteopenia     Sciatica of right side         Social History     Socioeconomic History    Marital status:      Spouse name: Not on file    Number of children: Not on file    Years of education: Not on file    Highest education level: Not on file   Occupational History    Not on file   Tobacco Use    Smoking status: Former Smoker     Packs/day: 1.00     Years: 10.00     Pack years: 10.00     Types: Cigarettes     Quit date: 1970     Years since quittin.3    Smokeless tobacco: Never Used   Substance and Sexual Activity    Alcohol use: Yes     Alcohol/week: 1.0 standard drink     Types: 1 Glasses of wine per week     Comment: occasional wine    Drug use: No    Sexual activity: Yes     Partners: Male     Birth control/protection: None   Other Topics Concern    Not on file   Social History Narrative    Not on file     Social Determinants of Health     Financial Resource Strain:     Difficulty of Paying Living Expenses: Not on file   Food Insecurity:     Worried About Running Out of Food in the Last Year: Not on file    [place] of Food in the Last Year: Not on file   Transportation Needs:     Lack of Transportation (Medical): Not on file    Lack of Transportation (Non-Medical):  Not on file   Physical Activity:     Days of Exercise per Week: Not on file    Minutes of Exercise per Session: Not on file   Stress:     Feeling of Stress : Not on file   Social Connections:     Frequency of Communication with Friends and Family: Not on file    Frequency of Social Gatherings with Friends and Family: Not on file    Attends Rastafarian Services: Not on file    Active Member of [address] [place] [address] or Organizations: Not on file    Attends Club or Organization Meetings: Not on file    Marital Status: Not on file   Intimate Partner Violence:     Fear of Current or Ex-Partner: Not on file    Emotionally Abused: Not on file    Physically Abused: Not on file    Sexually Abused: Not on file   Housing Stability:     Unable to Pay for Housing in the Last Year: Not on file    Number of [place] in the Last Year: Not on file    Unstable Housing in the Last Year: Not on file       Current Outpatient Medications   Medication Sig Dispense Refill    meclizine (ANTIVERT) 12.5 mg tablet Take 12.5 mg by mouth three (3) times daily as needed for Dizziness.  gabapentin (Gralise) 600 mg Tb24 Take 2 Tablets by mouth nightly. Max Daily Amount: 2 Tablets. 180 Tablet 1    fluticasone propionate (FLONASE) 50 mcg/actuation nasal spray USE 2 SPRAYS IN EACH NOSTRIL DAILY 48 g 3    triamterene-hydroCHLOROthiazide (MAXZIDE) 37.5-25 mg per tablet TAKE ONE-HALF (1/2) TABLET DAILY 45 Tablet 3    fexofenadine (ALLEGRA) 180 mg tablet Take 180 mg by mouth daily.  PROAIR HFA 90 mcg/actuation inhaler USE 2 INHALATIONS EVERY 4 HOURS AS NEEDED 3 Inhaler 3    melatonin 3 mg tablet Take 3 mg by mouth daily as needed (sleep).  Cholecalciferol, Vitamin D3, (VITAMIN D3) 1,000 unit cap Take 3,000 Units by mouth daily.  cycloSPORINE (RESTASIS) 0.05 % ophthalmic emulsion Administer 1 Drop to both eyes two (2) times a day.  MULTIVITAMIN W-MINERALS/LUTEIN (CENTRUM SILVER PO) Take 1 Tablet by mouth daily.  co-enzyme Q-10 (CO Q-10) 100 mg capsule Take 100 mg by mouth daily.  BIOTIN PO Take 5,000 mcg by mouth daily.  Dymista 137-50 mcg/spray       methylPREDNISolone (MEDROL DOSEPACK) 4 mg tablet Per dose pack instructions (Patient not taking: Reported on 4/12/2022) 1 Dose Pack 0    metroNIDAZOLE (FLAGYL) 500 mg tablet Take 1 Tablet by mouth two (2) times a day. (Patient not taking: Reported on 2/9/2022) 14 Tablet 0    estradioL (ESTRACE) 0.01 % (0.1 mg/gram) vaginal cream insert 1 gram vaginally once daily for 14 days (Patient not taking: Reported on 4/20/2022)      diclofenac (Voltaren) 1 % gel Apply 4 grams to affected joint up to 4 times per day, maximum 16 grams per joint per day. Dispense 100 gram tubes 100 g 2    mv-min/vit C/glut/lysine/hb124 (AIRBORNE, LYSINE HCL, PO) Take  by mouth.       EPINEPHrine (EPIPEN) 0.3 mg/0.3 mL injection 0.3 mL by IntraMUSCular route once as needed for up to 1 dose. 1 Syringe 1       Allergies   Allergen Reactions    Naproxen Other (comments)     Blood in urine    Other reaction(s): GI intolerance         REVIEW OF SYSTEMS    Constitutional: Negative for fever, chills, or weight change. Respiratory: Negative for cough or shortness of breath. Cardiovascular: Negative for chest pain or palpitations. Gastrointestinal: Negative for acid reflux, change in bowel habits, or constipation. Genitourinary: Negative for dysuria and flank pain. Musculoskeletal: Positive for right shoulder pain and right shoulder weakness. Skin: Negative for rash. Neurological: Negative for headaches, dizziness, or numbness. Endo/Heme/Allergies: Negative for increased bruising. Psychiatric/Behavioral: Negative for difficulty with sleep. As per HPI    PHYSICAL EXAMINATION  Visit Vitals  /71 (BP 1 Location: Right arm, BP Patient Position: Sitting)   Pulse 72   Resp 16   Ht 5' (1.524 m)   Wt 164 lb 6.4 oz (74.6 kg)   SpO2 98%   BMI 32.11 kg/m²       Constitutional: Awake, alert, and in no acute distress. Neurological: Sensation to light touch is intact. Skin: warm, dry, and intact. Musculoskeletal: Good range of motion in the left shoulder. Abduction to 70 degrees in the right shoulder. Pain with motion on all planes in the right shoulder. Weakness on the right with empty can test and resisted abduction. No pain with extension, axial loading, or forward flexion. No pain with internal or external rotation of her hips. Negative straight leg raise bilaterally. Biceps  Triceps Deltoids Wrist Ext Wrist Flex Hand Intrin   Right  4/5  4/5  4/5 +4/5 +4/5 +4/5   Left +4/5 +4/5 +4/5 +4/5 +4/5 +4/5     IMAGING:  Right shoulder x-rays from 8/4/2021 were personally reviewed with the patient and demonstrated:  FINDINGS:   Bones/joints: Mild osteophyte formation at the acromion and acromioclavicular  joint. Otherwise unremarkable.   Soft tissues: Unremarkable.     IMPRESSION  No significant radiographic abnormalities.     Mild acromial and AC joint degenerative change.     Left shoulder x-rays from 8/4/2021 were personally reviewed with the patient and demonstrated:  FINDINGS:   Bones/joints: Unremarkable. Soft tissues: Unremarkable.     IMPRESSION  No significant radiographic abnormalities    Lumbar spine MRI from 7/19/2021 was personally reviewed with the Pt and demonstrated:  Results from Orders Only encounter on 07/13/21     MRI LUMB SPINE WO CONT     Narrative  EXAM: MRI LUMB SPINE WO CONT     CLINICAL INDICATIONS/HISTORY: increased lumbar pain with right radicular  symptoms despite physical therapy.     COMPARISON: June 2016 MRI     Technique: Multi-sequence multiplanar MR imaging obtained centered on the lumbar  spine.     FINDINGS:     Alignment: Intact lordosis  Vertebral body height: Normal  Marrow signal: Unremarkable  Conus: T12-L1     Axial imaging correlation:     T12-L1: Patent canal and foramina.     L1-2: Patent canal and foramina.     L2-3: Mild disc bulge. Some disc desiccation. Mild facet arthropathy. Patent  canal and foramina.     L3-4: Slightly more prominent disc bulge and facet arthropathy. Low-grade facet  inflammation on the left. Slight bulging posterior epidural fat. Minor spinal  stenosis in AP dimension. Mild to moderate left foraminal stenosis.     L4-5: Mild disc bulge. Bilateral facet arthropathy with trace facet  inflammation. Patent canal and foramina.     L5-S1: Focal central to right paracentral disc protrusion with annular tear. Bilateral facet arthropathy. Patent canal and foramina.     Other structures: Unremarkable.     Impression  1.  Relatively mild multilevel degenerative findings along lumbar spine  -Overall slight progression compared with 2016  -There is no high-grade spinal stenosis  -There is some distortion of the exiting right L3 nerve from disc and facet  pathology at the right foramen which does not look appreciably changed.     Cervical Spine MRI from 11/08/2016 was personally reviewed with the Pt and demonstrated:  Results from [facility] on 11/08/16   MRI CERV SPINE WO CONT    Narrative MRI OF CERVICAL SPINE WITHOUT CONTRAST    CPT CODE: 54926    HISTORY: Chronic neck pain extending into the left shoulder with progressive  paresthesias left hand. COMPARISON: MRI cervical spine 4/10/2014. FINDINGS:     There is normal anatomic alignment of the cervical spine. Vertebral body heights  are maintained. Craniocervical junction and posterior elements are intact. Prevertebral soft tissues are normal. Incidentally imaged intracranial soft  tissues demonstrate no acute abnormalities. Within limitations of motion  artifact, cervical spinal cord maintains normal caliber, signal intensity and  morphology throughout. Cervical soft tissues demonstrate no acute abnormalities. C2/C3: Central canal and neural foramina are patent. C3/C4: Central canal and neural foramina are patent. C4/C5: Central canal and neural foramina are patent. C5/C6: Broad-based disc protrusion. Contact of the ventral cord. CSF dorsal to  the cord is maintained. Probable moderate right greater than left foraminal  stenoses. C6/C7: Mild bulging of the disc. Probable mild bilateral foraminal narrowing. C7/T1: Central canal and neural foramina are patent. T1/T2 through T4/T5: Central canal and foramina are adequate on the sagittal  images.               Impression IMPRESSION:    Allowing for differences in technique, no substantial interval change since  2014. Degenerative changes of the cervical spine are most pronounced at C5/C6 where  there is a disc protrusion which contacts the ventral cord and moderate  bilateral foraminal stenosis.         Abdominal/pelvic CT from 02/04/2022 was personally reviewed with the patient and demonstrated:     CT ABDOMEN FINDINGS: Visualized portions of the lung bases demonstrate mild  fibrotic changes. There are 2 small nodules in the right middle lobe on images 4  and 5 with the largest measuring 5 mm in size. Visualized portions of the heart  and pericardium are normal.     Hepatic steatosis is present. The liver is otherwise normal in appearance. The  gallbladder is normal. The spleen is normal. A small hiatal hernia is present. The pancreas is normal in appearance. Adrenal glands are normal. There are  numerous small cysts involving the right kidney. Kidneys are otherwise normal.  There is no intra-abdominal or retroperitoneal adenopathy. Vascular structures  are normal. There is a small umbilical hernia containing fat without  incarceration.     CT PELVIS FINDINGS: Status post hysterectomy. The bladder is normal. There is no  free fluid. There is no pelvic adenopathy. Postoperative changes are seen  involving the anterior pelvic wall. I see no hernia.     No specific bowel abnormalities are seen.     No significant osseous abnormalities.     IMPRESSION        1. No acute abnormalities. 2. There are couple of small nodules in the right middle lobe unchanged from  3/7/2017 presumably benign. Mild fibrotic changes are also present in the lung  bases. 3. Hepatic steatosis. Small hiatal hernia. 4. Status post hysterectomy. Small umbilical hernia containing fat without  incarceration. Additional chronic changes as described above.     Cervical 2V x-rays from 2/2/2021 was personally reviewed with the Pt and demonstrated:  Some obstruction to visualization due to necklace. Possibly mild disc space narrowing at C5-C6. No acute pathology identified.        Written by Kaitlin Zamora, as dictated by Kalie Reeder MD.  I, Dr. Kalie Reeder confirm that all documentation is accurate.

## 2022-04-27 DIAGNOSIS — M50.30 DDD (DEGENERATIVE DISC DISEASE), CERVICAL: ICD-10-CM

## 2022-04-27 DIAGNOSIS — M54.12 CERVICAL RADICULOPATHY: ICD-10-CM

## 2022-04-27 DIAGNOSIS — M54.16 LUMBAR RADICULOPATHY: ICD-10-CM

## 2022-04-27 DIAGNOSIS — M79.2 NEURITIS: ICD-10-CM

## 2022-04-27 NOTE — TELEPHONE ENCOUNTER
Medication has been pended for provider review. The last one on 04/12/22 did not go through because of transmission fail. A heat ticket was put in to correct this issue and it should now be resolved.

## 2022-04-28 RX ORDER — GABAPENTIN 600 MG/1
2 TABLET, FILM COATED ORAL
Qty: 180 TABLET | Refills: 1 | Status: SHIPPED | OUTPATIENT
Start: 2022-04-28 | End: 2022-09-04 | Stop reason: SDUPTHER

## 2022-05-11 ENCOUNTER — HOSPITAL ENCOUNTER (OUTPATIENT)
Dept: ULTRASOUND IMAGING | Age: 82
Discharge: HOME | End: 2022-05-11
Attending: OTOLARYNGOLOGY
Payer: MEDICARE

## 2022-05-11 ENCOUNTER — HOSPITAL ENCOUNTER (OUTPATIENT)
Age: 82
Discharge: HOME | End: 2022-05-11
Attending: PHYSICAL MEDICINE & REHABILITATION
Payer: MEDICARE

## 2022-05-11 DIAGNOSIS — E04.1 NONTOXIC UNINODULAR GOITER: ICD-10-CM

## 2022-05-11 PROCEDURE — 73221 MRI JOINT UPR EXTREM W/O DYE: CPT

## 2022-05-11 PROCEDURE — 76536 US EXAM OF HEAD AND NECK: CPT

## 2022-05-17 ENCOUNTER — OFFICE VISIT (OUTPATIENT)
Dept: INTERNAL MEDICINE CLINIC | Age: 82
End: 2022-05-17
Payer: MEDICARE

## 2022-05-17 ENCOUNTER — TELEPHONE (OUTPATIENT)
Dept: PHYSICAL THERAPY | Age: 82
End: 2022-05-17

## 2022-05-17 VITALS
TEMPERATURE: 97.9 F | SYSTOLIC BLOOD PRESSURE: 110 MMHG | HEART RATE: 76 BPM | HEIGHT: 60 IN | BODY MASS INDEX: 32.98 KG/M2 | DIASTOLIC BLOOD PRESSURE: 65 MMHG | OXYGEN SATURATION: 96 % | WEIGHT: 168 LBS | RESPIRATION RATE: 16 BRPM

## 2022-05-17 DIAGNOSIS — R42 VERTIGO: ICD-10-CM

## 2022-05-17 DIAGNOSIS — R21 RASH: ICD-10-CM

## 2022-05-17 DIAGNOSIS — R51.9 NONINTRACTABLE HEADACHE, UNSPECIFIED CHRONICITY PATTERN, UNSPECIFIED HEADACHE TYPE: ICD-10-CM

## 2022-05-17 DIAGNOSIS — R42 VERTIGO: Primary | ICD-10-CM

## 2022-05-17 PROCEDURE — G0463 HOSPITAL OUTPT CLINIC VISIT: HCPCS | Performed by: INTERNAL MEDICINE

## 2022-05-17 PROCEDURE — 99214 OFFICE O/P EST MOD 30 MIN: CPT | Performed by: INTERNAL MEDICINE

## 2022-05-17 PROCEDURE — G8427 DOCREV CUR MEDS BY ELIG CLIN: HCPCS | Performed by: INTERNAL MEDICINE

## 2022-05-17 PROCEDURE — G8536 NO DOC ELDER MAL SCRN: HCPCS | Performed by: INTERNAL MEDICINE

## 2022-05-17 PROCEDURE — G8417 CALC BMI ABV UP PARAM F/U: HCPCS | Performed by: INTERNAL MEDICINE

## 2022-05-17 PROCEDURE — G8752 SYS BP LESS 140: HCPCS | Performed by: INTERNAL MEDICINE

## 2022-05-17 PROCEDURE — G8432 DEP SCR NOT DOC, RNG: HCPCS | Performed by: INTERNAL MEDICINE

## 2022-05-17 PROCEDURE — G8754 DIAS BP LESS 90: HCPCS | Performed by: INTERNAL MEDICINE

## 2022-05-17 PROCEDURE — G8399 PT W/DXA RESULTS DOCUMENT: HCPCS | Performed by: INTERNAL MEDICINE

## 2022-05-17 PROCEDURE — 1090F PRES/ABSN URINE INCON ASSESS: CPT | Performed by: INTERNAL MEDICINE

## 2022-05-17 PROCEDURE — 1101F PT FALLS ASSESS-DOCD LE1/YR: CPT | Performed by: INTERNAL MEDICINE

## 2022-05-17 RX ORDER — DIAZEPAM 2 MG/1
1 TABLET ORAL
Qty: 10 TABLET | Refills: 0 | Status: SHIPPED | OUTPATIENT
Start: 2022-05-17

## 2022-05-17 RX ORDER — DIAZEPAM 2 MG/1
2 TABLET ORAL
Qty: 10 TABLET | Refills: 0 | Status: SHIPPED | OUTPATIENT
Start: 2022-05-17 | End: 2022-05-17

## 2022-05-17 NOTE — PROGRESS NOTES
Please fax her results to her ENT team. I discussed this report with her.      Dr. Maximiliano Singleton  [facility], [address] [facility], [address]  Phone: [phone]  Fax: [phone]

## 2022-05-17 NOTE — PROGRESS NOTES
Radha Melara presents today for   Chief Complaint   Patient presents with    Dizziness     Patient reports vertigo episode that caused incontinence of urine and stool x 1 week ago. Patient reports 2 episodes. The incontinence present when patient is vomiting due to dizziness       1. \"Have you been to the ER, urgent care clinic since your last visit? Hospitalized since your last visit? \" no    2. \"Have you seen or consulted any other health care providers outside of the [address] [place] [address] since your last visit? \" yes     3. For patients aged 39-70: Has the patient had a colonoscopy / FIT/ Cologuard? NA - based on age      If the patient is female:    4. For patients aged 41-77: Has the patient had a mammogram within the past 2 years? NA - based on age or sex  See top three    5. For patients aged 21-65: Has the patient had a pap smear?  NA - based on age or sex

## 2022-05-17 NOTE — PROGRESS NOTES
INTERNISTS OF [facility]:  5/23/2022, MRN: [id number]      Sasha Correa is a [de-identified] y.o. female and presents to clinic for Dizziness (Patient reports vertigo episode that caused incontinence of urine and stool x 1 week ago. Patient reports 2 episodes. The incontinence present when patient is vomiting due to dizziness)      Subjective: The patient is a 22-year-old female with history of hypertension, LGSIL 4/19, osteopenia, tubular adenomas colon polyps, obesity, vitamin D deficiency, stable pulmonary nodules, small hiatal hernia, NAFLD, asthma (followed by [facility]), cervical disc disease, and lumbar disc disease (followed by Orthopedics, ).     1. Mixed Incontinence: She is reporting urine and stool incontinence but only with vertigo sx. She had an episode w/i the past yr. Vertigo sx are associated with N/V. She saw her ENT team last wk. Allergy testing was recommended but she declined this testing. She declines allergy shots. She has a history of lumbar disc disease and is followed Dr. Glenna Queen. She is on 1200 mg of gabapentin, that she takes at night. She has had HAs \"like a band around my head. \" She takes tylenol prn HA pain. No double vision or hearing loss. Her vertigo does not respond to meclizine. She used to have migraines. 11/8/16 Cervical Spine MRI: Allowing for differences in technique, no substantial interval change since 2014. Degenerative changes of the cervical spine are most pronounced at C5/C6 where there is a disc protrusion which contacts the ventral cord and moderate bilateral foraminal stenosis. 7/19/21 Lumbar MRI: Relatively mild multilevel degenerative findings along lumbar spine. Overall slight progression compared with 2016. There is no high-grade spinal stenosis. There is some distortion of the exiting right L3 nerve from disc and facet pathology at the right foramen which does not look appreciably changed. 2. Vaginal rash: Reported at her last apt.  Resolved after stopping use of prescription strength estrogen cream.  Her rash was thought be secondary to an allergic reaction to this cream.        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 Max Daily Amount: 2 mg. 10 Tablet 0    gabapentin (Gralise) 600 mg Tb24 Take 2 Tablets by mouth nightly. Max Daily Amount: 2 Tablets. 180 Tablet 1    meclizine (ANTIVERT) 12.5 mg tablet Take 12.5 mg by mouth three (3) times daily as needed for Dizziness.  triamterene-hydroCHLOROthiazide (MAXZIDE) 37.5-25 mg per tablet TAKE ONE-HALF (1/2) TABLET DAILY 45 Tablet 3    mv-min/vit C/glut/lysine/hb124 (AIRBORNE, LYSINE HCL, PO) Take  by mouth.  fexofenadine (ALLEGRA) 180 mg tablet Take 180 mg by mouth daily.  PROAIR HFA 90 mcg/actuation inhaler USE 2 INHALATIONS EVERY 4 HOURS AS NEEDED 3 Inhaler 3    EPINEPHrine (EPIPEN) 0.3 mg/0.3 mL injection 0.3 mL by IntraMUSCular route once as needed for up to 1 dose. 1 Syringe 1    melatonin 3 mg tablet Take 3 mg by mouth daily as needed (sleep).  Cholecalciferol, Vitamin D3, (VITAMIN D3) 1,000 unit cap Take 3,000 Units by mouth daily.       cycloSPORINE (RESTASIS) 0.05 % ophthalmic emulsion Administer 1 Drop to both eyes two (2) times a day.  MULTIVITAMIN W-MINERALS/LUTEIN (CENTRUM SILVER PO) Take 1 Tablet by mouth daily.  co-enzyme Q-10 (CO Q-10) 100 mg capsule Take 100 mg by mouth daily.  BIOTIN PO Take 5,000 mcg by mouth daily.  fluticasone propionate (FLONASE) 50 mcg/actuation nasal spray USE 2 SPRAYS IN EACH NOSTRIL DAILY (Patient not taking: Reported on 5/17/2022) 48 g 3    estradioL (ESTRACE) 0.01 % (0.1 mg/gram) vaginal cream insert 1 gram vaginally once daily for 14 days (Patient not taking: Reported on 4/20/2022)      diclofenac (Voltaren) 1 % gel Apply 4 grams to affected joint up to 4 times per day, maximum 16 grams per joint per day.  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    Band keratopathy of right eye 2/3/2018    Cataract     Cylindrical bronchiectasis (HCC)     Fracture     rt ankle as an adult    History of pneumonia     Hypertension     Ill-defined condition     Spasms to left side    Left arm pain     Lumbar spinal stenosis     Myofascial pain     Osteopenia     Sciatica of right side        Past Surgical History:   Procedure Laterality Date    COLONOSCOPY N/A 11/11/2019    COLONOSCOPY w polypectomies performed by Avery Rodríguez MD at [facility]    HX CATARACT REMOVAL  8/15/2011    HX HEENT      sinus surgery    HX KERATOPLASTY Left 10/02/2017    HX ORTHOPAEDIC      left 4th finger trigger release    HX SHIELA AND BSO  1970s    uterine fibroids       Family History   Problem Relation Age of Onset    Asthma Mother     COPD Mother    Jyl Crome Sclerosis Mother     Heart Disease Mother     Hypertension Mother    Bridget Averydung Stroke Father     Heart Disease Father     Hypertension Father     Allergic Rhinitis Sister     Asthma Brother     Hypertension Brother     Diabetes Brother     Migraines Paternal Grandmother        Social History     Tobacco Use    Smoking status: Former Smoker     Packs/day: 1.00     Years: 10.00     Pack years: 10.00     Types: Cigarettes     Quit date: 1970     Years since quittin.4    Smokeless tobacco: Never Used   Substance Use Topics    Alcohol use: Yes     Alcohol/week: 1.0 standard drink     Types: 1 Glasses of wine per week     Comment: occasional wine       ROS   Review of Systems   Constitutional: Negative for chills and fever. HENT: Negative for ear pain, hearing loss and sore throat. Eyes: Negative for blurred vision and pain. Respiratory: Negative for cough and shortness of breath. Cardiovascular: Negative for chest pain. Gastrointestinal: Negative for abdominal pain, blood in stool and melena. Genitourinary: Negative for dysuria and hematuria. Musculoskeletal: Positive for back pain and neck pain. Negative for myalgias. Sx are off/on   Skin: Negative for rash. Neurological: Positive for dizziness and headaches. Endo/Heme/Allergies: Does not bruise/bleed easily. Psychiatric/Behavioral: Negative for substance abuse. Objective     Vitals:    22 1103   BP: 110/65   Pulse: 76   Resp: 16   Temp: 97.9 °F (36.6 °C)   TempSrc: Temporal   SpO2: 96%   Weight: 168 lb (76.2 kg)   Height: 5' (1.524 m)   PainSc:   0 - No pain       Physical Exam  Vitals and nursing note reviewed. HENT:      Head: Normocephalic and atraumatic. Right Ear: External ear normal.      Left Ear: External ear normal.   Eyes:      General: No scleral icterus. Right eye: No discharge. Left eye: No discharge. Conjunctiva/sclera: Conjunctivae normal.   Cardiovascular:      Rate and Rhythm: Normal rate and regular rhythm.       Heart sounds: Normal heart sounds. No murmur heard. No friction rub. No gallop. Pulmonary:      Effort: Pulmonary effort is normal. No respiratory distress. Breath sounds: Normal breath sounds. No wheezing or rales. Abdominal:      General: Bowel sounds are normal. There is no distension. Palpations: Abdomen is soft. There is no mass. Tenderness: There is no abdominal tenderness. There is no guarding or rebound. Musculoskeletal:         General: No swelling (BUE) or tenderness (BUE). Cervical back: Neck supple. Lymphadenopathy:      Cervical: No cervical adenopathy. Skin:     General: Skin is warm and dry. Findings: No erythema or rash. Neurological:      Mental Status: She is alert. Motor: No abnormal muscle tone.       Gait: Gait normal.   Psychiatric:         Mood and Affect: Mood normal.         LABS   Data Review:   Lab Results   Component Value Date/Time    WBC 5.0 04/05/2022 08:57 AM    HGB 14.0 04/05/2022 08:57 AM    HCT 43.7 04/05/2022 08:57 AM    PLATELET 357 26/06/0355 08:57 AM    MCV 90.7 04/05/2022 08:57 AM       Lab Results   Component Value Date/Time    Sodium 140 04/05/2022 08:57 AM    Potassium 4.0 04/05/2022 08:57 AM    Chloride 105 04/05/2022 08:57 AM    CO2 32 04/05/2022 08:57 AM    Anion gap 3 04/05/2022 08:57 AM    Glucose 89 04/05/2022 08:57 AM    BUN 11 04/05/2022 08:57 AM    Creatinine 0.72 04/05/2022 08:57 AM    BUN/Creatinine ratio 15 04/05/2022 08:57 AM    GFR est AA >60 04/05/2022 08:57 AM    GFR est non-AA >60 04/05/2022 08:57 AM    Calcium 8.9 04/05/2022 08:57 AM       Lab Results   Component Value Date/Time    Cholesterol, total 169 04/27/2021 06:24 AM    HDL Cholesterol 55 04/27/2021 06:24 AM    LDL, calculated 93 04/27/2021 06:24 AM    VLDL, calculated 21 04/27/2021 06:24 AM    Triglyceride 105 04/27/2021 06:24 AM    CHOL/HDL Ratio 3.1 04/27/2021 06:24 AM       Lab Results   Component Value Date/Time    Hemoglobin A1c 5.5 10/04/2021 08:30 AM       Assessment/Plan: Vertigo and HAs  - Referral placed to Neurology  - Ordering an MRI of the IAC  - Referral to PT for vertigo sx  - Ordering valium for vertigo sx refractory to meclizine. ORDERS:  - REFERRAL TO NEUROLOGY  - MRI IAC W WO CONT; Future  - REFERRAL TO PHYSICAL THERAPY  - diazePAM (Valium) 2 mg tablet; Take 0.5 Tablets by mouth every twelve (12) hours as needed (vertigo). Max Daily Amount: 2 mg. Dispense: 10 Tablet; Refill: 0    2. Vaginal Rash: Resolved. Observation. Continue avoidance of prescription strength estrogen cream.      There are no preventive care reminders to display for this patient. Lab review: labs are reviewed in the EHR    I have discussed the diagnosis with the patient and the intended plan as seen in the above orders. The patient has received an after-visit summary and questions were answered concerning future plans. I have discussed medication side effects and warnings with the patient as well. I have reviewed the plan of care with the patient, accepted their input and they are in agreement with the treatment goals. All questions were answered. The patient understands the plan of care. Handouts provided today with above information. Pt instructed if symptoms worsen to call the office or report to the ED for continued care. Greater than 50% of the visit time was spent in counseling and/or coordination of care. Voice recognition was used to generate this report, which may have resulted in some phonetic based errors in grammar and contents. Even though attempts were made to correct all the mistakes, some may have been missed, and remained in the body of the document.           Poli Crenshaw MD

## 2022-05-23 ENCOUNTER — HOSPITAL ENCOUNTER (OUTPATIENT)
Dept: PHYSICAL THERAPY | Age: 82
Discharge: HOME | End: 2022-05-23
Attending: INTERNAL MEDICINE
Payer: MEDICARE

## 2022-05-23 PROCEDURE — 97162 PT EVAL MOD COMPLEX 30 MIN: CPT

## 2022-05-23 PROCEDURE — 97110 THERAPEUTIC EXERCISES: CPT

## 2022-05-23 NOTE — PROGRESS NOTES
PT DAILY TREATMENT NOTE 10-18    Patient Name: Veronika Ordaz  Date:2022  : 1940  [x]  Patient  Verified  Payor: [place] MEDICARE / Plan: [place] MEDICARE PART A & B / Product Type: Medicare /    In time:1115  Out time:1200  Total Treatment Time (min): 45  Visit #: 1 of 4    Medicare/BCBS Only   Total Timed Codes (min):  10 1:1 Treatment Time:  45       Treatment Area: Vertigo [R42]    SUBJECTIVE  Pain Level (0-10 scale): 0  Any medication changes, allergies to medications, adverse drug reactions, diagnosis change, or new procedure performed?: [x] No    [] Yes (see summary sheet for update)  Subjective functional status/changes:   [] No changes reported  See eval    OBJECTIVE      35 min [x]Eval                  []Re-Eval       10 min Therapeutic Exercise:  [] See flow sheet :   Rationale: improve coordination, improve balance and increase proprioception to improve the patients ability to habituate to dizziness     With   [] TE   [] TA   [] neuro   [] other: Patient Education: [x] Review HEP    [] Progressed/Changed HEP based on:   [] positioning   [] body mechanics   [] transfers   [] heat/ice application    [] other:      Other Objective/Functional Measures:      Pain Level (0-10 scale) post treatment: 0    ASSESSMENT/Changes in Function: see POC    Patient will continue to benefit from skilled PT services to modify and progress therapeutic interventions, analyze and cue movement patterns and address imbalance/dizziness to attain remaining goals. [x]  See Plan of Care  []  See progress note/recertification  []  See Discharge Summary         Progress towards goals / Updated goals:  Short Term Goals: To be accomplished in 1 weeks:  1. I and compliant with HEP for self management of symptoms. IE: issued HEP  Long Term Goals: To be accomplished in 4 weeks:  1. Improve FOTO to 56 to indicate improved function with daily activities. IE: 46  2.  Patient will report >50% improvement with dizziness to increase ease with positional changes. IE: 0%  3. Patient will perform dynamic balance without c/o dizziness to increase ease with daily activities.   IE:not tested  PLAN  []  Upgrade activities as tolerated     [x]  Continue plan of care  []  Update interventions per flow sheet       []  Discharge due to:_  []  Other:_      Ramon Watts, MPT, CMTPT 5/23/2022  2:08 PM    Future Appointments   Date Time Provider Kin Malaika   6/2/2022 11:15 AM Radha OCAMPO, PT [facility]   6/3/2022  8:30 AM HBV MRI RM 1 [facility]   6/10/2022  9:00 AM Juliann Elizalde, PT [facility]   6/14/2022  8:15 AM Rina Osborn MD [facility]   6/16/2022  7:00 AM Karen Pisano, PT [facility]   7/12/2022  8:20 AM Lin Manriquez MD [facility] AMB

## 2022-05-23 NOTE — PROGRESS NOTES
[facility]  [address]  [place], [address]  [phone] fax    Plan of Care/ Statement of Necessity for Physical Therapy Services    Patient name: Ulysses Feather Start of Care: 2022   Referral source: Erickson Geller MD : 1940    Medical Diagnosis: Vertigo [R42]  Payor: Ximena Berry / Plan: [place] MEDICARE PART A & B / Product Type: Medicare /  Onset Date:2022    Treatment Diagnosis: Dizziness with impaired gait/balance   Prior Hospitalization: see medical history Provider#: [id number]   Medications: Verified on Patient summary List    Comorbidities: OA; HTN; Asthma    Prior Level of Function: No c/o dizziness with positional changes      The Plan of Care and following information is based on the information from the initial evaluation. Assessment/ key information: [de-identified]y.o. year old female presents with CC of intermittent dizziness that began in 2022. Patient reports worst episode occurred 2 weeks ago where patient experienced room spinning dizziness when standing in the kitchen, which subsequently caused patient to vomit. Patient reports nothing stops the dizziness other than patient lying down and closing her eyes. Evaluation today revealed the following: -Hallpike-Baker B, but experienced dizziness when sitting up from a supine position. Other limitations include: +gaze holding nystagmus, +smooth pursuit, and saccades. Patient will benefit from physical therapy to address deficits, and ultimately to return patient to prior level of function. Evaluation Complexity History MEDIUM  Complexity : 1-2 comorbidities / personal factors will impact the outcome/ POC ; Examination MEDIUM Complexity : 3 Standardized tests and measures addressing body structure, function, activity limitation and / or participation in recreation  ;Presentation MEDIUM Complexity : Evolving with changing characteristics  ; Clinical Decision Making MEDIUM Complexity : FOTO score of 26-74  Overall Complexity Rating: MEDIUM  Problem List: impaired gait/ balance   Treatment Plan may include any combination of the following: Therapeutic exercise, Neuromuscular re-education, Gait/balance training, Manual therapy and Patient education  Patient / Family readiness to learn indicated by: asking questions, trying to perform skills and interest  Persons(s) to be included in education: patient (P)  Barriers to Learning/Limitations: None  Patient Goal (s): to stop the dizziness  Patient Self Reported Health Status: fair  Rehabilitation Potential: fair    Short Term Goals: To be accomplished in 1 weeks:  1. I and compliant with HEP for self management of symptoms. Long Term Goals: To be accomplished in 4 weeks:  1. Improve FOTO to 56 to indicate improved function with daily activities. 2. Patient will report >50% improvement with dizziness to increase ease with positional changes. 3. Patient will perform dynamic balance without c/o dizziness to increase ease with daily activities. Frequency / Duration: Patient to be seen 1 times per week for 4 weeks. Patient/ Caregiver education and instruction: Diagnosis, prognosis, exercises   [x]  Plan of care has been reviewed with PTA    Certification Period: 5/23/22-6/21/22  Oleh Ahumada, PT 5/23/2022 1:54 PM    ________________________________________________________________________    I certify that the above Therapy Services are being furnished while the patient is under my care. I agree with the treatment plan and certify that this therapy is necessary.     [de-identified] Signature:____________________  Date:____________Time: _________     Faiza Wong MD  Please sign and return to [facility] [address]  [place], [address]  [phone] fax

## 2022-05-26 ENCOUNTER — PATIENT MESSAGE (OUTPATIENT)
Dept: NEUROLOGY | Age: 82
End: 2022-05-26

## 2022-06-02 ENCOUNTER — HOSPITAL ENCOUNTER (OUTPATIENT)
Dept: PHYSICAL THERAPY | Age: 82
Discharge: HOME | End: 2022-06-02
Attending: INTERNAL MEDICINE
Payer: MEDICARE

## 2022-06-02 PROCEDURE — 97116 GAIT TRAINING THERAPY: CPT

## 2022-06-02 PROCEDURE — 97110 THERAPEUTIC EXERCISES: CPT

## 2022-06-02 PROCEDURE — 97112 NEUROMUSCULAR REEDUCATION: CPT

## 2022-06-02 NOTE — PROGRESS NOTES
PT DAILY TREATMENT NOTE 10-18    Patient Name: Marta Summers  Date:2022  : 1940  [x]  Patient  Verified  Payor: [place] MEDICARE / Plan: [place] MEDICARE PART A & B / Product Type: Medicare /    In time:1115  Out time:1158  Total Treatment Time (min): 43  Visit #: 2 of 4    Medicare/BCBS Only   Total Timed Codes (min):  43 1:1 Treatment Time:  43       Treatment Area: Vertigo [R42]    SUBJECTIVE  Pain Level (0-10 scale): 5 dizzy  Any medication changes, allergies to medications, adverse drug reactions, diagnosis change, or new procedure performed?: [x] No    [] Yes (see summary sheet for update)  Subjective functional status/changes:   [] No changes reported  Those exercises     OBJECTIVE    27 min Neuromuscular Re-education:  []  See flow sheet :   Rationale: improve coordination, improve balance and increase proprioception  to improve the patients ability to decrease dizziness     8 min Manual Therapy:  Hallpike-Houston B; Roll test B   The manual therapy interventions were performed at a separate and distinct time from the therapeutic activities interventions. To assess canals  8 min Gait Training:  Dynamic balance with head turns in all directions   Rationale: to improve community ambulation    With   [] TE   [] TA   [] neuro   [] other: Patient Education: [x] Review HEP    [] Progressed/Changed HEP based on:   [] positioning   [] body mechanics   [] transfers   [] heat/ice application    [] other:      Other Objective/Functional Measures: (-) Hallpike Houston and Roll test B     Pain Level (0-10 scale) post treatment: 5 dizzy    ASSESSMENT/Changes in Function: Patient lost balance several times when walking with horizontal head turns and diagonal head patterns; able to regain balance (I). Has imaging on brain tomorrow; continue with adaptation exercises.      Patient will continue to benefit from skilled PT services to analyze and cue movement patterns and address imbalance/dizziness to attain remaining goals.     []  See Plan of Care  []  See progress note/recertification  []  See Discharge Summary         Progress towards goals / Updated goals:  Short Term Goals: To be accomplished in 1 weeks:  1. I and compliant with HEP for self management of symptoms.   IE: issued HEP  Current: goal met 6/2/22  Long Term Goals: To be accomplished in 4 weeks:  1. Improve FOTO LW 75 LL indicate improved function with daily activities. IE: 46  2. Patient will report >50% improvement with dizziness to increase ease with positional changes. IE: 0%  3. Patient will perform dynamic balance without c/o dizziness to increase ease with daily activities.   IE:not tested    PLAN  [x]  Upgrade activities as tolerated     []  Continue plan of care  []  Update interventions per flow sheet       []  Discharge due to:_  []  Other:_      Isatu Pi, MPT, CMTPT 6/2/2022  9:17 AM    Future Appointments   Date Time Provider Kin Malaika   6/2/2022 11:15 AM Guzman OCAMPO, PT [facility]   6/3/2022  8:30 AM HBV MRI RM 1 [facility]   6/10/2022  9:00 AM Charissa Lafleur, PT [facility]   6/14/2022  8:15 AM Luis Armando Walker MD [facility] AMB   6/16/2022  7:00 AM Lucero Rollins, PT [facility]   7/12/2022  8:20 AM Rohan Walker MD [facility] BS AMB

## 2022-06-03 ENCOUNTER — HOSPITAL ENCOUNTER (OUTPATIENT)
Age: 82
Discharge: HOME | End: 2022-06-03
Attending: INTERNAL MEDICINE
Payer: MEDICARE

## 2022-06-03 LAB — CREAT UR-MCNC: 0.6 MG/DL (ref 0.6–1.3)

## 2022-06-03 PROCEDURE — 82565 ASSAY OF CREATININE: CPT

## 2022-06-03 PROCEDURE — 74011250636 HC RX REV CODE- 250/636: Performed by: INTERNAL MEDICINE

## 2022-06-03 PROCEDURE — A9575 INJ GADOTERATE MEGLUMI 0.1ML: HCPCS | Performed by: INTERNAL MEDICINE

## 2022-06-03 PROCEDURE — 70553 MRI BRAIN STEM W/O & W/DYE: CPT

## 2022-06-03 RX ADMIN — GADOTERATE MEGLUMINE 15 ML: 376.9 INJECTION INTRAVENOUS at 09:03

## 2022-06-07 NOTE — PROGRESS NOTES
Please let her know that her MRI does not show any abnormalities along her interval auditory canal in her ears. She has nonspecific findings on her brain cough likely secondary to her history of longstanding hypertension. No additional studies are warranted for these findings. She should follow-up with her ENT doctor.      Dr. Arelia Litten  Internists of [address] [place] [address], [address] [place] [address], [facility] [address]  Phone: [phone]  Fax: [phone]

## 2022-06-08 NOTE — PROGRESS NOTES
Patient contacted, patient identified with two identifiers (Name & ). Patient aware of results per DR. Argueta and verbalizes understanding.

## 2022-06-09 ENCOUNTER — TELEPHONE (OUTPATIENT)
Dept: PHYSICAL THERAPY | Age: 82
End: 2022-06-09

## 2022-06-10 ENCOUNTER — HOSPITAL ENCOUNTER (OUTPATIENT)
Dept: PHYSICAL THERAPY | Age: 82
End: 2022-06-10
Attending: INTERNAL MEDICINE
Payer: MEDICARE

## 2022-06-14 ENCOUNTER — OFFICE VISIT (OUTPATIENT)
Dept: ORTHOPEDIC SURGERY | Age: 82
End: 2022-06-14
Payer: MEDICARE

## 2022-06-14 VITALS
DIASTOLIC BLOOD PRESSURE: 80 MMHG | WEIGHT: 169 LBS | TEMPERATURE: 97.3 F | HEART RATE: 74 BPM | SYSTOLIC BLOOD PRESSURE: 122 MMHG | HEIGHT: 60 IN | OXYGEN SATURATION: 96 % | BODY MASS INDEX: 33.18 KG/M2

## 2022-06-14 DIAGNOSIS — M25.512 BILATERAL SHOULDER PAIN, UNSPECIFIED CHRONICITY: ICD-10-CM

## 2022-06-14 DIAGNOSIS — M25.511 BILATERAL SHOULDER PAIN, UNSPECIFIED CHRONICITY: ICD-10-CM

## 2022-06-14 DIAGNOSIS — M47.816 LUMBAR FACET ARTHROPATHY: ICD-10-CM

## 2022-06-14 DIAGNOSIS — M62.838 MUSCLE SPASM: ICD-10-CM

## 2022-06-14 DIAGNOSIS — Z87.19 HISTORY OF GI BLEED: ICD-10-CM

## 2022-06-14 DIAGNOSIS — M54.16 LUMBAR RADICULOPATHY: ICD-10-CM

## 2022-06-14 DIAGNOSIS — M75.111 NONTRAUMATIC INCOMPLETE TEAR OF RIGHT ROTATOR CUFF: Primary | ICD-10-CM

## 2022-06-14 PROCEDURE — 1123F ACP DISCUSS/DSCN MKR DOCD: CPT | Performed by: PHYSICAL MEDICINE & REHABILITATION

## 2022-06-14 PROCEDURE — 1090F PRES/ABSN URINE INCON ASSESS: CPT | Performed by: PHYSICAL MEDICINE & REHABILITATION

## 2022-06-14 PROCEDURE — G8432 DEP SCR NOT DOC, RNG: HCPCS | Performed by: PHYSICAL MEDICINE & REHABILITATION

## 2022-06-14 PROCEDURE — G8417 CALC BMI ABV UP PARAM F/U: HCPCS | Performed by: PHYSICAL MEDICINE & REHABILITATION

## 2022-06-14 PROCEDURE — G8752 SYS BP LESS 140: HCPCS | Performed by: PHYSICAL MEDICINE & REHABILITATION

## 2022-06-14 PROCEDURE — G8536 NO DOC ELDER MAL SCRN: HCPCS | Performed by: PHYSICAL MEDICINE & REHABILITATION

## 2022-06-14 PROCEDURE — G8754 DIAS BP LESS 90: HCPCS | Performed by: PHYSICAL MEDICINE & REHABILITATION

## 2022-06-14 PROCEDURE — 99214 OFFICE O/P EST MOD 30 MIN: CPT | Performed by: PHYSICAL MEDICINE & REHABILITATION

## 2022-06-14 PROCEDURE — 1101F PT FALLS ASSESS-DOCD LE1/YR: CPT | Performed by: PHYSICAL MEDICINE & REHABILITATION

## 2022-06-14 PROCEDURE — G8399 PT W/DXA RESULTS DOCUMENT: HCPCS | Performed by: PHYSICAL MEDICINE & REHABILITATION

## 2022-06-14 PROCEDURE — G8427 DOCREV CUR MEDS BY ELIG CLIN: HCPCS | Performed by: PHYSICAL MEDICINE & REHABILITATION

## 2022-06-14 NOTE — PROGRESS NOTES
Azar Cope presents today for   Chief Complaint   Patient presents with    Shoulder Pain     right    Back Pain       Is someone accompanying this pt? no    Is the patient using any DME equipment during OV? no    Depression Screening:  3 most recent PHQ Screens 4/12/2022   PHQ Not Done -   Little interest or pleasure in doing things Not at all   Feeling down, depressed, irritable, or hopeless Not at all   Total Score PHQ 2 0       Learning Assessment:  Learning Assessment 10/4/2019   PRIMARY LEARNER Patient   HIGHEST LEVEL OF EDUCATION - PRIMARY LEARNER  4 YEARS OF COLLEGE   BARRIERS PRIMARY LEARNER NONE   CO-LEARNER CAREGIVER No   PRIMARY LANGUAGE ENGLISH   LEARNER PREFERENCE PRIMARY DEMONSTRATION   ANSWERED BY patient   RELATIONSHIP SELF       Abuse Screening:  Abuse Screening Questionnaire 5/17/2022   Do you ever feel afraid of your partner? N   Are you in a relationship with someone who physically or mentally threatens you? N   Is it safe for you to go home? Y       Fall Risk  Fall Risk Assessment, last 12 mths 4/20/2022   Able to walk? Yes   Fall in past 12 months? 0   Do you feel unsteady? -   Are you worried about falling -       Coordination of Care:  1. Have you been to the ER, urgent care clinic since your last visit? no  Hospitalized since your last visit? no    2. Have you seen or consulted any other health care providers outside of the [address] [place] [address] since your last visit? Yes, pcp and ENT Include any pap smears or colon screening.  no

## 2022-06-14 NOTE — PROGRESS NOTES
[facility]  [address] [place], [address]  Phone: [phone]  Fax: [phone]    Pt's YOB: 1940    ASSESSMENT   Diagnoses and all orders for this visit:    1. 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 Bilateral shoulder pain, unspecified chronicity  -     REFERRAL TO ORTHOPEDICS    3. Lumbar radiculopathy    4. Muscle spasm    5. Lumbar facet arthropathy    6. History of GI bleed         IMPRESSION AND PLAN:  Pt is [de-identified] yo female with hx of shoulder pain and rotator cuff weakness and has had some improvement with therapy but still having pain; she is limited with medication and agrees to continue with PT and follow up with ortho. 1) Pt was given information on rotator cuff exercises and information. 2) Pt will continue with PT  3) Will refer pt to ortho for further evaluation of her rotator cuff  4) Ms. Alexsandra Vasquez has a reminder for a \"due or due soon\" health maintenance. I have asked that she contact her primary care provider, Francisco Wang MD, for follow-up on this health maintenance. 5)  demonstrated consistency with prescribing. 6) Pt will continue with Gralise for neuropathic symptoms and does not need a refill at this time  Follow-up and Dispositions    · Return in about 6 months (around 12/14/2022) for Medication follow up. HISTORY OF PRESENT ILLNESS:  Azar Cope is a [de-identified] y.o.  female with history of shoulder pain and presents to the office today for MRI and PT follow up. Pt has had therapy with some temporary improvement and still has limited ROM and difficulty fully using her arm. It is difficult to sleep and she is not a candidate for NSAIDs due to gi issues. She continues with Gralise for neuropathic symptoms and this prescription works well as she significant less sedation with it.   She has had previous injury to rotator cuff but has not had any recent surgery. She has tried topical anti inflammatory medication with minimal improvement. MRI was reviewed and discussed extensively. Review of the records demonstrate a creatinine of 0.6 and eGFR > 60. 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    Band keratopathy of right eye 2/3/2018    Cataract     Cylindrical bronchiectasis ([address]     Fracture     rt ankle as an adult    History of pneumonia     Hypertension     Ill-defined condition     Spasms to left side    Left arm pain     Lumbar spinal stenosis     Myofascial pain     Osteopenia     Sciatica of right side         Social History     Socioeconomic History    Marital status:      Spouse name: Not on file    Number of children: Not on file    Years of education: Not on file    Highest education level: Not on file   Occupational History    Not on file   Tobacco Use    Smoking status: Former Smoker     Packs/day: 1.00     Years: 10.00     Pack years: 10.00     Types: Cigarettes     Quit date: 1970     Years since quittin.5    Smokeless tobacco: Never Used   Substance and Sexual Activity    Alcohol use:  Yes     Alcohol/week: 1.0 standard drink     Types: 1 Glasses of wine per week     Comment: occasional wine    Drug use: No    Sexual activity: Yes     Partners: Male     Birth control/protection: None   Other Topics Concern    Not on file   Social History Narrative    Not on file     Social Determinants of Health     Financial Resource Strain:     Difficulty of Paying Living Expenses: Not on file   Food Insecurity:     Worried About Running Out of Food in the Last Year: Not on file    [place] of Food in the Last Year: Not on file   Transportation Needs:     Lack of Transportation (Medical): Not on file    Lack of Transportation (Non-Medical): Not on file   Physical Activity:     Days of Exercise per Week: Not on file    Minutes of Exercise per Session: Not on file   Stress:     Feeling of Stress : Not on file   Social Connections:     Frequency of Communication with Friends and Family: Not on file    Frequency of Social Gatherings with Friends and Family: Not on file    Attends [employer] Services: Not on file    Active Member of [employer] or Organizations: Not on file    Attends Club or Organization Meetings: Not on file    Marital Status: Not on file   Intimate Partner Violence:     Fear of Current or Ex-Partner: Not on file    Emotionally Abused: Not on file    Physically Abused: Not on file    Sexually Abused: Not on file   Housing Stability:     Unable to Pay for Housing in the Last Year: Not on file    Number of [place] in the Last Year: Not on file    Unstable Housing in the Last Year: Not on file       Current Outpatient Medications   Medication Sig Dispense Refill    diazePAM (Valium) 2 mg tablet Take 0.5 Tablets by mouth every twelve (12) hours as needed (vertigo). Max Daily Amount: 2 mg. 10 Tablet 0    gabapentin (Gralise) 600 mg Tb24 Take 2 Tablets by mouth nightly. Max Daily Amount: 2 Tablets. 180 Tablet 1    meclizine (ANTIVERT) 12.5 mg tablet Take 12.5 mg by mouth three (3) times daily as needed for Dizziness.  fluticasone propionate (FLONASE) 50 mcg/actuation nasal spray USE 2 SPRAYS IN EACH NOSTRIL DAILY 48 g 3    triamterene-hydroCHLOROthiazide (MAXZIDE) 37.5-25 mg per tablet TAKE ONE-HALF (1/2) TABLET DAILY 45 Tablet 3    mv-min/vit C/glut/lysine/hb124 (A[place]NE, LYSINE HCL, PO) Take 1 Tablet by mouth daily as needed.  fexofenadine (ALLEGRA) 180 mg tablet Take 180 mg by mouth daily.       PROAIR HFA 90 mcg/actuation inhaler USE 2 INHALATIONS EVERY 4 HOURS AS NEEDED 3 Inhaler 3    EPINEPHrine (EPIPEN) 0.3 mg/0.3 mL injection 0.3 mL by IntraMUSCular route once as needed for up to 1 dose. 1 Syringe 1    melatonin 3 mg tablet Take 3 mg by mouth daily as needed (sleep).  Cholecalciferol, Vitamin D3, (VITAMIN D3) 1,000 unit cap Take 3,000 Units by mouth daily.  cycloSPORINE (RESTASIS) 0.05 % ophthalmic emulsion Administer 1 Drop to both eyes two (2) times a day.  MULTIVITAMIN W-MINERALS/LUTEIN (CENTRUM SILVER PO) Take 1 Tablet by mouth daily.  co-enzyme Q-10 (CO Q-10) 100 mg capsule Take 100 mg by mouth daily.  BIOTIN PO Take 5,000 mcg by mouth daily. Allergies   Allergen Reactions    Naproxen Other (comments)     Blood in urine    Other reaction(s): GI intolerance         REVIEW OF SYSTEMS    Constitutional: Negative for fever, chills, or weight change. Respiratory: Negative for cough or shortness of breath. Cardiovascular: Negative for chest pain or palpitations. Gastrointestinal: Negative for acid reflux, change in bowel habits, or constipation. Genitourinary: Negative for dysuria and flank pain. Musculoskeletal: Positive for shoulder and lumbar pain. Skin: Negative for rash. Neurological: Negative for headaches, dizziness, or numbness. Endo/Heme/Allergies: Negative for increased bruising. Psychiatric/Behavioral: Positive for difficulty with sleep. As per HPI  e   PHYSICAL EXAMINATION  Visit Vitals  /80 (BP 1 Location: Right upper arm, BP Patient Position: Sitting, BP Cuff Size: Adult)   Pulse 74   Temp 97.3 °F (36.3 °C) (Skin)   Ht 5' (1.524 m)   Wt 169 lb (76.7 kg)   SpO2 96% Comment: RA   BMI 33.01 kg/m²       Constitutional: Awake, alert, and in no acute distress. Neurological: 1+ symmetrical DTRs in the upper extremities. 1+ symmetrical DTRs in the lower extremities. Sensation to light touch is intact. Negative Perry's sign bilaterally. Skin: warm, dry, and intact.    Musculoskeletal:  Limited ROM right shoulder ; + empty can test and tightness across the trapezius; Minimal pain with extension, axial loading, or forward flexion. No pain with internal or external rotation of her hips. Negative straight leg raise bilaterally. Biceps  Triceps Deltoids Wrist Ext Wrist Flex Hand Intrin   Right  4/5  4/5  4/5 +4/5 +4/5 +4/5   Left +4/5 +4/5 +4/5 +4/5 +4/5 +4/5       Hip Flex  Quads Hamstrings Ankle DF EHL Ankle PF   Right +4/5 +4/5 +4/5 +4/5 +4/5 +4/5   Left +4/5 +4/5 +4/5 +4/5 +4/5 +4/5     IMAGING:      Status: Final result (Exam End: 5/11/2022 10:46) Provider Status: Open       MRI SHOULDER RT WO CONT: Patient Communication    Add Comments  Seen       Study Result    Narrative & Impression   EXAM: MRI of the Right Shoulder without contrast.      INDICATION: Decreased range of motion and shoulder pain.     TECHNIQUE: Multiplanar Multisequence MRI of the right shoulder obtained without  contrast.      COMPARISON: Plain film dated 8/4/2021     FINDINGS:   Subacromial/Subdeltoid Bursa: Fluid is present within the subacromial subdeltoid  bursa.     Supraspinatus/Infraspinatus: There is severe thinning of the supraspinatus in  the midportion. This is a 1.6 x 2 cm area. This is on the articular sided of the  supraspinatus. There is a likely punctate full-thickness defect in the  supraspinatus is present 1.4 cm proximal to the attachment is partially 2 mm  wide. Mild atrophy is noted. Small articular sided partial tear of the  infraspinatus is noted which is 0.6 miles wide. No full-thickness tear of the  infraspinatus appreciated. No atrophy identified.     Subscapularis: Unremarkable.     Teres Minor: Unremarkable.      Long Head of the Biceps: Unremarkable.      Glenoid articular surface/Labrum: Multifocal thinning of the glenohumeral joint  cartilage thinning. Osteophyte formation is noted in the humeral head.     Inferior glenohumeral ligament: Unremarkable.      AC Joint and ligaments: Subacromial spur is noted.  Moderate degenerative changes  of the [facility] joint with mild inferior osteophyte formation is noted. Mild  subchondral edema is noted.     Acromion Type: Type 2 acromion is present.      Osseous structures/Marrow: Subchondral cystic changes are noted in the humeral  head. Mild degenerative changes in the [facility] joint.     Soft Tissue/Other: Unremarkable.      IMPRESSION  1. Moderate near full thickness tear of the supraspinatus in a 1.6 x 2 cm area  with a likely small full thickness defect.      2.  Small articular sided partial tear of the infraspinatus.      3. 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      4.  Subacromial spur of the acromion.                  Right shoulder x-rays from 8/4/2021 were personally reviewed with the patient and demonstrated:  FINDINGS:   Bones/joints: Mild osteophyte formation at the acromion and acromioclavicular  joint. Otherwise unremarkable. Soft tissues: Unremarkable.     IMPRESSION  No significant radiographic abnormalities.     Mild acromial and AC joint degenerative change.     Left shoulder x-rays from 8/4/2021 were personally reviewed with the patient and demonstrated:  FINDINGS:   Bones/joints: Unremarkable. Soft tissues: Unremarkable.     IMPRESSION  No significant radiographic abnormalities     Lumbar spine MRI from 7/19/2021 was personally reviewed with the Pt and demonstrated:  Results from Orders Only encounter on 07/13/21     MRI LUMB SPINE WO CONT     Narrative  EXAM: MRI LUMB SPINE WO CONT     CLINICAL INDICATIONS/HISTORY: increased lumbar pain with right radicular  symptoms despite physical therapy.     COMPARISON: June 2016 MRI     Technique: Multi-sequence multiplanar MR imaging obtained centered on the lumbar  spine.     FINDINGS:     Alignment: Intact lordosis  Vertebral body height: Normal  Marrow signal: Unremarkable  Conus: T12-L1     Axial imaging correlation:     T12-L1: Patent canal and foramina.     L1-2: Patent canal and foramina.     L2-3: Mild disc bulge. Some disc desiccation.  Mild facet arthropathy. Patent  canal and foramina.     L3-4: Slightly more prominent disc bulge and facet arthropathy. Low-grade facet  inflammation on the left. Slight bulging posterior epidural fat. Minor spinal  stenosis in AP dimension. Mild to moderate left foraminal stenosis.     L4-5: Mild disc bulge. Bilateral facet arthropathy with trace facet  inflammation. Patent canal and foramina.     L5-S1: Focal central to right paracentral disc protrusion with annular tear. Bilateral facet arthropathy. Patent canal and foramina.     Other structures: Unremarkable.     Impression  1. Relatively mild multilevel degenerative findings along lumbar spine  -Overall slight progression compared with 2016  -There is no high-grade spinal stenosis  -There is some distortion of the exiting right L3 nerve from disc and facet  pathology at the right foramen which does not look appreciably changed.     Cervical Spine MRI from 11/08/2016 was personally reviewed with the Pt and demonstrated:  Results from [facility] on 11/08/16   MRI CERV SPINE WO CONT    Narrative MRI OF CERVICAL SPINE WITHOUT CONTRAST    CPT CODE: 35133    HISTORY: Chronic neck pain extending into the left shoulder with progressive  paresthesias left hand. COMPARISON: MRI cervical spine 4/10/2014. FINDINGS:     There is normal anatomic alignment of the cervical spine. Vertebral body heights  are maintained. Craniocervical junction and posterior elements are intact. Prevertebral soft tissues are normal. Incidentally imaged intracranial soft  tissues demonstrate no acute abnormalities. Within limitations of motion  artifact, cervical spinal cord maintains normal caliber, signal intensity and  morphology throughout. Cervical soft tissues demonstrate no acute abnormalities. C2/C3: Central canal and neural foramina are patent. C3/C4: Central canal and neural foramina are patent. C4/C5: Central canal and neural foramina are patent.     C5/C6: Broad-based disc protrusion. Contact of the ventral cord. CSF dorsal to  the cord is maintained. Probable moderate right greater than left foraminal  stenoses. C6/C7: Mild bulging of the disc. Probable mild bilateral foraminal narrowing. C7/T1: Central canal and neural foramina are patent. T1/T2 through T4/T5: Central canal and foramina are adequate on the sagittal  images.               Impression IMPRESSION:    Allowing for differences in technique, no substantial interval change since  2014. Degenerative changes of the cervical spine are most pronounced at C5/C6 where  there is a disc protrusion which contacts the ventral cord and moderate  bilateral foraminal stenosis.           Abdominal/pelvic CT from 02/04/2022 was personally reviewed with the patient and demonstrated:     CT ABDOMEN FINDINGS: Visualized portions of the lung bases demonstrate mild  fibrotic changes. There are 2 small nodules in the right middle lobe on images 4  and 5 with the largest measuring 5 mm in size. Visualized portions of the heart  and pericardium are normal.     Hepatic steatosis is present. The liver is otherwise normal in appearance. The  gallbladder is normal. The spleen is normal. A small hiatal hernia is present. The pancreas is normal in appearance. Adrenal glands are normal. There are  numerous small cysts involving the right kidney. Kidneys are otherwise normal.  There is no intra-abdominal or retroperitoneal adenopathy. Vascular structures  are normal. There is a small umbilical hernia containing fat without  incarceration.     CT PELVIS FINDINGS: Status post hysterectomy. The bladder is normal. There is no  free fluid. There is no pelvic adenopathy. Postoperative changes are seen  involving the anterior pelvic wall. I see no hernia.     No specific bowel abnormalities are seen.     No significant osseous abnormalities.     IMPRESSION        1. No acute abnormalities.   2. There are couple of small nodules in the right middle lobe unchanged from  3/7/2017 presumably benign. Mild fibrotic changes are also present in the lung  bases. 3. Hepatic steatosis. Small hiatal hernia. 4. Status post hysterectomy. Small umbilical hernia containing fat without  incarceration. Additional chronic changes as described above.     Cervical 2V x-rays from 2/2/2021 was personally reviewed with the Pt and demonstrated:  Some obstruction to visualization due to necklace. Possibly mild disc space narrowing at C5-C6.  No acute pathology identified.

## 2022-06-14 NOTE — PATIENT INSTRUCTIONS
Rotator Cuff Injury: Care Instructions  Overview     The rotator cuff is a group of tendons and muscles around the shoulder that keeps the upper arm bone in place. It keeps the shoulder joint stable and allows you to raise and rotate your arm. Damage to the rotator cuff can be caused by overuse, a fall, or a direct blow to the shoulder area, which can tear the tendons. Over time, everyday wear can damage the tendons and make injury more likely. Treatment can depend on the type and amount of damage to the tendons. Your doctor may have you try physical therapy and exercise first. If physical therapy does not help, your doctor may recommend surgery. Follow-up care is a key part of your treatment and safety. Be sure to make and go to all appointments, and call your doctor if you are having problems. It's also a good idea to know your test results and keep a list of the medicines you take. How can you care for yourself at home? · Rest your shoulder as much as you can. If your doctor put your arm in a sling or shoulder immobilizer, wear it as directed. Do not take it off before your doctor tells you to. If it is too tight, loosen it. · Be safe with medicines. Read and follow all instructions on the label. ? If the doctor gave you a prescription medicine for pain, take it as prescribed. ? If you are not taking a prescription pain medicine, ask your doctor if you can take an over-the-counter medicine. · Put ice or a cold pack on your shoulder for 10 to 20 minutes at a time. Try to do this every 1 to 2 hours for the next 3 days (when you are awake). Put a thin cloth between the ice pack and your skin. · After 2 or 3 days, if you don't have swelling, apply heat. Put a warm water bottle, a heating pad set on low, or a warm cloth on your shoulder. Do not go to sleep with a heating pad on your skin. Put a thin cloth between the heating pad and your skin.   · While holding a warm, wet towel on your shoulder, lean forward so your arm hangs freely, and gently swing your arm back and forth like a pendulum. You also can do this standing under a warm shower. · Do not do anything that makes your pain worse. · Follow your doctor's advice about whether you need physical therapy. When should you call for help? Call your doctor now or seek immediate medical care if:    · You have severe pain.     · You cannot move your shoulder or arm.     · You have tingling or numbness in your arm or hand.     · Your arm or hand is cool or pale. Watch closely for changes in your health, and be sure to contact your doctor if:    · Your pain gets worse.     · You have new or worse swelling in your arm or hand.     · You do not get better as expected. Where can you learn more? Go to http://www.wells.com/  Enter D027 in the search box to learn more about \"Rotator Cuff Injury: Care Instructions. \"  Current as of: July 1, 2021               Content Version: 13.2  © 2006-2022 [employer]. Care instructions adapted under license by [employer] (which disclaims liability or warranty for this information). If you have questions about a medical condition or this instruction, always ask your healthcare professional. [place] [address] any warranty or liability for your use of this information. Rotator Cuff: Exercises  Introduction  Here are some examples of exercises for you to try. The exercises may be suggested for a condition or for rehabilitation. Start each exercise slowly. Ease off the exercises if you start to have pain. You will be told when to start these exercises and which ones will work best for you. How to do the exercises  Pendulum swing    If you have pain in your back, do not do this exercise. 1. Hold on to a table or the back of a chair with your good arm. Then bend forward a little and let your sore arm hang straight down.  This exercise does not use the arm muscles. Rather, use your legs and your hips to create movement that makes your arm swing freely. 2. Use the movement from your hips and legs to guide the slightly swinging arm back and forth like a pendulum (or elephant trunk). Then guide it in circles that start small (about the size of a dinner plate). Make the circles a bit larger each day, as your pain allows. 3. Do this exercise for 5 minutes, 5 to 7 times each day. 4. As you have less pain, try bending over a little farther to do this exercise. This will increase the amount of movement at your shoulder. Posterior stretching exercise    1. Hold the elbow of your injured arm with your other hand. 2. Use your hand to pull your injured arm gently up and across your body. You will feel a gentle stretch across the back of your injured shoulder. 3. Hold for at least 15 to 30 seconds. Then slowly lower your arm. 4. Repeat 2 to 4 times. Up-the-back stretch    Your doctor or physical therapist may want you to wait to do this stretch until you have regained most of your range of motion and strength. You can do this stretch in different ways. Hold any of these stretches for at least 15 to 30 seconds. Repeat them 2 to 4 times. 1. Light stretch: Put your hand in your back pocket. Let it rest there to stretch your shoulder. 2. Moderate stretch: With your other hand, hold your injured arm (palm outward) behind your back by the wrist. Pull your arm up gently to stretch your shoulder. 3. Advanced stretch: Put a towel over your other shoulder. Put the hand of your injured arm behind your back. Now hold the back end of the towel. With the other hand, hold the front end of the towel in front of your body. Pull gently on the front end of the towel. This will bring your hand farther up your back to stretch your shoulder. Overhead stretch    1. Standing about an arm's length away, grasp onto a solid surface.  You could use a countertop, a doorknob, or the back of a sturdy chair.  2. With your knees slightly bent, bend forward with your arms straight. Lower your upper body, and let your shoulders stretch. 3. As your shoulders are able to stretch farther, you may need to take a step or two backward. 4. Hold for at least 15 to 30 seconds. Then stand up and relax. If you had stepped back during your stretch, step forward so you can keep your hands on the solid surface. 5. Repeat 2 to 4 times. Shoulder flexion (lying down)    To make a wand for this exercise, use a piece of PVC pipe or a broom handle with the broom removed. Make the wand about a foot wider than your shoulders. 1. Lie on your back, holding a wand with both hands. Your palms should face down as you hold the wand. 2. Keeping your elbows straight, slowly raise your arms over your head. Raise them until you feel a stretch in your shoulders, upper back, and chest.  3. Hold for 15 to 30 seconds. 4. Repeat 2 to 4 times. Shoulder rotation (lying down)    To make a wand for this exercise, use a piece of PVC pipe or a broom handle with the broom removed. Make the wand about a foot wider than your shoulders. 1. Lie on your back. Hold a wand with both hands with your elbows bent and palms up. 2. Keep your elbows close to your body, and move the wand across your body toward the sore arm. 3. Hold for 8 to 12 seconds. 4. Repeat 2 to 4 times. Wall climbing (to the side)    Avoid any movement that is straight to your side, and be careful not to arch your back. Your arm should stay about 30 degrees to the front of your side. 1. Stand with your side to a wall so that your fingers can just touch it at an angle about 30 degrees toward the front of your body. 2. Walk the fingers of your injured arm up the wall as high as pain permits. Try not to shrug your shoulder up toward your ear as you move your arm up.   3. Hold that position for a count of at least 15 to 20.  4. Walk your fingers back down to the starting position. 5. Repeat at least 2 to 4 times. Try to reach higher each time. Wall climbing (to the front)    During this stretching exercise, be careful not to arch your back. 1. Face a wall, and stand so your fingers can just touch it. 2. Keeping your shoulder down, walk the fingers of your injured arm up the wall as high as pain permits. (Don't shrug your shoulder up toward your ear.)  3. Hold your arm in that position for at least 15 to 30 seconds. 4. Slowly walk your fingers back down to where you started. 5. Repeat at least 2 to 4 times. Try to reach higher each time. Shoulder blade squeeze    1. Stand with your arms at your sides, and squeeze your shoulder blades together. Do not raise your shoulders up as you squeeze. 2. Hold 6 seconds. 3. Repeat 8 to 12 times. Scapular exercise: Arm reach    1. Lie flat on your back. This exercise is a very slight motion that starts with your arms raised (elbows straight, arms straight). 2. From this position, reach higher toward the robb or ceiling. Keep your elbows straight. All motion should be from your shoulder blade only. 3. Relax your arms back to where you started. 4. Repeat 8 to 12 times. Arm raise to the side    During this strengthening exercise, your arm should stay about 30 degrees to the front of your side. 1. Slowly raise your injured arm to the side, with your thumb facing up. Raise your arm 60 degrees at the most (shoulder level is 90 degrees). 2. Hold the position for 3 to 5 seconds. Then lower your arm back to your side. If you need to, bring your \"good\" arm across your body and place it under the elbow as you lower your injured arm. Use your good arm to keep your injured arm from dropping down too fast.  3. Repeat 8 to 12 times. 4. When you first start out, don't hold any extra weight in your hand. As you get stronger, you may use a 1-pound to 2-pound dumbbell or a small can of food.   Shoulder flexor and extensor exercise    These are isometric exercises. That means you contract your muscles without actually moving. 1. Push forward (flex): Stand facing a wall or doorjamb, about 6 inches or less back. Hold your injured arm against your body. Make a closed fist with your thumb on top. Then gently push your hand forward into the wall with about 25% to 50% of your strength. Don't let your body move backward as you push. Hold for about 6 seconds. Relax for a few seconds. Repeat 8 to 12 times. 2. Push backward (extend): Stand with your back flat against a wall. Your upper arm should be against the wall, with your elbow bent 90 degrees (your hand straight ahead). Push your elbow gently back against the wall with about 25% to 50% of your strength. Don't let your body move forward as you push. Hold for about 6 seconds. Relax for a few seconds. Repeat 8 to 12 times. Scapular exercise: Wall push-ups    This exercise is best done with your fingers somewhat turned out, rather than straight up and down. 1. Stand facing a wall, about 12 inches to 18 inches away. 2. Place your hands on the wall at shoulder height. 3. Slowly bend your elbows and bring your face to the wall. Keep your back and hips straight. 4. Push back to where you started. 5. Repeat 8 to 12 times. 6. When you can do this exercise against a wall comfortably, you can try it against a counter. You can then slowly progress to the end of a couch, then to a sturdy chair, and finally to the floor. Scapular exercise: Retraction    For this exercise, you will need elastic exercise material, such as surgical tubing or Thera-Band. 1. Put the band around a solid object at about waist level. (A bedpost will work well.) Each hand should hold an end of the band. 2. With your elbows at your sides and bent to 90 degrees, pull the band back. Your shoulder blades should move toward each other. Then move your arms back where you started. 3. Repeat 8 to 12 times.   4. If you have good range of motion in your shoulders, try this exercise with your arms lifted out to the sides. Keep your elbows at a 90-degree angle. Raise the elastic band up to about shoulder level. Pull the band back to move your shoulder blades toward each other. Then move your arms back where you started. Internal rotator strengthening exercise    1. Start by tying a piece of elastic exercise material to a doorknob. You can use surgical tubing or Thera-Band. 2. Stand or sit with your shoulder relaxed and your elbow bent 90 degrees. Your upper arm should rest comfortably against your side. Squeeze a rolled towel between your elbow and your body for comfort. This will help keep your arm at your side. 3. Hold one end of the elastic band in the hand of the painful arm. 4. Slowly rotate your forearm toward your body until it touches your belly. Slowly move it back to where you started. 5. Keep your elbow and upper arm firmly tucked against the towel roll or at your side. 6. Repeat 8 to 12 times. External rotator strengthening exercise    1. Start by tying a piece of elastic exercise material to a doorknob. You can use surgical tubing or Thera-Band. (You may also hold one end of the band in each hand.)  2. Stand or sit with your shoulder relaxed and your elbow bent 90 degrees. Your upper arm should rest comfortably against your side. Squeeze a rolled towel between your elbow and your body for comfort. This will help keep your arm at your side. 3. Hold one end of the elastic band with the hand of the painful arm. 4. Start with your forearm across your belly. Slowly rotate the forearm out away from your body. Keep your elbow and upper arm tucked against the towel roll or the side of your body until you begin to feel tightness in your shoulder. Slowly move your arm back to where you started. 5. Repeat 8 to 12 times. Follow-up care is a key part of your treatment and safety.  Be sure to make and go to all appointments, and call your doctor if you are having problems. It's also a good idea to know your test results and keep a list of the medicines you take. Where can you learn more? Go to http://www.gray.com/  Enter J005 in the search box to learn more about \"Rotator Cuff: Exercises. \"  Current as of: July 1, 2021               Content Version: 13.2  © 2006-2022 [employer]. Care instructions adapted under license by [employer] (which disclaims liability or warranty for this information). If you have questions about a medical condition or this instruction, always ask your healthcare professional. [place] [address] any warranty or liability for your use of this information.

## 2022-06-16 ENCOUNTER — APPOINTMENT (OUTPATIENT)
Dept: PHYSICAL THERAPY | Age: 82
End: 2022-06-16
Attending: INTERNAL MEDICINE
Payer: MEDICARE

## 2022-06-28 ENCOUNTER — HOSPITAL ENCOUNTER (OUTPATIENT)
Dept: PHYSICAL THERAPY | Age: 82
Discharge: HOME | End: 2022-06-28
Attending: INTERNAL MEDICINE
Payer: MEDICARE

## 2022-06-28 PROCEDURE — 97112 NEUROMUSCULAR REEDUCATION: CPT

## 2022-06-28 PROCEDURE — 97530 THERAPEUTIC ACTIVITIES: CPT

## 2022-06-28 PROCEDURE — 97116 GAIT TRAINING THERAPY: CPT

## 2022-06-28 NOTE — PROGRESS NOTES
[facility]  [address]  [place], [address]  [phone] fax    Continued Plan of Care/ Re-certification for Physical Therapy Services    Patient name: Olga Lidia Montejo Start of Care: 22   Referral source: Kendra Johnson MD : 1940   Medical/Treatment Diagnosis: Vertigo [R42]  Payor: [place] MEDICARE / Plan: [place] MEDICARE PART A & B / Product Type: Medicare /  Onset Date:     Prior Hospitalization: see medical history Provider#: [id number]   Medications: Verified on Patient Summary List    Comorbidities: OA; HTN; Asthma    Prior Level of Function: No c/o dizziness with positional changes  Visits from Start of Care: 3    Missed Visits: 0    The Plan of Care and following information is based on the patient's current status:  Short Term Goals: To be accomplished in 1 weeks:  1. I and compliant with HEP for self management of symptoms.   IE: issued HEP  Current: goal met  [address] be accomplished in 4 weeks:  1. Improve FOTO ZV 36 ZM indicate improved function with daily activities.   IE: 51  Current: not met, 52    2. Patient will report >50% improvement with dizziness to increase ease with positional changes.   IE: 0%    Current: met, 90%   3. Patient will perform dynamic balance without c/o dizziness to increase ease with daily activities. IE:not tested    Current: met, no dizziness, but headache & slight nausea present.     Key functional changes: feels 90% improvement in sx      Problems/ barriers to goal attainment: COVID     Problem List: decrease strength, impaired gait/ balance, decrease ADL/ functional abilitiies, decrease activity tolerance and decrease flexibility/ joint mobility    Treatment Plan: Therapeutic exercise, Therapeutic activities, Neuromuscular re-education, Physical agent/modality, Gait/balance training, Manual therapy, Patient education and Self Care training     Patient Goal (s) has been updated and includes: d/c     Goals for this certification period to be accomplished in 1 treatments:  Short Term Goals: To be accomplished in 1 weeks:  1. I and compliant with HEP for self management of symptoms.   IE: issued HEP  Current: goal met  [address] be accomplished in 4 weeks:  1. Improve FOTO KU 23 YK indicate improved function with daily activities.   IE: 51  Current: not met, 52    2. Patient will report >50% improvement with dizziness to increase ease with positional changes.   IE: 0%    Current: met, 90%   3. Patient will perform dynamic balance without c/o dizziness to increase ease with daily activities. IE:not tested    Current: met, no dizziness, but headache & slight nausea present. Frequency / Duration: Patient to be seen 1 times per week for 1 treatments:    Assessment / Recommendations:Pt returns for a re-assessment and simultaneous discharge today as she has had no recurrence of room-spinning dizziness episodes. She has not been in therapy for the last three weeks due to onset of COVID, and in this time she expressed that she felt near-dizzy episodes occur without experiencing them. She resumed her HEP last week. Today, she opts to d/c and is directed to complete her HEP 3x/day for the next 4 weeks and then reducing to 3x/week once sx improve. If they do not improve, it is recommended for her to return to therapy to resume habituation training. Certification Period: 6/28/2022 -- 7/26/2022    Josiane Peterson, PT 6/28/2022 5:16 PM    ________________________________________________________________________  I certify that the above Therapy Services are being furnished while the patient is under my care. I agree with the treatment plan and certify that this therapy is necessary. [] I have read the above and request that my patient continue as recommended.   [] I have read the above report and request that my patient continue therapy with the following changes/special instructions: _____________________________________________  [] I have read the above report and request that my patient be discharged from therapy    Physician's Signature:____________Date:_________TIME:________     Rozina Renee MD  ** Signature, Date and Time must be completed for valid certification **    Please sign and return to [facility] [address] [place] [address] & [facility] [address] [facility] [address]  [address] [place] [address], [facility] [address]  [phone] fax

## 2022-06-28 NOTE — PROGRESS NOTES
PT DAILY TREATMENT NOTE     Patient Name: Mi Rivas  Date:2022  : 1940  [x]  Patient  Verified  Payor: VA MEDICARE / Plan: VA MEDICARE PART A & B / Product Type: Medicare /    In time:1115am  Out time:1156pm  Total Treatment Time (min): 41  Visit #: 1 of 1    Medicare/BCBS Only   Total Timed Codes (min):  41 1:1 Treatment Time:  38       Treatment Area: Vertigo [R42]    SUBJECTIVE  Pain Level (0-10 scale): 0  Any medication changes, allergies to medications, adverse drug reactions, diagnosis change, or new procedure performed?: [x] No    [] Yes (see summary sheet for update)  Subjective functional status/changes:   [] No changes reported  Has been resuming HEP as of this past week without dizziness. Feels like she will almost get the dizziness but it doesn't hit. Had COVID, so was out the last couple of weeks. Has not been driving as much due to fear of dizziness with driving. OBJECTIVE    10 min Therapeutic Activity:  []  See flow sheet : re-assessment; pt education on continued performance of HEP to improve habituation of sx. Pt education on eye floaters and procedures that MD's may provide if deemed appropriate. Rationale: increase ROM, improve balance and increase proprioception  to improve the patients ability to manage self care. 20 min Neuromuscular Re-education:  [x]  See flow sheet :   Rationale: improve coordination, improve balance and increase proprioception  to improve the patients ability to habituate sx and reduce vertigo with ADLs. 8 min Gait Training:  _120__ feet with _CGA for walking with head turns vertically   Rationale: improve balance to reduce fall risk.           With   [] TE   [] TA   [] neuro   [] other: Patient Education: [x] Review HEP    [] Progressed/Changed HEP based on:   [] positioning   [] body mechanics   [] transfers   [] heat/ice application    [] other:      Other Objective/Functional Measures: see re-cert & d/c     Pain Level (0-10 scale) post treatment: 3, nausea    ASSESSMENT/Changes in Function: Pt returns for a re-assessment and simultaneous discharge today as she has had no recurrence of room-spinning dizziness episodes. She has not been in therapy for the last three weeks due to onset of COVID, and in this time she expressed that she felt near-dizzy episodes occur without experiencing them. She resumed her HEP last week. Today, she opts to d/c and is directed to complete her HEP 3x/day for the next 4 weeks and then reducing to 3x/week once sx improve. If they do not improve, it is recommended for her to return to therapy to resume habituation training. []  See Plan of Care  [x]  See progress note/recertification  [x]  See Discharge Summary         Progress towards goals / Updated goals:  Short Term Goals: To be accomplished in 1 weeks:  1. I and compliant with HEP for self management of symptoms.   IE: issued HEP  Current: goal met 6/2/22 took a break from exercises due to COVID (6/28/2022)  Long Term Goals: To be accomplished in 4 weeks:  1. Improve FOTO DR 60 UM indicate improved function with daily activities.   IE: 51  Current: not met, 52 (6/28/2022)   2. Patient will report >50% improvement with dizziness to increase ease with positional changes.   IE: 0%    Current: met, 90% (6/28/2022)  3. Patient will perform dynamic balance without c/o dizziness to increase ease with daily activities.   IE:not tested    Current: met, no dizziness, but headache & slight nausea present. (6/28/2022)    PLAN  []  Upgrade activities as tolerated     [x]  Continue plan of care  []  Update interventions per flow sheet       []  Discharge due to:_  []  Other:_      Ana Gaines, PT 6/28/2022  11:09 AM    Future Appointments   Date Time Provider Kin Dai   6/28/2022 11:15 AM Michelle Ragland, PT [facility]   7/1/2022  1:30 PM Trisha Sánchez, PT [facility]   7/12/2022  8:20 AM Fareed Poole MD [facility] BS AMB   8/10/2022  9:15 AM Shena Silva MD [facility] BS AMB   12/14/2022  8:00 AM Jasmin Berry MD [facility] BS AMB

## 2022-06-28 NOTE — PROGRESS NOTES
Physical Therapy Discharge Instructions      [facility]  [address] [facility] [address]  [place], [address]  [phone] fax    Patient: Dez Ingram  : 1940      Continue Home Exercise Program 3 times per day for 4 weeks, then decrease to 3 times per week    Follow up with MD:     [] Upon completion of therapy     [x] As needed      Recommendations:     [x]   Return to activity with home program    []   Return to activity with the following modifications:       []Post Rehab Program    []Join Independent aquatic program     []Return to/join local gym    Additional Comments: As you perform these consistently, they will get easier. You can make them harder by doing more of them or changing to performing in standing while holding onto a counter top. If your symptoms worsen, you may get a new referral and return. Nausea and headache should reduce as your body accommodates to these stressors, which will also give you improved confidence with driving. See your eye doctor for your floaters that limit your driving confidence.     Mohini Bryant, PT 2022 11:49 AM

## 2022-06-28 NOTE — PROGRESS NOTES
[facility]  [address] [facility] [address]  [place], [address]  [phone] fax    Physical Therapy Discharge Summary    Patient name: Gavino Richardson Start of Care: 22   Referral source: Jack Garsia MD : [id number]   Medical/Treatment Diagnosis: Vertigo [R42]  Payor: [place] MEDICARE / Plan: [place] MEDICARE PART A & B / Product Type: Medicare /  Onset Date:2022     Prior Hospitalization: see medical history Provider#: [id number]   Medications: Verified on Patient Summary List    Comorbidities:OA; HTN; Asthma    Prior Level of Function: No c/o dizziness with positional changes  Visits from Start of Care: 3    Missed Visits: 0  Reporting Period : 2022 to 2022    Summary of Care:  Short Term Goals: To be accomplished in 1 weeks:  1. I and compliant with HEP for self management of symptoms.   IE: issued HEP  Current: goal met  [address] be accomplished in 4 weeks:  1. Improve FOTO BB 48 BB indicate improved function with daily activities.   IE: 51  Current: not met, 52    2. Patient will report >50% improvement with dizziness to increase ease with positional changes.   IE: 0%    Current: met, 90%   3. Patient will perform dynamic balance without c/o dizziness to increase ease with daily activities. IE:not tested    Current: met, no dizziness, but headache & slight nausea present. ASSESSMENT/RECOMMENDATIONS:  Pt returns for a re-assessment and simultaneous discharge today as she has had no recurrence of room-spinning dizziness episodes. She has not been in therapy for the last three weeks due to onset of COVID, and in this time she expressed that she felt near-dizzy episodes occur without experiencing them. She resumed her HEP last week. Today, she opts to d/c and is directed to complete her HEP 3x/day for the next 4 weeks and then reducing to 3x/week once sx improve.  If they do not improve, it is recommended for her to return to therapy to resume habituation training.     [x]Discontinue therapy: [x]Patient has reached or is progressing toward set goals      []Patient is non-compliant or has abdicated      []Due to lack of appreciable progress towards set goals    Ajit Lala, PT 6/28/2022 5:18 PM

## 2022-07-01 ENCOUNTER — HOSPITAL ENCOUNTER (OUTPATIENT)
Dept: PHYSICAL THERAPY | Age: 82
End: 2022-07-01
Attending: INTERNAL MEDICINE

## 2022-07-05 ENCOUNTER — HOSPITAL ENCOUNTER (OUTPATIENT)
Dept: LAB | Age: 82
Discharge: HOME | End: 2022-07-05
Payer: MEDICARE

## 2022-07-05 ENCOUNTER — APPOINTMENT (OUTPATIENT)
Dept: INTERNAL MEDICINE CLINIC | Age: 82
End: 2022-07-05

## 2022-07-05 DIAGNOSIS — R10.9 LEFT FLANK PAIN: ICD-10-CM

## 2022-07-05 DIAGNOSIS — Z13.220 SCREENING FOR HYPERLIPIDEMIA: ICD-10-CM

## 2022-07-05 DIAGNOSIS — R73.9 HYPERGLYCEMIA: ICD-10-CM

## 2022-07-05 LAB
CHOLEST SERPL-MCNC: 177 MG/DL
CRP SERPL-MCNC: 3 MG/DL (ref 0–0.3)
EST. AVERAGE GLUCOSE BLD GHB EST-MCNC: 123 MG/DL
HBA1C MFR BLD: 5.9 % (ref 4.2–5.6)
HDLC SERPL-MCNC: 65 MG/DL (ref 40–60)
HDLC SERPL: 2.7 {RATIO} (ref 0–5)
LDLC SERPL CALC-MCNC: 92.2 MG/DL (ref 0–100)
LIPID PROFILE,FLP: ABNORMAL
TRIGL SERPL-MCNC: 99 MG/DL (ref ?–150)
VLDLC SERPL CALC-MCNC: 19.8 MG/DL

## 2022-07-05 PROCEDURE — 36415 COLL VENOUS BLD VENIPUNCTURE: CPT

## 2022-07-05 PROCEDURE — 86140 C-REACTIVE PROTEIN: CPT

## 2022-07-05 PROCEDURE — 80061 LIPID PANEL: CPT

## 2022-07-05 PROCEDURE — 83036 HEMOGLOBIN GLYCOSYLATED A1C: CPT

## 2022-07-11 NOTE — PROGRESS NOTES
I will discuss her results with her at her upcoming appointment. Her A1c is back up to 5.9 from 5.5 in October. Her total cholesterol is 177, up from 169. Triglycerides are 99. HDL is 65. LDL is 72. Her CRP is elevated at 3 from 1 in April.     Dr. oKbi Brito  [facility] [address] [place] [address], [address] [place] [address], [address] [place] [address] Str.  Phone: [phone]  Fax: [phone]

## 2022-07-12 ENCOUNTER — OFFICE VISIT (OUTPATIENT)
Dept: INTERNAL MEDICINE CLINIC | Age: 82
End: 2022-07-12
Payer: MEDICARE

## 2022-07-12 VITALS
SYSTOLIC BLOOD PRESSURE: 120 MMHG | BODY MASS INDEX: 32.39 KG/M2 | OXYGEN SATURATION: 97 % | RESPIRATION RATE: 16 BRPM | DIASTOLIC BLOOD PRESSURE: 69 MMHG | WEIGHT: 165 LBS | HEIGHT: 60 IN | HEART RATE: 66 BPM | TEMPERATURE: 97.2 F

## 2022-07-12 DIAGNOSIS — R42 VERTIGO: ICD-10-CM

## 2022-07-12 DIAGNOSIS — Z12.31 ENCOUNTER FOR SCREENING MAMMOGRAM FOR BREAST CANCER: ICD-10-CM

## 2022-07-12 DIAGNOSIS — R79.82 ELEVATED C-REACTIVE PROTEIN (CRP): ICD-10-CM

## 2022-07-12 DIAGNOSIS — R73.9 HYPERGLYCEMIA: Primary | ICD-10-CM

## 2022-07-12 DIAGNOSIS — G89.29 CHRONIC RIGHT SHOULDER PAIN: ICD-10-CM

## 2022-07-12 DIAGNOSIS — M25.511 CHRONIC RIGHT SHOULDER PAIN: ICD-10-CM

## 2022-07-12 DIAGNOSIS — E78.5 HYPERLIPIDEMIA, UNSPECIFIED HYPERLIPIDEMIA TYPE: ICD-10-CM

## 2022-07-12 DIAGNOSIS — K76.0 NAFLD (NONALCOHOLIC FATTY LIVER DISEASE): ICD-10-CM

## 2022-07-12 PROCEDURE — G8536 NO DOC ELDER MAL SCRN: HCPCS | Performed by: INTERNAL MEDICINE

## 2022-07-12 PROCEDURE — G8754 DIAS BP LESS 90: HCPCS | Performed by: INTERNAL MEDICINE

## 2022-07-12 PROCEDURE — G8399 PT W/DXA RESULTS DOCUMENT: HCPCS | Performed by: INTERNAL MEDICINE

## 2022-07-12 PROCEDURE — G0463 HOSPITAL OUTPT CLINIC VISIT: HCPCS | Performed by: INTERNAL MEDICINE

## 2022-07-12 PROCEDURE — G8752 SYS BP LESS 140: HCPCS | Performed by: INTERNAL MEDICINE

## 2022-07-12 PROCEDURE — G8427 DOCREV CUR MEDS BY ELIG CLIN: HCPCS | Performed by: INTERNAL MEDICINE

## 2022-07-12 PROCEDURE — 1123F ACP DISCUSS/DSCN MKR DOCD: CPT | Performed by: INTERNAL MEDICINE

## 2022-07-12 PROCEDURE — 99214 OFFICE O/P EST MOD 30 MIN: CPT | Performed by: INTERNAL MEDICINE

## 2022-07-12 PROCEDURE — G8432 DEP SCR NOT DOC, RNG: HCPCS | Performed by: INTERNAL MEDICINE

## 2022-07-12 PROCEDURE — G8417 CALC BMI ABV UP PARAM F/U: HCPCS | Performed by: INTERNAL MEDICINE

## 2022-07-12 PROCEDURE — 1090F PRES/ABSN URINE INCON ASSESS: CPT | Performed by: INTERNAL MEDICINE

## 2022-07-12 PROCEDURE — 1101F PT FALLS ASSESS-DOCD LE1/YR: CPT | Performed by: INTERNAL MEDICINE

## 2022-07-12 NOTE — PROGRESS NOTES
[facility]:  7/12/2022, MRN: [id number]      Nenita Menjivar is a [de-identified] y.o. female and presents to clinic for Follow-up and Labs (7-5-22)      Subjective: The patient is a [de-identified]year-old female with history of hypertension, LGSIL 4/19, osteopenia, tubular adenomas colon polyps, obesity, vitamin D deficiency, stable pulmonary nodules, small hiatal hernia, NAFLD, asthma (followed by Ghanshyam Sparks), cervical disc disease, allergic rhinitis (declining allergy testing and immunotherapy), and lumbar disc disease (followed by Orthopedics, ).       1. Prediabetes: Her most recent labs show that her A1c is up to 5.9 from 5.5 in October. She admits to eating a lot of sweets/cookies. 2.  Hyperlipidemia/NAFLD: Her most recent labs show: Her total cholesterol is 177, up from 169. Triglycerides are 99. HDL is 65. LDL is 72. She is not on a cholesterol-lowering medication. She declines rx. +NAFLD    3. Vertigo: At her last appointment, she reported persistent vertigo symptoms. An MRI of her IAC was ordered. Findings are listed below. Valium was also ordered for vertigo refractory to meclizine. She was also referred for PT. Today she reports: she is asymptomatic. \"I haven't had any dizziness at all. \" She never tried valium for refractor sx. No stool/urine incontinence. 6/3/22 MRI IAC: Mild to moderate burden chronic small vessel disease. Given the constellation of findings cannot exclude possible acute infarction; today's study is done without diffusion images may wish to augment this study with additional diffusion sequences only. The internal auditory canals are well studied. No evidence of any mass in cerebellopontine angle acoustic canals identified      4. Elevated CRP: Higher measuring 3. It was 1 in April. She has intermittent epigastric pain. 5. Right Shoulder Pain: She has worsening right shoulder pain that interferes with her ability to bathe at times.   referred her to another orthopedist in the group that specializes in shoulder pain. She declines surgical intervention and injections. 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 Max Daily Amount: 2 mg. 10 Tablet 0    gabapentin (Gralise) 600 mg Tb24 Take 2 Tablets by mouth nightly. Max Daily Amount: 2 Tablets. 180 Tablet 1    meclizine (ANTIVERT) 12.5 mg tablet Take 12.5 mg by mouth three (3) times daily as needed for Dizziness.  fluticasone propionate (FLONASE) 50 mcg/actuation nasal spray USE 2 SPRAYS IN EACH NOSTRIL DAILY 48 g 3    triamterene-hydroCHLOROthiazide (MAXZIDE) 37.5-25 mg per tablet TAKE ONE-HALF (1/2) TABLET DAILY 45 Tablet 3    mv-min/vit C/glut/lysine/hb124 (AIRBORNE, LYSINE HCL, PO) Take 1 Tablet by mouth daily as needed.  fexofenadine (ALLEGRA) 180 mg tablet Take 180 mg by mouth daily.  PROAIR HFA 90 mcg/actuation inhaler USE 2 INHALATIONS EVERY 4 HOURS AS NEEDED 3 Inhaler 3    EPINEPHrine (EPIPEN) 0.3 mg/0.3 mL injection 0.3 mL by IntraMUSCular route once as needed for up to 1 dose.  1 Syringe 1    melatonin 3 mg tablet Take 3 mg by mouth daily as needed (sleep).  Cholecalciferol, Vitamin D3, (VITAMIN D3) 1,000 unit cap Take 3,000 Units by mouth daily.  cycloSPORINE (RESTASIS) 0.05 % ophthalmic emulsion Administer 1 Drop to both eyes two (2) times a day.  MULTIVITAMIN W-MINERALS/LUTEIN (CENTRUM SILVER PO) Take 1 Tablet by mouth daily.  co-enzyme Q-10 (CO Q-10) 100 mg capsule Take 100 mg by mouth daily.  BIOTIN PO Take 5,000 mcg by mouth daily.          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    Band keratopathy of right eye 2/3/2018    Cataract     Cylindrical bronchiectasis (HCC)     Fracture     rt ankle as an adult    History of pneumonia     Hypertension     Ill-defined condition     Spasms to left side    Left arm pain     Lumbar spinal stenosis     Myofascial pain     Osteopenia     Sciatica of right side        Past Surgical History:   Procedure Laterality Date    COLONOSCOPY N/A 11/11/2019    COLONOSCOPY w polypectomies performed by Fernando Parsons MD at [address] HX CATARACT REMOVAL  8/15/2011    HX HEENT      sinus surgery    HX KERATOPLASTY Left 10/02/2017    HX ORTHOPAEDIC      left 4th finger trigger release    HX SHIELA AND BSO  1970s    uterine fibroids       Family History   Problem Relation Age of Onset    Asthma Mother     COPD Mother     Mult Sclerosis Mother     Heart Disease Mother     Hypertension Mother     Stroke Father     Heart Disease Father     Hypertension Father     Allergic Rhinitis Sister     Asthma Brother     Hypertension Brother     Diabetes Brother     Migraines Paternal Grandmother        Social History     Tobacco Use    Smoking status: Former Smoker     Packs/day: 1.00     Years: 10.00     Pack years: 10.00     Types: Cigarettes     Quit date: 1970     Years since quittin.5    Smokeless tobacco: Never Used   Substance Use Topics    Alcohol use: Yes     Alcohol/week: 1.0 standard drink     Types: 1 Glasses of wine per week     Comment: occasional wine       ROS   Review of Systems   Constitutional: Negative for chills and fever. HENT: Negative for ear pain and sore throat. Eyes: Negative for blurred vision and pain. Respiratory: Negative for cough and shortness of breath. Cardiovascular: Negative for chest pain. Gastrointestinal: Negative for abdominal pain, blood in stool and melena. Genitourinary: Negative for dysuria and hematuria. Musculoskeletal: Positive for joint pain. Negative for myalgias. Skin: Negative for rash. Neurological: Negative for headaches. Endo/Heme/Allergies: Does not bruise/bleed easily. Psychiatric/Behavioral: Negative for substance abuse. Objective     Vitals:    22 0813   BP: 120/69   Pulse: 66   Resp: 16   Temp: 97.2 °F (36.2 °C)   TempSrc: Temporal   SpO2: 97%   Weight: 165 lb (74.8 kg)   Height: 5' (1.524 m)   PainSc:   0 - No pain       Physical Exam  Vitals and nursing note reviewed. HENT:      Head: Normocephalic and atraumatic. Right Ear: External ear normal.      Left Ear: External ear normal.   Eyes:      General: No scleral icterus. Right eye: No discharge. Left eye: No discharge. Conjunctiva/sclera: Conjunctivae normal.   Cardiovascular:      Rate and Rhythm: Normal rate and regular rhythm. Heart sounds: Normal heart sounds. No murmur heard. No friction rub. No gallop. Pulmonary:      Effort: Pulmonary effort is normal. No respiratory distress. Breath sounds: Normal breath sounds. No wheezing or rales. Abdominal:      General: Bowel sounds are normal. There is no distension. Palpations: Abdomen is soft. There is no mass. Tenderness: There is no abdominal tenderness.  There is no guarding or rebound. Musculoskeletal:         General: No swelling (BUE) or tenderness (BUE). Cervical back: Neck supple. Comments: She has some discomfort with ROM along her right shoulder   Lymphadenopathy:      Cervical: No cervical adenopathy. Skin:     General: Skin is warm and dry. Findings: No erythema or rash. Neurological:      Mental Status: She is alert. Motor: No abnormal muscle tone. Gait: Gait normal.   Psychiatric:         Mood and Affect: Mood normal.         LABS   Data Review:   Lab Results   Component Value Date/Time    WBC 5.0 04/05/2022 08:57 AM    HGB 14.0 04/05/2022 08:57 AM    HCT 43.7 04/05/2022 08:57 AM    PLATELET 409 56/27/1282 08:57 AM    MCV 90.7 04/05/2022 08:57 AM       Lab Results   Component Value Date/Time    Sodium 140 04/05/2022 08:57 AM    Potassium 4.0 04/05/2022 08:57 AM    Chloride 105 04/05/2022 08:57 AM    CO2 32 04/05/2022 08:57 AM    Anion gap 3 04/05/2022 08:57 AM    Glucose 89 04/05/2022 08:57 AM    BUN 11 04/05/2022 08:57 AM    Creatinine 0.72 04/05/2022 08:57 AM    BUN/Creatinine ratio 15 04/05/2022 08:57 AM    GFR est AA >60 04/05/2022 08:57 AM    GFR est non-AA >60 04/05/2022 08:57 AM    Calcium 8.9 04/05/2022 08:57 AM       Lab Results   Component Value Date/Time    Cholesterol, total 177 07/05/2022 08:13 AM    HDL Cholesterol 65 (H) 07/05/2022 08:13 AM    LDL, calculated 92.2 07/05/2022 08:13 AM    VLDL, calculated 19.8 07/05/2022 08:13 AM    Triglyceride 99 07/05/2022 08:13 AM    CHOL/HDL Ratio 2.7 07/05/2022 08:13 AM       Lab Results   Component Value Date/Time    Hemoglobin A1c 5.9 (H) 07/05/2022 08:13 AM       Assessment/Plan:   1. Health Maintenance:  - Mammogram ordered for breast cancer screening    ORDERS:  - [facility] 3D HAL W MAMMO BI SCREENING INCL CAD; Future    2. Hyperglycemia/Prediabetes: Her A1C is 5.9, up from when previously checked.   - I encouraged her to reduce her weight by maintaining a low carb and lower caloric intake diet.  - Checking f/u labs later this yr    ORDERS:  - HEMOGLOBIN A1C WITH EAG; Future  - METABOLIC PANEL, COMPREHENSIVE; Future    3. Elevated C-reactive protein (CRP): Etiology is unknown. +Right Shoulder pain  - Screening for autoimmune disease with labs. - I encouraged her to f/u with Orthopedics for further recommendations given her shoulder pain. - Activity as tolerated. ORDERS:  - ISMAEL COMPREHENSIVE PANEL; Future  - RHEUMASSURE; Future  - C REACTIVE PROTEIN, QT; Future  - CBC WITH AUTOMATED DIFF; Future  - METABOLIC PANEL, COMPREHENSIVE; Future    4. NAFLD (nonalcoholic fatty liver disease) and HLD:   - Checking a fibrosure. - She declines rx at this time to lower her cholesterol.  - Heart healthy diet recommended. ORDERS:  - RAMESH FIBROSURE; Future    5. Vertigo: Resolved. Observation. There are no preventive care reminders to display for this patient. Lab review: labs are reviewed in the EHR and ordered as mentioned above    I have discussed the diagnosis with the patient and the intended plan as seen in the above orders. The patient has received an after-visit summary and questions were answered concerning future plans. I have discussed medication side effects and warnings with the patient as well. I have reviewed the plan of care with the patient, accepted their input and they are in agreement with the treatment goals. All questions were answered. The patient understands the plan of care. Handouts provided today with above information. Pt instructed if symptoms worsen to call the office or report to the ED for continued care. Greater than 50% of the visit time was spent in counseling and/or coordination of care. Voice recognition was used to generate this report, which may have resulted in some phonetic based errors in grammar and contents.  Even though attempts were made to correct all the mistakes, some may have been missed, and remained in the body of the document. Follow-up and Dispositions    · Return in about 5 months (around 12/15/2022).          Willis Easton MD

## 2022-07-12 NOTE — PROGRESS NOTES
Riley Merlin presents today for   Chief Complaint   Patient presents with   [address]     7-5-22             1. \"Have you been to the ER, urgent care clinic since your last visit? Hospitalized since your last visit? \" no    2. \"Have you seen or consulted any other health care providers outside of the [address] [place] [address] since your last visit? \" yes     3. For patients aged 39-70: Has the patient had a colonoscopy / FIT/ Cologuard? Yes - no Care Gap present      If the patient is female:    4. For patients aged 41-77: Has the patient had a mammogram within the past 2 years? Yes - no Care Gap present  See top three    5. For patients aged 21-65: Has the patient had a pap smear?  Yes - no Care Gap present

## 2022-07-12 NOTE — PATIENT INSTRUCTIONS
Prediabetes: Care Instructions  Overview     Prediabetes is a warning sign that you're at risk for getting type 2 diabetes. It means that your blood sugar is higher than it should be. But it's not high enough to be diabetes. The food you eat naturally turns into sugar. Your body uses the sugar for energy. Normally, an organ called the pancreas makes insulin. And insulin allows the sugar in your blood to get into your body's cells. But sometimes the body can't use insulin the right way. So the sugar stays in your blood instead. This is called insulin resistance. The buildup of sugar in your blood means you have prediabetes. The good news is that you may be able to prevent or delay diabetes. Making small lifestyle changes, like getting active and changing your eating habits, may help you get your blood sugar back to normal. You can work with your doctor to make a treatment plan. Follow-up care is a key part of your treatment and safety. Be sure to make and go to all appointments, and call your doctor if you are having problems. It's also a good idea to know your test results and keep a list of the medicines you take. How can you care for yourself at home? · Watch your weight. A healthy weight helps your body use insulin properly. · Limit the amount of calories, sweets, and unhealthy fat you eat. Ask your doctor if you should see a dietitian. A registered dietitian can help you create meal plans that fit your lifestyle. · Get at least 30 minutes of exercise on most days of the week. Exercise helps control your blood sugar. It also helps you maintain a healthy weight. Walking is a good choice. You also may want to do other activities, such as running, swimming, cycling, or playing tennis or team sports. · Do not smoke. Smoking can make prediabetes worse. If you need help quitting, talk to your doctor about stop-smoking programs and medicines. These can increase your chances of quitting for good.   · If your doctor prescribed medicines, take them exactly as prescribed. Call your doctor if you think you are having a problem with your medicine. You will get more details on the specific medicines your doctor prescribes. When should you call for help? Watch closely for changes in your health, and be sure to contact your doctor if:    · You have any symptoms of diabetes. These may include:  ? Being thirsty more often. ? Urinating more. ? Being hungrier. ? Losing weight. ? Being very tired. ? Having blurry vision.     · You have a wound that will not heal.     · You have an infection that will not go away.     · You have problems with your blood pressure.     · You want more information about diabetes and how you can keep from getting it. Where can you learn more? Go to http://www.gray.com/  Enter I222 in the search box to learn more about \"Prediabetes: Care Instructions. \"  Current as of: July 28, 2021               Content Version: 13.2  © 2006-2022 [employer]. Care instructions adapted under license by [employer] (which disclaims liability or warranty for this information). If you have questions about a medical condition or this instruction, always ask your healthcare professional. [address] any warranty or liability for your use of this information.

## 2022-07-26 ENCOUNTER — TELEPHONE (OUTPATIENT)
Dept: INTERNAL MEDICINE CLINIC | Age: 82
End: 2022-07-26

## 2022-07-26 DIAGNOSIS — R42 VERTIGO: Primary | ICD-10-CM

## 2022-07-26 DIAGNOSIS — R51.9 NONINTRACTABLE HEADACHE, UNSPECIFIED CHRONICITY PATTERN, UNSPECIFIED HEADACHE TYPE: ICD-10-CM

## 2022-07-26 NOTE — TELEPHONE ENCOUNTER
Patient called and states that Dr. Goran Kumar had referred her to a neurologist, but she had never heard from them to schedule an appt. She reached out to one she already knows, Dr. Tony Wallace with [facility], and she agreed to see Patient. She has an appt set up for next month, but couldn't remember the exact date at this time. She states that Dr. Niya Luna is requesting notes and information on Patient's condition and is asking if the referral can be changed and faxed to them at [phone]. Patient can be reached at [phone].   Please advise, thank you

## 2022-08-10 ENCOUNTER — OFFICE VISIT (OUTPATIENT)
Dept: PULMONOLOGY | Age: 82
End: 2022-08-10
Payer: MEDICARE

## 2022-08-10 VITALS
SYSTOLIC BLOOD PRESSURE: 126 MMHG | BODY MASS INDEX: 32.63 KG/M2 | OXYGEN SATURATION: 97 % | RESPIRATION RATE: 18 BRPM | TEMPERATURE: 97.4 F | HEIGHT: 60 IN | HEART RATE: 64 BPM | DIASTOLIC BLOOD PRESSURE: 66 MMHG | WEIGHT: 166.2 LBS

## 2022-08-10 DIAGNOSIS — J30.9 ALLERGIC RHINITIS, UNSPECIFIED SEASONALITY, UNSPECIFIED TRIGGER: ICD-10-CM

## 2022-08-10 DIAGNOSIS — R91.8 LUNG NODULES: ICD-10-CM

## 2022-08-10 DIAGNOSIS — J45.20 MILD INTERMITTENT ASTHMA WITHOUT COMPLICATION: Primary | ICD-10-CM

## 2022-08-10 PROCEDURE — 1090F PRES/ABSN URINE INCON ASSESS: CPT | Performed by: INTERNAL MEDICINE

## 2022-08-10 PROCEDURE — G8417 CALC BMI ABV UP PARAM F/U: HCPCS | Performed by: INTERNAL MEDICINE

## 2022-08-10 PROCEDURE — G8427 DOCREV CUR MEDS BY ELIG CLIN: HCPCS | Performed by: INTERNAL MEDICINE

## 2022-08-10 PROCEDURE — G8752 SYS BP LESS 140: HCPCS | Performed by: INTERNAL MEDICINE

## 2022-08-10 PROCEDURE — G8399 PT W/DXA RESULTS DOCUMENT: HCPCS | Performed by: INTERNAL MEDICINE

## 2022-08-10 PROCEDURE — 1101F PT FALLS ASSESS-DOCD LE1/YR: CPT | Performed by: INTERNAL MEDICINE

## 2022-08-10 PROCEDURE — G8536 NO DOC ELDER MAL SCRN: HCPCS | Performed by: INTERNAL MEDICINE

## 2022-08-10 PROCEDURE — 1123F ACP DISCUSS/DSCN MKR DOCD: CPT | Performed by: INTERNAL MEDICINE

## 2022-08-10 PROCEDURE — G8510 SCR DEP NEG, NO PLAN REQD: HCPCS | Performed by: INTERNAL MEDICINE

## 2022-08-10 PROCEDURE — 99214 OFFICE O/P EST MOD 30 MIN: CPT | Performed by: INTERNAL MEDICINE

## 2022-08-10 PROCEDURE — G8754 DIAS BP LESS 90: HCPCS | Performed by: INTERNAL MEDICINE

## 2022-08-10 NOTE — PROGRESS NOTES
HISTORY OF PRESENT ILLNESS  Richardson Zuluaga is a [de-identified] y.o. female following up for asthma. Pt with asthma, no interval exacerbations and only on Albuterol which she uses sparingly. She notes infrequent and short episodes of wheezing and dyspnea in the mornings which resolve on their own. She was previously on Arnuity which was stopped over 2 years ago. She does have allergic rhinitis which is controlled with nasal steroids. SOB increased after pt was diagnosed with COVID in June 2022. She recovered at home. Last spirometry in 2021 showed normal flows. She was followed for lung nodules in the past which on serial CT's have been stable for over 5 years. Review of Systems   Constitutional:  Negative for chills, diaphoresis, fever, malaise/fatigue and weight loss. HENT:  Positive for congestion. Negative for ear discharge, ear pain, hearing loss, nosebleeds, sinus pain, sore throat and tinnitus. Eyes:  Negative for blurred vision, double vision, photophobia, pain, discharge and redness. Respiratory:  Negative for cough, hemoptysis, sputum production and stridor. Shortness of breath: rare. Wheezing: rare. Cardiovascular:  Negative for chest pain, palpitations, orthopnea, claudication, leg swelling and PND. Gastrointestinal:  Positive for abdominal pain. Negative for blood in stool, constipation, diarrhea, heartburn, melena, nausea and vomiting. Genitourinary:  Negative for dysuria, flank pain, frequency, hematuria and urgency. Musculoskeletal:  Positive for back pain ( sciatica). Negative for falls, joint pain, myalgias and neck pain. Skin:  Negative for itching and rash. Neurological:  Negative for dizziness, tingling, tremors, sensory change, speech change, focal weakness, seizures, loss of consciousness, weakness and headaches. Endo/Heme/Allergies:  Positive for environmental allergies. Negative for polydipsia. Does not bruise/bleed easily.    Psychiatric/Behavioral:  Negative for depression, hallucinations, memory loss, substance abuse and suicidal ideas. The patient is not nervous/anxious and does not have insomnia. 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 Max Daily Amount: 2 mg. 10 Tablet 0    gabapentin (Gralise) 600 mg Tb24 Take 2 Tablets by mouth nightly. Max Daily Amount: 2 Tablets. 180 Tablet 1    meclizine (ANTIVERT) 12.5 mg tablet Take 12.5 mg by mouth three (3) times daily as needed for Dizziness. fluticasone propionate (FLONASE) 50 mcg/actuation nasal spray USE 2 SPRAYS IN EACH NOSTRIL DAILY 48 g 3    triamterene-hydroCHLOROthiazide (MAXZIDE) 37.5-25 mg per tablet TAKE ONE-HALF (1/2) TABLET DAILY 45 Tablet 3    mv-min/vit C/glut/lysine/hb124 (AIRBORNE, LYSINE HCL, PO) Take 1 Tablet by mouth daily as needed. fexofenadine (ALLEGRA) 180 mg tablet Take 180 mg by mouth daily. PROAIR HFA 90 mcg/actuation inhaler USE 2 INHALATIONS EVERY 4 HOURS AS NEEDED 3 Inhaler 3    EPINEPHrine (EPIPEN) 0.3 mg/0.3 mL injection 0.3 mL by IntraMUSCular route once as needed for up to 1 dose. 1 Syringe 1    melatonin 3 mg tablet Take 3 mg by mouth daily as needed (sleep). cholecalciferol (VITAMIN D3) 25 mcg (1,000 unit) cap Take 3,000 Units by mouth daily. cycloSPORINE (RESTASIS) 0.05 % dpet Administer 1 Drop to both eyes two (2) times a day. MULTIVITAMIN W-MINERALS/LUTEIN (CENTRUM SILVER PO) Take 1 Tablet by mouth daily. co-enzyme Q-10 (CO Q-10) 100 mg capsule Take 100 mg by mouth daily. BIOTIN PO Take 5,000 mcg by mouth daily. No current facility-administered medications on file prior to visit. Allergies   Allergen Reactions    Naproxen Other (comments)     Blood in urine    Other reaction(s): GI intolerance     Social History     Socioeconomic History    Marital status:      Spouse name: Not on file    Number of children: Not on file    Years of education: Not on file    Highest education level: Not on file   Occupational History    Not on file   Tobacco Use    Smoking status: Former     Packs/day: 1.00     Years: 10.00     Pack years: 10.00     Types: Cigarettes     Quit date: 1970     Years since quittin.6    Smokeless tobacco: Never   Substance and Sexual Activity    Alcohol use: Yes     Alcohol/week: 1.0 standard drink     Types: 1 Glasses of wine per week     Comment: occasional wine    Drug use: No    Sexual activity: Yes     Partners: Male     Birth control/protection: None   Other Topics Concern    Not on file   Social History Narrative    Not on file     Social Determinants of Health     Financial Resource Strain: Not on file   Food Insecurity: Not on file   Transportation Needs: Not on file   Physical Activity: Not on file   Stress: Not on file   Social Connections: Not on file   Intimate Partner Violence: Not on file   Housing Stability: Not on file     Blood pressure 126/66, pulse 64, temperature 97.4 °F (36.3 °C), temperature source Temporal, resp. rate 18, height 5' (1.524 m), weight 75.4 kg (166 lb 3.2 oz), SpO2 97 %. Physical Exam  Constitutional:       General: She is not in acute distress. Appearance: She is well-developed. She is not ill-appearing, toxic-appearing or diaphoretic. HENT:      Head: Normocephalic and atraumatic.       Right Ear: External ear normal.      Left Ear: External ear normal.      Nose:      Comments: Deferred      Mouth/Throat:      Comments: Deferred   Eyes:      General: No scleral icterus. Right eye: No discharge. Left eye: No discharge. Conjunctiva/sclera: Conjunctivae normal.      Pupils: Pupils are equal, round, and reactive to light. Neck:      Thyroid: No thyromegaly. Vascular: No carotid bruit or JVD. Trachea: No tracheal deviation. Cardiovascular:      Rate and Rhythm: Normal rate and regular rhythm. Pulses: Normal pulses. Heart sounds: Normal heart sounds. No murmur heard. No gallop. Pulmonary:      Effort: Pulmonary effort is normal. No respiratory distress. Breath sounds: Normal breath sounds. No stridor. No wheezing or rales. Chest:      Chest wall: No tenderness. Abdominal:      General: Abdomen is flat. Palpations: Abdomen is soft. There is no mass. Tenderness: There is no abdominal tenderness. There is no rebound. Musculoskeletal:         General: No swelling, tenderness, deformity or signs of injury. Cervical back: No rigidity or tenderness. Right lower leg: No edema. Left lower leg: No edema. Lymphadenopathy:      Cervical: No cervical adenopathy. Skin:     General: Skin is warm and dry. Coloration: Skin is not jaundiced or pale. Findings: No bruising, erythema, lesion or rash. Neurological:      General: No focal deficit present. Mental Status: She is alert and oriented to person, place, and time. Coordination: Coordination normal.   Psychiatric:         Mood and Affect: Mood normal.         Behavior: Behavior normal.         Thought Content: Thought content normal.         Judgment: Judgment normal.     CT Results (most recent):  Results from Hospital Encounter encounter on 02/04/22    CT ABD PELV W CONT    Narrative  CT ABDOMEN AND PELVIS WITH IV CONTRAST    COMPARISON: Chest CT 3/7/2017    INDICATIONS: Endometriosis. Endometritis. Nausea. TECHNIQUE:  Following the uneventful administration of 100 cc of Isovue-300,  dynamic enhanced multislice scanning of the abdomen and pelvis was performed  during the portal venous phase using standard 5 mm collimated images. All CT scans at this facility are performed using dose optimization technique as  appropriate to a performed exam, to include automated exposure control,  adjustment of the mA and/or KV according to patient's size (including  appropriate matching for site-specific examinations), or use of iterative  reconstruction technique. CT ABDOMEN FINDINGS: Visualized portions of the lung bases demonstrate mild  fibrotic changes. There are 2 small nodules in the right middle lobe on images 4  and 5 with the largest measuring 5 mm in size. Visualized portions of the heart  and pericardium are normal.    Hepatic steatosis is present. The liver is otherwise normal in appearance. The  gallbladder is normal. The spleen is normal. A small hiatal hernia is present. The pancreas is normal in appearance. Adrenal glands are normal. There are  numerous small cysts involving the right kidney. Kidneys are otherwise normal.  There is no intra-abdominal or retroperitoneal adenopathy. Vascular structures  are normal. There is a small umbilical hernia containing fat without  incarceration. CT PELVIS FINDINGS: Status post hysterectomy. The bladder is normal. There is no  free fluid. There is no pelvic adenopathy. Postoperative changes are seen  involving the anterior pelvic wall. I see no hernia. No specific bowel abnormalities are seen. No significant osseous abnormalities. Impression  1. No acute abnormalities. 2. There are couple of small nodules in the right middle lobe unchanged from  3/7/2017 presumably benign. Mild fibrotic changes are also present in the lung  bases. 3. Hepatic steatosis. Small hiatal hernia. 4. Status post hysterectomy.  Small umbilical hernia containing fat without  incarceration. Additional chronic changes as described above. Spirometry: normal flows and FV loop  ASSESSMENT and PLAN  Encounter Diagnoses   Name Primary? Mild intermittent asthma without complication Yes    Lung nodules     Allergic rhinitis, unspecified seasonality, unspecified trigger        Continue rescue Albuterol , no indication for step up therapy. Continue Flonase,  pt reminded on proper use. Counseled on allergy trigger avoidance. No further indication for CT follow up as nodules have been documented stable x 5 years. Continue rest of medications. RTC 12 months. Spirometry on return.

## 2022-08-10 NOTE — PROGRESS NOTES
Zuleika Tran presents today for   Chief Complaint   Patient presents with    Follow-up    Wheezing       Is someone accompanying this pt? no    Is the patient using any DME equipment during OV? no    -DME Company n/a    Depression Screening:  3 most recent PHQ Screens 8/10/2022   PHQ Not Done -   Little interest or pleasure in doing things Not at all   Feeling down, depressed, irritable, or hopeless Not at all   Total Score PHQ 2 0       Learning Assessment:  Learning Assessment 10/4/2019   PRIMARY LEARNER Patient   HIGHEST LEVEL OF EDUCATION - PRIMARY LEARNER  4 YEARS OF COLLEGE   BARRIERS PRIMARY LEARNER NONE   CO-LEARNER CAREGIVER No   PRIMARY LANGUAGE ENGLISH   LEARNER PREFERENCE PRIMARY DEMONSTRATION   ANSWERED BY patient   RELATIONSHIP SELF       Abuse Screening:  Abuse Screening Questionnaire 7/12/2022   Do you ever feel afraid of your partner? N   Are you in a relationship with someone who physically or mentally threatens you? N   Is it safe for you to go home? Y       Fall Risk  Fall Risk Assessment, last 12 mths 7/12/2022   Able to walk? Yes   Fall in past 12 months? 0   Do you feel unsteady? 0   Are you worried about falling 0         Coordination of Care:  1. Have you been to the ER, urgent care clinic since your last visit? Hospitalized since your last visit? No    2. Have you seen or consulted any other health care providers outside of the [address] [place] [address] since your last visit? Include any pap smears or colon screening.  No

## 2022-08-15 ENCOUNTER — OFFICE VISIT (OUTPATIENT)
Dept: ORTHOPEDIC SURGERY | Age: 82
End: 2022-08-15
Payer: MEDICARE

## 2022-08-15 ENCOUNTER — TELEPHONE (OUTPATIENT)
Dept: PHYSICAL THERAPY | Age: 82
End: 2022-08-15

## 2022-08-15 VITALS
HEART RATE: 65 BPM | TEMPERATURE: 97.1 F | OXYGEN SATURATION: 99 % | HEIGHT: 61 IN | WEIGHT: 167 LBS | BODY MASS INDEX: 31.53 KG/M2

## 2022-08-15 DIAGNOSIS — M75.111 NONTRAUMATIC INCOMPLETE TEAR OF RIGHT ROTATOR CUFF: Primary | ICD-10-CM

## 2022-08-15 PROCEDURE — 1123F ACP DISCUSS/DSCN MKR DOCD: CPT | Performed by: ORTHOPAEDIC SURGERY

## 2022-08-15 PROCEDURE — 99214 OFFICE O/P EST MOD 30 MIN: CPT | Performed by: ORTHOPAEDIC SURGERY

## 2022-08-15 PROCEDURE — G8417 CALC BMI ABV UP PARAM F/U: HCPCS | Performed by: ORTHOPAEDIC SURGERY

## 2022-08-15 PROCEDURE — G8432 DEP SCR NOT DOC, RNG: HCPCS | Performed by: ORTHOPAEDIC SURGERY

## 2022-08-15 PROCEDURE — 1101F PT FALLS ASSESS-DOCD LE1/YR: CPT | Performed by: ORTHOPAEDIC SURGERY

## 2022-08-15 PROCEDURE — G8427 DOCREV CUR MEDS BY ELIG CLIN: HCPCS | Performed by: ORTHOPAEDIC SURGERY

## 2022-08-15 PROCEDURE — G8756 NO BP MEASURE DOC: HCPCS | Performed by: ORTHOPAEDIC SURGERY

## 2022-08-15 PROCEDURE — G8536 NO DOC ELDER MAL SCRN: HCPCS | Performed by: ORTHOPAEDIC SURGERY

## 2022-08-15 PROCEDURE — G8399 PT W/DXA RESULTS DOCUMENT: HCPCS | Performed by: ORTHOPAEDIC SURGERY

## 2022-08-15 PROCEDURE — 1090F PRES/ABSN URINE INCON ASSESS: CPT | Performed by: ORTHOPAEDIC SURGERY

## 2022-08-15 NOTE — PROGRESS NOTES
Sean Edmonds  1940   Chief Complaint   Patient presents with    Follow-up     RSP        HISTORY OF PRESENT ILLNESS  Sean Edmonds is a [de-identified] y.o. female who presents today for evaluation of right shoulder pain. Pt referred by Dr. Catherine Fried. She rates her pain 8/10 today. Pain has been present for around 1 year. Pain has gradually worsened over time. No specific injury. Notes difficulty with reaching up. Has tried PT previously with minimal relief. 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    Cataract     Cylindrical bronchiectasis (HCC)     Fracture     rt ankle as an adult    History of pneumonia     Hypertension     Ill-defined condition     Spasms to left side    Left arm pain     Lumbar spinal stenosis     Myofascial pain     Osteopenia     Sciatica of right side       Social History     Socioeconomic History    Marital status:      Spouse name: Not on file    Number of children: Not on file    Years of education: Not on file    Highest education level: Not on file   Occupational History    Not on file   Tobacco Use    Smoking status: Former     Packs/day: 1.00     Years: 10.00     Pack years: 10.00     Types: Cigarettes     Quit date: 1970     Years since quittin.6    Smokeless tobacco: Never   Substance and Sexual Activity    Alcohol use:  Yes     Alcohol/week: 1.0 standard drink     Types: 1 Glasses of wine per week     Comment: occasional wine    Drug use: No    Sexual activity: Yes     Partners: Male     Birth control/protection: None   Other Topics Concern    Not on file   Social History Narrative Not on file     Social Determinants of Health     Financial Resource Strain: Not on file   Food Insecurity: Not on file   Transportation Needs: Not on file   Physical Activity: Not on file   Stress: Not on file   Social Connections: Not on file   Intimate Partner Violence: Not on file   Housing Stability: Not on file      Past Surgical History:   Procedure Laterality Date    COLONOSCOPY N/A 11/11/2019    COLONOSCOPY w polypectomies performed by Jennifer Echeverria MD at [address] [facility]  8/15/2011    HX HEENT      sinus surgery    HX KERATOPLASTY Left 10/02/2017    HX ORTHOPAEDIC      left 4th finger trigger release    HX SHIELA AND BSO  1970s    uterine fibroids      Family History   Problem Relation Age of Onset    Asthma Mother     COPD Mother     Mult Sclerosis Mother     Heart Disease Mother     Hypertension Mother     Stroke Father     Heart Disease Father     Hypertension Father     Allergic Rhinitis Sister     Asthma Brother     Hypertension Brother     Diabetes Brother     Migraines Paternal Grandmother       Current Outpatient Medications   Medication Sig    diazePAM (Valium) 2 mg tablet Take 0.5 Tablets by mouth every twelve (12) hours as needed (vertigo). Max Daily Amount: 2 mg.    gabapentin (Gralise) 600 mg Tb24 Take 2 Tablets by mouth nightly. Max Daily Amount: 2 Tablets. meclizine (ANTIVERT) 12.5 mg tablet Take 12.5 mg by mouth three (3) times daily as needed for Dizziness. fluticasone propionate (FLONASE) 50 mcg/actuation nasal spray USE 2 SPRAYS IN EACH NOSTRIL DAILY    triamterene-hydroCHLOROthiazide (MAXZIDE) 37.5-25 mg per tablet TAKE ONE-HALF (1/2) TABLET DAILY    mv-min/vit C/glut/lysine/hb124 (AIRBORNE, LYSINE HCL, PO) Take 1 Tablet by mouth daily as needed. fexofenadine (ALLEGRA) 180 mg tablet Take 180 mg by mouth daily.     PROAIR HFA 90 mcg/actuation inhaler USE 2 INHALATIONS EVERY 4 HOURS AS NEEDED    EPINEPHrine (EPIPEN) 0.3 mg/0.3 mL injection 0.3 mL by IntraMUSCular route once as needed for up to 1 dose. melatonin 3 mg tablet Take 3 mg by mouth daily as needed (sleep). cholecalciferol (VITAMIN D3) 25 mcg (1,000 unit) cap Take 3,000 Units by mouth daily. cycloSPORINE (RESTASIS) 0.05 % dpet Administer 1 Drop to both eyes two (2) times a day. MULTIVITAMIN W-MINERALS/LUTEIN (CENTRUM SILVER PO) Take 1 Tablet by mouth daily. co-enzyme Q-10 (CO Q-10) 100 mg capsule Take 100 mg by mouth daily. BIOTIN PO Take 5,000 mcg by mouth daily. No current facility-administered medications for this visit. REVIEW OF SYSTEM   Patient denies: Weight loss, Fever/Chills, HA, Visual changes, Fatigue, Chest pain, SOB, Abdominal pain, N/V/D/C, Blood in stool or urine, Edema. Pertinent positive as above in HPI. All others were negative    PHYSICAL EXAM:   Visit Vitals  Pulse 65   Temp 97.1 °F (36.2 °C) (Temporal)   Ht 5' 1\" (1.549 m)   Wt 167 lb (75.8 kg)   SpO2 99%   BMI 31.55 kg/m²     The patient is a well-developed, well-nourished female   in no acute distress. The patient is alert and oriented times three. The patient is alert and oriented times three. Mood and affect are normal.  LYMPHATIC: lymph nodes are not enlarged and are within normal limits  SKIN: normal in color and non tender to palpation. There are no bruises or abrasions noted. NEUROLOGICAL: Motor sensory exam is within normal limits. Reflexes are equal bilaterally.  There is normal sensation to pinprick and light touch  MUSCULOSKELETAL:   Examination Right shoulder   Skin Intact   AC joint tenderness -   Biceps tenderness -   Forward flexion/Elevation    Active abduction    Glenohumeral abduction 60   External rotation ROM 45   Internal rotation ROM 30   Apprehension -   Ysabels Relocation -   Jerk -   Load and Shift -   Obriens -   Speeds -   Impingement sign +   Supraspinatus/Empty Can +   External Rotation Strength -, 5/5   Lift Off/Belly Press -, 5/5   Neurovascular Intact IMAGING: MRI of right shoulder dated 5/11/2022 was reviewed and read by Dr. Cyndy Giles:   IMPRESSION:  1. Moderate near full thickness tear of the supraspinatus in a 1.6 x 2 cm area  with a likely small full thickness defect. 2.  Small articular sided partial tear of the infraspinatus. 3.  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 4.  Subacromial spur of the acromion. XR of the right shoulder with 2 views obtained at \[facility]\"" Radiology dated 8/04/2021 was reviewed and read by Dr. Cyndy Giles:   IMPRESSION:  No significant radiographic abnormalities. Mild acromial and AC joint degenerative change. IMPRESSION:      ICD-10-CM ICD-9-CM    1. Nontraumatic incomplete tear of right rotator cuff  M75.111 726.13 REFERRAL TO PHYSICAL THERAPY           PLAN:  1. Pt presents today with right shoulder pain due to an MRI-documented partial RCT. Treatment options were discussed with patient today including cortisone injection and course of formal PT. Patient declined cortisone injection at this time and would like to trial PT. Patient was provided with physician-directed home exercise program in the office today. Risk factors include: htn, BMI>30  2. No ultrasound exam indicated today  3. No cortisone injection indicated today   4. Yes Physical/Occupational Therapy indicated today  5. No diagnostic test indicated today:   6. No durable medical equipment indicated today  7. No referral indicated today   8. No medications indicated today:   9. No Narcotic indicated today      RTC 4 weeks      Scribed by Rao Mireles) as dictated by Ernst Nj MD    I, Dr. Ernst Nj, confirm that all documentation is accurate.     Ernst Nj M.D.   [facility]

## 2022-08-22 ENCOUNTER — HOSPITAL ENCOUNTER (OUTPATIENT)
Dept: PHYSICAL THERAPY | Age: 82
Discharge: HOME | End: 2022-08-22
Attending: ORTHOPAEDIC SURGERY
Payer: MEDICARE

## 2022-08-22 DIAGNOSIS — M75.111 NONTRAUMATIC INCOMPLETE TEAR OF RIGHT ROTATOR CUFF: Primary | ICD-10-CM

## 2022-08-22 PROCEDURE — 97162 PT EVAL MOD COMPLEX 30 MIN: CPT

## 2022-08-22 PROCEDURE — 97110 THERAPEUTIC EXERCISES: CPT

## 2022-08-22 PROCEDURE — 97530 THERAPEUTIC ACTIVITIES: CPT

## 2022-08-22 NOTE — PROGRESS NOTES
PT DAILY TREATMENT NOTE     Patient Name: Riley Merlin  Date:2022  : 1940  [x]  Patient  Verified  Payor: [place] MEDICARE / Plan: [place] MEDICARE PART A & B / Product Type: Medicare /    In time:828  Out time:909  Total Treatment Time (min): 41  Visit #: 1 of 8    Medicare/BCBS Only   Total Timed Codes (min):  24 1:1 Treatment Time:  41       Treatment Area: Nontraumatic incomplete tear of right rotator cuff [M75.111]    SUBJECTIVE  Pain Level (0-10 scale): 2-3  Any medication changes, allergies to medications, adverse drug reactions, diagnosis change, or new procedure performed?: [x] No    [] Yes (see summary sheet for update)  Subjective functional status/changes:   [] No changes reported  Patient reports insidious onset of right shoulder pain beginning  or . Patient reporting dull pain to anterior/lateral shoulder pain.      OBJECTIVE    Modality rationale: decrease pain and increase tissue extensibility to improve the patients ability to perform functional tasks with greater ease    Min Type Additional Details    [] Estim:  []Unatt       []IFC  []Premod                        []Other:  []w/ice   []w/heat  Position:  Location:    [] Estim: []Att    []TENS instruct  []NMES                    []Other:  []w/US   []w/ice   []w/heat  Position:  Location:    []  Traction: [] Cervical       []Lumbar                       [] Prone          []Supine                       []Intermittent   []Continuous Lbs:  [] before manual  [] after manual    []  Ultrasound: []Continuous   [] Pulsed                           []1MHz   []3MHz W/cm2:  Location:    []  Iontophoresis with dexamethasone         Location: [] Take home patch   [] In clinic   10 []  Ice     [x]  heat  []  Ice massage  []  Laser   []  Anodyne Position: sitting    Location: right shoulder    []  Laser with stim  []  Other:  Position:  Location:    []  Vasopneumatic Device    []  Right     []  Left  Pre-treatment girth:  Post-treatment girth: Measured at (location):  Pressure:       [] lo [] med [] hi   Temperature: [] lo [] med [] hi   [] Skin assessment post-treatment:  []intact []redness- no adverse reaction    []redness - adverse reaction:     17 min [x]Eval                  []Re-Eval       10 min Therapeutic Exercise:  [x] See flow sheet : Patient provided printed HEP to begin addressing impairments. Patient provided demonstration of exercises and rationale for each exercise to improve compliance to HEP. Education provided on importance of compliance to HEP for improved carry over between therapy session. MHP applied during education. Rationale: increase ROM, increase strength, and improve coordination to improve the patients ability to perform ADLs and self care tasks with greater ease     14 min Therapeutic Activity:  [x]  See flow sheet : Patient education provided on pathology, findings, and treatment plan of care. Rationale: increase strength and improve coordination  to improve the patients ability to perform functional tasks with greater ease            With   [] TE   [] TA   [] neuro   [] other: Patient Education: [x] Review HEP    [] Progressed/Changed HEP based on:   [] positioning   [] body mechanics   [] transfers   [] heat/ice application    [] other:      Other Objective/Functional Measures: see paper chart for evaluation form      Pain Level (0-10 scale) post treatment: 0    ASSESSMENT/Changes in Function: Patient is an [de-identified]year old presenting with right shoulder pain. Patient reports pain beginning earlier this year, though does report falling coming back into her house from the garage on 12/24/2021 that caused her to land on her right side. Patient reports formal PT did help her mobility and pain earlier this year, however has been worsening over the last couple of months.  Per chart, patient has \"near full thickness tear of right supraspinatus, moderate AC osteoarthritis with inferior osteophyte formation, subacromial spur of acromion. \" Patient is right hand dominant and denies limitations prior to onset. Since finishing therapy, she has had greater difficulty with ADLs, self care tasks, reaching overhead, driving, and sleeping due to increased right shoulder pain. Currently, she is using a reacher for most activities due to decreased right shoulder ROM. Patient demonstrates decreased right shoulder ROM, strength, mobility, (+) UT tightness, (+) posterior capsule tightness, and increased TTP through supraspinatus, pec, deltoid, and AC joint. Patient's signs and symptoms consistent with OA and RTC involvement. Patient would benefit from skilled PT 2x/week for 4 weeks to address the above limitations and promote improved ease with daily activities. Patient will continue to benefit from skilled PT services to modify and progress therapeutic interventions, address functional mobility deficits, address ROM deficits, address strength deficits, analyze and address soft tissue restrictions, analyze and cue movement patterns, analyze and modify body mechanics/ergonomics, assess and modify postural abnormalities, and instruct in home and community integration to attain remaining goals. [x]  See Plan of Care  []  See progress note/recertification  []  See Discharge Summary         Progress towards goals / Updated goals:  Short Term Goals: To be accomplished in 2 weeks:  1. Patient will report performance of home exercise program 4 of 7 days in the next week demonstrating compliance to therapy program and progress towards independent management of symptoms. Eval: HEP provided and instructed   2. Patient will increase right shoulder PROM flexion to at least 120 degrees to improve ease with daily activities. Eval: 105*    Long Term Goals: To be accomplished in 4 weeks:  1. Patient will increase right shoulder flexion AROM to at least 110* to improve ease with reaching overhead. Eval: 72*/105*  2.  Patient will increase right shoulder scaption AROM to at least 100* to improve ease with daily activities. Eval 55*/90*  3. Patient will increase right FIR to at least L2 to improve ease with self care tasks. Eval: PSIS with significant discomfort   4. Patient will increase VANITA to at least C7 to improve ease with combing her hair and return to PLOF. Eval: 15* at 45* abduction   5. Patient will increase FOTO score to at least 60 to improve ease with daily and household activities.     Eval: 52     PLAN  []  Upgrade activities as tolerated     [x]  Continue plan of care  []  Update interventions per flow sheet       []  Discharge due to:_  []  Other:_      Anju Axe, PT, DPT 8/22/2022  8:56 AM    Future Appointments   Date Time Provider Kin Malaika   9/12/2022  2:10 PM Marietta Perdomo MD [facility] BS AMB   12/6/2022  8:10 AM [facility] LAB VISIT [facility] BS AMB   12/13/2022  8:20 AM Mary Snell MD [facility] BS AMB   12/14/2022  8:00 AM Ney Whitehead MD [facility] BS AMB

## 2022-08-22 NOTE — PROGRESS NOTES
[facility]  [address]  [place], [address]  [phone] fax    Plan of Care/ Statement of Necessity for Physical Therapy Services    Patient name: Shawn Martínez Start of Care: 2022   Referral source: Swapna Ambrocio,* : 1940    Medical Diagnosis: Nontraumatic incomplete tear of right rotator cuff [M75.111]  Payor: Elmer Arndt / Plan: Minnie Lee / Product Type: Medicare /  Onset Date:2021    Treatment Diagnosis: right shoulder pain   Prior Hospitalization: see medical history Provider#: [id number]   Medications: Verified on Patient summary List    Comorbidities: allergies, arthritis, asthma, back pain, BMI>30, GI disease, headaches, HTN, sleep dysfunction, visual impairment    Prior Level of Function: no limitations, right hand dominant       The Plan of Care and following information is based on the information from the initial evaluation. Assessment/ key information: Patient is an [de-identified]year old presenting with right shoulder pain. Patient reports pain beginning earlier this year, though does report falling coming back into her house from the garage on 2021 that caused her to land on her right side. Patient reports formal PT did help her mobility and pain earlier this year, however has been worsening over the last couple of months. Per chart, patient has \"near full thickness tear of right supraspinatus, moderate AC osteoarthritis with inferior osteophyte formation, subacromial spur of acromion. \" Patient is right hand dominant and denies limitations prior to onset. Since finishing therapy, she has had greater difficulty with ADLs, self care tasks, reaching overhead, driving, and sleeping due to increased right shoulder pain. Currently, she is using a reacher for most activities due to decreased right shoulder ROM.  Patient demonstrates decreased right shoulder ROM, strength, mobility, (+) UT tightness, (+) posterior capsule tightness, and increased TTP through supraspinatus, pec, deltoid, and AC joint. Patient's signs and symptoms consistent with OA and RTC involvement. Patient would benefit from skilled PT 2x/week for 4 weeks to address the above limitations and promote improved ease with daily activities. Evaluation Complexity History HIGH Complexity :3+ comorbidities / personal factors will impact the outcome/ POC ; Examination MEDIUM Complexity : 3 Standardized tests and measures addressing body structure, function, activity limitation and / or participation in recreation  ;Presentation MEDIUM Complexity : Evolving with changing characteristics  ; Clinical Decision Making MEDIUM Complexity : FOTO score of 26-74  Overall Complexity Rating: MEDIUM  Problem List: pain affecting function, decrease ROM, decrease strength, decrease ADL/ functional abilitiies, decrease activity tolerance, decrease flexibility/ joint mobility, and decrease transfer abilities   Treatment Plan may include any combination of the following: Therapeutic exercise, Therapeutic activities, Neuromuscular re-education, Physical agent/modality, Gait/balance training, Manual therapy, Patient education, Self Care training, Functional mobility training, Home safety training, and Stair training  Patient / Family readiness to learn indicated by: asking questions, trying to perform skills, and interest  Persons(s) to be included in education: patient (P)  Barriers to Learning/Limitations: None  Patient Goal (s): get some relief  Patient Self Reported Health Status: good  Rehabilitation Potential: good    Short Term Goals: To be accomplished in 2 weeks:  1. Patient will report performance of home exercise program 4 of 7 days in the next week demonstrating compliance to therapy program and progress towards independent management of symptoms.   2. Patient will increase right shoulder PROM flexion to at least 120 degrees to improve ease with daily activities. Long Term Goals: To be accomplished in 4 weeks:  1. Patient will increase right shoulder flexion AROM to at least 110* to improve ease with reaching overhead. 2. Patient will increase right shoulder scaption AROM to at least 100* to improve ease with daily activities. 3. Patient will increase right FIR to at least L2 to improve ease with self care tasks. 4. Patient will increase VANITA to at least C7 to improve ease with combing her hair and return to PLOF. 5. Patient will increase FOTO score to at least 60 to improve ease with daily and household activities. Frequency / Duration: Patient to be seen 2 times per week for 4 weeks. Patient/ Caregiver education and instruction: Diagnosis, prognosis, activity modification and exercises   [x]  Plan of care has been reviewed with PTA    Certification Period: 8/22/2022 - 9/20/2022  Devin Dove PT, DPT  8/22/2022 9:01 AM    ________________________________________________________________________    I certify that the above Therapy Services are being furnished while the patient is under my care. I agree with the treatment plan and certify that this therapy is necessary.     [de-identified] Signature:____________________  Date:____________Time: _________     Markel Shows,*  Please sign and return to [facility]  [address]  [place], [address]  [phone] fax

## 2022-08-26 ENCOUNTER — HOSPITAL ENCOUNTER (OUTPATIENT)
Dept: PHYSICAL THERAPY | Age: 82
Discharge: HOME | End: 2022-08-26
Attending: ORTHOPAEDIC SURGERY
Payer: MEDICARE

## 2022-08-26 PROCEDURE — 97110 THERAPEUTIC EXERCISES: CPT

## 2022-08-26 PROCEDURE — 97140 MANUAL THERAPY 1/> REGIONS: CPT

## 2022-08-26 NOTE — PROGRESS NOTES
PT DAILY TREATMENT NOTE     Patient Name: Norm Resendez  Date:2022  : 1940  [x]  Patient  Verified  Payor: VA MEDICARE / Plan: VA MEDICARE PART A & B / Product Type: Medicare /    In time:11:15  Out time:11:56  Total Treatment Time (min): 41  Visit #: 2 of 8    Medicare/BCBS Only   Total Timed Codes (min):  31 1:1 Treatment Time:  26       Treatment Area: Right shoulder pain [M25.511]    SUBJECTIVE  Pain Level (0-10 scale): 5-6  Any medication changes, allergies to medications, adverse drug reactions, diagnosis change, or new procedure performed?: [x] No    [] Yes (see summary sheet for update)  Subjective functional status/changes:   [] No changes reported  Pt reports her right shoulder is in pain all the time. OBJECTIVE    Modality rationale: decrease pain and increase tissue extensibility to improve the patients ability to tolerate post workout soreness.    Min Type Additional Details    [] Estim:  []Unatt       []IFC  []Premod                        []Other:  []w/ice   []w/heat  Position:  Location:    [] Estim: []Att    []TENS instruct  []NMES                    []Other:  []w/US   []w/ice   []w/heat  Position:  Location:    []  Traction: [] Cervical       []Lumbar                       [] Prone          []Supine                       []Intermittent   []Continuous Lbs:  [] before manual  [] after manual    []  Ultrasound: []Continuous   [] Pulsed                           []1MHz   []3MHz W/cm2:  Location:    []  Iontophoresis with dexamethasone         Location: [] Take home patch   [] In clinic   10 []  Ice     [x]  heat  []  Ice massage  []  Laser   []  Anodyne Position: seated  Location: right shoulder    []  Laser with stim  []  Other:  Position:  Location:    []  Vasopneumatic Device    []  Right     []  Left  Pre-treatment girth:  Post-treatment girth:  Measured at (location):  Pressure:       [] lo [] med [] hi   Temperature: [] lo [] med [] hi   [x] Skin assessment post-treatment:  [x]intact []redness- no adverse reaction    []redness - adverse reaction:         23 min Therapeutic Exercise:  [x] See flow sheet :   Rationale: increase ROM, increase strength, and improve coordination to improve the patients ability to perform functional task with ease. 8 min Manual Therapy: supine- PROM to the right shoulder, GHJ mobs grade 1, STM to UT/LS. The manual therapy interventions were performed at a separate and distinct time from the therapeutic activities interventions. Rationale: decrease pain, increase ROM, and increase tissue extensibility to improve functional mobility with overhead reaching ADL's. With   [] TE   [] TA   [] neuro   [] other: Patient Education: [x] Review HEP    [] Progressed/Changed HEP based on:   [] positioning   [] body mechanics   [] transfers   [] heat/ice application    [] other:      Other Objective/Functional Measures: initiated therex per flow sheet. Pain Level (0-10 scale) post treatment: 10    ASSESSMENT/Changes in Function:   First follow up session with pt displaying a fair tolerance to therex and putting forth a good effort. Pt informed not to push to far into any pain and to stay within pain free/tolerable limits of ROM. Pt reports HEP understanding and compliance. Pt reports difficulty sleeping at night due to increased pain and notes limited mobility throughout her day. Increased pain in the right shoulder following manual due to increased movement however pt left in no apparent distress. Pt encouraged to ice her shoulder at home to aid with the pain.     Patient will continue to benefit from skilled PT services to modify and progress therapeutic interventions, address functional mobility deficits, address ROM deficits, address strength deficits, analyze and address soft tissue restrictions, analyze and cue movement patterns, analyze and modify body mechanics/ergonomics, and assess and modify postural abnormalities to attain remaining goals. [x]  See Plan of Care  []  See progress note/recertification  []  See Discharge Summary         Progress towards goals / Updated goals:  Long Term Goals: To be accomplished in 4 weeks:  1. Patient will increase right shoulder flexion AROM to at least 110* to improve ease with reaching overhead. Eval: 72*/105*  2. Patient will increase right shoulder scaption AROM to at least 100* to improve ease with daily activities. Eval 55*/90*  3. Patient will increase right FIR to at least L2 to improve ease with self care tasks. Eval: PSIS with significant discomfort   4. Patient will increase VANITA to at least C7 to improve ease with combing her hair and return to PLOF. Eval: 15* at 45* abduction   5. Patient will increase FOTO score to at least 60 to improve ease with daily and household activities.                Eval: 52     PLAN  []  Upgrade activities as tolerated     [x]  Continue plan of care  []  Update interventions per flow sheet       []  Discharge due to:_  []  Other:_      Harshal Opal, PTA 8/26/2022  11:05 AM    Future Appointments   Date Time Provider Kin Dai   8/26/2022 11:15 AM Reji Allen, PTA [facility]   8/31/2022  9:45 AM Reji Allen, PTA [facility]   9/2/2022  2:15 PM Reji Allen, PTA [facility]   9/7/2022 11:15 AM Reji Allen, PTA [facility]   9/9/2022 11:15 AM Andrews Mcbride [facility]   9/12/2022  2:10 PM Markel Stafford MD [facility] AMB   9/14/2022 10:00 AM Conda Cons, PT [facility]   9/16/2022 10:00 AM Conda Cons, PT [facility]   12/6/2022  8:10 AM [facility] LAB VISIT [facility] BS AMB   12/13/2022  8:20 AM Gene Carvajal MD [facility] BS AMB   12/14/2022  8:00 AM [facility]

## 2022-08-29 DIAGNOSIS — Z12.31 ENCOUNTER FOR SCREENING MAMMOGRAM FOR BREAST CANCER: ICD-10-CM

## 2022-08-31 ENCOUNTER — HOSPITAL ENCOUNTER (OUTPATIENT)
Dept: PHYSICAL THERAPY | Age: 82
Discharge: HOME | End: 2022-08-31
Attending: ORTHOPAEDIC SURGERY
Payer: MEDICARE

## 2022-08-31 PROCEDURE — 97110 THERAPEUTIC EXERCISES: CPT

## 2022-08-31 PROCEDURE — 97140 MANUAL THERAPY 1/> REGIONS: CPT

## 2022-08-31 NOTE — PROGRESS NOTES
PT DAILY TREATMENT NOTE     Patient Name: Shawn Martínez  Date:2022  : 1940  [x]  Patient  Verified  Payor: VA MEDICARE / Plan: VA MEDICARE PART A & B / Product Type: Medicare /    In time:9:46  Out time:10:33  Total Treatment Time (min): 52  Visit #: 3 of 8    Medicare/BCBS Only   Total Timed Codes (min):  37 1:1 Treatment Time:  31       Treatment Area: Right shoulder pain [M25.511]    SUBJECTIVE  Pain Level (0-10 scale): 7  Any medication changes, allergies to medications, adverse drug reactions, diagnosis change, or new procedure performed?: [x] No    [] Yes (see summary sheet for update)  Subjective functional status/changes:   [] No changes reported  Pt reports she had a hard time sleeping last night and it has her feeling tired this morning. OBJECTIVE    Modality rationale: decrease pain and increase tissue extensibility to improve the patients ability to perform ADL's with ease.    Min Type Additional Details    [] Estim:  []Unatt       []IFC  []Premod                        []Other:  []w/ice   []w/heat  Position:  Location:    [] Estim: []Att    []TENS instruct  []NMES                    []Other:  []w/US   []w/ice   []w/heat  Position:  Location:    []  Traction: [] Cervical       []Lumbar                       [] Prone          []Supine                       []Intermittent   []Continuous Lbs:  [] before manual  [] after manual    []  Ultrasound: []Continuous   [] Pulsed                           []1MHz   []3MHz W/cm2:  Location:    []  Iontophoresis with dexamethasone         Location: [] Take home patch   [] In clinic   10 []  Ice     [x]  heat  []  Ice massage  []  Laser   []  Anodyne Position:seated  Location: right shoulder     []  Laser with stim  []  Other:  Position:  Location:    []  Vasopneumatic Device    []  Right     []  Left  Pre-treatment girth:  Post-treatment girth:  Measured at (location):  Pressure:       [] lo [] med [] hi   Temperature: [] lo [] med [] hi   [] Skin assessment post-treatment:  []intact []redness- no adverse reaction    []redness - adverse reaction:         27 min Therapeutic Exercise:  [x] See flow sheet :   Rationale: increase ROM, increase strength, and improve coordination to improve the patients ability to perform functional task with ease. 10 min Manual Therapy:   supine- PROM to the right shoulder, GHJ mobs grade 1, STM to UT/LS. Left S/L, Trp release to teres/infra region of the    The manual therapy interventions were performed at a separate and distinct time from the therapeutic activities interventions. Rationale: decrease pain, increase ROM, increase tissue extensibility, and decrease trigger points to improve functional mobility with overhead reaching ADL's. With   [] TE   [] TA   [] neuro   [] other: Patient Education: [x] Review HEP    [] Progressed/Changed HEP based on: []  [] positioning   [] body mechanics   [] transfers    heat/ice application    [] other:      Other Objective/Functional Measures:      Pain Level (0-10 scale) post treatment: 5    ASSESSMENT/Changes in Function:   Significant Trp noted in the infra/Teres region of the right shoulder during manual with pt noting that where a lot of her pain resides. Trp release to the area with pt noting decreased pain post manual.     Patient will continue to benefit from skilled PT services to modify and progress therapeutic interventions, address functional mobility deficits, address ROM deficits, address strength deficits, analyze and address soft tissue restrictions, analyze and cue movement patterns, analyze and modify body mechanics/ergonomics, and assess and modify postural abnormalities to attain remaining goals. [x]  See Plan of Care  []  See progress note/recertification  []  See Discharge Summary         Progress towards goals / Updated goals:  Long Term Goals: To be accomplished in 4 weeks:  1.  Patient will increase right shoulder flexion AROM to at least 110* to improve ease with reaching overhead. Eval: 72*/105*  2. Patient will increase right shoulder scaption AROM to at least 100* to improve ease with daily activities. Eval 55*/90*  3. Patient will increase right FIR to at least L2 to improve ease with self care tasks. Eval: PSIS with significant discomfort   4. Patient will increase VANITA to at least C7 to improve ease with combing her hair and return to PLOF. Eval: 15* at 45* abduction   5. Patient will increase FOTO score to at least 60 to improve ease with daily and household activities.                Eval: 52     PLAN  []  Upgrade activities as tolerated     [x]  Continue plan of care  []  Update interventions per flow sheet       []  Discharge due to:_  []  Other:_      Ryan Wolf PTA 8/31/2022  9:49 AM    Future Appointments   Date Time Provider Kin Dai   9/2/2022  2:15 PM [facility]   9/7/2022 11:15 AM [facility]   9/9/2022 11:15 AM [facility]lier [facility]   9/12/2022  2:10 PM Ginny Cameron MD [facility] BS [facility]   9/14/2022 10:00 AM Jaden Schmidt, PT [facility]   9/16/2022 10:00 AM Jaden Schmidt, PT [facility]   12/6/2022  8:10 AM [facility] LAB VISIT [facility] BS AMB   12/13/2022  8:20 AM Lindsey Mckeon MD [facility] BS AMB   12/14/2022  8:00 AM Henry Morgan MD [facility] BS AMB

## 2022-09-02 ENCOUNTER — APPOINTMENT (OUTPATIENT)
Dept: PHYSICAL THERAPY | Age: 82
End: 2022-09-02
Attending: ORTHOPAEDIC SURGERY
Payer: MEDICARE

## 2022-09-02 ENCOUNTER — TELEPHONE (OUTPATIENT)
Dept: PHYSICAL THERAPY | Age: 82
End: 2022-09-02

## 2022-09-02 DIAGNOSIS — M79.2 NEURITIS: ICD-10-CM

## 2022-09-02 DIAGNOSIS — M54.16 LUMBAR RADICULOPATHY: ICD-10-CM

## 2022-09-02 DIAGNOSIS — M50.30 DDD (DEGENERATIVE DISC DISEASE), CERVICAL: Primary | ICD-10-CM

## 2022-09-02 DIAGNOSIS — M54.12 CERVICAL RADICULOPATHY: ICD-10-CM

## 2022-09-02 NOTE — TELEPHONE ENCOUNTER
Patient is requesting a short supply be sent to [facility] to hold her until the [facility] arrives. Requested Prescriptions     Pending Prescriptions Disp Refills    gabapentin (Gralise) 600 mg Tb24 28 Tablet 0     Sig: Take 2 Tablets by mouth nightly. Max Daily Amount: 2 Tablets. For [facility] in place:   Recommendation Provided To:    Intervention Detail: New Rx: 1, reason: Patient Preference  Gap Closed?:   Intervention Accepted By:   Time Spent (min): 5

## 2022-09-03 ENCOUNTER — PATIENT MESSAGE (OUTPATIENT)
Dept: ORTHOPEDIC SURGERY | Age: 82
End: 2022-09-03

## 2022-09-03 DIAGNOSIS — M50.30 DDD (DEGENERATIVE DISC DISEASE), CERVICAL: ICD-10-CM

## 2022-09-03 DIAGNOSIS — M54.16 LUMBAR RADICULOPATHY: ICD-10-CM

## 2022-09-03 DIAGNOSIS — M79.2 NEURITIS: ICD-10-CM

## 2022-09-03 DIAGNOSIS — M54.12 CERVICAL RADICULOPATHY: ICD-10-CM

## 2022-09-04 RX ORDER — GABAPENTIN 600 MG/1
2 TABLET, FILM COATED ORAL
Qty: 20 TABLET | Refills: 0 | Status: SHIPPED | OUTPATIENT
Start: 2022-09-04

## 2022-09-04 RX ORDER — GABAPENTIN 600 MG/1
2 TABLET, FILM COATED ORAL
Qty: 28 TABLET | Refills: 0 | OUTPATIENT
Start: 2022-09-04

## 2022-09-05 NOTE — TELEPHONE ENCOUNTER
From: Susana Danielle  To:  Colleen Monson MD  Sent: 9/3/2022 3:35 PM EDT  Subject: no respond     called office 10 times to request 10 day refill of gralise 600 mg 2 daily until [facility] script sends no respond im out so will u please respond

## 2022-09-07 ENCOUNTER — HOSPITAL ENCOUNTER (OUTPATIENT)
Dept: PHYSICAL THERAPY | Age: 82
Discharge: HOME | End: 2022-09-07
Attending: ORTHOPAEDIC SURGERY
Payer: MEDICARE

## 2022-09-07 PROCEDURE — 97110 THERAPEUTIC EXERCISES: CPT

## 2022-09-07 PROCEDURE — 97140 MANUAL THERAPY 1/> REGIONS: CPT

## 2022-09-07 NOTE — PROGRESS NOTES
PT DAILY TREATMENT NOTE     Patient Name: Riley Merlin  Date:2022  : 1940  [x]  Patient  Verified  Payor: [place] MEDICARE / Plan: VA MEDICARE PART A & B / Product Type: Medicare /    In time:11:17  Out time:11:51  Total Treatment Time (min): 34  Visit #: 4 of 8    Medicare/BCBS Only   Total Timed Codes (min):  34 1:1 Treatment Time:  28       Treatment Area: Right shoulder pain [M25.511]    SUBJECTIVE  Pain Level (0-10 scale): 2  Any medication changes, allergies to medications, adverse drug reactions, diagnosis change, or new procedure performed?: [x] No    [] Yes (see summary sheet for update)  Subjective functional status/changes:   [] No changes reported  Pt reports she has a tooth ache that seems to be cancelling out the pain in her right shoulder. OBJECTIVE    26 min Therapeutic Exercise:  [x] See flow sheet :   Rationale: increase ROM, increase strength, and improve coordination to improve the patients ability to perform functional task with ease. 8 min Manual Therapy:  supine- PROM to the right shoulder, GHJ mobs grade 1, STM to UT/LS. Left S/L, Trp release to teres/infra region of the    The manual therapy interventions were performed at a separate and distinct time from the therapeutic activities interventions. Rationale: decrease pain, increase ROM, increase tissue extensibility, and decrease trigger points to improve functional mobility with overhead reaching ADL's. With   [] TE   [] TA   [] neuro   [] other: Patient Education: [x] Review HEP    [] Progressed/Changed HEP based on:   [] positioning   [] body mechanics   [] transfers   [] heat/ice application    [] other:      Other Objective/Functional Measures:      Pain Level (0-10 scale) post treatment: 0    ASSESSMENT/Changes in Function:   Pt displays improving right shoulder AROM flexion reaching 90 deg with measuring.  The pt also displays improved ER mobility of th right shoulder with pt able to reach about C4-5. No pain post treatment. Patient will continue to benefit from skilled PT services to modify and progress therapeutic interventions, address functional mobility deficits, address ROM deficits, address strength deficits, analyze and address soft tissue restrictions, analyze and cue movement patterns, analyze and modify body mechanics/ergonomics, and assess and modify postural abnormalities to attain remaining goals. [x]  See Plan of Care  []  See progress note/recertification  []  See Discharge Summary         Progress towards goals / Updated goals:  Long Term Goals: To be accomplished in 4 weeks:  1. Patient will increase right shoulder flexion AROM to at least 110* to improve ease with reaching overhead. Eval: 72*/105*  Current: progressing 9/7/22 right shoulder AROM flexion 90 deg  2. Patient will increase right shoulder scaption AROM to at least 100* to improve ease with daily activities. Eval 55*/90*  3. Patient will increase right FIR to at least L2 to improve ease with self care tasks. Eval: PSIS with significant discomfort   4. Patient will increase VANITA to at least C7 to improve ease with combing her hair and return to PLOF. Eval: 15* at 45* abduction   Current: progressing 9/7/22 VANITA C4-C5  5. Patient will increase FOTO score to at least 60 to improve ease with daily and household activities.                Eval: 52     PLAN  []  Upgrade activities as tolerated     [x]  Continue plan of care  []  Update interventions per flow sheet       []  Discharge due to:_  []  Other:_      Avis Casas, PTA 9/7/2022  11:18 AM    Future Appointments   Date Time Provider Kin Dai   9/9/2022 11:15 AM [facility]   9/12/2022  2:10 PM Kelly Storey MD [facility]   9/14/2022 10:00 AM [facility]   9/16/2022 10:00 AM [facility]   9/20/2022 11:15 AM Cindy Donaldson PT [facility]   12/6/2022  8:10 AM [facility] LAB VISIT [facility] BS AMB   12/13/2022  8:20 AM Rozina Renee MD [facility] BS AMB   12/14/2022  8:00 AM Jw De Paz MD [facility] AMB

## 2022-09-09 ENCOUNTER — APPOINTMENT (OUTPATIENT)
Dept: PHYSICAL THERAPY | Age: 82
End: 2022-09-09
Attending: ORTHOPAEDIC SURGERY
Payer: MEDICARE

## 2022-09-14 ENCOUNTER — APPOINTMENT (OUTPATIENT)
Dept: PHYSICAL THERAPY | Age: 82
End: 2022-09-14
Attending: ORTHOPAEDIC SURGERY
Payer: MEDICARE

## 2022-09-16 ENCOUNTER — APPOINTMENT (OUTPATIENT)
Dept: PHYSICAL THERAPY | Age: 82
End: 2022-09-16
Attending: ORTHOPAEDIC SURGERY
Payer: MEDICARE

## 2022-09-20 ENCOUNTER — HOSPITAL ENCOUNTER (OUTPATIENT)
Dept: PHYSICAL THERAPY | Age: 82
Discharge: HOME | End: 2022-09-20
Attending: ORTHOPAEDIC SURGERY
Payer: MEDICARE

## 2022-09-20 PROCEDURE — 97110 THERAPEUTIC EXERCISES: CPT

## 2022-09-20 PROCEDURE — 97140 MANUAL THERAPY 1/> REGIONS: CPT

## 2022-09-20 NOTE — PROGRESS NOTES
[facility]  [address]  [place], [address]  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]  Payor: VA MEDICARE / Plan: VA MEDICARE PART A & B / Product Type: Medicare /  Onset Date:2021               Treatment Diagnosis: right shoulder pain   Prior Hospitalization: see medical history Provider#: [id number]   Medications: Verified on Patient summary List    Comorbidities: allergies, arthritis, asthma, back pain, BMI>30, GI disease, headaches, HTN, sleep dysfunction, visual impairment    Prior Level of Function: no limitations, right hand dominant                             Visits from Start of Care: 5    Missed Visits: 3    The Plan of Care and following information is based on the patient's current status:      Progress towards goals   Short Term Goals: To be accomplished in 2 weeks:  1. Patient will report performance of home exercise program 4 of 7 days in the next week demonstrating compliance to therapy program and progress towards independent management of symptoms. Eval: HEP provided and instructed    Current: reports HEP compliance  2. Patient will increase right shoulder PROM flexion to at least 120 degrees to improve ease with daily activities. Eval: 105*   Current:  degrees 22    Long Term Goals: To be accomplished in 4 weeks:  1. Patient will increase right shoulder flexion AROM to at least 110* to improve ease with reaching overhead. Eval: 72*/105*   Current: progressing 80 degrees on 22  Current: progressing 22 right shoulder AROM flexion 90 deg  2. Patient will increase right shoulder scaption AROM to at least 100* to improve ease with daily activities. Eval 55*/90*  Current: progressing 95 degrees on 9-2022  3. Patient will increase right FIR to at least L2 to improve ease with self care tasks. Eval: PSIS with significant discomfort    Current: PSIS 9-20-22  4. Patient will increase VANITA to at least C7 to improve ease with combing her hair and return to PLOF. Eval: 15* at 45* abduction    Current: no change 9-20-22  Current: progressing 9/7/22 VANITA C4-C5  5. Patient will increase FOTO score to at least 60 to improve ease with daily and household activities. Eval: 52    Current: progressing well 59 on 9-20-22    Key functional changes: The pt is making progress with PROM and AROM of the right shoulder but does remain limited. Her FOTO score has improved, indicating improved ability for functional activities. The pt will benefit from continued PT to further her current progress. Problems/ barriers to goal attainment: none     Problem List: pain affecting function, decrease ROM, decrease strength, decrease ADL/ functional abilitiies, decrease activity tolerance, and decrease flexibility/ joint mobility    Treatment Plan: Therapeutic exercise, Therapeutic activities, Neuromuscular re-education, Physical agent/modality, Manual therapy, Patient education, and Self Care training     Patient Goal (s) has been updated and includes: \"To get better use of my arm\"     Goals for this certification period to be accomplished in 4 weeks:  1. Patient will increase right shoulder flexion AROM to at least 110* to improve ease with reaching overhead. PN: 80 degrees  2. Patient will increase right shoulder scaption AROM to at least 100* to improve ease with daily activities. PN:  progressing 95 degrees   3. Patient will increase right FIR to at least L2 to improve ease with self care tasks. PN: PSIS      4. Patient will increase VANITA to at least C7 to improve ease with combing her hair and return to PLOF. PN: ear  5. Patient will increase FOTO score to at least 60 to improve ease with daily and household activities. PN: 59        Frequency / Duration: Patient to be seen 2 times per week for 4 weeks:    Assessment / Recommendations: The pt is making gradual progress with PT. She has had to miss a few visits due to oral surgery, which likely delayed her progress. The pt will benefit from continued PT to further her functional status. Certification Period: 09/22/22 to 10/20/22    Marce Rivera, PT 9/20/2022 9:29 AM    ________________________________________________________________________  I certify that the above Therapy Services are being furnished while the patient is under my care. I agree with the treatment plan and certify that this therapy is necessary. [] I have read the above and request that my patient continue as recommended.   [] I have read the above report and request that my patient continue therapy with the following changes/special instructions: _____________________________________________  [] I have read the above report and request that my patient be discharged from therapy    Physician's Signature:____________Date:_________TIME:________     Edda Gilliam Signature, Date and Time must be completed for valid certification **    Please sign and return to [facility] [address] [place] [address] & [facility]  [address]  [place], [address]  [phone] fax

## 2022-09-20 NOTE — PROGRESS NOTES
PT DAILY TREATMENT NOTE     Patient Name: Pilar Hess  Date:2022  : 1940  [x]  Patient  Verified  Payor: VA MEDICARE / Plan: VA MEDICARE PART A & B / Product Type: Medicare /    In [id number]  Out [id number]  Total Treatment Time (min): 40  Visit #: 5 of 8    Medicare/BCBS Only   Total Timed Codes (min):  40 1:1 Treatment Time:  40       Treatment Area: Right shoulder pain [M25.511]    SUBJECTIVE  Pain Level (0-10 scale): 3  Any medication changes, allergies to medications, adverse drug reactions, diagnosis change, or new procedure performed?: [x] No    [] Yes (see summary sheet for update)  Subjective functional status/changes:   [] No changes reported  The pt reports improvement but is still limited with right UE use,     OBJECTIVE      30 min Therapeutic Exercise:  [x] See flow sheet :   Rationale: increase ROM and increase strength to improve the patients ability to perform ADL      10 min Manual Therapy:  Pt supine grade II/III GHJ post glides, PROM of the right shoudler   The manual therapy interventions were performed at a separate and distinct time from the therapeutic activities interventions. Rationale: decrease pain, increase ROM, and increase tissue extensibility to perform ADL    With   [] TE   [] TA   [] neuro   [] other: Patient Education: [x] Review HEP    [] Progressed/Changed HEP based on:   [] positioning   [] body mechanics   [] transfers   [] heat/ice application    [] other:      Other Objective/Functional Measures:  added sidelying abduction and increased reps per flow sheet     Pain Level (0-10 scale) post treatment: 0    ASSESSMENT/Changes in Function: The pt is making progress with PROM and AROM of the right shoulder but does remain limited. Her FOTO score has improved, indicating improved ability for functional activities. The pt will benefit from continued PT to further her current progress.      Patient will continue to benefit from skilled PT services to \   to attain remaining goals. [x]  See Plan of Care  []  See progress note/recertification  []  See Discharge Summary         Progress towards goals / Updated goals:  Short Term Goals: To be accomplished in 2 weeks:  1. Patient will report performance of home exercise program 4 of 7 days in the next week demonstrating compliance to therapy program and progress towards independent management of symptoms. Eval: HEP provided and instructed    Current: reports HEP compliance  2. Patient will increase right shoulder PROM flexion to at least 120 degrees to improve ease with daily activities. Eval: 105*   Current:  degrees 9-20-22    Long Term Goals: To be accomplished in 4 weeks:  1. Patient will increase right shoulder flexion AROM to at least 110* to improve ease with reaching overhead. Eval: 72*/105*   Current: progressing 80 degrees on 9-20-22  2. Patient will increase right shoulder scaption AROM to at least 100* to improve ease with daily activities. Eval 55*/90*  Current: progressing 95 degrees on 9-2022  3. Patient will increase right FIR to at least L2 to improve ease with self care tasks. Eval: PSIS with significant discomfort    Current: PSIS 9-20-22  4. Patient will increase VANITA to at least C7 to improve ease with combing her hair and return to PLOF. Eval: 15* at 45* abduction    Current: no change 9-20-22  Current: progressing 9/7/22 VANITA C4-C5  5. Patient will increase FOTO score to at least 60 to improve ease with daily and household activities.                Eval: 52    Current: progressing well 59 on 9-20-22     PLAN  []  Upgrade activities as tolerated     [x]  Continue plan of care  []  Update interventions per flow sheet       []  Discharge due to:_  []  Other:_      Lenore Parsons, PT 9/20/2022  9:19 AM    Future Appointments   Date Time Provider Kin Dai   12/6/2022  8:10 AM [facility] LAB [facility]ISIT [facility] 12/13/2022  8:20 AM Vanessa Velasquez MD [facility] AM[facility]   12/14/2022  8:00 AM Aracely Jiang MD [facility] BS AMB

## 2022-09-26 RX ORDER — TRIAMTERENE/HYDROCHLOROTHIAZID 37.5-25 MG
TABLET ORAL
Qty: 45 TABLET | Refills: 3 | Status: SHIPPED | OUTPATIENT
Start: 2022-09-26

## 2022-09-27 ENCOUNTER — HOSPITAL ENCOUNTER (OUTPATIENT)
Dept: PHYSICAL THERAPY | Age: 82
Discharge: HOME | End: 2022-09-27
Attending: ORTHOPAEDIC SURGERY
Payer: MEDICARE

## 2022-09-27 ENCOUNTER — OFFICE VISIT (OUTPATIENT)
Dept: ORTHOPEDIC SURGERY | Age: 82
End: 2022-09-27
Payer: MEDICARE

## 2022-09-27 VITALS — BODY MASS INDEX: 31.72 KG/M2 | WEIGHT: 168 LBS | HEIGHT: 61 IN | TEMPERATURE: 97.7 F

## 2022-09-27 DIAGNOSIS — M54.16 LUMBAR RADICULOPATHY: Primary | ICD-10-CM

## 2022-09-27 PROCEDURE — G8399 PT W/DXA RESULTS DOCUMENT: HCPCS | Performed by: PHYSICIAN ASSISTANT

## 2022-09-27 PROCEDURE — 1101F PT FALLS ASSESS-DOCD LE1/YR: CPT | Performed by: PHYSICIAN ASSISTANT

## 2022-09-27 PROCEDURE — 97140 MANUAL THERAPY 1/> REGIONS: CPT

## 2022-09-27 PROCEDURE — 1090F PRES/ABSN URINE INCON ASSESS: CPT | Performed by: PHYSICIAN ASSISTANT

## 2022-09-27 PROCEDURE — 1123F ACP DISCUSS/DSCN MKR DOCD: CPT | Performed by: PHYSICIAN ASSISTANT

## 2022-09-27 PROCEDURE — G8432 DEP SCR NOT DOC, RNG: HCPCS | Performed by: PHYSICIAN ASSISTANT

## 2022-09-27 PROCEDURE — G8417 CALC BMI ABV UP PARAM F/U: HCPCS | Performed by: PHYSICIAN ASSISTANT

## 2022-09-27 PROCEDURE — G8427 DOCREV CUR MEDS BY ELIG CLIN: HCPCS | Performed by: PHYSICIAN ASSISTANT

## 2022-09-27 PROCEDURE — 99213 OFFICE O/P EST LOW 20 MIN: CPT | Performed by: PHYSICIAN ASSISTANT

## 2022-09-27 PROCEDURE — 97110 THERAPEUTIC EXERCISES: CPT

## 2022-09-27 PROCEDURE — G8536 NO DOC ELDER MAL SCRN: HCPCS | Performed by: PHYSICIAN ASSISTANT

## 2022-09-27 PROCEDURE — G8756 NO BP MEASURE DOC: HCPCS | Performed by: PHYSICIAN ASSISTANT

## 2022-09-27 NOTE — PROGRESS NOTES
Pricilla Jacobsen  1940   Chief Complaint   Patient presents with    Shoulder Pain     Rt           HISTORY OF PRESENT ILLNESS  Pricilla Jacobsen is a [de-identified] y.o. female who presents today for evaluation of right shoulder pain. She rates her pain 2/10 today. She presents in follow up following 6 sessions of therapy. She overall feels like she is improving. Notes more soreness now than anything else. Pain has been present for around 1 year. Pain has gradually worsened over time. No specific injury. Notes difficulty with reaching up. Has tried PT previously with minimal relief. Has tried following treatments: Injections:NO; Brace:NO; Therapy:YES; Cane/Crutch:NO   Patient denies any fever, chills, chest pain, shortness of breath or calf pain. The remainder of the review of systems is negative. There are no new illness or injuries to report since last seen in the office. No changes in medications, allergies, social or family history. PHYSICAL EXAM:   Visit Vitals  Temp 97.7 °F (36.5 °C) (Temporal)   Ht 5' 1\" (1.549 m)   Wt 168 lb (76.2 kg)   BMI 31.74 kg/m²       The patient is a well-developed, well-nourished female   in no acute distress. The patient is alert and oriented times three. The patient is alert and oriented times three. Mood and affect are normal.  LYMPHATIC: lymph nodes are not enlarged and are within normal limits  SKIN: normal in color and non tender to palpation. There are no bruises or abrasions noted. NEUROLOGICAL: Motor sensory exam is within normal limits. Reflexes are equal bilaterally.  There is normal sensation to pinprick and light touch  MUSCULOSKELETAL:   Examination Right shoulder   Skin Intact   AC joint tenderness -   Biceps tenderness -   Forward flexion/Elevation    Active abduction    Glenohumeral abduction 60   External rotation ROM 45   Internal rotation ROM 30   Apprehension -   Ysabels Relocation -   Jerk -   Load and Shift -   Obriens -   Speeds - Impingement sign +   Supraspinatus/Empty Can +   External Rotation Strength -, 5/5   Lift Off/Belly Press -, 5/5   Neurovascular Intact       IMAGING: MRI of right shoulder dated 5/11/2022 was reviewed and read by Dr. Elly Francis:   IMPRESSION:  1. Moderate near full thickness tear of the supraspinatus in a 1.6 x 2 cm area  with a likely small full thickness defect. 2.  Small articular sided partial tear of the infraspinatus. 3.  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 4.  Subacromial spur of the acromion. XR of the right shoulder with 2 views obtained at [facility] dated 8/04/2021 was reviewed and read by Dr. Elly Francis:   IMPRESSION:  No significant radiographic abnormalities. Mild acromial and AC joint degenerative change. IMPRESSION:      ICD-10-CM ICD-9-CM    1. Lumbar radiculopathy  M54.16 724.4            PLAN:  1. Pt presents today with right shoulder pain due to an MRI-documented partial RCT. Treatment options were discussed with patient today including cortisone injection and course of formal PT. Patient declined cortisone injection at this time and would like to continue PT. Patient was provided with physician-directed home exercise program in the office today. Risk factors include: htn, BMI>30  2. No ultrasound exam indicated today  3. No cortisone injection indicated today   4. Yes Physical/Occupational Therapy indicated today  5. No diagnostic test indicated today:   6. No durable medical equipment indicated today  7. No referral indicated today   8. No medications indicated today:   9.  No Narcotic indicated today      RTC PRN    [facility]

## 2022-09-27 NOTE — PROGRESS NOTES
PT DAILY TREATMENT NOTE     Patient Name: Conrad Farah  Date:2022  : 1940  [x]  Patient  Verified  Payor: VA MEDICARE / Plan: VA MEDICARE PART A & B / Product Type: Medicare /    In time:0700  Out time:45  Total Treatment Time (min): 45  Visit #: 6 of 8    Medicare/BCBS Only   Total Timed Codes (min):  45 1:1 Treatment Time:  45       Treatment Area: Right shoulder pain [M25.511]    SUBJECTIVE  Pain Level (0-10 scale): 3  Any medication changes, allergies to medications, adverse drug reactions, diagnosis change, or new procedure performed?: [x] No    [] Yes (see summary sheet for update)  Subjective functional status/changes:   [] No changes reported  The pt reports a little pain is there.      OBJECTIVE    Modality rationale:    Min Type Additional Details    [] Estim:  []Unatt       []IFC  []Premod                        []Other:  []w/ice   []w/heat  Position:  Location:    [] Estim: []Att    []TENS instruct  []NMES                    []Other:  []w/US   []w/ice   []w/heat  Position:  Location:    []  Traction: [] Cervical       []Lumbar                       [] Prone          []Supine                       []Intermittent   []Continuous Lbs:  [] before manual  [] after manual    []  Ultrasound: []Continuous   [] Pulsed                           []1MHz   []3MHz W/cm2:  Location:    []  Iontophoresis with dexamethasone         Location: [] Take home patch   [] In clinic    []  Ice     []  heat  []  Ice massage  []  Laser   []  Anodyne Position:  Location:    []  Laser with stim  []  Other:  Position:  Location:    []  Vasopneumatic Device    []  Right     []  Left  Pre-treatment girth:  Post-treatment girth:  Measured at (location):  Pressure:       [] lo [] med [] hi   Temperature: [] lo [] med [] hi   [] Skin assessment post-treatment:  []intact []redness- no adverse reaction    []redness - adverse reaction:         37 min Therapeutic Exercise:  [x] See flow sheet :   Rationale: increase ROM and increase strength to improve the patients ability to perform ADL      8 min Manual Therapy:  Pt supine grade II/III GHJ post glides and PROM of the right shoulder   The manual therapy interventions were performed at a separate and distinct time from the therapeutic activities interventions. Rationale: increase ROM and increase tissue extensibility to perform ADL              With   [] TE   [] TA   [] neuro   [] other: Patient Education: [x] Review HEP    [] Progressed/Changed HEP based on:   [] positioning   [] body mechanics   [] transfers   [] heat/ice application    [] other:      Other Objective/Functional Measures: added UBE and progressed to wall slides     Pain Level (0-10 scale) post treatment: 0    ASSESSMENT/Changes in Function:  The pt was able to progress with therex today without complaint. She was challenged with wall slides in scaption plane but was able to perform. Patient will continue to benefit from skilled PT services to modify and progress therapeutic interventions, address functional mobility deficits, address ROM deficits, address strength deficits, analyze and address soft tissue restrictions, and analyze and cue movement patterns to attain remaining goals. [x]  See Plan of Care  []  See progress note/recertification  []  See Discharge Summary         Progress towards goals / Updated goals:  Goals for this certification period to be accomplished in 4 weeks:  1. Patient will increase right shoulder flexion AROM to at least 110* to improve ease with reaching overhead. PN: 80 degrees  2. Patient will increase right shoulder scaption AROM to at least 100* to improve ease with daily activities. PN:  progressing 95 degrees   3. Patient will increase right FIR to at least L2 to improve ease with self care tasks. PN: PSIS            4. Patient will increase VANITA to at least C7 to improve ease with combing her hair and return to PLOF.                PN: ear  5. Patient will increase FOTO score to at least 60 to improve ease with daily and household activities.                PN: 61     PLAN  []  Upgrade activities as tolerated     []  Continue plan of care  []  Update interventions per flow sheet       []  Discharge due to:_  []  Other:_      Nicolasa Saab, PT 9/27/2022  7:08 AM    Future Appointments   Date Time Provider Kin Malaika   9/30/2022 10:45 AM Perez Loera, PT [facility]   10/4/2022 12:00 PM Darby Alfaro, PT [facility]   10/6/2022  9:15 AM Prasad Dover, PTA [facility]   10/10/2022 11:15 AM Valrenetta Dover, PTA [facility]   10/12/2022 10:30 AM Prasad Dover, PTA [facility]   10/18/2022  9:00 AM Darby Alfaro, PT [facility]   10/20/2022  9:15 AM Prasad Dover, PTA [facility]   12/6/2022  8:10 AM [facility]   12/13/2022  8:20 AM Burt Molnia MD [facility]   12/14/2022  8:00 AM Aki Rhoades MD [facility] BS AMB

## 2022-09-30 ENCOUNTER — HOSPITAL ENCOUNTER (OUTPATIENT)
Dept: PHYSICAL THERAPY | Age: 82
Discharge: HOME | End: 2022-09-30
Attending: ORTHOPAEDIC SURGERY
Payer: MEDICARE

## 2022-09-30 PROCEDURE — 97112 NEUROMUSCULAR REEDUCATION: CPT

## 2022-09-30 PROCEDURE — 97140 MANUAL THERAPY 1/> REGIONS: CPT

## 2022-09-30 PROCEDURE — 97110 THERAPEUTIC EXERCISES: CPT

## 2022-09-30 NOTE — PROGRESS NOTES
PT DAILY TREATMENT NOTE     Patient Name: Conrad Farah  Date:2022  : 1940  [x]  Patient  Verified  Payor: [place] MEDICARE / Plan: [place] MEDICARE PART A & B / Product Type: Medicare /    In time:10:45  Out time:11:34  Total Treatment Time (min): 52  Visit #: 2 of 8    Medicare/BCBS Only   Total Timed Codes (min):  39 1:1 Treatment Time:  39       Treatment Area: Right shoulder pain [M25.511]    SUBJECTIVE  Pain Level (0-10 scale): 4/10  Any medication changes, allergies to medications, adverse drug reactions, diagnosis change, or new procedure performed?: [x] No    [] Yes (see summary sheet for update)  Subjective functional status/changes:   [] No changes reported  The patient states that her shoulder is just sore this morning. OBJECTIVE  Modality rationale: decrease pain and increase tissue extensibility to improve the patients ability to improve ADL ease. Min Type Additional Details   10 []  Ice     [x]  heat  []  Ice massage  []  Laser   []  Anodyne Position: seated  Location: right shoulder   [] Skin assessment post-treatment:  []intact []redness- no adverse reaction    []redness - adverse reaction:     19 min Therapeutic Exercise:  [x] See flow sheet :   Rationale: increase ROM and increase strength to improve the patients ability to improve ADL ease. 12 min Neuromuscular Re-education:  [x]  See flow sheet :   Rationale: increase ROM and increase strength  to improve the patients ability to improve ADL ease. 8 min Manual Therapy:  Right GHJ inferior/posterior capsular mobs performed with the patient in supine grade III. The manual therapy interventions were performed at a separate and distinct time from the therapeutic activities interventions. Rationale: decrease pain, increase ROM, and increase tissue extensibility to improve ADL ease.          With   [] TE   [] TA   [] neuro   [] other: Patient Education: [x] Review HEP    [] Progressed/Changed HEP based on:   [] positioning   [] body mechanics   [] transfers   [] heat/ice application    [] other:      Other Objective/Functional Measures:      Pain Level (0-10 scale) post treatment: 0/10    ASSESSMENT/Changes in Function: The patient demonstrates some mild capsular restrictions through her right shoulder into frontal plane and sagittal plane as well. Performed interventions without increase in pain. The patient left denying pain and all questions answered. Still most limited with elevation. Patient will continue to benefit from skilled PT services to address ROM deficits, address strength deficits, analyze and address soft tissue restrictions, analyze and cue movement patterns, analyze and modify body mechanics/ergonomics, assess and modify postural abnormalities, and instruct in home and community integration to attain remaining goals. []  See Plan of Care  []  See progress note/recertification  []  See Discharge Summary         Progress towards goals / Updated goals:  Goals for this certification period to be accomplished in 4 weeks:  1. Patient will increase right shoulder flexion AROM to at least 110* to improve ease with reaching overhead. PN: 80 degrees  2. Patient will increase right shoulder scaption AROM to at least 100* to improve ease with daily activities. PN:  progressing 95 degrees  3. Patient will increase right FIR to at least L2 to improve ease with self care tasks. PN: PSIS  4. Patient will increase VANITA to at least C7 to improve ease with combing her hair and return to PLOF. PN: ear  5. Patient will increase FOTO score to at least 60 to improve ease with daily and household activities.               PN: 61    PLAN  [x]  Upgrade activities as tolerated     []  Continue plan of care  []  Update interventions per flow sheet       []  Discharge due to:_  []  Other:_      Yosi Piper PT 9/30/2022  11:08 AM    Future Appointments   Date Time Provider [place]   10/4/2022 12:00 PM Corene Perches, PT [facility]   10/6/2022  9:15 AM Jeyson Shear, PTA [facility]   10/10/2022 11:15 AM Jeyson Shear, PTA [facility]   10/12/2022 10:30 AM Jeyson Shear, PTA [facility]   10/18/2022  9:00 AM Corene Perches, PT [facility]   10/20/2022  9:15 AM Jeyson Shear, PTA [facility]   12/6/2022  8:10 AM [facility] LAB VISIT [facility] BS AMB   12/13/2022  8:20 AM Sussy Kemp MD [facility] BS AMB   12/14/2022  8:00 AM Hayley Orozco MD [facility] BS AMB

## 2022-10-04 ENCOUNTER — HOSPITAL ENCOUNTER (OUTPATIENT)
Dept: PHYSICAL THERAPY | Age: 82
Discharge: HOME | End: 2022-10-04
Attending: ORTHOPAEDIC SURGERY
Payer: MEDICARE

## 2022-10-04 PROCEDURE — 97112 NEUROMUSCULAR REEDUCATION: CPT

## 2022-10-04 PROCEDURE — 97110 THERAPEUTIC EXERCISES: CPT

## 2022-10-04 PROCEDURE — 97140 MANUAL THERAPY 1/> REGIONS: CPT

## 2022-10-04 NOTE — PROGRESS NOTES
PT DAILY TREATMENT NOTE     Patient Name: Soraya Dodd  Date:10/4/2022  : 1940  [x]  Patient  Verified  Payor: [place] MEDICARE / Plan: VA MEDICARE PART A & B / Product Type: Medicare /    In time:11:59  Out time:12:51  Total Treatment Time (min): 52  Visit #: 3 of 8    Medicare/BCBS Only   Total Timed Codes (min):  42 1:1 Treatment Time:  42       Treatment Area: Right shoulder pain [M25.511]    SUBJECTIVE  Pain Level (0-10 scale): 5/10  Any medication changes, allergies to medications, adverse drug reactions, diagnosis change, or new procedure performed?: [x] No    [] Yes (see summary sheet for update)  Subjective functional status/changes:   [] No changes reported  The patient states that she has been de-cluttering and her right shoulder     OBJECTIVE  Modality rationale: decrease pain and increase tissue extensibility to improve the patients ability to improve ADL ease. Min Type Additional Details   10 []  Ice     [x]  heat  []  Ice massage  []  Laser   []  Anodyne Position: seated  Location: right shoulder   [] Skin assessment post-treatment:  []intact []redness- no adverse reaction    []redness - adverse reaction:     19 min Therapeutic Exercise:  [] See flow sheet :   Rationale: increase ROM and increase strength to improve the patients ability to improve ADL ease. 15 min Neuromuscular Re-education:  []  See flow sheet :   Rationale: increase ROM and increase strength  to improve the patients ability to improve ADL ease. 8 min Manual Therapy:  UT/LS MFR with patient in supine and right humerus supported by towel roll. GHJ inferior/posterior capsular mobs performed with the patient in supine grade II into flexion/scaption, and gentle ER. The manual therapy interventions were performed at a separate and distinct time from the therapeutic activities interventions. Rationale: decrease pain, increase ROM, and increase tissue extensibility to improve ADL ease.        With   [] TE   [] TA   [] neuro   [] other: Patient Education: [x] Review HEP    [] Progressed/Changed HEP based on:   [] positioning   [] body mechanics   [] transfers   [] heat/ice application    [] other:      Other Objective/Functional Measures:   Held some exercises due to increased pain today. Pain Level (0-10 scale) post treatment: 4/10    ASSESSMENT/Changes in Function: The patient did have increased pain today, indicating she has been doing some more cleaning as well as de-cluttering her home which she feels resulted in an increase in pain for her. Held some exercises due to increase in pain through anterior shoulder today. She was able to leave in slightly less pain today. PT recommended holding cleaning today due to higher pain levels to allow shoulder a chance to rest.     Patient will continue to benefit from skilled PT services to modify and progress therapeutic interventions, address functional mobility deficits, address ROM deficits, address strength deficits, analyze and address soft tissue restrictions, analyze and cue movement patterns, analyze and modify body mechanics/ergonomics, assess and modify postural abnormalities, and instruct in home and community integration to attain remaining goals. []  See Plan of Care  []  See progress note/recertification  []  See Discharge Summary         Progress towards goals / Updated goals:  Goals for this certification period to be accomplished in 4 weeks:  1. Patient will increase right shoulder flexion AROM to at least 110* to improve ease with reaching overhead. PN: 80 degrees  2. Patient will increase right shoulder scaption AROM to at least 100* to improve ease with daily activities. PN:  progressing 95 degrees  3. Patient will increase right FIR to at least L2 to improve ease with self care tasks. PN: PSIS  4. Patient will increase VANITA to at least C7 to improve ease with combing her hair and return to PLOF. PN: ear  5. Patient will increase FOTO score to at least 60 to improve ease with daily and household activities.               PN: 61     PLAN  [x]  Upgrade activities as tolerated     []  Continue plan of care  []  Update interventions per flow sheet       []  Discharge due to:_  []  Other:_      Graciela Temple, PT 10/4/2022  12:15 PM    Future Appointments   Date Time Provider [place]   10/6/2022  9:15 AM Stephen Kaba, PTA [facility]   10/10/2022 11:15 AM Stephen Kaba, PTA [facility]   10/12/2022 10:30 AM Stephen Kaba, PTA [facility]   10/18/2022  9:00 AM Yahaira Marshall, PT [facility]   10/20/2022  9:15 AM Stephen Kaba, PTA [facility]   12/6/2022  8:10 AM [facility]   12/13/2022  8:20 AM Edwige Wheatley MD [facility] AM[facility]   12/14/2022  8:00 AM Boston Pack MD [facility] BS AMB

## 2022-10-06 ENCOUNTER — HOSPITAL ENCOUNTER (OUTPATIENT)
Dept: PHYSICAL THERAPY | Age: 82
Discharge: HOME | End: 2022-10-06
Attending: ORTHOPAEDIC SURGERY
Payer: MEDICARE

## 2022-10-06 PROCEDURE — 97110 THERAPEUTIC EXERCISES: CPT

## 2022-10-06 PROCEDURE — 97112 NEUROMUSCULAR REEDUCATION: CPT

## 2022-10-06 PROCEDURE — 97140 MANUAL THERAPY 1/> REGIONS: CPT

## 2022-10-06 NOTE — PROGRESS NOTES
PT DAILY TREATMENT NOTE     Patient Name: Telma Culver  Date:10/6/2022  : 1940  [x]  Patient  Verified  Payor: VA MEDICARE / Plan: VA MEDICARE PART A & B / Product Type: Medicare /    In time:9:18  Out time:10:10  Total Treatment Time (min): 52  Visit #: 4 of 8    Medicare/BCBS Only   Total Timed Codes (min):  42 1:1 Treatment Time:  42       Treatment Area: Right shoulder pain [M25.511]    SUBJECTIVE  Pain Level (0-10 scale): 2  Any medication changes, allergies to medications, adverse drug reactions, diagnosis change, or new procedure performed?: [x] No    [] Yes (see summary sheet for update)  Subjective functional status/changes:   [] No changes reported  Pt reports her right shoulder is feeling a lot better than it did at her last session. OBJECTIVE    Modality rationale: decrease pain and increase tissue extensibility to improve the patients ability to perform ADL's with ease.    Min Type Additional Details    [] Estim:  []Unatt       []IFC  []Premod                        []Other:  []w/ice   []w/heat  Position:  Location:    [] Estim: []Att    []TENS instruct  []NMES                    []Other:  []w/US   []w/ice   []w/heat  Position:  Location:    []  Traction: [] Cervical       []Lumbar                       [] Prone          []Supine                       []Intermittent   []Continuous Lbs:  [] before manual  [] after manual    []  Ultrasound: []Continuous   [] Pulsed                           []1MHz   []3MHz W/cm2:  Location:    []  Iontophoresis with dexamethasone         Location: [] Take home patch   [] In clinic   10 []  Ice     [x]  heat  []  Ice massage  []  Laser   []  Anodyne Position: seated  Location: right shoulder    []  Laser with stim  []  Other:  Position:  Location:    []  Vasopneumatic Device    []  Right     []  Left  Pre-treatment girth:  Post-treatment girth:  Measured at (location):  Pressure:       [] lo [] med [] hi   Temperature: [] lo [] med [] hi   [] Skin assessment post-treatment:  []intact []redness- no adverse reaction    []redness - adverse reaction:         19 min Therapeutic Exercise:  [x] See flow sheet :   Rationale: increase ROM, increase strength, and improve coordination to improve the patients ability to perform functional task with ease. 15 min Neuromuscular Re-education:  [x]  See flow sheet :   Rationale: increase strength, improve coordination, and increase proprioception  to improve the patients ability to perform ADL's with ease. 8 min Manual Therapy:  UT/LS MFR with patient in supine and right humerus supported by towel roll. GHJ inferior/posterior capsular mobs performed with the patient in supine grade II into flexion/scaption, and gentle ER. The manual therapy interventions were performed at a separate and distinct time from the therapeutic activities interventions. Rationale: decrease pain, increase ROM, and increase tissue extensibility to improve functional mobility with overhead reaching ADL's. With   [] TE   [] TA   [] neuro   [] other: Patient Education: [x] Review HEP    [] Progressed/Changed HEP based on:   [] positioning   [] body mechanics   [] transfers   [] heat/ice application    [] other:      Other Objective/Functional Measures:      Pain Level (0-10 scale) post treatment: 2    ASSESSMENT/Changes in Function:   Resumed therex as prescribed due to pt reporting decreased pain in the right shoulder. Improving ER ROM and mobility noted and measured today with pt meeting goal number 4 with VANITA at C2-C3. Scaption wall slides resumed as well with pt noting pain in the right shoulder but able to perform with fair form. Pt notes no significant changes in pain post treatment.     Patient will continue to benefit from skilled PT services to modify and progress therapeutic interventions, address functional mobility deficits, address ROM deficits, address strength deficits, analyze and address soft tissue restrictions, analyze and cue movement patterns, analyze and modify body mechanics/ergonomics, and assess and modify postural abnormalities to attain remaining goals. [x]  See Plan of Care  []  See progress note/recertification  []  See Discharge Summary         Progress towards goals / Updated goals:  Goals for this certification period to be accomplished in 4 weeks:  1. Patient will increase right shoulder flexion AROM to at least 110* to improve ease with reaching overhead. PN: 80 degrees  2. Patient will increase right shoulder scaption AROM to at least 100* to improve ease with daily activities. PN:  progressing 95 degrees  3. Patient will increase right FIR to at least L2 to improve ease with self care tasks. PN: PSIS  4. Patient will increase VANITA to at least C7 to improve ease with combing her hair and return to PLOF. PN: ear   Current: progressing 10/6/22 C2-C3 VANITA  5. Patient will increase FOTO score to at least 60 to improve ease with daily and household activities.               PN: 61    PLAN  []  Upgrade activities as tolerated     [x]  Continue plan of care  []  Update interventions per flow sheet       []  Discharge due to:_  []  Other:_      Reford Toni, PTA 10/6/2022  9:23 AM    Future Appointments   Date Time Provider [place]   10/10/2022 11:15 AM Maykel Legacy, PTA [facility]   10/12/2022 10:30 AM Maykel Legacy, PTA [facility]   10/18/2022  9:00 AM Walter Chavez, PT [facility]   10/20/2022  9:15 AM Maykel Legacy, PTA [facility]   12/6/2022  8:10 AM [facility] LAB VISIT [facility] BS AMB   12/13/2022  8:20 AM Savgae Reyes MD [facility] BS AMB   12/14/2022  8:00 AM Analisa Bass MD [facility] BS AMB

## 2022-10-10 ENCOUNTER — HOSPITAL ENCOUNTER (OUTPATIENT)
Dept: PHYSICAL THERAPY | Age: 82
Discharge: HOME | End: 2022-10-10
Attending: ORTHOPAEDIC SURGERY
Payer: MEDICARE

## 2022-10-10 PROCEDURE — 97112 NEUROMUSCULAR REEDUCATION: CPT

## 2022-10-10 PROCEDURE — 97140 MANUAL THERAPY 1/> REGIONS: CPT

## 2022-10-10 PROCEDURE — 97110 THERAPEUTIC EXERCISES: CPT

## 2022-10-10 NOTE — PROGRESS NOTES
PT DAILY TREATMENT NOTE     Patient Name: Cathryn Haque  Date:10/10/2022  : 1940  [x]  Patient  Verified  Payor: VA MEDICARE / Plan: VA MEDICARE PART A & B / Product Type: Medicare /    In time:11:19  Out time:12:02  Total Treatment Time (min): 43  Visit #: 5 of 8    Medicare/BCBS Only   Total Timed Codes (min):  43 1:1 Treatment Time:  43       Treatment Area: Right shoulder pain [M25.511]    SUBJECTIVE  Pain Level (0-10 scale): 5  Any medication changes, allergies to medications, adverse drug reactions, diagnosis change, or new procedure performed?: [x] No    [] Yes (see summary sheet for update)  Subjective functional status/changes:   [] No changes reported  Pt states she has been up since 1 am due to the discomfort in her right shoulder from over using it while picking up branches and leaves in her yard yesterday afternoon/evening. OBJECTIVE    20 min Therapeutic Exercise:  [x] See flow sheet :   Rationale: increase ROM, increase strength, and improve coordination to improve the patients ability to perform functional task with ease. 15 min Neuromuscular Re-education:  [x]  See flow sheet :   Rationale: increase strength, improve coordination, and increase proprioception  to improve the patients ability to perform ADL's with ease. 8 min Manual Therapy:  UT/LS MFR with patient in supine and right humerus supported by towel roll. GHJ inferior/posterior capsular mobs performed with the patient in supine grade II into flexion/scaption, and gentle ER. The manual therapy interventions were performed at a separate and distinct time from the therapeutic activities interventions. Rationale: decrease pain, increase ROM, and increase tissue extensibility to improve functional mobility with overhead reaching ADL's.     With   [] TE   [] TA   [] neuro   [] other: Patient Education: [x] Review HEP    [] Progressed/Changed HEP based on:   [] positioning   [] body mechanics   [] transfers [] heat/ice application    [] other:      Other Objective/Functional Measures:      Pain Level (0-10 scale) post treatment: 0    ASSESSMENT/Changes in Function:   Pt continues to display improving right shoulder ROM and mobility noted with testing this visit with pt reaching 96 deg of AROM flexion and about L4 with FIR. The pt continues to note end range pain with flexion but no pain with FIR. Patient will continue to benefit from skilled PT services to modify and progress therapeutic interventions, address functional mobility deficits, address ROM deficits, address strength deficits, analyze and address soft tissue restrictions, analyze and cue movement patterns, analyze and modify body mechanics/ergonomics, and assess and modify postural abnormalities to attain remaining goals. [x]  See Plan of Care  []  See progress note/recertification  []  See Discharge Summary         Progress towards goals / Updated goals:  Goals for this certification period to be accomplished in 4 weeks:  1. Patient will increase right shoulder flexion AROM to at least 110* to improve ease with reaching overhead. PN: 80 degrees  Current: progressing 10/10/22 96 deg, 3/10 pain  2. Patient will increase right shoulder scaption AROM to at least 100* to improve ease with daily activities. PN:  progressing 95 degrees  3. Patient will increase right FIR to at least L2 to improve ease with self care tasks. PN: PSIS  Current: progressing 10/10/22 L4 no pain  4. Patient will increase VANITA to at least C7 to improve ease with combing her hair and return to PLOF. PN: ear  Current: progressing 10/6/22 C2-C3 VANITA  5. Patient will increase FOTO score to at least 60 to improve ease with daily and household activities.               PN: 61    PLAN  []  Upgrade activities as tolerated     [x]  Continue plan of care  []  Update interventions per flow sheet       []  Discharge due to:_  [] Other:_      Carolynn Bueno, PTA 10/10/2022  11:23 AM    Future Appointments   Date Time Provider [place]   10/12/2022 10:30 AM Prasad Dover, PTA [facility]   10/18/2022  9:00 AM Draby Alfaro, PT [facility]   10/20/2022  9:15 AM Prasad Dover, PTA [facility]   12/6/2022  8:10 AM [facility] LAB VISIT [facility] BS AMB   12/13/2022  8:20 AM Burt Molina MD [facility] BS AMB   12/14/2022  8:00 AM Aki Rhoades MD [facility] BS AMB

## 2022-10-12 ENCOUNTER — HOSPITAL ENCOUNTER (OUTPATIENT)
Dept: PHYSICAL THERAPY | Age: 82
Discharge: HOME | End: 2022-10-12
Attending: ORTHOPAEDIC SURGERY
Payer: MEDICARE

## 2022-10-12 PROCEDURE — 97140 MANUAL THERAPY 1/> REGIONS: CPT

## 2022-10-12 PROCEDURE — 97110 THERAPEUTIC EXERCISES: CPT

## 2022-10-12 PROCEDURE — 97112 NEUROMUSCULAR REEDUCATION: CPT

## 2022-10-12 NOTE — PROGRESS NOTES
PT DAILY TREATMENT NOTE     Patient Name: Cathryn Haque  Date:10/12/2022  : 1940  [x]  Patient  Verified  Payor: VA MEDICARE / Plan: VA MEDICARE PART A & B / Product Type: Medicare /    In time:10:30  Out time:11:13  Total Treatment Time (min): 43  Visit #: 6 of 8    Medicare/BCBS Only   Total Timed Codes (min):  43 1:1 Treatment Time:  40       Treatment Area: Right shoulder pain [M25.511]    SUBJECTIVE  Pain Level (0-10 scale): 1  Any medication changes, allergies to medications, adverse drug reactions, diagnosis change, or new procedure performed?: [x] No    [] Yes (see summary sheet for update)  Subjective functional status/changes:   [] No changes reported  Pt reports her right shoulder feels a lot better than it did    OBJECTIVE    20 min Therapeutic Exercise:  [x] See flow sheet :   Rationale: increase ROM, increase strength, and improve coordination to improve the patients ability to perform functional task with ease. 15 min Neuromuscular Re-education:  [x]  See flow sheet :   Rationale: increase strength, improve coordination, and increase proprioception  to improve the patients ability to perform overhead reaching ADL's with ease. 8 min Manual Therapy:  UT/LS MFR with patient in supine and right humerus supported by towel roll. GHJ inferior/posterior capsular mobs performed with the patient in supine grade II into flexion/scaption, and gentle ER. The manual therapy interventions were performed at a separate and distinct time from the therapeutic activities interventions. Rationale: decrease pain, increase ROM, and increase tissue extensibility to improve functional mobility with overhead reaching ADL's.     With   [] TE   [] TA   [] neuro   [] other: Patient Education: [x] Review HEP    [] Progressed/Changed HEP based on:   [] positioning   [] body mechanics   [] transfers   [] heat/ice application    [] other:      Other Objective/Functional Measures:      Pain Level (0-10 scale) post treatment: 0    ASSESSMENT/Changes in Function:   Continued improvements in AROM of the right shoulder with pt progressing well towards her goals. The pt reports decreased pain in the right shoulder this visit compared to last visit and was able to tolerate increased weight to the dowel during supine wand exercises. No pain reported post treatment. Patient will continue to benefit from skilled PT services to modify and progress therapeutic interventions, address functional mobility deficits, address ROM deficits, address strength deficits, analyze and address soft tissue restrictions, analyze and cue movement patterns, analyze and modify body mechanics/ergonomics, and assess and modify postural abnormalities to attain remaining goals. [x]  See Plan of Care  []  See progress note/recertification  []  See Discharge Summary         Progress towards goals / Updated goals:  Goals for this certification period to be accomplished in 4 weeks:  1. Patient will increase right shoulder flexion AROM to at least 110* to improve ease with reaching overhead. PN: 80 degrees  Current: progressing 10/10/22 96 deg, 3/10 pain  2. Patient will increase right shoulder scaption AROM to at least 100* to improve ease with daily activities. PN:  progressing 95 degrees  Current: met 10/12 22 right shoulder scaption AROM 115 deg  3. Patient will increase right FIR to at least L2 to improve ease with self care tasks. PN: PSIS  Current: progressing 10/10/22 L4 no pain  4. Patient will increase VANITA to at least C7 to improve ease with combing her hair and return to PLOF. PN: ear  Current: progressing 10/6/22 C2-C3 VANITA  5. Patient will increase FOTO score to at least 60 to improve ease with daily and household activities.               PN: 61    PLAN  []  Upgrade activities as tolerated     [x]  Continue plan of care  []  Update interventions per flow sheet       [] Discharge due to:_  []  Other:_      Melvi Deanna, PTA 10/12/2022  10:46 AM    Future Appointments   Date Time Provider [place]   10/18/2022  9:00 AM Cori Loera, PT [facility]   10/20/2022  9:15 AM Allegra Bocanegra, PTA [facility]   12/6/2022  8:10 AM [facility] LAB VISIT [facility] BS AMB   12/13/2022  8:20 AM Mona Almaguer MD [facility] BS AMB   12/14/2022  8:00 AM Kathrin Burleson MD [facility] BS AMB

## 2022-10-18 ENCOUNTER — APPOINTMENT (OUTPATIENT)
Dept: PHYSICAL THERAPY | Age: 82
End: 2022-10-18
Attending: ORTHOPAEDIC SURGERY
Payer: MEDICARE

## 2022-10-20 ENCOUNTER — HOSPITAL ENCOUNTER (OUTPATIENT)
Dept: PHYSICAL THERAPY | Age: 82
Discharge: HOME | End: 2022-10-20
Attending: ORTHOPAEDIC SURGERY
Payer: MEDICARE

## 2022-10-20 PROCEDURE — 97140 MANUAL THERAPY 1/> REGIONS: CPT

## 2022-10-20 PROCEDURE — 97110 THERAPEUTIC EXERCISES: CPT

## 2022-10-20 NOTE — PROGRESS NOTES
PT DAILY TREATMENT NOTE     Patient Name: Keke Rolon  Date:10/20/2022  : 1940  [x]  Patient  Verified  [place]or[place] / Plan: [place] MED[place]ARE [place] A & B / Product Type: Medicare /    In time:9:15  Out time:10:02  Total Treatment Time (min): 52  Visit #: 7 of 8    Medicare/BCBS Only   Total Timed Codes (min):  37 1:1 Treatment Time:  37       Treatment Area: Right shoulder pain [M25.511]    SUBJECTIVE  Pain Level (0-10 scale): 3  Any medication changes, allergies to medications, adverse drug reactions, diagnosis change, or new procedure performed?: [x] No    [] Yes (see summary sheet for update)  Subjective functional status/changes:   [] No changes reported  Pt reports the water heater in her house busted last night so she did a lot of lifting, pushing and pulling and her shoulder is in more pain than usual.    OBJECTIVE    Modality rationale: decrease pain and increase tissue extensibility to improve the patients ability to perform ADL's with ease.    Min Type Additional Details    [] Estim:  []Unatt       []IFC  []Premod                        []Other:  []w/ice   []w/heat  Position:  Location:    [] Estim: []Att    []TENS instruct  []NMES                    []Other:  []w/US   []w/ice   []w/heat  Position:  Location:    []  Traction: [] Cervical       []Lumbar                       [] Prone          []Supine                       []Intermittent   []Continuous Lbs:  [] before manual  [] after manual    []  Ultrasound: []Continuous   [] Pulsed                           []1MHz   []3MHz W/cm2:  Location:    []  Iontophoresis with dexamethasone         Location: [] Take home patch   [] In clinic   10 []  Ice     [x]  heat  []  Ice massage  []  Laser   []  Anodyne Position:seated  Location: right shoulder    []  Laser with stim  []  Other:  Position:  Location:    []  Vasopneumatic Device    []  Right     []  Left  Pre-treatment girth:  Post-treatment girth:  Measured at (location):  Pressure: [] lo [] med [] hi   Temperature: [] lo [] med [] hi   [] Skin assessment post-treatment:  []intact []redness- no adverse reaction    []redness - adverse reaction:     15 min Therapeutic Exercise:  [x] See flow sheet :   Rationale: increase ROM and increase strength to improve the patients ability to perform functional task with ease     14 min Neuromuscular Re-education:  [x]  See flow sheet :   Rationale: increase strength, improve coordination, and increase proprioception  to improve the patients ability to perform ADL's with ease. 8 min Manual Therapy:  left S/L- UT/LS MFR with patient. Supine- GHJ inferior/posterior capsular mobs performed with the patient in supine grade II into flexion/scaption, and gentle ER. The manual therapy interventions were performed at a separate and distinct time from the therapeutic activities interventions. Rationale: decrease pain, increase ROM, and increase tissue extensibility to improve functional mobility with over head reaching with ADL's. With   [] TE   [] TA   [] neuro   [] other: Patient Education: [x] Review HEP    [] Progressed/Changed HEP based on:   [] positioning   [] body mechanics   [] transfers   [] heat/ice application    [] other:      Other Objective/Functional Measures:      Pain Level (0-10 scale) post treatment: 0    ASSESSMENT/Changes in Function:   Pt presents for a PN this visit displaying improved ROM and mobility of the right shoulder, displays decreased compensations and notes increased ease with her everyday activities with use of the right UE. The pt has met her FOTO goal and notes a a significant decrease in pain and discomfort in the right shoulder. The pt continues to display some limitations in ROM with functional IR/ER and continued limitations with right shoulder AROM flexion. The pt is to benefit from continued treatment to improve right shoulder AROM, stability and strength to aid with ease of ADL's.     Patient will continue to benefit from skilled PT services to modify and progress therapeutic interventions, address functional mobility deficits, address ROM deficits, address strength deficits, analyze and address soft tissue restrictions, analyze and cue movement patterns, analyze and modify body mechanics/ergonomics, and assess and modify postural abnormalities to attain remaining goals. [x]  See Plan of Care  []  See progress note/recertification  []  See Discharge Summary         Progress towards goals / Updated goals:  Goals for this certification period to be accomplished in 4 weeks:  1. Patient will increase right shoulder flexion AROM to at least 110* to improve ease with reaching overhead. PN: 80 degrees  Current: progressing 10/10/22 96 deg, 3/10 pain  2. Patient will increase right shoulder scaption AROM to at least 100* to improve ease with daily activities. PN:  progressing 95 degrees  Current: met 10/12 22 right shoulder scaption AROM 115 deg  3. Patient will increase right FIR to at least L2 to improve ease with self care tasks. PN: PSIS  Current: progressing 10/10/22 L4 no pain  4. Patient will increase VANITA to at least C7 to improve ease with combing her hair and return to PLOF. PN: ear  Current: progressing 10/6/22 C2-C3 VANITA  5. Patient will increase FOTO score to at least 60 to improve ease with daily and household activities.               PN: 59  Current: met 10/20/22 FOTO score 72    PLAN  []  Upgrade activities as tolerated     [x]  Continue plan of care  []  Update interventions per flow sheet       []  Discharge due to:_  []  Other:_      Bonilla Celestin, PTA 10/20/2022  9:24 AM    Future Appointments   Date Time Provider Kin Dai   10/21/2022  9:15 AM Stephon Rodriguez PT [facility]   12/6/2022  8:10 AM [facility] LAB VISIT [facility] AMB   12/13/2022  8:20 AM Jessee Cummings MD [facility] BS AMB   12/14/2022  8:00 AM Johnny Navarro MD [facility] AMB

## 2022-10-20 NOTE — PROGRESS NOTES
[facility]   [address] [place]  [place], [address]  [phone] fax    Continued Plan of Care/ Re-certification for Physical Therapy Services    Patient name: Janet Dove Start of Care: 2022   Referral source: Mallorie Knee,* : 1940   Medical/Treatment Diagnosis: Right shoulder pain [M25.511]  Payor: [place] MEDICARE / Plan: [place] MEDICARE PART A & B / Product Type: Medicare /  Onset Date:2021                 Prior Hospitalization: see medical history Provider#: [id number]   Medications: Verified on Patient Summary List    Comorbidities: allergies, arthritis, asthma, back pain, BMI>30, GI disease, headaches, HTN, sleep dysfunction, visual impairment    Prior Level of Function: no limitations, right hand dominant   Visits from Start of Care: 12    Missed Visits: 2    Progress towards goals   1. Patient will increase right shoulder flexion AROM to at least 110* to improve ease with reaching overhead. PN: 80 degrees  Current: progressing  96 deg, 3/10 pain  2. Patient will increase right shoulder scaption AROM to at least 100* to improve ease with daily activities. PN:  progressing 95 degrees  Current: met right shoulder scaption AROM 115 deg  3. Patient will increase right FIR to at least L2 to improve ease with self care tasks. PN: PSIS  Current: progressing L4 no pain  4. Patient will increase VANITA to at least C7 to improve ease with combing her hair and return to PLOF. PN: ear  Current: progressing  C2-C3 VANITA  5. Patient will increase FOTO score to at least 60 to improve ease with daily and household activities.               PN: 59  Current: met  FOTO score 72          Problems/ barriers to goal attainment: NONE      Problem List: pain affecting function, decrease ROM, decrease strength, edema affecting function, impaired gait/ balance, decrease ADL/ functional abilitiies, decrease activity tolerance, decrease flexibility/ joint mobility, and decrease transfer abilities    Treatment Plan: Therapeutic exercise, Neuromuscular reeducation, Manual therapy, Therapeutic activity, and Self care/home management     Patient Goal (s) has been updated and includes: \"To improve my shoulder more\"      Goals for this certification period to be accomplished in 4 weeks:  1. Patient will increase right shoulder flexion AROM to at least 110* to improve ease with reaching overhead. RECERT: progressing  96 deg, 3/10 pain  2. Patient will increase right FIR to at least L2 to improve ease with self care tasks. RECERT: progressing L4 no pain  3. Patient will increase VANITA to at least C7 to improve ease with combing her hair and return to PLOF. RECERT: progressing G9-C5 VANITA    Frequency / Duration: Patient to be seen 2 times per week for 4 weeks:    Assessment / Recommendations:  Pt presents for a PN this visit displaying improved ROM and mobility of the right shoulder, displays decreased compensations and notes increased ease with her everyday activities with use of the right UE. The pt has met her FOTO goal and notes a a significant decrease in pain and discomfort in the right shoulder. The pt continues to display some limitations in ROM with functional IR/ER and continued limitations with right shoulder AROM flexion. The pt is to benefit from continued treatment to improve right shoulder AROM, stability and strength to aid with ease of ADL's. Certification Period: 10/21/22-11/19/22    Vito Kong, PTA 10/20/2022 12:26 PM    ________________________________________________________________________  I certify that the above Therapy Services are being furnished while the patient is under my care. I agree with the treatment plan and certify that this therapy is necessary. [] I have read the above and request that my patient continue as recommended.   [] I have read the above report and request that my patient continue therapy with the following changes/special instructions: _____________________________________________  [] I have read the above report and request that my patient be discharged from therapy    Physician's Signature:____________Date:_________TIME:________     Cassia Sharp Signature, Date and Time must be completed for valid certification **    Please sign and return to [facility] [address] [place] [address] & [address]  [address] [place] [address] [address]  [phone] fax

## 2022-10-21 ENCOUNTER — HOSPITAL ENCOUNTER (OUTPATIENT)
Dept: PHYSICAL THERAPY | Age: 82
Discharge: HOME | End: 2022-10-21
Attending: ORTHOPAEDIC SURGERY
Payer: MEDICARE

## 2022-10-21 PROCEDURE — 97112 NEUROMUSCULAR REEDUCATION: CPT

## 2022-10-21 PROCEDURE — 97140 MANUAL THERAPY 1/> REGIONS: CPT

## 2022-10-21 PROCEDURE — 97110 THERAPEUTIC EXERCISES: CPT

## 2022-10-21 NOTE — PROGRESS NOTES
PT DAILY TREATMENT NOTE     Patient Name: Florence Mcfarland  Date:10/21/2022  : 1940  [x]  Patient  Verified  Payor: [place] MEDICARE / Plan: [place] MEDICARE PART A & B / Product Type: Medicare /    In time:920  Out time:  Total Treatment Time (min): 55  Visit #: 1 of 8    Medicare/BCBS Only   Total Timed Codes (min):  45 1:1 Treatment Time:  45       Treatment Area: Right shoulder pain [M25.511]    SUBJECTIVE  Pain Level (0-10 scale): 1  Any medication changes, allergies to medications, adverse drug reactions, diagnosis change, or new procedure performed?: [x] No    [] Yes (see summary sheet for update)  Subjective functional status/changes:   [] No changes reported  The pt reports she is challenged with AROM elevation with her daily activities but reports improvement with her motion.      OBJECTIVE    Modality rationale: decrease pain and increase tissue extensibility to improve the patients ability to perform ADL   Min Type Additional Details    [] Estim:  []Unatt       []IFC  []Premod                        []Other:  []w/ice   []w/heat  Position:  Location:    [] Estim: []Att    []TENS instruct  []NMES                    []Other:  []w/US   []w/ice   []w/heat  Position:  Location:    []  Traction: [] Cervical       []Lumbar                       [] Prone          []Supine                       []Intermittent   []Continuous Lbs:  [] before manual  [] after manual    []  Ultrasound: []Continuous   [] Pulsed                           []1MHz   []3MHz W/cm2:  Location:    []  Iontophoresis with dexamethasone         Location: [] Take home patch   [] In clinic   10 []  Ice     [x]  heat  []  Ice massage  []  Laser   []  Anodyne Position:seated  Location:    []  Laser with stim  []  Other:  Position:  Location:    []  Vasopneumatic Device    []  Right     []  Left  Pre-treatment girth:  Post-treatment girth:  Measured at (location):  Pressure:       [] lo [] med [] hi   Temperature: [] lo [] med [] hi   [] Skin assessment post-treatment:  []intact []redness- no adverse reaction    []redness - adverse reaction:     25 min Therapeutic Exercise:  [x] See flow sheet :   Rationale: increase ROM and increase strength to improve the patients ability to perform ADL         12 min Neuromuscular Re-education:  [x]  See flow sheet :   Rationale: increase strength, improve coordination, and increase proprioception  to improve the patients ability to perform functional tasks. 8  min Manual Therapy:  pt left sidelying performed STJ clocks, supine peerformed grade III GHJ post mobs, inf mobs, PROM flexion, scaption, IR and ER   The manual therapy interventions were performed at a separate and distinct time from the therapeutic activities interventions. Rationale: decrease pain, increase ROM, and increase tissue extensibility to improve functional activity tolerance              With   [] TE   [] TA   [] neuro   [] other: Patient Education: [x] Review HEP    [] Progressed/Changed HEP based on:   [] positioning   [] body mechanics   [] transfers   [] heat/ice application    [] other:      Other Objective/Functional Measures:  added standing dowel AAROM elevation and progressed wt with sideling exercises     Pain Level (0-10 scale) post treatment: 1    ASSESSMENT/Changes in Function:  The pt shows progress with PT but is limited with ROM and strength. Pain is fairly well controlled. She progressed with therex as noted above but was challenged with increase in wt with sidelying exercises. Patient will continue to benefit from skilled PT services to modify and progress therapeutic interventions, address functional mobility deficits, address ROM deficits, address strength deficits, analyze and address soft tissue restrictions, and analyze and cue movement patterns to attain remaining goals.      []  See Plan of Care  []  See progress note/recertification  []  See Discharge Summary         Goals for this certification period to be accomplished in 2 weeks:  1. Patient will increase right shoulder flexion AROM to at least 110* to improve ease with reaching overhead. RECERT: progressing  96 deg, 3/10 pain  2. Patient will increase right FIR to at least L2 to improve ease with self care tasks. RECERT: progressing L4 no pain  3. Patient will increase VANITA to at least C7 to improve ease with combing her hair and return to PLOF.              RECERT: progressing K9-I8 VANITA         PLAN  []  Upgrade activities as tolerated     [x]  Continue plan of care  []  Update interventions per flow sheet       []  Discharge due to:_  []  Other:_      Doris Closs, PT 10/21/2022  9:34 AM    Future Appointments   Date Time Provider Kin [place]   12/6/2022  8:10 AM [facility] LAB VISIT [facility] AMB   12/13/2022  8:20 AM Xochitl Perez MD [facility] BS AMB   12/14/2022  8:00 AM Stanislav Clancy MD [facility] BS AMB

## 2022-10-26 ENCOUNTER — HOSPITAL ENCOUNTER (OUTPATIENT)
Dept: PHYSICAL THERAPY | Age: 82
Discharge: HOME | End: 2022-10-26
Attending: ORTHOPAEDIC SURGERY
Payer: MEDICARE

## 2022-10-26 PROCEDURE — 97112 NEUROMUSCULAR REEDUCATION: CPT

## 2022-10-26 PROCEDURE — 97140 MANUAL THERAPY 1/> REGIONS: CPT

## 2022-10-26 PROCEDURE — 97110 THERAPEUTIC EXERCISES: CPT

## 2022-10-26 NOTE — PROGRESS NOTES
PT DAILY TREATMENT NOTE     Patient Name: Demetris Carreon  Date:10/26/2022  : 1940  [x]  Patient  Verified  Payor: [place] MEDICARE / Plan: [place] MEDICARE PART A & B / Product Type: Medicare /    In time:1015  Out time:1110  Total Treatment Time (min): 55  Visit #: 2 of 8    Medicare/BCBS Only   Total Timed Codes (min):  45 1:1 Treatment Time:  45       Treatment Area: Right shoulder pain [M25.511]    SUBJECTIVE  Pain Level (0-10 scale): 1  Any medication changes, allergies to medications, adverse drug reactions, diagnosis change, or new procedure performed?: [x] No    [] Yes (see summary sheet for update)  Subjective functional status/changes:   [] No changes reported  \"I was a little sore after last session but it wakes it up\"    OBJECTIVE    Modality rationale: decrease pain and increase tissue extensibility to improve the patients ability to tolerate AdLs.     Min Type Additional Details    [] Estim:  []Unatt       []IFC  []Premod                        []Other:  []w/ice   []w/heat  Position:  Location:    [] Estim: []Att    []TENS instruct  []NMES                    []Other:  []w/US   []w/ice   []w/heat  Position:  Location:    []  Traction: [] Cervical       []Lumbar                       [] Prone          []Supine                       []Intermittent   []Continuous Lbs:  [] before manual  [] after manual    []  Ultrasound: []Continuous   [] Pulsed                           []1MHz   []3MHz W/cm2:  Location:    []  Iontophoresis with dexamethasone         Location: [] Take home patch   [] In clinic   10 []  Ice     [x]  heat  []  Ice massage  []  Laser   []  Anodyne Position: seated  Location: right shoulder    []  Laser with stim  []  Other:  Position:  Location:    []  Vasopneumatic Device    []  Right     []  Left  Pre-treatment girth:  Post-treatment girth:  Measured at (location):  Pressure:       [] lo [] med [] hi   Temperature: [] lo [] med [] hi   [x] Skin assessment post-treatment: [x]intact [x]redness- no adverse reaction    []redness - adverse reaction:     20 min Therapeutic Exercise:  [x] See flow sheet :   Rationale: increase ROM and increase strength to improve the patients ability to complete ADLs with ease. 17 min Neuromuscular Re-education:  [x]  See flow sheet : scapular and core re-education, including VC/TC to increase scapular retractor engagement and decrease B UT compensation   Rationale: increase ROM, increase strength, improve coordination, and increase proprioception  to improve the patients ability to improve biomechanics and increase kinesthetic awareness for ease of ADL completion. 8 min Manual Therapy:  STM to right rhomboids and scapular clocks in all directions with  patient in left S/L. Grade 2-3 posterior and inferior GH joints mobs and PROM into flexion and abduction with patient supine. The manual therapy interventions were performed at a separate and distinct time from the therapeutic activities interventions. Rationale: decrease pain, increase ROM, increase tissue extensibility, and decrease trigger points to improve functional mobility. With   [x] TE   [] TA   [] neuro   [] other: Patient Education: [x] Review HEP    [] Progressed/Changed HEP based on:   [] positioning   [] body mechanics   [] transfers   [] heat/ice application    [] other:      Other Objective/Functional Measures:   -added time under tension to rows and extensions    -increased reps during standing dowel flexion and S/L exercises    -pt. Challenged with eccentric lower during dowel exercises       Pain Level (0-10 scale) post treatment: 0    ASSESSMENT/Changes in Function: Today's session focused on increased scapular and accessory musculature strength, mobility, and kinesthetic awareness. Patient tolerated treatment well, reporting no adverse responses throughout activities, despite progressions in repetitions and time under tension.  Emphasis on eccentric lowering was added today during exercises in which reps and load were maintained. Patient will continue to benefit from skilled PT services to modify and progress therapeutic interventions, address functional mobility deficits, address ROM deficits, address strength deficits, analyze and address soft tissue restrictions, analyze and cue movement patterns, analyze and modify body mechanics/ergonomics, assess and modify postural abnormalities, and instruct in home and community integration to attain remaining goals. []  See Plan of Care  [x]  See progress note/recertification  []  See Discharge Summary         Progress towards goals / Updated goals:  1. Patient will increase right shoulder flexion AROM to at least 110* to improve ease with reaching overhead. RECERT: progressing  96 deg, 3/10 pain  Current: reassess closer to PN  2. Patient will increase right FIR to at least L2 to improve ease with self care tasks. RECERT: progressing L4 no pain  Current: reassess closer to PN  3. Patient will increase VANITA to at least C7 to improve ease with combing her hair and return to PLOF.              RECERT: progressing M7-X1 VANITA  Current: reassess closer to PN      PLAN  [x]  Upgrade activities as tolerated     []  Continue plan of care  []  Update interventions per flow sheet       []  Discharge due to:_  []  Other:_      Nestor Schulz, PTA 10/26/2022  7:51 AM    Future Appointments   Date Time Provider Kin Dai   10/26/2022 10:15 AM Arturo Hair, PTA [facility]   11/3/2022  9:15 AM Arturo Hair, PTA [facility]   11/8/2022 12:00 PM Darby Alfaro, PT [facility]   11/10/2022  9:15 AM Valrenetta Dover, PTA [facility]   11/14/2022 11:15 AM Valrenetta Dover, PTA [facility]   11/16/2022 12:45 PM Prasad Dover, PTA [facility]   12/6/2022  8:10 AM [facility] LAB VISIT [facility] BS AMB   12/13/2022  8:20 AM Burt Molina MD [facility] BS AMB   12/14/2022  8:00 AM Aki Rhoades MD [facility] BS AMB

## 2022-11-03 ENCOUNTER — HOSPITAL ENCOUNTER (OUTPATIENT)
Dept: PHYSICAL THERAPY | Age: 82
Discharge: HOME | End: 2022-11-03
Attending: ORTHOPAEDIC SURGERY
Payer: MEDICARE

## 2022-11-03 PROCEDURE — 97140 MANUAL THERAPY 1/> REGIONS: CPT

## 2022-11-03 PROCEDURE — 97530 THERAPEUTIC ACTIVITIES: CPT

## 2022-11-03 PROCEDURE — 97112 NEUROMUSCULAR REEDUCATION: CPT

## 2022-11-03 PROCEDURE — 97110 THERAPEUTIC EXERCISES: CPT

## 2022-11-03 NOTE — PROGRESS NOTES
PT DAILY TREATMENT NOTE     Patient Name: Dulce Grove  Date:11/3/2022  : 1940  [x]  Patient  Verified  Payor: VA MEDICARE / Plan: VA MEDICARE PART A & B / Product Type: Medicare /    In time:915  Out time:1011  Total Treatment Time (min): 64  Visit #: 3 of 8    Medicare/BCBS Only   Total Timed Codes (min):  46 1:1 Treatment Time:  46       Treatment Area: Right shoulder pain [M25.511]    SUBJECTIVE  Pain Level (0-10 scale): 1-2  Any medication changes, allergies to medications, adverse drug reactions, diagnosis change, or new procedure performed?: [x] No    [] Yes (see summary sheet for update)  Subjective functional status/changes:   [] No changes reported  Patient reports that she feels \"fine\" today. OBJECTIVE    Modality rationale: decrease pain and increase tissue extensibility to improve the patients ability to tolerate AdLs.     Min Type Additional Details    [] Estim:  []Unatt       []IFC  []Premod                        []Other:  []w/ice   []w/heat  Position:  Location:    [] Estim: []Att    []TENS instruct  []NMES                    []Other:  []w/US   []w/ice   []w/heat  Position:  Location:    []  Traction: [] Cervical       []Lumbar                       [] Prone          []Supine                       []Intermittent   []Continuous Lbs:  [] before manual  [] after manual    []  Ultrasound: []Continuous   [] Pulsed                           []1MHz   []3MHz W/cm2:  Location:    []  Iontophoresis with dexamethasone         Location: [] Take home patch   [] In clinic   10 []  Ice     [x]  heat  []  Ice massage  []  Laser   []  Anodyne Position: seated  Location: right shoulder    []  Laser with stim  []  Other:  Position:  Location:    []  Vasopneumatic Device    []  Right     []  Left  Pre-treatment girth:  Post-treatment girth:  Measured at (location):  Pressure:       [] lo [] med [] hi   Temperature: [] lo [] med [] hi   [x] Skin assessment post-treatment:  [x]intact [x]redness- no adverse reaction    []redness - adverse reaction:         19 min Therapeutic Exercise:  [x] See flow sheet :   Rationale: increase ROM and increase strength to improve the patients ability to complete AdLs with ease. 19 min Neuromuscular Re-education:  [x]  See flow sheet : scapular and core re-education, including VC/Tc to increase scapular engagement and decrease UT compensation   Rationale: improve coordination, improve balance, and increase proprioception  to improve the patients ability to improve biomechanics and increase kinesthetic awareness for ease of ADL completion. 8 min Manual Therapy:  STM to right rhomboids and scapular clocks in all directions with  patient in left S/L. Grade 2-3 posterior and inferior GH joints mobs and PROM into flexion and abduction with patient supine. The manual therapy interventions were performed at a separate and distinct time from the therapeutic activities interventions. Rationale: decrease pain, increase ROM, increase tissue extensibility, and decrease trigger points to improve functional mobility. With   [x] TE   [] TA   [] neuro   [] other: Patient Education: [x] Review HEP    [] Progressed/Changed HEP based on:   [] positioning   [] body mechanics   [] transfers   [] heat/ice application    [] other:      Other Objective/Functional Measures:   -added IR stretch with towel    -added load to wall slides     -increased load during dowel exercises     Pain Level (0-10 scale) post treatment: 5    ASSESSMENT/Changes in Function: Today's session focused on increased scapular strength, mobility, and kinesthetic awareness. While patient reported no pain during the session, she reported \"throbbing\" at the end of the session, possibly due to increased load during dowel exercises. Plan to modify weight down to 4# at next visit. Continue to progress POC according to patient tolerance.      Patient will continue to benefit from skilled PT services to modify and progress therapeutic interventions, address functional mobility deficits, address ROM deficits, address strength deficits, analyze and address soft tissue restrictions, analyze and cue movement patterns, analyze and modify body mechanics/ergonomics, assess and modify postural abnormalities, and instruct in home and community integration to attain remaining goals. []  See Plan of Care  [x]  See progress note/recertification  []  See Discharge Summary         Progress towards goals / Updated goals:  1. Patient will increase right shoulder flexion AROM to at least 110* to improve ease with reaching overhead. RECERT: progressing  96 deg, 3/10 pain  Current: reassess closer to PN  2. Patient will increase right FIR to at least L2 to improve ease with self care tasks. RECERT: progressing L4 no pain  Current: MET, right FIR at L2 11/3/22  3. Patient will increase VANITA to at least C7 to improve ease with combing her hair and return to PLOF.              RECERT: progressing G6-O6 VANITA  Current: reassess closer to PN    PLAN  [x]  Upgrade activities as tolerated     []  Continue plan of care  []  Update interventions per flow sheet       []  Discharge due to:_  []  Other:_      Micaela Nixon, PTA 11/3/2022  7:12 AM    Future Appointments   Date Time Provider Kin Dai   11/3/2022  9:15 AM Swapnil Velázquez, PTA [facility]   11/8/2022 12:00 PM Yahaira Marshall, PT [facility]   11/10/2022  7:00 AM Maricarmen Hernandez, PT [facility]   11/14/2022 11:15 AM Stephen Kaba PTA [facility]   11/16/2022 12:45 PM Stephen Kaba, PTA [facility]   12/6/2022  8:10 AM [facility] LAB VISIT [facility] BS AMB   12/13/2022  8:20 AM Edwige Wheatley MD [facility] BS AMB   12/14/2022  8:00 AM Boston Pack MD [facility] BS AMB

## 2022-11-08 ENCOUNTER — HOSPITAL ENCOUNTER (OUTPATIENT)
Dept: PHYSICAL THERAPY | Age: 82
Discharge: HOME | End: 2022-11-08
Attending: ORTHOPAEDIC SURGERY
Payer: MEDICARE

## 2022-11-08 PROCEDURE — 97112 NEUROMUSCULAR REEDUCATION: CPT

## 2022-11-08 PROCEDURE — 97110 THERAPEUTIC EXERCISES: CPT

## 2022-11-08 PROCEDURE — 97140 MANUAL THERAPY 1/> REGIONS: CPT

## 2022-11-08 NOTE — PROGRESS NOTES
PT DAILY TREATMENT NOTE     Patient Name: Subha Harris  Date:2022  : 1940  [x]  Patient  Verified  Payor: VA MEDICARE / Plan: VA MEDICARE PART A & B / Product Type: Medicare /    In time: 12:00  Out time:12:45  Total Treatment Time (min): 45  Visit #: 4 of 8    Medicare/BCBS Only   Total Timed Codes (min):  45 1:1 Treatment Time:  41       Treatment Area: Right shoulder pain [M25.511]    SUBJECTIVE  Pain Level (0-10 scale): 0/10  Any medication changes, allergies to medications, adverse drug reactions, diagnosis change, or new procedure performed?: [x] No    [] Yes (see summary sheet for update)  Subjective functional status/changes:   [] No changes reported  The patient states that today is a good day. OBJECTIVE  22 min Therapeutic Exercise:  [x] See flow sheet :   Rationale: increase ROM and increase strength to improve the patients ability to improve ADL ease. 15 min Neuromuscular Re-education:  [x]  See flow sheet :   Rationale: increase ROM, increase strength, and improve coordination  to improve the patients ability to improve ADL ease. 8 min Manual Therapy:  STM to right rhomboids and scapular clocks in all directions with  patient in left S/L. Grade 2-3 posterior and inferior GH joints mobs and PROM into flexion and abduction with patient supine. The manual therapy interventions were performed at a separate and distinct time from the therapeutic activities interventions. Rationale: decrease pain, increase ROM, and increase tissue extensibility to improve ADL ease.        With   [] TE   [] TA   [] neuro   [] other: Patient Education: [x] Review HEP    [] Progressed/Changed HEP based on:   [] positioning   [] body mechanics   [] transfers   [] heat/ice application    [] other:      Other Objective/Functional Measures:   AROM flexion right shoulder: 96 degrees with pain     Pain Level (0-10 scale) post treatment: 0/10    ASSESSMENT/Changes in Function: No change regarding right shoulder elevation, though the patient's pain is better today. Anticipated D/C in 2 visits with patient being in agreement. Patient will continue to benefit from skilled PT services to modify and progress therapeutic interventions, address functional mobility deficits, address ROM deficits, address strength deficits, analyze and address soft tissue restrictions, analyze and cue movement patterns, analyze and modify body mechanics/ergonomics, assess and modify postural abnormalities, and instruct in home and community integration to attain remaining goals. []  See Plan of Care  []  See progress note/recertification  []  See Discharge Summary         Progress towards goals / Updated goals:  1. Patient will increase right shoulder flexion AROM to at least 110* to improve ease with reaching overhead. RECERT: progressing  96 deg, 3/10 pain  Current: No change - 96 degrees 11/08/2022  2. Patient will increase right FIR to at least L2 to improve ease with self care tasks. RECERT: progressing L4 no pain  Current: MET, right FIR at L2 11/3/22  3. Patient will increase VANITA to at least C7 to improve ease with combing her hair and return to PLOF.              RECERT: progressing K5-X3 VANITA  Current: reassess closer to PN       PLAN  [x]  Upgrade activities as tolerated     []  Continue plan of care  []  Update interventions per flow sheet       []  Discharge due to:_  []  Other:_      Cas Ramos, PT 11/8/2022  12:03 PM    Future Appointments   Date Time Provider Kin Dai   11/10/2022  7:00 AM [facility]   11/14/2022 11:15 AM [facility]   11/16/2022 12:45 PM [facility]   12/6/2022  8:10 AM [facility] LAB [facility]ISIT [facility] AM[facility]   12/13/2022  8:20 AM Yvonne Márquez MD [facility] AM[facility]   12/14/2022  8:00 AM Alex Montoya MD [facility] BS AMB

## 2022-11-10 ENCOUNTER — HOSPITAL ENCOUNTER (OUTPATIENT)
Dept: PHYSICAL THERAPY | Age: 82
Discharge: HOME | End: 2022-11-10
Attending: ORTHOPAEDIC SURGERY
Payer: MEDICARE

## 2022-11-10 PROCEDURE — 97140 MANUAL THERAPY 1/> REGIONS: CPT

## 2022-11-10 PROCEDURE — 97112 NEUROMUSCULAR REEDUCATION: CPT

## 2022-11-10 PROCEDURE — 97110 THERAPEUTIC EXERCISES: CPT

## 2022-11-10 NOTE — PROGRESS NOTES
PT DAILY TREATMENT NOTE     Patient Name: Laurence Gonzalez  Date:11/10/2022  : 1940  [x]  Patient  Verified  Payor: VA MEDICARE / Plan: VA MEDICARE PART A & B / Product Type: Medicare /    In time:702  Out time:743  Total Treatment Time (min): 41  Visit #: 5 of 8    Medicare/BCBS Only   Total Timed Codes (min):  41 1:1 Treatment Time:  41       Treatment Area: Right shoulder pain [M25.511]    SUBJECTIVE  Pain Level (0-10 scale): 0/10  Any medication changes, allergies to medications, adverse drug reactions, diagnosis change, or new procedure performed?: [x] No    [] Yes (see summary sheet for update)  Subjective functional status/changes:   [] No changes reported  Pt reports she feels good today, no new complaints. Ready for d/c next visit. OBJECTIVE    18 min Therapeutic Exercise:  [x] See flow sheet :   Rationale: increase ROM and increase strength to improve the patients ability to improve ADL ease. 15 min Neuromuscular Re-education:  [x]  See flow sheet :   Rationale: increase ROM, increase strength, and improve coordination  to improve the patients ability to improve ADL ease. 8 min Manual Therapy:  STM to right rhomboids and scapular clocks in all directions with  patient in left S/L. Grade 2-3 posterior and inferior GH joints mobs and PROM into flexion and abduction with patient supine. The manual therapy interventions were performed at a separate and distinct time from the therapeutic activities interventions. Rationale: decrease pain, increase ROM, and increase tissue extensibility to improve ADL ease. With   [] TE   [] TA   [] neuro   [] other: Patient Education: [x] Review HEP    [] Progressed/Changed HEP based on:   [] positioning   [] body mechanics   [] transfers   [] heat/ice application    [] other:      Other Objective/Functional Measures: Continued with therex and NMR per flow sheet.       Pain Level (0-10 scale) post treatment: 0/10    ASSESSMENT/Changes in Function: Pt faired well during session today and tolerates full therapeutic intervention without increase in symptoms, however no change in R shoulder elevation ROM. Pt remains agreeable with d/c next visit. Patient will continue to benefit from skilled PT services to modify and progress therapeutic interventions, address functional mobility deficits, address ROM deficits, address strength deficits, analyze and address soft tissue restrictions, analyze and cue movement patterns, analyze and modify body mechanics/ergonomics, assess and modify postural abnormalities, and instruct in home and community integration to attain remaining goals. []  See Plan of Care  []  See progress note/recertification  []  See Discharge Summary         Progress towards goals / Updated goals:  1. Patient will increase right shoulder flexion AROM to at least 110* to improve ease with reaching overhead. RECERT: progressing  96 deg, 3/10 pain  Current: No change - 96 degrees 11/08/2022  2. Patient will increase right FIR to at least L2 to improve ease with self care tasks. RECERT: progressing L4 no pain  Current: MET, right FIR at L2 11/3/22  3. Patient will increase VANITA to at least C7 to improve ease with combing her hair and return to PLOF.              RECERT: progressing D6-U2 VANITA  Current: reassess closer to PN    PLAN  []  Upgrade activities as tolerated     [x]  Continue plan of care  []  Update interventions per flow sheet       []  Discharge due to:_  [x]  Other:_  Plan to d/c next visit     Ethan Crane PT 11/10/2022  7:05 AM    Future Appointments   Date Time Provider Kin Dai   11/14/2022 11:15 AM [facility]   11/16/2022 12:45 [facility]   12/6/2022  8:10 AM [facility] LAB VISIT [facility] AM[facility]   12/13/2022  8:20 AM Edwige Wheatley MD [facility] AM[facility]   12/14/2022  8:00 AM Boston Pack MD [facility] BS AMB

## 2022-11-14 ENCOUNTER — HOSPITAL ENCOUNTER (OUTPATIENT)
Dept: PHYSICAL THERAPY | Age: 82
Discharge: HOME | End: 2022-11-14
Attending: ORTHOPAEDIC SURGERY
Payer: MEDICARE

## 2022-11-14 PROCEDURE — 97112 NEUROMUSCULAR REEDUCATION: CPT

## 2022-11-14 PROCEDURE — 97110 THERAPEUTIC EXERCISES: CPT

## 2022-11-14 NOTE — PROGRESS NOTES
PT DAILY TREATMENT NOTE     Patient Name: Rut Olmstead  Date:2022  : 1940  [x]  Patient  Verified  Payor: VA MEDICARE / Plan: VA MEDICARE PART A & B / Product Type: Medicare /    In time:11:27  Out time:11:58  Total Treatment Time (min): 31  Visit #: 6 of 8    Medicare/BCBS Only   Total Timed Codes (min):  31 1:1 Treatment Time:  31       Treatment Area: Right shoulder pain [M25.511]    SUBJECTIVE  Pain Level (0-10 scale): 0  Any medication changes, allergies to medications, adverse drug reactions, diagnosis change, or new procedure performed?: [x] No    [] Yes (see summary sheet for update)  Subjective functional status/changes:   [] No changes reported  Pt reports she did not sleep well last night. OBJECTIVE    16 min Therapeutic Exercise:  [x] See flow sheet :   Rationale: increase ROM, increase strength, and improve coordination to improve the patients ability to perform functional task with ease. 15 min Neuromuscular Re-education:  [x]  See flow sheet :   Rationale: increase strength, improve coordination, and increase proprioception  to improve the patients ability to perform ADL's with ease. With   [] TE   [] TA   [] neuro   [] other: Patient Education: [x] Review HEP    [] Progressed/Changed HEP based on:   [] positioning   [] body mechanics   [] transfers   [] heat/ice application    [] other:      Other Objective/Functional Measures: pt discharged to manage self care at home. FOTO score     Pain Level (0-10 scale) post treatment: 0    ASSESSMENT/Changes in Function:   The pt has shown a steady progression with treatment to the right shoulder since [facility] noting improvements with ROM and mobility. The pt was able to meet her VANITA goal reaching C7 and her FIR goal reaching L2.  She notes little to no pain at shoulder level heights and was progressing towards her AROM flexion goal. The pt dyllan continue to note pain past about 90 deg and continues to display some UT compensations. Due to little change within this last bout of therapy, the pt was given an updated HEP and was discharged to manage her self care at home. The pt reports no pain post treatment. Patient will continue to benefit from skilled PT services to modify and progress therapeutic interventions, address functional mobility deficits, address ROM deficits, address strength deficits, analyze and address soft tissue restrictions, analyze and cue movement patterns, analyze and modify body mechanics/ergonomics, and assess and modify postural abnormalities to attain remaining goals. [x]  See Plan of Care  []  See progress note/recertification  []  See Discharge Summary         Progress towards goals / Updated goals:  1. Patient will increase right shoulder flexion AROM to at least 110* to improve ease with reaching overhead. RECERT: progressing  96 deg, 3/10 pain  Current: progressing 11/14/22 108 deg 3-4/10  2. Patient will increase right FIR to at least L2 to improve ease with self care tasks. RECERT: progressing L4 no pain  Current: MET, right FIR at L2 11/3/22  3. Patient will increase VANITA to at least C7 to improve ease with combing her hair and return to PLOF.              RECERT: progressing S7-W2 VANITA  Current: Met 11/14/22 VANITA to C7 no pain    PLAN  []  Upgrade activities as tolerated     []  Continue plan of care  []  Update interventions per flow sheet       [x]  Discharge due to:_  []  Other:_      Traci Crawford PTA 11/14/2022  11:30 AM    Future Appointments   Date Time Provider Kin Dai   11/16/2022 12:45 PM See Pathak PTA [facility]   12/6/2022  8:10 AM [facility] LAB VISIT [facility] BS AMB   12/13/2022  8:20 AM Kati Simmons MD [facility] BS AMB   12/14/2022  8:00 AM Inge Fisher MD [facility] BS AMB

## 2022-11-15 NOTE — PROGRESS NOTES
[facility]  [address] [facility] [address]  [place], [address]  [phone] fax    Physical Therapy Discharge Summary  Patient name: Dee Molina Start of Care: 2022   Referral source: Clarisa Quiroz,* : 1940   Medical/Treatment Diagnosis: Right shoulder pain [M25.511]  Payor: VA MEDICARE / Plan: VA MEDICARE PART A & B / Product Type: Medicare /  Onset Date:2021                 Prior Hospitalization: see medical history Provider#: [id number]   Medications: Verified on Patient Summary List    Comorbidities: allergies, arthritis, asthma, back pain, BMI>30, GI disease, headaches, HTN, sleep dysfunction, visual impairment    Prior Level of Function: no limitations, right hand dominant                             Visits from Start of Care: 18    Missed Visits: 2  Reporting Period : 2022 to 2022      Progress towards goals / Updated goals:  1. Patient will increase right shoulder flexion AROM to at least 110* to improve ease with reaching overhead. RECERT: progressing  96 deg, 3/10 pain  DC: progressing 108 deg 3-4/10  2. Patient will increase right FIR to at least L2 to improve ease with self care tasks. RECERT: progressing L4 no pain  DC: MET, right FIR at L2   3. Patient will increase VANITA to at least C7 to improve ease with combing her hair and return to PLOF. RECERT: progressing N9-T8 VANITA  DC: Met VANITA to C7 no pain    ASSESSMENT/RECOMMENDATIONS:  The pt has shown a steady progression with treatment to the right shoulder since [facility] noting improvements with ROM and mobility. The pt was able to meet her VANITA goal reaching C7 and her FIR goal reaching L2. She notes little to no pain at shoulder level heights and was progressing towards her AROM flexion goal. The pt dyllan continue to note pain past about 90 deg and continues to display some UT compensations.  Due to little change within this last bout of therapy, the pt was given an updated HEP and was discharged to manage her self care at home. The pt reports no pain post treatment.     [x]Discontinue therapy: [x]Patient has reached or is progressing toward set goals      []Patient is non-compliant or has abdicated      []Due to lack of appreciable progress towards set goals    Uziel Castro, PTA 11/15/2022 6:30 PM  Co-sign: Lana Carpenter DPT PT [facility]

## 2022-11-16 ENCOUNTER — APPOINTMENT (OUTPATIENT)
Dept: PHYSICAL THERAPY | Age: 82
End: 2022-11-16
Attending: ORTHOPAEDIC SURGERY
Payer: MEDICARE

## 2022-12-06 ENCOUNTER — HOSPITAL ENCOUNTER (OUTPATIENT)
Dept: LAB | Age: 82
Discharge: HOME | End: 2022-12-06
Payer: MEDICARE

## 2022-12-06 ENCOUNTER — APPOINTMENT (OUTPATIENT)
Dept: INTERNAL MEDICINE CLINIC | Age: 82
End: 2022-12-06

## 2022-12-06 DIAGNOSIS — R79.82 ELEVATED C-REACTIVE PROTEIN (CRP): ICD-10-CM

## 2022-12-06 DIAGNOSIS — K76.0 NAFLD (NONALCOHOLIC FATTY LIVER DISEASE): ICD-10-CM

## 2022-12-06 DIAGNOSIS — R73.9 HYPERGLYCEMIA: ICD-10-CM

## 2022-12-06 LAB
ALBUMIN SERPL-MCNC: 3.9 G/DL (ref 3.4–5)
ALBUMIN/GLOB SERPL: 1.1 {RATIO} (ref 0.8–1.7)
ALP SERPL-CCNC: 82 U/L (ref 45–117)
ALT SERPL-CCNC: 22 U/L (ref 13–56)
ANION GAP SERPL CALC-SCNC: 5 MMOL/L (ref 3–18)
AST SERPL-CCNC: 14 U/L (ref 10–38)
BASOPHILS # BLD: 0 K/UL (ref 0–0.1)
BASOPHILS NFR BLD: 0 % (ref 0–2)
BILIRUB SERPL-MCNC: 0.4 MG/DL (ref 0.2–1)
BUN SERPL-MCNC: 14 MG/DL (ref 7–18)
BUN/CREAT SERPL: 19 (ref 12–20)
CALCIUM SERPL-MCNC: 9.5 MG/DL (ref 8.5–10.1)
CHLORIDE SERPL-SCNC: 107 MMOL/L (ref 100–111)
CO2 SERPL-SCNC: 30 MMOL/L (ref 21–32)
CREAT SERPL-MCNC: 0.73 MG/DL (ref 0.6–1.3)
CRP SERPL-MCNC: 0.8 MG/DL (ref 0–0.3)
DIFFERENTIAL METHOD BLD: ABNORMAL
EOSINOPHIL # BLD: 0 K/UL (ref 0–0.4)
EOSINOPHIL NFR BLD: 0 % (ref 0–5)
ERYTHROCYTE [DISTWIDTH] IN BLOOD BY AUTOMATED COUNT: 14.7 % (ref 11.6–14.5)
EST. AVERAGE GLUCOSE BLD GHB EST-MCNC: 120 MG/DL
GLOBULIN SER CALC-MCNC: 3.5 G/DL (ref 2–4)
GLUCOSE SERPL-MCNC: 91 MG/DL (ref 74–99)
HBA1C MFR BLD: 5.8 % (ref 4.2–5.6)
HCT VFR BLD AUTO: 42.7 % (ref 35–45)
HGB BLD-MCNC: 13.9 G/DL (ref 12–16)
IMM GRANULOCYTES # BLD AUTO: 0 K/UL (ref 0–0.04)
IMM GRANULOCYTES NFR BLD AUTO: 0 % (ref 0–0.5)
LYMPHOCYTES # BLD: 1.6 K/UL (ref 0.9–3.6)
LYMPHOCYTES NFR BLD: 29 % (ref 21–52)
MCH RBC QN AUTO: 29.5 PG (ref 24–34)
MCHC RBC AUTO-ENTMCNC: 32.6 G/DL (ref 31–37)
MCV RBC AUTO: 90.7 FL (ref 78–100)
MONOCYTES # BLD: 0.3 K/UL (ref 0.05–1.2)
MONOCYTES NFR BLD: 6 % (ref 3–10)
NEUTS SEG # BLD: 3.5 K/UL (ref 1.8–8)
NEUTS SEG NFR BLD: 64 % (ref 40–73)
NRBC # BLD: 0 K/UL (ref 0–0.01)
NRBC BLD-RTO: 0 PER 100 WBC
PLATELET # BLD AUTO: 326 K/UL (ref 135–420)
PMV BLD AUTO: 10.6 FL (ref 9.2–11.8)
POTASSIUM SERPL-SCNC: 3.9 MMOL/L (ref 3.5–5.5)
PROT SERPL-MCNC: 7.4 G/DL (ref 6.4–8.2)
RBC # BLD AUTO: 4.71 M/UL (ref 4.2–5.3)
SODIUM SERPL-SCNC: 142 MMOL/L (ref 136–145)
WBC # BLD AUTO: 5.5 K/UL (ref 4.6–13.2)

## 2022-12-06 PROCEDURE — 83520 IMMUNOASSAY QUANT NOS NONAB: CPT

## 2022-12-06 PROCEDURE — 83036 HEMOGLOBIN GLYCOSYLATED A1C: CPT

## 2022-12-06 PROCEDURE — 85025 COMPLETE CBC W/AUTO DIFF WBC: CPT

## 2022-12-06 PROCEDURE — 36415 COLL VENOUS BLD VENIPUNCTURE: CPT

## 2022-12-06 PROCEDURE — 80053 COMPREHEN METABOLIC PANEL: CPT

## 2022-12-06 PROCEDURE — 86225 DNA ANTIBODY NATIVE: CPT

## 2022-12-06 PROCEDURE — 84450 TRANSFERASE (AST) (SGOT): CPT

## 2022-12-06 PROCEDURE — 86140 C-REACTIVE PROTEIN: CPT

## 2022-12-07 LAB
CENTROMERE B AB SER-ACNC: <0.2 AI (ref 0–0.9)
CHROMATIN AB SERPL-ACNC: <0.2 AI (ref 0–0.9)
DSDNA AB SER-ACNC: 2 IU/ML (ref 0–9)
ENA JO1 AB SER-ACNC: <0.2 AI (ref 0–0.9)
ENA RNP AB SER-ACNC: <0.2 AI (ref 0–0.9)
ENA SCL70 AB SER-ACNC: <0.2 AI (ref 0–0.9)
ENA SM AB SER-ACNC: <0.2 AI (ref 0–0.9)
ENA SS-A AB SER-ACNC: <0.2 AI (ref 0–0.9)
ENA SS-B AB SER-ACNC: <0.2 AI (ref 0–0.9)
SEE BELOW, 164869: NORMAL

## 2022-12-08 LAB
A2 MACROGLOB SERPL-MCNC: 213 MG/DL (ref 110–276)
ALT (SGPT) P5P, 001547: 16 IU/L (ref 0–40)
APO A-I SERPL-MCNC: 142 MG/DL (ref 116–209)
AST SERPL W P-5'-P-CCNC: 18 IU/L (ref 0–40)
BILIRUB SERPL-MCNC: 0.4 MG/DL (ref 0–1.2)
CHOLEST SERPL-MCNC: 187 MG/DL (ref 100–199)
COMMENT:: ABNORMAL
FIBROSIS SCORING:, 550107: ABNORMAL
FIBROSIS STAGE SERPL QL: ABNORMAL
GGT SERPL-CCNC: 19 IU/L (ref 0–60)
GLUCOSE SERPL-MCNC: 92 MG/DL (ref 70–99)
HAPTOGLOB SERPL-MCNC: 188 MG/DL (ref 42–346)
HEIGHT (NASH), 13912: 61
INTERPRETATIONS:, 550143: ABNORMAL
LIVER FIBR SCORE SERPL CALC.FIBROSURE: 0.25 (ref 0–0.21)
NASH SCORING, 550144: ABNORMAL
NECROINFLAMMATORY ACT GRADE SERPL QL: ABNORMAL
NECROINFLAMMATORY ACT SCORE SERPL: 0.5
SERVICE CMNT-IMP: ABNORMAL
STEATOSIS GRADE, 550153: ABNORMAL
STEATOSIS GRADING, 550189: ABNORMAL
STEATOSIS SCORE, 550149: 0.51 (ref 0–0.3)
TRIGL SERPL-MCNC: 83 MG/DL (ref 0–149)
WEIGHT (NASH): 168

## 2022-12-10 LAB
14.3.3 ETA, RHEUM. ARTHRITIS: NORMAL NG/ML
CCP IGA+IGG SERPL IA-ACNC: NORMAL UNITS
RHEUMATOID FACT SERPL-ACNC: <14 UNITS/ML

## 2022-12-13 ENCOUNTER — OFFICE VISIT (OUTPATIENT)
Dept: INTERNAL MEDICINE CLINIC | Age: 82
End: 2022-12-13
Payer: MEDICARE

## 2022-12-13 VITALS
RESPIRATION RATE: 14 BRPM | TEMPERATURE: 97.4 F | OXYGEN SATURATION: 97 % | HEIGHT: 61 IN | DIASTOLIC BLOOD PRESSURE: 75 MMHG | SYSTOLIC BLOOD PRESSURE: 130 MMHG | HEART RATE: 60 BPM | BODY MASS INDEX: 31.91 KG/M2 | WEIGHT: 169 LBS

## 2022-12-13 DIAGNOSIS — Z71.89 ADVANCE CARE PLANNING: ICD-10-CM

## 2022-12-13 DIAGNOSIS — R79.82 ELEVATED C-REACTIVE PROTEIN (CRP): ICD-10-CM

## 2022-12-13 DIAGNOSIS — K76.0 NAFLD (NONALCOHOLIC FATTY LIVER DISEASE): ICD-10-CM

## 2022-12-13 DIAGNOSIS — K76.0 NAFLD (NONALCOHOLIC FATTY LIVER DISEASE): Primary | ICD-10-CM

## 2022-12-13 DIAGNOSIS — I10 PRIMARY HYPERTENSION: ICD-10-CM

## 2022-12-13 DIAGNOSIS — R73.9 HYPERGLYCEMIA: ICD-10-CM

## 2022-12-13 DIAGNOSIS — Z00.00 MEDICARE ANNUAL WELLNESS VISIT, SUBSEQUENT: ICD-10-CM

## 2022-12-13 PROCEDURE — G0463 HOSPITAL OUTPT CLINIC VISIT: HCPCS | Performed by: INTERNAL MEDICINE

## 2022-12-13 PROCEDURE — G8754 DIAS BP LESS 90: HCPCS | Performed by: INTERNAL MEDICINE

## 2022-12-13 PROCEDURE — G8399 PT W/DXA RESULTS DOCUMENT: HCPCS | Performed by: INTERNAL MEDICINE

## 2022-12-13 PROCEDURE — G8417 CALC BMI ABV UP PARAM F/U: HCPCS | Performed by: INTERNAL MEDICINE

## 2022-12-13 PROCEDURE — 3078F DIAST BP <80 MM HG: CPT | Performed by: INTERNAL MEDICINE

## 2022-12-13 PROCEDURE — G8427 DOCREV CUR MEDS BY ELIG CLIN: HCPCS | Performed by: INTERNAL MEDICINE

## 2022-12-13 PROCEDURE — 1123F ACP DISCUSS/DSCN MKR DOCD: CPT | Performed by: INTERNAL MEDICINE

## 2022-12-13 PROCEDURE — G8510 SCR DEP NEG, NO PLAN REQD: HCPCS | Performed by: INTERNAL MEDICINE

## 2022-12-13 PROCEDURE — 99214 OFFICE O/P EST MOD 30 MIN: CPT | Performed by: INTERNAL MEDICINE

## 2022-12-13 PROCEDURE — 3074F SYST BP LT 130 MM HG: CPT | Performed by: INTERNAL MEDICINE

## 2022-12-13 PROCEDURE — G8752 SYS BP LESS 140: HCPCS | Performed by: INTERNAL MEDICINE

## 2022-12-13 PROCEDURE — G8536 NO DOC ELDER MAL SCRN: HCPCS | Performed by: INTERNAL MEDICINE

## 2022-12-13 PROCEDURE — G0439 PPPS, SUBSEQ VISIT: HCPCS | Performed by: INTERNAL MEDICINE

## 2022-12-13 PROCEDURE — 1090F PRES/ABSN URINE INCON ASSESS: CPT | Performed by: INTERNAL MEDICINE

## 2022-12-13 PROCEDURE — 1101F PT FALLS ASSESS-DOCD LE1/YR: CPT | Performed by: INTERNAL MEDICINE

## 2022-12-13 NOTE — PATIENT INSTRUCTIONS
Medicare Wellness Visit, Female     The best way to live healthy is to have a lifestyle where you eat a well-balanced diet, exercise regularly, limit alcohol use, and quit all forms of tobacco/nicotine, if applicable. Regular preventive services are another way to keep healthy. Preventive services (vaccines, screening tests, monitoring & exams) can help personalize your care plan, which helps you manage your own care. Screening tests can find health problems at the earliest stages, when they are easiest to treat. [facility] follows the current, evidence-based guidelines published by the [facility] Bandar Rodriguez ([facility]) when recommending preventive services for our patients. Because we follow these guidelines, sometimes recommendations change over time as research supports it. (For example, mammograms used to be recommended annually. Even though Medicare will still pay for an annual mammogram, the newer guidelines recommend a mammogram every two years for women of average risk). Of course, you and your doctor may decide to screen more often for some diseases, based on your risk and your co-morbidities (chronic disease you are already diagnosed with). Preventive services for you include:  - Medicare offers their members a free annual wellness visit, which is time for you and your primary care provider to discuss and plan for your preventive service needs.  Take advantage of this benefit every year!    -Over the age of 72 should receive the recommended pneumonia vaccines.    -All adults should have a flu vaccine yearly.  -All adults should have a tetanus vaccine every 10 years.   -Over the age 48 should receive the shingles vaccines.        -All adults should be screened once for Hepatitis C.  -All adults age 38-68 who are overweight should have a diabetes screening test once every three years.   -Other screening tests and preventive services for persons with diabetes include: an eye exam to screen for diabetic retinopathy, a kidney function test, a foot exam, and stricter control over your cholesterol.   -Cardiovascular screening for adults with routine risk involves an electrocardiogram (ECG) at intervals determined by your doctor.     -Colorectal cancer screenings should be done for adults age 39-70 with no increased risk factors for colorectal cancer. There are a number of acceptable methods of screening for this type of cancer. Each test has its own benefits and drawbacks. Discuss with your doctor what is most appropriate for you during your annual wellness visit. The different tests include: colonoscopy (considered the best screening method), a fecal occult blood test, a fecal DNA test, and sigmoidoscopy.    -Lung cancer screening is recommended annually with a low dose CT scan for adults between age 54 and 68, who have smoked at least 30 pack years (equivalent of 1 pack per day for 30 days), and who is a current smoker or quit less than 15 years ago.    -A bone mass density test is recommended when a woman turns 65 to screen for osteoporosis. This test is only recommended one time, as a screening. Some providers will use this same test as a disease monitoring tool if you already have osteoporosis. -Breast cancer screenings are recommended every other year for women of normal risk, age 54-69.    -Cervical cancer screenings for women over age 72 are only recommended with certain risk factors.      Here is a list of your current Health Maintenance items (your personalized list of preventive services) with a due date:  Health Maintenance Due   Topic Date Due    Annual Well Visit  10/12/2022

## 2022-12-13 NOTE — PROGRESS NOTES
Mindy Mccurdy presents today for   Chief Complaint   Patient presents with    Follow-up    Labs     12-6-22    Annual Wellness Visit         1. \"Have you been to the ER, urgent care clinic since your last visit? Hospitalized since your last visit? \" no    2. \"Have you seen or consulted any other health care providers outside of the [address] [place] [address] since your last visit? \" yes     3. For patients aged 39-70: Has the patient had a colonoscopy / FIT/ Cologuard? NA - based on age      If the patient is female:    4. For patients aged 41-77: Has the patient had a mammogram within the past 2 years? NA - based on age or sex  See top three    5. For patients aged 21-65: Has the patient had a pap smear?  NA - based on age or sex

## 2022-12-13 NOTE — PROGRESS NOTES
[facility]:  12/13/22, [id number]      The Subsequent Medicare Annual Wellness Exam PROGRESS NOTE    This is a Subsequent Medicare Annual Wellness Exam (AWV). I have reviewed the patient's medical history in detail and updated the computerized patient record. Laurence Gonzalez is a [de-identified] y.o.  female and presents for an annual wellness exam.    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    Band keratopathy of right eye 02/03/2018    Cataract     Chronic pain     Cylindrical bronchiectasis (HCC)     Fracture     rt ankle as an adult    History of pneumonia     Hypertension     Ill-defined condition     Spasms to left side    Left arm pain     Lumbar spinal stenosis     Myofascial pain     Osteopenia     Sciatica of right side       Past Surgical History:   Procedure Laterality Date    COLONOSCOPY N/A 11/11/2019    COLONOSCOPY w polypectomies performed by Andi Stone MD at [address] [place] [address]  08/15/2011    HX COLONOSCOPY      HX HEENT      sinus surgery    HX KERATOPLASTY Left 10/02/2017    HX ORTHOPAEDIC      left 4th finger trigger release    HX SHIELA AND BSO  1970s    uterine fibroids     Current Outpatient Medications   Medication Sig Dispense Refill    Gralise 600 mg Tb24 TAKE 2 TABLETS NIGHTLY 180 Tablet 0    triamterene-hydroCHLOROthiazide (MAXZIDE) 37.5-25 mg per tablet TAKE ONE-HALF (1/2) TABLET DAILY 45 Tablet 3    diazePAM (Valium) 2 mg tablet Take 0.5 Tablets by mouth every twelve (12) hours as needed (vertigo). Max Daily Amount: 2 mg. 10 Tablet 0    meclizine (ANTIVERT) 12.5 mg tablet Take 12.5 mg by mouth three (3) times daily as needed for Dizziness.       fluticasone propionate (FLONASE) 50 mcg/actuation nasal spray USE 2 SPRAYS IN EACH NOSTRIL DAILY 48 g 3    mv-min/vit C/glut/lysine/hb124 (AIR[place]ORNE, LYSINE HCL, PO) Take 1 Tablet by mouth daily as needed. fexofenadine (ALLEGRA) 180 mg tablet Take 180 mg by mouth daily. PROAIR HFA 90 mcg/actuation inhaler USE 2 INHALATIONS EVERY 4 HOURS AS NEEDED 3 Inhaler 3    EPINEPHrine (EPIPEN) 0.3 mg/0.3 mL injection 0.3 mL by IntraMUSCular route once as needed for up to 1 dose. 1 Syringe 1    melatonin 3 mg tablet Take 3 mg by mouth daily as needed (sleep). cholecalciferol (VITAMIN D3) 25 mcg (1,000 unit) cap Take 3,000 Units by mouth daily. cycloSPORINE (RESTASIS) 0.05 % dpet Administer 1 Drop to both eyes two (2) times a day. MULTIVITAMIN W-MINERALS/LUTEIN (CENTRUM SILVER PO) Take 1 Tablet by mouth daily. co-enzyme Q-10 (CO Q-10) 100 mg capsule Take 100 mg by mouth daily. BIOTIN PO Take 5,000 mcg by mouth daily.        Allergies   Allergen Reactions    Naproxen Other (comments)     Blood in urine    Other reaction(s): GI intolerance     Family History   Problem Relation Age of Onset    Asthma Mother     COPD Mother     Mult Sclerosis Mother     Heart Disease Mother     Hypertension Mother     Stroke Father     Heart Disease Father     Hypertension Father     Allergic Rhinitis Sister     Asthma Brother     Hypertension Brother     Diabetes Brother     Migraines Paternal Grandmother      Social History     Tobacco Use    Smoking status: Former     Packs/day: 1.00     Years: 10.00     Pack years: 10.00     Types: Cigarettes     Quit date: 1970     Years since quittin.9    Smokeless tobacco: Never   Substance Use Topics    Alcohol use: Not Currently     Alcohol/week: 1.0 standard drink     Types: 1 Glasses of wine per week     Comment: occasional wine     Patient Active Problem List   Diagnosis Code    Multiple lung nodules R91.8    Allergic rhinitis J30.9    HTN (hypertension) I10    Asthma J45.909    Osteopenia M85.80    Vitamin D deficiency E55.9    Right knee DJD, 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    Tubular adenoma of colon D12.6    Lumbar spinal stenosis M48.061    HNP (herniated nucleus pulposus), lumbar M51.26    Lumbar facet arthropathy M47.816    Cervical stenosis of spine M48.02    LGSIL of cervix of undetermined significance R87.612    Heel spur, left M77.32    Severe obesity (HCC) E66.01    Hiatal hernia K44.9    NAFLD (nonalcoholic fatty liver disease) K76.0     The patient is a 47-year-old female with history of hypertension, LGSIL 4/19, osteopenia, tubular adenomas colon polyps, obesity, vitamin D deficiency, stable pulmonary nodules, small hiatal hernia, NAFLD, asthma (followed by Frankey Poster), cervical disc disease, allergic rhinitis (declining allergy testing and immunotherapy), and lumbar disc disease (followed by Orthopedics, ). Health Maintenance History  Immunizations reviewed: Tdap up to date   Pneumovax: up to date   Flu: up to date  Zoster: up to date    Immunization History   Administered Date(s) Administered    COVID-19, PFIZER Bivalent BOOSTER, (age 12y+), IM, 30 mcg/0.3 mL dose 10/21/2022    COVID-19, PFIZER PURPLE top, DILUTE for use, (age 15 y+), IM, 30mcg/0.3mL 03/06/2021, 03/26/2021, 10/08/2021    Influenza High Dose Vaccine PF 09/22/2016, 09/25/2017, 09/10/2020, 11/30/2022    Influenza Vaccine 10/08/2013, 09/26/2014, 10/20/2015, 10/28/2019    Influenza Vaccine Split 10/06/2011    Influenza, FLUAD, (age 72 y+), Adjuvanted 09/10/2020, 09/29/2021    Pneumococcal Conjugate (PCV-13) 05/11/2015    Pneumococcal Polysaccharide (PPSV-23) 06/14/2016    Tdap 10/24/2020    Zoster Recombinant 01/14/2020, 06/08/2020       Colonoscopy: Up to date. Eye exam: Up to date. Mammo: Up to date. Dexascan: Up to date. Pelvic/Pap: Up to date. Review of Systems   Constitutional:  Negative for chills and fever. HENT:  Negative for ear pain and sore throat. Eyes:  Negative for pain. Respiratory:  Negative for cough and shortness of breath. Cardiovascular:  Negative for chest pain.    Gastrointestinal: Negative for abdominal pain, blood in stool and melena. Genitourinary:  Negative for dysuria and hematuria. Musculoskeletal:  Positive for back pain and joint pain. Negative for myalgias. Skin:  Negative for rash. Neurological:  Negative for headaches. Endo/Heme/Allergies:  Does not bruise/bleed easily. Psychiatric/Behavioral:  Negative for substance abuse. The patient is nervous/anxious (from taking care of her  who has mild dementia per her hx). Depression Risk Factor Screening:      Patient Health Questionnaire (PHQ-2)   Over the last 2 weeks, how often have you been bothered by any of the following problems? Little interest or pleasure in doing things? Not at all. [0]  Feeling down, depressed, or hopeless? Not at all. [0]    Total Score: 0/6  PHQ-2 Assessment Scoring:   A score of 2 or more requires further screening with the PHQ-9    Alcohol Risk Factor Screening:   Women: On any occasion during the past 3 months, have you had more than 3 drinks containing alcohol? no    Do you average more than 7 drinks per week? no    Tobacco Use Screening:     Social History     Tobacco Use   Smoking Status Former    Packs/day: 1.00    Years: 10.00    Pack years: 10.00    Types: Cigarettes    Quit date: 1970    Years since quittin.9   Smokeless Tobacco Never       Hearing Loss   There have been no changes to the pt's hearing. No additional studies/evaluation is warranted at this time    Activities of Daily Living   Self-care. Requires assistance with: no ADLs  She does not need help with ADLs/IADLs    Fall Risk   No falls w/I the past yr    Abuse Screen   None    Additional Examination Findings:  Vitals:    22 0816   BP: 130/75   Pulse: 60   Resp: 14   Temp: 97.4 °F (36.3 °C)   TempSrc: Temporal   SpO2: 97%   Weight: 169 lb (76.7 kg)   Height: 5' 1\" (1.549 m)   PainSc:   0 - No pain      Body mass index is 31.93 kg/m².      Evaluation of Cognitive Function:  Mood/affect: Euthymic  Appearance: Well-groomed  Family member/caregiver input: she is not with a family member today      General:   Well-nourished, well-groomed, pleasant, alert, in no acute distress. Head:  Normocephalic, atraumatic  Ears:  External ears WNL  Eyes:  Clear sclera  Neuro:   Alert, conversant, appropriate, following commands  Skin:    No rashes noted  Psych: Affect, mood and judgment appropriate      Dementia Screen:  Clock Drawing (ten past eleven) Exercise: Unremarkable.       LABS   Data Review:   Lab Results   Component Value Date/Time    Sodium 142 12/06/2022 07:54 AM    Potassium 3.9 12/06/2022 07:54 AM    Chloride 107 12/06/2022 07:54 AM    CO2 30 12/06/2022 07:54 AM    Anion gap 5 12/06/2022 07:54 AM    Glucose 91 12/06/2022 07:54 AM    Glucose 92 12/06/2022 07:54 AM    BUN 14 12/06/2022 07:54 AM    Creatinine 0.73 12/06/2022 07:54 AM    BUN/Creatinine ratio 19 12/06/2022 07:54 AM    GFR est AA >60 04/05/2022 08:57 AM    GFR est non-AA >60 04/05/2022 08:57 AM    Calcium 9.5 12/06/2022 07:54 AM       Lab Results   Component Value Date/Time    WBC 5.5 12/06/2022 07:54 AM    HGB 13.9 12/06/2022 07:54 AM    HCT 42.7 12/06/2022 07:54 AM    PLATELET 233 58/33/6635 07:54 AM    MCV 90.7 12/06/2022 07:54 AM       Lab Results   Component Value Date/Time    Hemoglobin A1c 5.8 (H) 12/06/2022 07:54 AM       Lab Results   Component Value Date/Time    Cholesterol, total 187 12/06/2022 07:54 AM    HDL Cholesterol 65 (H) 07/05/2022 08:13 AM    LDL, calculated 92.2 07/05/2022 08:13 AM    VLDL, calculated 19.8 07/05/2022 08:13 AM    Triglyceride 83 12/06/2022 07:54 AM    CHOL/HDL Ratio 2.7 07/05/2022 08:13 AM         Patient Care Team:  Mike Lawrence MD as PCP - General (Family Medicine)  Mike Lawrence MD as PCP - REHABILITATION [facility] Empaneled Provider  Francine Carpenter MD (Physical Medicine and Rehabilitation Physician)  Jaymie Inman MD (Pulmonary Disease)  Kaitlin Means MD (Obstetrics & Gynecology)    End-of-life planning  Advanced Directive in the case than an injury or illness causes the patient to be unable to make health care decisions was discussed with the patient. Advice/Referrals/Counselling/Plan:   Education and counseling provided:  Are appropriate based on today's review and evaluation  End-of-Life planning (with patient's consent)  Pneumococcal Vaccine  Influenza Vaccine  Screening Mammography  Screening Pap and pelvic (covered once every 2 years)  Colorectal cancer screening tests  Cardiovascular screening blood test  Bone mass measurement (DEXA)  Screening for glaucoma  Diabetes screening test  Include in education list (weight loss, physical activity, smoking cessation, fall prevention, and nutrition)    ICD-10-CM ICD-9-CM    1. NAFLD (nonalcoholic fatty liver disease)  K76.0 571.8 [facility]      RAMESH FIBROSURE      LIPID PANEL      METABOLIC PANEL, COMPREHENSIVE      2. Medicare annual wellness visit, subsequent  Z00.00 V70.0       3. Hyperglycemia  R73.9 790.29 HEMOGLOBIN A1C WITH EAG      4. Elevated C-reactive protein (CRP)  R79.82 790.95       5. Primary hypertension  I10 401.9 LIPID PANEL      METABOLIC PANEL, COMPREHENSIVE        reviewed diet, exercise and weight control. Brief written plan, checklist    Assessment/Plan:    Lab review: labs are reviewed in the [address] [place] [address] (ACP) Provider Conversation     Date of ACP Conversation: 12/13/22  Persons included in Conversation:  patient    Authorized Decision Maker (if patient is incapable of making informed decisions):    This person is:   Named in Advance Directive or Healthcare Power of           For Patients with Decision Making Capacity:   Values/Goals: Exploration of values, goals, and preferences if recovery is not expected, even with continued medical treatment in the event of:  Imminent death  Severe, permanent brain injury    Conversation Outcomes / Follow-Up Plan:   ACP complete - no further action today. Her  is listed as her primary POA. He has mild dementia per her history. Nevertheless, at this time, she still wants him to be her primary POA given how his dementia is considered mild. She was given advance directive today in case she would like to update her advance directive in the future-regarding her POA information. She would like to change her  as primary POA as his dementia progresses but declines at this time. I have discussed the diagnosis with the patient and the intended plan as seen in the above orders. The patient has received an after-visit summary and questions were answered concerning future plans. I have discussed medication side effects and warnings with the patient as well. I have reviewed the plan of care with the patient, accepted their input and they are in agreement with the treatment goals.

## 2022-12-13 NOTE — ACP (ADVANCE CARE PLANNING)
Advance Care Planning  Advance Care Planning (ACP) Provider Conversation     Date of ACP Conversation: 12/13/22  Persons included in Conversation:  patient    Authorized Decision Maker (if patient is incapable of making informed decisions): This person is:   Named in Advance Directive or Healthcare Power of           For Patients with Decision Making Capacity:   Values/Goals: Exploration of values, goals, and preferences if recovery is not expected, even with continued medical treatment in the event of:  Imminent death  Severe, permanent brain injury    Conversation Outcomes / Follow-Up Plan:   ACP complete - no further action today. Her  is listed as her primary POA. He has mild dementia per her history. Nevertheless, at this time, she still wants him to be her primary POA given how his dementia is considered mild. She was given advance directive today in case she would like to update her advance directive in the future-regarding her POA information. She would like to change her  as primary POA as his dementia progresses but declines at this time.     Dr. Richard Sharma  [facility], [address] [place] [address] Str.  Phone: [phone]  Fax: [phone]

## 2022-12-13 NOTE — PROGRESS NOTES
INTERNISTS OF [facility]:  12/13/2022, MRN: [id number]      Telma Culver is a [de-identified] y.o. female and presents to clinic for Follow-up, Labs (12-6-22), and Annual Wellness Visit      Subjective: The patient is a 56-year-old female with history of hypertension, LGSIL 4/19, osteopenia, tubular adenomas colon polyps, obesity, vitamin D deficiency, stable pulmonary nodules, small hiatal hernia, NAFLD, asthma (followed by Roxane Sheridan), cervical disc disease, allergic rhinitis (declining allergy testing and immunotherapy), and lumbar disc disease (followed by Orthopedics, ). 1.  Prediabetes/Hyperglycemia: Her most recent labs show that her A1c is down to 5.8 from 5.9 earlier this year. 2.  Elevated CRP: Earlier this year, she had an elevated CRP in the 3 range. Now it is down to 0.8. She was also screened for rheumatoid arthritis and Sjogren's disease via serology labs. These labs were unremarkable. 3.  NAFLD: At her last visit, we discussed the option of taking a cholesterol-lowering agent to reduce her CVD risk. She declined. Her fibrosure fibrosis score is 0.25. Her Tasneem Reveal and steatosis score 0.5 range. She has moderate steatosis. Alcohol beverage consumption: very rarely - maybe once a month or once every 2-3 months. Her recent labs are unremarkable LFTs (AST and ALT)    4. Hypertension: Blood pressure is 130/75. Taking triamterene-HCTZ. No microalbuminuria. 5. General:  -She met with Roxane Sheridan given her history of asthma and was told to return to their office in a year.  -She met with Orthopedics for evaluation of her right shoulder pain and lumbar radiculopathy/disc disease. She is participating in physical therapy. She declines a right shoulder injection.          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 Max Daily Amount: 2 mg. 10 Tablet 0    meclizine (ANTIVERT) 12.5 mg tablet Take 12.5 mg by mouth three (3) times daily as needed for Dizziness. fluticasone propionate (FLONASE) 50 mcg/actuation nasal spray USE 2 SPRAYS IN EACH NOSTRIL DAILY 48 g 3    mv-min/vit C/glut/lysine/hb124 (A[place]NE, LYSINE HCL, PO) Take 1 Tablet by mouth daily as needed. fexofenadine (ALLEGRA) 180 mg tablet Take 180 mg by mouth daily. PROAIR HFA 90 mcg/actuation inhaler USE 2 INHALATIONS EVERY 4 HOURS AS NEEDED 3 Inhaler 3    EPINEPHrine (EPIPEN) 0.3 mg/0.3 mL injection 0.3 mL by IntraMUSCular route once as needed for up to 1 dose. 1 Syringe 1    melatonin 3 mg tablet Take 3 mg by mouth daily as needed (sleep). cholecalciferol (VITAMIN D3) 25 mcg (1,000 unit) cap Take 3,000 Units by mouth daily. cycloSPORINE (RESTASIS) 0.05 % dpet Administer 1 Drop to both eyes two (2) times a day. MULTIVITAMIN W-MINERALS/LUTEIN (CENTRUM SILVER PO) Take 1 Tablet by mouth daily. co-enzyme Q-10 (CO Q-10) 100 mg capsule Take 100 mg by mouth daily. BIOTIN PO Take 5,000 mcg by mouth daily.          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    Band keratopathy of right eye 2018    Cataract     Chronic pain     Cylindrical bronchiectasis (HCC)     Fracture     rt ankle as an adult    History of pneumonia     Hypertension     Ill-defined condition     Spasms to left side    Left arm pain     Lumbar spinal stenosis     Myofascial pain     Osteopenia     Sciatica of right side        Past Surgical History:   Procedure Laterality Date    COLONOSCOPY N/A 2019    COLONOSCOPY w polypectomies performed by Jimy Sotomayor MD at [facility]  08/15/2011    HX COLONOSCOPY      HX HEENT      sinus surgery    HX KERATOPLASTY Left 10/02/2017    HX ORTHOPAEDIC      left 4th finger trigger release    HX SHIELA AND BSO  1970s    uterine fibroids       Family History   Problem Relation Age of Onset    Asthma Mother     COPD Mother     Mult Sclerosis Mother     Heart Disease Mother     Hypertension Mother     Stroke Father     Heart Disease Father     Hypertension Father     Allergic Rhinitis Sister     Asthma Brother     Hypertension Brother     Diabetes Brother     Migraines Paternal Grandmother        Social History     Tobacco Use    Smoking status: Former     Packs/day: 1.00     Years: 10.00     Pack years: 10.00     Types: Cigarettes     Quit date: 1970     Years since quittin.9    Smokeless tobacco: Never   Substance Use Topics    Alcohol use: Not Currently     Alcohol/week: 1.0 standard drink     Types: 1 Glasses of wine per week     Comment: occasional wine       ROS   Review of Systems   Constitutional:  Negative for chills and fever. HENT:  Negative for ear pain and sore throat. Eyes:  Negative for pain.    Respiratory:  Negative for cough and shortness of breath. Cardiovascular:  Negative for chest pain. Gastrointestinal:  Negative for abdominal pain, blood in stool and melena. Genitourinary:  Negative for dysuria and hematuria. Musculoskeletal:  Positive for back pain and joint pain. Skin:  Negative for rash. Neurological:  Negative for headaches. Endo/Heme/Allergies:  Does not bruise/bleed easily. Psychiatric/Behavioral:  Negative for depression and substance abuse. The patient is nervous/anxious (stress with caring for her  who has mild dementia per her hx). Objective     Vitals:    12/13/22 0816   BP: 130/75   Pulse: 60   Resp: 14   Temp: 97.4 °F (36.3 °C)   TempSrc: Temporal   SpO2: 97%   Weight: 169 lb (76.7 kg)   Height: 5' 1\" (1.549 m)   PainSc:   0 - No pain       Physical Exam  Vitals and nursing note reviewed. HENT:      Head: Normocephalic and atraumatic. Right Ear: External ear normal.      Left Ear: External ear normal.   Eyes:      General: No scleral icterus. Right eye: No discharge. Left eye: No discharge. Conjunctiva/sclera: Conjunctivae normal.   Cardiovascular:      Rate and Rhythm: Normal rate and regular rhythm. Heart sounds: Normal heart sounds. No murmur heard. No friction rub. No gallop. Pulmonary:      Effort: Pulmonary effort is normal. No respiratory distress. Breath sounds: Normal breath sounds. No wheezing or rales. Abdominal:      General: Bowel sounds are normal. There is no distension. Palpations: Abdomen is soft. There is no mass. Tenderness: There is no abdominal tenderness. There is no guarding or rebound. Musculoskeletal:         General: No swelling (BUE) or tenderness (BUE). Cervical back: Neck supple. Lymphadenopathy:      Cervical: No cervical adenopathy. Skin:     General: Skin is warm and dry. Findings: No erythema or rash. Neurological:      Mental Status: She is alert. Motor: No abnormal muscle tone. Gait: Gait normal.   Psychiatric:         Mood and Affect: Mood normal.       LABS   Data Review:   Lab Results   Component Value Date/Time    WBC 5.5 12/06/2022 07:54 AM    HGB 13.9 12/06/2022 07:54 AM    HCT 42.7 12/06/2022 07:54 AM    PLATELET 336 41/79/5496 07:54 AM    MCV 90.7 12/06/2022 07:54 AM       Lab Results   Component Value Date/Time    Sodium 142 12/06/2022 07:54 AM    Potassium 3.9 12/06/2022 07:54 AM    Chloride 107 12/06/2022 07:54 AM    CO2 30 12/06/2022 07:54 AM    Anion gap 5 12/06/2022 07:54 AM    Glucose 91 12/06/2022 07:54 AM    Glucose 92 12/06/2022 07:54 AM    BUN 14 12/06/2022 07:54 AM    Creatinine 0.73 12/06/2022 07:54 AM    BUN/Creatinine ratio 19 12/06/2022 07:54 AM    GFR est AA >60 04/05/2022 08:57 AM    GFR est non-AA >60 04/05/2022 08:57 AM    Calcium 9.5 12/06/2022 07:54 AM       Lab Results   Component Value Date/Time    Cholesterol, total 187 12/06/2022 07:54 AM    HDL Cholesterol 65 (H) 07/05/2022 08:13 AM    LDL, calculated 92.2 07/05/2022 08:13 AM    VLDL, calculated 19.8 07/05/2022 08:13 AM    Triglyceride 83 12/06/2022 07:54 AM    CHOL/HDL Ratio 2.7 07/05/2022 08:13 AM       Lab Results   Component Value Date/Time    Hemoglobin A1c 5.8 (H) 12/06/2022 07:54 AM       Assessment/Plan:   1. NAFLD (nonalcoholic fatty liver disease): Her FibroSure test result indicates moderate steatosis with some progression to irritation to the liver.  -Ordering a right upper quadrant ultrasound.  - Ordering follow-up labs in 6 months. - I encouraged her to maintain a heart healthy diet low in fried and junk foods. I also encouraged her not to eat after dinner until breakfast the next day. ORDERS:  - US ABD LTD; Future  - RAMESH FIBROSURE; Future  - LIPID PANEL; Future  - METABOLIC PANEL, COMPREHENSIVE; Future    2. Prediabetes/Hyperglycemia: Improved but her A1c is still elevated at 5.8.  -Low-carb diet recommended. - We will check follow-up A1c in 6 months.     ORDERS:  - HEMOGLOBIN A1C WITH EAG; Future    4. Elevated C-reactive protein (CRP): She is negative rheumatoid arthritis and lupus. Her CRP is declining. Observation. 5. Primary hypertension: Stable  -Continue medication as prescribed. - We will check labs in 6 months. ORDERS:  - LIPID PANEL; Future  - METABOLIC PANEL, COMPREHENSIVE; Future      **She will continue to see her orthopedist in regards to her shoulder and chronic back pain. **      Health Maintenance Due   Topic Date Due    Medicare Yearly Exam  10/12/2022         Lab review: labs are reviewed in the EHR and ordered as mentioned above    I have discussed the diagnosis with the patient and the intended plan as seen in the above orders. The patient has received an after-visit summary and questions were answered concerning future plans. I have discussed medication side effects and warnings with the patient as well. I have reviewed the plan of care with the patient, accepted their input and they are in agreement with the treatment goals. All questions were answered. The patient understands the plan of care. Handouts provided today with above information. Pt instructed if symptoms worsen to call the office or report to the ED for continued care. Greater than 50% of the visit time was spent in counseling and/or coordination of care. Voice recognition was used to generate this report, which may have resulted in some phonetic based errors in grammar and contents. Even though attempts were made to correct all the mistakes, some may have been missed, and remained in the body of the document. Follow-up and Dispositions    Return in about 6 months (around 6/13/2023).          Jayda Rice MD

## 2022-12-14 ENCOUNTER — OFFICE VISIT (OUTPATIENT)
Dept: ORTHOPEDIC SURGERY | Age: 82
End: 2022-12-14
Payer: MEDICARE

## 2022-12-14 VITALS
OXYGEN SATURATION: 96 % | TEMPERATURE: 97.6 F | HEIGHT: 61 IN | HEART RATE: 61 BPM | BODY MASS INDEX: 31.91 KG/M2 | WEIGHT: 169 LBS

## 2022-12-14 DIAGNOSIS — M62.838 MUSCLE SPASM: ICD-10-CM

## 2022-12-14 DIAGNOSIS — M79.2 NEURITIS: Primary | ICD-10-CM

## 2022-12-14 DIAGNOSIS — M54.12 CERVICAL RADICULOPATHY: ICD-10-CM

## 2022-12-14 DIAGNOSIS — M54.16 LUMBAR RADICULOPATHY: ICD-10-CM

## 2022-12-14 DIAGNOSIS — M75.111 NONTRAUMATIC INCOMPLETE TEAR OF RIGHT ROTATOR CUFF: ICD-10-CM

## 2022-12-14 DIAGNOSIS — M50.30 DDD (DEGENERATIVE DISC DISEASE), CERVICAL: ICD-10-CM

## 2022-12-14 NOTE — PATIENT INSTRUCTIONS
Learning About the [facility] Diet  What is the Mediterranean diet? The Mediterranean diet is a style of eating rather than a diet plan. It features foods eaten in [place], [place], [place] and [place], and other countries along the [facility]. It emphasizes eating foods like fish, fruits, vegetables, beans, high-fiber breads and whole grains, nuts, and olive oil. This style of eating includes limited red meat, cheese, and sweets. Why choose the Mediterranean diet? A Mediterranean-style diet may improve heart health. It contains more fat than other heart-healthy diets. But the fats are mainly from nuts, unsaturated oils (such as fish oils and olive oil), and certain nut or seed oils (such as canola, soybean, or flaxseed oil). These fats may help protect the heart and blood vessels. How can you get started on the Mediterranean diet? Here are some things you can do to switch to a more Mediterranean way of eating. What to eat  Eat a variety of fruits and vegetables each day, such as grapes, blueberries, tomatoes, broccoli, peppers, figs, olives, spinach, eggplant, beans, lentils, and chickpeas. Eat a variety of whole-grain foods each day, such as oats, brown rice, and whole wheat bread, pasta, and couscous. Eat fish at least 2 times a week. Try tuna, salmon, mackerel, lake trout, herring, or sardines. Eat moderate amounts of low-fat dairy products, such as milk, cheese, or yogurt. Eat moderate amounts of poultry and eggs. Choose healthy (unsaturated) fats, such as nuts, olive oil, and certain nut or seed oils like canola, soybean, and flaxseed. Limit unhealthy (saturated) fats, such as butter, palm oil, and coconut oil. And limit fats found in animal products, such as meat and dairy products made with whole milk. Try to eat red meat only a few times a month in very small amounts. Limit sweets and desserts to only a few times a week. This includes sugar-sweetened drinks like soda.   The Mediterranean diet may also include red wine with your meal--1 glass each day for women and up to 2 glasses a day for men. Tips for eating at home  Use herbs, spices, garlic, lemon zest, and citrus juice instead of salt to add flavor to foods. Add avocado slices to your sandwich instead of stewart. Have fish for lunch or dinner instead of red meat. Brush the fish with olive oil, and broil or grill it. Sprinkle your salad with seeds or nuts instead of cheese. Cook with olive or canola oil instead of butter or oils that are high in saturated fat. Switch from 2% milk or whole milk to 1% or fat-free milk. Dip raw vegetables in a vinaigrette dressing or hummus instead of dips made from mayonnaise or sour cream.  Have a piece of fruit for dessert instead of a piece of cake. Try baked apples, or have some dried fruit. Tips for eating out  Try broiled, grilled, baked, or poached fish instead of having it fried or breaded. Ask your  to have your meals prepared with olive oil instead of butter. Order dishes made with marinara sauce or sauces made from olive oil. Avoid sauces made from cream or mayonnaise. Choose whole-grain breads, whole wheat pasta and pizza crust, brown rice, beans, and lentils. Cut back on butter or margarine on bread. Instead, you can dip your bread in a small amount of olive oil. Ask for a side salad or grilled vegetables instead of french fries or chips. Where can you learn more? Go to http://www.wells.com/  Enter O407 in the search box to learn more about \"Learning About the Mediterranean Diet. \"  Current as of: May 9, 2022               Content Version: 13.4  © 7326-3492 Healthwise, [employer]. Care instructions adapted under license by [employer] (which disclaims liability or warranty for this information).  If you have questions about a medical condition or this instruction, always ask your healthcare professional. Leni Guerrero disclaims any warranty or liability for your use of this information. Rotator Cuff: Exercises  Introduction  Here are some examples of exercises for you to try. The exercises may be suggested for a condition or for rehabilitation. Start each exercise slowly. Ease off the exercises if you start to have pain. You will be told when to start these exercises and which ones will work best for you. How to do the exercises  Pendulum swing  If you have pain in your back, do not do this exercise. Hold on to a table or the back of a chair with your good arm. Then bend forward a little and let your sore arm hang straight down. This exercise does not use the arm muscles. Rather, use your legs and your hips to create movement that makes your arm swing freely. Use the movement from your hips and legs to guide the slightly swinging arm back and forth like a pendulum (or elephant trunk). Then guide it in circles that start small (about the size of a dinner plate). Make the circles a bit larger each day, as your pain allows. Do this exercise for 5 minutes, 5 to 7 times each day. As you have less pain, try bending over a little farther to do this exercise. This will increase the amount of movement at your shoulder. Posterior stretching exercise  Hold the elbow of your injured arm with your other hand. Use your hand to pull your injured arm gently up and across your body. You will feel a gentle stretch across the back of your injured shoulder. Hold for at least 15 to 30 seconds. Then slowly lower your arm. Repeat 2 to 4 times. Up-the-back stretch  Your doctor or physical therapist may want you to wait to do this stretch until you have regained most of your range of motion and strength. You can do this stretch in different ways. Hold any of these stretches for at least 15 to 30 seconds. Repeat them 2 to 4 times. Light stretch: Put your hand in your back pocket. Let it rest there to stretch your shoulder.   Moderate stretch: With your other hand, hold your injured arm (palm outward) behind your back by the wrist. Pull your arm up gently to stretch your shoulder. Advanced stretch: Put a towel over your other shoulder. Put the hand of your injured arm behind your back. Now hold the back end of the towel. With the other hand, hold the front end of the towel in front of your body. Pull gently on the front end of the towel. This will bring your hand farther up your back to stretch your shoulder. Overhead stretch  Standing about an arm's length away, grasp onto a solid surface. You could use a countertop, a doorknob, or the back of a sturdy chair. With your knees slightly bent, bend forward with your arms straight. Lower your upper body, and let your shoulders stretch. As your shoulders are able to stretch farther, you may need to take a step or two backward. Hold for at least 15 to 30 seconds. Then stand up and relax. If you had stepped back during your stretch, step forward so you can keep your hands on the solid surface. Repeat 2 to 4 times. Shoulder flexion (lying down)  To make a wand for this exercise, use a piece of PVC pipe or a broom handle with the broom removed. Make the wand about a foot wider than your shoulders. Lie on your back, holding a wand with both hands. Your palms should face down as you hold the wand. Keeping your elbows straight, slowly raise your arms over your head. Raise them until you feel a stretch in your shoulders, upper back, and chest.  Hold for 15 to 30 seconds. Repeat 2 to 4 times. Shoulder rotation (lying down)  To make a wand for this exercise, use a piece of PVC pipe or a broom handle with the broom removed. Make the wand about a foot wider than your shoulders. Lie on your back. Hold a wand with both hands with your elbows bent and palms up. Keep your elbows close to your body, and move the wand across your body toward the sore arm. Hold for 8 to 12 seconds.   Repeat 2 to 4 times. Wall climbing (to the side)  Avoid any movement that is straight to your side, and be careful not to arch your back. Your arm should stay about 30 degrees to the front of your side. Stand with your side to a wall so that your fingers can just touch it at an angle about 30 degrees toward the front of your body. Walk the fingers of your injured arm up the wall as high as pain permits. Try not to shrug your shoulder up toward your ear as you move your arm up. Hold that position for a count of at least 15 to 20. Walk your fingers back down to the starting position. Repeat at least 2 to 4 times. Try to reach higher each time. Wall climbing (to the front)  During this stretching exercise, be careful not to arch your back. Face a wall, and stand so your fingers can just touch it. Keeping your shoulder down, walk the fingers of your injured arm up the wall as high as pain permits. (Don't shrug your shoulder up toward your ear.)  Hold your arm in that position for at least 15 to 30 seconds. Slowly walk your fingers back down to where you started. Repeat at least 2 to 4 times. Try to reach higher each time. Shoulder blade squeeze  Stand with your arms at your sides, and squeeze your shoulder blades together. Do not raise your shoulders up as you squeeze. Hold 6 seconds. Repeat 8 to 12 times. Scapular exercise: Arm reach  Lie flat on your back. This exercise is a very slight motion that starts with your arms raised (elbows straight, arms straight). From this position, reach higher toward the robb or ceiling. Keep your elbows straight. All motion should be from your shoulder blade only. Relax your arms back to where you started. Repeat 8 to 12 times. Arm raise to the side  During this strengthening exercise, your arm should stay about 30 degrees to the front of your side. Slowly raise your injured arm to the side, with your thumb facing up.  Raise your arm 60 degrees at the most (shoulder level is 90 degrees). Hold the position for 3 to 5 seconds. Then lower your arm back to your side. If you need to, bring your \"good\" arm across your body and place it under the elbow as you lower your injured arm. Use your good arm to keep your injured arm from dropping down too fast.  Repeat 8 to 12 times. When you first start out, don't hold any extra weight in your hand. As you get stronger, you may use a 1-pound to 2-pound dumbbell or a small can of food. Shoulder flexor and extensor exercise  These are isometric exercises. That means you contract your muscles without actually moving. Push forward (flex): Stand facing a wall or doorjamb, about 6 inches or less back. Hold your injured arm against your body. Make a closed fist with your thumb on top. Then gently push your hand forward into the wall with about 25% to 50% of your strength. Don't let your body move backward as you push. Hold for about 6 seconds. Relax for a few seconds. Repeat 8 to 12 times. Push backward (extend): Stand with your back flat against a wall. Your upper arm should be against the wall, with your elbow bent 90 degrees (your hand straight ahead). Push your elbow gently back against the wall with about 25% to 50% of your strength. Don't let your body move forward as you push. Hold for about 6 seconds. Relax for a few seconds. Repeat 8 to 12 times. Scapular exercise: Wall push-ups  This exercise is best done with your fingers somewhat turned out, rather than straight up and down. Stand facing a wall, about 12 inches to 18 inches away. Place your hands on the wall at shoulder height. Slowly bend your elbows and bring your face to the wall. Keep your back and hips straight. Push back to where you started. Repeat 8 to 12 times. When you can do this exercise against a wall comfortably, you can try it against a counter. You can then slowly progress to the end of a couch, then to a sturdy chair, and finally to the floor.   Scapular exercise: Retraction  For this exercise, you will need elastic exercise material, such as surgical tubing or Thera-Band. Put the band around a solid object at about waist level. (A bedpost will work well.) Each hand should hold an end of the band. With your elbows at your sides and bent to 90 degrees, pull the band back. Your shoulder blades should move toward each other. Then move your arms back where you started. Repeat 8 to 12 times. If you have good range of motion in your shoulders, try this exercise with your arms lifted out to the sides. Keep your elbows at a 90-degree angle. Raise the elastic band up to about shoulder level. Pull the band back to move your shoulder blades toward each other. Then move your arms back where you started. Internal rotator strengthening exercise  Start by tying a piece of elastic exercise material to a doorknob. You can use surgical tubing or Thera-Band. Stand or sit with your shoulder relaxed and your elbow bent 90 degrees. Your upper arm should rest comfortably against your side. Squeeze a rolled towel between your elbow and your body for comfort. This will help keep your arm at your side. Hold one end of the elastic band in the hand of the painful arm. Slowly rotate your forearm toward your body until it touches your belly. Slowly move it back to where you started. Keep your elbow and upper arm firmly tucked against the towel roll or at your side. Repeat 8 to 12 times. External rotator strengthening exercise  Start by tying a piece of elastic exercise material to a doorknob. You can use surgical tubing or Thera-Band. (You may also hold one end of the band in each hand.)  Stand or sit with your shoulder relaxed and your elbow bent 90 degrees. Your upper arm should rest comfortably against your side. Squeeze a rolled towel between your elbow and your body for comfort. This will help keep your arm at your side.   Hold one end of the elastic band with the hand of the painful arm.  Start with your forearm across your belly. Slowly rotate the forearm out away from your body. Keep your elbow and upper arm tucked against the towel roll or the side of your body until you begin to feel tightness in your shoulder. Slowly move your arm back to where you started. Repeat 8 to 12 times. Follow-up care is a key part of your treatment and safety. Be sure to make and go to all appointments, and call your doctor if you are having problems. It's also a good idea to know your test results and keep a list of the medicines you take. Current as of: March 9, 2022               Content Version: 13.4  © 2006-2022 Healthwise, [employer]. Care instructions adapted under license by [employer] (which disclaims liability or warranty for this information). If you have questions about a medical condition or this instruction, always ask your healthcare professional. [place] [address] any warranty or liability for your use of this information.

## 2022-12-14 NOTE — LETTER
12/15/2022    Patient: Newton Lee   YOB: 1940   Date of Visit: 12/14/2022     Amanda Dodd, [address]  [place] [address]  Via In Basket    Dear Amanda Dodd MD,      Thank you for referring Ms. Esther White to [address] [place] [address] [facility] for evaluation. My notes for this consultation are attached. If you have questions, please do not hesitate to call me. I look forward to following your patient along with you.       Sincerely,    Kavon Brown MD

## 2022-12-14 NOTE — PROGRESS NOTES
Soraya Dodd presents today for   Chief Complaint   Patient presents with    Back Pain       Is someone accompanying this pt? no    Is the patient using any DME equipment during OV? no    Depression Screening:  3 most recent PHQ Screens 12/13/2022   PHQ Not Done -   Little interest or pleasure in doing things Not at all   Feeling down, depressed, irritable, or hopeless Not at all   Total Score PHQ 2 0       Learning Assessment:  Learning Assessment 10/4/2019   PRIMARY LEARNER Patient   HIGHEST LEVEL OF EDUCATION - PRIMARY LEARNER  4 YEARS OF COLLEGE   BARRIERS PRIMARY LEARNER NONE   CO-LEARNER CAREGIVER No   PRIMARY LANGUAGE ENGLISH   LEARNER PREFERENCE PRIMARY DEMONSTRATION   ANSWERED BY patient   RELATIONSHIP SELF       Abuse Screening:  Abuse Screening Questionnaire 12/13/2022   Do you ever feel afraid of your partner? N   Are you in a relationship with someone who physically or mentally threatens you? N   Is it safe for you to go home? Y       Fall Risk  Fall Risk Assessment, last 12 mths 12/13/2022   Able to walk? Yes   Fall in past 12 months? 0   Do you feel unsteady? 0   Are you worried about falling 0       Coordination of Care:  1. Have you been to the ER, urgent care clinic since your last visit? no  Hospitalized since your last visit? no    2. Have you seen or consulted any other health care providers outside of the [address] [place] [address] since your last visit? Yes, pcp  Include any pap smears or colon screening.  no

## 2022-12-14 NOTE — PROGRESS NOTES
[facility]  [address] [place] [address], [facility] [place] [address]  Phone: [phone]  Fax: [phone]    Pt's YOB: 1940    ASSESSMENT   Diagnoses and all orders for this visit:    1. Neuritis  -     gabapentin (Gralise) 600 mg Tb24; TAKE 2 TABLETS NIGHTLY  Indications: neuropathic pain    2. Cervical radiculopathy  -     gabapentin (Gralise) 600 mg Tb24; TAKE 2 TABLETS NIGHTLY  Indications: neuropathic pain    3. Lumbar radiculopathy  -     gabapentin (Gralise) 600 mg Tb24; TAKE 2 TABLETS NIGHTLY  Indications: neuropathic pain    4. Nontraumatic incomplete tear of right rotator cuff    5. DDD (degenerative disc disease), cervical  -     gabapentin (Gralise) 600 mg Tb24; TAKE 2 TABLETS NIGHTLY  Indications: neuropathic pain    6. Muscle spasm       IMPRESSION AND PLAN:  Chuyita Kelly is a [de-identified] y.o. female with history of lumbar and shoulder pain and presents to the office today for medication follow up. Pt continues to complain of right shoulder pain, occasional right upper trapezius pain, limited range of motion with shoulder abduction, and lumbar pain. She is taking Gralise 600 mg 2 caps in the evening (around 5:00p) with benefit and without excessive or morning sedation. 1) Pt was given information on rotator cuff exercises and the Mediterranean diet. 2) She received refills of Gralise 600 mg 2 tabs QHS at this time-- She does well with this medication but is not able to tolerate gabapentin due sedation. 3) Ms. Cami Martinez has a reminder for a \"due or due soon\" health maintenance. I have asked that she contact her primary care provider, Savage Reyes MD, for follow-up on this health maintenance. 4)  demonstrated consistency with prescribing. 5) Pt is not a candidate for NSAID's due to GI issues. Follow-up and Dispositions    Return in about 6 months (around 6/14/2023) for Medication follow up.              HISTORY OF PRESENT ILLNESS:  Dipika Bonner is a [de-identified] y.o. female with history of lumbar and shoulder pain and presents to the office today for medication follow up. Pt continues to complain of right shoulder pain, occasional right upper trapezius pain, limited range of motion with shoulder abduction, and lumbar pain. She reports undergoing physical therapy with minimal relief of the right shoulder pain and continued limited range of motion. Pt notes her lumbar pain is managed well with her current medication regimen, allowing her to be more functional in her daily routine. She mentions being followed by Dr. Andrade Sky secondary to her rotator cuff injury hx where she was informed she is not a candidate for surgery due to her age. Pt is taking Gralise 600 mg 2 caps in the evening (around 5:00p) with benefit and without excessive or morning sedation. Of note, pt contracted COVID-19 in 06/2022 and recovered well. Pt at this time desires to continue with current care.     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    Band keratopathy of right eye 02/03/2018    Cataract     Chronic pain     Cylindrical bronchiectasis (HCC)     Fracture     rt ankle as an adult    History of pneumonia     Hypertension     Ill-defined condition     Spasms to left side    Left arm pain     Lumbar spinal stenosis     Myofascial pain     Osteopenia     Sciatica of right side         Social History     Socioeconomic History    Marital status:      Spouse name: Not on file    Number of children: Not on file    Years of education: Not on file    Highest education level: Not on file   Occupational History    Not on file   Tobacco Use    Smoking status: Former     Packs/day: 1.00     Years: 10.00     Pack years: 10.00     Types: Cigarettes     Quit date: 1970     Years since quittin.9    Smokeless tobacco: Never   Substance and Sexual Activity    Alcohol use: Not Currently     Alcohol/week: 1.0 standard drink     Types: 1 Glasses of wine per week     Comment: occasional wine    Drug use: Never    Sexual activity: Not Currently     Partners: Male     Birth control/protection: None     Comment: None   Other Topics Concern    Not on file   Social History Narrative    Not on file     Social Determinants of Health     Financial Resource Strain: Not on file   Food Insecurity: Not on file   Transportation Needs: Not on file   Physical Activity: Not on file   Stress: Not on file   Social Connections: Not on file   Intimate Partner Violence: Not on file   Housing Stability: Not on file       Current Outpatient Medications   Medication Sig Dispense Refill    [START ON 2023] gabapentin (Gralise) 600 mg Tb24 TAKE 2 TABLETS NIGHTLY  Indications: neuropathic pain 180 Tablet 1    triamterene-hydroCHLOROthiazide (MAXZIDE) 37.5-25 mg per tablet TAKE ONE-HALF (1/2) TABLET DAILY 45 Tablet 3    meclizine (ANTIVERT) 12.5 mg tablet Take 12.5 mg by mouth three (3) times daily as needed for Dizziness. fluticasone propionate (FLONASE) 50 mcg/actuation nasal spray USE 2 SPRAYS IN EACH NOSTRIL DAILY 48 g 3    mv-min/vit C/glut/lysine/hb124 ([place]NE, LYSINE HCL, PO) Take 1 Tablet by mouth daily as needed. fexofenadine (ALLEGRA) 180 mg tablet Take 180 mg by mouth daily. PROAIR HFA 90 mcg/actuation inhaler USE 2 INHALATIONS EVERY 4 HOURS AS NEEDED 3 Inhaler 3    EPINEPHrine (EPIPEN) 0.3 mg/0.3 mL injection 0.3 mL by IntraMUSCular route once as needed for up to 1 dose. 1 Syringe 1    melatonin 3 mg tablet Take 3 mg by mouth daily as needed (sleep). cholecalciferol (VITAMIN D3) 25 mcg (1,000 unit) cap Take 3,000 Units by mouth daily. cycloSPORINE (RESTASIS) 0.05 % dpet Administer 1 Drop to both eyes two (2) times a day.       MULTIVITAMIN W-MINERALS/LUTEIN (CENTRUM SILVER PO) Take 1 Tablet by mouth daily. co-enzyme Q-10 (CO Q-10) 100 mg capsule Take 100 mg by mouth daily. BIOTIN PO Take 5,000 mcg by mouth daily. diazePAM (Valium) 2 mg tablet Take 0.5 Tablets by mouth every twelve (12) hours as needed (vertigo). Max Daily Amount: 2 mg. 10 Tablet 0       Allergies   Allergen Reactions    Naproxen Other (comments)     Blood in urine    Other reaction(s): GI intolerance         REVIEW OF SYSTEMS    Constitutional: Negative for fever, chills, or weight change. Respiratory: Negative for cough or shortness of breath. Cardiovascular: Negative for chest pain or palpitations. Gastrointestinal: Negative for acid reflux, change in bowel habits, or constipation. Genitourinary: Negative for dysuria and flank pain. Musculoskeletal: Positive for right shoulder and lumbar pain. Skin: Negative for rash. Neurological: Negative for headaches, dizziness, or numbness. Endo/Heme/Allergies: Negative for increased bruising. Psychiatric/Behavioral: Negative for difficulty with sleep. As per HPI    PHYSICAL EXAMINATION  Visit Vitals  Pulse 61   Temp 97.6 °F (36.4 °C) (Temporal)   Ht 5' 1\" (1.549 m)   Wt 169 lb (76.7 kg)   SpO2 96% Comment: RA   BMI 31.93 kg/m²       Constitutional: Awake, alert, and in no acute distress. Neurological: Sensation to light touch is intact. Negative Perry's sign bilaterally. Skin: warm, dry, and intact. Musculoskeletal: Abduction to about 90 degrees. Limited internal and external rotation of the right shoulder. Limited ROM in right shoulder. No pain with extension, axial loading, or forward flexion. No pain with internal or external rotation of her hips. Negative straight leg raise bilaterally.       Biceps  Triceps Deltoids Wrist Ext Wrist Flex Hand Intrin   Right +4/5 +4/5 +4/5 +4/5 +4/5 +4/5   Left +4/5 +4/5 +4/5 +4/5 +4/5 +4/5      Hip Flex  Quads Hamstrings Ankle DF EHL Ankle PF   Right +4/5 +4/5 +4/5 +4/5 +4/5 +4/5 Left +4/5 +4/5 +4/5 +4/5 +4/5 +4/5     IMAGING:    Right shoulder MRI from 05/11/2022 was personally reviewed with the patient and demonstrated:  Narrative & Impression   EXAM: MRI of the Right Shoulder without contrast.      INDICATION: Decreased range of motion and shoulder pain. TECHNIQUE: Multiplanar Multisequence MRI of the right shoulder obtained without  contrast.      COMPARISON: Plain film dated 8/4/2021     FINDINGS:   Subacromial/Subdeltoid Bursa: Fluid is present within the subacromial subdeltoid  bursa. Supraspinatus/Infraspinatus: There is severe thinning of the supraspinatus in  the midportion. This is a 1.6 x 2 cm area. This is on the articular sided of the  supraspinatus. There is a likely punctate full-thickness defect in the  supraspinatus is present 1.4 cm proximal to the attachment is partially 2 mm  wide. Mild atrophy is noted. Small articular sided partial tear of the  infraspinatus is noted which is 0.6 miles wide. No full-thickness tear of the  infraspinatus appreciated. No atrophy identified. Subscapularis: Unremarkable. Teres Minor: Unremarkable. Long Head of the Biceps: Unremarkable. Glenoid articular surface/Labrum: Multifocal thinning of the glenohumeral joint  cartilage thinning. Osteophyte formation is noted in the humeral head. Inferior glenohumeral ligament: Unremarkable. AC Joint and ligaments: Subacromial spur is noted. Moderate degenerative changes  of the [facility] joint with mild inferior osteophyte formation is noted. Mild  subchondral edema is noted. Acromion Type: Type 2 acromion is present. Osseous structures/Marrow: Subchondral cystic changes are noted in the humeral  head. Mild degenerative changes in the [facility] joint. Soft Tissue/Other: Unremarkable. IMPRESSION  1. Moderate near full thickness tear of the supraspinatus in a 1.6 x 2 cm area  with a likely small full thickness defect.       2.  Small articular sided partial tear of the infraspinatus. 3.  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 4.  Subacromial spur of the acromion. Right shoulder x-rays from 8/4/2021 were personally reviewed with the patient and demonstrated:  FINDINGS:   Bones/joints: Mild osteophyte formation at the acromion and acromioclavicular  joint. Otherwise unremarkable. Soft tissues: Unremarkable. IMPRESSION  No significant radiographic abnormalities. Mild acromial and AC joint degenerative change. Left shoulder x-rays from 8/4/2021 were personally reviewed with the patient and demonstrated:  FINDINGS:   Bones/joints: Unremarkable. Soft tissues: Unremarkable. IMPRESSION  No significant radiographic abnormalities     Lumbar spine MRI from 7/19/2021 was personally reviewed with the Pt and demonstrated:   Narrative  EXAM: MRI LUMB SPINE WO CONT     CLINICAL INDICATIONS/HISTORY: increased lumbar pain with right radicular  symptoms despite physical therapy. COMPARISON: June 2016 MRI     Technique: Multi-sequence multiplanar MR imaging obtained centered on the lumbar  spine. FINDINGS:     Alignment: Intact lordosis  Vertebral body height: Normal  Marrow signal: Unremarkable  Conus: T12-L1     Axial imaging correlation:     T12-L1: Patent canal and foramina. L1-2: Patent canal and foramina. L2-3: Mild disc bulge. Some disc desiccation. Mild facet arthropathy. Patent  canal and foramina. L3-4: Slightly more prominent disc bulge and facet arthropathy. Low-grade facet  inflammation on the left. Slight bulging posterior epidural fat. Minor spinal  stenosis in AP dimension. Mild to moderate left foraminal stenosis. L4-5: Mild disc bulge. Bilateral facet arthropathy with trace facet  inflammation. Patent canal and foramina. L5-S1: Focal central to right paracentral disc protrusion with annular tear. Bilateral facet arthropathy. Patent canal and foramina. Other structures: Unremarkable. Impression  1. Relatively mild multilevel degenerative findings along lumbar spine  -Overall slight progression compared with 2016  -There is no high-grade spinal stenosis  -There is some distortion of the exiting right L3 nerve from disc and facet  pathology at the right foramen which does not look appreciably changed. Cervical Spine MRI from 11/08/2016 was personally reviewed with the Pt and demonstrated:  Narrative MRI OF CERVICAL SPINE WITHOUT CONTRAST    CPT CODE: 26011    HISTORY: Chronic neck pain extending into the left shoulder with progressive  paresthesias left hand. COMPARISON: MRI cervical spine 4/10/2014. FINDINGS:     There is normal anatomic alignment of the cervical spine. Vertebral body heights  are maintained. Craniocervical junction and posterior elements are intact. Prevertebral soft tissues are normal. Incidentally imaged intracranial soft  tissues demonstrate no acute abnormalities. Within limitations of motion  artifact, cervical spinal cord maintains normal caliber, signal intensity and  morphology throughout. Cervical soft tissues demonstrate no acute abnormalities. C2/C3: Central canal and neural foramina are patent. C3/C4: Central canal and neural foramina are patent. C4/C5: Central canal and neural foramina are patent. C5/C6: Broad-based disc protrusion. Contact of the ventral cord. CSF dorsal to  the cord is maintained. Probable moderate right greater than left foraminal  stenoses. C6/C7: Mild bulging of the disc. Probable mild bilateral foraminal narrowing. C7/T1: Central canal and neural foramina are patent. T1/T2 through T4/T5: Central canal and foramina are adequate on the sagittal  images. Impression IMPRESSION:    Allowing for differences in technique, no substantial interval change since  2014.     Degenerative changes of the cervical spine are most pronounced at C5/C6 where  there is a disc protrusion which contacts the ventral cord and moderate  bilateral foraminal stenosis. Abdominal/pelvic CT from 02/04/2022 was personally reviewed with the patient and demonstrated:     CT ABDOMEN FINDINGS: Visualized portions of the lung bases demonstrate mild  fibrotic changes. There are 2 small nodules in the right middle lobe on images 4  and 5 with the largest measuring 5 mm in size. Visualized portions of the heart  and pericardium are normal.     Hepatic steatosis is present. The liver is otherwise normal in appearance. The  gallbladder is normal. The spleen is normal. A small hiatal hernia is present. The pancreas is normal in appearance. Adrenal glands are normal. There are  numerous small cysts involving the right kidney. Kidneys are otherwise normal.  There is no intra-abdominal or retroperitoneal adenopathy. Vascular structures  are normal. There is a small umbilical hernia containing fat without  incarceration. CT PELVIS FINDINGS: Status post hysterectomy. The bladder is normal. There is no  free fluid. There is no pelvic adenopathy. Postoperative changes are seen  involving the anterior pelvic wall. I see no hernia. No specific bowel abnormalities are seen. No significant osseous abnormalities. IMPRESSION     1. No acute abnormalities. 2. There are couple of small nodules in the right middle lobe unchanged from  3/7/2017 presumably benign. Mild fibrotic changes are also present in the lung  bases. 3. Hepatic steatosis. Small hiatal hernia. 4. Status post hysterectomy. Small umbilical hernia containing fat without  incarceration. Additional chronic changes as described above. Cervical 2V x-rays from 2/2/2021 was personally reviewed with the Pt and demonstrated:  Some obstruction to visualization due to necklace. Possibly mild disc space narrowing at C5-C6. No acute pathology identified.      Written by Parul Cameron, as dictated by Claudean Pilling, MD.  I, Dr. Claudean Pilling confirm that all documentation is accurate.

## 2022-12-15 RX ORDER — GABAPENTIN 600 MG/1
TABLET, FILM COATED ORAL
Qty: 180 TABLET | Refills: 1 | Status: SHIPPED | OUTPATIENT
Start: 2023-02-25

## 2022-12-15 RX ORDER — GABAPENTIN 600 MG/1
TABLET, FILM COATED ORAL
Qty: 180 TABLET | Refills: 1 | Status: SHIPPED | OUTPATIENT
Start: 2023-02-25 | End: 2022-12-15 | Stop reason: SDUPTHER

## 2023-01-04 ENCOUNTER — HOSPITAL ENCOUNTER (OUTPATIENT)
Dept: ULTRASOUND IMAGING | Age: 83
Discharge: HOME | End: 2023-01-04
Attending: INTERNAL MEDICINE
Payer: MEDICARE

## 2023-01-04 PROCEDURE — 76705 ECHO EXAM OF ABDOMEN: CPT

## 2023-01-05 ENCOUNTER — TELEPHONE (OUTPATIENT)
Dept: INTERNAL MEDICINE CLINIC | Age: 83
End: 2023-01-05

## 2023-01-05 NOTE — TELEPHONE ENCOUNTER
----- Message from Mirta Sotelo MD sent at 1/4/2023  8:14 PM EST -----  Please let her know that her ultrasound shows no evidence of cirrhosis but her liver is enlarged with fatty tissue deposition. She needs to reduce her weight by maintaining a heart healthy diet in order to resolve her NAFLD.      Dr. Kelli Cavazos  [facility], [address] [place] [address], [address] [place] [address] Str.  Phone: [phone]  Fax: [phone]

## 2023-01-05 NOTE — PROGRESS NOTES
Please let her know that her ultrasound shows no evidence of cirrhosis but her liver is enlarged with fatty tissue deposition. She needs to reduce her weight by maintaining a heart healthy diet in order to resolve her NAFLD.      Dr. Sebastian Olivas  [facility] [address], [address] [place] [address].  Phone: [phone]  Fax: [phone]

## 2023-01-06 NOTE — TELEPHONE ENCOUNTER
Patient returned call. patient identified with two identifiers (Name & ). Patient aware of results per DR. Argueta and verbalizes understanding.

## 2023-01-11 ENCOUNTER — HOSPITAL ENCOUNTER (OUTPATIENT)
Dept: MAMMOGRAPHY | Age: 83
Discharge: HOME | End: 2023-01-11
Attending: INTERNAL MEDICINE
Payer: MEDICARE

## 2023-01-11 PROCEDURE — 77063 BREAST TOMOSYNTHESIS BI: CPT

## 2023-02-04 DIAGNOSIS — I10 PRIMARY HYPERTENSION: ICD-10-CM

## 2023-02-04 DIAGNOSIS — R73.9 HYPERGLYCEMIA: Primary | ICD-10-CM

## 2023-02-04 DIAGNOSIS — K76.0 NAFLD (NONALCOHOLIC FATTY LIVER DISEASE): Primary | ICD-10-CM

## 2023-02-05 DIAGNOSIS — K76.0 NAFLD (NONALCOHOLIC FATTY LIVER DISEASE): Primary | ICD-10-CM

## 2023-02-05 DIAGNOSIS — I10 PRIMARY HYPERTENSION: ICD-10-CM

## 2023-06-19 SDOH — ECONOMIC STABILITY: FOOD INSECURITY: WITHIN THE PAST 12 MONTHS, YOU WORRIED THAT YOUR FOOD WOULD RUN OUT BEFORE YOU GOT MONEY TO BUY MORE.: NEVER TRUE

## 2023-06-19 SDOH — ECONOMIC STABILITY: TRANSPORTATION INSECURITY
IN THE PAST 12 MONTHS, HAS LACK OF TRANSPORTATION KEPT YOU FROM MEETINGS, WORK, OR FROM GETTING THINGS NEEDED FOR DAILY LIVING?: NO

## 2023-06-19 SDOH — ECONOMIC STABILITY: INCOME INSECURITY: HOW HARD IS IT FOR YOU TO PAY FOR THE VERY BASICS LIKE FOOD, HOUSING, MEDICAL CARE, AND HEATING?: NOT HARD AT ALL

## 2023-06-19 SDOH — ECONOMIC STABILITY: HOUSING INSECURITY
IN THE LAST 12 MONTHS, WAS THERE A TIME WHEN YOU DID NOT HAVE A STEADY PLACE TO SLEEP OR SLEPT IN A SHELTER (INCLUDING NOW)?: NO

## 2023-06-19 SDOH — ECONOMIC STABILITY: FOOD INSECURITY: WITHIN THE PAST 12 MONTHS, THE FOOD YOU BOUGHT JUST DIDN'T LAST AND YOU DIDN'T HAVE MONEY TO GET MORE.: NEVER TRUE

## 2023-06-20 ENCOUNTER — OFFICE VISIT (OUTPATIENT)
Age: 83
End: 2023-06-20
Payer: MEDICARE

## 2023-06-20 VITALS
WEIGHT: 173 LBS | HEART RATE: 60 BPM | BODY MASS INDEX: 32.66 KG/M2 | HEIGHT: 61 IN | OXYGEN SATURATION: 96 % | TEMPERATURE: 97.8 F | SYSTOLIC BLOOD PRESSURE: 128 MMHG | DIASTOLIC BLOOD PRESSURE: 75 MMHG | RESPIRATION RATE: 18 BRPM

## 2023-06-20 DIAGNOSIS — E78.5 HYPERLIPIDEMIA, UNSPECIFIED HYPERLIPIDEMIA TYPE: Primary | ICD-10-CM

## 2023-06-20 DIAGNOSIS — M25.551 RIGHT HIP PAIN: ICD-10-CM

## 2023-06-20 DIAGNOSIS — I10 ESSENTIAL (PRIMARY) HYPERTENSION: ICD-10-CM

## 2023-06-20 DIAGNOSIS — M54.50 LOW BACK PAIN, UNSPECIFIED BACK PAIN LATERALITY, UNSPECIFIED CHRONICITY, UNSPECIFIED WHETHER SCIATICA PRESENT: ICD-10-CM

## 2023-06-20 DIAGNOSIS — Z78.0 POSTMENOPAUSAL: ICD-10-CM

## 2023-06-20 DIAGNOSIS — R73.9 HYPERGLYCEMIA: ICD-10-CM

## 2023-06-20 DIAGNOSIS — K76.0 NAFLD (NONALCOHOLIC FATTY LIVER DISEASE): ICD-10-CM

## 2023-06-20 PROCEDURE — G8399 PT W/DXA RESULTS DOCUMENT: HCPCS | Performed by: INTERNAL MEDICINE

## 2023-06-20 PROCEDURE — 1036F TOBACCO NON-USER: CPT | Performed by: INTERNAL MEDICINE

## 2023-06-20 PROCEDURE — G8417 CALC BMI ABV UP PARAM F/U: HCPCS | Performed by: INTERNAL MEDICINE

## 2023-06-20 PROCEDURE — G8427 DOCREV CUR MEDS BY ELIG CLIN: HCPCS | Performed by: INTERNAL MEDICINE

## 2023-06-20 PROCEDURE — 99214 OFFICE O/P EST MOD 30 MIN: CPT | Performed by: INTERNAL MEDICINE

## 2023-06-20 PROCEDURE — 99211 OFF/OP EST MAY X REQ PHY/QHP: CPT | Performed by: INTERNAL MEDICINE

## 2023-06-20 PROCEDURE — 3074F SYST BP LT 130 MM HG: CPT | Performed by: INTERNAL MEDICINE

## 2023-06-20 PROCEDURE — 1123F ACP DISCUSS/DSCN MKR DOCD: CPT | Performed by: INTERNAL MEDICINE

## 2023-06-20 PROCEDURE — 3078F DIAST BP <80 MM HG: CPT | Performed by: INTERNAL MEDICINE

## 2023-06-20 PROCEDURE — 1090F PRES/ABSN URINE INCON ASSESS: CPT | Performed by: INTERNAL MEDICINE

## 2023-06-20 RX ORDER — PRAVASTATIN SODIUM 40 MG
40 TABLET ORAL DAILY
Qty: 90 TABLET | Refills: 1 | Status: SHIPPED | OUTPATIENT
Start: 2023-06-20

## 2023-06-20 RX ORDER — METHYLPREDNISOLONE 4 MG/1
TABLET ORAL
Qty: 1 KIT | Refills: 0 | Status: SHIPPED | OUTPATIENT
Start: 2023-06-20 | End: 2023-06-26

## 2023-06-20 ASSESSMENT — PATIENT HEALTH QUESTIONNAIRE - PHQ9
SUM OF ALL RESPONSES TO PHQ QUESTIONS 1-9: 0
SUM OF ALL RESPONSES TO PHQ QUESTIONS 1-9: 0
1. LITTLE INTEREST OR PLEASURE IN DOING THINGS: 0
SUM OF ALL RESPONSES TO PHQ QUESTIONS 1-9: 0
SUM OF ALL RESPONSES TO PHQ QUESTIONS 1-9: 0
SUM OF ALL RESPONSES TO PHQ9 QUESTIONS 1 & 2: 0
SUM OF ALL RESPONSES TO PHQ QUESTIONS 1-9: 0
SUM OF ALL RESPONSES TO PHQ QUESTIONS 1-9: 0
2. FEELING DOWN, DEPRESSED OR HOPELESS: 0
1. LITTLE INTEREST OR PLEASURE IN DOING THINGS: 0
SUM OF ALL RESPONSES TO PHQ9 QUESTIONS 1 & 2: 0
SUM OF ALL RESPONSES TO PHQ QUESTIONS 1-9: 0
2. FEELING DOWN, DEPRESSED OR HOPELESS: 0
SUM OF ALL RESPONSES TO PHQ QUESTIONS 1-9: 0

## 2023-06-21 ENCOUNTER — HOSPITAL ENCOUNTER (OUTPATIENT)
Facility: HOSPITAL | Age: 83
Discharge: HOME | End: 2023-06-24
Payer: MEDICARE

## 2023-06-21 DIAGNOSIS — M25.551 RIGHT HIP PAIN: ICD-10-CM

## 2023-06-21 PROCEDURE — 73502 X-RAY EXAM HIP UNI 2-3 VIEWS: CPT

## 2023-06-26 ASSESSMENT — ENCOUNTER SYMPTOMS
BACK PAIN: 1
SORE THROAT: 0
ANAL BLEEDING: 0
ABDOMINAL PAIN: 0
SHORTNESS OF BREATH: 0
COUGH: 0
BLOOD IN STOOL: 0
EYE PAIN: 0

## 2023-06-27 ENCOUNTER — HOSPITAL ENCOUNTER (OUTPATIENT)
Facility: HOSPITAL | Age: 83
Discharge: HOME | End: 2023-06-30
Attending: INTERNAL MEDICINE
Payer: MEDICARE

## 2023-06-27 ENCOUNTER — TELEPHONE (OUTPATIENT)
Age: 83
End: 2023-06-27

## 2023-06-27 DIAGNOSIS — Z78.0 POSTMENOPAUSAL: ICD-10-CM

## 2023-06-27 PROCEDURE — 77080 DXA BONE DENSITY AXIAL: CPT

## 2023-07-07 ENCOUNTER — OFFICE VISIT (OUTPATIENT)
Age: 83
End: 2023-07-07

## 2023-07-07 VITALS
BODY MASS INDEX: 32.1 KG/M2 | SYSTOLIC BLOOD PRESSURE: 110 MMHG | DIASTOLIC BLOOD PRESSURE: 66 MMHG | TEMPERATURE: 96.9 F | HEART RATE: 68 BPM | HEIGHT: 61 IN | OXYGEN SATURATION: 96 % | WEIGHT: 170 LBS

## 2023-07-07 DIAGNOSIS — M47.816 LUMBAR FACET ARTHROPATHY: ICD-10-CM

## 2023-07-07 DIAGNOSIS — M54.16 RADICULOPATHY, LUMBAR REGION: ICD-10-CM

## 2023-07-07 DIAGNOSIS — M54.41 ACUTE MIDLINE LOW BACK PAIN WITH RIGHT-SIDED SCIATICA: Primary | ICD-10-CM

## 2023-07-07 DIAGNOSIS — M70.61 GREATER TROCHANTERIC BURSITIS OF RIGHT HIP: ICD-10-CM

## 2023-07-07 RX ORDER — METHYLPREDNISOLONE 4 MG/1
TABLET ORAL
Qty: 1 KIT | Refills: 0 | Status: SHIPPED | OUTPATIENT
Start: 2023-07-07 | End: 2023-07-13

## 2023-07-07 NOTE — PROGRESS NOTES
Joshua Farias presents today for   Chief Complaint   Patient presents with    Lower Back Pain       Is someone accompanying this pt? no    Is the patient using any DME equipment during OV? no    Coordination of Care:  1. Have you been to the ER, urgent care clinic since your last visit? no  Hospitalized since your last visit? no    2. Have you seen or consulted any other health care providers outside of the [address] [place] [address] since your last visit? Yes, PCP Include any pap smears or colon screening.  no
soft tissues demonstrate no acute abnormalities. C2/C3: Central canal and neural foramina are patent. C3/C4: Central canal and neural foramina are patent. C4/C5: Central canal and neural foramina  are patent. C5/C6: Broad-based disc protrusion. Contact of the ventral cord. CSF dorsal to  the cord is maintained. Probable moderate right greater than left foraminal  stenoses. C6/C7: Mild bulging of the disc. Probable mild  bilateral foraminal narrowing. C7/T1: Central canal and neural foramina are patent. T1/T2 through T4/T5: Central canal and foramina are adequate on the sagittal  images. Impression  IMPRESSION:    Allowing for differences in technique, no substantial interval change since  2014. Degenerative changes of the cervical  spine are most pronounced at C5/C6 where  there is a disc protrusion which contacts the ventral cord and moderate  bilateral foraminal stenosis. Abdominal/pelvic CT from 02/04/2022 was personally reviewed with the patient and demonstrated:       CT ABDOMEN FINDINGS: Visualized portions of the lung bases demonstrate mild   fibrotic changes. There are 2 small nodules in the right middle lobe on images 4   and 5 with the largest measuring 5 mm in size. Visualized portions of the heart   and pericardium are normal.       Hepatic steatosis is present. The liver is otherwise normal in appearance. The   gallbladder is normal. The spleen is normal. A small hiatal hernia is present. The pancreas is normal in appearance. Adrenal glands are normal. There are   numerous small cysts involving the right kidney. Kidneys are otherwise normal.   There is no intra-abdominal or retroperitoneal adenopathy. Vascular structures   are normal. There is a small umbilical hernia containing fat without   incarceration. CT PELVIS FINDINGS: Status post hysterectomy. The bladder is normal. There is no   free fluid. There is no pelvic adenopathy.

## 2023-07-10 ENCOUNTER — TELEPHONE (OUTPATIENT)
Age: 83
End: 2023-07-10

## 2023-07-10 NOTE — TELEPHONE ENCOUNTER
Patient verified 2 patient identifiers. The patient hasn't scheduled her MRI Lumbar Spine yet, the patient stated no one has called so I gave her the number for [facility] at [phone]. She will get it booked.

## 2023-07-10 NOTE — TELEPHONE ENCOUNTER
Patient called to see if Dr. Sheron Hassan was able to review her x-ray results, and is asking for a call back once they have been reviewed. Patient can be reached at [phone].

## 2023-07-14 ENCOUNTER — HOSPITAL ENCOUNTER (OUTPATIENT)
Facility: HOSPITAL | Age: 83
End: 2023-07-14
Attending: PHYSICAL MEDICINE & REHABILITATION
Payer: MEDICARE

## 2023-07-14 DIAGNOSIS — M54.16 RADICULOPATHY, LUMBAR REGION: ICD-10-CM

## 2023-07-14 DIAGNOSIS — M54.41 ACUTE MIDLINE LOW BACK PAIN WITH RIGHT-SIDED SCIATICA: ICD-10-CM

## 2023-07-14 DIAGNOSIS — M47.816 LUMBAR FACET ARTHROPATHY: ICD-10-CM

## 2023-07-14 PROCEDURE — 72148 MRI LUMBAR SPINE W/O DYE: CPT

## 2023-07-17 DIAGNOSIS — M54.50 LOW BACK PAIN, UNSPECIFIED BACK PAIN LATERALITY, UNSPECIFIED CHRONICITY, UNSPECIFIED WHETHER SCIATICA PRESENT: ICD-10-CM

## 2023-07-17 DIAGNOSIS — M25.551 RIGHT HIP PAIN: ICD-10-CM

## 2023-07-18 RX ORDER — METHYLPREDNISOLONE 4 MG/1
TABLET ORAL
Qty: 21 TABLET | OUTPATIENT
Start: 2023-07-18

## 2023-08-09 DIAGNOSIS — M54.16 RADICULOPATHY, LUMBAR REGION: ICD-10-CM

## 2023-08-09 DIAGNOSIS — M54.12 RADICULOPATHY, CERVICAL REGION: ICD-10-CM

## 2023-08-09 RX ORDER — GABAPENTIN 600 MG/1
TABLET, FILM COATED ORAL
Qty: 180 TABLET | Refills: 1 | OUTPATIENT
Start: 2023-08-09

## 2023-08-15 ENCOUNTER — HOSPITAL ENCOUNTER (OUTPATIENT)
Facility: HOSPITAL | Age: 83
Discharge: HOME | End: 2023-08-18
Payer: MEDICARE

## 2023-08-15 LAB
ALBUMIN SERPL-MCNC: 3.7 G/DL (ref 3.4–5)
ALBUMIN/GLOB SERPL: 0.9 (ref 0.8–1.7)
ALP SERPL-CCNC: 79 U/L (ref 45–117)
ALT SERPL-CCNC: 26 U/L (ref 13–56)
ANION GAP SERPL CALC-SCNC: 5 MMOL/L (ref 3–18)
AST SERPL-CCNC: 18 U/L (ref 10–38)
BILIRUB SERPL-MCNC: 0.9 MG/DL (ref 0.2–1)
BUN SERPL-MCNC: 12 MG/DL (ref 7–18)
BUN/CREAT SERPL: 15 (ref 12–20)
CALCIUM SERPL-MCNC: 9.5 MG/DL (ref 8.5–10.1)
CHLORIDE SERPL-SCNC: 102 MMOL/L (ref 100–111)
CHOLEST SERPL-MCNC: 134 MG/DL
CO2 SERPL-SCNC: 33 MMOL/L (ref 21–32)
CREAT SERPL-MCNC: 0.81 MG/DL (ref 0.6–1.3)
GLOBULIN SER CALC-MCNC: 3.9 G/DL (ref 2–4)
GLUCOSE SERPL-MCNC: 101 MG/DL (ref 74–99)
HDLC SERPL-MCNC: 54 MG/DL (ref 40–60)
HDLC SERPL: 2.5 (ref 0–5)
LDLC SERPL CALC-MCNC: 61.6 MG/DL (ref 0–100)
LIPID PANEL: NORMAL
POTASSIUM SERPL-SCNC: 4.2 MMOL/L (ref 3.5–5.5)
PROT SERPL-MCNC: 7.6 G/DL (ref 6.4–8.2)
SODIUM SERPL-SCNC: 140 MMOL/L (ref 136–145)
TRIGL SERPL-MCNC: 92 MG/DL
VLDLC SERPL CALC-MCNC: 18.4 MG/DL

## 2023-08-15 PROCEDURE — 36415 COLL VENOUS BLD VENIPUNCTURE: CPT

## 2023-08-15 PROCEDURE — 80061 LIPID PANEL: CPT

## 2023-08-15 PROCEDURE — 80053 COMPREHEN METABOLIC PANEL: CPT

## 2023-08-15 NOTE — PROGRESS NOTES
[facility]  [facility] [address] [place] [address], [address] [facility], [address] [place] [address]   Phone: [phone]  Fax: [phone]    Pt's YOB: 1940    ASSESSMENT   Diagnoses and all orders for this visit:    Radiculopathy, lumbar region  -     Discontinue: Gabapentin, Once-Daily, (GRALISE) 600 MG TABS; TAKE 2 TABLETS NIGHTLY  Indications: neuropathic pain  -     Gabapentin, Once-Daily, (GRALISE) 600 MG TABS; TAKE 2 TABLETS NIGHTLY  Indications: neuropathic pain    Annular tear    Radiculopathy, cervical region  -     Discontinue: Gabapentin, Once-Daily, (GRALISE) 600 MG TABS; TAKE 2 TABLETS NIGHTLY  Indications: neuropathic pain  -     Gabapentin, Once-Daily, (GRALISE) 600 MG TABS; TAKE 2 TABLETS NIGHTLY  Indications: neuropathic pain    Lumbar facet arthropathy         IMPRESSION AND PLAN:  Josiah Obregon is a 82 y.o. female with history of lumbar and shoulder pain and presents to the office today for diagnostic follow up. Pt continues to complain of right shoulder pain, occasional  right upper trapezius pain, limited range of motion with shoulder abduction, lumbar pain that radiates from the left side of her lumbar region to the right side with radicular symptoms into the right lower extremity stopping at the knee and tenderness in the bilateral bursitis hip regions, slightly improved since her last office visit. She is taking Neurontin 600 mg 2 caps in the evening (around 5:00p) with relief and without excessive morning sedation and Tylenol arthritis 2 QAM and 2 QHS with relief. 1) Lumbar spine MRI from 07/14/2023 was reviewed with the pt at today's office visit. 2) I discussed with patient multiple treatment options including medications, physical therapy and lumbar blocks. 3) She is taking Neurontin 600 mg 2 caps in the evening for neuropathic symptoms and received refills at this time. 4) Ms. Juan Jose Barfield has a reminder for a \"due or due soon\" health maintenance.  I

## 2023-08-16 ENCOUNTER — OFFICE VISIT (OUTPATIENT)
Age: 83
End: 2023-08-16
Payer: MEDICARE

## 2023-08-16 VITALS
WEIGHT: 168 LBS | SYSTOLIC BLOOD PRESSURE: 107 MMHG | HEIGHT: 61 IN | RESPIRATION RATE: 18 BRPM | HEART RATE: 76 BPM | TEMPERATURE: 97.1 F | BODY MASS INDEX: 31.72 KG/M2 | DIASTOLIC BLOOD PRESSURE: 71 MMHG | OXYGEN SATURATION: 95 %

## 2023-08-16 DIAGNOSIS — M47.816 LUMBAR FACET ARTHROPATHY: ICD-10-CM

## 2023-08-16 DIAGNOSIS — M54.12 RADICULOPATHY, CERVICAL REGION: ICD-10-CM

## 2023-08-16 DIAGNOSIS — M54.16 RADICULOPATHY, LUMBAR REGION: Primary | ICD-10-CM

## 2023-08-16 DIAGNOSIS — M51.9 ANNULAR TEAR: ICD-10-CM

## 2023-08-16 PROCEDURE — 3074F SYST BP LT 130 MM HG: CPT | Performed by: PHYSICAL MEDICINE & REHABILITATION

## 2023-08-16 PROCEDURE — 1090F PRES/ABSN URINE INCON ASSESS: CPT | Performed by: PHYSICAL MEDICINE & REHABILITATION

## 2023-08-16 PROCEDURE — 1123F ACP DISCUSS/DSCN MKR DOCD: CPT | Performed by: PHYSICAL MEDICINE & REHABILITATION

## 2023-08-16 PROCEDURE — 99213 OFFICE O/P EST LOW 20 MIN: CPT | Performed by: PHYSICAL MEDICINE & REHABILITATION

## 2023-08-16 PROCEDURE — G8399 PT W/DXA RESULTS DOCUMENT: HCPCS | Performed by: PHYSICAL MEDICINE & REHABILITATION

## 2023-08-16 PROCEDURE — 1036F TOBACCO NON-USER: CPT | Performed by: PHYSICAL MEDICINE & REHABILITATION

## 2023-08-16 PROCEDURE — G8417 CALC BMI ABV UP PARAM F/U: HCPCS | Performed by: PHYSICAL MEDICINE & REHABILITATION

## 2023-08-16 PROCEDURE — 3078F DIAST BP <80 MM HG: CPT | Performed by: PHYSICAL MEDICINE & REHABILITATION

## 2023-08-16 PROCEDURE — G8427 DOCREV CUR MEDS BY ELIG CLIN: HCPCS | Performed by: PHYSICAL MEDICINE & REHABILITATION

## 2023-08-16 RX ORDER — GABAPENTIN 600 MG/1
TABLET, FILM COATED ORAL
Qty: 180 TABLET | Refills: 1 | Status: SHIPPED | OUTPATIENT
Start: 2023-08-16 | End: 2023-08-16 | Stop reason: SDUPTHER

## 2023-08-16 RX ORDER — GABAPENTIN 600 MG/1
TABLET, FILM COATED ORAL
Qty: 180 TABLET | Refills: 1 | Status: SHIPPED | OUTPATIENT
Start: 2023-08-16 | End: 2023-12-11

## 2023-08-21 ENCOUNTER — OFFICE VISIT (OUTPATIENT)
Age: 83
End: 2023-08-21
Payer: MEDICARE

## 2023-08-21 VITALS
RESPIRATION RATE: 18 BRPM | WEIGHT: 168.38 LBS | DIASTOLIC BLOOD PRESSURE: 70 MMHG | HEART RATE: 67 BPM | BODY MASS INDEX: 31.79 KG/M2 | SYSTOLIC BLOOD PRESSURE: 106 MMHG | TEMPERATURE: 98.1 F | HEIGHT: 61 IN | OXYGEN SATURATION: 97 %

## 2023-08-21 DIAGNOSIS — I10 ESSENTIAL (PRIMARY) HYPERTENSION: ICD-10-CM

## 2023-08-21 DIAGNOSIS — R73.9 HYPERGLYCEMIA: ICD-10-CM

## 2023-08-21 DIAGNOSIS — E78.5 HYPERLIPIDEMIA, UNSPECIFIED HYPERLIPIDEMIA TYPE: ICD-10-CM

## 2023-08-21 DIAGNOSIS — M85.80 OSTEOPENIA, UNSPECIFIED LOCATION: ICD-10-CM

## 2023-08-21 DIAGNOSIS — M54.50 LOW BACK PAIN, UNSPECIFIED BACK PAIN LATERALITY, UNSPECIFIED CHRONICITY, UNSPECIFIED WHETHER SCIATICA PRESENT: Primary | ICD-10-CM

## 2023-08-21 PROCEDURE — 3078F DIAST BP <80 MM HG: CPT | Performed by: INTERNAL MEDICINE

## 2023-08-21 PROCEDURE — 1036F TOBACCO NON-USER: CPT | Performed by: INTERNAL MEDICINE

## 2023-08-21 PROCEDURE — 1090F PRES/ABSN URINE INCON ASSESS: CPT | Performed by: INTERNAL MEDICINE

## 2023-08-21 PROCEDURE — G8399 PT W/DXA RESULTS DOCUMENT: HCPCS | Performed by: INTERNAL MEDICINE

## 2023-08-21 PROCEDURE — G8427 DOCREV CUR MEDS BY ELIG CLIN: HCPCS | Performed by: INTERNAL MEDICINE

## 2023-08-21 PROCEDURE — 3074F SYST BP LT 130 MM HG: CPT | Performed by: INTERNAL MEDICINE

## 2023-08-21 PROCEDURE — G8417 CALC BMI ABV UP PARAM F/U: HCPCS | Performed by: INTERNAL MEDICINE

## 2023-08-21 PROCEDURE — 99211 OFF/OP EST MAY X REQ PHY/QHP: CPT | Performed by: INTERNAL MEDICINE

## 2023-08-21 PROCEDURE — 99214 OFFICE O/P EST MOD 30 MIN: CPT | Performed by: INTERNAL MEDICINE

## 2023-08-21 PROCEDURE — 1123F ACP DISCUSS/DSCN MKR DOCD: CPT | Performed by: INTERNAL MEDICINE

## 2023-08-21 ASSESSMENT — ENCOUNTER SYMPTOMS
SORE THROAT: 0
ANAL BLEEDING: 0
BACK PAIN: 1
COUGH: 0
EYE PAIN: 0
BLOOD IN STOOL: 0
SHORTNESS OF BREATH: 0
ABDOMINAL PAIN: 0

## 2023-08-23 ENCOUNTER — PATIENT MESSAGE (OUTPATIENT)
Age: 83
End: 2023-08-23

## 2023-09-06 ENCOUNTER — HOSPITAL ENCOUNTER (EMERGENCY)
Facility: HOSPITAL | Age: 83
Discharge: HOME | End: 2023-09-06
Attending: EMERGENCY MEDICINE
Payer: MEDICARE

## 2023-09-06 ENCOUNTER — APPOINTMENT (OUTPATIENT)
Facility: HOSPITAL | Age: 83
End: 2023-09-06
Payer: MEDICARE

## 2023-09-06 VITALS
BODY MASS INDEX: 33.04 KG/M2 | HEART RATE: 78 BPM | OXYGEN SATURATION: 100 % | WEIGHT: 175 LBS | DIASTOLIC BLOOD PRESSURE: 94 MMHG | SYSTOLIC BLOOD PRESSURE: 141 MMHG | HEIGHT: 61 IN | TEMPERATURE: 98 F | RESPIRATION RATE: 16 BRPM

## 2023-09-06 DIAGNOSIS — S67.10XA CRUSH INJURY TO FINGER, INITIAL ENCOUNTER: Primary | ICD-10-CM

## 2023-09-06 PROCEDURE — 99283 EMERGENCY DEPT VISIT LOW MDM: CPT

## 2023-09-06 PROCEDURE — 6370000000 HC RX 637 (ALT 250 FOR IP): Performed by: EMERGENCY MEDICINE

## 2023-09-06 PROCEDURE — 73140 X-RAY EXAM OF FINGER(S): CPT

## 2023-09-06 RX ORDER — ACETAMINOPHEN 500 MG
1000 TABLET ORAL
Status: COMPLETED | OUTPATIENT
Start: 2023-09-06 | End: 2023-09-06

## 2023-09-06 RX ADMIN — ACETAMINOPHEN 1000 MG: 500 TABLET ORAL at 10:44

## 2023-09-06 ASSESSMENT — PAIN DESCRIPTION - DESCRIPTORS: DESCRIPTORS: ACHING

## 2023-09-06 ASSESSMENT — PAIN - FUNCTIONAL ASSESSMENT: PAIN_FUNCTIONAL_ASSESSMENT: 0-10

## 2023-09-06 ASSESSMENT — PAIN SCALES - GENERAL: PAINLEVEL_OUTOF10: 10

## 2023-09-06 ASSESSMENT — PAIN DESCRIPTION - PAIN TYPE: TYPE: ACUTE PAIN

## 2023-09-06 ASSESSMENT — PAIN DESCRIPTION - LOCATION: LOCATION: FINGER (COMMENT WHICH ONE)

## 2023-09-06 ASSESSMENT — PAIN DESCRIPTION - ORIENTATION: ORIENTATION: RIGHT

## 2023-09-06 NOTE — ED NOTES
Discharge instructions given to patient. Follow up information provided, verbalized understanding.       Rosana Brizuela RN  09/06/23 1100

## 2023-11-27 RX ORDER — TRIAMTERENE AND HYDROCHLOROTHIAZIDE 37.5; 25 MG/1; MG/1
TABLET ORAL
Qty: 45 TABLET | Refills: 3 | Status: SHIPPED | OUTPATIENT
Start: 2023-11-27

## 2023-11-29 DIAGNOSIS — E78.5 HYPERLIPIDEMIA, UNSPECIFIED HYPERLIPIDEMIA TYPE: ICD-10-CM

## 2023-11-29 RX ORDER — PRAVASTATIN SODIUM 40 MG
40 TABLET ORAL DAILY
Qty: 90 TABLET | Refills: 3 | Status: SHIPPED | OUTPATIENT
Start: 2023-11-29

## 2023-12-11 ENCOUNTER — HOSPITAL ENCOUNTER (OUTPATIENT)
Facility: HOSPITAL | Age: 83
Discharge: HOME | End: 2023-12-14
Payer: MEDICARE

## 2023-12-11 DIAGNOSIS — R73.9 HYPERGLYCEMIA: ICD-10-CM

## 2023-12-11 LAB
EST. AVERAGE GLUCOSE BLD GHB EST-MCNC: 111 MG/DL
HBA1C MFR BLD: 5.5 % (ref 4.2–5.6)

## 2023-12-11 PROCEDURE — 36415 COLL VENOUS BLD VENIPUNCTURE: CPT

## 2023-12-11 PROCEDURE — 83036 HEMOGLOBIN GLYCOSYLATED A1C: CPT

## 2023-12-14 ENCOUNTER — OFFICE VISIT (OUTPATIENT)
Age: 83
End: 2023-12-14

## 2023-12-14 VITALS
RESPIRATION RATE: 16 BRPM | TEMPERATURE: 97.3 F | HEIGHT: 61 IN | HEART RATE: 73 BPM | DIASTOLIC BLOOD PRESSURE: 68 MMHG | BODY MASS INDEX: 31.72 KG/M2 | SYSTOLIC BLOOD PRESSURE: 102 MMHG | OXYGEN SATURATION: 96 % | WEIGHT: 168 LBS

## 2023-12-14 DIAGNOSIS — K21.9 GASTROESOPHAGEAL REFLUX DISEASE, UNSPECIFIED WHETHER ESOPHAGITIS PRESENT: ICD-10-CM

## 2023-12-14 DIAGNOSIS — I10 ESSENTIAL (PRIMARY) HYPERTENSION: Primary | ICD-10-CM

## 2023-12-14 DIAGNOSIS — E78.5 HYPERLIPIDEMIA, UNSPECIFIED HYPERLIPIDEMIA TYPE: ICD-10-CM

## 2023-12-14 DIAGNOSIS — Z71.89 ADVANCE CARE PLANNING: ICD-10-CM

## 2023-12-14 DIAGNOSIS — R73.9 HYPERGLYCEMIA: ICD-10-CM

## 2023-12-14 DIAGNOSIS — M75.121 COMPLETE TEAR OF RIGHT ROTATOR CUFF, UNSPECIFIED WHETHER TRAUMATIC: ICD-10-CM

## 2023-12-14 DIAGNOSIS — Z00.00 MEDICARE ANNUAL WELLNESS VISIT, SUBSEQUENT: ICD-10-CM

## 2023-12-14 RX ORDER — OMEPRAZOLE 20 MG/1
20 CAPSULE, DELAYED RELEASE ORAL
Qty: 21 CAPSULE | Refills: 0 | Status: SHIPPED | OUTPATIENT
Start: 2023-12-14 | End: 2024-01-05 | Stop reason: SDUPTHER

## 2023-12-14 RX ORDER — HYDROCHLOROTHIAZIDE 25 MG/1
25 TABLET ORAL EVERY MORNING
Qty: 90 TABLET | Refills: 1 | Status: SHIPPED | OUTPATIENT
Start: 2023-12-14

## 2023-12-14 ASSESSMENT — LIFESTYLE VARIABLES
HOW OFTEN DO YOU HAVE A DRINK CONTAINING ALCOHOL: NEVER
HOW MANY STANDARD DRINKS CONTAINING ALCOHOL DO YOU HAVE ON A TYPICAL DAY: PATIENT DOES NOT DRINK

## 2023-12-14 ASSESSMENT — PATIENT HEALTH QUESTIONNAIRE - PHQ9
SUM OF ALL RESPONSES TO PHQ QUESTIONS 1-9: 0
SUM OF ALL RESPONSES TO PHQ QUESTIONS 1-9: 0
1. LITTLE INTEREST OR PLEASURE IN DOING THINGS: 0
SUM OF ALL RESPONSES TO PHQ QUESTIONS 1-9: 0
2. FEELING DOWN, DEPRESSED OR HOPELESS: 0
SUM OF ALL RESPONSES TO PHQ9 QUESTIONS 1 & 2: 0
SUM OF ALL RESPONSES TO PHQ QUESTIONS 1-9: 0

## 2023-12-14 NOTE — PROGRESS NOTES
hydroCHLOROthiazide (HYDRODIURIL) 25 MG tablet Take 1 tablet by mouth every morning Yes Anette Hayes MD   pravastatin (PRAVACHOL) 40 MG tablet TAKE 1 TABLET DAILY Yes Anette Hayes MD   Gabapentin, Once-Daily, (GRALISE) 600 MG TABS TAKE 2 TABLETS NIGHTLY  Indications: neuropathic pain Yes Erlinda Torres MD   BIOTIN PO Take 5,000 mcg by mouth daily Yes Automatic Reconciliation, Ar   albuterol sulfate HFA (PROVENTIL;VENTOLIN;PROAIR) 108 (90 Base) MCG/ACT inhaler USE 2 INHALATIONS EVERY 4 HOURS AS NEEDED Yes Automatic Reconciliation, Ar   vitamin D 25 MCG (1000 UT) CAPS Take 3 capsules by mouth daily Yes Automatic Reconciliation, Ar   coenzyme Q10 100 MG CAPS capsule Take 1 capsule by mouth daily Yes Automatic Reconciliation, Ar   cycloSPORINE (RESTASIS) 0.05 % ophthalmic emulsion Apply 1 drop to eye 2 times daily Yes Automatic Reconciliation, Ar   EPINEPHrine (EPIPEN) 0.3 MG/0.3ML SOAJ injection Inject 0.3 mLs into the muscle once as needed Yes Automatic Reconciliation, Ar   fexofenadine (ALLEGRA) 180 MG tablet Take 1 tablet by mouth daily Yes Automatic Reconciliation, Ar   fluticasone (FLONASE) 50 MCG/ACT nasal spray USE 2 SPRAYS IN EACH NOSTRIL DAILY Yes Automatic Reconciliation, Ar   meclizine (ANTIVERT) 12.5 MG tablet Take 1 tablet by mouth 3 times daily as needed Yes Automatic Reconciliation, Ar   melatonin 3 MG TABS tablet Take 1 tablet by mouth daily as needed Yes Automatic Reconciliation, Ar   omeprazole (PRILOSEC) 20 MG delayed release capsule Take 1 capsule by mouth every morning (before breakfast)  Anette Hayes MD       [facility] (Including outside providers/suppliers regularly involved in providing care):   Patient Care Team:  Anette Hayes MD as PCP - General  Anette Hayes MD as PCP - Empaneled Provider     Reviewed and updated this visit:  Allergies  Meds  Problems  Med Hx  Surg Hx  Soc Hx  Fam Hx

## 2023-12-14 NOTE — PATIENT INSTRUCTIONS
warranty or liability for your use of this information.           A Healthy Heart: Care Instructions  Your Care Instructions     Coronary artery disease, also called heart disease, occurs when a substance called plaque builds up in the vessels that supply oxygen-rich blood to your heart muscle. This can narrow the blood vessels and reduce blood flow. A heart attack happens when blood flow is completely blocked. A high-fat diet, smoking, and other factors increase the risk of heart disease.  Your doctor has found that you have a chance of having heart disease. You can do lots of things to keep your heart healthy. It may not be easy, but you can change your diet, exercise more, and quit smoking. These steps really work to lower your chance of heart disease.  Follow-up care is a key part of your treatment and safety. Be sure to make and go to all appointments, and call your doctor if you are having problems. It's also a good idea to know your test results and keep a list of the medicines you take.  How can you care for yourself at home?  Diet    Use less salt when you cook and eat. This helps lower your blood pressure. Taste food before salting. Add only a little salt when you think you need it. With time, your taste buds will adjust to less salt.     Eat fewer snack items, fast foods, canned soups, and other high-salt, high-fat, processed foods.     Read food labels and try to avoid saturated and trans fats. They increase your risk of heart disease by raising cholesterol levels.     Limit the amount of solid fat-butter, margarine, and shortening-you eat. Use olive, peanut, or canola oil when you cook. Bake, broil, and steam foods instead of frying them.     Eat a variety of fruit and vegetables every day. Dark green, deep orange, red, or yellow fruits and vegetables are especially good for you. Examples include spinach, carrots, peaches, and berries.     Foods high in fiber can reduce your cholesterol and provide

## 2023-12-28 ENCOUNTER — APPOINTMENT (OUTPATIENT)
Facility: HOSPITAL | Age: 83
End: 2023-12-28
Payer: MEDICARE

## 2023-12-28 ENCOUNTER — HOSPITAL ENCOUNTER (EMERGENCY)
Facility: HOSPITAL | Age: 83
Discharge: HOME | End: 2023-12-28
Attending: EMERGENCY MEDICINE
Payer: MEDICARE

## 2023-12-28 VITALS
DIASTOLIC BLOOD PRESSURE: 82 MMHG | HEART RATE: 99 BPM | WEIGHT: 175 LBS | OXYGEN SATURATION: 99 % | RESPIRATION RATE: 22 BRPM | TEMPERATURE: 98.5 F | HEIGHT: 61 IN | BODY MASS INDEX: 33.04 KG/M2 | SYSTOLIC BLOOD PRESSURE: 127 MMHG

## 2023-12-28 DIAGNOSIS — J22 LOWER RESPIRATORY INFECTION: Primary | ICD-10-CM

## 2023-12-28 LAB
FLUAV AG NPH QL IA: NEGATIVE
FLUBV AG NOSE QL IA: NEGATIVE
SARS-COV-2 RDRP RESP QL NAA+PROBE: NOT DETECTED
SOURCE: NORMAL

## 2023-12-28 PROCEDURE — 87804 INFLUENZA ASSAY W/OPTIC: CPT

## 2023-12-28 PROCEDURE — 99284 EMERGENCY DEPT VISIT MOD MDM: CPT

## 2023-12-28 PROCEDURE — 71046 X-RAY EXAM CHEST 2 VIEWS: CPT

## 2023-12-28 PROCEDURE — 87635 SARS-COV-2 COVID-19 AMP PRB: CPT

## 2023-12-28 RX ORDER — AMOXICILLIN 875 MG/1
875 TABLET, COATED ORAL 2 TIMES DAILY
Qty: 10 TABLET | Refills: 0 | Status: SHIPPED | OUTPATIENT
Start: 2023-12-28 | End: 2024-01-02

## 2023-12-28 RX ORDER — AZITHROMYCIN 250 MG/1
250 TABLET, FILM COATED ORAL SEE ADMIN INSTRUCTIONS
Qty: 6 TABLET | Refills: 0 | Status: SHIPPED | OUTPATIENT
Start: 2023-12-28 | End: 2024-01-02

## 2023-12-28 ASSESSMENT — PAIN - FUNCTIONAL ASSESSMENT: PAIN_FUNCTIONAL_ASSESSMENT: 0-10

## 2023-12-28 NOTE — ED TRIAGE NOTES
Patient presents to ED to be checked for flu/ covid, and RSV because her  was recently diagnosed with it on Christmas. Patient states she has cough, runny nose, and headache since Friday.

## 2023-12-29 ENCOUNTER — TELEPHONE (OUTPATIENT)
Age: 83
End: 2023-12-29

## 2023-12-29 NOTE — TELEPHONE ENCOUNTER
----- Message from Yamileth Paulino sent at 12/27/2023  2:08 PM EST -----  Regarding: Spouse tested pod RSV  Contact: [phone]  My spouse tested positive for RSV on Thursday since then I have been forceful coughing no energy sleeping a lot no temp taking Coricidin anything else

## 2024-01-02 ENCOUNTER — APPOINTMENT (OUTPATIENT)
Facility: HOSPITAL | Age: 84
End: 2024-01-02
Payer: MEDICARE

## 2024-01-05 ENCOUNTER — APPOINTMENT (OUTPATIENT)
Facility: HOSPITAL | Age: 84
End: 2024-01-05
Payer: MEDICARE

## 2024-01-05 DIAGNOSIS — K21.9 GASTROESOPHAGEAL REFLUX DISEASE, UNSPECIFIED WHETHER ESOPHAGITIS PRESENT: ICD-10-CM

## 2024-01-05 RX ORDER — OMEPRAZOLE 20 MG/1
20 CAPSULE, DELAYED RELEASE ORAL
Qty: 90 CAPSULE | Refills: 2 | Status: SHIPPED | OUTPATIENT
Start: 2024-01-05

## 2024-01-06 ENCOUNTER — HOSPITAL ENCOUNTER (OUTPATIENT)
Facility: HOSPITAL | Age: 84
End: 2024-01-06
Attending: INTERNAL MEDICINE
Payer: MEDICARE

## 2024-01-06 DIAGNOSIS — M75.121 COMPLETE TEAR OF RIGHT ROTATOR CUFF, UNSPECIFIED WHETHER TRAUMATIC: ICD-10-CM

## 2024-01-06 PROCEDURE — 73221 MRI JOINT UPR EXTREM W/O DYE: CPT

## 2024-01-08 ENCOUNTER — HOSPITAL ENCOUNTER (OUTPATIENT)
Facility: HOSPITAL | Age: 84
Setting detail: RECURRING SERIES
Discharge: HOME | End: 2024-01-11
Payer: MEDICARE

## 2024-01-08 PROCEDURE — 97112 NEUROMUSCULAR REEDUCATION: CPT

## 2024-01-08 PROCEDURE — 97110 THERAPEUTIC EXERCISES: CPT

## 2024-01-08 PROCEDURE — 97140 MANUAL THERAPY 1/> REGIONS: CPT

## 2024-01-08 NOTE — PROGRESS NOTES
29  55569 Therapeutic Exercise (timed):  increase ROM, strength, coordination, balance, and proprioception to improve patient's ability to progress to PLOF and address remaining functional goals. (see flow sheet as applicable)    Details if applicable:       8  61817 Neuromuscular Re-Education (timed):  improve balance, coordination, kinesthetic sense, posture, core stability and proprioception to improve patient's ability to develop conscious control of individual muscles and awareness of position of extremities in order to progress to PLOF and address remaining functional goals. (see flow sheet as applicable)    Details if applicable:     8  88844 Manual Therapy (timed):  decrease pain, increase ROM, and increase tissue extensibility to improve patient's ability to progress to PLOF and address remaining functional goals.  The manual therapy interventions were performed at a separate and distinct time from the therapeutic activities interventions . Details:        Details if applicable:           Details if applicable:            Details if applicable:     45  [facility] Totals Reminder: bill using total billable min of TIMED therapeutic procedures (example: do not include dry needle or estim unattended, both untimed codes, in totals to left)  8-22 min = 1 unit; 23-37 min = 2 units; 38-52 min = 3 units; 53-67 min = 4 units; 68-82 min = 5 units   Total Total     TOTAL TREATMENT TIME:        50     [x]  Patient Education billed concurrently with other procedures   [x] Review HEP    [] Progressed/Changed HEP, detail:    [] Other detail:       Objective Information/Functional Measures/Assessment  First f/u appointment with pt displaying fair ROM of the right shoulder and a good tolerance to therex with exercise performance. Pt reports she feels most of her discomfort at the ant right shoulder with her highest pain being at night. Non compliance with HEP reported at this time with pt encouraged to consistently perform her

## 2024-01-08 NOTE — ED PROVIDER NOTES
EMERGENCY DEPARTMENT HISTORY AND PHYSICAL EXAM      Date: 12/28/2023  Patient Name: Jayla Paulino    History of Presenting Illness     Chief Complaint   Patient presents with    Cough    Fatigue    Headache       History Provided By: Patient    HPI: Jayla Paulino, 83 y.o. female with PMHx\as noted below presents the emergency department chief complaint of cough, fatigue and congestion.  Patient states that  was diagnosed with RSV.  She is requesting testing for that as well as COVID and flu.    Pt denies any other alleviating or exacerbating factors. Additionally, pt specifically denies any chest pain, severe dyspnea, AMS    PCP: Anette Hayes MD    No current facility-administered medications for this encounter.     Current Outpatient Medications   Medication Sig Dispense Refill    omeprazole (PRILOSEC) 20 MG delayed release capsule Take 1 capsule by mouth every morning (before breakfast) 90 capsule 2    hydroCHLOROthiazide (HYDRODIURIL) 25 MG tablet Take 1 tablet by mouth every morning 90 tablet 1    pravastatin (PRAVACHOL) 40 MG tablet TAKE 1 TABLET DAILY 90 tablet 3    Gabapentin, Once-Daily, (GRALISE) 600 MG TABS TAKE 2 TABLETS NIGHTLY  Indications: neuropathic pain 180 tablet 1    BIOTIN PO Take 5,000 mcg by mouth daily      albuterol sulfate HFA (PROVENTIL;VENTOLIN;PROAIR) 108 (90 Base) MCG/ACT inhaler USE 2 INHALATIONS EVERY 4 HOURS AS NEEDED      vitamin D 25 MCG (1000 UT) CAPS Take 3 capsules by mouth daily      coenzyme Q10 100 MG CAPS capsule Take 1 capsule by mouth daily      cycloSPORINE (RESTASIS) 0.05 % ophthalmic emulsion Apply 1 drop to eye 2 times daily      EPINEPHrine (EPIPEN) 0.3 MG/0.3ML SOAJ injection Inject 0.3 mLs into the muscle once as needed      fexofenadine (ALLEGRA) 180 MG tablet Take 1 tablet by mouth daily      fluticasone (FLONASE) 50 MCG/ACT nasal spray USE 2 SPRAYS IN EACH NOSTRIL DAILY      meclizine (ANTIVERT) 12.5 MG tablet Take 1 tablet by mouth 3

## 2024-01-10 ENCOUNTER — HOSPITAL ENCOUNTER (OUTPATIENT)
Facility: HOSPITAL | Age: 84
Setting detail: RECURRING SERIES
Discharge: HOME | End: 2024-01-13
Payer: MEDICARE

## 2024-01-10 PROCEDURE — 97140 MANUAL THERAPY 1/> REGIONS: CPT

## 2024-01-10 PROCEDURE — 97112 NEUROMUSCULAR REEDUCATION: CPT

## 2024-01-10 PROCEDURE — 97110 THERAPEUTIC EXERCISES: CPT

## 2024-01-10 NOTE — PROGRESS NOTES
PHYSICAL / OCCUPATIONAL THERAPY - DAILY TREATMENT NOTE     Patient Name: Jayla Paulino    Date: 1/10/2024    : 1940  Insurance: Payor: MEDICARE / Plan: MEDICARE PART A AND B / Product Type: *No Product type* /      Patient  verified Yes     Visit #   Current / Total 3 24   Time   In / Out 111 159   Pain   In / Out 7 0   Subjective Functional Status/Changes: Pt reports no new changes.   Changes to:  Allergies, Med Hx, Sx Hx?   no       TREATMENT AREA =  Pain in right shoulder [M25.511]    OBJECTIVE    Modalities Rationale:     decrease inflammation and decrease pain to improve patient's ability to progress to PLOF and address remaining functional goals.     min [] Estim Unattended, type/location:                                      []  w/ice    []  w/heat    min [] Estim Attended, type/location:                                     []  w/US     []  w/ice    []  w/heat    []  TENS insruct      min []  Mechanical Traction: type/lbs                   []  pro   []  sup   []  int   []  cont    []  before manual    []  after manual    min []  Ultrasound, settings/location:      min []  Iontophoresis w/ dexamethasone, location:                                               []  take home patch       []  in clinic     10   min  unbilled [x]  Ice     []  Heat    location/position: Seated, right shoulder    min []  Paraffin,  details:     min []  Vasopneumatic Device, press/temp:     min []  Whirlpool / Fluido:    If using vaso (only need to measure limb vaso being performed on)      pre-treatment girth :       post-treatment girth :       measured at (landmark location) :      min []  Other:    Skin assessment post-treatment (if applicable):    [x]  intact    [x]  redness- no adverse reaction                 []redness - adverse reaction:         Therapeutic Procedures:  Tx Min Billable or 1:1 Min (if diff from Tx Min) Procedure, Rationale, Specifics   30  62834 Therapeutic Exercise (timed):  increase ROM,

## 2024-01-15 ENCOUNTER — HOSPITAL ENCOUNTER (OUTPATIENT)
Facility: HOSPITAL | Age: 84
Setting detail: RECURRING SERIES
Discharge: HOME | End: 2024-01-18
Payer: MEDICARE

## 2024-01-15 ENCOUNTER — TELEPHONE (OUTPATIENT)
Age: 84
End: 2024-01-15

## 2024-01-15 PROCEDURE — 97140 MANUAL THERAPY 1/> REGIONS: CPT

## 2024-01-15 PROCEDURE — 97112 NEUROMUSCULAR REEDUCATION: CPT

## 2024-01-15 PROCEDURE — 97110 THERAPEUTIC EXERCISES: CPT

## 2024-01-15 NOTE — TELEPHONE ENCOUNTER
----- Message from Jayla Paulino sent at 1/14/2024 12:35 PM EST -----  Regarding: Infection?  Contact: [phone]  I have a vaginal itch. I don't see discharge just red labia from irritation any recommendation

## 2024-01-15 NOTE — PROGRESS NOTES
PHYSICAL / OCCUPATIONAL THERAPY - DAILY TREATMENT NOTE     Patient Name: Jayla Paulino    Date: 1/15/2024    : 1940  Insurance: Payor: MEDICARE / Plan: MEDICARE PART A AND B / Product Type: *No Product type* /      Patient  verified Yes     Visit #   Current / Total 4 24   Time   In / Out 1030 1124   Pain   In / Out 3 0   Subjective Functional Status/Changes: Pt reports the pain in her right shoulder fluctuates but she feels most of her pain at night.   Changes to:  Allergies, Med Hx, Sx Hx?   no       TREATMENT AREA =  Pain in right shoulder [M25.511]    OBJECTIVE    Modalities Rationale:     decrease inflammation and decrease pain to improve patient's ability to progress to PLOF and address remaining functional goals.     min [] Estim Unattended, type/location:                                      []  w/ice    []  w/heat    min [] Estim Attended, type/location:                                     []  w/US     []  w/ice    []  w/heat    []  TENS insruct      min []  Mechanical Traction: type/lbs                   []  pro   []  sup   []  int   []  cont    []  before manual    []  after manual    min []  Ultrasound, settings/location:      min []  Iontophoresis w/ dexamethasone, location:                                               []  take home patch       []  in clinic     10   min  unbilled [x]  Ice     []  Heat    location/position: Seated, right shoulder and bicep    min []  Paraffin,  details:     min []  Vasopneumatic Device, press/temp:     min []  Whirlpool / Fluido:    If using vaso (only need to measure limb vaso being performed on)      pre-treatment girth :       post-treatment girth :       measured at (landmark location) :      min []  Other:    Skin assessment post-treatment (if applicable):    [x]  intact    [x]  redness- no adverse reaction                 []redness - adverse reaction:         Therapeutic Procedures:  Tx Min Billable or 1:1 Min (if diff from Tx Min) Procedure,

## 2024-01-15 NOTE — TELEPHONE ENCOUNTER
Have her try applying monistat cream to the affected areas. If her sx do not improve,she needs to be seen in the office and should let us know.      Dr. Anette Hayes  [facility] [address], [address]  [place] [address]  Phone: [phone]  Fax: [phone]

## 2024-01-16 ENCOUNTER — HOSPITAL ENCOUNTER (OUTPATIENT)
Facility: HOSPITAL | Age: 84
Discharge: HOME | End: 2024-01-19
Attending: INTERNAL MEDICINE
Payer: MEDICARE

## 2024-01-16 VITALS — BODY MASS INDEX: 32.1 KG/M2 | HEIGHT: 61 IN | WEIGHT: 170 LBS

## 2024-01-16 DIAGNOSIS — Z12.31 VISIT FOR SCREENING MAMMOGRAM: ICD-10-CM

## 2024-01-16 PROCEDURE — 77063 BREAST TOMOSYNTHESIS BI: CPT

## 2024-01-17 ENCOUNTER — HOSPITAL ENCOUNTER (OUTPATIENT)
Facility: HOSPITAL | Age: 84
Setting detail: RECURRING SERIES
Discharge: HOME | End: 2024-01-20
Payer: MEDICARE

## 2024-01-17 PROCEDURE — 97112 NEUROMUSCULAR REEDUCATION: CPT

## 2024-01-17 PROCEDURE — 97110 THERAPEUTIC EXERCISES: CPT

## 2024-01-17 PROCEDURE — 97140 MANUAL THERAPY 1/> REGIONS: CPT

## 2024-01-17 NOTE — PROGRESS NOTES
[facility]  [address] [facility] [address] [place] [address] - Ph: [phone]   Fx: [phone]    PHYSICAL THERAPY PROGRESS NOTE        Patient name: Jayla Paulino Start of Care: 2023   Referral source: Anette Hayes MD : 1940               Medical Diagnosis: Pain in right shoulder [M25.511]        Onset Date: 23   Treatment Diagnosis: M25.511  RIGHT SHOULDER PAIN                                     Prior Hospitalization: see medical history Provider#: [id number]   Medications: Verified on Patient Summary List   Comorbidities: asthma, HTN, hx right shoulder pain (was in therapy last year), osteopenia, hx LBP  Prior Level of Function: Independent with ADLs, functional and daily tasks.        Visits from Start of Care: 5    Missed Visits: 0    Goals/Measure of Progress: To be achieved in 8 weeks:  Short Term Goals: To be accomplished in 8 treatments   Pt will report compliance and independence to HEP to help the pt manage their pain and symptoms.  Status at last note/certification: established  Current: not met 24 non HEP compliance at this time.  2.     Pt will improve AROM right shoulder flex/ABD to 100 degs in sitting to improve ease of reaching/lifting to a shoulder height shelf.  Status at last note/certification: flex 85 degs, ABD 90 degs, both in sitting  Re-cert: Met 1/15/24 right shoulder AROM flex 125 deg (5-6/10 pain),  deg (8/10 eccentric pain)  Long Term Goals: To be accomplished in 24 treatments  Pt will increase FOTO score to 61 points to improve ability to perform ADLs.  Status at last note/certification: 48 points  Re-cert: Met 71 points   2.  Pt will increase MMT right shoulder ER to 4/5 (within available AROM) to improve performance of household chores and driving.   Status at last note/certification: 3+/5 within available AROM  Re-cert: Progressed to 4-/5 MT  3.  Pt will increase AROM right shoulder 
manage their pain and symptoms.  Status at last note/certification: established  Current: not met 1/8/24 non HEP compliance at this time.  2.     Pt will improve AROM right shoulder flex/ABD to 100 degs in sitting to improve ease of reaching/lifting to a shoulder height shelf.  Status at last note/certification: flex 85 degs, ABD 90 degs, both in sitting  Re-cert: Met 1/15/24 right shoulder AROM flex 125 deg (5-6/10 pain),  deg (8/10 eccentric pain)  Long Term Goals: To be accomplished in 24 treatments  Pt will increase FOTO score to 61 points to improve ability to perform ADLs.  Status at last note/certification: 48 points  Re-cert: Met 71 points   2.  Pt will increase MMT right shoulder ER to 4/5 (within available AROM) to improve performance of household chores and driving.   Status at last note/certification: 3+/5 within available AROM  Re-cert: Progressed to 4-/5 MT  3.  Pt will increase AROM right shoulder flex/ABD to 120 degs, ER to 40 degs to improve ability to do her hair.  Status at last note/certification: flex 85 degs, ABD 90 degs, ER 16 degs (flex/ABD in sitting, ER in supine).  Re-cert: Met 1/15/24 right shoulder AROM flex 125 deg (5-6/10 pain),  deg (8/10 eccentric pain), ER 42 deg no pain  4.  Pt will report being able to turn the steering wheel with minimal to no increased pain to improve ease of driving.  Status at last note/certification: trouble with turning the steering wheel due to pain   Re-cert: progressing 1/15/24 pt reports increased ease with turning the steering wheel but some discomfort remains.      PLAN  Yes  Continue plan of care  []  Upgrade activities as tolerated  []  Discharge due to :  []  Other:    Landen Ordonez, PT    1/17/2024    2:36 PM    Future Appointments   Date Time Provider Department Center   2/19/2024  8:20 AM Erlinda Torres MD [facility] BS AMB   3/7/2024 10:15 AM IO LAB VISIT [facility] BS AMB   3/14/2024 10:40 AM Anette Hayes MD [facility] BS AMB

## 2024-01-26 ENCOUNTER — HOSPITAL ENCOUNTER (OUTPATIENT)
Facility: HOSPITAL | Age: 84
Setting detail: RECURRING SERIES
Discharge: HOME | End: 2024-01-29
Payer: MEDICARE

## 2024-01-26 PROCEDURE — 97112 NEUROMUSCULAR REEDUCATION: CPT

## 2024-01-26 PROCEDURE — 97140 MANUAL THERAPY 1/> REGIONS: CPT

## 2024-01-26 PROCEDURE — 97110 THERAPEUTIC EXERCISES: CPT

## 2024-01-26 NOTE — PROGRESS NOTES
PHYSICAL / OCCUPATIONAL THERAPY - DAILY TREATMENT NOTE     Patient Name: Jayla Paulino    Date: 2024    : 1940  Insurance: Payor: MEDICARE / Plan: MEDICARE PART A AND B / Product Type: *No Product type* /      Patient  verified Yes     Visit #   Current / Total 6 24   Time   In / Out 1110 1207   Pain   In / Out 4 0   Subjective Functional Status/Changes: No new changes.   Changes to:  Allergies, Med Hx, Sx Hx?   no       TREATMENT AREA =  Pain in right shoulder [M25.511]    OBJECTIVE    Modalities Rationale:     decrease inflammation and decrease pain to improve patient's ability to progress to PLOF and address remaining functional goals.     min [] Estim Unattended, type/location:                                      []  w/ice    []  w/heat    min [] Estim Attended, type/location:                                     []  w/US     []  w/ice    []  w/heat    []  TENS insruct      min []  Mechanical Traction: type/lbs                   []  pro   []  sup   []  int   []  cont    []  before manual    []  after manual    min []  Ultrasound, settings/location:      min []  Iontophoresis w/ dexamethasone, location:                                               []  take home patch       []  in clinic     10   min  unbilled [x]  Ice     []  Heat    location/position: Seated, right shoulder and bicep     min []  Paraffin,  details:     min []  Vasopneumatic Device, press/temp:     min []  Whirlpool / Fluido:    If using vaso (only need to measure limb vaso being performed on)      pre-treatment girth :       post-treatment girth :       measured at (landmark location) :      min []  Other:    Skin assessment post-treatment (if applicable):    [x]  intact    [x]  redness- no adverse reaction                 []redness - adverse reaction:         Therapeutic Procedures:  Tx Min Billable or 1:1 Min (if diff from Tx Min) Procedure, Rationale, Specifics   24  99758 Therapeutic Exercise (timed):  increase ROM,

## 2024-01-30 ENCOUNTER — HOSPITAL ENCOUNTER (OUTPATIENT)
Facility: HOSPITAL | Age: 84
Setting detail: RECURRING SERIES
Discharge: HOME | End: 2024-02-02
Payer: MEDICARE

## 2024-01-30 PROCEDURE — 97140 MANUAL THERAPY 1/> REGIONS: CPT

## 2024-01-30 PROCEDURE — 97110 THERAPEUTIC EXERCISES: CPT

## 2024-01-30 PROCEDURE — 97530 THERAPEUTIC ACTIVITIES: CPT

## 2024-01-30 NOTE — PROGRESS NOTES
PHYSICAL / OCCUPATIONAL THERAPY - DAILY TREATMENT NOTE     Patient Name: Jayla Paulino    Date: 2024    : 1940  Insurance: Payor: MEDICARE / Plan: MEDICARE PART A AND B / Product Type: *No Product type* /      Patient  verified Yes     Visit #   Current / Total 7 24   Time   In / Out 9:09 10:03   Pain   In / Out 0 0   Subjective Functional Status/Changes: Pt reported feeling O.K today   Changes to:  Allergies, Med Hx, Sx Hx?   no       TREATMENT AREA =  Pain in right shoulder [M25.511]    OBJECTIVE    Modalities Rationale:     decrease pain and increase tissue extensibility to improve patient's ability to progress to PLOF and address remaining functional goals.        10 min  unbilled [x]  Ice     []  Heat    location/position: Right shoulder/ Seated    min []  Paraffin,  details:     min []  Vasopneumatic Device, press/temp:     min []  Whirlpool / Fluido:    If using vaso (only need to measure limb vaso being performed on)      pre-treatment girth :       post-treatment girth :       measured at (landmark location) :      min []  Other:    Skin assessment post-treatment (if applicable):    []  intact    []  redness- no adverse reaction                 []redness - adverse reaction:         Therapeutic Procedures:  Tx Min Billable or 1:1 Min (if diff from Tx Min) Procedure, Rationale, Specifics   20  88402 Therapeutic Exercise (timed):  increase ROM, strength, coordination, balance, and proprioception to improve patient's ability to progress to PLOF and address remaining functional goals. (see flow sheet as applicable)    Details if applicable:       16  76919 Therapeutic Activity (timed):  use of dynamic activities replicating functional movements to increase ROM, strength, coordination, balance, and proprioception in order to improve patient's ability to progress to PLOF and address remaining functional goals.  (see flow sheet as applicable)    Details if applicable:     8  96463 Manual Therapy

## 2024-01-31 ENCOUNTER — APPOINTMENT (OUTPATIENT)
Facility: HOSPITAL | Age: 84
End: 2024-01-31
Payer: MEDICARE

## 2024-01-31 DIAGNOSIS — M54.16 RADICULOPATHY, LUMBAR REGION: ICD-10-CM

## 2024-01-31 DIAGNOSIS — M54.12 RADICULOPATHY, CERVICAL REGION: ICD-10-CM

## 2024-01-31 RX ORDER — GABAPENTIN 600 MG/1
TABLET, FILM COATED ORAL
Qty: 180 TABLET | Refills: 1 | OUTPATIENT
Start: 2024-01-31

## 2024-02-06 ENCOUNTER — HOSPITAL ENCOUNTER (OUTPATIENT)
Facility: HOSPITAL | Age: 84
Setting detail: RECURRING SERIES
Discharge: HOME | End: 2024-02-09
Payer: MEDICARE

## 2024-02-06 PROCEDURE — 97110 THERAPEUTIC EXERCISES: CPT

## 2024-02-06 PROCEDURE — 97140 MANUAL THERAPY 1/> REGIONS: CPT

## 2024-02-06 PROCEDURE — 97112 NEUROMUSCULAR REEDUCATION: CPT

## 2024-02-06 NOTE — PROGRESS NOTES
PHYSICAL / OCCUPATIONAL THERAPY - DAILY TREATMENT NOTE     Patient Name: Jayla Paulino    Date: 2024    : 1940  Insurance: Payor: MEDICARE / Plan: MEDICARE PART A AND B / Product Type: *No Product type* /      Patient  verified Yes     Visit #   Current / Total 8 24   Time   In / Out 1:12 2:02   Pain   In / Out 2-310 0/10   Subjective Functional Status/Changes: Right shoulder is sore at night.    Changes to:  Allergies, Med Hx, Sx Hx?   no       TREATMENT AREA =  Pain in right shoulder [M25.511]    OBJECTIVE    Modalities Rationale:     decrease pain and increase tissue extensibility to improve patient's ability to progress to PLOF and address remaining functional goals.      10   min  unbilled []  Ice     [x]  Heat    location/position: Right shoulder, pt seated   Skin assessment post-treatment (if applicable):    []  intact    []  redness- no adverse reaction                 []redness - adverse reaction:         Therapeutic Procedures:  Tx Min Billable or 1:1 Min (if diff from Tx Min) Procedure, Rationale, Specifics   20  68560 Therapeutic Exercise (timed):  increase ROM, strength, coordination, balance, and proprioception to improve patient's ability to progress to PLOF and address remaining functional goals. (see flow sheet as applicable)    Details if applicable:       12  29464 Neuromuscular Re-Education (timed):  improve balance, coordination, kinesthetic sense, posture, core stability and proprioception to improve patient's ability to develop conscious control of individual muscles and awareness of position of extremities in order to progress to PLOF and address remaining functional goals. (see flow sheet as applicable)    Details if applicable:     8  80673 Manual Therapy (timed):  decrease pain, increase ROM, and increase tissue extensibility to improve patient's ability to progress to PLOF and address remaining functional goals.  The manual therapy interventions were performed at a

## 2024-02-09 ENCOUNTER — HOSPITAL ENCOUNTER (OUTPATIENT)
Facility: HOSPITAL | Age: 84
Setting detail: RECURRING SERIES
Discharge: HOME | End: 2024-02-12
Payer: MEDICARE

## 2024-02-09 PROCEDURE — 97110 THERAPEUTIC EXERCISES: CPT

## 2024-02-09 PROCEDURE — 97112 NEUROMUSCULAR REEDUCATION: CPT

## 2024-02-09 PROCEDURE — 97140 MANUAL THERAPY 1/> REGIONS: CPT

## 2024-02-09 NOTE — PROGRESS NOTES
ROM, and increase tissue extensibility to improve patient's ability to progress to PLOF and address remaining functional goals.  The manual therapy interventions were performed at a separate and distinct time from the therapeutic activities interventions . (see flow sheet as applicable)     Details if applicable:               40  [facility] Totals Reminder: bill using total billable min of TIMED therapeutic procedures (example: do not include dry needle or estim unattended, both untimed codes, in totals to left)  8-22 min = 1 unit; 23-37 min = 2 units; 38-52 min = 3 units; 53-67 min = 4 units; 68-82 min = 5 units   Total Total       [x]  Patient Education billed concurrently with other procedures   [x] Review HEP    [] Progressed/Changed HEP, detail:    [] Other detail:       Objective Information/Functional Measures/Assessment    The pt has progressed well with PT and met all goals. She feels she is ready for discharge at this time.     GOALS       Short Term Goals: To be accomplished in 8 treatments   Pt will report compliance and independence to HEP to help the pt manage their pain and symptoms.  Status at last note/certification: established  Re-cert: Not Met non HEP compliance at this time.  Current: Met, pt reported compliance, 01/30/2024  2.     Pt will improve AROM right shoulder flex/ABD to 100 degs in sitting to improve ease of reaching/lifting to a shoulder height shelf.  Status at last note/certification: flex 85 degs, ABD 90 degs, both in sitting  Re-cert: MET right shoulder AROM flex 125 deg (5-6/10 pain),  deg (8/10 eccentric pain)     Long Term Goals: To be accomplished in 24 treatments  Pt will increase FOTO score to 61 points to improve ability to perform ADLs.  Status at last note/certification: 48 points  Re-cert: MET 71 points   2.  Pt will increase MMT right shoulder ER to 4/5 (within available AROM) to improve performance of household chores and driving.   Status at last note/certification:

## 2024-02-19 ENCOUNTER — OFFICE VISIT (OUTPATIENT)
Age: 84
End: 2024-02-19
Payer: MEDICARE

## 2024-02-19 VITALS
WEIGHT: 164 LBS | HEIGHT: 63 IN | BODY MASS INDEX: 29.06 KG/M2 | OXYGEN SATURATION: 96 % | SYSTOLIC BLOOD PRESSURE: 118 MMHG | HEART RATE: 70 BPM | DIASTOLIC BLOOD PRESSURE: 73 MMHG

## 2024-02-19 DIAGNOSIS — M47.816 LUMBAR FACET ARTHROPATHY: ICD-10-CM

## 2024-02-19 DIAGNOSIS — M75.111 INCOMPLETE ROTATOR CUFF TEAR OR RUPTURE OF RIGHT SHOULDER, NOT SPECIFIED AS TRAUMATIC: ICD-10-CM

## 2024-02-19 DIAGNOSIS — M54.12 RADICULOPATHY, CERVICAL REGION: ICD-10-CM

## 2024-02-19 DIAGNOSIS — M54.16 RADICULOPATHY, LUMBAR REGION: Primary | ICD-10-CM

## 2024-02-19 PROCEDURE — 1090F PRES/ABSN URINE INCON ASSESS: CPT | Performed by: PHYSICAL MEDICINE & REHABILITATION

## 2024-02-19 PROCEDURE — G8399 PT W/DXA RESULTS DOCUMENT: HCPCS | Performed by: PHYSICAL MEDICINE & REHABILITATION

## 2024-02-19 PROCEDURE — 1036F TOBACCO NON-USER: CPT | Performed by: PHYSICAL MEDICINE & REHABILITATION

## 2024-02-19 PROCEDURE — 1123F ACP DISCUSS/DSCN MKR DOCD: CPT | Performed by: PHYSICAL MEDICINE & REHABILITATION

## 2024-02-19 PROCEDURE — G8484 FLU IMMUNIZE NO ADMIN: HCPCS | Performed by: PHYSICAL MEDICINE & REHABILITATION

## 2024-02-19 PROCEDURE — G8417 CALC BMI ABV UP PARAM F/U: HCPCS | Performed by: PHYSICAL MEDICINE & REHABILITATION

## 2024-02-19 PROCEDURE — 3074F SYST BP LT 130 MM HG: CPT | Performed by: PHYSICAL MEDICINE & REHABILITATION

## 2024-02-19 PROCEDURE — 3078F DIAST BP <80 MM HG: CPT | Performed by: PHYSICAL MEDICINE & REHABILITATION

## 2024-02-19 PROCEDURE — 99213 OFFICE O/P EST LOW 20 MIN: CPT | Performed by: PHYSICAL MEDICINE & REHABILITATION

## 2024-02-19 PROCEDURE — G8427 DOCREV CUR MEDS BY ELIG CLIN: HCPCS | Performed by: PHYSICAL MEDICINE & REHABILITATION

## 2024-02-19 RX ORDER — GABAPENTIN 600 MG/1
TABLET, FILM COATED ORAL
Qty: 180 TABLET | Refills: 1 | Status: SHIPPED | OUTPATIENT
Start: 2024-02-19 | End: 2024-06-18

## 2024-02-19 NOTE — PROGRESS NOTES
lumbar   spine.       FINDINGS:       Alignment: Intact lordosis   Vertebral body height: Normal   Marrow signal: Unremarkable   Conus: T12-L1       Axial imaging correlation:       T12-L1: Patent canal and foramina.       L1-2: Patent canal and foramina.       L2-3: Mild disc bulge. Some disc desiccation. Mild facet arthropathy. Patent   canal and foramina.       L3-4: Slightly more prominent disc bulge and facet arthropathy. Low-grade facet   inflammation on the left. Slight bulging posterior epidural fat. Minor spinal   stenosis in AP dimension. Mild to moderate left foraminal stenosis.       L4-5: Mild disc bulge. Bilateral facet arthropathy with trace facet   inflammation. Patent canal and foramina.       L5-S1: Focal central to right paracentral disc protrusion with annular tear.   Bilateral facet arthropathy. Patent canal and foramina.       Other structures: Unremarkable.       Impression   1. Relatively mild multilevel degenerative findings along lumbar spine   -Overall slight progression compared with 2016   -There is no high-grade spinal stenosis   -There is some distortion of the exiting right L3 nerve from disc and facet   pathology at the right foramen which does not look appreciably changed.       Cervical Spine MRI from 11/08/2016 was personally reviewed with the Pt and demonstrated:       Narrative  MRI OF CERVICAL SPINE WITHOUT CONTRAST    CPT CODE: 91794    HISTORY: Chronic neck pain extending into the left shoulder with progressive   paresthesias left hand.    COMPARISON: MRI cervical spine 4/10/2014.    FINDINGS:     There is normal anatomic alignment of the cervical spine. Vertebral body heights  are maintained. Craniocervical junction and posterior elements  are intact.  Prevertebral soft tissues are normal. Incidentally imaged intracranial soft  tissues demonstrate no acute abnormalities. Within limitations of motion  artifact, cervical spinal cord maintains normal caliber, signal intensity

## 2024-03-06 ENCOUNTER — HOSPITAL ENCOUNTER (OUTPATIENT)
Facility: HOSPITAL | Age: 84
Discharge: HOME | End: 2024-03-09
Payer: MEDICARE

## 2024-03-06 DIAGNOSIS — E78.5 HYPERLIPIDEMIA, UNSPECIFIED HYPERLIPIDEMIA TYPE: ICD-10-CM

## 2024-03-06 DIAGNOSIS — I10 ESSENTIAL (PRIMARY) HYPERTENSION: ICD-10-CM

## 2024-03-06 LAB
ALBUMIN SERPL-MCNC: 3.8 G/DL (ref 3.4–5)
ALBUMIN/GLOB SERPL: 1.1 (ref 0.8–1.7)
ALP SERPL-CCNC: 77 U/L (ref 45–117)
ALT SERPL-CCNC: 20 U/L (ref 13–56)
ANION GAP SERPL CALC-SCNC: 4 MMOL/L (ref 3–18)
AST SERPL-CCNC: 19 U/L (ref 10–38)
BILIRUB SERPL-MCNC: 0.7 MG/DL (ref 0.2–1)
BUN SERPL-MCNC: 11 MG/DL (ref 7–18)
BUN/CREAT SERPL: 16 (ref 12–20)
CALCIUM SERPL-MCNC: 9.9 MG/DL (ref 8.5–10.1)
CHLORIDE SERPL-SCNC: 102 MMOL/L (ref 100–111)
CHOLEST SERPL-MCNC: 187 MG/DL
CO2 SERPL-SCNC: 36 MMOL/L (ref 21–32)
CREAT SERPL-MCNC: 0.67 MG/DL (ref 0.6–1.3)
CREAT UR-MCNC: 312 MG/DL (ref 30–125)
GLOBULIN SER CALC-MCNC: 3.6 G/DL (ref 2–4)
GLUCOSE SERPL-MCNC: 107 MG/DL (ref 74–99)
HDLC SERPL-MCNC: 63 MG/DL (ref 40–60)
HDLC SERPL: 3 (ref 0–5)
LDLC SERPL CALC-MCNC: 100.6 MG/DL (ref 0–100)
LIPID PANEL: ABNORMAL
MICROALBUMIN UR-MCNC: 1.47 MG/DL (ref 0–3)
MICROALBUMIN/CREAT UR-RTO: 5 MG/G (ref 0–30)
POTASSIUM SERPL-SCNC: 3.5 MMOL/L (ref 3.5–5.5)
PROT SERPL-MCNC: 7.4 G/DL (ref 6.4–8.2)
SODIUM SERPL-SCNC: 142 MMOL/L (ref 136–145)
TRIGL SERPL-MCNC: 117 MG/DL
VLDLC SERPL CALC-MCNC: 23.4 MG/DL

## 2024-03-06 PROCEDURE — 36415 COLL VENOUS BLD VENIPUNCTURE: CPT

## 2024-03-06 PROCEDURE — 82570 ASSAY OF URINE CREATININE: CPT

## 2024-03-06 PROCEDURE — 82043 UR ALBUMIN QUANTITATIVE: CPT

## 2024-03-06 PROCEDURE — 80061 LIPID PANEL: CPT

## 2024-03-06 PROCEDURE — 80053 COMPREHEN METABOLIC PANEL: CPT

## 2024-03-14 ENCOUNTER — OFFICE VISIT (OUTPATIENT)
Age: 84
End: 2024-03-14
Payer: MEDICARE

## 2024-03-14 VITALS
RESPIRATION RATE: 18 BRPM | HEIGHT: 63 IN | HEART RATE: 68 BPM | DIASTOLIC BLOOD PRESSURE: 67 MMHG | OXYGEN SATURATION: 96 % | WEIGHT: 165.8 LBS | TEMPERATURE: 98.4 F | SYSTOLIC BLOOD PRESSURE: 115 MMHG | BODY MASS INDEX: 29.38 KG/M2

## 2024-03-14 DIAGNOSIS — R73.9 HYPERGLYCEMIA: Primary | ICD-10-CM

## 2024-03-14 DIAGNOSIS — I10 ESSENTIAL (PRIMARY) HYPERTENSION: ICD-10-CM

## 2024-03-14 DIAGNOSIS — E78.5 HYPERLIPIDEMIA, UNSPECIFIED HYPERLIPIDEMIA TYPE: ICD-10-CM

## 2024-03-14 DIAGNOSIS — M75.121 COMPLETE TEAR OF RIGHT ROTATOR CUFF, UNSPECIFIED WHETHER TRAUMATIC: ICD-10-CM

## 2024-03-14 PROCEDURE — G8399 PT W/DXA RESULTS DOCUMENT: HCPCS | Performed by: INTERNAL MEDICINE

## 2024-03-14 PROCEDURE — 1090F PRES/ABSN URINE INCON ASSESS: CPT | Performed by: INTERNAL MEDICINE

## 2024-03-14 PROCEDURE — 3078F DIAST BP <80 MM HG: CPT | Performed by: INTERNAL MEDICINE

## 2024-03-14 PROCEDURE — G8484 FLU IMMUNIZE NO ADMIN: HCPCS | Performed by: INTERNAL MEDICINE

## 2024-03-14 PROCEDURE — 99214 OFFICE O/P EST MOD 30 MIN: CPT | Performed by: INTERNAL MEDICINE

## 2024-03-14 PROCEDURE — 1123F ACP DISCUSS/DSCN MKR DOCD: CPT | Performed by: INTERNAL MEDICINE

## 2024-03-14 PROCEDURE — G8417 CALC BMI ABV UP PARAM F/U: HCPCS | Performed by: INTERNAL MEDICINE

## 2024-03-14 PROCEDURE — G8427 DOCREV CUR MEDS BY ELIG CLIN: HCPCS | Performed by: INTERNAL MEDICINE

## 2024-03-14 PROCEDURE — 3074F SYST BP LT 130 MM HG: CPT | Performed by: INTERNAL MEDICINE

## 2024-03-14 PROCEDURE — 1036F TOBACCO NON-USER: CPT | Performed by: INTERNAL MEDICINE

## 2024-03-14 PROCEDURE — 99211 OFF/OP EST MAY X REQ PHY/QHP: CPT

## 2024-03-14 ASSESSMENT — PATIENT HEALTH QUESTIONNAIRE - PHQ9
SUM OF ALL RESPONSES TO PHQ QUESTIONS 1-9: 0
2. FEELING DOWN, DEPRESSED OR HOPELESS: NOT AT ALL
1. LITTLE INTEREST OR PLEASURE IN DOING THINGS: NOT AT ALL
SUM OF ALL RESPONSES TO PHQ QUESTIONS 1-9: 0
SUM OF ALL RESPONSES TO PHQ9 QUESTIONS 1 & 2: 0

## 2024-03-14 NOTE — PROGRESS NOTES
[facility] OF [facility]:  3/20/2024, MRN: [id number]      Jayla Paulino is a 84 y.o. female and presents to clinic for Follow-up (3 mon f/u), Hypertension (3 mon f/u), Cholesterol Problem (3 mon f/u), Gastroesophageal Reflux (3 mon f/u), and Results      Subjective:   The patient is a 84-year-old female with history of hypertension, LGSIL 4/19, osteopenia, tubular adenomas colon polyps, obesity, vitamin D deficiency, stable pulmonary nodules, chronic right rotator (declining injection, followed by Orthopedics), small hiatal hernia, NAFLD, asthma (followed by ), cervical disc disease, allergic rhinitis (declining allergy testing and immunotherapy), and lumbar disc disease (followed by Orthopedics, ).      1.  Hypertension: Blood pressure is 115/67.  At her last appointment her blood pressure was low.  As result, I discontinued her triamterene-hydrochlorothiazide and replace it with just hydrochlorothiazide 25 mg daily.  Recent lab work shows a normal set electrolytes.  Her creatinine is normal.No microalbuminuria.     2.  Prediabetes/Hyperglycemia and Stress: Resolved per her most recent A1c in December.  Her next A1c is due in May or June of this year. Her  is declining. She is stress eating. The [facility] finally gave her a HH aid three days a wk for 3 hours at a time to assist her with caring for her .    3.  Hyperlipidemia: Taking pravastatin 40 mg daily.  Recent lab work shows: Her LDL was 100.  LFTs are unremarkable.     Latest Reference Range & Units 03/06/24 10:28   Chol/HDL Ratio 0 - 5.0   3.0   Cholesterol, Total <200 MG/   HDL Cholesterol 40 - 60 MG/DL 63 (H)   LDL Calculated 0 - 100 MG/.6 (H)   Triglycerides <150 MG/   VLDL Cholesterol Calculated MG/DL 23.4   (H): Data is abnormally high    4.  Right rotator cuff tendinopathy: At her last appointment she was referred to orthopedics.  A referral was also placed to physical therapy.  An MRI of her

## 2024-03-14 NOTE — PROGRESS NOTES
Jayla Paulino presents today for   Chief Complaint   Patient presents with    Follow-up     3 mon f/u    Hypertension     3 mon f/u    Cholesterol Problem     3 mon f/u    Gastroesophageal Reflux     3 mon f/u           \"Have you been to the ER, urgent care clinic since your last visit?  Hospitalized since your last visit?\"    YES - When: approximately 3 months ago.  Where and Why: [facility]- RSV.    “Have you seen or consulted any other health care providers outside of [facility] since your last visit?”    NO

## 2024-03-20 ASSESSMENT — ENCOUNTER SYMPTOMS
SHORTNESS OF BREATH: 0
ABDOMINAL PAIN: 0
BACK PAIN: 1
SORE THROAT: 0
EYE PAIN: 0
ANAL BLEEDING: 0
COUGH: 0

## 2024-03-22 ENCOUNTER — TELEPHONE (OUTPATIENT)
Age: 84
End: 2024-03-22

## 2024-03-22 NOTE — TELEPHONE ENCOUNTER
Called and spoke with patient.  She is aware she can not use her my chart for a family member.  Patient was given names and phone number of local neurologist.

## 2024-03-22 NOTE — TELEPHONE ENCOUNTER
----- Message from Jayla Paulino sent at 3/22/2024  1:00 PM EDT -----  Regarding: Help me  Contact: [phone]  [place] a  [place] my [place]. He has one but she often calls out and he needs to be seen every 3/4 months as planned can u suggest one would really appreciate your help thanks

## 2024-04-01 ENCOUNTER — HOSPITAL ENCOUNTER (EMERGENCY)
Facility: HOSPITAL | Age: 84
Discharge: HOME | End: 2024-04-01
Attending: EMERGENCY MEDICINE
Payer: MEDICARE

## 2024-04-01 VITALS
HEIGHT: 61 IN | HEART RATE: 71 BPM | DIASTOLIC BLOOD PRESSURE: 74 MMHG | BODY MASS INDEX: 31.72 KG/M2 | OXYGEN SATURATION: 99 % | TEMPERATURE: 98.3 F | SYSTOLIC BLOOD PRESSURE: 156 MMHG | RESPIRATION RATE: 20 BRPM | WEIGHT: 168 LBS

## 2024-04-01 DIAGNOSIS — N30.01 ACUTE CYSTITIS WITH HEMATURIA: Primary | ICD-10-CM

## 2024-04-01 LAB
APPEARANCE UR: ABNORMAL
BILIRUB UR QL: NEGATIVE
COLOR UR: YELLOW
EPITH CASTS URNS QL MICRO: NORMAL /LPF (ref 0–5)
GLUCOSE UR STRIP.AUTO-MCNC: NEGATIVE MG/DL
HGB UR QL STRIP: ABNORMAL
KETONES UR QL STRIP.AUTO: NEGATIVE MG/DL
LEUKOCYTE ESTERASE UR QL STRIP.AUTO: ABNORMAL
NITRITE UR QL STRIP.AUTO: NEGATIVE
PH UR STRIP: 6.5 (ref 5–8)
PROT UR STRIP-MCNC: ABNORMAL MG/DL
RBC #/AREA URNS HPF: NORMAL /HPF (ref 0–5)
SP GR UR REFRACTOMETRY: 1 (ref 1–1.03)
UROBILINOGEN UR QL STRIP.AUTO: 0.2 EU/DL (ref 0.2–1)
WBC URNS QL MICRO: NORMAL /HPF (ref 0–4)

## 2024-04-01 PROCEDURE — 81001 URINALYSIS AUTO W/SCOPE: CPT

## 2024-04-01 PROCEDURE — 87186 SC STD MICRODIL/AGAR DIL: CPT

## 2024-04-01 PROCEDURE — 6370000000 HC RX 637 (ALT 250 FOR IP): Performed by: EMERGENCY MEDICINE

## 2024-04-01 PROCEDURE — 99283 EMERGENCY DEPT VISIT LOW MDM: CPT

## 2024-04-01 PROCEDURE — 87086 URINE CULTURE/COLONY COUNT: CPT

## 2024-04-01 PROCEDURE — 87088 URINE BACTERIA CULTURE: CPT

## 2024-04-01 RX ORDER — NITROFURANTOIN 25; 75 MG/1; MG/1
100 CAPSULE ORAL 2 TIMES DAILY
Qty: 10 CAPSULE | Refills: 0 | Status: SHIPPED | OUTPATIENT
Start: 2024-04-01 | End: 2024-04-01

## 2024-04-01 RX ORDER — NITROFURANTOIN 25; 75 MG/1; MG/1
100 CAPSULE ORAL 2 TIMES DAILY
Qty: 10 CAPSULE | Refills: 0 | Status: SHIPPED | OUTPATIENT
Start: 2024-04-01 | End: 2024-04-06

## 2024-04-01 RX ORDER — KETOROLAC TROMETHAMINE 10 MG/1
10 TABLET, FILM COATED ORAL EVERY 6 HOURS PRN
Qty: 20 TABLET | Refills: 0 | Status: SHIPPED | OUTPATIENT
Start: 2024-04-01 | End: 2024-04-01

## 2024-04-01 RX ORDER — PHENAZOPYRIDINE HYDROCHLORIDE 100 MG/1
100 TABLET, FILM COATED ORAL 3 TIMES DAILY PRN
Qty: 9 TABLET | Refills: 0 | Status: SHIPPED | OUTPATIENT
Start: 2024-04-01 | End: 2024-04-04

## 2024-04-01 RX ORDER — IBUPROFEN 600 MG/1
600 TABLET ORAL
Status: COMPLETED | OUTPATIENT
Start: 2024-04-01 | End: 2024-04-01

## 2024-04-01 RX ORDER — PHENAZOPYRIDINE HYDROCHLORIDE 100 MG/1
100 TABLET, FILM COATED ORAL 3 TIMES DAILY PRN
Qty: 9 TABLET | Refills: 0 | Status: SHIPPED | OUTPATIENT
Start: 2024-04-01 | End: 2024-04-01

## 2024-04-01 RX ORDER — KETOROLAC TROMETHAMINE 10 MG/1
10 TABLET, FILM COATED ORAL EVERY 6 HOURS PRN
Qty: 20 TABLET | Refills: 0 | Status: SHIPPED | OUTPATIENT
Start: 2024-04-01

## 2024-04-01 RX ADMIN — IBUPROFEN 600 MG: 600 TABLET, FILM COATED ORAL at 11:12

## 2024-04-01 ASSESSMENT — PAIN SCALES - GENERAL
PAINLEVEL_OUTOF10: 9
PAINLEVEL_OUTOF10: 9
PAINLEVEL_OUTOF10: 10

## 2024-04-01 ASSESSMENT — LIFESTYLE VARIABLES
HOW MANY STANDARD DRINKS CONTAINING ALCOHOL DO YOU HAVE ON A TYPICAL DAY: PATIENT DOES NOT DRINK
HOW OFTEN DO YOU HAVE A DRINK CONTAINING ALCOHOL: NEVER

## 2024-04-01 ASSESSMENT — PAIN - FUNCTIONAL ASSESSMENT
PAIN_FUNCTIONAL_ASSESSMENT: 0-10
PAIN_FUNCTIONAL_ASSESSMENT: 0-10

## 2024-04-01 NOTE — ED TRIAGE NOTES
Pt c/o lower abd/pelvic pain that started last night. States t feels like she didn't empty her bladder when she peed. Noticed pink color on the toilet paper

## 2024-04-01 NOTE — DISCHARGE INSTRUCTIONS
However, do not take the Toradol until least 6 hours after try drinking cranberry juice.  Stay well-hydrated to help flush out the bacteria.  The Pyridium may change her urine to an orange color which is normal.  Can alternate the Tylenol arthritis with the Toradol prescribed for pain relief.  However, do not take the Toradol until 6 hours after the ibuprofen we gave you in the emergency department.

## 2024-04-01 NOTE — ED PROVIDER NOTES
EMERGENCY DEPARTMENT HISTORY AND PHYSICAL EXAM      Date: 4/1/2024  Patient Name: Jayla Paulino      History of Presenting Illness     Chief Complaint   Patient presents with    Abdominal Pain    Hematuria       Location/Duration/Severity/Modifying factors   Chief Complaint   Patient presents with    Abdominal Pain    Hematuria       HPI:  Jayla Paulino is a 84 y.o. female with PMH significant for hypertension, hyperlipidemia presents with 1 day of pelvic pain, grossly bloody urine, blood while wiping her pubic region, painful urination. Pt  denies fevers chills, nausea or vomiting, change in stools, flank pain.  Also denies prior history of UTIs.  Brought a urine sample in sterile container.    PCP: Anette Hayes MD    Current Facility-Administered Medications   Medication Dose Route Frequency Provider Last Rate Last Admin    ibuprofen (ADVIL;MOTRIN) tablet 600 mg  600 mg Oral NOW Konstantin Aguilar DO         Current Outpatient Medications   Medication Sig Dispense Refill    nitrofurantoin, macrocrystal-monohydrate, (MACROBID) 100 MG capsule Take 1 capsule by mouth 2 times daily for 5 days 10 capsule 0    phenazopyridine (PYRIDIUM) 100 MG tablet Take 1 tablet by mouth 3 times daily as needed for Pain 9 tablet 0    ketorolac (TORADOL) 10 MG tablet Take 1 tablet by mouth every 6 hours as needed for Pain 20 tablet 0    Gabapentin, Once-Daily, (GRALISE) 600 MG TABS TAKE 2 TABLETS NIGHTLY  Indications: neuropathic pain 180 tablet 1    omeprazole (PRILOSEC) 20 MG delayed release capsule Take 1 capsule by mouth every morning (before breakfast) 90 capsule 2    hydroCHLOROthiazide (HYDRODIURIL) 25 MG tablet Take 1 tablet by mouth every morning 90 tablet 1    pravastatin (PRAVACHOL) 40 MG tablet TAKE 1 TABLET DAILY 90 tablet 3    BIOTIN PO Take 5,000 mcg by mouth daily      albuterol sulfate HFA (PROVENTIL;VENTOLIN;PROAIR) 108 (90 Base) MCG/ACT inhaler USE 2 INHALATIONS EVERY 4 HOURS AS NEEDED

## 2024-04-04 ENCOUNTER — TELEPHONE (OUTPATIENT)
Facility: HOSPITAL | Age: 84
End: 2024-04-04

## 2024-04-04 LAB
BACTERIA SPEC CULT: ABNORMAL
CC UR VC: ABNORMAL
SERVICE CMNT-IMP: ABNORMAL

## 2024-04-04 RX ORDER — SULFAMETHOXAZOLE AND TRIMETHOPRIM 800; 160 MG/1; MG/1
1 TABLET ORAL 2 TIMES DAILY
Qty: 6 TABLET | Refills: 0 | Status: SHIPPED | OUTPATIENT
Start: 2024-04-04 | End: 2024-04-07

## 2024-04-04 NOTE — TELEPHONE ENCOUNTER
Urine culture came back positive needs new prescription.  Patient called new prescription sent to pharmacy on file.  Patient verbalized understanding to stop current antibiotics and start new 1 today.  No further questions.

## 2024-04-08 ENCOUNTER — OFFICE VISIT (OUTPATIENT)
Age: 84
End: 2024-04-08
Payer: MEDICARE

## 2024-04-08 VITALS
HEIGHT: 61 IN | OXYGEN SATURATION: 96 % | WEIGHT: 167.6 LBS | SYSTOLIC BLOOD PRESSURE: 115 MMHG | HEART RATE: 62 BPM | BODY MASS INDEX: 31.64 KG/M2 | RESPIRATION RATE: 17 BRPM | TEMPERATURE: 97.9 F | DIASTOLIC BLOOD PRESSURE: 69 MMHG

## 2024-04-08 DIAGNOSIS — N30.01 ACUTE CYSTITIS WITH HEMATURIA: Primary | ICD-10-CM

## 2024-04-08 DIAGNOSIS — N76.2 ACUTE VULVITIS: ICD-10-CM

## 2024-04-08 PROCEDURE — 3078F DIAST BP <80 MM HG: CPT | Performed by: INTERNAL MEDICINE

## 2024-04-08 PROCEDURE — G8399 PT W/DXA RESULTS DOCUMENT: HCPCS | Performed by: INTERNAL MEDICINE

## 2024-04-08 PROCEDURE — 1123F ACP DISCUSS/DSCN MKR DOCD: CPT | Performed by: INTERNAL MEDICINE

## 2024-04-08 PROCEDURE — 99213 OFFICE O/P EST LOW 20 MIN: CPT | Performed by: INTERNAL MEDICINE

## 2024-04-08 PROCEDURE — G8427 DOCREV CUR MEDS BY ELIG CLIN: HCPCS | Performed by: INTERNAL MEDICINE

## 2024-04-08 PROCEDURE — 1036F TOBACCO NON-USER: CPT | Performed by: INTERNAL MEDICINE

## 2024-04-08 PROCEDURE — G8417 CALC BMI ABV UP PARAM F/U: HCPCS | Performed by: INTERNAL MEDICINE

## 2024-04-08 PROCEDURE — 3074F SYST BP LT 130 MM HG: CPT | Performed by: INTERNAL MEDICINE

## 2024-04-08 PROCEDURE — 1090F PRES/ABSN URINE INCON ASSESS: CPT | Performed by: INTERNAL MEDICINE

## 2024-04-08 ASSESSMENT — ENCOUNTER SYMPTOMS
COUGH: 0
EYE PAIN: 0
SORE THROAT: 0
BLOOD IN STOOL: 0
ABDOMINAL PAIN: 0
SHORTNESS OF BREATH: 0
ANAL BLEEDING: 0

## 2024-04-08 NOTE — PROGRESS NOTES
Jayla Paulino presents today for   Chief Complaint   Patient presents with    Follow-up     ED f/u           \"Have you been to the ER, urgent care clinic since your last visit?  Hospitalized since your last visit?\"    YES - When: approximately 1  weeks ago.  Where and Why: [facility]- UTI.    “Have you seen or consulted any other health care providers outside of [facility] since your last visit?”    NO             
spinal stenosis 10/26/2015    Tubular adenoma of colon 01/13/2015    Right knee DJD 01/21/2014    Osteopenia 01/15/2013    Vitamin D deficiency 01/15/2013    Asthma 10/06/2011    Multiple lung nodules 11/03/2010    Allergic rhinitis 11/03/2010    HTN (hypertension) 11/03/2010       Current Outpatient Medications   Medication Sig Dispense Refill    Gabapentin, Once-Daily, (GRALISE) 600 MG TABS TAKE 2 TABLETS NIGHTLY  Indications: neuropathic pain 180 tablet 1    omeprazole (PRILOSEC) 20 MG delayed release capsule Take 1 capsule by mouth every morning (before breakfast) 90 capsule 2    hydroCHLOROthiazide (HYDRODIURIL) 25 MG tablet Take 1 tablet by mouth every morning 90 tablet 1    pravastatin (PRAVACHOL) 40 MG tablet TAKE 1 TABLET DAILY 90 tablet 3    BIOTIN PO Take 5,000 mcg by mouth daily      albuterol sulfate HFA (PROVENTIL;VENTOLIN;PROAIR) 108 (90 Base) MCG/ACT inhaler USE 2 INHALATIONS EVERY 4 HOURS AS NEEDED      vitamin D 25 MCG (1000 UT) CAPS Take 3 capsules by mouth daily      coenzyme Q10 100 MG CAPS capsule Take 1 capsule by mouth daily      cycloSPORINE (RESTASIS) 0.05 % ophthalmic emulsion Apply 1 drop to eye 2 times daily      EPINEPHrine (EPIPEN) 0.3 MG/0.3ML SOAJ injection Inject 0.3 mLs into the muscle once as needed      fexofenadine (ALLEGRA) 180 MG tablet Take 1 tablet by mouth daily      fluticasone (FLONASE) 50 MCG/ACT nasal spray USE 2 SPRAYS IN EACH NOSTRIL DAILY      meclizine (ANTIVERT) 12.5 MG tablet Take 1 tablet by mouth 3 times daily as needed      melatonin 3 MG TABS tablet Take 1 tablet by mouth daily as needed      ketorolac (TORADOL) 10 MG tablet Take 1 tablet by mouth every 6 hours as needed for Pain 20 tablet 0     No current facility-administered medications for this visit.       Allergies   Allergen Reactions    Naproxen Other (See Comments)     Blood in urine    Other reaction(s): GI intolerance       Past Medical History:   Diagnosis Date    AR (allergic rhinitis)

## 2024-05-04 ENCOUNTER — HOSPITAL ENCOUNTER (OUTPATIENT)
Facility: HOSPITAL | Age: 84
Discharge: HOME | End: 2024-05-07
Payer: MEDICARE

## 2024-05-04 DIAGNOSIS — N30.01 ACUTE CYSTITIS WITH HEMATURIA: ICD-10-CM

## 2024-05-04 LAB
APPEARANCE UR: CLEAR
BACTERIA URNS QL MICRO: NEGATIVE /HPF
BILIRUB UR QL: NEGATIVE
COLOR UR: YELLOW
EPITH CASTS URNS QL MICRO: NORMAL /LPF (ref 0–5)
GLUCOSE UR STRIP.AUTO-MCNC: NEGATIVE MG/DL
HGB UR QL STRIP: NEGATIVE
KETONES UR QL STRIP.AUTO: NEGATIVE MG/DL
LEUKOCYTE ESTERASE UR QL STRIP.AUTO: NEGATIVE
NITRITE UR QL STRIP.AUTO: NEGATIVE
PH UR STRIP: 6 (ref 5–8)
PROT UR STRIP-MCNC: NEGATIVE MG/DL
RBC #/AREA URNS HPF: NEGATIVE /HPF (ref 0–5)
SP GR UR REFRACTOMETRY: <1.005 (ref 1–1.03)
UROBILINOGEN UR QL STRIP.AUTO: 0.2 EU/DL (ref 0.2–1)
WBC URNS QL MICRO: NORMAL /HPF (ref 0–4)

## 2024-05-04 PROCEDURE — 81001 URINALYSIS AUTO W/SCOPE: CPT

## 2024-05-06 ENCOUNTER — TELEPHONE (OUTPATIENT)
Age: 84
End: 2024-05-06

## 2024-05-06 ENCOUNTER — OFFICE VISIT (OUTPATIENT)
Age: 84
End: 2024-05-06
Payer: MEDICARE

## 2024-05-06 VITALS
BODY MASS INDEX: 31.53 KG/M2 | HEART RATE: 73 BPM | HEIGHT: 61 IN | DIASTOLIC BLOOD PRESSURE: 55 MMHG | SYSTOLIC BLOOD PRESSURE: 122 MMHG | OXYGEN SATURATION: 96 % | RESPIRATION RATE: 18 BRPM | WEIGHT: 167 LBS | TEMPERATURE: 98.7 F

## 2024-05-06 DIAGNOSIS — M54.50 LOW BACK PAIN, UNSPECIFIED BACK PAIN LATERALITY, UNSPECIFIED CHRONICITY, UNSPECIFIED WHETHER SCIATICA PRESENT: ICD-10-CM

## 2024-05-06 DIAGNOSIS — M54.50 LOW BACK PAIN, UNSPECIFIED BACK PAIN LATERALITY, UNSPECIFIED CHRONICITY, UNSPECIFIED WHETHER SCIATICA PRESENT: Primary | ICD-10-CM

## 2024-05-06 PROCEDURE — G8417 CALC BMI ABV UP PARAM F/U: HCPCS | Performed by: INTERNAL MEDICINE

## 2024-05-06 PROCEDURE — G8399 PT W/DXA RESULTS DOCUMENT: HCPCS | Performed by: INTERNAL MEDICINE

## 2024-05-06 PROCEDURE — 99213 OFFICE O/P EST LOW 20 MIN: CPT | Performed by: INTERNAL MEDICINE

## 2024-05-06 PROCEDURE — G8427 DOCREV CUR MEDS BY ELIG CLIN: HCPCS | Performed by: INTERNAL MEDICINE

## 2024-05-06 PROCEDURE — 1036F TOBACCO NON-USER: CPT | Performed by: INTERNAL MEDICINE

## 2024-05-06 PROCEDURE — 3078F DIAST BP <80 MM HG: CPT | Performed by: INTERNAL MEDICINE

## 2024-05-06 PROCEDURE — 1090F PRES/ABSN URINE INCON ASSESS: CPT | Performed by: INTERNAL MEDICINE

## 2024-05-06 PROCEDURE — 3074F SYST BP LT 130 MM HG: CPT | Performed by: INTERNAL MEDICINE

## 2024-05-06 PROCEDURE — 1123F ACP DISCUSS/DSCN MKR DOCD: CPT | Performed by: INTERNAL MEDICINE

## 2024-05-06 RX ORDER — PREDNISONE 10 MG/1
TABLET ORAL
Qty: 10 TABLET | Refills: 0 | Status: SHIPPED | OUTPATIENT
Start: 2024-05-06 | End: 2024-05-07 | Stop reason: SDUPTHER

## 2024-05-06 RX ORDER — GINSENG 100 MG
1 CAPSULE ORAL DAILY
COMMUNITY

## 2024-05-06 ASSESSMENT — PATIENT HEALTH QUESTIONNAIRE - PHQ9
SUM OF ALL RESPONSES TO PHQ QUESTIONS 1-9: 0
SUM OF ALL RESPONSES TO PHQ QUESTIONS 1-9: 0
SUM OF ALL RESPONSES TO PHQ9 QUESTIONS 1 & 2: 0
SUM OF ALL RESPONSES TO PHQ QUESTIONS 1-9: 0
1. LITTLE INTEREST OR PLEASURE IN DOING THINGS: NOT AT ALL
2. FEELING DOWN, DEPRESSED OR HOPELESS: NOT AT ALL
SUM OF ALL RESPONSES TO PHQ QUESTIONS 1-9: 0

## 2024-05-06 ASSESSMENT — ENCOUNTER SYMPTOMS: BACK PAIN: 1

## 2024-05-06 NOTE — PROGRESS NOTES
Jayla Paulino (: 1940) is a 84 y.o. female, here for evaluation of the following chief complaint(s):  Back Pain (\"Severe\" lower back pain x 1 week, .)       ASSESSMENT/PLAN:  Below is the assessment and plan developed based on review of pertinent history, physical exam, labs, studies, and medications.    1. Low back pain, unspecified back pain laterality, unspecified chronicity, unspecified whether sciatica present  Assessment & Plan:   Patient to follow-up with spine surgery if does not start to feel better in next couple of days.  Orders:  -     predniSONE (DELTASONE) 10 MG tablet; 1 tablet 2 times a day then 1 tablet once a day for 10 days, Disp-10 tablet, R-0Normal      Return if symptoms worsen or fail to improve.     SUBJECTIVE/OBJECTIVE:  Back Pain      Patient is complaining of low back ache for last several weeks.  Patient takes Tylenol for pain.  No radiation of pain down the legs.  Patient says that it hurts when she bends down.    Patient has history of sciatica in the past and has seen spine surgery.  Patient has not contacted spine surgery lately.  Patient will be contacting spine surgery if no relief in next few days.    Patient also gives history of UTI early last month and was treated with nitrofurantoin initially and then changed to Bactrim.    Patient does not have any urinary symptoms at this stage.  No fever or chills.      ROS as above    Vitals:    24 1456   BP: (!) 122/55   Pulse: 73   Resp: 18   Temp: 98.7 °F (37.1 °C)   TempSrc: Temporal   SpO2: 96%   Weight: 75.8 kg (167 lb)   Height: 1.549 m (5' 1\")     Physical Exam  Constitutional:       Appearance: Normal appearance.   HENT:      Head: Normocephalic and atraumatic.      Nose: Nose normal.      Mouth/Throat:      Mouth: Mucous membranes are moist.   Eyes:      Extraocular Movements: Extraocular movements intact.      Pupils: Pupils are equal, round, and reactive to light.   Cardiovascular:      Rate and Rhythm: Normal

## 2024-05-06 NOTE — TELEPHONE ENCOUNTER
Pt was seen in office today and was prescribed predniSONE (DELTASONE) 10 MG tablet   Pt called in [facility] went to pharmacy to  Rx and was told the filling instructions were not clear and they needed to be resent to the Pharmacy .     Please advise.     Pharmacy   [id number] - [facility] [id number] -  [phone] - F [phone] [[id number]]

## 2024-05-06 NOTE — PROGRESS NOTES
\"Have you been to the ER, urgent care clinic since your last visit?  Hospitalized since your last visit?\"    YES - When: approximately 1 months ago.  Where and Why:   ER dysuria . And hematuria    “Have you seen or consulted any other health care providers outside of [facility] since your last visit?”    NO

## 2024-05-06 NOTE — PROGRESS NOTES
\"Have you been to the ER, urgent care clinic since your last visit?  Hospitalized since your last visit?\"    NO    “Have you seen or consulted any other health care providers outside of [facility] since your last visit?”    NO

## 2024-05-07 RX ORDER — PREDNISONE 10 MG/1
TABLET ORAL
Qty: 20 TABLET | Refills: 0 | Status: SHIPPED | OUTPATIENT
Start: 2024-05-07

## 2024-05-07 NOTE — TELEPHONE ENCOUNTER
Called pt to advise that Dr. Quiñonez has resent scripts. Pt verbalized understanding.      MEKHI Hadley LPN

## 2024-05-09 ENCOUNTER — TELEPHONE (OUTPATIENT)
Age: 84
End: 2024-05-09

## 2024-05-09 NOTE — TELEPHONE ENCOUNTER
----- Message from Jayla Paulino sent at 5/8/2024 11:52 AM EDT -----  Regarding: Concerned   Contact: [phone]  Saw dr Quiñonez prescribed prednisone 10mg Bid x5 dys then 1 [id number] dys What do u think?  Pain on both sides of spine pain #5 concerned re: steroids

## 2024-05-10 NOTE — TELEPHONE ENCOUNTER
Contacted pt to advise of the following msg from provider. Pt verbalized understanding.      Please have her take the prescribed prednisone, ordered by .  If her pain does not improve, she needs to contact us for further recommendations.     MEKHI Hadley LPN

## 2024-05-23 ENCOUNTER — OFFICE VISIT (OUTPATIENT)
Age: 84
End: 2024-05-23
Payer: MEDICARE

## 2024-05-23 VITALS
DIASTOLIC BLOOD PRESSURE: 64 MMHG | OXYGEN SATURATION: 98 % | SYSTOLIC BLOOD PRESSURE: 105 MMHG | RESPIRATION RATE: 17 BRPM | HEIGHT: 61 IN | WEIGHT: 164 LBS | TEMPERATURE: 98.3 F | BODY MASS INDEX: 30.96 KG/M2 | HEART RATE: 72 BPM

## 2024-05-23 DIAGNOSIS — M54.50 LOW BACK PAIN, UNSPECIFIED BACK PAIN LATERALITY, UNSPECIFIED CHRONICITY, UNSPECIFIED WHETHER SCIATICA PRESENT: Primary | ICD-10-CM

## 2024-05-23 DIAGNOSIS — I10 ESSENTIAL (PRIMARY) HYPERTENSION: ICD-10-CM

## 2024-05-23 DIAGNOSIS — H69.92 DYSFUNCTION OF LEFT EUSTACHIAN TUBE: ICD-10-CM

## 2024-05-23 PROCEDURE — 1036F TOBACCO NON-USER: CPT | Performed by: INTERNAL MEDICINE

## 2024-05-23 PROCEDURE — 1123F ACP DISCUSS/DSCN MKR DOCD: CPT | Performed by: INTERNAL MEDICINE

## 2024-05-23 PROCEDURE — 3074F SYST BP LT 130 MM HG: CPT | Performed by: INTERNAL MEDICINE

## 2024-05-23 PROCEDURE — G8399 PT W/DXA RESULTS DOCUMENT: HCPCS | Performed by: INTERNAL MEDICINE

## 2024-05-23 PROCEDURE — G8427 DOCREV CUR MEDS BY ELIG CLIN: HCPCS | Performed by: INTERNAL MEDICINE

## 2024-05-23 PROCEDURE — G8417 CALC BMI ABV UP PARAM F/U: HCPCS | Performed by: INTERNAL MEDICINE

## 2024-05-23 PROCEDURE — 1090F PRES/ABSN URINE INCON ASSESS: CPT | Performed by: INTERNAL MEDICINE

## 2024-05-23 PROCEDURE — 3078F DIAST BP <80 MM HG: CPT | Performed by: INTERNAL MEDICINE

## 2024-05-23 PROCEDURE — 99214 OFFICE O/P EST MOD 30 MIN: CPT | Performed by: INTERNAL MEDICINE

## 2024-05-23 RX ORDER — HYDROCHLOROTHIAZIDE 12.5 MG/1
12.5 CAPSULE, GELATIN COATED ORAL EVERY MORNING
Qty: 14 CAPSULE | Refills: 0 | Status: SHIPPED | OUTPATIENT
Start: 2024-05-23 | End: 2024-06-06

## 2024-05-23 RX ORDER — PSEUDOEPHEDRINE HCL 30 MG
30 TABLET ORAL EVERY 8 HOURS
Qty: 6 TABLET | Refills: 0 | Status: SHIPPED | OUTPATIENT
Start: 2024-05-23 | End: 2024-05-25

## 2024-05-23 RX ORDER — TIZANIDINE 2 MG/1
2 TABLET ORAL EVERY 8 HOURS PRN
Qty: 20 TABLET | Refills: 0 | Status: SHIPPED | OUTPATIENT
Start: 2024-05-23

## 2024-05-23 RX ORDER — AZELASTINE 1 MG/ML
1 SPRAY, METERED NASAL 2 TIMES DAILY
Qty: 60 ML | Refills: 1 | Status: SHIPPED | OUTPATIENT
Start: 2024-05-23

## 2024-05-23 RX ORDER — DULOXETIN HYDROCHLORIDE 30 MG/1
30 CAPSULE, DELAYED RELEASE ORAL DAILY
Qty: 30 CAPSULE | Refills: 5 | Status: SHIPPED | OUTPATIENT
Start: 2024-05-23

## 2024-05-23 RX ORDER — HYDROCHLOROTHIAZIDE 12.5 MG/1
12.5 TABLET ORAL EVERY MORNING
Qty: 90 TABLET | Refills: 1 | Status: SHIPPED | OUTPATIENT
Start: 2024-05-23

## 2024-05-23 NOTE — PROGRESS NOTES
Jayla Paulino presents today for   Chief Complaint   Patient presents with    Ear Fullness     Left ear; x1 wk       No water entered the ear; pt states she has experienced sinus      \"Have you been to the ER, urgent care clinic since your last visit?  Hospitalized since your last visit?\"    NO    “Have you seen or consulted any other health care providers outside of [facility] since your last visit?”    NO             
paraspinal muscles   Lymphadenopathy:      Cervical: No cervical adenopathy.   Skin:     General: Skin is warm and dry.      Findings: No erythema.   Neurological:      Mental Status: She is alert. Mental status is at baseline.      Gait: Gait normal.   Psychiatric:         Mood and Affect: Mood normal.           LABS   Data Review:   Lab Results   Component Value Date/Time    WBC 5.5 12/06/2022 07:54 AM    HGB 13.9 12/06/2022 07:54 AM    HCT 42.7 12/06/2022 07:54 AM     12/06/2022 07:54 AM    MCV 90.7 12/06/2022 07:54 AM       Lab Results   Component Value Date/Time     03/06/2024 10:28 AM    K 3.5 03/06/2024 10:28 AM     03/06/2024 10:28 AM    CO2 36 03/06/2024 10:28 AM    BUN 11 03/06/2024 10:28 AM    GFRAA >60 06/03/2022 08:42 AM       Lab Results   Component Value Date/Time    CHOL 187 03/06/2024 10:28 AM    HDL 63 03/06/2024 10:28 AM    .6 03/06/2024 10:28 AM    VLDL 23.4 03/06/2024 10:28 AM       No results found for: \"HBA1C\", \"[id number]\"    Assessment/Plan:   1. Essential (primary) hypertension: BP is low  - Reducing her HCTZ from 25mg daily to 12.5mg daily.     2. Low back pain: +Sciatica hx. +PE findings are c/w muscle strain.   - C/w gabapentin 1200mg at night.   - Ordering cymbalta 30mg daily.   - Ordering zanflex 2mg tid prn.   - Activity as tolerated.  - I encouraged her to follow up with Orthopedics prn    3. Dysfunction of left eustachian tube: Per hx. No sx of infection.  +Allergic rhinitis.   - C/w allegra 180mg daiyl.   - Ordering sudafed 30mg tid x 2 days  - Ordering azelastine nasal spray.           ICD-10-CM    1. Low back pain, unspecified back pain laterality, unspecified chronicity, unspecified whether sciatica present  M54.50 DULoxetine (CYMBALTA) 30 MG extended release capsule     tiZANidine (ZANAFLEX) 2 MG tablet      2. Essential (primary) hypertension  I10 hydroCHLOROthiazide 12.5 MG tablet     hydroCHLOROthiazide 12.5 MG capsule      3. Dysfunction of left

## 2024-05-29 ASSESSMENT — ENCOUNTER SYMPTOMS
ANAL BLEEDING: 0
BLOOD IN STOOL: 0
SORE THROAT: 0
ABDOMINAL PAIN: 0
SHORTNESS OF BREATH: 0
EYE PAIN: 0
BACK PAIN: 1
COUGH: 0

## 2024-06-19 ENCOUNTER — NURSE ONLY (OUTPATIENT)
Facility: CLINIC | Age: 84
End: 2024-06-19

## 2024-06-19 ENCOUNTER — HOSPITAL ENCOUNTER (OUTPATIENT)
Facility: HOSPITAL | Age: 84
Setting detail: SPECIMEN
Discharge: HOME | End: 2024-06-22
Payer: MEDICARE

## 2024-06-19 DIAGNOSIS — R73.9 HYPERGLYCEMIA: ICD-10-CM

## 2024-06-19 LAB
EST. AVERAGE GLUCOSE BLD GHB EST-MCNC: 123 MG/DL
HBA1C MFR BLD: 5.9 % (ref 4.2–5.6)

## 2024-06-19 PROCEDURE — 36415 COLL VENOUS BLD VENIPUNCTURE: CPT

## 2024-06-19 PROCEDURE — 83036 HEMOGLOBIN GLYCOSYLATED A1C: CPT

## 2024-06-23 SDOH — ECONOMIC STABILITY: FOOD INSECURITY: WITHIN THE PAST 12 MONTHS, YOU WORRIED THAT YOUR FOOD WOULD RUN OUT BEFORE YOU GOT MONEY TO BUY MORE.: NEVER TRUE

## 2024-06-23 SDOH — ECONOMIC STABILITY: FOOD INSECURITY: WITHIN THE PAST 12 MONTHS, THE FOOD YOU BOUGHT JUST DIDN'T LAST AND YOU DIDN'T HAVE MONEY TO GET MORE.: NEVER TRUE

## 2024-06-23 SDOH — ECONOMIC STABILITY: INCOME INSECURITY: HOW HARD IS IT FOR YOU TO PAY FOR THE VERY BASICS LIKE FOOD, HOUSING, MEDICAL CARE, AND HEATING?: NOT HARD AT ALL

## 2024-06-24 ENCOUNTER — OFFICE VISIT (OUTPATIENT)
Facility: CLINIC | Age: 84
End: 2024-06-24
Payer: MEDICARE

## 2024-06-24 VITALS
SYSTOLIC BLOOD PRESSURE: 106 MMHG | BODY MASS INDEX: 31.34 KG/M2 | HEIGHT: 61 IN | TEMPERATURE: 98.8 F | HEART RATE: 66 BPM | RESPIRATION RATE: 17 BRPM | DIASTOLIC BLOOD PRESSURE: 61 MMHG | OXYGEN SATURATION: 97 % | WEIGHT: 166 LBS

## 2024-06-24 DIAGNOSIS — R73.9 HYPERGLYCEMIA: Primary | ICD-10-CM

## 2024-06-24 DIAGNOSIS — H69.92 DYSFUNCTION OF LEFT EUSTACHIAN TUBE: ICD-10-CM

## 2024-06-24 DIAGNOSIS — M54.50 LOW BACK PAIN, UNSPECIFIED BACK PAIN LATERALITY, UNSPECIFIED CHRONICITY, UNSPECIFIED WHETHER SCIATICA PRESENT: ICD-10-CM

## 2024-06-24 DIAGNOSIS — I10 ESSENTIAL (PRIMARY) HYPERTENSION: ICD-10-CM

## 2024-06-24 DIAGNOSIS — F32.A DEPRESSION, UNSPECIFIED DEPRESSION TYPE: ICD-10-CM

## 2024-06-24 PROCEDURE — 3078F DIAST BP <80 MM HG: CPT | Performed by: INTERNAL MEDICINE

## 2024-06-24 PROCEDURE — 1036F TOBACCO NON-USER: CPT | Performed by: INTERNAL MEDICINE

## 2024-06-24 PROCEDURE — G8417 CALC BMI ABV UP PARAM F/U: HCPCS | Performed by: INTERNAL MEDICINE

## 2024-06-24 PROCEDURE — 3074F SYST BP LT 130 MM HG: CPT | Performed by: INTERNAL MEDICINE

## 2024-06-24 PROCEDURE — 1090F PRES/ABSN URINE INCON ASSESS: CPT | Performed by: INTERNAL MEDICINE

## 2024-06-24 PROCEDURE — 1123F ACP DISCUSS/DSCN MKR DOCD: CPT | Performed by: INTERNAL MEDICINE

## 2024-06-24 PROCEDURE — 99214 OFFICE O/P EST MOD 30 MIN: CPT | Performed by: INTERNAL MEDICINE

## 2024-06-24 PROCEDURE — G8399 PT W/DXA RESULTS DOCUMENT: HCPCS | Performed by: INTERNAL MEDICINE

## 2024-06-24 PROCEDURE — G8427 DOCREV CUR MEDS BY ELIG CLIN: HCPCS | Performed by: INTERNAL MEDICINE

## 2024-06-24 RX ORDER — DULOXETIN HYDROCHLORIDE 60 MG/1
60 CAPSULE, DELAYED RELEASE ORAL DAILY
Qty: 30 CAPSULE | Refills: 5 | Status: SHIPPED | OUTPATIENT
Start: 2024-06-24

## 2024-06-24 NOTE — PROGRESS NOTES
Jayla Paulino presents today for   Chief Complaint   Patient presents with    Follow-up           \"Have you been to the ER, urgent care clinic since your last visit?  Hospitalized since your last visit?\"    NO    “Have you seen or consulted any other health care providers outside of [facility] since your last visit?”    NO             
(1 - 1-dose 60+ series) Never done    COVID-19 Vaccine (6 - 2023-24 season) 09/01/2023         Lab review: labs are reviewed in the EHR and ordered as mentioned above    I have discussed the diagnosis with the patient and the intended plan as seen in the above orders.  The patient has received an after-visit summary and questions were answered concerning future plans.  I have discussed medication side effects and warnings with the patient as well. I have reviewed the plan of care with the patient, accepted their input and they are in agreement with the treatment goals. All questions were answered. The patient understands the plan of care. Handouts provided today with above information. Pt instructed if symptoms worsen to call the office or report to the ED for continued care.  Greater than 50% of the visit time was spent in counseling and/or coordination of care.      Voice recognition was used to generate this report, which may have resulted in some phonetic based errors in grammar and contents. Even though attempts were made to correct all the mistakes, some may have been missed, and remained in the body of the document.      No follow-up provider specified.    Anette Hayes MD

## 2024-07-01 ASSESSMENT — ENCOUNTER SYMPTOMS
ABDOMINAL PAIN: 0
EYE PAIN: 0
ANAL BLEEDING: 0
BLOOD IN STOOL: 0
SHORTNESS OF BREATH: 0
BACK PAIN: 1
COUGH: 0
SORE THROAT: 0

## 2024-09-25 ENCOUNTER — OFFICE VISIT (OUTPATIENT)
Age: 84
End: 2024-09-25
Payer: MEDICARE

## 2024-09-25 VITALS
SYSTOLIC BLOOD PRESSURE: 114 MMHG | OXYGEN SATURATION: 95 % | BODY MASS INDEX: 30.7 KG/M2 | RESPIRATION RATE: 18 BRPM | WEIGHT: 162.6 LBS | HEIGHT: 61 IN | TEMPERATURE: 98.5 F | HEART RATE: 68 BPM | DIASTOLIC BLOOD PRESSURE: 66 MMHG

## 2024-09-25 DIAGNOSIS — M54.16 RADICULOPATHY, LUMBAR REGION: ICD-10-CM

## 2024-09-25 DIAGNOSIS — M54.12 RADICULOPATHY, CERVICAL REGION: ICD-10-CM

## 2024-09-25 PROCEDURE — 1036F TOBACCO NON-USER: CPT | Performed by: NURSE PRACTITIONER

## 2024-09-25 PROCEDURE — G8417 CALC BMI ABV UP PARAM F/U: HCPCS | Performed by: NURSE PRACTITIONER

## 2024-09-25 PROCEDURE — G8399 PT W/DXA RESULTS DOCUMENT: HCPCS | Performed by: NURSE PRACTITIONER

## 2024-09-25 PROCEDURE — 3078F DIAST BP <80 MM HG: CPT | Performed by: NURSE PRACTITIONER

## 2024-09-25 PROCEDURE — 3074F SYST BP LT 130 MM HG: CPT | Performed by: NURSE PRACTITIONER

## 2024-09-25 PROCEDURE — G8427 DOCREV CUR MEDS BY ELIG CLIN: HCPCS | Performed by: NURSE PRACTITIONER

## 2024-09-25 PROCEDURE — 99214 OFFICE O/P EST MOD 30 MIN: CPT | Performed by: NURSE PRACTITIONER

## 2024-09-25 PROCEDURE — 1090F PRES/ABSN URINE INCON ASSESS: CPT | Performed by: NURSE PRACTITIONER

## 2024-09-25 PROCEDURE — 1123F ACP DISCUSS/DSCN MKR DOCD: CPT | Performed by: NURSE PRACTITIONER

## 2024-09-25 RX ORDER — GABAPENTIN 600 MG/1
TABLET, FILM COATED ORAL
Qty: 180 TABLET | Refills: 1 | Status: SHIPPED | OUTPATIENT
Start: 2024-09-25 | End: 2025-01-29

## 2024-10-16 ENCOUNTER — OFFICE VISIT (OUTPATIENT)
Age: 84
End: 2024-10-16
Payer: MEDICARE

## 2024-10-16 VITALS
DIASTOLIC BLOOD PRESSURE: 79 MMHG | BODY MASS INDEX: 30.27 KG/M2 | WEIGHT: 160.2 LBS | TEMPERATURE: 97.5 F | HEART RATE: 68 BPM | SYSTOLIC BLOOD PRESSURE: 137 MMHG | RESPIRATION RATE: 16 BRPM | OXYGEN SATURATION: 96 %

## 2024-10-16 DIAGNOSIS — J45.20 MILD INTERMITTENT ASTHMA WITHOUT COMPLICATION: Primary | ICD-10-CM

## 2024-10-16 DIAGNOSIS — Z91.09 ENVIRONMENTAL ALLERGIES: ICD-10-CM

## 2024-10-16 PROCEDURE — G8427 DOCREV CUR MEDS BY ELIG CLIN: HCPCS | Performed by: INTERNAL MEDICINE

## 2024-10-16 PROCEDURE — 1123F ACP DISCUSS/DSCN MKR DOCD: CPT | Performed by: INTERNAL MEDICINE

## 2024-10-16 PROCEDURE — 1036F TOBACCO NON-USER: CPT | Performed by: INTERNAL MEDICINE

## 2024-10-16 PROCEDURE — 3078F DIAST BP <80 MM HG: CPT | Performed by: INTERNAL MEDICINE

## 2024-10-16 PROCEDURE — 99214 OFFICE O/P EST MOD 30 MIN: CPT | Performed by: INTERNAL MEDICINE

## 2024-10-16 PROCEDURE — 1090F PRES/ABSN URINE INCON ASSESS: CPT | Performed by: INTERNAL MEDICINE

## 2024-10-16 PROCEDURE — 3075F SYST BP GE 130 - 139MM HG: CPT | Performed by: INTERNAL MEDICINE

## 2024-10-16 PROCEDURE — G8484 FLU IMMUNIZE NO ADMIN: HCPCS | Performed by: INTERNAL MEDICINE

## 2024-10-16 PROCEDURE — G8417 CALC BMI ABV UP PARAM F/U: HCPCS | Performed by: INTERNAL MEDICINE

## 2024-10-16 PROCEDURE — G8399 PT W/DXA RESULTS DOCUMENT: HCPCS | Performed by: INTERNAL MEDICINE

## 2024-10-16 ASSESSMENT — ENCOUNTER SYMPTOMS
SORE THROAT: 0
EYE PAIN: 0
ABDOMINAL PAIN: 0
COUGH: 0
WHEEZING: 1
NAUSEA: 0
PHOTOPHOBIA: 0
STRIDOR: 0
VOICE CHANGE: 0
EYE REDNESS: 0
CHOKING: 0
COLOR CHANGE: 0
TROUBLE SWALLOWING: 0
BLOOD IN STOOL: 0
VOMITING: 0
SHORTNESS OF BREATH: 1
EYE DISCHARGE: 0
BACK PAIN: 0
CHEST TIGHTNESS: 0
EYE ITCHING: 0
APNEA: 0

## 2024-10-16 NOTE — PROGRESS NOTES
Jayla Paulino (:  1940) is a 84 y.o. female,Established patient, here for evaluation of the following chief complaint(s):  Asthma and Follow-up         Assessment & Plan  Mild intermittent asthma without complication            Environmental allergies          pt's symptoms are few and far between, only requiring a rare dose of Albuterol.  Continue Albuterol at same dose.  Hold off on maintenance medications.  Reviewed controlled medications at this time.  Counseled on up to date vaccinations.  Allergy trigger avoidance counseled.    Return in about 1 year (around 10/16/2025) for spirometry.       Subjective   Pt with asthma, no interval exacerbations and only on Albuterol, last used several months ago. She notes infrequent and short and rare episodes of wheezing and dyspnea in the mornings which resolve on their own. She was previously on Arnuity which was stopped over 2 years ago. She does have allergic rhinitis which is controlled with nasal steroids. SOB increased after pt was diagnosed with COVID in 2022. She was diagnosed with COVID and RSV again in  but recovered at home and is back to baseline. Pt has environmental allergies for which she takes Allegra.  Last spirometry in  showed normal flows.  She was followed for lung nodules in the past which on serial CT's have been stable for over 5 years.  Pt is a non smoker but [place] sometimes exposed [place] 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            Review of Systems   Constitutional:  Negative for activity change, appetite change, chills, diaphoresis, fatigue, fever and unexpected weight change.   HENT:  Negative for congestion, hearing loss, postnasal drip, sore throat, tinnitus, trouble swallowing and voice change.    Eyes:  Negative for photophobia, pain, discharge, redness, itching and visual disturbance.   Respiratory:  Positive for shortness of breath (rare) and wheezing

## 2024-10-16 NOTE — PROGRESS NOTES
Jayla Paulino presents today for   Chief Complaint   Patient presents with    Asthma    Follow-up       Is someone accompanying this pt? No    Is the patient using any DME equipment during OV? No    -DME Company NA    Depression Screenin/6/2024     2:52 PM   PHQ-9 Questionaire   Little interest or pleasure in doing things 0   Feeling down, depressed, or hopeless 0   PHQ-9 Total Score 0       Learning Needs Questionnaire:     No question data found.      Fall Risk:         2024    10:21 AM 2023     1:27 PM 2023     9:22 AM 2023     9:15 AM   Fall Risk   Do you feel unsteady or are you worried about falling?  no no no no   2 or more falls in past year? no no no no   Fall with injury in past year? no no no no        Abuse Screening:          No data to display                  Coordination of Care:    1. Have you been to the ER, urgent care clinic since your last visit? Hospitalized since your last visit? No    2. Have you seen or consulted any other health care providers outside of the [facility] since your last visit? Include any pap smears or colon screening. No    Medication list has been update per patient.

## 2024-11-18 ENCOUNTER — HOSPITAL ENCOUNTER (OUTPATIENT)
Facility: HOSPITAL | Age: 84
Setting detail: SPECIMEN
Discharge: HOME | End: 2024-11-21
Payer: MEDICARE

## 2024-11-18 DIAGNOSIS — R73.9 HYPERGLYCEMIA: ICD-10-CM

## 2024-11-18 DIAGNOSIS — M54.50 LOW BACK PAIN, UNSPECIFIED BACK PAIN LATERALITY, UNSPECIFIED CHRONICITY, UNSPECIFIED WHETHER SCIATICA PRESENT: ICD-10-CM

## 2024-11-18 LAB
EST. AVERAGE GLUCOSE BLD GHB EST-MCNC: 120 MG/DL
HBA1C MFR BLD: 5.8 % (ref 4.2–5.6)

## 2024-11-18 PROCEDURE — 36415 COLL VENOUS BLD VENIPUNCTURE: CPT

## 2024-11-18 PROCEDURE — 83036 HEMOGLOBIN GLYCOSYLATED A1C: CPT

## 2024-12-10 NOTE — PROGRESS NOTES
Diabetes Education    Patient Name:  Colten Yanez Jr.  YOB: 1965  MRN: [id number]  Admit Date:  12/9/2024    Consult received due to adm with DKA. A1c this adm 13.1%. Previous A1c in [facility] was from 4/23/2024 and result 8.7%. Adm bs >700, pH 6.810, CO2 4.2, anion gap 35.8, ketones 15. Per home med list, pt takes Tresiba 30 units hs, Novolog 15 units ac. Pt was found down on porch and unresponsive. Pt has followed with endocrinologist at [facility] [place] with last visit being on 10/2/2023. Pt had follow up appt in 4/2024 and this was cancelled by provider. Pt has not rescheduled follow up visit. Per nurse documentation this am, pt with confusion. Educator talked with nurse at 16:15 today and pt condition still not appropriate for education today. Will attempt follow up on different day.       Electronically signed by:  Aylin Temple RN  12/10/24 16:36 EST   I phoned the pt. Her CRP is likely elevated due to her recent infection. We will trend this and repeat this lab in 1-2 wks.     Dr. Kobi Brito  [facility] [address], [address] [place] [address] Str.  Phone: [phone]  Fax: [phone]

## 2025-01-06 ENCOUNTER — PATIENT MESSAGE (OUTPATIENT)
Facility: CLINIC | Age: 85
End: 2025-01-06

## 2025-01-07 ENCOUNTER — OFFICE VISIT (OUTPATIENT)
Age: 85
End: 2025-01-07
Payer: MEDICARE

## 2025-01-07 VITALS
SYSTOLIC BLOOD PRESSURE: 135 MMHG | HEIGHT: 61 IN | OXYGEN SATURATION: 94 % | DIASTOLIC BLOOD PRESSURE: 68 MMHG | RESPIRATION RATE: 18 BRPM | TEMPERATURE: 98.2 F | HEART RATE: 72 BPM | BODY MASS INDEX: 30.7 KG/M2 | WEIGHT: 162.6 LBS

## 2025-01-07 DIAGNOSIS — M54.12 RADICULOPATHY, CERVICAL REGION: ICD-10-CM

## 2025-01-07 DIAGNOSIS — M54.16 RADICULOPATHY, LUMBAR REGION: ICD-10-CM

## 2025-01-07 PROCEDURE — 3078F DIAST BP <80 MM HG: CPT | Performed by: NURSE PRACTITIONER

## 2025-01-07 PROCEDURE — 1090F PRES/ABSN URINE INCON ASSESS: CPT | Performed by: NURSE PRACTITIONER

## 2025-01-07 PROCEDURE — 1036F TOBACCO NON-USER: CPT | Performed by: NURSE PRACTITIONER

## 2025-01-07 PROCEDURE — G8417 CALC BMI ABV UP PARAM F/U: HCPCS | Performed by: NURSE PRACTITIONER

## 2025-01-07 PROCEDURE — 1123F ACP DISCUSS/DSCN MKR DOCD: CPT | Performed by: NURSE PRACTITIONER

## 2025-01-07 PROCEDURE — 1159F MED LIST DOCD IN RCRD: CPT | Performed by: NURSE PRACTITIONER

## 2025-01-07 PROCEDURE — 1125F AMNT PAIN NOTED PAIN PRSNT: CPT | Performed by: NURSE PRACTITIONER

## 2025-01-07 PROCEDURE — 3075F SYST BP GE 130 - 139MM HG: CPT | Performed by: NURSE PRACTITIONER

## 2025-01-07 PROCEDURE — G8399 PT W/DXA RESULTS DOCUMENT: HCPCS | Performed by: NURSE PRACTITIONER

## 2025-01-07 PROCEDURE — 99214 OFFICE O/P EST MOD 30 MIN: CPT | Performed by: NURSE PRACTITIONER

## 2025-01-07 PROCEDURE — G8427 DOCREV CUR MEDS BY ELIG CLIN: HCPCS | Performed by: NURSE PRACTITIONER

## 2025-01-07 PROCEDURE — 1160F RVW MEDS BY RX/DR IN RCRD: CPT | Performed by: NURSE PRACTITIONER

## 2025-01-07 PROCEDURE — M1308 PR FLU IMMUNIZE NO ADMIN: HCPCS | Performed by: NURSE PRACTITIONER

## 2025-01-07 RX ORDER — GABAPENTIN 600 MG/1
TABLET, FILM COATED ORAL
Qty: 180 TABLET | Refills: 0 | Status: SHIPPED | OUTPATIENT
Start: 2025-01-07 | End: 2025-05-13

## 2025-01-07 RX ORDER — METHYLPREDNISOLONE 4 MG/1
TABLET ORAL
Qty: 1 KIT | Refills: 0 | Status: SHIPPED | OUTPATIENT
Start: 2025-01-07 | End: 2025-01-13

## 2025-01-07 NOTE — PROGRESS NOTES
Jayla Paulino presents today for   Chief Complaint   Patient presents with    Back Problem    Pain    Back Pain       Is someone accompanying this pt? no    Is the patient using any DME equipment during OV? no    Depression Screening:       No data to display                Learning Assessment:  Failed to redirect to the Timeline version of the quickhuddle SmartLink.    Abuse Screening:       No data to display                Fall Risk  Failed to redirect to the Timeline version of the quickhuddle SmartLink.    OPIOID RISK TOOL  Failed to redirect to the Timeline version of the quickhuddle SmartLink.    Coordination of Care:  1. Have you been to the ER, urgent care clinic since your last visit? no  Hospitalized since your last visit? no    2. Have you seen or consulted any other health care providers outside of the [facility] since your last visit? no Include any pap smears or colon screening. no

## 2025-01-07 NOTE — PROGRESS NOTES
Chief complaint   Chief Complaint   Patient presents with    Back Problem    Pain    Back Pain       History of Present Illness:  Jayla Paulino is a  84 y.o.  female who comes in today for follow-up of her low back pain that radiates to the right lower extremity pain down to her knee.  She states more recently it is started also radiating into the left leg.  She denies any injury.  Usually release 600 mg 2 capsules in the evening helps with her pain.  She denies fever bowel bladder dysfunction.  She also gets some neck pain but that is doing okay currently.      Physical Exam: Patient is a 84-year-old female well-developed well-nourished who is alert and oriented with a normal mood and affect.  She has a full weightbearing nonantalgic gait.  She denies any assist device.  She has 4 out of 5 strength bilateral lower extremities.  Negative straight leg raise.  She has pain with hyperextension of her spine      Assessment and Plan:.  This is a patient who has back pain with lumbar radiculopathy.  She does get some neck pain also.  She is having some new pain into the left lower extremity.  I am going to try a Medrol Dosepak on her.  She will take it with food and not take anti-inflammatories with it.  I refilled her release.  Will see her back in 6 weeks with Dr. Torres.    Jayla was seen today for back problem, pain and back pain.    Diagnoses and all orders for this visit:    Radiculopathy, lumbar region  -     Gabapentin, Once-Daily, (GRALISE) 600 MG TABS; TAKE 2 TABLETS NIGHTLY  Indications: neuropathic pain    Radiculopathy, cervical region  -     Gabapentin, Once-Daily, (GRALISE) 600 MG TABS; TAKE 2 TABLETS NIGHTLY  Indications: neuropathic pain    Other orders  -     methylPREDNISolone (MEDROL DOSEPACK) 4 MG tablet; Take by mouth.        Return in about 6 weeks (around 2/18/2025) for with Dr Torres.      has been .reviewed and is appropriate    Medications:  Current Outpatient Medications

## 2025-01-15 ENCOUNTER — TRANSCRIBE ORDERS (OUTPATIENT)
Dept: ADMINISTRATIVE | Age: 85
End: 2025-01-15

## 2025-01-15 DIAGNOSIS — Z12.31 ENCOUNTER FOR SCREENING MAMMOGRAM FOR MALIGNANT NEOPLASM OF BREAST: ICD-10-CM

## 2025-01-17 DIAGNOSIS — E78.5 HYPERLIPIDEMIA, UNSPECIFIED HYPERLIPIDEMIA TYPE: ICD-10-CM

## 2025-01-17 DIAGNOSIS — I10 ESSENTIAL (PRIMARY) HYPERTENSION: ICD-10-CM

## 2025-01-17 DIAGNOSIS — K21.9 GASTROESOPHAGEAL REFLUX DISEASE, UNSPECIFIED WHETHER ESOPHAGITIS PRESENT: ICD-10-CM

## 2025-01-17 RX ORDER — HYDROCHLOROTHIAZIDE 12.5 MG/1
12.5 TABLET ORAL EVERY MORNING
Qty: 90 TABLET | Refills: 0 | Status: SHIPPED | OUTPATIENT
Start: 2025-01-17

## 2025-01-17 RX ORDER — PRAVASTATIN SODIUM 40 MG
40 TABLET ORAL DAILY
Qty: 90 TABLET | Refills: 0 | Status: SHIPPED | OUTPATIENT
Start: 2025-01-17

## 2025-01-26 SDOH — ECONOMIC STABILITY: FOOD INSECURITY: WITHIN THE PAST 12 MONTHS, THE FOOD YOU BOUGHT JUST DIDN'T LAST AND YOU DIDN'T HAVE MONEY TO GET MORE.: NEVER TRUE

## 2025-01-26 SDOH — ECONOMIC STABILITY: FOOD INSECURITY: WITHIN THE PAST 12 MONTHS, YOU WORRIED THAT YOUR FOOD WOULD RUN OUT BEFORE YOU GOT MONEY TO BUY MORE.: NEVER TRUE

## 2025-01-26 SDOH — ECONOMIC STABILITY: INCOME INSECURITY: IN THE LAST 12 MONTHS, WAS THERE A TIME WHEN YOU WERE NOT ABLE TO PAY THE MORTGAGE OR RENT ON TIME?: NO

## 2025-01-26 SDOH — ECONOMIC STABILITY: TRANSPORTATION INSECURITY
IN THE PAST 12 MONTHS, HAS THE LACK OF TRANSPORTATION KEPT YOU FROM MEDICAL APPOINTMENTS OR FROM GETTING MEDICATIONS?: NO

## 2025-01-29 ENCOUNTER — OFFICE VISIT (OUTPATIENT)
Facility: CLINIC | Age: 85
End: 2025-01-29

## 2025-01-29 VITALS
WEIGHT: 165 LBS | TEMPERATURE: 97.8 F | HEART RATE: 78 BPM | SYSTOLIC BLOOD PRESSURE: 115 MMHG | HEIGHT: 61 IN | BODY MASS INDEX: 31.15 KG/M2 | OXYGEN SATURATION: 95 % | RESPIRATION RATE: 18 BRPM | DIASTOLIC BLOOD PRESSURE: 74 MMHG

## 2025-01-29 DIAGNOSIS — Z87.892 HISTORY OF ANAPHYLAXIS: ICD-10-CM

## 2025-01-29 DIAGNOSIS — Z00.00 MEDICARE ANNUAL WELLNESS VISIT, SUBSEQUENT: ICD-10-CM

## 2025-01-29 DIAGNOSIS — I10 ESSENTIAL (PRIMARY) HYPERTENSION: ICD-10-CM

## 2025-01-29 DIAGNOSIS — M54.50 LOW BACK PAIN, UNSPECIFIED BACK PAIN LATERALITY, UNSPECIFIED CHRONICITY, UNSPECIFIED WHETHER SCIATICA PRESENT: ICD-10-CM

## 2025-01-29 DIAGNOSIS — G47.00 INSOMNIA, UNSPECIFIED TYPE: Primary | ICD-10-CM

## 2025-01-29 DIAGNOSIS — F32.A DEPRESSION, UNSPECIFIED DEPRESSION TYPE: ICD-10-CM

## 2025-01-29 DIAGNOSIS — R73.9 HYPERGLYCEMIA: ICD-10-CM

## 2025-01-29 DIAGNOSIS — Z71.89 ADVANCE CARE PLANNING: ICD-10-CM

## 2025-01-29 RX ORDER — ZOLPIDEM TARTRATE 5 MG/1
5 TABLET ORAL NIGHTLY PRN
Qty: 30 TABLET | Refills: 0 | Status: SHIPPED | OUTPATIENT
Start: 2025-01-29 | End: 2025-02-28

## 2025-01-29 RX ORDER — EPINEPHRINE 0.3 MG/.3ML
0.3 INJECTION SUBCUTANEOUS
Qty: 1 EACH | Refills: 0 | Status: SHIPPED | OUTPATIENT
Start: 2025-01-29 | End: 2025-01-29

## 2025-01-29 SDOH — ECONOMIC STABILITY: FOOD INSECURITY: WITHIN THE PAST 12 MONTHS, THE FOOD YOU BOUGHT JUST DIDN'T LAST AND YOU DIDN'T HAVE MONEY TO GET MORE.: NEVER TRUE

## 2025-01-29 SDOH — ECONOMIC STABILITY: FOOD INSECURITY: WITHIN THE PAST 12 MONTHS, YOU WORRIED THAT YOUR FOOD WOULD RUN OUT BEFORE YOU GOT MONEY TO BUY MORE.: NEVER TRUE

## 2025-01-29 ASSESSMENT — PATIENT HEALTH QUESTIONNAIRE - PHQ9
SUM OF ALL RESPONSES TO PHQ QUESTIONS 1-9: 3
8. MOVING OR SPEAKING SO SLOWLY THAT OTHER PEOPLE COULD HAVE NOTICED. OR THE OPPOSITE, BEING SO FIGETY OR RESTLESS THAT YOU HAVE BEEN MOVING AROUND A LOT MORE THAN USUAL: NOT AT ALL
1. LITTLE INTEREST OR PLEASURE IN DOING THINGS: NOT AT ALL
SUM OF ALL RESPONSES TO PHQ9 QUESTIONS 1 & 2: 3
7. TROUBLE CONCENTRATING ON THINGS, SUCH AS READING THE NEWSPAPER OR WATCHING TELEVISION: NOT AT ALL
SUM OF ALL RESPONSES TO PHQ QUESTIONS 1-9: 3
5. POOR APPETITE OR OVEREATING: NOT AT ALL
3. TROUBLE FALLING OR STAYING ASLEEP: NOT AT ALL
6. FEELING BAD ABOUT YOURSELF - OR THAT YOU ARE A FAILURE OR HAVE LET YOURSELF OR YOUR FAMILY DOWN: NOT AT ALL
4. FEELING TIRED OR HAVING LITTLE ENERGY: NOT AT ALL
10. IF YOU CHECKED OFF ANY PROBLEMS, HOW DIFFICULT HAVE THESE PROBLEMS MADE IT FOR YOU TO DO YOUR WORK, TAKE CARE OF THINGS AT HOME, OR GET ALONG WITH OTHER PEOPLE: NOT DIFFICULT AT ALL
2. FEELING DOWN, DEPRESSED OR HOPELESS: NEARLY EVERY DAY
SUM OF ALL RESPONSES TO PHQ QUESTIONS 1-9: 3
9. THOUGHTS THAT YOU WOULD BE BETTER OFF DEAD, OR OF HURTING YOURSELF: NOT AT ALL
SUM OF ALL RESPONSES TO PHQ QUESTIONS 1-9: 3

## 2025-01-29 NOTE — PATIENT INSTRUCTIONS
yourself as you start a weight-loss plan?  Set realistic goals. Many people expect to lose much more weight than is likely. A weight loss of 5% to 10% of your body weight may be enough to improve your health.  Get family and friends involved to provide support. Talk to them about why you are trying to lose weight, and ask them to help. They can help by participating in exercise and having meals with you, even if they may be eating something different.  Find what works best for you. If you do not have time or do not like to cook, a program that offers meal replacement bars or shakes may be better for you. Or if you like to prepare meals, finding a plan that includes daily menus and recipes may be best.  Ask your doctor about other health professionals who can help you achieve your weight-loss goals.  A dietitian can help you make healthy changes in your diet.  An exercise specialist or  can help you develop a safe and effective exercise program.  A counselor or psychiatrist can help you cope with issues such as depression, anxiety, or family problems that can make it hard to focus on weight loss.  Consider joining a support group for people who are trying to lose weight. Your doctor can suggest groups in your area.  Where can you learn more?  Go to https://www.[employer].net/patientEd and enter U357 to learn more about \"Starting a Weight-Loss Plan: Care Instructions.\"  Current as of: April 30, 2024  Content Version: 14.3  © 2024 [employer].   Care instructions adapted under license by [employer]. If you have questions about a medical condition or this instruction, always ask your healthcare professional. [email or web address] disclaims any warranty or liability for your use of this information.         A Healthy Heart: Care Instructions  Overview     Coronary artery disease, also called heart disease, occurs when a substance called plaque builds up in the vessels that supply

## 2025-01-29 NOTE — PROGRESS NOTES
Jayla Paulino presents today for   Chief Complaint   Patient presents with    Follow-up           \"Have you been to the ER, urgent care clinic since your last visit?  Hospitalized since your last visit?\"    NO    “Have you seen or consulted any other health care providers outside of [facility] since your last visit?”    NO

## 2025-01-29 NOTE — PROGRESS NOTES
INTERNISTS OF [facility]:  2/5/2025, MRN: [id number]      Jayla Paulino is a 85 y.o. female and presents to clinic for Follow-up      Subjective:   The patient is a 84-year-old female with history of hypertension, LGSIL 4/19, osteopenia, tubular adenomas colon polyps, obesity, vitamin D deficiency, stable pulmonary nodules, prediabetes, chronic right rotator (declining injection, followed by Orthopedics), small hiatal hernia, NAFLD, asthma (followed by ), cervical disc disease, allergic rhinitis (declining allergy testing and immunotherapy), and lumbar disc disease (followed by Orthopedics, ).       1. HTN: BP is 115/74. Treated with HCTZ 12.5mg daily.     2. Prediabetes, Depression, and Insomnia:  Diet: her diet is worsening. +Stress eating. She was on prozac 20mg daily before transitioning last yr to cymbalta. She has had issues sleeping 2/2 her 's sundowning. She took melatonin x 2 last night. She has a HH aid assisting her.      Latest Reference Range & Units 06/19/24 10:26 11/18/24 10:30   Hemoglobin A1C 4.2 - 5.6 % 5.9 (H) 5.8 (H)   eAG (mg/dL) mg/dL 123 120   (H): Data is abnormally high    3.  Lower back pain: Reported at her last visit.  At that time I ordered Cymbalta 60 mg daily.  She is presently taking 30 mg daily at her last visit.  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  I also ordered-82 mg as needed in addition to her gabapentin 1200 mg at night.  She has a history of lumbar disc disease and is followed by orthopedics.  Today she reports: she had improvement of her sx with taking cymbalta 60mg daily. She completed a course of steroid rx since her last apt. Her sx have been better since then. Lower back pain has not radiated down her legs this month. No paresthesias.         Patient Active Problem List    Diagnosis Date Noted    NAFLD (nonalcoholic fatty liver disease) 02/06/2022    Hiatal

## 2025-02-04 ASSESSMENT — ENCOUNTER SYMPTOMS
ANAL BLEEDING: 0
SHORTNESS OF BREATH: 0
ABDOMINAL PAIN: 0
COUGH: 0
SORE THROAT: 0
BLOOD IN STOOL: 0
EYE PAIN: 0

## 2025-02-05 ASSESSMENT — ENCOUNTER SYMPTOMS: BACK PAIN: 1

## 2025-02-05 NOTE — ACP (ADVANCE CARE PLANNING)
Advance Care Planning     General Advance Care Planning (ACP) Conversation    Date of Conversation: 1/29/25    Conducted with: Patient with Decision Making Capacity    Healthcare Decision Maker:  Today we She wants German Ortiz, her nephew to be her primary POA.     Content/Action Overview:  We completed her advance directives today, updating her pre-existing 1.  Her  has dementia.  She wants an option to be her primary POA as mentioned above.    Length of Voluntary ACP Conversation in minutes:  <16 minutes (Non-Billable)    Anette Hayes MD

## 2025-02-14 ENCOUNTER — PATIENT MESSAGE (OUTPATIENT)
Facility: CLINIC | Age: 85
End: 2025-02-14

## 2025-02-16 ENCOUNTER — PATIENT MESSAGE (OUTPATIENT)
Facility: CLINIC | Age: 85
End: 2025-02-16

## 2025-02-21 NOTE — ED TRIAGE NOTES
C/o nasal congestion, cough, decreased appetite since last Monday. States having fatigue. MST Score 2 or >

## 2025-02-24 ENCOUNTER — OFFICE VISIT (OUTPATIENT)
Facility: CLINIC | Age: 85
End: 2025-02-24
Payer: MEDICARE

## 2025-02-24 VITALS
BODY MASS INDEX: 30.73 KG/M2 | SYSTOLIC BLOOD PRESSURE: 120 MMHG | HEIGHT: 61 IN | OXYGEN SATURATION: 96 % | TEMPERATURE: 98.4 F | DIASTOLIC BLOOD PRESSURE: 75 MMHG | RESPIRATION RATE: 17 BRPM | HEART RATE: 75 BPM | WEIGHT: 162.8 LBS

## 2025-02-24 DIAGNOSIS — G47.00 INSOMNIA, UNSPECIFIED TYPE: ICD-10-CM

## 2025-02-24 DIAGNOSIS — I10 HYPERTENSION, UNSPECIFIED TYPE: Primary | ICD-10-CM

## 2025-02-24 PROCEDURE — 99213 OFFICE O/P EST LOW 20 MIN: CPT | Performed by: INTERNAL MEDICINE

## 2025-02-24 PROCEDURE — 1125F AMNT PAIN NOTED PAIN PRSNT: CPT | Performed by: INTERNAL MEDICINE

## 2025-02-24 PROCEDURE — 1036F TOBACCO NON-USER: CPT | Performed by: INTERNAL MEDICINE

## 2025-02-24 PROCEDURE — 1159F MED LIST DOCD IN RCRD: CPT | Performed by: INTERNAL MEDICINE

## 2025-02-24 PROCEDURE — G8417 CALC BMI ABV UP PARAM F/U: HCPCS | Performed by: INTERNAL MEDICINE

## 2025-02-24 PROCEDURE — 1123F ACP DISCUSS/DSCN MKR DOCD: CPT | Performed by: INTERNAL MEDICINE

## 2025-02-24 PROCEDURE — G8427 DOCREV CUR MEDS BY ELIG CLIN: HCPCS | Performed by: INTERNAL MEDICINE

## 2025-02-24 PROCEDURE — 1090F PRES/ABSN URINE INCON ASSESS: CPT | Performed by: INTERNAL MEDICINE

## 2025-02-24 PROCEDURE — 3078F DIAST BP <80 MM HG: CPT | Performed by: INTERNAL MEDICINE

## 2025-02-24 PROCEDURE — G8399 PT W/DXA RESULTS DOCUMENT: HCPCS | Performed by: INTERNAL MEDICINE

## 2025-02-24 PROCEDURE — 3074F SYST BP LT 130 MM HG: CPT | Performed by: INTERNAL MEDICINE

## 2025-02-24 ASSESSMENT — ENCOUNTER SYMPTOMS
COUGH: 0
ABDOMINAL PAIN: 0
SHORTNESS OF BREATH: 0
ANAL BLEEDING: 0
BACK PAIN: 1
BLOOD IN STOOL: 0
EYE PAIN: 0
SORE THROAT: 0

## 2025-02-24 NOTE — PROGRESS NOTES
Jayla Paulino presents today for   Chief Complaint   Patient presents with    Blood Pressure Check           \"Have you been to the ER, urgent care clinic since your last visit?  Hospitalized since your last visit?\"    NO    “Have you seen or consulted any other health care providers outside of [facility] since your last visit?”    NO             
10:28 AM     03/06/2024 10:28 AM    CO2 36 03/06/2024 10:28 AM    BUN 11 03/06/2024 10:28 AM    GFRAA >60 06/03/2022 08:42 AM       Lab Results   Component Value Date/Time    CHOL 187 03/06/2024 10:28 AM    HDL 63 03/06/2024 10:28 AM    .6 03/06/2024 10:28 AM    VLDL 23.4 03/06/2024 10:28 AM       No results found for: \"HBA1C\", \"ING8JOOV\"    Assessment/Plan:   1. HTN: Stable per today's blood pressure.  -Continue with hydrochlorothiazide 12.5 mg daily.  - I encouraged her to get a new blood pressure machine as her home machine is likely malfunctioning.    2.  Insomnia: Not improving but she hasn't tried rx.  Getting 3 to 4 hours of sleep per night.  - I encouraged her to try the Ambien 5 mg nightly that I ordered at her last visit.  - Nonpharmacologic stress reduction techniques discussed.  A lot of her insomnia is secondary to being the sole caregiver for her  with worsening dementia.          ICD-10-CM    1. Hypertension, unspecified type  I10       2. Insomnia, unspecified type  G47.00             Health Maintenance Due   Topic Date Due    Respiratory Syncytial Virus (RSV) Pregnant or age 60 yrs+ (1 - 1-dose 75+ series) Never done    Flu vaccine (1) 08/01/2024    Lipids  03/06/2025         Lab review: labs are reviewed in the EHR    I have discussed the diagnosis with the patient and the intended plan as seen in the above orders.  The patient has received an after-visit summary and questions were answered concerning future plans.  I have discussed medication side effects and warnings with the patient as well. I have reviewed the plan of care with the patient, accepted their input and they are in agreement with the treatment goals. All questions were answered. The patient understands the plan of care. Handouts provided today with above information. Pt instructed if symptoms worsen to call the office or report to the ED for continued care.  Greater than 50% of the visit time was spent in

## 2025-03-04 ENCOUNTER — HOSPITAL ENCOUNTER (EMERGENCY)
Facility: HOSPITAL | Age: 85
Discharge: HOME | End: 2025-03-04
Payer: MEDICARE

## 2025-03-04 ENCOUNTER — APPOINTMENT (OUTPATIENT)
Facility: HOSPITAL | Age: 85
End: 2025-03-04
Payer: MEDICARE

## 2025-03-04 VITALS
TEMPERATURE: 97.8 F | SYSTOLIC BLOOD PRESSURE: 142 MMHG | HEART RATE: 65 BPM | BODY MASS INDEX: 33.38 KG/M2 | HEIGHT: 60 IN | DIASTOLIC BLOOD PRESSURE: 73 MMHG | RESPIRATION RATE: 16 BRPM | OXYGEN SATURATION: 100 % | WEIGHT: 170 LBS

## 2025-03-04 DIAGNOSIS — S63.612A SPRAIN OF RIGHT MIDDLE FINGER, UNSPECIFIED SITE OF DIGIT, INITIAL ENCOUNTER: Primary | ICD-10-CM

## 2025-03-04 PROCEDURE — 99283 EMERGENCY DEPT VISIT LOW MDM: CPT

## 2025-03-04 PROCEDURE — 73140 X-RAY EXAM OF FINGER(S): CPT

## 2025-03-04 ASSESSMENT — PAIN - FUNCTIONAL ASSESSMENT: PAIN_FUNCTIONAL_ASSESSMENT: 0-10

## 2025-03-04 ASSESSMENT — PAIN SCALES - GENERAL: PAINLEVEL_OUTOF10: 8

## 2025-03-04 NOTE — ED PROVIDER NOTES
EMERGENCY DEPARTMENT HISTORY AND PHYSICAL EXAM      Date: 3/4/2025  Patient Name: Jayla Paulino    History of Presenting Illness     Chief Complaint   Patient presents with    Hand Pain       History (Context): Jayla Paulino is a 85 y.o. female with significant PMHx for hypertension, osteopenia, asthma, sciatica, lumbar spinal stenosis presents ambulatory to the ED today.  Patient reports her right third finger was bent backwards when she attempted to care for her  last night.  Patient reports her  has Alzheimers.  Patient has increased pain with movement.  Patient right-handed.  Denies numbness, tingling, weakness.       PCP: Anette Hayes MD    No current facility-administered medications for this encounter.     Current Outpatient Medications   Medication Sig Dispense Refill    EPINEPHrine (EPIPEN) 0.3 MG/0.3ML SOAJ injection Inject 0.3 mLs into the muscle once as needed (anaphylaxis) 1 each 0    pravastatin (PRAVACHOL) 40 MG tablet TAKE 1 TABLET DAILY 90 tablet 0    hydroCHLOROthiazide 12.5 MG tablet TAKE 1 TABLET EVERY MORNING 90 tablet 0    omeprazole (PRILOSEC) 20 MG delayed release capsule TAKE 1 CAPSULE EVERY MORNING BEFORE BREAKFAST 90 capsule 0    Gabapentin, Once-Daily, (GRALISE) 600 MG TABS TAKE 2 TABLETS NIGHTLY  Indications: neuropathic pain 180 tablet 0    DULoxetine (CYMBALTA) 60 MG extended release capsule Take 1 capsule by mouth daily 30 capsule 5    tiZANidine (ZANAFLEX) 2 MG tablet Take 1 tablet by mouth every 8 hours as needed (left lower back muscle spasm related pain) 20 tablet 0    azelastine (ASTELIN) 0.1 % nasal spray 1 spray by Nasal route 2 times daily Use in each nostril as directed. This is for seasonal allergies. 60 mL 1    Cranberry 200 MG CAPS Take 1 tablet by mouth daily Patient unsure  of dosing      BIOTIN PO Take 5,000 mcg by mouth daily      albuterol sulfate HFA (PROVENTIL;VENTOLIN;PROAIR) 108 (90 Base) MCG/ACT inhaler USE 2 INHALATIONS EVERY 4

## 2025-03-04 NOTE — ED NOTES
Right 3rd and 4th fingers jona taped with instructions, verb understanding.     Discharge instructions reviewed with patient. Patient advised to follow up as directed on discharge instructions.  Patient denies questions, needs or concerns at this time.  Patient verbalized understanding. No s/sx of distress noted.

## 2025-03-08 NOTE — PATIENT INSTRUCTIONS
ED Attending Physician Note      Patient : Angelique Chan Age: 57 year old Sex: female   MRN: [id number] Encounter Date: 3/7/2025      History         Chief Complaint   Patient presents with    Finger Injury       Patient seen and evaluate with resident please see their note for full details.    HPI: 57 year old female with no significant past medical history presents to the ED for evaluation of left second digit injury.  Patient thought that the bladder was turned off and stuck her hand in the .  Sustained multiple lacerations and nail removal secondary to .  Sensation intact.  Patient is left-hand dominant.  Tetanus status unknown.  Denies any wrist injury or any other significant injuries.      Allergies   Allergen Reactions    Latex RASH         No past medical history on file.      No past surgical history on file.      No family history on file.           Review of Systems    Physical Exam     ED Triage Vitals [03/07/25 2040]   ED Triage Vitals Group      Temp 97 °F (36.1 °C)      Heart Rate 88      Resp 16      BP (!) 148/86      SpO2 99 %      EtCO2 mmHg       Height       Weight       Weight Scale Used       BMI (Calculated)       IBW/kg (Calculated)        Physical Exam  Constitutional:       General: She is not in acute distress.     Appearance: She is normal weight. She is not toxic-appearing.   HENT:      Head: Normocephalic and atraumatic.      Nose: Nose normal.      Mouth/Throat:      Mouth: Mucous membranes are moist.      Pharynx: Oropharynx is clear.      Neck: Neck supple.   Eyes:      Extraocular Movements: Extraocular movements intact.      Conjunctiva/sclera: Conjunctivae normal.   Cardiovascular:      Rate and Rhythm: Normal rate and regular rhythm.      Pulses: Normal pulses.   Pulmonary:      Effort: Pulmonary effort is normal. No respiratory distress.      Breath sounds: Normal breath sounds.   Musculoskeletal:         General: Normal range of motion.      Comments: Laceration  Cawthorne Exercises for Vertigo: Care Instructions  Your Care Instructions  Simple exercises can help you regain your balance when you have vertigo. If you have Ménière's disease, benign paroxysmal positional vertigo (BPPV), or another inner ear problem, you may have vertigo off and on. Do these exercises first thing in the morning and before you go to bed. You might get dizzy when you first start them. If this happens, try to do them for at least 5 minutes. Do a group of exercises at a time, starting at the top of the list. It may take several weeks before you can do all the exercises without feeling dizzy. Follow-up care is a key part of your treatment and safety. Be sure to make and go to all appointments, and call your doctor if you are having problems. It's also a good idea to know your test results and keep a list of the medicines you take. How can you care for yourself at home? Exercise 1  While sitting on the side of the bed and holding your head still:  · Look up as far as you can. · Look down as far as you can. · Look from side to side as far as you can. · Stretch your arm straight out in front of you. Focus on your index finger. Continue to focus on your finger while you bring it to your nose. Exercise 2  While sitting on the side of the bed:  · Bring your head as far back as you can. · Bring your head forward to touch your chin to your chest.  · Turn your head from side to side. · Do these exercises first with your eyes open. Then try with your eyes closed. Exercise 3  While sitting on the side of the bed:  · Shrug your shoulders straight upward, then relax them. · Bend over and try to touch the ground with your fingers. Then go back to a sitting position. · Toss a small ball from one hand to the other. Throw the ball higher than your eyes so you have to look up.   Exercise 4  While standing (with someone close by if you feel uncomfortable):  · Repeat Exercise 1.  · Repeat Exercise 2.  · Pass a ball between your legs and above your head. · Sit down and then stand up. Repeat. Turn around in a [place] a different way each time you stand. · With someone close by to help you, try the above exercises with your eyes closed. Exercise 5  In a room that is cleared of obstacles:  · Walk to a corner of the room, turn to your right, and walk back to the starting point. Now, repeat and turn left. · Walk up and down a slope. Now try stairs. · While holding on to someone's arm, try these exercises with your eyes closed. When should you call for help? Watch closely for changes in your health, and be sure to contact your doctor if:    · You do not get better as expected. Where can you learn more? Go to http://www.gray.com/  Enter A743 in the search box to learn more about \"Cawthorne Exercises for Vertigo: Care Instructions. \"  Current as of: September 8, 2021               Content Version: 13.2  © 2006-2022 [employer]. Care instructions adapted under license by [employer] (which disclaims liability or warranty for this information). If you have questions about a medical condition or this instruction, always ask your healthcare professional. [place] [address] any warranty or liability for your use of this information. Vertigo: Exercises  Introduction  Here are some examples of exercises for you to try. The exercises may be suggested for a condition or for rehabilitation. Start each exercise slowly. Ease off the exercises if you start to have pain. You will be told when to start these exercises and which ones will work best for you. How to do the exercises  Exercise 1    1. Stand with a chair in front of you and a wall behind you. If you begin to fall, you may use them for support. 2. Stand with your feet together and your arms at your sides. 3. Move your head up and down 10 times. Exercise 2    1.  Move your head side to to pulp of left second digit without exposed bone, complete removal of nailbed, subsequently more proximal laceration to radial aspect of second digit, patient does have flexion and extension at PIP and DIP, sensation intact, appropriate cap refill   Skin:     General: Skin is warm and dry.   Neurological:      General: No focal deficit present.      Mental Status: She is alert. Mental status is at baseline.         ED Course     Procedures: Laceration repaired by resident physician under my direct supervision.  Please see resident note for procedure note. Resident physician performed procedure under my direct supervision I was available for all pertinent portions     Lab Results     No results found for this visit on 03/07/25.      Radiology Results     Imaging Results              XR FINGER(S) 2 OR MORE VIEWS LEFT (Final result)  Result time 03/07/25 22:32:17      Final result                   Impression:      As above          Electronically Signed by: KIRSTEN CONTRERAS M.D.   Signed on: 3/7/2025 10:32 PM   Workstation ID: [id number]               Narrative:    EXAM: XR FINGER(S) 2 OR MORE VIEWS LEFT    CLINICAL INDICATION: Laceration    COMPARISON: None.    FINDINGS: There are no fractures, subluxations or bone erosions. Soft  tissue laceration overlies the distal phalanx of left second finger.                                      Medical Decision Making        ED Course as of 03/08/25 0553   Fri Mar 07, 2025   2328 Sent PerfectServe message to orthopedic resident on-call for hand surgery.  For consult. [MT]   2329 XR FINGER(S) 2 OR MORE VIEWS LEFT  X-ray of left pointer finger without evidence for fracture or foreign body. [MT]   Sat Mar 08, 2025   0408 Orthopedic resident recommends outpatient follow up with hand surgery, no concern for tendon injury at this time.  [MT]   0409 57F with lacerations to left index finger and nail avulsion from using . Tetanus last in 2019.   Orthopedics consulted, no  concern for tendon or nerve injury at this time, patient with full ROM of flexion/extension of left index finger.   See lac repair note. Lacerations repaired with sutures, chromic gut sterile foil utilized to take place of avulsed nail and hold cuticle open. Given Cephalexin script and follow up with hand surgery. Given strict return precautions, directed to follow up with hand surgery, directed to have sutures removed within 1 week. Patient expressed understanding of plan. Discharged home, stable at discharge.  [MT]      ED Course User Index  [MT] Magy Lopez MD       Patient is hemodynamically stable, saturating well on room air nontoxic-appearing.  Afebrile.  Patient is neurovascularly intact distally.  Laceration irrigated, tetanus updated, hand surgery consulted.      Clinical Impression     ED Diagnosis   1. Laceration of left index finger without foreign body with damage to nail, initial encounter              Disposition        Discharge 3/8/2025  2:36 AM  Angelique Chan discharge to home/self care.        Patient was seen in conjunction with the resident physician.  I performed an independent evaluation including history and physical with the resident physician or after the patient was seen by the resident physician. For further details about elements of the patient encounter including PMH, Social History, Family History, and ROS, see the resident note, and I agree with his/her documentation and care except as noted.     Tu Evans DO   3/8/2025 5:53 AM         Tu Evans DO  03/08/25 0553     side 10 times. Exercise 3    1. Move your head diagonally up and down 10 times. Exercise 4    1. Move your head diagonally up and down 10 times on the other side. Follow-up care is a key part of your treatment and safety. Be sure to make and go to all appointments, and call your doctor if you are having problems. It's also a good idea to know your test results and keep a list of the medicines you take. Where can you learn more? Go to http://www.gray.com/  Enter F349 in the search box to learn more about \"Vertigo: Exercises. \"  Current as of: September 8, 2021               Content Version: 13.2  © 2006-2022 [employer]. Care instructions adapted under license by [employer] (which disclaims liability or warranty for this information). If you have questions about a medical condition or this instruction, always ask your healthcare professional. [place] [address] any warranty or liability for your use of this information. Vertigo: Care Instructions  Your Care Instructions     Vertigo is the feeling that you or your surroundings are moving when there is no actual movement. It is often described as a feeling of spinning, whirling, falling, or tilting. Vertigo may make you vomit or feel nauseated. You may have trouble standing or walking and may lose your balance. Vertigo is often related to an inner ear problem, but it can have other more serious causes. If vertigo continues, you may need more tests to find its cause. Follow-up care is a key part of your treatment and safety. Be sure to make and go to all appointments, and call your doctor if you are having problems. It's also a good idea to know your test results and keep a list of the medicines you take. How can you care for yourself at home? · Do not lie flat on your back. Prop yourself up slightly. This may reduce the spinning feeling. Keep your eyes open.   · Move slowly so that you do not fall. · If your doctor recommends medicine, take it exactly as directed. · Do not drive while you are having vertigo. Certain exercises, called Jordan-Daroff exercises, can help decrease vertigo. To do Jordan-Daroff exercises:  · Sit on the edge of a bed or sofa and quickly lie down on the side that causes the worst vertigo. Lie on your side with your ear down. · Stay in this position for at least 30 seconds or until the vertigo goes away. · Sit up. If this causes vertigo, wait for it to stop. · Repeat the procedure on the other side. · Repeat this 10 times. Do these exercises 2 times a day until the vertigo is gone. When should you call for help? Call 911 anytime you think you may need emergency care. For example, call if:    · You passed out (lost consciousness).     · You have symptoms of a stroke. These may include:  ? Sudden numbness, tingling, weakness, or loss of movement in your face, arm, or leg, especially on only one side of your body. ? Sudden vision changes. ? Sudden trouble speaking. ? Sudden confusion or trouble understanding simple statements. ? Sudden problems with walking or balance. ? A sudden, severe headache that is different from past headaches. Call your doctor now or seek immediate medical care if:    · Vertigo occurs with a fever, a headache, or ringing in your ears.     · You have new or increased nausea and vomiting. Watch closely for changes in your health, and be sure to contact your doctor if:    · Vertigo gets worse or happens more often.     · Vertigo has not gotten better after 2 weeks. Where can you learn more? Go to http://www.gray.com/  Enter A090 in the search box to learn more about \"Vertigo: Care Instructions. \"  Current as of: September 8, 2021               Content Version: 13.2  © 2006-2022 [employer].    Care instructions adapted under license by [employer] (which disclaims liability or warranty for this information). If you have questions about a medical condition or this instruction, always ask your healthcare professional. [place] [address] any warranty or liability for your use of this information.

## 2025-03-26 ENCOUNTER — PATIENT MESSAGE (OUTPATIENT)
Facility: CLINIC | Age: 85
End: 2025-03-26

## 2025-03-31 RX ORDER — AZELASTINE 1 MG/ML
1 SPRAY, METERED NASAL 2 TIMES DAILY
Qty: 60 ML | Refills: 5 | Status: SHIPPED | OUTPATIENT
Start: 2025-03-31

## 2025-04-01 ENCOUNTER — HOSPITAL ENCOUNTER (EMERGENCY)
Facility: HOSPITAL | Age: 85
Discharge: HOME | End: 2025-04-01
Attending: EMERGENCY MEDICINE
Payer: MEDICARE

## 2025-04-01 VITALS
WEIGHT: 170 LBS | BODY MASS INDEX: 33.38 KG/M2 | RESPIRATION RATE: 16 BRPM | OXYGEN SATURATION: 97 % | DIASTOLIC BLOOD PRESSURE: 86 MMHG | HEIGHT: 60 IN | HEART RATE: 64 BPM | TEMPERATURE: 98 F | SYSTOLIC BLOOD PRESSURE: 174 MMHG

## 2025-04-01 DIAGNOSIS — H10.33 ACUTE CONJUNCTIVITIS OF BOTH EYES, UNSPECIFIED ACUTE CONJUNCTIVITIS TYPE: Primary | ICD-10-CM

## 2025-04-01 DIAGNOSIS — J30.1 SEASONAL ALLERGIC RHINITIS DUE TO POLLEN: ICD-10-CM

## 2025-04-01 PROCEDURE — 99283 EMERGENCY DEPT VISIT LOW MDM: CPT

## 2025-04-01 RX ORDER — ERYTHROMYCIN 5 MG/G
OINTMENT OPHTHALMIC
Qty: 3.5 G | Refills: 0 | Status: SHIPPED | OUTPATIENT
Start: 2025-04-01 | End: 2025-04-11

## 2025-04-01 RX ORDER — METHYLPREDNISOLONE 4 MG/1
TABLET ORAL
Qty: 1 KIT | Refills: 0 | Status: SHIPPED | OUTPATIENT
Start: 2025-04-01 | End: 2025-04-07

## 2025-04-01 ASSESSMENT — PAIN DESCRIPTION - ORIENTATION: ORIENTATION: RIGHT;LEFT

## 2025-04-01 ASSESSMENT — PAIN SCALES - GENERAL: PAINLEVEL_OUTOF10: 10

## 2025-04-01 ASSESSMENT — PAIN - FUNCTIONAL ASSESSMENT: PAIN_FUNCTIONAL_ASSESSMENT: 0-10

## 2025-04-01 ASSESSMENT — PAIN DESCRIPTION - LOCATION: LOCATION: EYE

## 2025-04-01 ASSESSMENT — VISUAL ACUITY: OU: 1

## 2025-04-01 ASSESSMENT — PAIN DESCRIPTION - PAIN TYPE: TYPE: ACUTE PAIN

## 2025-04-01 NOTE — FLOWSHEET NOTE
04/01/25 0826   Departure Condition   Mobility at Departure Ambulatory   Ambulatory Mobility With No Difficulty   Departure Mode Significant other   Discharged To Home   Patient Teaching Discharge instructions reviewed;Medications discussed;Patient verbalized understanding

## 2025-04-01 NOTE — ED TRIAGE NOTES
Pt presents to ED for eye redness, itching, and drainage since Sunday. Pt has been taking zyrtec.

## 2025-04-01 NOTE — ED PROVIDER NOTES
EMERGENCY DEPARTMENT HISTORY AND PHYSICAL EXAM      Patient Name: Jayla Paulino  MRN: [id number]  YOB: 1940  Provider: Vanessa Zabala MD  PCP: Anette Hayes MD   Time/Date of evaluation: 8:22 AM EDT on 4/1/25    History of Presenting Illness     Chief Complaint   Patient presents with    Conjunctivitis       History Provided By: Patient     History (Narative):   Jayla Paulino is a 85 y.o. female with a PMHX of allergic rhinitis, asthma, cataracts, hypertension, and all the other medical problems listed below  who presents to the emergency department  by POV C/O red and itchy eyes with crusting and drainage since Sunday.  The patient has been taking her Pataday eyedrops, taking her nasal inhaler for her allergic rhinitis, and taking Zyrtec.  Her symptoms are continuing.  She is complaining of itching so much that she feels like she wants to rip her eyeballs out.  She is also complaining of some upper and lower lid swelling and itching.          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    Band keratopathy of right eye 02/03/2018    Cataract     Chronic pain     Cylindrical bronchiectasis (HCC)     Fracture     rt ankle as an adult    History of pneumonia     Hypertension     Ill-defined condition     Spasms to left side    Left arm pain     Lumbar spinal stenosis     Myofascial pain     Osteopenia     Sciatica of right side        Past Surgical History:  Past Surgical History:   Procedure Laterality Date    CATARACT REMOVAL  08/15/2011    COLONOSCOPY N/A 11/11/2019    COLONOSCOPY w polypectomies performed by ELIEZER Freed MD at [facility] ENDOSCOPY    COLONOSCOPY      HEENT      sinus surgery    KERATOPLASTY Left 10/02/2017    ORTHOPEDIC SURGERY      left 4th finger trigger release    ROBIN AND BSO (CERVIX REMOVED)  1970s

## 2025-04-14 NOTE — PROGRESS NOTES
[facility]  [address] [place] [address]., [address]  [place] [address]  Phone: [phone]  Fax: [phone]    Patient's YOB: 1940    ASSESSMENT   Jayla was seen today for follow-up.    Diagnoses and all orders for this visit:    Radiculopathy, lumbar region  -     Gabapentin, Once-Daily, (GRALISE) 600 MG TABS; TAKE 2 TABLETS NIGHTLY  Indications: neuropathic pain    Radiculopathy, cervical region  -     Gabapentin, Once-Daily, (GRALISE) 600 MG TABS; TAKE 2 TABLETS NIGHTLY  Indications: neuropathic pain    Lumbar facet arthropathy    Annular tear         IMPRESSION AND PLAN:  Jayla Paulino is a 85 y.o. female with history of lumbar pain. Since last visit, pt feels her symptoms have continued at the same level. Patient is currently taking Gralise 600mg BID.    Patient was given information on hiatal hernia.   Patient was given information on healthy sleep.   Rf Gralise 600 mg BID for neuropathic symptoms.   Ms. Paulino has a reminder for a \"due or due soon\" health maintenance. I have asked that she contact her primary care provider, Anette Hayes MD, for follow-up on this health maintenance.   demonstrated consistency with prescribing.     Return in about 6 months (around 10/21/2025) for Medication follow up.      HISTORY OF PRESENT ILLNESS:  Jayla Paulino is a 85 y.o. female who presents to the office today for a follow up. At her last OV (2/19/24), Rf Gralise 600mg BID.    Patient presents here for follow-up. Patient continues on Gralise 600 mg, 2 tablets at 5:00 PM for relief, and last received a refill from Nhung Ravi APRN - NP on 3/2/25. Patient reports currently suffering with GI issues d/t a hiatal hernia. She notes having an appointment scheduled next week with a GI specialist for treatment. Patient reports taking Prilosec, which is not offering much relief.     Patient notes her 88 y.o.  suffers from dementia, and she

## 2025-04-21 ENCOUNTER — OFFICE VISIT (OUTPATIENT)
Age: 85
End: 2025-04-21
Payer: MEDICARE

## 2025-04-21 VITALS
OXYGEN SATURATION: 98 % | TEMPERATURE: 96.7 F | HEART RATE: 72 BPM | WEIGHT: 165 LBS | BODY MASS INDEX: 28.17 KG/M2 | HEIGHT: 64 IN

## 2025-04-21 DIAGNOSIS — M51.9 ANNULAR TEAR: ICD-10-CM

## 2025-04-21 DIAGNOSIS — M54.12 RADICULOPATHY, CERVICAL REGION: ICD-10-CM

## 2025-04-21 DIAGNOSIS — M47.816 LUMBAR FACET ARTHROPATHY: ICD-10-CM

## 2025-04-21 DIAGNOSIS — M54.16 RADICULOPATHY, LUMBAR REGION: Primary | ICD-10-CM

## 2025-04-21 PROCEDURE — G8417 CALC BMI ABV UP PARAM F/U: HCPCS | Performed by: PHYSICAL MEDICINE & REHABILITATION

## 2025-04-21 PROCEDURE — 1036F TOBACCO NON-USER: CPT | Performed by: PHYSICAL MEDICINE & REHABILITATION

## 2025-04-21 PROCEDURE — 1125F AMNT PAIN NOTED PAIN PRSNT: CPT | Performed by: PHYSICAL MEDICINE & REHABILITATION

## 2025-04-21 PROCEDURE — G8427 DOCREV CUR MEDS BY ELIG CLIN: HCPCS | Performed by: PHYSICAL MEDICINE & REHABILITATION

## 2025-04-21 PROCEDURE — 1160F RVW MEDS BY RX/DR IN RCRD: CPT | Performed by: PHYSICAL MEDICINE & REHABILITATION

## 2025-04-21 PROCEDURE — 1090F PRES/ABSN URINE INCON ASSESS: CPT | Performed by: PHYSICAL MEDICINE & REHABILITATION

## 2025-04-21 PROCEDURE — G8399 PT W/DXA RESULTS DOCUMENT: HCPCS | Performed by: PHYSICAL MEDICINE & REHABILITATION

## 2025-04-21 PROCEDURE — 1123F ACP DISCUSS/DSCN MKR DOCD: CPT | Performed by: PHYSICAL MEDICINE & REHABILITATION

## 2025-04-21 PROCEDURE — 1159F MED LIST DOCD IN RCRD: CPT | Performed by: PHYSICAL MEDICINE & REHABILITATION

## 2025-04-21 PROCEDURE — 99213 OFFICE O/P EST LOW 20 MIN: CPT | Performed by: PHYSICAL MEDICINE & REHABILITATION

## 2025-04-21 RX ORDER — GABAPENTIN 600 MG/1
TABLET, FILM COATED ORAL
Qty: 180 TABLET | Refills: 1 | Status: SHIPPED | OUTPATIENT
Start: 2025-05-30 | End: 2025-08-25

## 2025-05-12 ENCOUNTER — TELEPHONE (OUTPATIENT)
Facility: CLINIC | Age: 85
End: 2025-05-12

## 2025-05-12 NOTE — TELEPHONE ENCOUNTER
Patient contacted and notified that there isn't any availability for an earlier appt.   4/28/25 - Patient saw her GI doctor and they prescribed medication. Patient isn't having any relief and feels bloated and is having pain between her breasts. Pt stated there are days where she doesn't feel like eating. Please advise if patient needs to come into the office earlier or if she  should keep current appointment.     Please advise

## 2025-05-12 NOTE — TELEPHONE ENCOUNTER
----- Message from KAMILA CELIS LPN sent at 5/12/2025  3:43 PM EDT -----  Regarding: FW: [facility] Appointment Request    ----- Message -----  From: Dereck Espinosa  Sent: 5/12/2025   3:14 PM EDT  To: [facility] Clinical Staff  Subject: [facility] Appointment Request                          [facility] Appointment Request    Patient needs appointment for [facility] Appointment Type: Existing Condition Follow Up.    Patient Requested Dates(s):This week - except Wednesday  Patient Requested Time:Mornings  Provider Name:Anette Hayes MD    Reason for Appointment Request: Established Patient - Available appointments did not meet patient need. Wants to have a sooner appointment than the one that is currently set up  --------------------------------------------------------------------------------------------------------------------------    Relationship to Patient: Self     Call Back Information: OK to leave message on voicemail  Preferred Call Back Number: Phone [phone]

## 2025-05-15 ENCOUNTER — OFFICE VISIT (OUTPATIENT)
Facility: CLINIC | Age: 85
End: 2025-05-15
Payer: MEDICARE

## 2025-05-15 ENCOUNTER — HOSPITAL ENCOUNTER (OUTPATIENT)
Age: 85
Discharge: HOME | End: 2025-05-15

## 2025-05-15 VITALS
HEART RATE: 65 BPM | DIASTOLIC BLOOD PRESSURE: 71 MMHG | SYSTOLIC BLOOD PRESSURE: 114 MMHG | OXYGEN SATURATION: 97 % | BODY MASS INDEX: 32.59 KG/M2 | TEMPERATURE: 98.1 F | WEIGHT: 166 LBS | RESPIRATION RATE: 18 BRPM | HEIGHT: 60 IN

## 2025-05-15 DIAGNOSIS — R07.9 CHEST PAIN, UNSPECIFIED TYPE: ICD-10-CM

## 2025-05-15 DIAGNOSIS — M50.90 CERVICAL DISC DISEASE: ICD-10-CM

## 2025-05-15 DIAGNOSIS — R10.10 UPPER ABDOMINAL PAIN: Primary | ICD-10-CM

## 2025-05-15 DIAGNOSIS — R10.10 UPPER ABDOMINAL PAIN: ICD-10-CM

## 2025-05-15 DIAGNOSIS — R13.10 DYSPHAGIA, UNSPECIFIED TYPE: ICD-10-CM

## 2025-05-15 LAB
EKG ATRIAL RATE: 59 BPM
EKG DIAGNOSIS: NORMAL
EKG P AXIS: 43 DEGREES
EKG P-R INTERVAL: 188 MS
EKG Q-T INTERVAL: 452 MS
EKG QRS DURATION: 78 MS
EKG QTC CALCULATION (BAZETT): 447 MS
EKG R AXIS: -23 DEGREES
EKG T AXIS: 6 DEGREES
EKG VENTRICULAR RATE: 59 BPM

## 2025-05-15 PROCEDURE — 3078F DIAST BP <80 MM HG: CPT | Performed by: INTERNAL MEDICINE

## 2025-05-15 PROCEDURE — 99214 OFFICE O/P EST MOD 30 MIN: CPT | Performed by: INTERNAL MEDICINE

## 2025-05-15 PROCEDURE — G8427 DOCREV CUR MEDS BY ELIG CLIN: HCPCS | Performed by: INTERNAL MEDICINE

## 2025-05-15 PROCEDURE — G8399 PT W/DXA RESULTS DOCUMENT: HCPCS | Performed by: INTERNAL MEDICINE

## 2025-05-15 PROCEDURE — 1123F ACP DISCUSS/DSCN MKR DOCD: CPT | Performed by: INTERNAL MEDICINE

## 2025-05-15 PROCEDURE — 1036F TOBACCO NON-USER: CPT | Performed by: INTERNAL MEDICINE

## 2025-05-15 PROCEDURE — G8417 CALC BMI ABV UP PARAM F/U: HCPCS | Performed by: INTERNAL MEDICINE

## 2025-05-15 PROCEDURE — 3074F SYST BP LT 130 MM HG: CPT | Performed by: INTERNAL MEDICINE

## 2025-05-15 PROCEDURE — 1090F PRES/ABSN URINE INCON ASSESS: CPT | Performed by: INTERNAL MEDICINE

## 2025-05-15 RX ORDER — SUCRALFATE 1 G/1
1 TABLET ORAL 3 TIMES DAILY
Qty: 90 TABLET | Refills: 0 | Status: SHIPPED | OUTPATIENT
Start: 2025-05-15 | End: 2025-05-20 | Stop reason: ALTCHOICE

## 2025-05-15 RX ORDER — DULOXETIN HYDROCHLORIDE 60 MG/1
CAPSULE, DELAYED RELEASE ORAL
Qty: 30 CAPSULE | Refills: 5 | Status: SHIPPED | OUTPATIENT
Start: 2025-05-15

## 2025-05-15 RX ORDER — DULOXETIN HYDROCHLORIDE 30 MG/1
30 CAPSULE, DELAYED RELEASE ORAL DAILY
Qty: 30 CAPSULE | Refills: 5 | Status: SHIPPED | OUTPATIENT
Start: 2025-05-15

## 2025-05-15 RX ORDER — PANTOPRAZOLE SODIUM 40 MG/1
40 TABLET, DELAYED RELEASE ORAL 2 TIMES DAILY
COMMUNITY
Start: 2025-04-28

## 2025-05-15 NOTE — PROGRESS NOTES
Jayla ELIEZER Paulino presents today for   Chief Complaint   Patient presents with    GI Problem     Upper GI problem started last month. Went to see he GI doctor. Patient was prescribed protonix    Shoulder Pain     Started yesterday. The pain goes down into her hands. Constant pain. Hands tingle at times.           \"Have you been to the ER, urgent care clinic since your last visit?  Hospitalized since your last visit?\"    NO    “Have you seen or consulted any other health care providers outside of [facility] since your last visit?”    NO

## 2025-05-15 NOTE — PROGRESS NOTES
[facility] OF [facility]:  5/21/2025, MRN: [id number]      Jayla Paulino is a 85 y.o. female and presents to clinic for GI Problem (Upper GI problem started last month. Went to see he GI doctor. Patient was prescribed protonix) and Shoulder Pain (Started yesterday. The pain goes down into her hands. Constant pain. Hands tingle at times.)      Subjective:   The patient is a 85-year-old female with history of hypertension, LGSIL 4/19, osteopenia, tubular adenomas colon polyps, obesity, vitamin D deficiency, stable pulmonary nodules, prediabetes, chronic right rotator (declining injection, followed by Orthopedics), small hiatal hernia, NAFLD, asthma (followed by ), cervical disc disease, allergic rhinitis (declining allergy testing and immunotherapy), and lumbar disc disease (followed by Orthopedics, ).      1. Abdominal Pain: She began last month having upper abdominal pain. She met with GI in late April. She has had continued abdominal pain. She is taking gaviscon. Pain is constant. +Bloating. +Nausea. \"A couple of days, it feels like the food didn't go down.\" She has not had any relief with taking protonix 40mg bid, ordered by GI. +H/o hiatal hernia. No emesis. No bleeding. No urinary sx. Bms are normal. Pain is triggered by po intake. She follows up with GI late this month or early next month.    2. Left Shoulder and Chest Pain, HTN, and Chronic Neck/Lower Back Pain: +H/o lumbar disc disease. Sx began yesterday. She is having left chest pain with her sx. No triggers are known. Pain radiates down her hands. Pain is constant. +Paresthesias in her hands. No SOB but \"it was hurting to breathe this morning.\"  No cough. Sx are not exertional. She has tingling in all of her left hand fingers. No vision/hearing changes. No headaches today but she had one 2 days ago - but it was mild and she attributed it to allergies. BP is 114/71. Taking: HCTZ 12.5 mg daily for BP control.  +H/o cervical disc

## 2025-05-16 DIAGNOSIS — M50.90 CERVICAL DISC DISEASE: ICD-10-CM

## 2025-05-16 RX ORDER — DULOXETIN HYDROCHLORIDE 60 MG/1
CAPSULE, DELAYED RELEASE ORAL
Refills: 0 | OUTPATIENT
Start: 2025-05-16

## 2025-05-19 ENCOUNTER — HOSPITAL ENCOUNTER (OUTPATIENT)
Age: 85
Discharge: HOME | End: 2025-05-22
Payer: MEDICARE

## 2025-05-19 DIAGNOSIS — K86.89 PANCREATIC MASS: Primary | ICD-10-CM

## 2025-05-19 DIAGNOSIS — R10.10 UPPER ABDOMINAL PAIN: ICD-10-CM

## 2025-05-19 LAB — CREAT UR-MCNC: 0.7 MG/DL (ref 0.6–1.3)

## 2025-05-19 PROCEDURE — 2500000003 HC RX 250 WO HCPCS: Performed by: INTERNAL MEDICINE

## 2025-05-19 PROCEDURE — 6360000004 HC RX CONTRAST MEDICATION: Performed by: INTERNAL MEDICINE

## 2025-05-19 PROCEDURE — 82565 ASSAY OF CREATININE: CPT

## 2025-05-19 PROCEDURE — 74160 CT ABDOMEN W/CONTRAST: CPT

## 2025-05-19 RX ORDER — IOPAMIDOL 612 MG/ML
100 INJECTION, SOLUTION INTRAVASCULAR
Status: COMPLETED | OUTPATIENT
Start: 2025-05-19 | End: 2025-05-19

## 2025-05-19 RX ADMIN — BARIUM SULFATE 900 ML: 20 SUSPENSION ORAL at 16:35

## 2025-05-19 RX ADMIN — IOPAMIDOL 100 ML: 612 INJECTION, SOLUTION INTRAVENOUS at 16:36

## 2025-05-20 ENCOUNTER — OFFICE VISIT (OUTPATIENT)
Facility: CLINIC | Age: 85
End: 2025-05-20
Payer: MEDICARE

## 2025-05-20 ENCOUNTER — RESULTS FOLLOW-UP (OUTPATIENT)
Facility: CLINIC | Age: 85
End: 2025-05-20

## 2025-05-20 VITALS
OXYGEN SATURATION: 92 % | DIASTOLIC BLOOD PRESSURE: 82 MMHG | RESPIRATION RATE: 17 BRPM | HEART RATE: 77 BPM | WEIGHT: 165 LBS | SYSTOLIC BLOOD PRESSURE: 139 MMHG | TEMPERATURE: 98.6 F | BODY MASS INDEX: 32.39 KG/M2 | HEIGHT: 60 IN

## 2025-05-20 DIAGNOSIS — K86.89 PANCREATIC MASS: Primary | ICD-10-CM

## 2025-05-20 PROCEDURE — 3075F SYST BP GE 130 - 139MM HG: CPT | Performed by: INTERNAL MEDICINE

## 2025-05-20 PROCEDURE — G8417 CALC BMI ABV UP PARAM F/U: HCPCS | Performed by: INTERNAL MEDICINE

## 2025-05-20 PROCEDURE — 1125F AMNT PAIN NOTED PAIN PRSNT: CPT | Performed by: INTERNAL MEDICINE

## 2025-05-20 PROCEDURE — G8399 PT W/DXA RESULTS DOCUMENT: HCPCS | Performed by: INTERNAL MEDICINE

## 2025-05-20 PROCEDURE — 1090F PRES/ABSN URINE INCON ASSESS: CPT | Performed by: INTERNAL MEDICINE

## 2025-05-20 PROCEDURE — G8428 CUR MEDS NOT DOCUMENT: HCPCS | Performed by: INTERNAL MEDICINE

## 2025-05-20 PROCEDURE — 1036F TOBACCO NON-USER: CPT | Performed by: INTERNAL MEDICINE

## 2025-05-20 PROCEDURE — 3079F DIAST BP 80-89 MM HG: CPT | Performed by: INTERNAL MEDICINE

## 2025-05-20 PROCEDURE — 99213 OFFICE O/P EST LOW 20 MIN: CPT | Performed by: INTERNAL MEDICINE

## 2025-05-20 PROCEDURE — 1123F ACP DISCUSS/DSCN MKR DOCD: CPT | Performed by: INTERNAL MEDICINE

## 2025-05-20 ASSESSMENT — ENCOUNTER SYMPTOMS
SHORTNESS OF BREATH: 0
BACK PAIN: 1
SORE THROAT: 0
BLOOD IN STOOL: 0
ABDOMINAL PAIN: 1
ANAL BLEEDING: 0
EYE PAIN: 0

## 2025-05-20 NOTE — PROGRESS NOTES
Jayla Paulino presents today for   Chief Complaint   Patient presents with    Discuss Results     BP recheck: 139/82      \"Have you been to the ER, urgent care clinic since your last visit?  Hospitalized since your last visit?\"    NO    “Have you seen or consulted any other health care providers outside of [facility] since your last visit?”    NO             
VOA referral sent w/return confirmation.      
Pregnant or age 60 yrs+ (1 - 1-dose 75+ series) Never done    Lipids  03/06/2025    COVID-19 Vaccine (7 - 2024-25 season) 03/19/2025         Lab review: labs are reviewed in the EHR    I have discussed the diagnosis with the patient and the intended plan as seen in the above orders.  The patient has received an after-visit summary and questions were answered concerning future plans.  I have discussed medication side effects and warnings with the patient as well. I have reviewed the plan of care with the patient, accepted their input and they are in agreement with the treatment goals. All questions were answered. The patient understands the plan of care. Handouts provided today with above information. Pt instructed if symptoms worsen to call the office or report to the ED for continued care.  Greater than 50% of the visit time was spent in counseling and/or coordination of care.  I spent 25 minutes with the patient for this encounter as mentioned above.    Voice recognition was used to generate this report, which may have resulted in some phonetic based errors in grammar and contents. Even though attempts were made to correct all the mistakes, some may have been missed, and remained in the body of the document.      No follow-up provider specified.    Anette Hayes MD

## 2025-05-20 NOTE — PROGRESS NOTES
Please have her scheduled to see [facility] -  urgently and GI ( urgently). 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 Please make sure she is called tfirst thing tomorrow (5/20/25) and asked to come in 5/20/25 at 1:20pm to discuss her CT results. I need to speak urgently with her about her CT findings prior to her finding out about her urgent referrals and apts.       Dr. Anette Hayes  [facility] [address], [address]  [place] [address]  Phone: [phone]  Fax: [phone]

## 2025-05-20 NOTE — PROGRESS NOTES
Pt called and scheduled for today @ 1:20. Pt scheduled for Dr. Pretty 05/27/25 Will call [facility] again to officially confirm appt due to pt not being notified of results as of yet and avoid [facility] calling the pt ahead of being seen by provider.    MEKHI Hadley LPN

## 2025-05-20 NOTE — TELEPHONE ENCOUNTER
----- Message from Dr. Anette Hayes MD sent at 5/19/2025  7:58 PM EDT -----  Regarding: Apt needed  Have her come in at 1:20pm for an urgent apt to discuss her CT results 5/20/25.       Respectfully,  Dr. Anette Hayes  [facility] [address], [address]  [place] [address]  Phone: [phone]  Fax: [phone]  ----- Message -----  From: Dash Francois Incoming Orders Results To [facility]iant  Sent: 5/19/2025   6:45 PM EDT  To: Anette Hayes MD  
Called pt to schedule per provider request. Pt confirmed 1:20.    MEKHI Hadley LPN  
No

## 2025-05-21 ASSESSMENT — ENCOUNTER SYMPTOMS
EYE PAIN: 0
BACK PAIN: 1
ANAL BLEEDING: 0
SORE THROAT: 0
BLOOD IN STOOL: 0
SHORTNESS OF BREATH: 0
ABDOMINAL PAIN: 1
COUGH: 0

## 2025-05-27 ENCOUNTER — PATIENT MESSAGE (OUTPATIENT)
Facility: CLINIC | Age: 85
End: 2025-05-27

## 2025-05-27 ENCOUNTER — TELEPHONE (OUTPATIENT)
Facility: CLINIC | Age: 85
End: 2025-05-27

## 2025-05-27 NOTE — TELEPHONE ENCOUNTER
The patient called back to let Dr. Hayes know that her biopsy is scheduled for tomorrow 05/28 with Dr. Deleon.

## 2025-05-30 ENCOUNTER — TELEPHONE (OUTPATIENT)
Facility: CLINIC | Age: 85
End: 2025-05-30

## 2025-05-30 NOTE — TELEPHONE ENCOUNTER
[facility] called in  states they need the last OV note and and radiography results of the pelvis/abdomin sent in for referral .     Fax# [phone] Attn. Bhakti

## 2025-06-07 DIAGNOSIS — M50.90 CERVICAL DISC DISEASE: ICD-10-CM

## 2025-06-09 RX ORDER — DULOXETIN HYDROCHLORIDE 60 MG/1
CAPSULE, DELAYED RELEASE ORAL
Qty: 90 CAPSULE | Refills: 3 | Status: SHIPPED | OUTPATIENT
Start: 2025-06-09

## 2025-06-09 RX ORDER — DULOXETIN HYDROCHLORIDE 30 MG/1
CAPSULE, DELAYED RELEASE ORAL
Qty: 90 CAPSULE | Refills: 3 | Status: SHIPPED | OUTPATIENT
Start: 2025-06-09

## 2025-06-09 NOTE — TELEPHONE ENCOUNTER
Duplicate request Duloxetine both strength was sent to the pharmacy 5/ 15/2025 with 5 refills.     Please refuse.

## 2025-06-18 ENCOUNTER — PATIENT MESSAGE (OUTPATIENT)
Facility: CLINIC | Age: 85
End: 2025-06-18

## 2025-06-23 ENCOUNTER — TRANSCRIBE ORDERS (OUTPATIENT)
Facility: HOSPITAL | Age: 85
End: 2025-06-23

## 2025-06-23 DIAGNOSIS — K86.89 PANCREATIC MASS: Primary | ICD-10-CM

## 2025-06-24 ENCOUNTER — HOSPITAL ENCOUNTER (OUTPATIENT)
Age: 85
Discharge: HOME | End: 2025-06-27
Payer: MEDICARE

## 2025-06-24 DIAGNOSIS — K86.89 PANCREATIC MASS: ICD-10-CM

## 2025-06-24 PROCEDURE — 74177 CT ABD & PELVIS W/CONTRAST: CPT

## 2025-06-24 PROCEDURE — 6360000004 HC RX CONTRAST MEDICATION: Performed by: INTERNAL MEDICINE

## 2025-06-24 RX ORDER — IOPAMIDOL 612 MG/ML
100 INJECTION, SOLUTION INTRAVASCULAR
Status: COMPLETED | OUTPATIENT
Start: 2025-06-24 | End: 2025-06-24

## 2025-06-24 RX ADMIN — IOPAMIDOL 100 ML: 612 INJECTION, SOLUTION INTRAVENOUS at 13:19

## 2025-06-25 ENCOUNTER — OFFICE VISIT (OUTPATIENT)
Facility: CLINIC | Age: 85
End: 2025-06-25
Payer: MEDICARE

## 2025-06-25 VITALS
BODY MASS INDEX: 31.61 KG/M2 | HEART RATE: 78 BPM | RESPIRATION RATE: 18 BRPM | SYSTOLIC BLOOD PRESSURE: 124 MMHG | WEIGHT: 161 LBS | DIASTOLIC BLOOD PRESSURE: 75 MMHG | OXYGEN SATURATION: 96 % | HEIGHT: 60 IN | TEMPERATURE: 97.2 F

## 2025-06-25 DIAGNOSIS — C25.0 MALIGNANT NEOPLASM OF HEAD OF PANCREAS (HCC): Primary | ICD-10-CM

## 2025-06-25 PROCEDURE — 1090F PRES/ABSN URINE INCON ASSESS: CPT | Performed by: INTERNAL MEDICINE

## 2025-06-25 PROCEDURE — 99213 OFFICE O/P EST LOW 20 MIN: CPT | Performed by: INTERNAL MEDICINE

## 2025-06-25 PROCEDURE — 1036F TOBACCO NON-USER: CPT | Performed by: INTERNAL MEDICINE

## 2025-06-25 PROCEDURE — 3078F DIAST BP <80 MM HG: CPT | Performed by: INTERNAL MEDICINE

## 2025-06-25 PROCEDURE — 1160F RVW MEDS BY RX/DR IN RCRD: CPT | Performed by: INTERNAL MEDICINE

## 2025-06-25 PROCEDURE — G8417 CALC BMI ABV UP PARAM F/U: HCPCS | Performed by: INTERNAL MEDICINE

## 2025-06-25 PROCEDURE — G8427 DOCREV CUR MEDS BY ELIG CLIN: HCPCS | Performed by: INTERNAL MEDICINE

## 2025-06-25 PROCEDURE — 1123F ACP DISCUSS/DSCN MKR DOCD: CPT | Performed by: INTERNAL MEDICINE

## 2025-06-25 PROCEDURE — G8399 PT W/DXA RESULTS DOCUMENT: HCPCS | Performed by: INTERNAL MEDICINE

## 2025-06-25 PROCEDURE — 3074F SYST BP LT 130 MM HG: CPT | Performed by: INTERNAL MEDICINE

## 2025-06-25 PROCEDURE — 1159F MED LIST DOCD IN RCRD: CPT | Performed by: INTERNAL MEDICINE

## 2025-06-25 ASSESSMENT — ENCOUNTER SYMPTOMS
EYE PAIN: 0
COUGH: 0
SORE THROAT: 0
ABDOMINAL PAIN: 1
BLOOD IN STOOL: 0
ANAL BLEEDING: 0
BACK PAIN: 1
SHORTNESS OF BREATH: 0
NAUSEA: 1

## 2025-06-25 NOTE — PROGRESS NOTES
Jayla Paulino presents today for   Chief Complaint   Patient presents with    Follow-up     Discuss imaging results           \"Have you been to the ER, urgent care clinic since your last visit?  Hospitalized since your last visit?\"    NO    “Have you seen or consulted any other health care providers outside of [facility] since your last visit?”    NO

## 2025-06-25 NOTE — PROGRESS NOTES
[facility]:  6/25/2025, MRN: [id number]      Jayla Paulino is a 85 y.o. female and presents to clinic for Follow-up (Discuss imaging results)      Subjective:   The patient is a 85-year-old female with history of hypertension, LGSIL 4/19, osteopenia, tubular adenomas colon polyps, obesity, vitamin D deficiency, stable pulmonary nodules, prediabetes, chronic right rotator (declining injection, followed by Orthopedics), small hiatal hernia, NAFLD, asthma (followed by ), cervical disc disease, allergic rhinitis (declining allergy testing and immunotherapy), and lumbar disc disease (followed by Orthopedics, ).      Pancreatic Cancer: New diagnosis.  Followed by .  May lab work shows a normal set of electrolytes.  Her renal function is normal.  Her blood counts this month are normal. She has yet to schedule chemotherapy. She is scheduled July 1 with Oncology. Her stomach hurts every day. Her weight is down to 161lbs from 165lbs last month. It was 170lbs in April. She feels full. +Protonix 40mg daily. Her pain is tolerable. +H/o prediabetes. She rarely takes tramadol 50mg prn. She had it since having her mediport placed. She has yet to take tramadol though. She is still caring for her . +Insomnia.  She is still caring for her  with dementia. She is applying for more help with caring for her  from the [facility].  She is scheduled with a dietician soon as well.     6/24/25 Chest/Abdomen/Pelvis CT: Redemonstration of the pancreatic head mass, currently 26 x 24 mm (similar to 05/19/25).  Associated with post-obstructive changes to the pancreatic duct.  No definite associated peripancreatic adenopathy. No convincing evidence of hepatic mass. Small lung nodules; similar to prior study.     [facility] TransportationProv[facility] Hospital05/31/2025  Component 05/31/2025       CA 19-9 [id number] (high)        5/28/25 Pancreatic Bx: POSITIVE FOR MALIGNANCY.  Moderately differentiated adenocarcinoma.

## 2025-07-05 DIAGNOSIS — I10 ESSENTIAL (PRIMARY) HYPERTENSION: ICD-10-CM

## 2025-07-05 DIAGNOSIS — E78.5 HYPERLIPIDEMIA, UNSPECIFIED HYPERLIPIDEMIA TYPE: ICD-10-CM

## 2025-07-07 RX ORDER — HYDROCHLOROTHIAZIDE 12.5 MG/1
12.5 TABLET ORAL EVERY MORNING
Qty: 90 TABLET | Refills: 3 | Status: SHIPPED | OUTPATIENT
Start: 2025-07-07

## 2025-07-07 RX ORDER — PRAVASTATIN SODIUM 40 MG
40 TABLET ORAL DAILY
Qty: 90 TABLET | Refills: 3 | Status: SHIPPED | OUTPATIENT
Start: 2025-07-07

## 2025-07-07 NOTE — TELEPHONE ENCOUNTER
PCP: Anette Hayes MD    Last appt: 06/25/2025  Last RX:01/17/2025 Hydrochlorothiazide, Pravastatin    Future Appointments   Date Time Provider Department Center   7/28/2025 11:20 AM Anette Hayes MD [facility]   10/27/2025 11:00 AM Erlinda Torres MD [facility]       Requested Prescriptions     Pending Prescriptions Disp Refills    hydroCHLOROthiazide 12.5 MG tablet [Pharmacy Med Name: HYDROCHLOROTHIAZIDE TABS 12.5MG] 90 tablet 3     Sig: TAKE 1 TABLET EVERY MORNING    pravastatin (PRAVACHOL) 40 MG tablet [Pharmacy Med Name: PRAVASTATIN TABS 40MG] 90 tablet 3     Sig: TAKE 1 TABLET DAILY

## 2025-07-09 ENCOUNTER — PATIENT MESSAGE (OUTPATIENT)
Facility: CLINIC | Age: 85
End: 2025-07-09

## 2025-07-16 DIAGNOSIS — M50.90 CERVICAL DISC DISEASE: ICD-10-CM

## 2025-07-18 RX ORDER — DULOXETIN HYDROCHLORIDE 30 MG/1
30 CAPSULE, DELAYED RELEASE ORAL DAILY
Qty: 90 CAPSULE | Refills: 3 | Status: SHIPPED | OUTPATIENT
Start: 2025-07-18

## 2025-07-18 RX ORDER — DULOXETIN HYDROCHLORIDE 60 MG/1
CAPSULE, DELAYED RELEASE ORAL
Qty: 90 CAPSULE | Refills: 3 | Status: SHIPPED | OUTPATIENT
Start: 2025-07-18

## 2025-07-29 ENCOUNTER — OFFICE VISIT (OUTPATIENT)
Facility: CLINIC | Age: 85
End: 2025-07-29

## 2025-07-29 VITALS
HEART RATE: 73 BPM | RESPIRATION RATE: 18 BRPM | TEMPERATURE: 97.8 F | SYSTOLIC BLOOD PRESSURE: 107 MMHG | OXYGEN SATURATION: 96 % | WEIGHT: 157 LBS | DIASTOLIC BLOOD PRESSURE: 65 MMHG | BODY MASS INDEX: 30.82 KG/M2 | HEIGHT: 60 IN

## 2025-07-29 DIAGNOSIS — E78.5 HYPERLIPIDEMIA, UNSPECIFIED HYPERLIPIDEMIA TYPE: ICD-10-CM

## 2025-07-29 DIAGNOSIS — R73.9 HYPERGLYCEMIA: ICD-10-CM

## 2025-07-29 DIAGNOSIS — I10 ESSENTIAL (PRIMARY) HYPERTENSION: Primary | ICD-10-CM

## 2025-07-29 DIAGNOSIS — C25.0 MALIGNANT NEOPLASM OF HEAD OF PANCREAS (HCC): ICD-10-CM

## 2025-07-29 ASSESSMENT — ENCOUNTER SYMPTOMS
COUGH: 0
EYE PAIN: 0
BLOOD IN STOOL: 0
ANAL BLEEDING: 0
ABDOMINAL PAIN: 0
SORE THROAT: 0

## 2025-07-29 NOTE — PROGRESS NOTES
[facility]:  7/29/2025, MRN: [id number]      Jayla Paulino is a 85 y.o. female and presents to clinic for Hypertension      Subjective:   The patient is a 85-year-old female with history of hypertension, LGSIL 4/19, osteopenia, tubular adenomas colon polyps, obesity, vitamin D deficiency, stable pulmonary nodules, prediabetes, chronic right rotator (declining injection, followed by Orthopedics), small hiatal hernia, NAFLD, asthma (followed by ), cervical disc disease, allergic rhinitis (declining allergy testing and immunotherapy), and lumbar disc disease (followed by Orthopedics, ).       1.  Pancreatic cancer: Diagnosed this year.  Followed by [facility].  Undergoing chemotherapy.  Her weight today is down to 157 pounds.  Noted it was 165 pounds in May.  Earlier this year she was referred to a nutritionist.  Today she reports: \"I am eating better.\"  Taking chemotherapy.  Next imaging study: August (late).  Next appointment with oncology: she met with [facility] yesterday.  Nausea/vomiting: none. Abdominal pain: none. Pain control: just tylenol; she has tramadol at home.  She is on protonix 40mg bid. No bleeding hx. Her 7/28/25 [facility] labs show that her WBC is 7.6. Hgb is 11.3. Her platelet count is 493.  She met with the nutritionist and was told to eat anything she wants to eat. \"I feel tired a lot like I don't have energy.\" No fever. No cough.  +Right sided mediport.    2.  Prediabetes/HLD: Her A1c was 5.8 when checked in November 2024.  She has not had her cholesterol checked in over a year.  She is on pravastatin 40 mg daily.    3.  Hypertension: Blood pressure is 107/65 on HCTZ 12.5 mg daily. No SOB or CP. No lightheadedness. +Headaches.       Patient Active Problem List    Diagnosis Date Noted    Malignant neoplasm of head of pancreas (HCC) 06/25/2025    NAFLD (nonalcoholic fatty liver disease) 02/06/2022    Hiatal hernia 02/06/2022    Severe obesity (HCC)

## 2025-07-29 NOTE — PROGRESS NOTES
Jayla Paulino presents today for   Chief Complaint   Patient presents with    Hypertension           \"Have you been to the ER, urgent care clinic since your last visit?  Hospitalized since your last visit?\"    NO    “Have you seen or consulted any other health care providers outside of [facility] since your last visit?”    NO

## 2025-08-17 ENCOUNTER — PATIENT MESSAGE (OUTPATIENT)
Facility: CLINIC | Age: 85
End: 2025-08-17

## 2025-08-28 ENCOUNTER — TRANSCRIBE ORDERS (OUTPATIENT)
Facility: HOSPITAL | Age: 85
End: 2025-08-28

## 2025-08-28 DIAGNOSIS — C25.3 MALIGNANT NEOPLASM OF PANCREATIC DUCT (HCC): Primary | ICD-10-CM

## (undated) DEVICE — SYR 10ML LUER LOK 1/5ML GRAD --

## (undated) DEVICE — ENDOSCOPY PUMP TUBING/ CAP SET: Brand: ERBE

## (undated) DEVICE — FLEX ADVANTAGE 3000CC: Brand: FLEX ADVANTAGE

## (undated) DEVICE — SOLUTION IRRIG 1000ML H2O STRL BLT

## (undated) DEVICE — SNARE VASC L240CM LOOP W10MM SHTH DIA2.4MM RND STIFF CLD

## (undated) DEVICE — CANNULA ORIG TL CLR W FOAM CUSHIONS AND 14FT SUPL TB 3 CHN

## (undated) DEVICE — MEDI-VAC NON-CONDUCTIVE SUCTION TUBING: Brand: CARDINAL HEALTH

## (undated) DEVICE — FLUFF AND POLYMER UNDERPAD,EXTRA HEAVY: Brand: WINGS

## (undated) DEVICE — AIRLIFE™ NASAL OXYGEN CANNULA CURVED, NONFLARED TIP WITH 14 FOOT (4.3 M) CRUSH-RESISTANT TUBING, OVER-THE-EAR STYLE: Brand: AIRLIFE™

## (undated) DEVICE — CATHETER SUCT TR FL TIP 14FR W/ O CTRL

## (undated) DEVICE — SNARE POLYP M W27MMXL240CM OVL STIFF DISP CAPTIVATOR

## (undated) DEVICE — MEDI-VAC SUCTION HIGH CAPACITY: Brand: CARDINAL HEALTH

## (undated) DEVICE — (D)GLOVE EXAM LG NITRL NS -- DISC BY MFR NO SUB

## (undated) DEVICE — SYRINGE MED 25GA 3ML L5/8IN SUBQ PLAS W/ DETACH NDL SFTY

## (undated) DEVICE — GAUZE SPONGES,16 PLY: Brand: CURITY

## (undated) DEVICE — SYRINGE MED 20ML STD CLR PLAS LUERLOCK TIP N CTRL DISP

## (undated) DEVICE — FORCEPS BX L240CM JAW DIA2.8MM L CAP W/ NDL MIC MESH TOOTH

## (undated) DEVICE — SYR 50ML SLIP TIP NSAF LF STRL --

## (undated) DEVICE — GOWN ISOL IMPERV UNIV, DISP, OPEN BACK, BLUE --